# Patient Record
Sex: FEMALE | Race: BLACK OR AFRICAN AMERICAN | Employment: OTHER | URBAN - METROPOLITAN AREA
[De-identification: names, ages, dates, MRNs, and addresses within clinical notes are randomized per-mention and may not be internally consistent; named-entity substitution may affect disease eponyms.]

---

## 2017-01-04 ENCOUNTER — TELEPHONE (OUTPATIENT)
Dept: NEUROLOGY | Age: 72
End: 2017-01-04

## 2017-01-13 ENCOUNTER — DOCUMENTATION ONLY (OUTPATIENT)
Dept: NEUROLOGY | Age: 72
End: 2017-01-13

## 2017-01-13 NOTE — PROGRESS NOTES
Tried to fax notes several time to Dr Brielle Kong, but line is consistently busy.  Documenting this has been attempted

## 2018-08-20 ENCOUNTER — HOSPITAL ENCOUNTER (OUTPATIENT)
Dept: NUCLEAR MEDICINE | Age: 73
Discharge: HOME | End: 2018-08-20
Attending: ORTHOPAEDIC SURGERY
Payer: MEDICARE

## 2018-08-20 DIAGNOSIS — M25.562 LEFT KNEE PAIN: ICD-10-CM

## 2018-08-20 DIAGNOSIS — M25.561 RIGHT KNEE PAIN: ICD-10-CM

## 2018-08-20 PROCEDURE — 78315 BONE IMAGING 3 PHASE: CPT

## 2018-08-22 ENCOUNTER — OFFICE VISIT (OUTPATIENT)
Dept: RHEUMATOLOGY | Age: 73
End: 2018-08-22

## 2018-08-22 VITALS
TEMPERATURE: 97.8 F | DIASTOLIC BLOOD PRESSURE: 78 MMHG | HEART RATE: 68 BPM | HEIGHT: 65 IN | BODY MASS INDEX: 32.82 KG/M2 | SYSTOLIC BLOOD PRESSURE: 159 MMHG | RESPIRATION RATE: 20 BRPM | WEIGHT: 197 LBS | OXYGEN SATURATION: 98 %

## 2018-08-22 DIAGNOSIS — D47.2 MGUS (MONOCLONAL GAMMOPATHY OF UNKNOWN SIGNIFICANCE): ICD-10-CM

## 2018-08-22 DIAGNOSIS — E55.9 VITAMIN D DEFICIENCY: ICD-10-CM

## 2018-08-22 DIAGNOSIS — M85.89 OSTEOPENIA OF MULTIPLE SITES: ICD-10-CM

## 2018-08-22 DIAGNOSIS — M06.9 RHEUMATOID ARTHRITIS WITH UNKNOWN RHEUMATOID FACTOR STATUS (HCC): Primary | ICD-10-CM

## 2018-08-22 DIAGNOSIS — M89.9 DISORDER OF BONE: ICD-10-CM

## 2018-08-22 DIAGNOSIS — Z79.60 LONG-TERM USE OF IMMUNOSUPPRESSANT MEDICATION: ICD-10-CM

## 2018-08-22 RX ORDER — METFORMIN HYDROCHLORIDE 500 MG/1
500 TABLET ORAL 2 TIMES DAILY WITH MEALS
COMMUNITY
End: 2018-09-28 | Stop reason: SDUPTHER

## 2018-08-22 RX ORDER — GLUCOSAMINE SULFATE 1500 MG
POWDER IN PACKET (EA) ORAL DAILY
COMMUNITY
End: 2018-08-28 | Stop reason: DRUGHIGH

## 2018-08-22 RX ORDER — PREDNISONE 5 MG/1
TABLET ORAL
Qty: 91 TAB | Refills: 0 | Status: SHIPPED | OUTPATIENT
Start: 2018-08-22 | End: 2018-09-21

## 2018-08-22 NOTE — PROGRESS NOTES
REASON FOR VISIT    This is the initial evaluation for Mr. Merced Downs a 81 y.o.  male for question of an inflammatory arthritis. The patient is referred to the [facility] at the request of . No ref. provider found. HISTORY OF PRESENT ILLNESS      I have reviewed and summarized old records from Shawna Baker, [facility], Godwin Galeazzi, NP office notes    She has had bilateral knee replacements, which appears to have been due to Rheumatoid Arthritis. In 8/18/2010, Bilateral Knee radiographs showed irregularity along the patella surface where there is a scalloped margin suggesting liner wear. In addition, the cement seen along the bone prosthetic interface of the anterior distal femoral cortex is less apparent, which also may reflect underlying liner wear and possibly early particulate disease. Heterotopic ossification has not changed in interval. Vascular calcifications reflecting atherosclerotic disease remains severe. Diffuse osteopenia is noted. In 9/27/2010, she saw [address] [place] [address] rheumatology. Bilateral Hand radiographs showed  Overall alignment is anatomic. The bones are demineralized overall. The joint spaces are relatively preserved throughout. Relatively mild degenerative changes are present in the DIP joints, and in the first CMC joints. There appears to be some mild soft tissue swelling overlying the right MCP joints. There are also several equivocal erosions noted involving the metacarpal heads of the right hand which raise the possibility of early erosions related to inflammatory arthritis such as rheumatoid. These changes appear slightly more conspicuous and the 2008 examination. Incidentally, there are fairly prominent vascular calcifications present indicating that the patient is likely diabetic or perhaps as renal disease    In 10/28/2010, she was started on methotrexate     In 8/03/2011, Right Hip radiograph showed there is diffuse osteopenia.  Degenerative changes noted in the lower lumbar spine. The SI joints and pubic symphysis are not widened. The right and left hip are normally aligned. The right hip demonstrates mild osteophyte formation most notable at the inferior aspect of the joint. There is minimal diffuse joint space narrowing. Similar changes are noted in the left hip with minimal osteophyte formation. There is preservation of the left hip joint space. No acute fractures. Dense vascular calcifications are noted. Soft tissue calcification lateral to the right iliac wing is visualized and was noted on prior abdominal and pelvic CT scan. Hypertrophic changes anterior/superior left iliac wing and left greater trochanter as before. In 9/08/2011, Bilateral Shoulder radiograph showed RIGHT: no evidence of fracture or dislocation. Alignment of the glenohumeral joint is anatomic. The patient is status post distal clavicle resection and acromioplasty. A small amount of heterotopic ossification is noted about the distal aspect of the clavicle. Mild marginal osteophytes noted at the the glenoid. Irregularity along the superolateral aspect of the humeral head may reflect rotator cuff pathology. An ovoid well-corticated calcific density projecting over the humeral head may represent an intraventricular loose body. The visualized right lung is clear. Soft tissues are unremarkable. LEFT: no evidence of fracture or dislocation. Alignment of the glenohumeral and acromioclavicular joints is anatomic. Moderate superiorly projecting osteophytes emanate from the distal clavicle at the acromioclavicular joint. Bony hypertrophy irregularity about the greater tuberosity may indicate chronic rotator cuff pathology. There is a small subacromial spur. The visualized left lung is clear. Multiple mediastinal clips are noted. Bone mineralization is mildly diminished.     In 9/26/2011, CT Right Shoulder with arthrogram showed there is a large full-thickness tear seen involving the supraspinatus tendon extending to the level of the acromion. Additionally there is a partial tear of the subscapularis tendon. The biceps appears chronically torn. There are mild degenerative changes of the acromioclavicular joint. Some mild osteoarthritic changes are present of the glenohumeral joint. No evidence of fracture was seen. Im 5/04/2016, DXA showed (excluded L4 for spondylitic change, right hip) lumbar spine L1-L3 T score -1.5 (BMD 0.954 g/cm2), left femoral neck T score: -1.0 (0.805 g/cm2), left total hip T score: -0.1 (1.016 g/cm2), and distal one third left radius T score -1.9 (BMD 0.571 g/cm2). FRAX score 6.1 % probability in 10 years for major osteoporotic fracture and 0.3 % 10 year probability of hip fracture. In 6/21/2018, labs showed WBC 6.7, lymphocytes 2.7, Hct 38.4%, platelets 549,920, creatinine 0.85 mg/dL, eGFR 80, albumin 4.3 g/dL, ALK 68 U/L, ALT 11 U/L, AST 34 U/L, HbA1c 6.5%, negative SUPA IFA    In 8/20/2018, Triple Phase Scan showed Patient status post bilateral knee replacements. Phase 1 (blood flow): There is slight increased blood flow to the left knee. Phase 2 (blood pool/soft tissue): There is slight increased peritracheal activity left knee. Phase 3 (delayed bone): Patient status post right knee replacement which is within normal limits. Patient is status post left knee replacement. There are slight increased activity left tibial plateau. Today, she complains of aching pain in her hands, shoulders and knees (right more than left). Her hand and shoulder pain is worse at night and in the morning. She has stiffness lasting hours. She has swelling in her hands and knees. She is unable to make a fist and has pain walking. Hot water helps. Resting, rain, and cold makes it worse. She is Aleve without relief. Prednisone helped. She had seen another rheumatologist a while back and recommended an infusion but her insurance did not cover it.  She no longer follows with a rheumatologist.    Therapy History includes:    Current DMARD therapy includes: methotrexate 20 mg every Saturday  Prior DMARD therapy includes: none  The following DMARDs have been ineffective: none  The following DMARDs were stopped because of side effects: none    REVIEW OF SYSTEMS    A 15 point review of systems was performed and summarized below. The questionnaire was reviewed with the patient and scanned into the patient's medical record.     General: denies recent weight gain, recent weight loss, fatigue, weakness, fever, night sweats  Musculoskeletal: endorses joint pain, joint swelling, morning stiffness (lasting 120 minutes), muscle pain  Ears: denies ringing in ears, loss of hearing, deafness  Eyes: denies pain, redness, loss of vision, double vision, blurred vision, dryness, foreign body sensation  Mouth: denies sore tongue, oral ulcers, bleeding gums, loss of taste, dryness, increased dental caries  Nose: denies nosebleeds, loss of smell, nasal ulcers  Throat: denies frequent sore throats, hoarseness, difficulty in swallowing, pain in jaw while chewing  Neck: denies swollen glands, tender glands  Cardiopulmonary: denies pain in chest, irregular heart beat, sudden changes in heart beat, shortness of breath, difficulty breathing at night, dry cough, productive cough, coughing of blood, wheezing  Gastrointestinal: endorses heartburn, denies nausea, stomach pain relieved by food, vomiting of blood/\"coffee grounds\", jaundice, increasing constipation, persistent diarrhea, blood in stools, black stools  Genitourinary: denies nocturia, difficult urination, pain or burning on urination, blood in urine, cloudy urine, pus in urine, genital discharge, frequent urination, vaginal dryness, rash/ulcers, sexual difficulties  Hematologic: denies anemia, bleeding tendency, blood clots  Skin: endorses easy bruising, denies sun sensitive, rash, redness, hives, skin tightness, nodules/bumps, hair loss, color changes of hands or feet in the cold (Raynaud's)  Neurologic: endorses muscle weakness, numbness or tingling in hands/feet, denies headaches, dizziness,  memory loss  Psychiatric: denies depression, excessive worries, PTSD, Bipolar  Sleep: endorses poor sleep, difficulty falling asleep, difficulty staying asleep , denies snoring, apnea, daytime somnolence    PAST MEDICAL HISTORY    He has a past medical history of Arthritis; Diabetes ([address] High cholesterol; and Hypertension. He also has no past medical history of Difficult intubation; Malignant hyperthermia due to anesthesia; Nausea & vomiting; Pseudocholinesterase deficiency; or Unspecified adverse effect of anesthesia. FAMILY HISTORY    His family history includes Heart Disease in his mother. There is no history of Dementia, Cancer, or Seizures. SOCIAL HISTORY    He reports that he has never smoked. He has never used smokeless tobacco. He reports that he does not drink alcohol or use illicit drugs. GYNECOLOGIC HISTORY     2, Para 2, Living 2, Miscarriage 0  She denies severe pre-eclampsia, eclampsia or placental insufficiency    HEALTH MAINTENANCE    Immunizations    There is no immunization history on file for this patient. MEDICATIONS    Current Outpatient Prescriptions   Medication Sig Dispense Refill    cholecalciferol (VITAMIN D3) 1,000 unit cap Take  by mouth daily.  metFORMIN (GLUCOPHAGE) 500 mg tablet Take 500 mg by mouth two (2) times daily (with meals).  methotrexate (RHEUMATREX) 2.5 mg tablet dose pack Take 20 mg by mouth Every Saturday.  predniSONE (DELTASONE) 5 mg tablet 4 tabs daily for 7 days, 3 tabs for 7 days, 2 tabs for 14 days, 1 tab for 14 days 91 Tab 0    alendronate (FOSAMAX) 70 mg tablet Take 70 mg by mouth every seven (7) days.  aspirin delayed-release 81 mg tablet Take  by mouth daily.  benzonatate (TESSALON) 100 mg capsule Take 100 mg by mouth three (3) times daily as needed for Cough.       cyanocobalamin (VITAMIN B-12) 1,000 mcg sublingual tablet Take 1,000 mcg by mouth daily.  fluticasone (FLOVENT DISKUS) 50 mcg/actuation inhaler Take  by inhalation.  folic acid (FOLVITE) 1 mg tablet Take  by mouth daily.  furosemide (LASIX) 20 mg tablet Take  by mouth daily.  metoprolol tartrate (LOPRESSOR) 25 mg tablet Take  by mouth two (2) times a day.  nortriptyline (PAMELOR) 50 mg capsule Take 50 mg by mouth nightly.  simvastatin (ZOCOR) 20 mg tablet Take  by mouth nightly.  lisinopril (PRINIVIL, ZESTRIL) 10 mg tablet Take 20 mg by mouth daily.  FERROUS SULFATE, DRIED (IRON, DRIED, PO) Take  by mouth.  OMEPRAZOLE PO Take  by mouth daily.  diclofenac EC (VOLTAREN) 50 mg EC tablet Take 1 Tab by mouth two (2) times daily as needed for Pain. 20 Tab 0       ALLERGIES    Allergies   Allergen Reactions    Lisinopril Rash       PHYSICAL EXAMINATION    Visit Vitals    /78 (BP 1 Location: Left arm, BP Patient Position: Sitting)    Pulse 68    Temp 97.8 °F (36.6 °C) (Oral)    Resp 20    Ht 5' 5\" (1.651 m)    Wt 197 lb (89.4 kg)    SpO2 98%    BMI 32.78 kg/m2     Body mass index is 32.78 kg/(m^2). General: Patient is alert, oriented x 3, not in acute distress    HEENT:   Conjunctiva are not injected and appear moist, oral mucous membranes are moist, there are no ulcers present, there is no alopecia, neck is supple, there is no lymphadenopathy. Salivary glands are normal    Cardiovascular:  Heart is regular rate and rhythm, no murmurs. Chest:  Lungs are clear to auscultation bilaterally. Extremities:  Free of clubbing, cyanosis, edema, extremities well perfused. Neurological exam:  No focal sensory deficits, muscle strength is full in upper and lower extremities. Skin exam:  There are no rashes, no tophi, no psoriasis, no active Raynaud's, no livedo reticularis, no periungual erythema.     Musculoskeletal exam:  A comprehensive musculoskeletal exam was performed for all joints of each upper and lower extremity and assessed for swelling, tenderness and range of motion. Pertinent results are documented as below:    Decreased ROM of bilateral shoulders (right worse than left) due to pain  Bilateral knee replacements without effusion.   Bilateral ankle swelling with tenderness on the right    Z-Deformities:   no  [place] Deformities:  no  [place]ierre's Deformities:  no  Ulnar Deviation:   Yes (LEFT:3rd digit)  MCP Subluxation:  no    Joint Count 8/22/2018   Patient pain (0-100) 90   MHAQ 0.5   Left shoulder - Tender 1   Left shoulder - Swollen 1   Left elbow - Tender 1   Left elbow - Swollen 1   Left wrist- Swollen 1   Left 1st MCP - Tender 1   Left 1st MCP - Swollen 1   Left 2nd MCP - Tender 1   Left 2nd MCP - Swollen 1   Left 3rd MCP - Tender 1   Left 3rd MCP - Swollen 1   Left 4th MCP - Tender 1   Left 4th MCP - Swollen 1   Left 5th MCP - Tender 1   Left 5th MCP - Swollen 1   Left thumb IP - Tender 1   Left 2nd PIP - Tender 1   Left 2nd PIP - Swollen 1   Left 3rd PIP - Tender 1   Left 3rd PIP - Swollen 1   Left 4th PIP - Tender 1   Left 4th PIP - Swollen 1   Left 5th PIP - Tender 1   Left 5th PIP - Swollen 1   Right shoulder - Tender 1   Right elbow - Tender 1   Right elbow - Swollen 1   Right wrist- Tender 1   Right wrist- Swollen 1   Right 1st MCP - Tender 1   Right 2nd MCP - Tender 1   Right 2nd MCP - Swollen 1   Right 3rd MCP - Tender 1   Right 3rd MCP - Swollen 1   Right 4th MCP - Tender 1   Right 4th MCP - Swollen 1   Right 5th MCP - Tender 1   Right 5th MCP - Swollen 1   Right thumb IP - Tender 1   Right 2nd PIP - Tender 1   Right 2nd PIP - Swollen 1   Right 3rd PIP - Tender 1   Right 3rd PIP - Swollen 1   Right 4th PIP - Tender 1   Right 4th PIP - Swollen 1   Right 5th PIP - Tender 1   Right 5th PIP - Swollen 1   Tender Joint Count (Total) 25   Swollen Joint Count (Total) 22   Physician Assessment (0-10) 7   Patient Assessment (0-10) 9   CDAI Total (calculated) 63       DATA REVIEW    Prior medical records were reviewed and are summarized as below:    Laboratory data: summarized in the HPI    Imaging: summarized in the HPI. ASSESSMENT AND PLAN    1) Rheumatoid Arthritis. She has long standing Rheumatoid Arthritis that has involved her shoulders, wrists, hands, knees and ankles. She has had bilateral knee replacement in the past with persistent pain likely due to active/recurrent synovitis in the replaced joint. Bone scan showed vascular flow suggestive of this. She has been on methotrexate since 10/2010 and intermittent doses of prednisone. She was recommended an infusion but it was not covered by her insurance per her report. Her CDAI was 63 with 25 tender and 22 swollen joints, with right ankle involvement, consistent with high disease activity. I discussed with him about advancing therapy to a biologic (small particle versus anti-TNF versus anti-IL-7 versus anti-CD20). We discussed the potential adverse effects, which include infections, such as upper respiratory infections, latent TB reactivation, viral hepatitis activation, and routes of administration (oral versus infusion versus subcutaneous). The patient was informed about the low risk for lymphoma based on at least 20 years of post-market data and the likely inherent relation between chronic autoimmune diseases, such as Rheumatoid Arthritis, and lymphoma. The patient was informed these medications co-pay are subject to the patient's insurance coverage and we will not know until it has been submitted to the insurance company. She preferred an infusion. An order will be submitted today to [facility] for [address]. I explained that she may need to apply for patient assistance for the drug company if her copay is too high. I asked her to continue methotrexate 20 mg every Saturday. I will start him on a short course of prednisone, called a prednisone taper, with 5 mg tablets.  This regimen is to be taken as follows: 4 tabs (20 mg) for 7 days, 3 tabs (15 mg) for 7 days, 2 tabs (10 mg) for 14 days and then 1 tab (5 mg) for 14 days, and then stop. Labs and radiographs today. 2) Long Term Use of Immunosuppressants. The patient remains on immunomodulatory medications (methotrexate) and requires frequent toxicity monitoring by blood work. Respective labs were ordered (CBC and CMP). 3) Vitamin D Deficiency. She is on replacement. I will check her vitamin D level today, and if it is low, I will prescribe her a weekly supplement called ergocalciferol 50,000.    4) Osteopenia involving Multiple Joints. Her most recent DXA on 5/04/201 umbar spine L1-L3 T score -1.5 (BMD 0.954 g/cm2) and distal one third left radius T score -1.9 (BMD 0.571 g/cm2). This is likely due to her chronic Rheumatoid Arthritis. She is on Fosamax per her PCP. Based on her DXA, she may not require Fosamax. Cristiana Blank al reported that the risk of atypical femoral fractures is low, with an incidence of up to 50 per 100,000 person-years during the first 5 years of bisphosphonate use, resulting in a clear positive benefit/risk ratio within this time frame (J Bone [place] Res. 2016 Jan;31(1):16-35). The patient voiced understanding of the aforementioned assessment and plan. Summary of plan was provided in the After Visit Summary patient instructions. I also provided education about MyChart setup and utility.     TODAY'S ORDERS    Orders Placed This Encounter    QUANTIFERON TB GOLD    XR FOOT LT MIN 3 V    XR FOOT RT MIN 3 V    XR HAND LT MIN 3 V    XR HAND RT MIN 3 V    CYCLIC CITRUL PEPTIDE AB, IGG    CBC WITH AUTOMATED DIFF    CHRONIC HEPATITIS PANEL    METABOLIC PANEL, COMPREHENSIVE    C REACTIVE PROTEIN, QT    SED RATE (ESR)    RHEUMATOID FACTOR, QL    PROTEIN ELECTROPHORESIS W/ REFLX TIANNA    URIC ACID    VITAMIN D, 25 HYDROXY    methotrexate (RHEUMATREX) 2.5 mg tablet dose pack    predniSONE (DELTASONE) 5 mg tablet     Future Appointments  Date Time Provider Dorothy Mirandai   11/19/2018 9:40 AM MD Clint Ambrosio MD, [address]    Adult Rheumatology   Rheumatology Ultrasound Certified  [facility]  A Part of [address], [address] [place] [address]   Phone [phone]  Fax [phone]

## 2018-08-22 NOTE — MR AVS SNAPSHOT
[address] [facility] [address] 
[phone] Patient: Melisa Alvarez MRN: [id number] :1945 Visit Information Date & Time Provider Department Dept. Phone Encounter #  
 2018 11:00 AM Ana Cristina Tuttle [facility] [phone] [id number] Follow-up Instructions Return in about 3 months (around 2018). Upcoming Health Maintenance Date Due Hepatitis C Screening 1945 DTaP/Tdap/Td series (1 - Tdap) 10/31/1968 FOBT Q 1 YEAR AGE 50-75 10/31/1997 ZOSTER VACCINE AGE 60> 2007 GLAUCOMA SCREENING Q2Y 10/31/2012 Pneumococcal 65+ Low/Medium Risk (1 of 2 - PCV13) 10/31/2012 Influenza Age 5 to Adult 2018 MEDICARE YEARLY EXAM 2018 Allergies as of 2018  Review Complete On: 2018 By: Karyna Sloan LPN Severity Noted Reaction Type Reactions Lisinopril  2018    Rash Current Immunizations  Reviewed on 11/3/2013 No immunizations on file. Not reviewed this visit You Were Diagnosed With   
  
 Codes Comments Rheumatoid arthritis with unknown rheumatoid factor status ([facility]    -  Primary ICD-10-CM: M06.9 ICD-9-CM: 714.0 Long-term use of immunosuppressant medication     ICD-10-CM: Z79.899 ICD-9-CM: V58.69 Osteopenia of multiple sites     ICD-10-CM: M85.89 ICD-9-CM: 733.90 Disorder of bone     ICD-10-CM: M89.9 ICD-9-CM: 733.90 Vitals BP Pulse Temp Resp Height(growth percentile) Weight(growth percentile) 159/78 (BP 1 Location: Left arm, BP Patient Position: Sitting) 68 97.8 °F (36.6 °C) (Oral) 20 5' 5\" (1.651 m) 197 lb (89.4 kg) SpO2 BMI Smoking Status 98% 32.78 kg/m2 Never Smoker Vitals History BMI and BSA Data Body Mass Index Body Surface Area 32.78 kg/m 2 2.02 m 2 Preferred Pharmacy Pharmacy Name Phone [facility]Y [id number], [place] - [address] [phone] Your Updated Medication List  
  
   
This list is accurate as of 8/22/18 11:35 AM.  Always use your most recent med list.  
  
  
  
  
 alendronate 70 mg tablet Commonly known as:  FOSAMAX Take 70 mg by mouth every seven (7) days. aspirin delayed-release 81 mg tablet Take  by mouth daily. benzonatate 100 mg capsule Commonly known as:  TESSALON Take 100 mg by mouth three (3) times daily as needed for Cough. cyanocobalamin 1,000 mcg sublingual tablet Commonly known as:  VITAMIN B-12 Take 1,000 mcg by mouth daily. diclofenac EC 50 mg EC tablet Commonly known as:  VOLTAREN Take 1 Tab by mouth two (2) times daily as needed for Pain. fluticasone 50 mcg/actuation inhaler Commonly known as:  FLOVENT DISKUS Take  by inhalation. folic acid 1 mg tablet Commonly known as:  Google Take  by mouth daily. furosemide 20 mg tablet Commonly known as:  LASIX Take  by mouth daily. IRON (DRIED) PO Take  by mouth.  
  
 lisinopril 10 mg tablet Commonly known as:  Donnald Code Take 20 mg by mouth daily. metFORMIN 500 mg tablet Commonly known as:  GLUCOPHAGE Take 500 mg by mouth two (2) times daily (with meals). methotrexate 2.5 mg tablet dose pack Commonly known as:  Madhavi Frames Take 20 mg by mouth Every Saturday. metoprolol tartrate 25 mg tablet Commonly known as:  LOPRESSOR Take  by mouth two (2) times a day. nortriptyline 50 mg capsule Commonly known as:  PAMELOR Take 50 mg by mouth nightly. OMEPRAZOLE PO Take  by mouth daily. predniSONE 5 mg tablet Commonly known as:  DELTASONE  
4 tabs daily for 7 days, 3 tabs for 7 days, 2 tabs for 14 days, 1 tab for 14 days  
  
 simvastatin 20 mg tablet Commonly known as:  ZOCOR Take  by mouth nightly. VITAMIN D3 1,000 unit Cap Generic drug:  cholecalciferol Take  by mouth daily. Prescriptions Sent to Pharmacy Refills  
 predniSONE (DELTASONE) 5 mg tablet 0 Si tabs daily for 7 days, 3 tabs for 7 days, 2 tabs for 14 days, 1 tab for 14 days Class: Normal  
 Pharmacy: [facility] [id number] [facility] [address]  #: [phone] We Performed the Following C REACTIVE PROTEIN, QT [33848 CPT(R)] CBC WITH AUTOMATED DIFF [53624 CPT(R)] CHRONIC HEPATITIS PANEL [GRQ8839 Custom] [address], IGG [id number] CPT(R)] METABOLIC PANEL, COMPREHENSIVE [49611 CPT(R)] PROTEIN ELECTROPHORESIS W/ REFLX TIANNA [[id number] Custom] QUANTIFERON TB GOLD [[id number] Custom] RHEUMATOID FACTOR, QL Q5664512 CPT(R)] SED RATE (ESR) V6779843 CPT(R)] URIC ACID S6583717 CPT(R)] VITAMIN D, 25 HYDROXY I979461 CPT(R)] Follow-up Instructions Return in about 3 months (around 2018). To-Do List   
 2018 Imaging:  XR FOOT LT MIN 3 V   
  
 2018 Imaging:  XR FOOT RT MIN 3 V   
  
 2018 Imaging:  XR HAND LT MIN 3 V   
  
 2018 Imaging:  XR HAND RT MIN 3 V Patient Instructions Continue methotrexate 8 tablets every Saturday. Take folic acid 1 mg daily I will start you on a short course of prednisone, called a prednisone taper, with 5 mg tablets. This regimen is to be taken as follows:  
 
 - 4 tablets (20 mg), all at once, daily for 7 days - 3 tablets (15 mg), all at once, daily for 7 days - 2 tablets (10 mg), all at once, daily for 14 days - 1 tablet (5 mg), daily for 14 days - then STOP Golimumab (By injection) Golimumab ([id number]-EHU-yh-mab) Treats rheumatoid arthritis, psoriatic arthritis, ankylosing spondylitis, and ulcerative colitis. Brand Name(s): Frosty Homans There may be other brand names for this medicine. When This Medicine Should Not Be Used: This medicine is not right for everyone. You should not receive it if you had an allergic reaction to golimumab. How to Use This Medicine:  
Injectable · Your doctor will prescribe your dose and schedule. This medicine is given through a needle placed in a vein or as a shot under your skin. Nelly Mixer must be injected slowly, so the IV will need to stay in place for 30 minutes. · A nurse or other health provider will give you this medicine. · You may be taught how to give your medicine at home. Make sure you understand all instructions before giving yourself an injection. Do not use more medicine or use it more often than your doctor tells you to. · Use a new needle and syringe each time you inject your medicine. · You will be shown the body areas where this shot can be given. Use a different body area each time you give yourself a shot. Keep track of where you give each shot to make sure you rotate body areas. Do not inject into skin areas that are red, bruised, tender, or hard, or have scars or stretch marks. · Check the liquid in the syringe or autoinjector. It should be clear and colorless or slightly yellow. Do not use Simponi® if it is cloudy, discolored, or has particles in it. Do not shake the medicine. · This medicine should come with a Medication Guide. Ask your pharmacist for a copy if you do not have one. · Missed dose: Take a dose as soon as you remember. If it is almost time for your next dose, wait until then and take a regular dose. Do not take extra medicine to make up for a missed dose. · If you store this medicine at home, keep it in the refrigerator. Do not freeze. Protect the medicine from direct light. Keep the medicine in the original package until you are ready to use it. Drugs and Foods to Avoid: Ask your doctor or pharmacist before using any other medicine, including over-the-counter medicines, vitamins, and herbal products. · Some medicines can affect how golimumab works. Tell your doctor if you are using any of the following: ¨ Abatacept, adalimumab, anakinra, certolizumab, cyclosporine, etanercept, infliximab, rituximab, theophylline, tocilizumab ¨ Blood thinner (including warfarin) ¨ Medicine that weakens the immune system (including a steroid or cancer medicine) · This medicine may interfere with vaccines. Ask your doctor before you get a flu shot or any other vaccines. Warnings While Using This Medicine: · Tell your doctor if you are pregnant or breastfeeding, or if you have liver disease, cancer, heart failure, diabetes, COPD, psoriasis, problems with your immune system, Wegener granulomatosis, optic neuritis, multiple sclerosis, or a history of Guillain-Barré syndrome. Tell your doctor if you have any type of infection (including hepatitis B or tuberculosis) or an infection that keeps coming back. Tell your doctor if you are allergic to latex. · This medicine may cause the following problems: 
¨ Increased risk for infection ¨ Increased risk of certain cancers (including lymphoma, leukemia, colon cancer, skin cancer) ¨ Heart problems, including heart failure ¨ Liver problems ¨ Lupus-like syndrome · You will need to have a skin test for tuberculosis (TB) before you start this medicine. Tell your doctor if you or anyone in your home has ever had a positive TB skin test or been exposed to TB. · This medicine may make you bleed, bruise, or get infections more easily. Take precautions to prevent illness and injury. Wash your hands often. · This medicine may make your skin more sensitive to sunlight. Wear sunscreen. Do not use sunlamps or tanning beds. · Your doctor will do lab tests at regular visits to check on the effects of this medicine. Keep all appointments. · Throw away used needles in a hard, closed container that the needles cannot poke through. Keep this container away from children and pets. · Keep all medicine out of the reach of children. Never share your medicine with anyone. Possible Side Effects While Using This Medicine:  
Call your doctor right away if you notice any of these side effects: · Allergic reaction: Itching or hives, swelling in your face or hands, swelling or tingling in your mouth or throat, chest tightness, trouble breathing · Change in how much or how often you urinate, painful urination · Changes in vision · Dark urine or pale stools, nausea, vomiting, loss of appetite, stomach pain, yellow skin or eyes · Fever, chills, cough, runny or stuffy nose, sore throat, and body aches · Numbness, tingling, or burning pain in your hands, arms, legs, or feet · Rapid weight gain, swelling in your hands, ankles, or feet · Swollen glands in the neck, underarms, or groin · Trouble breathing, chest pain, uneven heartbeat · Unusual bleeding, bruising, or weakness · Warm, red, swollen, or painful skin, blisters, skin sores If you notice these less serious side effects, talk with your doctor: · Redness, itching, pain, or swelling where the needle was placed or the shot was given If you notice other side effects that you think are caused by this medicine, tell your doctor. Call your doctor for medical advice about side effects. You may report side effects to FDA at 6-357-FDA-4898 © 2017 [facility] Information is for End User's use only and may not be sold, redistributed or otherwise used for commercial purposes. The above information is an  only. It is not intended as medical advice for individual conditions or treatments. Talk to your doctor, nurse or pharmacist before following any medical regimen to see if it is safe and effective for you. Introducing [facility]! [facility] introduces [employer] patient portal. Now you can access parts of your medical record, email your doctor's office, and request medication refills online. 1. In your internet browser, go to [email or web address] 2. Click on the First Time User? Click Here link in the Sign In box. You will see the New Member Sign Up page. 3. Enter your [employer] Access Code exactly as it appears below. You will not need to use this code after youve completed the sign-up process. If you do not sign up before the expiration date, you must request a new code. · [employer] Access Code: [email or web address] Expires: 10/22/2018  3:21 PM 
 
4. Enter the last four digits of your Social Security Number (xxxx) and Date of Birth (mm/dd/yyyy) as indicated and click Submit. You will be taken to the next sign-up page. 5. Create a [employer] ID. This will be your [employer] login ID and cannot be changed, so think of one that is secure and easy to remember. 6. Create a [employer] password. You can change your password at any time. 7. Enter your Password Reset Question and Answer. This can be used at a later time if you forget your password. 8. Enter your e-mail address. You will receive e-mail notification when new information is available in [address] 9. Click Sign Up. You can now view and download portions of your medical record. 10. Click the Download Summary menu link to download a portable copy of your medical information. If you have questions, please visit the Frequently Asked Questions section of the [employer] website. Remember, [employer] is NOT to be used for urgent needs. For medical emergencies, dial 911. Now available from your iPhone and Android! Please provide this summary of care documentation to your next provider. Your primary care clinician is listed as Lisbet Pedraza. If you have any questions after today's visit, please call [phone].

## 2018-08-22 NOTE — PATIENT INSTRUCTIONS
Continue methotrexate 8 tablets every Saturday. Take folic acid 1 mg daily    I will start you on a short course of prednisone, called a prednisone taper, with 5 mg tablets. This regimen is to be taken as follows:      - 4 tablets (20 mg), all at once, daily for 7 days   - 3 tablets (15 mg), all at once, daily for 7 days   - 2 tablets (10 mg), all at once, daily for 14 days   - 1 tablet (5 mg), daily for 14 days   - then STOP    Golimumab (By injection)   Golimumab ([id number]-mab)  Treats rheumatoid arthritis, psoriatic arthritis, ankylosing spondylitis, and ulcerative colitis. Brand Name(s): Simbreezyi, Simponi Aria   There may be other brand names for this medicine. When This Medicine Should Not Be Used: This medicine is not right for everyone. You should not receive it if you had an allergic reaction to golimumab. How to Use This Medicine:   Injectable  · Your doctor will prescribe your dose and schedule. This medicine is given through a needle placed in a vein or as a shot under your skin. Gus Macadam must be injected slowly, so the IV will need to stay in place for 30 minutes. · A nurse or other health provider will give you this medicine. · You may be taught how to give your medicine at home. Make sure you understand all instructions before giving yourself an injection. Do not use more medicine or use it more often than your doctor tells you to. · Use a new needle and syringe each time you inject your medicine. · You will be shown the body areas where this shot can be given. Use a different body area each time you give yourself a shot. Keep track of where you give each shot to make sure you rotate body areas. Do not inject into skin areas that are red, bruised, tender, or hard, or have scars or stretch marks. · Check the liquid in the syringe or autoinjector. It should be clear and colorless or slightly yellow. Do not use Simponi® if it is cloudy, discolored, or has particles in it.  Do not shake the medicine. · This medicine should come with a Medication Guide. Ask your pharmacist for a copy if you do not have one. · Missed dose: Take a dose as soon as you remember. If it is almost time for your next dose, wait until then and take a regular dose. Do not take extra medicine to make up for a missed dose. · If you store this medicine at home, keep it in the refrigerator. Do not freeze. Protect the medicine from direct light. Keep the medicine in the original package until you are ready to use it. Drugs and Foods to Avoid:   Ask your doctor or pharmacist before using any other medicine, including over-the-counter medicines, vitamins, and herbal products. · Some medicines can affect how golimumab works. Tell your doctor if you are using any of the following:  ¨ Abatacept, adalimumab, anakinra, certolizumab, cyclosporine, etanercept, infliximab, rituximab, theophylline, tocilizumab  ¨ Blood thinner (including warfarin)  ¨ Medicine that weakens the immune system (including a steroid or cancer medicine)  · This medicine may interfere with vaccines. Ask your doctor before you get a flu shot or any other vaccines. Warnings While Using This Medicine:   · Tell your doctor if you are pregnant or breastfeeding, or if you have liver disease, cancer, heart failure, diabetes, COPD, psoriasis, problems with your immune system, Wegener granulomatosis, optic neuritis, multiple sclerosis, or a history of Guillain-Barré syndrome. Tell your doctor if you have any type of infection (including hepatitis B or tuberculosis) or an infection that keeps coming back. Tell your doctor if you are allergic to latex.   · This medicine may cause the following problems:  ¨ Increased risk for infection  ¨ Increased risk of certain cancers (including lymphoma, leukemia, colon cancer, skin cancer)  ¨ Heart problems, including heart failure  ¨ Liver problems  ¨ Lupus-like syndrome  · You will need to have a skin test for tuberculosis (TB) before you start this medicine. Tell your doctor if you or anyone in your home has ever had a positive TB skin test or been exposed to TB. · This medicine may make you bleed, bruise, or get infections more easily. Take precautions to prevent illness and injury. Wash your hands often. · This medicine may make your skin more sensitive to sunlight. Wear sunscreen. Do not use sunlamps or tanning beds. · Your doctor will do lab tests at regular visits to check on the effects of this medicine. Keep all appointments. · Throw away used needles in a hard, closed container that the needles cannot poke through. Keep this container away from children and pets. · Keep all medicine out of the reach of children. Never share your medicine with anyone. Possible Side Effects While Using This Medicine:   Call your doctor right away if you notice any of these side effects:  · Allergic reaction: Itching or hives, swelling in your face or hands, swelling or tingling in your mouth or throat, chest tightness, trouble breathing  · Change in how much or how often you urinate, painful urination  · Changes in vision  · Dark urine or pale stools, nausea, vomiting, loss of appetite, stomach pain, yellow skin or eyes  · Fever, chills, cough, runny or stuffy nose, sore throat, and body aches  · Numbness, tingling, or burning pain in your hands, arms, legs, or feet  · Rapid weight gain, swelling in your hands, ankles, or feet  · Swollen glands in the neck, underarms, or groin  · Trouble breathing, chest pain, uneven heartbeat  · Unusual bleeding, bruising, or weakness  · Warm, red, swollen, or painful skin, blisters, skin sores  If you notice these less serious side effects, talk with your doctor:   · Redness, itching, pain, or swelling where the needle was placed or the shot was given  If you notice other side effects that you think are caused by this medicine, tell your doctor. Call your doctor for medical advice about side effects. You may report side effects to FDA at 4-546-FDA-9977  © 2017 [address] Information is for End User's use only and may not be sold, redistributed or otherwise used for commercial purposes. The above information is an  only. It is not intended as medical advice for individual conditions or treatments. Talk to your doctor, nurse or pharmacist before following any medical regimen to see if it is safe and effective for you.

## 2018-08-24 LAB
25(OH)D3+25(OH)D2 SERPL-MCNC: 19.3 NG/ML (ref 30–100)
ALBUMIN SERPL ELPH-MCNC: 3.6 G/DL (ref 2.9–4.4)
ALBUMIN SERPL-MCNC: 4.3 G/DL (ref 3.5–4.8)
ALBUMIN/GLOB SERPL: 1.2 {RATIO} (ref 0.7–1.7)
ALBUMIN/GLOB SERPL: 1.8 {RATIO} (ref 1.2–2.2)
ALP SERPL-CCNC: 66 IU/L (ref 39–117)
ALPHA1 GLOB SERPL ELPH-MCNC: 0.2 G/DL (ref 0–0.4)
ALPHA2 GLOB SERPL ELPH-MCNC: 0.8 G/DL (ref 0.4–1)
ALT SERPL-CCNC: 16 IU/L (ref 0–44)
AST SERPL-CCNC: 38 IU/L (ref 0–40)
B-GLOBULIN SERPL ELPH-MCNC: 0.9 G/DL (ref 0.7–1.3)
BASOPHILS # BLD AUTO: 0.1 X10E3/UL (ref 0–0.2)
BASOPHILS NFR BLD AUTO: 1 %
BILIRUB SERPL-MCNC: 1.2 MG/DL (ref 0–1.2)
BUN SERPL-MCNC: 14 MG/DL (ref 8–27)
BUN/CREAT SERPL: 18 (ref 10–24)
CALCIUM SERPL-MCNC: 8.2 MG/DL (ref 8.6–10.2)
CCP IGA+IGG SERPL IA-ACNC: 3 UNITS (ref 0–19)
CHLORIDE SERPL-SCNC: 107 MMOL/L (ref 96–106)
CO2 SERPL-SCNC: 22 MMOL/L (ref 20–29)
COMMENT, 144067: NORMAL
CREAT SERPL-MCNC: 0.78 MG/DL (ref 0.76–1.27)
CRP SERPL-MCNC: 1.3 MG/L (ref 0–4.9)
EOSINOPHIL # BLD AUTO: 0.1 X10E3/UL (ref 0–0.4)
EOSINOPHIL NFR BLD AUTO: 2 %
ERYTHROCYTE [DISTWIDTH] IN BLOOD BY AUTOMATED COUNT: 14.4 % (ref 12.3–15.4)
ERYTHROCYTE [SEDIMENTATION RATE] IN BLOOD BY WESTERGREN METHOD: 21 MM/HR (ref 0–30)
GAMMA GLOB SERPL ELPH-MCNC: 1.2 G/DL (ref 0.4–1.8)
GLOBULIN SER CALC-MCNC: 2.4 G/DL (ref 1.5–4.5)
GLOBULIN SER CALC-MCNC: 3.1 G/DL (ref 2.2–3.9)
GLUCOSE SERPL-MCNC: 97 MG/DL (ref 65–99)
HBV CORE AB SERPL QL IA: NEGATIVE
HBV CORE IGM SERPL QL IA: NEGATIVE
HBV E AB SERPL QL IA: NEGATIVE
HBV E AG SERPL QL IA: NEGATIVE
HBV SURFACE AB SER QL: NON REACTIVE
HBV SURFACE AG SERPL QL IA: NEGATIVE
HCT VFR BLD AUTO: 35.3 % (ref 37.5–51)
HCV AB S/CO SERPL IA: <0.1 S/CO RATIO (ref 0–0.9)
HGB BLD-MCNC: 11.5 G/DL (ref 13–17.7)
IGA SERPL-MCNC: 180 MG/DL (ref 61–437)
IGG SERPL-MCNC: 1072 MG/DL (ref 700–1600)
IGM SERPL-MCNC: 42 MG/DL (ref 20–172)
IMM GRANULOCYTES # BLD: 0 X10E3/UL (ref 0–0.1)
IMM GRANULOCYTES NFR BLD: 0 %
INTERPRETATION SERPL IEP-IMP: NORMAL
LYMPHOCYTES # BLD AUTO: 2.7 X10E3/UL (ref 0.7–3.1)
LYMPHOCYTES NFR BLD AUTO: 45 %
M PROTEIN SERPL ELPH-MCNC: 0.3 G/DL
MCH RBC QN AUTO: 30.3 PG (ref 26.6–33)
MCHC RBC AUTO-ENTMCNC: 32.6 G/DL (ref 31.5–35.7)
MCV RBC AUTO: 93 FL (ref 79–97)
MONOCYTES # BLD AUTO: 0.3 X10E3/UL (ref 0.1–0.9)
MONOCYTES NFR BLD AUTO: 4 %
NEUTROPHILS # BLD AUTO: 2.8 X10E3/UL (ref 1.4–7)
NEUTROPHILS NFR BLD AUTO: 48 %
PLATELET # BLD AUTO: 272 X10E3/UL (ref 150–379)
PLEASE NOTE, 011150: ABNORMAL
POTASSIUM SERPL-SCNC: 4.6 MMOL/L (ref 3.5–5.2)
PROT PATTERN SERPL ELPH-IMP: ABNORMAL
PROT SERPL-MCNC: 6.7 G/DL (ref 6–8.5)
RBC # BLD AUTO: 3.79 X10E6/UL (ref 4.14–5.8)
RHEUMATOID FACT SERPL-ACNC: <10 IU/ML (ref 0–13.9)
SODIUM SERPL-SCNC: 145 MMOL/L (ref 134–144)
URATE SERPL-MCNC: 3.6 MG/DL (ref 3.7–8.6)
WBC # BLD AUTO: 6 X10E3/UL (ref 3.4–10.8)

## 2018-08-27 LAB
ANNOTATION COMMENT IMP: NORMAL
GAMMA INTERFERON BACKGROUND BLD IA-ACNC: 0.16 IU/ML
M TB IFN-G BLD-IMP: NEGATIVE
M TB IFN-G CD4+ BCKGRND COR BLD-ACNC: 0.3 IU/ML
M TB IFN-G CD4+ T-CELLS BLD-ACNC: 0.46 IU/ML
MITOGEN IGNF BLD-ACNC: 8.14 IU/ML
QUANTIFERON INCUBATION: NORMAL
SERVICE CMNT-IMP: NORMAL

## 2018-08-28 DIAGNOSIS — D47.2 MGUS (MONOCLONAL GAMMOPATHY OF UNKNOWN SIGNIFICANCE): Primary | ICD-10-CM

## 2018-08-28 RX ORDER — ERGOCALCIFEROL 1.25 MG/1
50000 CAPSULE ORAL
Qty: 12 CAP | Refills: 3 | Status: SHIPPED | OUTPATIENT
Start: 2018-08-28 | End: 2018-11-20 | Stop reason: SDUPTHER

## 2018-09-18 ENCOUNTER — HOSPITAL ENCOUNTER (EMERGENCY)
Age: 73
Discharge: HOME | End: 2018-09-18
Attending: EMERGENCY MEDICINE
Payer: MEDICARE

## 2018-09-18 ENCOUNTER — APPOINTMENT (OUTPATIENT)
Dept: GENERAL RADIOLOGY | Age: 73
End: 2018-09-18
Payer: MEDICARE

## 2018-09-18 VITALS
HEIGHT: 65 IN | OXYGEN SATURATION: 99 % | WEIGHT: 197 LBS | SYSTOLIC BLOOD PRESSURE: 155 MMHG | BODY MASS INDEX: 32.82 KG/M2 | HEART RATE: 103 BPM | DIASTOLIC BLOOD PRESSURE: 75 MMHG | TEMPERATURE: 97.5 F | RESPIRATION RATE: 18 BRPM

## 2018-09-18 DIAGNOSIS — M53.3 COCCYX PAIN: ICD-10-CM

## 2018-09-18 DIAGNOSIS — M25.551 PAIN OF BOTH HIP JOINTS: ICD-10-CM

## 2018-09-18 DIAGNOSIS — M25.552 PAIN OF BOTH HIP JOINTS: ICD-10-CM
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
## 2018-09-18 PROCEDURE — 72220 X-RAY EXAM SACRUM TAILBONE: CPT

## 2018-09-18 PROCEDURE — 74011250637 HC RX REV CODE- 250/637: Performed by: PHYSICIAN ASSISTANT

## 2018-09-18 PROCEDURE — 99283 EMERGENCY DEPT VISIT LOW MDM: CPT

## 2018-09-18 PROCEDURE — 73521 X-RAY EXAM HIPS BI 2 VIEWS: CPT

## 2018-09-18 RX ORDER — TRAMADOL HYDROCHLORIDE 50 MG/1
50 TABLET ORAL
Qty: 15 TAB | Refills: 0 | Status: SHIPPED | OUTPATIENT
Start: 2018-09-18 | End: 2018-09-28 | Stop reason: ALTCHOICE

## 2018-09-18 RX ORDER — TRAMADOL HYDROCHLORIDE 50 MG/1
50 TABLET ORAL
Status: COMPLETED | OUTPATIENT
Start: 2018-09-18 | End: 2018-09-18

## 2018-09-18 RX ADMIN — TRAMADOL HYDROCHLORIDE 50 MG: 50 TABLET, FILM COATED ORAL at 15:28

## 2018-09-18 NOTE — DISCHARGE INSTRUCTIONS
Preventing Falls: Care Instructions  Your Care Instructions    Getting around your home safely can be a challenge if you have injuries or health problems that make it easy for you to fall. Loose rugs and furniture in walkways are among the dangers for many older people who have problems walking or who have poor eyesight. People who have conditions such as arthritis, osteoporosis, or dementia also have to be careful not to fall. You can make your home safer with a few simple measures. Follow-up care is a key part of your treatment and safety. Be sure to make and go to all appointments, and call your doctor if you are having problems. It's also a good idea to know your test results and keep a list of the medicines you take. How can you care for yourself at home? Taking care of yourself  · You may get dizzy if you do not drink enough water. To prevent dehydration, drink plenty of fluids, enough so that your urine is light yellow or clear like water. Choose water and other caffeine-free clear liquids. If you have kidney, heart, or liver disease and have to limit fluids, talk with your doctor before you increase the amount of fluids you drink. · Exercise regularly to improve your strength, muscle tone, and balance. Walk if you can. Swimming may be a good choice if you cannot walk easily. · Have your vision and hearing checked each year or any time you notice a change. If you have trouble seeing and hearing, you might not be able to avoid objects and could lose your balance. · Know the side effects of the medicines you take. Ask your doctor or pharmacist whether the medicines you take can affect your balance. Sleeping pills or sedatives can affect your balance. · Limit the amount of alcohol you drink. Alcohol can impair your balance and other senses. · Ask your doctor whether calluses or corns on your feet need to be removed.  If you wear loose-fitting shoes because of calluses or corns, you can lose your balance and fall. · Talk to your doctor if you have numbness in your feet. Preventing falls at home  · Remove raised doorway thresholds, throw rugs, and clutter. Repair loose carpet or raised areas in the floor. · Move furniture and electrical cords to keep them out of walking paths. · Use nonskid floor wax, and wipe up spills right away, especially on ceramic tile floors. · If you use a walker or cane, put rubber tips on it. If you use crutches, clean the bottoms of them regularly with an abrasive pad, such as steel wool. · Keep your house well lit, especially [place], and outside walkways. Use night-lights in areas such as hallways and bathrooms. Add extra light switches or use remote switches (such as switches that go on or off when you clap your hands) to make it easier to turn lights on if you have to get up during the night. · Install sturdy handrails on stairways. · Move items in your cabinets so that the things you use a lot are on the lower shelves (about waist level). · Keep a cordless phone and a flashlight with new batteries by your bed. If possible, put a phone in each of the main rooms of your house, or carry a cell phone in case you fall and cannot reach a phone. Or, you can wear a device around your neck or wrist. You push a button that sends a signal for help. · Wear low-heeled shoes that fit well and give your feet good support. Use footwear with nonskid soles. Check the heels and soles of your shoes for wear. Repair or replace worn heels or soles. · Do not wear socks without shoes on wood floors. · Walk on the grass when the sidewalks are slippery. If you live in an area that gets snow and ice in the winter, sprinkle salt on slippery steps and sidewalks. Preventing falls in the bath  · Install grab bars and nonskid mats inside and outside your shower or tub and near the toilet and sinks. · Use shower chairs and bath benches.   · Use a hand-held shower head that will allow you to sit while showering. · Get into a tub or shower by putting the weaker leg in first. Get out of a tub or shower with your strong side first.  · Repair loose toilet seats and consider installing a raised toilet seat to make getting on and off the toilet easier. · Keep your bathroom door unlocked while you are in the shower. Where can you learn more? Go to http://annette-evelin.info/. Enter 0476 [id number] in the search box to learn more about \"Preventing Falls: Care Instructions. \"  Current as of: May 12, 2017  Content Version: 11.7  © 6884-7166 [employer]. Care instructions adapted under license by [employer] (which disclaims liability or warranty for this information). If you have questions about a medical condition or this instruction, always ask your healthcare professional. [place] [address] any warranty or liability for your use of this information. Hip Pain: Care Instructions  Your Care Instructions    Hip pain may be caused by many things, including overuse, a fall, or a twisting movement. Another cause of hip pain is arthritis. Your pain may increase when you stand up, walk, or squat. The pain may come and go or may be constant. Home treatment can help relieve hip pain, swelling, and stiffness. If your pain is ongoing, you may need more tests and treatment. Follow-up care is a key part of your treatment and safety. Be sure to make and go to all appointments, and call your doctor if you are having problems. It's also a good idea to know your test results and keep a list of the medicines you take. How can you care for yourself at home? · Take pain medicines exactly as directed. ¨ If the doctor gave you a prescription medicine for pain, take it as prescribed. ¨ If you are not taking a prescription pain medicine, ask your doctor if you can take an over-the-counter medicine. · Rest and protect your hip.  Take a break from any activity, including standing or walking, that may cause pain. · Put ice or a cold pack against your hip for 10 to 20 minutes at a time. Try to do this every 1 to 2 hours for the next 3 days (when you are awake) or until the swelling goes down. Put a thin cloth between the ice and your skin. · Sleep on your healthy side with a pillow between your knees, or sleep on your back with pillows under your knees. · If there is no swelling, you can put moist heat, a heating pad, or a warm cloth on your hip. Do gentle stretching exercises to help keep your hip flexible. · Learn how to prevent falls. Have your vision and hearing checked regularly. Wear slippers or shoes with a nonskid sole. · Stay at a healthy weight. · Wear comfortable shoes. When should you call for help? Call 911 anytime you think you may need emergency care. For example, call if:    · You have sudden chest pain and shortness of breath, or you cough up blood.     · You are not able to stand or walk or bear weight.     · Your buttocks, legs, or feet feel numb or tingly.     · Your leg or foot is cool or pale or changes color.     · You have severe pain.    Call your doctor now or seek immediate medical care if:    · You have signs of infection, such as:  ¨ Increased pain, swelling, warmth, or redness in the hip area. ¨ Red streaks leading from the hip area. ¨ Pus draining from the hip area. ¨ A fever.     · You have signs of a blood clot, such as:  ¨ Pain in your calf, back of the knee, thigh, or groin. ¨ Redness and swelling in your leg or groin.     · You are not able to bend, straighten, or move your leg normally.     · You have trouble urinating or having bowel movements.    Watch closely for changes in your health, and be sure to contact your doctor if:    · You do not get better as expected. Where can you learn more? Go to http://annette-evelin.info/.   Enter T428 in the search box to learn more about \"Hip Pain: Care Instructions. \"  Current as of: November 20, 2017  Content Version: 11.7  © 8025-6646 [employer], [facility]. Care instructions adapted under license by [employer] (which disclaims liability or warranty for this information). If you have questions about a medical condition or this instruction, always ask your healthcare professional. [place] [address] any warranty or liability for your use of this information.

## 2018-09-18 NOTE — ED PROVIDER NOTES
EMERGENCY DEPARTMENT HISTORY AND PHYSICAL EXAM    Date: 9/18/2018  Patient Name: Tommie Wallace    History of Presenting Illness     Chief Complaint   Patient presents with    Fall     x 4days ago, pt states she broke tailbone in past, PCP advised to go to ED         History Provided By: Patient    Chief Complaint: fall  Duration: 4 Days  Timing:  Acute  Location: both hips, buttocks  Quality: Aching and Dull  Severity: 9 out of 10  Modifying Factors: walking makes pain worse  Associated Symptoms: denies any other associated signs or symptoms      HPI: Tommie Wallace is a 81 y.o. female with a PMH of DM, HTN, HLD, Arthritis who presents to the ER c/o bilateral hip pain and sacrum pain. Patient states she slipped out of bed and fell on the hard, tile floor about 4 days prior. She did not hit her head, and had no LOC. Patient reports persistent bilateral hip pain and pain in her buttocks similar to previous hx of broken tailbone in the past.  She has been taking OTC meds with minimal relief. Pain is worse with walking/weight bearing. She denied any numbness, tingling, abnormal weakness, bowel or bladder incontinence, saddle anesthesia and has no other symptoms or complaints. PCP: Sid Lauren MD    Current Facility-Administered Medications   Medication Dose Route Frequency Provider Last Rate Last Dose    traMADol (ULTRAM) tablet 50 mg  50 mg Oral NOW Zoila Alfaro PA-C         Current Outpatient Prescriptions   Medication Sig Dispense Refill    traMADol (ULTRAM) 50 mg tablet Take 1 Tab by mouth every eight (8) hours as needed for Pain. Max Daily Amount: 150 mg. 15 Tab 0    ergocalciferol (ERGOCALCIFEROL) 50,000 unit capsule Take 1 Cap by mouth every seven (7) days. Indications: VITAMIN D DEFICIENCY (HIGH DOSE THERAPY) 12 Cap 3    metFORMIN (GLUCOPHAGE) 500 mg tablet Take 500 mg by mouth two (2) times daily (with meals).       methotrexate (RHEUMATREX) 2.5 mg tablet dose pack Take 20 mg by mouth Every Saturday.  predniSONE (DELTASONE) 5 mg tablet 4 tabs daily for 7 days, 3 tabs for 7 days, 2 tabs for 14 days, 1 tab for 14 days 91 Tab 0    alendronate (FOSAMAX) 70 mg tablet Take 70 mg by mouth every seven (7) days.  aspirin delayed-release 81 mg tablet Take  by mouth daily.  benzonatate (TESSALON) 100 mg capsule Take 100 mg by mouth three (3) times daily as needed for Cough.  cyanocobalamin (VITAMIN B-12) 1,000 mcg sublingual tablet Take 1,000 mcg by mouth daily.  fluticasone (FLOVENT DISKUS) 50 mcg/actuation inhaler Take  by inhalation.  folic acid (FOLVITE) 1 mg tablet Take  by mouth daily.  furosemide (LASIX) 20 mg tablet Take  by mouth daily.  metoprolol tartrate (LOPRESSOR) 25 mg tablet Take  by mouth two (2) times a day.  nortriptyline (PAMELOR) 50 mg capsule Take 50 mg by mouth nightly.  simvastatin (ZOCOR) 20 mg tablet Take  by mouth nightly.  lisinopril (PRINIVIL, ZESTRIL) 10 mg tablet Take 20 mg by mouth daily.  FERROUS SULFATE, DRIED (IRON, DRIED, PO) Take  by mouth.  OMEPRAZOLE PO Take  by mouth daily.  diclofenac EC (VOLTAREN) 50 mg EC tablet Take 1 Tab by mouth two (2) times daily as needed for Pain. 20 Tab 0       Past History     Past Medical History:  Past Medical History:   Diagnosis Date    Arthritis     Diabetes ([facility] [address]     High cholesterol     Hypertension        Past Surgical History:  Past Surgical History:   Procedure Laterality Date    CARDIAC SURG PROCEDURE UNLIST      HX GASTRIC BYPASS      HX ORTHOPAEDIC Bilateral     knee replacement       Family History:  Family History   Problem Relation Age of Onset    Heart Disease Mother     Dementia Neg Hx     Cancer Neg Hx     Seizures Neg Hx        Social History:  Social History   Substance Use Topics    Smoking status: Never Smoker    Smokeless tobacco: Never Used    Alcohol use No       Allergies:   Allergies   Allergen Reactions    Lisinopril Rash         Review of Systems   Review of Systems   Constitutional: Negative for chills, fatigue and fever. HENT: Negative. Negative for sore throat. Eyes: Negative. Respiratory: Negative for cough and shortness of breath. Cardiovascular: Negative for chest pain and palpitations. Gastrointestinal: Negative for abdominal pain, nausea and vomiting. Genitourinary: Negative for dysuria. Musculoskeletal: Positive for arthralgias. Bilateral hip pain, buttocks pain   Skin: Negative. Neurological: Negative for dizziness, weakness, light-headedness and headaches. Psychiatric/Behavioral: Negative. All other systems reviewed and are negative. Physical Exam     Vitals:    09/18/18 1316   BP: 155/75   Pulse: (!) 103   Resp: 18   Temp: 97.5 °F (36.4 °C)   SpO2: 99%   Weight: 89.4 kg (197 lb)   Height: 5' 5\" (1.651 m)     Physical Exam   Constitutional: She is oriented to person, place, and time. She appears well-developed and well-nourished. She appears distressed. HENT:   Head: Normocephalic and atraumatic. Mouth/Throat: Oropharynx is clear and moist.   Eyes: Conjunctivae are normal. No scleral icterus. Neck: Normal range of motion. Neck supple. No JVD present. No spinous process tenderness present. No tracheal deviation present. Cardiovascular: Regular rhythm and normal heart sounds. Tachycardia present. No murmur heard. Pulmonary/Chest: Effort normal and breath sounds normal. No respiratory distress. She has no wheezes. She has no rales. Abdominal: Soft. There is no tenderness. Musculoskeletal: Normal range of motion. Lower lumbar/sacral TTP on exam; LROM to both hips due to pts age/cooperation; strong DP/PT pulses BL. No obvious crepitus/deformity noted. Able to weight bear   Neurological: She is alert and oriented to person, place, and time. She has normal strength. Gait normal. GCS eye subscore is 4. GCS verbal subscore is 5. GCS motor subscore is 6. Skin: Skin is warm and dry. She is not diaphoretic. Psychiatric: She has a normal mood and affect. Nursing note and vitals reviewed. Diagnostic Study Results     Labs -   No results found for this or any previous visit (from the past 12 hour(s)). Radiologic Studies -   XR SACRUM AND COCCYX   Final Result      XR HIPS BI W AP PELV   Final Result        CT Results  (Last 48 hours)    None        CXR Results  (Last 48 hours)    None            Medical Decision Making   I am the first provider for this patient. I reviewed the vital signs, available nursing notes, past medical history, past surgical history, family history and social history. Vital Signs-Reviewed the patient's vital signs. Records Reviewed: Nursing Notes and Old Medical Records    ED Course:   2:27 PM  80 y/o female c/o right hip, tailbone pain s/p mechanical fall that occurred on Friday. States she slipped when trying to get out of bed, fell on hard tile floor. Pain has persisted in right/left hips and tailbone. Denied hitting head and had no LOC. Able to weight bear with use of cane, but reports feeling unsteady. Will plan on imaging and will reeval.  Cassius Castro PA-C    3:13 PM  Xrays negative for acute abnormalities. Discussed results with pt. Will plan on tramadol for pain relief. Advised to f/u with PCP this week and discussed return precautions. All questions answered and patient in agreement with plan of care. Will plan for discharge. Cassius Castro PA-C    Disposition:  Discharged    DISCHARGE NOTE:       Care plan outlined and precautions discussed. Patient has no new complaints, changes, or physical findings. Results of xrays were reviewed with the patient. All medications were reviewed with the patient; will d/c home with tramadol. All of pt's questions and concerns were addressed. Patient was instructed and agrees to follow up with pcp, as well as to return to the ED upon further deterioration. Patient is ready to go home. Follow-up Information     Follow up With Details Comments Contact Info    [facility] EMERGENCY DEPT  If symptoms worsen [address] [facility], [address] [facility]    Carlo Carrera MD Call in 1 day As needed for ER follow up [address]  [phone]            Current Discharge Medication List      START taking these medications    Details   traMADol (ULTRAM) 50 mg tablet Take 1 Tab by mouth every eight (8) hours as needed for Pain. Max Daily Amount: 150 mg.  Qty: 15 Tab, Refills: 0    Associated Diagnoses: Pain of both hip joints         CONTINUE these medications which have NOT CHANGED    Details   ergocalciferol (ERGOCALCIFEROL) 50,000 unit capsule Take 1 Cap by mouth every seven (7) days. Indications: VITAMIN D DEFICIENCY (HIGH DOSE THERAPY)  Qty: 12 Cap, Refills: 3    Associated Diagnoses: Vitamin D deficiency      metFORMIN (GLUCOPHAGE) 500 mg tablet Take 500 mg by mouth two (2) times daily (with meals). methotrexate (RHEUMATREX) 2.5 mg tablet dose pack Take 20 mg by mouth Every Saturday. predniSONE (DELTASONE) 5 mg tablet 4 tabs daily for 7 days, 3 tabs for 7 days, 2 tabs for 14 days, 1 tab for 14 days  Qty: 91 Tab, Refills: 0    Associated Diagnoses: Rheumatoid arthritis with unknown rheumatoid factor status (HCC)      alendronate (FOSAMAX) 70 mg tablet Take 70 mg by mouth every seven (7) days. aspirin delayed-release 81 mg tablet Take  by mouth daily. benzonatate (TESSALON) 100 mg capsule Take 100 mg by mouth three (3) times daily as needed for Cough. cyanocobalamin (VITAMIN B-12) 1,000 mcg sublingual tablet Take 1,000 mcg by mouth daily. fluticasone (FLOVENT DISKUS) 50 mcg/actuation inhaler Take  by inhalation. folic acid (FOLVITE) 1 mg tablet Take  by mouth daily. furosemide (LASIX) 20 mg tablet Take  by mouth daily.       metoprolol tartrate (LOPRESSOR) 25 mg tablet Take  by mouth two (2) times a day. nortriptyline (PAMELOR) 50 mg capsule Take 50 mg by mouth nightly. simvastatin (ZOCOR) 20 mg tablet Take  by mouth nightly. lisinopril (PRINIVIL, ZESTRIL) 10 mg tablet Take 20 mg by mouth daily. FERROUS SULFATE, DRIED (IRON, DRIED, PO) Take  by mouth. OMEPRAZOLE PO Take  by mouth daily. diclofenac EC (VOLTAREN) 50 mg EC tablet Take 1 Tab by mouth two (2) times daily as needed for Pain. Qty: 20 Tab, Refills: 0             Provider Notes (Medical Decision Making):     Procedures:  Procedures        Diagnosis     Clinical Impression:   1. Fall, initial encounter    2. Pain of both hip joints    3.  Coccyx pain

## 2018-09-28 ENCOUNTER — OFFICE VISIT (OUTPATIENT)
Dept: FAMILY MEDICINE CLINIC | Age: 73
End: 2018-09-28

## 2018-09-28 VITALS
BODY MASS INDEX: 32.06 KG/M2 | HEIGHT: 65 IN | OXYGEN SATURATION: 92 % | TEMPERATURE: 96.5 F | SYSTOLIC BLOOD PRESSURE: 127 MMHG | HEART RATE: 70 BPM | DIASTOLIC BLOOD PRESSURE: 64 MMHG | RESPIRATION RATE: 18 BRPM | WEIGHT: 192.4 LBS

## 2018-09-28 DIAGNOSIS — G25.81 RLS (RESTLESS LEGS SYNDROME): ICD-10-CM

## 2018-09-28 DIAGNOSIS — R77.8 ABNORMAL SPEP: ICD-10-CM

## 2018-09-28 DIAGNOSIS — I25.83 CORONARY ARTERY DISEASE DUE TO LIPID RICH PLAQUE: ICD-10-CM

## 2018-09-28 DIAGNOSIS — E11.8 TYPE 2 DIABETES MELLITUS WITH COMPLICATION, WITHOUT LONG-TERM CURRENT USE OF INSULIN (HCC): Primary | ICD-10-CM

## 2018-09-28 DIAGNOSIS — M06.9 RHEUMATOID ARTHRITIS INVOLVING MULTIPLE SITES, UNSPECIFIED RHEUMATOID FACTOR PRESENCE: ICD-10-CM

## 2018-09-28 DIAGNOSIS — Z95.820 STATUS POST ANGIOPLASTY WITH STENT: ICD-10-CM

## 2018-09-28 DIAGNOSIS — J45.20 MILD INTERMITTENT ASTHMA, UNSPECIFIED WHETHER COMPLICATED: ICD-10-CM

## 2018-09-28 DIAGNOSIS — I10 ESSENTIAL HYPERTENSION: ICD-10-CM

## 2018-09-28 DIAGNOSIS — Z23 NEED FOR SHINGLES VACCINE: ICD-10-CM

## 2018-09-28 DIAGNOSIS — I25.10 CORONARY ARTERY DISEASE DUE TO LIPID RICH PLAQUE: ICD-10-CM

## 2018-09-28 DIAGNOSIS — Z12.31 ENCOUNTER FOR SCREENING MAMMOGRAM FOR BREAST CANCER: ICD-10-CM

## 2018-09-28 DIAGNOSIS — E78.2 MIXED HYPERLIPIDEMIA: ICD-10-CM

## 2018-09-28 DIAGNOSIS — E55.9 VITAMIN D DEFICIENCY: ICD-10-CM

## 2018-09-28 DIAGNOSIS — R35.0 URINARY FREQUENCY: ICD-10-CM

## 2018-09-28 LAB
BILIRUB UR QL STRIP: NEGATIVE
GLUCOSE UR-MCNC: NEGATIVE MG/DL
HBA1C MFR BLD HPLC: 6.7 %
KETONES P FAST UR STRIP-MCNC: NEGATIVE MG/DL
PH UR STRIP: 5.5 [PH] (ref 4.6–8)
PROT UR QL STRIP: NORMAL
SP GR UR STRIP: 1.02 (ref 1–1.03)
UA UROBILINOGEN AMB POC: NORMAL (ref 0.2–1)
URINALYSIS CLARITY POC: CLEAR
URINALYSIS COLOR POC: YELLOW
URINE BLOOD POC: NORMAL
URINE LEUKOCYTES POC: NORMAL
URINE NITRITES POC: NEGATIVE

## 2018-09-28 RX ORDER — GABAPENTIN 100 MG/1
CAPSULE ORAL 3 TIMES DAILY
COMMUNITY
End: 2019-05-07 | Stop reason: SDUPTHER

## 2018-09-28 RX ORDER — NAPROXEN 375 MG/1
325 TABLET ORAL
COMMUNITY
End: 2019-08-23 | Stop reason: SDUPTHER

## 2018-09-28 RX ORDER — SULFAMETHOXAZOLE AND TRIMETHOPRIM 800; 160 MG/1; MG/1
1 TABLET ORAL 2 TIMES DAILY
Qty: 14 TAB | Refills: 0 | Status: SHIPPED | OUTPATIENT
Start: 2018-09-28 | End: 2018-10-05

## 2018-09-28 RX ORDER — PREDNISONE 5 MG/1
10 TABLET ORAL
COMMUNITY
Start: 2018-09-16 | End: 2018-10-01

## 2018-09-28 RX ORDER — MAGNESIUM 200 MG
100 TABLET ORAL DAILY
COMMUNITY
End: 2019-11-22

## 2018-09-28 RX ORDER — METFORMIN HYDROCHLORIDE 500 MG/1
500 TABLET ORAL 2 TIMES DAILY WITH MEALS
Qty: 60 TAB | Refills: 5 | Status: SHIPPED | OUTPATIENT
Start: 2018-09-28 | End: 2020-04-29 | Stop reason: SDUPTHER

## 2018-09-28 RX ORDER — LATANOPROST 50 UG/ML
1 SOLUTION/ DROPS OPHTHALMIC
COMMUNITY
End: 2018-10-30 | Stop reason: SDUPTHER

## 2018-09-28 RX ORDER — NORTRIPTYLINE HYDROCHLORIDE 10 MG/1
10 CAPSULE ORAL
Qty: 30 CAP | Refills: 2 | Status: SHIPPED | OUTPATIENT
Start: 2018-09-28 | End: 2018-11-20 | Stop reason: SDUPTHER

## 2018-09-28 RX ORDER — ALBUTEROL SULFATE 2.5 MG/.5ML
2.5 SOLUTION RESPIRATORY (INHALATION)
COMMUNITY
End: 2019-05-07 | Stop reason: SDUPTHER

## 2018-09-28 RX ORDER — FLUTICASONE FUROATE AND VILANTEROL 100; 25 UG/1; UG/1
1 POWDER RESPIRATORY (INHALATION) DAILY
Qty: 1 INHALER | Refills: 5 | Status: SHIPPED | OUTPATIENT
Start: 2018-09-28 | End: 2020-04-29 | Stop reason: SDUPTHER

## 2018-09-28 RX ORDER — TRAMADOL HYDROCHLORIDE 50 MG/1
50 TABLET ORAL
COMMUNITY
End: 2018-09-28 | Stop reason: SINTOL

## 2018-09-28 RX ORDER — BENZONATATE 100 MG/1
100 CAPSULE ORAL
COMMUNITY
End: 2019-05-07 | Stop reason: ALTCHOICE

## 2018-09-28 NOTE — MR AVS SNAPSHOT
[address] [facility] 
[phone] Patient: Elizabeth Hernandez MRN: [id number] :1945 Visit Information Date & Time Provider Department Dept. Phone Encounter #  
 2018  8:30 AM Abdiaziz Chase MD [facility] at [address] [id number] Follow-up Instructions Return in about 6 weeks (around 2018) for Regular Follow up. Your Appointments 2018  9:40 AM  
ESTABLISHED PATIENT with Caroleen Holter, MD  
[facility] [address]) Appt Note: 3 month fu  
 [facility] [place] [address]  
[address] [place] [id number] Upcoming Health Maintenance Date Due FOBT Q 1 YEAR AGE 50-75 10/31/1997 GLAUCOMA SCREENING Q2Y 10/31/2012 MEDICARE YEARLY EXAM 2018 Pneumococcal 65+ High/Highest Risk (1 of 2 - PCV13) 2019* DTaP/Tdap/Td series (1 - Tdap) 2019* Shingrix Vaccine Age 50> (1 of 2) 3/29/2019* *Topic was postponed. The date shown is not the original due date. Allergies as of 2018  Review Complete On: 2018 By: Angela Guerrier LPN Severity Noted Reaction Type Reactions Lisinopril  2018    Rash Current Immunizations  Reviewed on 11/3/2013 Name Date Influenza High Dose Vaccine PF 2018 Not reviewed this visit You Were Diagnosed With   
  
 Codes Comments Type 2 diabetes mellitus with complication, without long-term current use of insulin (HCC)    -  Primary ICD-10-CM: E11.8 ICD-9-CM: 250.90 Urinary frequency     ICD-10-CM: R35.0 ICD-9-CM: 788.41 Rheumatoid arthritis involving multiple sites, unspecified rheumatoid factor presence ([facility]     ICD-10-CM: M06.9 ICD-9-CM: 714.0 Vitamin D deficiency     ICD-10-CM: E55.9 ICD-9-CM: 268.9 Abnormal SPEP     ICD-10-CM: R77.8 ICD-9-CM: 790.99 Essential hypertension     ICD-10-CM: I10 
ICD-9-CM: 401.9 Mixed hyperlipidemia     ICD-10-CM: E78.2 ICD-9-CM: 272.2 Need for shingles vaccine     ICD-10-CM: S65 ICD-9-CM: V04.89 Encounter for screening mammogram for breast cancer     ICD-10-CM: Z12.31 
ICD-9-CM: V76.12   
 RLS (restless legs syndrome)     ICD-10-CM: G25.81 ICD-9-CM: 333.94 Mild intermittent asthma, unspecified whether complicated     XMX-37-UH: J45.20 ICD-9-CM: 493.90 Coronary artery disease due to lipid rich plaque     ICD-10-CM: I25.10, I25.83 ICD-9-CM: 414.00, 414.3 Status post angioplasty with stent     ICD-10-CM: Z95.9 ICD-9-CM: V45.89 Vitals BP Pulse Temp Resp Height(growth percentile) Weight(growth percentile) 127/64 (BP 1 Location: Right arm, BP Patient Position: Sitting) 70 96.5 °F (35.8 °C) (Oral) 18 5' 5\" (1.651 m) 192 lb 6.4 oz (87.3 kg) SpO2 BMI Smoking Status 92% 32.02 kg/m2 Never Smoker Vitals History BMI and BSA Data Body Mass Index Body Surface Area 32.02 kg/m 2 2 m 2 Preferred Pharmacy Pharmacy Name Phone [id number], [place] - [address] [phone] Your Updated Medication List  
  
   
This list is accurate as of 9/28/18 12:15 PM.  Always use your most recent med list.  
  
  
  
  
 albuterol sulfate 2.5 mg/0.5 mL Nebu nebulizer solution Commonly known as:  PROVENTIL;VENTOLIN  
2.5 mg by Nebulization route every four (4) hours as needed for Wheezing. Indications: Acute Asthma Attack  
  
 aspirin delayed-release 81 mg tablet Take  by mouth daily. benzonatate 100 mg capsule Commonly known as:  TESSALON Take 100 mg by mouth three (3) times daily as needed for Cough. cyanocobalamin 1,000 mcg sublingual tablet Commonly known as:  VITAMIN B-12 Take 1,000 mcg by mouth daily. ergocalciferol 50,000 unit capsule Commonly known as:  ERGOCALCIFEROL Take 1 Cap by mouth every seven (7) days. Indications: VITAMIN D DEFICIENCY (HIGH DOSE THERAPY)  
  
 fluticasone-vilanterol 100-25 mcg/dose inhaler Commonly known as:  BREO ELLIPTA Take 1 Puff by inhalation daily. folic acid 1 mg tablet Commonly known as:  Google Take  by mouth daily. furosemide 20 mg tablet Commonly known as:  LASIX Take  by mouth daily. gabapentin 100 mg capsule Commonly known as:  NEURONTIN Take  by mouth three (3) times daily. IRON (DRIED) PO Take  by mouth. Twice a week  
  
 latanoprost 0.005 % ophthalmic solution Commonly known as:  Scooter Baez Administer 1 Drop to both eyes nightly.  
  
 magnesium 200 mg Tab Take 100 mg by mouth daily. metFORMIN 500 mg tablet Commonly known as:  GLUCOPHAGE Take 1 Tab by mouth two (2) times daily (with meals). methotrexate 2.5 mg tablet dose pack Commonly known as:  Olaf Jody Take 8 Tabs by mouth Every Saturday for 90 days. Start taking on:  9/29/2018  
  
 metoprolol tartrate 25 mg tablet Commonly known as:  LOPRESSOR Take  by mouth two (2) times a day. naproxen 375 mg tablet Commonly known as:  NAPROSYN Take 325 mg by mouth two (2) times daily as needed. nortriptyline 10 mg capsule Commonly known as:  PAMELOR Take 1 Cap by mouth nightly. DOSE DECREASE OMEPRAZOLE PO Take  by mouth daily. predniSONE 5 mg tablet Commonly known as:  Abdias Felling Take 10 mg by mouth. Take two daily for 14 days and then one daily for fourteen days  
  
 simvastatin 20 mg tablet Commonly known as:  ZOCOR Take  by mouth nightly. trimethoprim-sulfamethoxazole 160-800 mg per tablet Commonly known as:  BACTRIM DS, SEPTRA DS Take 1 Tab by mouth two (2) times a day for 7 days. Prescriptions Sent to Pharmacy Refills  
 methotrexate (RHEUMATREX) 2.5 mg tablet dose pack 2 Starting on: 9/29/2018 Sig: Take 8 Tabs by mouth Every Saturday for 90 days. Class: Normal  
 Pharmacy: [facility]/pharmacy [address] [place] [address] [address] Ph #: [phone] Route: Oral  
 metFORMIN (GLUCOPHAGE) 500 mg tablet 5 Sig: Take 1 Tab by mouth two (2) times daily (with meals). Class: Normal  
 Pharmacy: [facility]/pharmacy [address] [place] [address] [address] Ph #: [phone] Route: Oral  
 nortriptyline (PAMELOR) 10 mg capsule 2 Sig: Take 1 Cap by mouth nightly. DOSE DECREASE Class: Normal  
 Pharmacy: [facility]/pharmacy [address] [place] [address] [address] Ph #: [phone] Route: Oral  
 fluticasone-vilanterol (BREO ELLIPTA) 100-25 mcg/dose inhaler 5 Sig: Take 1 Puff by inhalation daily. Class: Normal  
 Pharmacy: [facility]/pharmacy [address] [place] [address] [address] Ph #: [phone] Route: Inhalation  
 trimethoprim-sulfamethoxazole (BACTRIM DS, SEPTRA DS) 160-800 mg per tablet 0 Sig: Take 1 Tab by mouth two (2) times a day for 7 days. Class: Normal  
 Pharmacy: [facility]/pharmacy [address] [place] [address] [address] Ph #: [phone] Route: Oral  
  
We Performed the Following AMB POC HEMOGLOBIN A1C [75457 CPT(R)] AMB POC URINALYSIS DIP STICK AUTO W/O MICRO [47961 CPT(R)] CULTURE, URINE Q4736343 CPT(R)] REFERRAL TO HEMATOLOGY [PDP49 Custom] Comments:  
 M spike on SPEP, suspect MGUS. Would like to be seen at [address] [place] [address] location REFERRAL TO PODIATRY [REF90 Custom] Comments:  
 Please evaluate patient for: [facility] location Follow-up Instructions Return in about 6 weeks (around 11/9/2018) for Regular Follow up. To-Do List   
 09/28/2018 Imaging:  [facility]AMMO BI SCREENING INCL CAD Referral Information Referral ID Referred By Referred To [id number] Kamlesh Saunders DPM   
   [address] [facility] [address] [place], [address] Phone: [phone] Fax: [phone] Visits Status Start Date End Date 1 New Request 9/28/18 9/28/19 If your referral has a status of pending review or denied, additional information will be sent to support the outcome of this decision. Referral ID Referred By Referred To  
 [id number] Leighann CHEN MD  
   [facility] [address] [place], [address] Phone: [phone] Fax: [phone] Visits Status Start Date End Date 1 New Request 9/28/18 9/28/19 If your referral has a status of pending review or denied, additional information will be sent to support the outcome of this decision. Patient Instructions For your restless leg syndrome, please try tonic water (quinine water) over the counter x 4 oz every night. We are also decreasing your dose of Nortriptylene - I have sent 10 mg capsules and would like you to take 2 caps every night for 1 week and then drop down to 1 cap nightly (10 mg). I would like to get records from [facility] and then consider checking additional labs including iron levels, vitamin levels, and cholesterol. We may also get an EMG (test to look at how your nerves are conducting). For your asthma, I would like to try to switch you to [facility] which is only once daily instead of Flovent. If too $$$, we can switch you back to Flovent - just call about this. Lastly, on the labs from Dr. Lor Martin, you had a test called SPEP that showed a slightly abnormal spike of protein. This is often not a major issue but something we monitor, but it sometimes can be more serious so we will have you check in with a specialist for this. Introducing [facility]!    
 Makenna Dupree introduces [email or web address] patient portal. Now you can access parts of your medical record, email your doctor's office, and request medication refills online. 1. In your internet browser, go to [email or web address] 2. Click on the First Time User? Click Here link in the Sign In box. You will see the New Member Sign Up page. 3. Enter your [employer] Access Code exactly as it appears below. You will not need to use this code after youve completed the sign-up process. If you do not sign up before the expiration date, you must request a new code. · [employer] Access Code: [email or web address] Expires: 10/22/2018  3:21 PM 
 
4. Enter the last four digits of your Social Security Number (xxxx) and Date of Birth (mm/dd/yyyy) as indicated and click Submit. You will be taken to the next sign-up page. 5. Create a [employer] ID. This will be your [employer] login ID and cannot be changed, so think of one that is secure and easy to remember. 6. Create a [employer] password. You can change your password at any time. 7. Enter your Password Reset Question and Answer. This can be used at a later time if you forget your password. 8. Enter your e-mail address. You will receive e-mail notification when new information is available in [address] 9. Click Sign Up. You can now view and download portions of your medical record. 10. Click the Download Summary menu link to download a portable copy of your medical information. If you have questions, please visit the Frequently Asked Questions section of the [employer] website. Remember, [employer] is NOT to be used for urgent needs. For medical emergencies, dial 911. Now available from your iPhone and Android! Please provide this summary of care documentation to your next provider. Your primary care clinician is listed as Deacon Lobo. If you have any questions after today's visit, please call [phone].

## 2018-09-28 NOTE — PROGRESS NOTES
Subjective:     Chief Complaint   Patient presents with   [facility] patient        He  is a 81 y.o. male who presents for evaluation of:  New pt to est care. Prev seeing [facility] and then switched to another practice. PMHx DM, HTN, HLD, CAD s/p PCI with stenting in 1998 at [facility], RA, Asthma, Sarcoidosis, Vit D def, Osteopenia, and s/p gastric bypass. Due for mammogram - last was about 3-4 yrs ago. Thinks she had a nuclear stress test recently. Diabetes Mellitus:  Dx about 39 yrs ago  Taking meds - prev on insulin but no longer needed after gastric bypass  Reports no polyuria or polydipsia, no chest pain, dyspnea or TIA's, no numbness, tingling or pain in extremities, last eye exam approximately < 1 yr ago. Exercises regularly with walking but limited d/t knees giving way. Working on diet  Pt is a non smoker. Lab Results   Component Value Date/Time    Hemoglobin A1c (POC) 6.7 09/28/2018 10:30 AM    Creatinine 0.78 08/22/2018 11:47 AM      Lab Results   Component Value Date/Time    GFR est  08/22/2018 11:47 AM    GFR est non-AA 91 08/22/2018 11:47 AM      No results found for: TSH, TSHEXT, TSHEXT      Asthma  Current control: Good   Current level: mild intermittent  Current symptoms: none  Current controller: flovent  Last flareup: 3-4 months ago. Number of flareups in past year: > 5, but not requiring ER visits. Occ will need Prednisone. Current symptom relief med: albuterol inh and nebs    Recently had fall and was seen in ER. X-rays ok. Has been on chronic pain meds for RA with prev PCPs.  reviewed and appropropriate. Following with Dr. Brionna Gill for this now. On MTX 20 mg daily. Dx with Sarcoidosis many yrs ago. No regular tx for this. Thought to be affecting her lungs. ROS  Gen - no fever/chills  Resp - no dyspnea or cough  CV - no chest pain or WORKMAN  Neuro - She tells me she is on Nortriptyline for RLS. No recent Fe labs. She occ takes Fe supplement.   Feels like legs are going to sleep. Feels like she needs to move her legs. Worse at night. Feels a little better when moving legs. Also reports sx in her hands too. Describes sx mostly as cramping. Rest per HPI    Past Medical History:   Diagnosis Date    Arthritis     Asthma     Diabetes ([address]     High cholesterol     Hypertension     Sarcoidosis 1999    MCV     Past Surgical History:   Procedure Laterality Date    CARDIAC SURG PROCEDURE UNLIST      HX GASTRIC BYPASS      HX ORTHOPAEDIC Bilateral     knee replacement     Current Outpatient Prescriptions on File Prior to Visit   Medication Sig Dispense Refill    ergocalciferol (ERGOCALCIFEROL) 50,000 unit capsule Take 1 Cap by mouth every seven (7) days. Indications: VITAMIN D DEFICIENCY (HIGH DOSE THERAPY) 12 Cap 3    aspirin delayed-release 81 mg tablet Take  by mouth daily.  cyanocobalamin (VITAMIN B-12) 1,000 mcg sublingual tablet Take 1,000 mcg by mouth daily.  furosemide (LASIX) 20 mg tablet Take  by mouth daily.  metoprolol tartrate (LOPRESSOR) 25 mg tablet Take  by mouth two (2) times a day.  simvastatin (ZOCOR) 20 mg tablet Take  by mouth nightly.  FERROUS SULFATE, DRIED (IRON, DRIED, PO) Take  by mouth. Twice a week      folic acid (FOLVITE) 1 mg tablet Take  by mouth daily.  OMEPRAZOLE PO Take  by mouth daily. No current facility-administered medications on file prior to visit.          Objective:     Vitals:    09/28/18 0918   BP: 127/64   Pulse: 70   Resp: 18   Temp: 96.5 °F (35.8 °C)   TempSrc: Oral   SpO2: 92%   Weight: 192 lb 6.4 oz (87.3 kg)   Height: 5' 5\" (1.651 m)       Physical Examination:  General appearance - alert, well appearing, and in no distress  Eyes -sclera anicteric  Neck - supple, no significant adenopathy, no thyromegaly, no bruits  Chest - clear to auscultation, no wheezes, rales or rhonchi, symmetric air entry  Heart - normal rate, regular rhythm, normal S1, S2, no murmurs, rubs, clicks or gallops  Neurological - alert, oriented, no focal findings or movement disorder noted  Msk -   Extremities-no edema  Psych-normal mood and affect    Assessment/ Plan:   Diagnoses and all orders for this visit:    1. Type 2 diabetes mellitus with complication, without long-term current use of insulin (HCC)-well controlled with A1c today. Continue metformin. Patient would like to see a podiatrist for routine foot care as a diabetic  -     AMB POC HEMOGLOBIN A1C  -     metFORMIN (GLUCOPHAGE) 500 mg tablet; Take 1 Tab by mouth two (2) times daily (with meals). -     REFERRAL TO PODIATRY    2. Urinary frequency-recent UTI and continues to have symptoms with abnormal UA. Sending urine for culture and will use Bactrim  -     AMB POC URINALYSIS DIP STICK AUTO W/O MICRO  -     CULTURE, URINE  -     trimethoprim-sulfamethoxazole (BACTRIM DS, SEPTRA DS) 160-800 mg per tablet; Take 1 Tab by mouth two (2) times a day for 7 days. 3. Rheumatoid arthritis involving multiple sites, unspecified rheumatoid factor presence (HCC)-long-standing issue and on methotrexate 20 mg/week with folate daily. Working with rheumatology and currently on prednisone taper. Refilling methotrexate until she follows up with rheumatology and possibly change his medications  -     methotrexate (RHEUMATREX) 2.5 mg tablet dose pack; Take 8 Tabs by mouth Every Saturday for 90 days. 4. Vitamin D deficiency-encouraged continued ergo    5. Abnormal SPEP-patient had canceled her appointment today with H/O so we will set her up again for further workup with CHRISTELLE gomez. Labs and history most consistent with MGUS  -     REFERRAL TO HEMATOLOGY    6. Essential hypertension-well controlled    7. Mixed hyperlipidemia-awaiting records from previous PCP. Encouraged her to continue on Zocor 20 mg nightly    8. Need for shingles vaccine-encouraged her to get shingrix from her pharmacy    9.  Encounter for screening mammogram for breast cancer  -     [facility] MAMMO BI SCREENING INCL CAD; Future    10. RLS (restless legs syndrome)-adjusting medications to decrease side effects and fall risk given recent fall. Will have patient get labs including iron panel as iron deficiency is the most common reason for RLS. We will also have her try quinine water. Question if she actually has some neuropathy so may end up needing an EMG  -     nortriptyline (PAMELOR) 10 mg capsule; Take 1 Cap by mouth nightly. DOSE DECREASE    11. Mild intermittent asthma, unspecified whether complicated- few flares on Flovent. Will switch to [facility] as a once daily ICS/LABA option to help keep her optimized  -     fluticasone-vilanterol (BREO ELLIPTA) 100-25 mcg/dose inhaler; Take 1 Puff by inhalation daily. 12. Coronary artery disease due to lipid rich plaque  13. Status post angioplasty with stent  -Encouraged continued compliance with Zocor and aspirin daily. Continue to work on weight loss with diet and exercise. I spent > 50% of the 25 min visit counseling and educating about overall history, medication compliance, medication side effects, and need to make further adjustments based on labs and old records. I have discussed the diagnosis with the patient and the intended plan as seen in the above orders. The patient has received an after-visit summary and questions were answered concerning future plans. I have discussed medication side effects and warnings with the patient as well. The patient verbalizes understanding and agreement with the plan. Follow-up Disposition:  Return in about 6 weeks (around 11/9/2018) for Regular Follow up.

## 2018-09-28 NOTE — PROGRESS NOTES
Chief Complaint   Patient presents with   [facility] Establish Care     New patient   1. Have you been to the ER, urgent care clinic since your last visit? Hospitalized since your last visit? Yes [facility]  2. Have you seen or consulted any other health care providers outside of the [address] [place] [address] since your last visit? Include any pap smears or colon screening.  Yes Where: [facility] 6 months ago   [id number]  Follow-up UTI,Shingles and feeling tired  Room 4

## 2018-09-28 NOTE — PATIENT INSTRUCTIONS
For your restless leg syndrome, please try tonic water (quinine water) over the counter x 4 oz every night. We are also decreasing your dose of Nortriptylene - I have sent 10 mg capsules and would like you to take 2 caps every night for 1 week and then drop down to 1 cap nightly (10 mg). I would like to get records from [facility] and then consider checking additional labs including iron levels, vitamin levels, and cholesterol. We may also get an EMG (test to look at how your nerves are conducting). For your asthma, I would like to try to switch you to [facility] which is only once daily instead of Flovent. If too $$$, we can switch you back to Flovent - just call about this. Lastly, on the labs from Dr. Danny Brumfield, you had a test called SPEP that showed a slightly abnormal spike of protein. This is often not a major issue but something we monitor, but it sometimes can be more serious so we will have you check in with a specialist for this.

## 2018-09-29 LAB — BACTERIA UR CULT: NO GROWTH

## 2018-10-08 ENCOUNTER — HOSPITAL ENCOUNTER (OUTPATIENT)
Dept: MAMMOGRAPHY | Age: 73
Discharge: HOME | End: 2018-10-08
Attending: FAMILY MEDICINE
Payer: MEDICARE

## 2018-10-08 DIAGNOSIS — Z12.31 ENCOUNTER FOR SCREENING MAMMOGRAM FOR BREAST CANCER: ICD-10-CM

## 2018-10-08 PROCEDURE — 77067 SCR MAMMO BI INCL CAD: CPT

## 2018-10-18 ENCOUNTER — OFFICE VISIT (OUTPATIENT)
Dept: ONCOLOGY | Age: 73
End: 2018-10-18

## 2018-10-18 VITALS
HEART RATE: 80 BPM | WEIGHT: 198.4 LBS | HEIGHT: 65 IN | RESPIRATION RATE: 18 BRPM | TEMPERATURE: 95.4 F | BODY MASS INDEX: 33.05 KG/M2 | OXYGEN SATURATION: 100 %

## 2018-10-18 DIAGNOSIS — D47.2 MGUS (MONOCLONAL GAMMOPATHY OF UNKNOWN SIGNIFICANCE): Primary | ICD-10-CM

## 2018-10-18 NOTE — PROGRESS NOTES
Hematology Consultation        Patient: Nick Morales MRN: [id number]  SSN: [id number]    YOB: 1945  Age: 81 y.o. Sex: female        Diagnosis:     1. MGUS    Subjective:      Nick Morales is a 81 y.o. female who I am seeing in consultation for MGUS in request of Dr. Montse Diaz. She has a diagnosis of poorly controlled RA. Routine laboratory studies show presence of IGM monoclonal protein. She has chronic joint stiffness and bone pains. The MGUS has been present since it was noted in labs. Review of Systems:    Constitutional: negative  Eyes: negative  Ears, Nose, Mouth, Throat, and Face: negative  Respiratory: negative  Cardiovascular: negative  Gastrointestinal: negative  Genitourinary:negative  Integument/Breast: negative  Hematologic/Lymphatic: negative  Musculoskeletal:negative  Neurological: negative      Past Medical History:   Diagnosis Date    Arthritis     Asthma     Diabetes ([facility] [address]     High cholesterol     Hypertension     Sarcoidosis 1999    [facility]     Past Surgical History:   Procedure Laterality Date    CARDIAC SURG PROCEDURE UNLIST      HX GASTRIC BYPASS      HX ORTHOPAEDIC Bilateral     knee replacement      Family History   Problem Relation Age of Onset    Heart Disease Mother     Liver Disease Father     Alcohol abuse Brother     Dementia Neg Hx     Cancer Neg Hx     Seizures Neg Hx      Social History     Tobacco Use    Smoking status: Never Smoker    Smokeless tobacco: Never Used   Substance Use Topics    Alcohol use: Yes     Alcohol/week: 1.2 oz     Types: 1 Glasses of wine, 1 Shots of liquor per week     Comment: 2-3 yearly      Prior to Admission medications    Medication Sig Start Date End Date Taking? Authorizing Provider   gabapentin (NEURONTIN) 100 mg capsule Take  by mouth three (3) times daily. Yes Provider, Historical   magnesium 200 mg tab Take 100 mg by mouth daily.    Yes Provider, Historical   benzonatate (TESSALON) 100 mg capsule Take 100 mg by mouth three (3) times daily as needed for Cough. Yes Provider, Historical   albuterol sulfate (PROVENTIL;VENTOLIN) 2.5 mg/0.5 mL nebu nebulizer solution 2.5 mg by Nebulization route every four (4) hours as needed for Wheezing. Indications: Acute Asthma Attack   Yes Provider, Historical   latanoprost (XALATAN) 0.005 % ophthalmic solution Administer 1 Drop to both eyes nightly. Yes Provider, Historical   naproxen (NAPROSYN) 375 mg tablet Take 325 mg by mouth two (2) times daily as needed. Yes Provider, Historical   methotrexate (RHEUMATREX) 2.5 mg tablet dose pack Take 8 Tabs by mouth Every Saturday for 90 days. 9/29/18 12/28/18 Yes Carter Eng MD   metFORMIN (GLUCOPHAGE) 500 mg tablet Take 1 Tab by mouth two (2) times daily (with meals). 9/28/18  Yes Carter Eng MD   nortriptyline (PAMELOR) 10 mg capsule Take 1 Cap by mouth nightly. DOSE DECREASE 9/28/18  Yes Carter Eng MD   fluticasone-vilanterol (BREO ELLIPTA) 100-25 mcg/dose inhaler Take 1 Puff by inhalation daily. 9/28/18  Yes Carter Eng MD   ergocalciferol (ERGOCALCIFEROL) 50,000 unit capsule Take 1 Cap by mouth every seven (7) days. Indications: VITAMIN D DEFICIENCY (HIGH DOSE THERAPY) 8/28/18  Yes Damien Kraft MD   aspirin delayed-release 81 mg tablet Take  by mouth daily. Yes Provider, Historical   cyanocobalamin (VITAMIN B-12) 1,000 mcg sublingual tablet Take 1,000 mcg by mouth daily. Yes Provider, Historical   folic acid (FOLVITE) 1 mg tablet Take  by mouth daily. Yes Provider, Historical   furosemide (LASIX) 20 mg tablet Take  by mouth daily. Yes Provider, Historical   metoprolol tartrate (LOPRESSOR) 25 mg tablet Take  by mouth two (2) times a day. Yes Provider, Historical   simvastatin (ZOCOR) 20 mg tablet Take  by mouth nightly. Yes Provider, Historical   FERROUS SULFATE, DRIED (IRON, DRIED, PO) Take  by mouth.  Twice a week   Yes OtherDevon MD   OMEPRAZOLE PO Take  by mouth daily. Yes Other, MD Devon              Allergies   Allergen Reactions    Lisinopril Rash           Objective:     Vitals:    10/18/18 1035   Pulse: 80   Resp: 18   Temp: 95.4 °F (35.2 °C)   TempSrc: Oral   SpO2: 100%   Weight: 198 lb 6.4 oz (90 kg)   Height: 5' 5\" (1.651 m)            Physical Exam:    GENERAL: alert, cooperative, no distress, appears stated age  EYE: negative  LYMPHATIC: Cervical, supraclavicular, and axillary nodes normal.   THROAT & NECK: normal and no erythema or exudates noted. LUNG: clear to auscultation bilaterally  HEART: regular rate and rhythm  ABDOMEN: soft, non-tender  EXTREMITIES:  no edema  SKIN: Normal.  NEUROLOGIC: negative      Lab Results   Component Value Date/Time    WBC 6.0 08/22/2018 11:47 AM    HGB 11.5 (L) 08/22/2018 11:47 AM    HCT 35.3 (L) 08/22/2018 11:47 AM    PLATELET 273 13/17/9687 11:47 AM    MCV 93 08/22/2018 11:47 AM       Lab Results   Component Value Date/Time    Sodium 145 (H) 08/22/2018 11:47 AM    Potassium 4.6 08/22/2018 11:47 AM    Chloride 107 (H) 08/22/2018 11:47 AM    CO2 22 08/22/2018 11:47 AM    Glucose 97 08/22/2018 11:47 AM    BUN 14 08/22/2018 11:47 AM    Creatinine 0.78 08/22/2018 11:47 AM    BUN/Creatinine ratio 18 08/22/2018 11:47 AM    GFR est  08/22/2018 11:47 AM    GFR est non-AA 91 08/22/2018 11:47 AM    Calcium 8.2 (L) 08/22/2018 11:47 AM    Bilirubin, total 1.2 08/22/2018 11:47 AM    AST (SGOT) 38 08/22/2018 11:47 AM    Alk. phosphatase 66 08/22/2018 11:47 AM    Protein, total 6.7 08/22/2018 11:47 AM    Albumin 4.3 08/22/2018 11:47 AM    A-G Ratio 1.8 08/22/2018 11:47 AM    ALT (SGPT) 16 08/22/2018 11:47 AM            Assessment:     1. MGUS   IgG lambda   M-spike: 0.3    No end organ damage  Likely related to underlying rheumatological illness    > Observation  > Risk of progression to Myeloma is low       Plan:       1. Return as needed      Signed by: Miguel Gregg MD                     October 18, 2018        CCWisam Ta Dyllan Jimenez MD  CC.  Colletta Milks, MD

## 2018-10-18 NOTE — PATIENT INSTRUCTIONS
How to Get Up Safely After a Fall: Care Instructions  Your Care Instructions    If you have injuries, health problems, or other reasons that may make it easy for you to fall at home, it is a good idea to learn how to get up safely after a fall. Learning how to get up correctly can help you avoid making an injury worse. Also, knowing what to do if you cannot get up can help you stay safe until help arrives. Follow-up care is a key part of your treatment and safety. Be sure to make and go to all appointments, and call your doctor if you are having problems. It's also a good idea to know your test results and keep a list of the medicines you take. How can you care for yourself after a fall? If you think you can get up  First lie still for a few minutes and think about how you feel. If your body feels okay and you think you can get up safely, follow the rest of the steps below:  1. Look for a chair or other piece of furniture that is close to you. 2. Roll onto your side and rest. Roll by turning your head in the direction you want to roll, move your shoulder and arm, then hip and leg in the same direction. 3. Lie still for a moment to let your blood pressure adjust.  4. Slowly push your upper body up, lift your head, and take a moment to rest.  5. Slowly get up on your hands and knees, and crawl to the chair or other stable piece of furniture. 6. Put your hands on the chair. 7. Move one foot forward, and place it flat on the floor. Your other leg should be bent with the knee on the floor. 8. Rise slowly, turn your body, and sit in the chair. Stay seated for a bit and think about how you feel. Call for help. Even if you feel okay, let someone know what happened to you. You might not know that you have a serious injury. If you cannot get up  1. If you think you are injured after a fall or you cannot get up, try not to panic. 2. Call out for help.   3. If you have a phone within reach or you have an emergency call device, use it to call for help. 4. If you do not have a phone within reach, try to slide yourself toward it. If you cannot get to the phone, try to slide toward a door or window or a place where you think you can be heard. 5. [place] or use an object to make noise so someone might hear you. 6. If you can reach something that you can use for a pillow, place it under your head. Try to stay warm by covering yourself with a blanket or clothing while you wait for help. When should you call for help? Call 911 anytime you think you may need emergency care. For example, call if:    · You passed out (lost consciousness).     · You cannot get up after a fall.     · You have severe pain.    Call your doctor now or seek immediate medical care if:    · You have new or worse pain.     · You are dizzy or lightheaded.     · You hit your head.    Watch closely for changes in your health, and be sure to contact your doctor if:    · You do not get better as expected. Where can you learn more? Go to http://annette-evelin.info/. Enter O420 in the search box to learn more about \"How to Get Up Safely After a Fall: Care Instructions. \"  Current as of: March 16, 2018  Content Version: 11.8  © 5630-7613 [employer]. Care instructions adapted under license by [employer] (which disclaims liability or warranty for this information). If you have questions about a medical condition or this instruction, always ask your healthcare professional. [address] any warranty or liability for your use of this information.

## 2018-10-18 NOTE — PROGRESS NOTES
Sami French is a 81 y.o. female new patient referred by Dr. Arline De Anda to provider for abnormal labs per pt. Patient ambulated with a cane. VS stable. Patient denies pain.      Visit Vitals  Pulse 80   Temp 95.4 °F (35.2 °C) (Oral)   Resp 18   Ht 5' 5\" (1.651 m)   Wt 198 lb 6.4 oz (90 kg)   SpO2 100%   BMI 33.02 kg/m²

## 2018-10-30 NOTE — TELEPHONE ENCOUNTER
Pt would like a refill for:     atanoprost (XALATAN) 0.005 % ophthalmic solution     She is out of it now     [phone]

## 2018-10-31 RX ORDER — LATANOPROST 50 UG/ML
1 SOLUTION/ DROPS OPHTHALMIC
Qty: 1 BOTTLE | Refills: 2 | Status: SHIPPED | OUTPATIENT
Start: 2018-10-31 | End: 2019-02-22 | Stop reason: SDUPTHER

## 2018-11-19 ENCOUNTER — OFFICE VISIT (OUTPATIENT)
Dept: RHEUMATOLOGY | Age: 73
End: 2018-11-19

## 2018-11-19 VITALS
BODY MASS INDEX: 33.66 KG/M2 | TEMPERATURE: 97.8 F | RESPIRATION RATE: 18 BRPM | DIASTOLIC BLOOD PRESSURE: 84 MMHG | SYSTOLIC BLOOD PRESSURE: 164 MMHG | HEIGHT: 65 IN | HEART RATE: 81 BPM | WEIGHT: 202 LBS

## 2018-11-19 DIAGNOSIS — D47.2 MGUS (MONOCLONAL GAMMOPATHY OF UNKNOWN SIGNIFICANCE): ICD-10-CM

## 2018-11-19 DIAGNOSIS — E55.9 VITAMIN D DEFICIENCY: ICD-10-CM

## 2018-11-19 DIAGNOSIS — Z79.60 LONG-TERM USE OF IMMUNOSUPPRESSANT MEDICATION: ICD-10-CM

## 2018-11-19 DIAGNOSIS — M06.09 SERONEGATIVE RHEUMATOID ARTHRITIS OF MULTIPLE SITES (HCC): Primary | ICD-10-CM

## 2018-11-19 DIAGNOSIS — M85.89 OSTEOPENIA OF MULTIPLE SITES: ICD-10-CM

## 2018-11-19 RX ORDER — FOLIC ACID 1 MG/1
1 TABLET ORAL DAILY
Qty: 90 TAB | Refills: 0 | Status: SHIPPED | OUTPATIENT
Start: 2018-11-19 | End: 2019-05-23 | Stop reason: SDUPTHER

## 2018-11-19 RX ORDER — TRIAMCINOLONE ACETONIDE 40 MG/ML
80 INJECTION, SUSPENSION INTRA-ARTICULAR; INTRAMUSCULAR ONCE
Qty: 1 ML | Refills: 0
Start: 2018-11-19 | End: 2018-11-19

## 2018-11-19 NOTE — PROGRESS NOTES
REASON FOR VISIT    This is a follow-up visit for Ms. Aris Simpson for Seronegative Rheumatoid Arthritis. Inflammatory arthritis phenotype includes:  Anti-CCP positive: no  Rheumatoid factor positive: no  Erosive disease: no  Extra-articular manifestations include: MGUS    Immunosuppression Screening (8/22/2018): Quantiferon TB: negative  PPD:  Not performed  Hepatitis B: negative  Hepatitis C: negative    Therapy History includes:  Current DMARD therapy includes: methotrexate 20 mg every Saturday  Prior DMARD therapy includes: none  The following DMARDs have been ineffective: none  The following DMARDs were stopped because of side effects: none    Immunization History   Administered Date(s) Administered    Influenza High Dose Vaccine PF 09/07/2018       Patient Active Problem List   Diagnosis Code    Long-term use of immunosuppressant medication Z79.899    Osteopenia of multiple sites M85.89    Vitamin D deficiency E55.9    Rheumatoid arthritis involving multiple sites ([facility] M06.9    Essential hypertension I10    Mixed hyperlipidemia E78.2    RLS (restless legs syndrome) G25.81    Mild intermittent asthma J45.20    Status post angioplasty with stent Z95.9    Coronary artery disease due to lipid rich plaque I25.10, I25.83       HISTORY OF PRESENT ILLNESS    Ms. Aris Simpson returns for a follow-up. On her lats visit, I continueed methotrexate 20 mg every Saturday, prescribed Simponi Aria and prednisone taper. Labs showed MGUS 0.3 M-spike so I referred her to Dr. Carlos Ceballos who noted a low risk of myeloma progression. She did not receive a call to schedule her infusion. Today, she feels bad. She continues to have aching and throbbing pain and swelling in her hands. She feels worse. She notes pain in her hamstring and butt. Her pain is worse at night and morning due to left knee pain. Tylenol does not help. Warm water helps. She endorses ankle swelling constantly.     She denies fever, weight loss, blurred vision, vision loss, oral ulcers, dry cough dyspnea, nausea, vomiting, dysphagia, abdominal pain, black or bloody stool, fall since last visit, rash, easy bruising and increased thirst.    Ms. Kameron Young has continued her medications for arthritis and reports good tolerance without significant side effects. Last toxicity monitoring by blood work was done on 8/22/2018 and did not reveal any significant adverse effects. Most recent inflammatory markers from 8/22/2018 revealed a ESR 21 mm/hr (previously N/A mm/hr) and CRP 1.3 mg/L (previously N/A mg/L). The patient has not had any interval hospital admissions, infections, or surgeries. REVIEW OF SYSTEMS    A comprehensive review of systems was performed and pertinent results are documented in the HPI, review of systems is otherwise non-contributory. PAST MEDICAL HISTORY    She has a past medical history of Arthritis, Asthma, Diabetes ([address] High cholesterol, Hypertension, and Sarcoidosis. FAMILY HISTORY    Her family history includes Alcohol abuse in her brother; Heart Disease in her mother; Liver Disease in her father. SOCIAL HISTORY    She reports that  has never smoked. she has never used smokeless tobacco. She reports that she drinks about 1.2 oz of alcohol per week. She reports that she does not use drugs. MEDICATIONS    Current Outpatient Medications   Medication Sig Dispense Refill    folic acid (FOLVITE) 1 mg tablet Take 1 Tab by mouth daily. 90 Tab 0    [START ON 11/24/2018] methotrexate (RHEUMATREX) 2.5 mg tablet dose pack Take 8 Tabs by mouth Every Saturday. 96 Tab 0    triamcinolone acetonide (KENALOG) 40 mg/mL injection 2 mL by IntraMUSCular route once for 1 dose. 1 mL 0    latanoprost (XALATAN) 0.005 % ophthalmic solution Administer 1 Drop to both eyes nightly. 1 Bottle 2    gabapentin (NEURONTIN) 100 mg capsule Take  by mouth three (3) times daily.       benzonatate (TESSALON) 100 mg capsule Take 100 mg by mouth three (3) times daily as needed for Cough.  albuterol sulfate (PROVENTIL;VENTOLIN) 2.5 mg/0.5 mL nebu nebulizer solution 2.5 mg by Nebulization route every four (4) hours as needed for Wheezing. Indications: Acute Asthma Attack      naproxen (NAPROSYN) 375 mg tablet Take 325 mg by mouth two (2) times daily as needed.  metFORMIN (GLUCOPHAGE) 500 mg tablet Take 1 Tab by mouth two (2) times daily (with meals). 60 Tab 5    nortriptyline (PAMELOR) 10 mg capsule Take 1 Cap by mouth nightly. DOSE DECREASE 30 Cap 2    fluticasone-vilanterol (BREO ELLIPTA) 100-25 mcg/dose inhaler Take 1 Puff by inhalation daily. 1 Inhaler 5    ergocalciferol (ERGOCALCIFEROL) 50,000 unit capsule Take 1 Cap by mouth every seven (7) days. Indications: VITAMIN D DEFICIENCY (HIGH DOSE THERAPY) 12 Cap 3    aspirin delayed-release 81 mg tablet Take  by mouth daily.  cyanocobalamin (VITAMIN B-12) 1,000 mcg sublingual tablet Take 1,000 mcg by mouth daily.  furosemide (LASIX) 20 mg tablet Take  by mouth daily.  metoprolol tartrate (LOPRESSOR) 25 mg tablet Take  by mouth two (2) times a day.  simvastatin (ZOCOR) 20 mg tablet Take  by mouth nightly.  FERROUS SULFATE, DRIED (IRON, DRIED, PO) Take  by mouth. Twice a week      OMEPRAZOLE PO Take  by mouth daily.  magnesium 200 mg tab Take 100 mg by mouth daily. ALLERGIES    Allergies   Allergen Reactions    Lisinopril Rash       PHYSICAL EXAMINATION    Visit Vitals  /84   Pulse 81   Temp 97.8 °F (36.6 °C)   Resp 18   Ht 5' 5\" (1.651 m)   Wt 202 lb (91.6 kg)   BMI 33.61 kg/m²     Body mass index is 33.61 kg/m². General: Patient is alert, oriented x 3, not in acute distress    HEENT:   Sclerae are not injected and appear moist.  There is no alopecia. Neck is supple     Cardiovascular:  Heart is regular rate and rhythm, no murmurs. Chest:  Lungs are clear to auscultation bilaterally. No rhonchi, wheezes, or crackles.     Extremities:  Free of clubbing, cyanosis, edema    Neurological exam:  Muscle strength is full in upper and lower extremities     Skin exam:  There are no rashes, no alopecia, no discoid lesions, no active Raynaud's, no livedo reticularis, no periungual erythema. Musculoskeletal exam:  A comprehensive musculoskeletal exam was performed for all joints of each upper and lower extremity and assessed for swelling, tenderness and range of motion. Positive results are documented as below:    Decreased ROM of bilateral shoulders (right worse than left) due to pain  Bilateral knee replacements without effusion.   Bilateral ankle swelling with tenderness on the right       Z-Deformities:   no  [place] Neck Deformities:  no  Boutonierre's Deformities:  no  Ulnar Deviation:   Yes (LEFT:3rd digit)  MCP Subluxation:  no     Joint Count 11/19/2018 8/22/2018   Patient pain (0-100) 90 90   MHAQ 1 0.5   Left shoulder - Tender - 1   Left shoulder - Swollen - 1   Left elbow - Tender 1 1   Left elbow - Swollen 0 1   Left wrist- Tender 1 -   Left wrist- Swollen 1 1   Left 1st MCP - Tender 1 1   Left 1st MCP - Swollen 1 1   Left 2nd MCP - Tender 1 1   Left 2nd MCP - Swollen 1 1   Left 3rd MCP - Tender 1 1   Left 3rd MCP - Swollen 1 1   Left 4th MCP - Tender 1 1   Left 4th MCP - Swollen 1 1   Left 5th MCP - Tender - 1   Left 5th MCP - Swollen - 1   Left thumb IP - Tender 1 1   Left thumb IP - Swollen 1 -   Left 2nd PIP - Tender 1 1   Left 2nd PIP - Swollen 1 1   Left 3rd PIP - Tender 1 1   Left 3rd PIP - Swollen 1 1   Left 4th PIP - Tender 1 1   Left 4th PIP - Swollen 1 1   Left 5th PIP - Tender 1 1   Left 5th PIP - Swollen 1 1   Left knee - Tender 1 -   Left knee - Swollen 1 -   Right shoulder - Tender - 1   Right elbow - Tender 1 1   Right elbow - Swollen 1 1   Right wrist- Tender 1 1   Right wrist- Swollen 1 1   Right 1st MCP - Tender 1 1   Right 1st MCP - Swollen 1 -   Right 2nd MCP - Tender 1 1   Right 2nd MCP - Swollen 1 1   Right 3rd MCP - Tender 1 1   Right 3rd MCP - Swollen 1 1   Right 4th MCP - Tender 1 1   Right 4th MCP - Swollen 1 1   Right 5th MCP - Tender 1 1   Right 5th MCP - Swollen 1 1   Right thumb IP - Tender - 1   Right 2nd PIP - Tender 1 1   Right 2nd PIP - Swollen 1 1   Right 3rd PIP - Tender 1 1   Right 3rd PIP - Swollen 1 1   Right 4th PIP - Tender 1 1   Right 4th PIP - Swollen 1 1   Right 5th PIP - Tender 1 1   Right 5th PIP - Swollen 1 1   Tender Joint Count (Total) 23 25   Swollen Joint Count (Total) 22 22   Physician Assessment (0-10) 7 7   Patient Assessment (0-10) 9 9   CDAI Total (calculated) 61 61       DATA REVIEW    Laboratory     Recent laboratory results were reviewed, summarized, and discussed with the patient. Imaging    Musculoskeletal Ultrasound    None    Radiographs    Sacrum and Coccyx 9/18/2018: no definite fracture or dislocation, however osteopenia and overlapping bowel gas somewhat limited evaluation of the sacrum. Severe multilevel degenerative disc disease is noted in the lower lumbar spine. Dense arterial vascular calcifications are present. Bilateral Hips 9/18/2018: no acute fracture or dislocation. Evaluation of the sacrum is limited by overlying bowel gas and osteopenia. Within these limitations, no fractures identified. Degenerative changes are present in the lower lumbar spine. Dense arterial vascular calcifications are present. Bilateral Hand 8/22/2018: moderate to severe diffuse osteopenia. Extensive atherosclerotic calcifications  are shown extending into the fingers bilaterally. Fusiform soft tissue swelling is shown bilaterally throughout the metacarpophalangeal and proximal interphalangeal joints. There is mild joint space narrowing on the left throughout the metacarpophalangeal joints and on the right in the third through fifth metacarpophalangeal joints.  Subtle marginal erosions are demonstrated at the lateral base of the right ring finger proximal phalanx and more extensively in the third through fifth left MCP joints. There are minimal-mild features of osteoarthritis at several DIP joints bilaterally. No substantial carpal joint space narrowing is shown. Calcium pyrophosphate dihydrate position is in the medial carpus bilaterally. Bilateral Foot 8/22/2018: moderate to severe diffuse osteopenia. Extensive atherosclerotic calcifications are shown extending into the digits. Bilateral talonavicular, naviculocuneiform and left greater than right diffuse tarsometatarsal joint space narrowing is noted with small marginal osteophytes. No substantial digital joint space narrowing is evident though fusiform soft tissue swelling is suggested at the metatarsophalangeal joints bilaterally. Moderate bilateral plantar and dorsal calcaneal spurs are shown. Bilateral Shoulder 9/08/2011: RIGHT: no evidence of fracture or dislocation. Alignment of the glenohumeral joint is anatomic. The patient is status post distal clavicle resection and acromioplasty. A small amount of heterotopic ossification is noted about the distal aspect of the clavicle. Mild marginal osteophytes noted at the the glenoid. Irregularity along the superolateral aspect of the humeral head may reflect rotator cuff pathology. An ovoid well-corticated calcific density projecting over the humeral head may represent an intraventricular loose body. The visualized right lung is clear. Soft tissues are unremarkable. LEFT: no evidence of fracture or dislocation. Alignment of the glenohumeral and acromioclavicular joints is anatomic. Moderate superiorly projecting osteophytes emanate from the distal clavicle at the acromioclavicular joint. Bony hypertrophy irregularity about the greater tuberosity may indicate chronic rotator cuff pathology. There is a small subacromial spur. The visualized left lung is clear. Multiple mediastinal clips are noted. Bone mineralization is mildly diminished. Right Hip 8/03/2011: there is diffuse osteopenia.  Degenerative changes noted in the lower lumbar spine. The SI joints and pubic symphysis are not widened. The right and left hip are normally aligned. The right hip demonstrates mild osteophyte formation most notable at the inferior aspect of the joint. There is minimal diffuse joint space narrowing. Similar changes are noted in the left hip with minimal osteophyte formation. There is preservation of the left hip joint space. No acute fractures. Dense vascular calcifications are noted. Soft tissue calcification lateral to the right iliac wing is visualized and was noted on prior abdominal and pelvic CT scan. Hypertrophic changes anterior/superior left iliac wing and left greater trochanter as before. Bilateral Hand 9/27/2010: Overall alignment is anatomic. The bones are demineralized overall. The joint spaces are relatively preserved throughout. Relatively mild degenerative changes are present in the DIP joints, and in the first CMC joints. There appears to be some mild soft tissue swelling overlying the right MCP joints. There are also several equivocal erosions noted involving the metacarpal heads of the right hand which raise the possibility of early erosions related to inflammatory arthritis such as rheumatoid. These changes appear slightly more conspicuous and the 2008 examination. Incidentally, there are fairly prominent vascular calcifications present indicating that the patient is likely diabetic or perhaps as renal disease    Bilateral Knee 8/18/2010:  irregularity along the patella surface where there is a scalloped margin suggesting liner wear. In addition, the cement seen along the bone prosthetic interface of the anterior distal femoral cortex is less apparent, which also may reflect underlying liner wear and possibly early particulate disease. Heterotopic ossification has not changed in interval. Vascular calcifications reflecting atherosclerotic disease remains severe. Diffuse osteopenia is noted.     CT Imaging    CT Right Shoulder with arthrogram 9/26/2011: there is a large full-thickness tear seen involving the supraspinatus tendon extending to the level of the acromion. Additionally there is a partial tear of the subscapularis tendon. The biceps appears chronically torn. There are mild degenerative changes of the acromioclavicular joint. Some mild osteoarthritic changes are present of the glenohumeral joint. No evidence of fracture was seen. MR Imaging    None    DXA     DXA 5/04/2016:  (excluded L4 for spondylitic change, right hip) lumbar spine L1-L3 T score -1.5 (BMD 0.954 g/cm2), left femoral neck T score: -1.0 (0.805 g/cm2), left total hip T score: -0.1 (1.016 g/cm2), and distal one third left radius T score -1.9 (BMD 0.571 g/cm2). FRAX score 6.1 % probability in 10 years for major osteoporotic fracture and 0.3 % 10 year probability of hip fracture. Nuclear Studies    Triple Phase Scan 8/20/0218:  Patient status post bilateral knee replacements. Phase 1 (blood flow):  There is slight increased blood flow to the left knee. Phase 2 (blood pool/soft tissue):  There is slight increased peritracheal activity left knee. Phase 3 (delayed bone): Patient status post right knee replacement which is within normal limits. Patient is status post left knee replacement. There are slight increased activity left tibial plateau    [facility]  OFFICE PROCEDURE PROGRESS NOTE      Symptom relief from Rheumatoid Arthritis.  (11/19/18)     Chart reviewed for the following:   Saloni Rocha MD, have reviewed the History, Physical and updated the Allergic reactions for [address] performed immediately prior to start of procedure:   Saloni Rocha MD, have performed the following reviews on Dai Garza prior to the start of the procedure            * Patient was identified by name and date of birth   * Agreement on procedure being performed was verified  * Risks and Benefits explained to the patient  * Procedure site verified and marked as necessary  * Patient was positioned for comfort  * Consent was signed and verified     Time: 12:43 PM    Date of procedure: 11/19/2018    Procedure performed by: Shanon Barr MD    Provider assisted by: self    Patient assisted by: self    How tolerated by patient: tolerated the procedure well with no complications    Post Procedural Pain Scale: 0 - No Hurt    Comments: none    PROCEDURE     Indications:   Symptom relief from Rheumatoid Arthritis flare. (11/19/18)     Procedure:   After consent was obtained, and timeout performed, using sterile technique the right gluteus was prepped using alcohol. Local anesthetic used: none. The gluteus was entered and 0 ml's of fluid was withdrawn. Kenalog 80 mg was injected into the gluteus and the needle withdrawn. The procedure was well tolerated. The patient was asked to watch for fever, or increased swelling or persistent pain in the joint. Call or return to clinic as needed if such symptoms occur or there is failure to improve as anticipated. ASSESSMENT AND PLAN    This is a follow-up visit for Ms. Opal Pollock. 1) Seronegative Rheumatoid Arthritis. She has long standing Rheumatoid Arthritis that has involved her shoulders, wrists, hands, knees and ankles. She has had bilateral knee replacement in the past with persistent pain likely due to active/recurrent synovitis in the replaced joint. Bone scan showed vascular flow suggestive of this. She has been on methotrexate since 10/2010 and intermittent doses of prednisone. I had started Renell Reyes on her last visit but she was not called and scheduled. Her CDAI was 61 (previously 63) with 23 tender and 22 swollen joints, with right ankle involvement, consistent with high disease activity. We called and scheduled her infusion today. I injected her with Kenalog 80 mg IM with good tolerance. 2) Long Term Use of Immunosuppressants.  The patient remains on immunomodulatory medications (methotrexate) and requires frequent toxicity monitoring by blood work. Respective labs were ordered (CBC and CMP). 3) Vitamin D Deficiency. She is on replacement. I will check her vitamin D level today, and if it is low, I will prescribe her a weekly supplement called ergocalciferol 50,000.    4) Osteopenia involving Multiple Sites. Her most recent DXA on 5/04/2016 umbar spine L1-L3 T score -1.5 (BMD 0.954 g/cm2) and distal one third left radius T score -1.9 (BMD 0.571 g/cm2). This is likely due to her chronic Rheumatoid Arthritis. She is on Fosamax per her PCP. Based on her DXA, she may not require Fosamax. Thang Shah al reported that the risk of atypical femoral fractures is low, with an incidence of up to 50 per 100,000 person-years during the first 5 years of bisphosphonate use, resulting in a clear positive benefit/risk ratio within this time frame (J Bone [place] Res. 2016 Jan;31(1):16-35). 5) MGUS. Her M-spike 0.3 with immunofixation shows IgG monoclonal protein with lambda light chain. I referred her to hematology with low risk for myeloma    6) Vitamin D Deficiency. Her vitamin D level was 19.3. I prescribed weekly ergocalciferol 50,000. The patient voiced understanding of the aforementioned assessment and plan. Summary of plan was provided in the After Visit Summary patient instructions.      TODAY'S ORDERS    Orders Placed This Encounter    THER/PROPH/DIAG INJECTION, SUBCUT/IM ([id number])    CBC WITH AUTOMATED DIFF    METABOLIC PANEL, COMPREHENSIVE    SED RATE (ESR)    TRIAMCINOLONE ACETONIDE INJ    folic acid (FOLVITE) 1 mg tablet    methotrexate (RHEUMATREX) 2.5 mg tablet dose pack    triamcinolone acetonide (KENALOG) 40 mg/mL injection     Future Appointments   Date Time Provider Dorothy Timmons   11/20/2018  9:10 AM Shekhar Garcia MD [address]   11/29/2018  3:00 PM [facility] NURSE 1 [address] REG   12/27/2018  3:00 PM [facility] NURSE 1 [address] REG   2/19/2019  9:40 AM Antionette Nelson MD [place], [place], [address] [facility]    Adult Rheumatology   Rheumatology Ultrasound Certified  [address]  [place], [address]   Phone [phone]  Fax [phone]

## 2018-11-20 ENCOUNTER — OFFICE VISIT (OUTPATIENT)
Dept: FAMILY MEDICINE CLINIC | Age: 73
End: 2018-11-20

## 2018-11-20 VITALS
DIASTOLIC BLOOD PRESSURE: 82 MMHG | WEIGHT: 197.8 LBS | BODY MASS INDEX: 32.96 KG/M2 | SYSTOLIC BLOOD PRESSURE: 136 MMHG | HEART RATE: 95 BPM | HEIGHT: 65 IN | TEMPERATURE: 96.7 F | OXYGEN SATURATION: 97 % | RESPIRATION RATE: 18 BRPM

## 2018-11-20 DIAGNOSIS — E55.9 VITAMIN D DEFICIENCY: ICD-10-CM

## 2018-11-20 DIAGNOSIS — F33.0 MILD EPISODE OF RECURRENT MAJOR DEPRESSIVE DISORDER (HCC): ICD-10-CM

## 2018-11-20 DIAGNOSIS — M06.9 RHEUMATOID ARTHRITIS INVOLVING MULTIPLE SITES, UNSPECIFIED RHEUMATOID FACTOR PRESENCE: ICD-10-CM

## 2018-11-20 DIAGNOSIS — G25.81 RLS (RESTLESS LEGS SYNDROME): ICD-10-CM

## 2018-11-20 DIAGNOSIS — J45.20 MILD INTERMITTENT ASTHMA, UNSPECIFIED WHETHER COMPLICATED: Primary | ICD-10-CM

## 2018-11-20 DIAGNOSIS — F51.04 PSYCHOPHYSIOLOGICAL INSOMNIA: ICD-10-CM

## 2018-11-20 LAB
ALBUMIN SERPL-MCNC: 3.9 G/DL (ref 3.5–4.8)
ALBUMIN/GLOB SERPL: 1.6 {RATIO} (ref 1.2–2.2)
ALP SERPL-CCNC: 62 IU/L (ref 39–117)
ALT SERPL-CCNC: 13 IU/L (ref 0–32)
AST SERPL-CCNC: 32 IU/L (ref 0–40)
BASOPHILS # BLD AUTO: 0 X10E3/UL (ref 0–0.2)
BASOPHILS NFR BLD AUTO: 1 %
BILIRUB SERPL-MCNC: 0.8 MG/DL (ref 0–1.2)
BUN SERPL-MCNC: 21 MG/DL (ref 8–27)
BUN/CREAT SERPL: 24 (ref 12–28)
CALCIUM SERPL-MCNC: 8.8 MG/DL (ref 8.7–10.3)
CHLORIDE SERPL-SCNC: 112 MMOL/L (ref 96–106)
CO2 SERPL-SCNC: 19 MMOL/L (ref 20–29)
CREAT SERPL-MCNC: 0.87 MG/DL (ref 0.57–1)
EOSINOPHIL # BLD AUTO: 0.1 X10E3/UL (ref 0–0.4)
EOSINOPHIL NFR BLD AUTO: 2 %
ERYTHROCYTE [DISTWIDTH] IN BLOOD BY AUTOMATED COUNT: 14.5 % (ref 12.3–15.4)
ERYTHROCYTE [SEDIMENTATION RATE] IN BLOOD BY WESTERGREN METHOD: 10 MM/HR (ref 0–40)
GLOBULIN SER CALC-MCNC: 2.4 G/DL (ref 1.5–4.5)
GLUCOSE SERPL-MCNC: 92 MG/DL (ref 65–99)
HCT VFR BLD AUTO: 36.4 % (ref 34–46.6)
HGB BLD-MCNC: 12 G/DL (ref 11.1–15.9)
IMM GRANULOCYTES # BLD: 0 X10E3/UL (ref 0–0.1)
IMM GRANULOCYTES NFR BLD: 0 %
LYMPHOCYTES # BLD AUTO: 2.1 X10E3/UL (ref 0.7–3.1)
LYMPHOCYTES NFR BLD AUTO: 39 %
MCH RBC QN AUTO: 31.5 PG (ref 26.6–33)
MCHC RBC AUTO-ENTMCNC: 33 G/DL (ref 31.5–35.7)
MCV RBC AUTO: 96 FL (ref 79–97)
MONOCYTES # BLD AUTO: 0.5 X10E3/UL (ref 0.1–0.9)
MONOCYTES NFR BLD AUTO: 9 %
NEUTROPHILS # BLD AUTO: 2.6 X10E3/UL (ref 1.4–7)
NEUTROPHILS NFR BLD AUTO: 49 %
PLATELET # BLD AUTO: 229 X10E3/UL (ref 150–379)
POTASSIUM SERPL-SCNC: 4.4 MMOL/L (ref 3.5–5.2)
PROT SERPL-MCNC: 6.3 G/DL (ref 6–8.5)
RBC # BLD AUTO: 3.81 X10E6/UL (ref 3.77–5.28)
SODIUM SERPL-SCNC: 143 MMOL/L (ref 134–144)
WBC # BLD AUTO: 5.4 X10E3/UL (ref 3.4–10.8)

## 2018-11-20 RX ORDER — SERTRALINE HYDROCHLORIDE 50 MG/1
TABLET, FILM COATED ORAL
Qty: 30 TAB | Refills: 1 | Status: SHIPPED | OUTPATIENT
Start: 2018-11-20 | End: 2018-12-12 | Stop reason: SDUPTHER

## 2018-11-20 RX ORDER — ERGOCALCIFEROL 1.25 MG/1
50000 CAPSULE ORAL
Qty: 12 CAP | Refills: 3 | Status: SHIPPED | OUTPATIENT
Start: 2018-11-20 | End: 2020-05-29 | Stop reason: SDUPTHER

## 2018-11-20 RX ORDER — NORTRIPTYLINE HYDROCHLORIDE 25 MG/1
25 CAPSULE ORAL
Qty: 30 CAP | Refills: 1 | Status: SHIPPED | OUTPATIENT
Start: 2018-11-20 | End: 2018-12-12 | Stop reason: SDUPTHER

## 2018-11-20 NOTE — PATIENT INSTRUCTIONS
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
## 2018-11-20 NOTE — PROGRESS NOTES
Chief Complaint   Patient presents with    Follow-up     asthma       1. Have you been to the ER, urgent care clinic since your last visit? Hospitalized since your last visit? No    2. Have you seen or consulted any other health care providers outside of the [address] [place] [address] since your last visit? Include any pap smears or colon screening.  No

## 2018-11-26 RX ORDER — SODIUM CHLORIDE 9 MG/ML
25 INJECTION, SOLUTION INTRAVENOUS CONTINUOUS
Status: DISPENSED | OUTPATIENT
Start: 2018-11-29 | End: 2018-11-29

## 2018-11-26 RX ORDER — ACETAMINOPHEN 325 MG/1
650 TABLET ORAL
Status: ACTIVE | OUTPATIENT
Start: 2018-11-29 | End: 2018-11-29

## 2018-11-26 RX ORDER — DIPHENHYDRAMINE HYDROCHLORIDE 50 MG/ML
50 INJECTION, SOLUTION INTRAMUSCULAR; INTRAVENOUS
Status: ACTIVE | OUTPATIENT
Start: 2018-11-29 | End: 2018-11-29

## 2018-11-29 ENCOUNTER — HOSPITAL ENCOUNTER (OUTPATIENT)
Dept: INFUSION THERAPY | Age: 73
Discharge: HOME | End: 2018-11-29
Payer: MEDICARE

## 2018-11-29 VITALS
SYSTOLIC BLOOD PRESSURE: 139 MMHG | WEIGHT: 200.5 LBS | DIASTOLIC BLOOD PRESSURE: 72 MMHG | HEART RATE: 78 BPM | RESPIRATION RATE: 18 BRPM | BODY MASS INDEX: 33.36 KG/M2 | OXYGEN SATURATION: 98 % | TEMPERATURE: 97.2 F

## 2018-11-29 PROCEDURE — 96365 THER/PROPH/DIAG IV INF INIT: CPT

## 2018-11-29 PROCEDURE — 74011250636 HC RX REV CODE- 250/636: Performed by: INTERNAL MEDICINE

## 2018-11-29 PROCEDURE — 74011000258 HC RX REV CODE- 258: Performed by: INTERNAL MEDICINE

## 2018-11-29 RX ORDER — SODIUM CHLORIDE 0.9 % (FLUSH) 0.9 %
5-10 SYRINGE (ML) INJECTION AS NEEDED
Status: ACTIVE | OUTPATIENT
Start: 2018-11-29 | End: 2018-11-30

## 2018-11-29 RX ADMIN — GOLIMUMAB 180 MG: 50 SOLUTION INTRAVENOUS at 14:50

## 2018-11-29 RX ADMIN — Medication 10 ML: at 14:37

## 2018-11-29 RX ADMIN — SODIUM CHLORIDE 25 ML/HR: 900 INJECTION, SOLUTION INTRAVENOUS at 14:48

## 2018-11-29 NOTE — PROGRESS NOTES
1425 Pt arrived at [facility] ambulatory and in no distress for Kurti. Assessment completed, no new complaints voiced. Full labs done 11/19/18. Simponi discussed. IV established. Patient Vitals for the past 12 hrs:   Temp Pulse Resp BP SpO2   11/29/18 1539 -- 78 -- 139/72 --   11/29/18 1425 97.2 °F (36.2 °C) 92 18 132/71 98 %       Medications received:  Simponi    1545 Tolerated treatment well, no adverse reaction noted. IV d/c'd. Discharge instructions given. D/Cd from [facility] ambulatory and in no distress accompanied by friend.   Next appt 12/27

## 2018-12-12 DIAGNOSIS — G25.81 RLS (RESTLESS LEGS SYNDROME): ICD-10-CM

## 2018-12-12 DIAGNOSIS — F33.0 MILD EPISODE OF RECURRENT MAJOR DEPRESSIVE DISORDER (HCC): ICD-10-CM

## 2018-12-12 RX ORDER — SERTRALINE HYDROCHLORIDE 50 MG/1
TABLET, FILM COATED ORAL
Qty: 90 TAB | Refills: 0 | Status: SHIPPED | OUTPATIENT
Start: 2018-12-12 | End: 2019-02-26 | Stop reason: SDUPTHER

## 2018-12-13 RX ORDER — NORTRIPTYLINE HYDROCHLORIDE 25 MG/1
25 CAPSULE ORAL
Qty: 90 CAP | Refills: 0 | Status: SHIPPED | OUTPATIENT
Start: 2018-12-13 | End: 2019-09-24 | Stop reason: ALTCHOICE

## 2018-12-26 RX ORDER — ACETAMINOPHEN 325 MG/1
650 TABLET ORAL
Status: ACTIVE | OUTPATIENT
Start: 2018-12-27 | End: 2018-12-27

## 2018-12-26 RX ORDER — SODIUM CHLORIDE 9 MG/ML
25 INJECTION, SOLUTION INTRAVENOUS CONTINUOUS
Status: DISPENSED | OUTPATIENT
Start: 2018-12-27 | End: 2018-12-27

## 2018-12-26 RX ORDER — DIPHENHYDRAMINE HYDROCHLORIDE 50 MG/ML
50 INJECTION, SOLUTION INTRAMUSCULAR; INTRAVENOUS
Status: ACTIVE | OUTPATIENT
Start: 2018-12-27 | End: 2018-12-27

## 2018-12-27 ENCOUNTER — HOSPITAL ENCOUNTER (OUTPATIENT)
Dept: INFUSION THERAPY | Age: 73
Discharge: HOME | End: 2018-12-27

## 2019-01-11 ENCOUNTER — HOSPITAL ENCOUNTER (OUTPATIENT)
Dept: INFUSION THERAPY | Age: 74
Discharge: HOME | End: 2019-01-11
Payer: MEDICARE

## 2019-01-11 VITALS
RESPIRATION RATE: 16 BRPM | TEMPERATURE: 97.7 F | DIASTOLIC BLOOD PRESSURE: 80 MMHG | HEART RATE: 75 BPM | BODY MASS INDEX: 33.68 KG/M2 | OXYGEN SATURATION: 99 % | SYSTOLIC BLOOD PRESSURE: 161 MMHG | WEIGHT: 202.4 LBS

## 2019-01-11 PROCEDURE — 74011250636 HC RX REV CODE- 250/636: Performed by: INTERNAL MEDICINE

## 2019-01-11 PROCEDURE — 74011000258 HC RX REV CODE- 258: Performed by: INTERNAL MEDICINE

## 2019-01-11 PROCEDURE — 74011250636 HC RX REV CODE- 250/636

## 2019-01-11 PROCEDURE — 96365 THER/PROPH/DIAG IV INF INIT: CPT

## 2019-01-11 RX ORDER — SODIUM CHLORIDE 9 MG/ML
25 INJECTION, SOLUTION INTRAVENOUS CONTINUOUS
Status: DISPENSED | OUTPATIENT
Start: 2019-01-11 | End: 2019-01-11

## 2019-01-11 RX ORDER — SODIUM CHLORIDE 0.9 % (FLUSH) 0.9 %
10-40 SYRINGE (ML) INJECTION AS NEEDED
Status: ACTIVE | OUTPATIENT
Start: 2019-01-11 | End: 2019-01-12

## 2019-01-11 RX ORDER — DIPHENHYDRAMINE HYDROCHLORIDE 50 MG/ML
50 INJECTION, SOLUTION INTRAMUSCULAR; INTRAVENOUS
Status: ACTIVE | OUTPATIENT
Start: 2019-01-11 | End: 2019-01-12

## 2019-01-11 RX ORDER — ACETAMINOPHEN 325 MG/1
650 TABLET ORAL
Status: ACTIVE | OUTPATIENT
Start: 2019-01-11 | End: 2019-01-12

## 2019-01-11 RX ADMIN — Medication 10 ML: at 08:30

## 2019-01-11 RX ADMIN — Medication 10 ML: at 10:15

## 2019-01-11 RX ADMIN — GOLIMUMAB 180 MG: 50 SOLUTION INTRAVENOUS at 09:40

## 2019-01-11 RX ADMIN — SODIUM CHLORIDE 25 ML/HR: 900 INJECTION, SOLUTION INTRAVENOUS at 08:32

## 2019-01-11 NOTE — PROGRESS NOTES
2296 Verified with Dr. Eduarda Galindo via tiger text, pt missed scheduled 4 week dose in December, so he would like her to receive simponi 4 weeks from today, then every 8 weeks

## 2019-01-11 NOTE — PROGRESS NOTES
Outpatient Infusion Center Progress Note    0810- Pt admit to [facility] for [address] ambulatory in stable condition. Assessment completed. No new concerns voiced. 24G PIV established in left wrist with positive blood return noted. Patient Vitals for the past 24 hrs:   Temp Pulse Resp BP SpO2   01/11/19 1009 97.7 °F (36.5 °C) 75 16 161/80 --   01/11/19 0810 98.1 °F (36.7 °C) 77 16 142/77 99 %     Medications:  NS KVO  Simponi Aria IV    1015- Pt tolerated treatment well. IV flushed per policy and removed. D/c home ambulatory in no distress.  Pt aware of next appointment scheduled for 2/8 at 8 AM.

## 2019-01-17 ENCOUNTER — OFFICE VISIT (OUTPATIENT)
Dept: FAMILY MEDICINE CLINIC | Age: 74
End: 2019-01-17

## 2019-01-17 VITALS
SYSTOLIC BLOOD PRESSURE: 140 MMHG | BODY MASS INDEX: 33.99 KG/M2 | DIASTOLIC BLOOD PRESSURE: 62 MMHG | HEIGHT: 65 IN | OXYGEN SATURATION: 98 % | WEIGHT: 204 LBS | RESPIRATION RATE: 18 BRPM | HEART RATE: 64 BPM | TEMPERATURE: 96 F

## 2019-01-17 DIAGNOSIS — I25.10 CORONARY ARTERY DISEASE DUE TO LIPID RICH PLAQUE: ICD-10-CM

## 2019-01-17 DIAGNOSIS — M06.9 RHEUMATOID ARTHRITIS INVOLVING MULTIPLE SITES, UNSPECIFIED RHEUMATOID FACTOR PRESENCE: ICD-10-CM

## 2019-01-17 DIAGNOSIS — E78.2 MIXED HYPERLIPIDEMIA: ICD-10-CM

## 2019-01-17 DIAGNOSIS — J45.20 MILD INTERMITTENT ASTHMA, UNSPECIFIED WHETHER COMPLICATED: ICD-10-CM

## 2019-01-17 DIAGNOSIS — M85.89 OSTEOPENIA OF MULTIPLE SITES: ICD-10-CM

## 2019-01-17 DIAGNOSIS — I10 ESSENTIAL HYPERTENSION: ICD-10-CM

## 2019-01-17 DIAGNOSIS — Z13.39 SCREENING FOR ALCOHOLISM: ICD-10-CM

## 2019-01-17 DIAGNOSIS — I25.83 CORONARY ARTERY DISEASE DUE TO LIPID RICH PLAQUE: ICD-10-CM

## 2019-01-17 DIAGNOSIS — E11.8 TYPE 2 DIABETES MELLITUS WITH COMPLICATION, WITHOUT LONG-TERM CURRENT USE OF INSULIN (HCC): ICD-10-CM

## 2019-01-17 DIAGNOSIS — Z71.89 ADVANCED DIRECTIVES, COUNSELING/DISCUSSION: ICD-10-CM

## 2019-01-17 DIAGNOSIS — F33.0 MILD EPISODE OF RECURRENT MAJOR DEPRESSIVE DISORDER (HCC): ICD-10-CM

## 2019-01-17 DIAGNOSIS — Z00.00 MEDICARE ANNUAL WELLNESS VISIT, SUBSEQUENT: Primary | ICD-10-CM

## 2019-01-17 DIAGNOSIS — R77.8 ABNORMAL SPEP: ICD-10-CM

## 2019-01-17 DIAGNOSIS — Z79.60 LONG-TERM USE OF IMMUNOSUPPRESSANT MEDICATION: ICD-10-CM

## 2019-01-17 DIAGNOSIS — Z79.899 ENCOUNTER FOR LONG-TERM (CURRENT) USE OF HIGH-RISK MEDICATION: ICD-10-CM

## 2019-01-17 DIAGNOSIS — Z95.820 STATUS POST ANGIOPLASTY WITH STENT: ICD-10-CM

## 2019-01-17 DIAGNOSIS — Z79.899 ENCOUNTER FOR LONG-TERM (CURRENT) USE OF MEDICATIONS: ICD-10-CM

## 2019-01-17 NOTE — PATIENT INSTRUCTIONS
Medicare Wellness Visit, Female     The best way to live healthy is to have a lifestyle where you eat a well-balanced diet, exercise regularly, limit alcohol use, and quit all forms of tobacco/nicotine, if applicable. Regular preventive services are another way to keep healthy. Preventive services (vaccines, screening tests, monitoring & exams) can help personalize your care plan, which helps you manage your own care. Screening tests can find health problems at the earliest stages, when they are easiest to treat. [facility] follows the current, evidence-based guidelines published by the [facility] ([facility]) when recommending preventive services for our patients. Because we follow these guidelines, sometimes recommendations change over time as research supports it. (For example, mammograms used to be recommended annually. Even though Medicare will still pay for an annual mammogram, the newer guidelines recommend a mammogram every two years for women of average risk.)  Of course, you and your doctor may decide to screen more often for some diseases, based on your risk and your health status. Preventive services for you include:  - Medicare offers their members a free annual wellness visit, which is time for you and your primary care provider to discuss and plan for your preventive service needs. Take advantage of this benefit every year!  -All adults over the age of 72 should receive the recommended pneumonia vaccines. Current USPSTF guidelines recommend a series of two vaccines for the best pneumonia protection.   -All adults should have a flu vaccine yearly and a tetanus vaccine every 10 years. All adults age 61 and older should receive a shingles vaccine once in their lifetime.    -A bone mass density test is recommended when a woman turns 65 to screen for osteoporosis. This test is only recommended one time, as a screening.  Some providers will use this same test as a disease monitoring tool if you already have osteoporosis. -All adults age 38-68 who are overweight should have a diabetes screening test once every three years.   -Other screening tests and preventive services for persons with diabetes include: an eye exam to screen for diabetic retinopathy, a kidney function test, a foot exam, and stricter control over your cholesterol.   -Cardiovascular screening for adults with routine risk involves an electrocardiogram (ECG) at intervals determined by your doctor.   -Colorectal cancer screenings should be done for adults age 54-65 with no increased risk factors for colorectal cancer. There are a number of acceptable methods of screening for this type of cancer. Each test has its own benefits and drawbacks. Discuss with your doctor what is most appropriate for you during your annual wellness visit. The different tests include: colonoscopy (considered the best screening method), a fecal occult blood test, a fecal DNA test, and sigmoidoscopy. -Breast cancer screenings are recommended every other year for women of normal risk, age 54-69.  -Cervical cancer screenings for women over age 72 are only recommended with certain risk factors.   -All adults born between [facility] should be screened once for Hepatitis C.      Here is a list of your current Health Maintenance items (your personalized list of preventive services) with a due date:  Health Maintenance Due   Topic Date Due    Diabetic Foot Care  10/31/1957    Eye Exam  10/31/1957    Colonoscopy  10/31/1965    Glaucoma Screening   10/31/2012    Pneumococcal Vaccine (1 of 2 - PCV13) 10/31/2012    Albumin Urine Test  01/05/2018    Annual Well Visit  10/13/2018

## 2019-01-17 NOTE — ACP (ADVANCE CARE PLANNING)
Advance Care Planning    Advance Care Planning (ACP) Provider Note - Comprehensive     Date of ACP Conversation: 01/17/19  Persons included in Conversation:  patient  Length of ACP Conversation in minutes:  <16 minutes (Non-Billable)    Authorized Decision Maker (if patient is incapable of making informed decisions): This person is:  Healthcare Agent/Medical Power of  under Advance Directive          General ACP for ALL Patients with Decision Making Capacity:   Importance of advance care planning, including choosing a healthcare agent to communicate patient's healthcare decisions if patient lost the ability to make decisions, such as after a sudden illness or accident    Review of Existing Advance Directive:  What information were you given about medical decisions to consider before completing your advance directive?  Discussion    For Serious or Chronic Illness:  Understanding of medical condition      Interventions Provided:  Recommended completion of Advance Directive form after review of ACP materials and conversation with prospective healthcare agent
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
## 2019-01-17 NOTE — PROGRESS NOTES
Chief Complaint   Patient presents with   [place] Annual Wellness Visit     Medicare   1. Have you been to the ER, urgent care clinic since your last visit? Hospitalized since your last visit? No    2. Have you seen or consulted any other health care providers outside of the [address] [place] [address] since your last visit? Include any pap smears or colon screening.  No   Discuss fall on Christmas Malorie no injury   [facility]

## 2019-01-31 ENCOUNTER — APPOINTMENT (OUTPATIENT)
Dept: INFUSION THERAPY | Age: 74
End: 2019-01-31
Payer: MEDICARE

## 2019-02-06 RX ORDER — DIPHENHYDRAMINE HYDROCHLORIDE 50 MG/ML
50 INJECTION, SOLUTION INTRAMUSCULAR; INTRAVENOUS
Status: ACTIVE | OUTPATIENT
Start: 2019-02-07 | End: 2019-02-08

## 2019-02-06 RX ORDER — SODIUM CHLORIDE 9 MG/ML
25 INJECTION, SOLUTION INTRAVENOUS CONTINUOUS
Status: DISPENSED | OUTPATIENT
Start: 2019-02-07 | End: 2019-02-07

## 2019-02-06 RX ORDER — ACETAMINOPHEN 325 MG/1
650 TABLET ORAL
Status: ACTIVE | OUTPATIENT
Start: 2019-02-07 | End: 2019-02-08

## 2019-02-07 ENCOUNTER — HOSPITAL ENCOUNTER (OUTPATIENT)
Dept: INFUSION THERAPY | Age: 74
Discharge: HOME | End: 2019-02-07
Payer: MEDICARE

## 2019-02-07 VITALS
RESPIRATION RATE: 20 BRPM | HEIGHT: 64 IN | SYSTOLIC BLOOD PRESSURE: 116 MMHG | DIASTOLIC BLOOD PRESSURE: 67 MMHG | BODY MASS INDEX: 34.16 KG/M2 | WEIGHT: 200.06 LBS | OXYGEN SATURATION: 100 % | HEART RATE: 78 BPM | TEMPERATURE: 97.5 F

## 2019-02-07 LAB
ALBUMIN SERPL-MCNC: 3.6 G/DL (ref 3.5–5)
ALBUMIN/GLOB SERPL: 1.1 {RATIO} (ref 1.1–2.2)
ALP SERPL-CCNC: 62 U/L (ref 45–117)
ALT SERPL-CCNC: 25 U/L (ref 12–78)
ANION GAP SERPL CALC-SCNC: 9 MMOL/L (ref 5–15)
AST SERPL-CCNC: 44 U/L (ref 15–37)
BASOPHILS # BLD: 0.1 K/UL (ref 0–0.1)
BASOPHILS NFR BLD: 1 % (ref 0–1)
BILIRUB SERPL-MCNC: 0.9 MG/DL (ref 0.2–1)
BUN SERPL-MCNC: 23 MG/DL (ref 6–20)
BUN/CREAT SERPL: 23 (ref 12–20)
CALCIUM SERPL-MCNC: 8.5 MG/DL (ref 8.5–10.1)
CHLORIDE SERPL-SCNC: 111 MMOL/L (ref 97–108)
CO2 SERPL-SCNC: 20 MMOL/L (ref 21–32)
CREAT SERPL-MCNC: 1.01 MG/DL (ref 0.55–1.02)
CRP SERPL-MCNC: <0.29 MG/DL (ref 0–0.6)
DIFFERENTIAL METHOD BLD: ABNORMAL
EOSINOPHIL # BLD: 0.1 K/UL (ref 0–0.4)
EOSINOPHIL NFR BLD: 2 % (ref 0–7)
ERYTHROCYTE [DISTWIDTH] IN BLOOD BY AUTOMATED COUNT: 12.9 % (ref 11.5–14.5)
ERYTHROCYTE [SEDIMENTATION RATE] IN BLOOD: 1 MM/HR (ref 0–30)
GLOBULIN SER CALC-MCNC: 3.2 G/DL (ref 2–4)
GLUCOSE SERPL-MCNC: 128 MG/DL (ref 65–100)
HCT VFR BLD AUTO: 34.9 % (ref 35–47)
HGB BLD-MCNC: 11.5 G/DL (ref 11.5–16)
IMM GRANULOCYTES # BLD AUTO: 0 K/UL (ref 0–0.04)
IMM GRANULOCYTES NFR BLD AUTO: 1 % (ref 0–0.5)
LYMPHOCYTES # BLD: 2.1 K/UL (ref 0.8–3.5)
LYMPHOCYTES NFR BLD: 33 % (ref 12–49)
MCH RBC QN AUTO: 32.8 PG (ref 26–34)
MCHC RBC AUTO-ENTMCNC: 33 G/DL (ref 30–36.5)
MCV RBC AUTO: 99.4 FL (ref 80–99)
MONOCYTES # BLD: 0.6 K/UL (ref 0–1)
MONOCYTES NFR BLD: 9 % (ref 5–13)
NEUTS SEG # BLD: 3.4 K/UL (ref 1.8–8)
NEUTS SEG NFR BLD: 54 % (ref 32–75)
NRBC # BLD: 0 K/UL (ref 0–0.01)
NRBC BLD-RTO: 0 PER 100 WBC
PLATELET # BLD AUTO: 215 K/UL (ref 150–400)
PMV BLD AUTO: 9.7 FL (ref 8.9–12.9)
POTASSIUM SERPL-SCNC: 4.7 MMOL/L (ref 3.5–5.1)
PROT SERPL-MCNC: 6.8 G/DL (ref 6.4–8.2)
RBC # BLD AUTO: 3.51 M/UL (ref 3.8–5.2)
SODIUM SERPL-SCNC: 140 MMOL/L (ref 136–145)
WBC # BLD AUTO: 6.2 K/UL (ref 3.6–11)

## 2019-02-07 PROCEDURE — 80053 COMPREHEN METABOLIC PANEL: CPT

## 2019-02-07 PROCEDURE — 85025 COMPLETE CBC W/AUTO DIFF WBC: CPT

## 2019-02-07 PROCEDURE — 96365 THER/PROPH/DIAG IV INF INIT: CPT

## 2019-02-07 PROCEDURE — 74011250636 HC RX REV CODE- 250/636

## 2019-02-07 PROCEDURE — 74011250636 HC RX REV CODE- 250/636: Performed by: INTERNAL MEDICINE

## 2019-02-07 PROCEDURE — 74011000258 HC RX REV CODE- 258: Performed by: INTERNAL MEDICINE

## 2019-02-07 PROCEDURE — 85652 RBC SED RATE AUTOMATED: CPT

## 2019-02-07 PROCEDURE — 86140 C-REACTIVE PROTEIN: CPT

## 2019-02-07 PROCEDURE — 36415 COLL VENOUS BLD VENIPUNCTURE: CPT

## 2019-02-07 RX ORDER — SODIUM CHLORIDE 0.9 % (FLUSH) 0.9 %
5-10 SYRINGE (ML) INJECTION AS NEEDED
Status: ACTIVE | OUTPATIENT
Start: 2019-02-07 | End: 2019-02-08

## 2019-02-07 RX ADMIN — Medication 10 ML: at 10:18

## 2019-02-07 RX ADMIN — GOLIMUMAB 180 MG: 50 SOLUTION INTRAVENOUS at 09:45

## 2019-02-07 RX ADMIN — Medication 10 ML: at 09:38

## 2019-02-07 RX ADMIN — SODIUM CHLORIDE 25 ML/HR: 900 INJECTION, SOLUTION INTRAVENOUS at 09:38

## 2019-02-07 NOTE — PROGRESS NOTES
Pt arrived to [facility] ambulatory in no acute distress at 0845 for Alma Sky.  Assessment unremarkable no new complaints or concerns. After 4 attempts  IV established in R wrist site WNL and positive blood return noted.      Patient Vitals for the past 12 hrs:   Temp Pulse Resp BP SpO2   02/07/19 1015 -- 78 -- 116/67 100 %   02/07/19 0848 97.5 °F (36.4 °C) 87 20 125/69 99 %       Labs obtained: Some labs results pending  Recent Results (from the past 12 hour(s))   CBC WITH AUTOMATED DIFF    Collection Time: 02/07/19  8:54 AM   Result Value Ref Range    WBC 6.2 3.6 - 11.0 K/uL    RBC 3.51 (L) 3.80 - 5.20 M/uL    HGB 11.5 11.5 - 16.0 g/dL    HCT 34.9 (L) 35.0 - 47.0 %    MCV 99.4 (H) 80.0 - 99.0 FL    MCH 32.8 26.0 - 34.0 PG    MCHC 33.0 30.0 - 36.5 g/dL    RDW 12.9 11.5 - 14.5 %    PLATELET 551 072 - 429 K/uL    MPV 9.7 8.9 - 12.9 FL    NRBC 0.0 0  WBC    ABSOLUTE NRBC 0.00 0.00 - 0.01 K/uL    NEUTROPHILS 54 32 - 75 %    LYMPHOCYTES 33 12 - 49 %    MONOCYTES 9 5 - 13 %    EOSINOPHILS 2 0 - 7 %    BASOPHILS 1 0 - 1 %    IMMATURE GRANULOCYTES 1 (H) 0.0 - 0.5 %    ABS. NEUTROPHILS 3.4 1.8 - 8.0 K/UL    ABS. LYMPHOCYTES 2.1 0.8 - 3.5 K/UL    ABS. MONOCYTES 0.6 0.0 - 1.0 K/UL    ABS. EOSINOPHILS 0.1 0.0 - 0.4 K/UL    ABS. BASOPHILS 0.1 0.0 - 0.1 K/UL    ABS. IMM.  GRANS. 0.0 0.00 - 0.04 K/UL    DF AUTOMATED     METABOLIC PANEL, COMPREHENSIVE    Collection Time: 02/07/19  8:54 AM   Result Value Ref Range    Sodium 140 136 - 145 mmol/L    Potassium 4.7 3.5 - 5.1 mmol/L    Chloride 111 (H) 97 - 108 mmol/L    CO2 20 (L) 21 - 32 mmol/L    Anion gap 9 5 - 15 mmol/L    Glucose 128 (H) 65 - 100 mg/dL    BUN 23 (H) 6 - 20 MG/DL    Creatinine 1.01 0.55 - 1.02 MG/DL    BUN/Creatinine ratio 23 (H) 12 - 20      GFR est AA >60 >60 ml/min/1.73m2    GFR est non-AA 54 (L) >60 ml/min/1.73m2    Calcium 8.5 8.5 - 10.1 MG/DL    Bilirubin, total 0.9 0.2 - 1.0 MG/DL    ALT (SGPT) 25 12 - 78 U/L    AST (SGOT) 44 (H) 15 - 37 U/L Alk. phosphatase 62 45 - 117 U/L    Protein, total 6.8 6.4 - 8.2 g/dL    Albumin 3.6 3.5 - 5.0 g/dL    Globulin 3.2 2.0 - 4.0 g/dL    A-G Ratio 1.1 1.1 - 2.2     SED RATE (ESR)    Collection Time: 02/07/19  8:54 AM   Result Value Ref Range    Sed rate, automated 1 0 - 30 mm/hr       The following medications administered:  NS KVO  Simponi Aria 180 mg IV over 30 min    Pt tolerated treatment well. No adverse reactions noted. IV flushed per policy and removed, 2x2 and coban placed.  Pt discharged ambulatory in no acute distress at 1025, accompanied by family.   Next appointment 4/5/19 @ 8 am.

## 2019-02-08 ENCOUNTER — APPOINTMENT (OUTPATIENT)
Dept: INFUSION THERAPY | Age: 74
End: 2019-02-08

## 2019-02-11 ENCOUNTER — TELEPHONE (OUTPATIENT)
Dept: RHEUMATOLOGY | Age: 74
End: 2019-02-11

## 2019-02-11 NOTE — TELEPHONE ENCOUNTER
Left message for pt regarding her lab results and let her know that she needs to increase her fluid intake and avoid NSAID's if she is taking them. Informed to call back with questions.

## 2019-02-18 ENCOUNTER — OFFICE VISIT (OUTPATIENT)
Dept: RHEUMATOLOGY | Age: 74
End: 2019-02-18

## 2019-02-18 VITALS
SYSTOLIC BLOOD PRESSURE: 121 MMHG | DIASTOLIC BLOOD PRESSURE: 72 MMHG | HEART RATE: 93 BPM | RESPIRATION RATE: 18 BRPM | WEIGHT: 199 LBS | TEMPERATURE: 98 F | BODY MASS INDEX: 33.97 KG/M2 | HEIGHT: 64 IN

## 2019-02-18 DIAGNOSIS — Z79.60 LONG-TERM USE OF IMMUNOSUPPRESSANT MEDICATION: ICD-10-CM

## 2019-02-18 DIAGNOSIS — M06.09 SERONEGATIVE RHEUMATOID ARTHRITIS OF MULTIPLE SITES (HCC): Primary | ICD-10-CM

## 2019-02-18 DIAGNOSIS — R79.89 LFT ELEVATION: ICD-10-CM

## 2019-02-18 DIAGNOSIS — M85.89 OSTEOPENIA OF MULTIPLE SITES: ICD-10-CM

## 2019-02-18 DIAGNOSIS — E55.9 VITAMIN D DEFICIENCY: ICD-10-CM

## 2019-02-18 LAB
ALBUMIN SERPL-MCNC: 4.1 G/DL (ref 3.5–4.8)
ALBUMIN/CREAT UR: 22.5 MG/G CREAT (ref 0–30)
ALBUMIN/GLOB SERPL: 1.6 {RATIO} (ref 1.2–2.2)
ALP SERPL-CCNC: 59 IU/L (ref 39–117)
ALT SERPL-CCNC: 20 IU/L (ref 0–32)
AST SERPL-CCNC: 36 IU/L (ref 0–40)
BASOPHILS # BLD AUTO: 0.1 X10E3/UL (ref 0–0.2)
BASOPHILS NFR BLD AUTO: 1 %
BILIRUB SERPL-MCNC: 1.3 MG/DL (ref 0–1.2)
BUN SERPL-MCNC: 25 MG/DL (ref 8–27)
BUN/CREAT SERPL: 25 (ref 12–28)
CALCIUM SERPL-MCNC: 8.3 MG/DL (ref 8.7–10.3)
CHLORIDE SERPL-SCNC: 100 MMOL/L (ref 96–106)
CHOLEST SERPL-MCNC: 157 MG/DL (ref 100–199)
CO2 SERPL-SCNC: 22 MMOL/L (ref 20–29)
CREAT SERPL-MCNC: 0.99 MG/DL (ref 0.57–1)
CREAT UR-MCNC: 43.1 MG/DL
CRP SERPL-MCNC: 0.9 MG/L (ref 0–4.9)
EOSINOPHIL # BLD AUTO: 0.1 X10E3/UL (ref 0–0.4)
EOSINOPHIL NFR BLD AUTO: 2 %
ERYTHROCYTE [DISTWIDTH] IN BLOOD BY AUTOMATED COUNT: 13.6 % (ref 12.3–15.4)
ERYTHROCYTE [SEDIMENTATION RATE] IN BLOOD BY WESTERGREN METHOD: 20 MM/HR (ref 0–40)
EST. AVERAGE GLUCOSE BLD GHB EST-MCNC: 128 MG/DL
GLOBULIN SER CALC-MCNC: 2.5 G/DL (ref 1.5–4.5)
GLUCOSE SERPL-MCNC: 102 MG/DL (ref 65–99)
HBA1C MFR BLD: 6.1 % (ref 4.8–5.6)
HCT VFR BLD AUTO: 35.4 % (ref 34–46.6)
HDLC SERPL-MCNC: 62 MG/DL
HGB BLD-MCNC: 11.6 G/DL (ref 11.1–15.9)
IMM GRANULOCYTES # BLD AUTO: 0 X10E3/UL (ref 0–0.1)
IMM GRANULOCYTES NFR BLD AUTO: 0 %
INTERPRETATION: NORMAL
LDLC SERPL CALC-MCNC: 82 MG/DL (ref 0–99)
LYMPHOCYTES # BLD AUTO: 3.2 X10E3/UL (ref 0.7–3.1)
LYMPHOCYTES NFR BLD AUTO: 45 %
Lab: NORMAL
MCH RBC QN AUTO: 32.1 PG (ref 26.6–33)
MCHC RBC AUTO-ENTMCNC: 32.8 G/DL (ref 31.5–35.7)
MCV RBC AUTO: 98 FL (ref 79–97)
MICROALBUMIN UR-MCNC: 9.7 UG/ML
MONOCYTES # BLD AUTO: 0.6 X10E3/UL (ref 0.1–0.9)
MONOCYTES NFR BLD AUTO: 10 %
NEUTROPHILS # BLD AUTO: 2.8 X10E3/UL (ref 1.4–7)
NEUTROPHILS NFR BLD AUTO: 42 %
PLATELET # BLD AUTO: 258 X10E3/UL (ref 150–379)
POTASSIUM SERPL-SCNC: 4.6 MMOL/L (ref 3.5–5.2)
PROT SERPL-MCNC: 6.6 G/DL (ref 6–8.5)
RBC # BLD AUTO: 3.61 X10E6/UL (ref 3.77–5.28)
SODIUM SERPL-SCNC: 139 MMOL/L (ref 134–144)
TRIGL SERPL-MCNC: 67 MG/DL (ref 0–149)
TSH SERPL DL<=0.005 MIU/L-ACNC: 2.97 UIU/ML (ref 0.45–4.5)
VLDLC SERPL CALC-MCNC: 13 MG/DL (ref 5–40)
WBC # BLD AUTO: 6.8 X10E3/UL (ref 3.4–10.8)

## 2019-02-18 RX ORDER — FOLIC ACID 1 MG/1
1 TABLET ORAL DAILY
Qty: 90 TAB | Refills: 0 | Status: CANCELLED | OUTPATIENT
Start: 2019-02-18

## 2019-02-18 RX ORDER — TRIAMCINOLONE ACETONIDE 40 MG/ML
80 INJECTION, SUSPENSION INTRA-ARTICULAR; INTRAMUSCULAR ONCE
Qty: 1 ML | Refills: 0
Start: 2019-02-18 | End: 2019-02-18

## 2019-02-18 NOTE — PROGRESS NOTES
REASON FOR VISIT    This is a follow-up visit for Ms. Alyssa Torres for Seronegative Rheumatoid Arthritis. Inflammatory arthritis phenotype includes:  Anti-CCP positive: no  Rheumatoid factor positive: no  Erosive disease: no  Extra-articular manifestations include: MGUS    Immunosuppression Screening (8/22/2018): Quantiferon TB: negative  PPD:  Not performed  Hepatitis B: negative  Hepatitis C: negative    Therapy History includes:  Current DMARD therapy includes: methotrexate 20 mg every Saturday, Simponi Aria (11/29/2018 to present)  Prior DMARD therapy includes: none  The following DMARDs have been ineffective: none  The following DMARDs were stopped because of side effects: none    Immunization History   Administered Date(s) Administered    Influenza High Dose Vaccine PF 09/07/2018       Patient Active Problem List   Diagnosis Code    Long-term use of immunosuppressant medication Z79.899    Osteopenia of multiple sites M85.89    Vitamin D deficiency E55.9    Rheumatoid arthritis involving multiple sites ([facility] M06.9    Essential hypertension I10    Mixed hyperlipidemia E78.2    RLS (restless legs syndrome) G25.81    Mild intermittent asthma J45.20    Status post angioplasty with stent Z95.9    Coronary artery disease due to lipid rich plaque I25.10, I25.83    Seronegative rheumatoid arthritis of multiple sites ([facility] M06.09     HISTORY OF PRESENT ILLNESS    Ms. Alyssa Torres returns for a follow-up. On her lats visit, I continueed methotrexate 20 mg every Saturday and scheduled [facility], which she received on 11/29/2019. Her most recent infusion was 2/07/2019. She has only received 3 doses to date with good tolerance. I also injected her with Kenalog 80 mg IM. Today, she has not felt much different since missing a dose in December. She continues to have aching and throbbing pain and swelling in her hands. She cannot make a fist that takes hours to loosen up.  Her pain is worse at night and morning due to left knee pain. Tylenol Arthritis does not help. Warm water helps. She denies fever, weight loss, blurred vision, vision loss, oral ulcers, ankle swelling, dry cough, dyspnea, nausea, vomiting, dysphagia, abdominal pain, black or bloody stool, fall since last visit, rash, easy bruising and increased thirst.    Ms. Manuel Yi has continued her medications for arthritis and reports good tolerance without significant side effects. Last toxicity monitoring by blood work was done on 2/07/2019 and did not reveal any significant adverse effects, except AST 44    Most recent inflammatory markers from 2/07/2019 revealed a ESR 1 mm/hr (previously 21 mm/hr) and CRP 0.3 mg/L (previously 1.3 mg/L). The patient has not had any interval hospital admissions, infections, or surgeries. REVIEW OF SYSTEMS    A comprehensive review of systems was performed and pertinent results are documented in the HPI, review of systems is otherwise non-contributory. PAST MEDICAL HISTORY    She has a past medical history of Arthritis, Asthma, Diabetes ([address] High cholesterol, Hypertension, and Sarcoidosis (1999). She also has no past medical history of Difficult intubation, Malignant hyperthermia due to anesthesia, Nausea & vomiting, Pseudocholinesterase deficiency, or Unspecified adverse effect of anesthesia. FAMILY HISTORY    Her family history includes Alcohol abuse in her brother; Heart Disease in her mother; Liver Disease in her father. SOCIAL HISTORY    She reports that  has never smoked. she has never used smokeless tobacco. She reports that she drinks about 1.2 oz of alcohol per week. She reports that she does not use drugs. MEDICATIONS    Current Outpatient Medications   Medication Sig Dispense Refill    golimumab (SIMPONI ARIA IV) 2 mg/kg by IntraVENous route every two (2) months.  [START ON 2/23/2019] methotrexate (RHEUMATREX) 2.5 mg tablet dose pack Take 8 Tabs by mouth Every Saturday.  96 Tab 0    triamcinolone acetonide (KENALOG) 40 mg/mL injection 2 mL by IntraMUSCular route once for 1 dose. 1 mL 0    nortriptyline (PAMELOR) 25 mg capsule Take 1 Cap by mouth nightly. 90 Cap 0    sertraline (ZOLOFT) 50 mg tablet Take 1/2 tab by mouth daily x 1 week and then increase to 1 tab daily 90 Tab 0    ergocalciferol (ERGOCALCIFEROL) 50,000 unit capsule Take 1 Cap by mouth every seven (7) days. 12 Cap 3    folic acid (FOLVITE) 1 mg tablet Take 1 Tab by mouth daily. 90 Tab 0    latanoprost (XALATAN) 0.005 % ophthalmic solution Administer 1 Drop to both eyes nightly. 1 Bottle 2    gabapentin (NEURONTIN) 100 mg capsule Take  by mouth three (3) times daily.  magnesium 200 mg tab Take 100 mg by mouth daily.  benzonatate (TESSALON) 100 mg capsule Take 100 mg by mouth three (3) times daily as needed for Cough.  albuterol sulfate (PROVENTIL;VENTOLIN) 2.5 mg/0.5 mL nebu nebulizer solution 2.5 mg by Nebulization route every four (4) hours as needed for Wheezing. Indications: Acute Asthma Attack      naproxen (NAPROSYN) 375 mg tablet Take 325 mg by mouth two (2) times daily as needed.  metFORMIN (GLUCOPHAGE) 500 mg tablet Take 1 Tab by mouth two (2) times daily (with meals). (Patient taking differently: Take 500 mg by mouth daily (with breakfast). ) 60 Tab 5    fluticasone-vilanterol (BREO ELLIPTA) 100-25 mcg/dose inhaler Take 1 Puff by inhalation daily. 1 Inhaler 5    aspirin delayed-release 81 mg tablet Take  by mouth daily.  cyanocobalamin (VITAMIN B-12) 1,000 mcg sublingual tablet Take 1,000 mcg by mouth daily.  furosemide (LASIX) 20 mg tablet Take  by mouth daily.  metoprolol tartrate (LOPRESSOR) 25 mg tablet Take  by mouth two (2) times a day.  simvastatin (ZOCOR) 20 mg tablet Take  by mouth nightly.  FERROUS SULFATE, DRIED (IRON, DRIED, PO) Take  by mouth every seven (7) days. Twice a week      OMEPRAZOLE PO Take 20 mg by mouth daily.           ALLERGIES    Allergies Allergen Reactions    Lisinopril Rash       PHYSICAL EXAMINATION    Visit Vitals  /72   Pulse 93   Temp 98 °F (36.7 °C)   Resp 18   Ht 5' 4\" (1.626 m)   Wt 199 lb (90.3 kg)   BMI 34.16 kg/m²     Body mass index is 34.16 kg/m². General: Patient is alert, oriented x 3, not in acute distress    HEENT:   Sclerae are not injected and appear moist.  There is no alopecia. Neck is supple     Cardiovascular:  Heart is regular rate and rhythm, no murmurs. Chest:  Lungs are clear to auscultation bilaterally. No rhonchi, wheezes, or crackles. Extremities:  Free of clubbing, cyanosis, edema    Neurological exam:  Muscle strength is full in upper and lower extremities     Skin exam:  There are no rashes, no alopecia, no discoid lesions, no active Raynaud's, no livedo reticularis, no periungual erythema. Musculoskeletal exam:  A comprehensive musculoskeletal exam was performed for all joints of each upper and lower extremity and assessed for swelling, tenderness and range of motion. Positive results are documented as below:    Decreased ROM of bilateral shoulders (right worse than left) due to pain  Bilateral knee replacements without effusion.   Bilateral ankle swelling with tenderness on the right (persistently)    Z-Deformities:   no  [place] Neck Deformities:  no  Boutonierre's Deformities:  no  Ulnar Deviation:   Yes (LEFT:3rd digit)  MCP Subluxation:  no     Joint Count 2/18/2019 11/19/2018 8/22/2018   Patient pain (0-100) 60 90 90   MHAQ 0.75 1 0.5   Left shoulder - Tender - - 1   Left shoulder - Swollen - - 1   Left elbow - Tender - 1 1   Left elbow - Swollen - 0 1   Left wrist- Tender 1 1 -   Left wrist- Swollen 1 1 1   Left 1st MCP - Tender 1 1 1   Left 1st MCP - Swollen 1 1 1   Left 2nd MCP - Tender 1 1 1   Left 2nd MCP - Swollen 1 1 1   Left 3rd MCP - Tender 0 1 1   Left 3rd MCP - Swollen 1 1 1   Left 4th MCP - Tender 1 1 1   Left 4th MCP - Swollen 1 1 1   Left 5th MCP - Tender 1 - 1   Left 5th MCP - Swollen 1 - 1   Left thumb IP - Tender 1 1 1   Left thumb IP - Swollen 1 1 -   Left 2nd PIP - Tender 1 1 1   Left 2nd PIP - Swollen 1 1 1   Left 3rd PIP - Tender 1 1 1   Left 3rd PIP - Swollen 1 1 1   Left 4th PIP - Tender 1 1 1   Left 4th PIP - Swollen 1 1 1   Left 5th PIP - Tender 1 1 1   Left 5th PIP - Swollen - 1 1   Left knee - Tender - 1 -   Left knee - Swollen - 1 -   Right shoulder - Tender - - 1   Right elbow - Tender - 1 1   Right elbow - Swollen - 1 1   Right wrist- Tender 1 1 1   Right wrist- Swollen 1 1 1   Right 1st MCP - Tender 1 1 1   Right 1st MCP - Swollen 0 1 -   Right 2nd MCP - Tender 1 1 1   Right 2nd MCP - Swollen 1 1 1   Right 3rd MCP - Tender 1 1 1   Right 3rd MCP - Swollen 1 1 1   Right 4th MCP - Tender 1 1 1   Right 4th MCP - Swollen 1 1 1   Right 5th MCP - Tender 1 1 1   Right 5th MCP - Swollen 1 1 1   Right thumb IP - Tender - - 1   Right 2nd PIP - Tender 1 1 1   Right 2nd PIP - Swollen 1 1 1   Right 3rd PIP - Tender 1 1 1   Right 3rd PIP - Swollen 1 1 1   Right 4th PIP - Tender 1 1 1   Right 4th PIP - Swollen 1 1 1   Right 5th PIP - Tender 1 1 1   Right 5th PIP - Swollen 1 1 1   Right knee - Tender 1 - -   Tender Joint Count (Total) 21 23 25   Swollen Joint Count (Total) 19 22 22   Physician Assessment (0-10) 6 7 7   Patient Assessment (0-10) 7 9 9   CDAI Total (calculated) 48 61 61       DATA REVIEW    Laboratory     Recent laboratory results were reviewed, summarized, and discussed with the patient. Imaging    Musculoskeletal Ultrasound    None    Radiographs    Sacrum and Coccyx 9/18/2018: no definite fracture or dislocation, however osteopenia and overlapping bowel gas somewhat limited evaluation of the sacrum. Severe multilevel degenerative disc disease is noted in the lower lumbar spine. Dense arterial vascular calcifications are present. Bilateral Hips 9/18/2018: no acute fracture or dislocation. Evaluation of the sacrum is limited by overlying bowel gas and osteopenia. Within these limitations, no fractures identified. Degenerative changes are present in the lower lumbar spine. Dense arterial vascular calcifications are present. Bilateral Hand 8/22/2018: moderate to severe diffuse osteopenia. Extensive atherosclerotic calcifications  are shown extending into the fingers bilaterally. Fusiform soft tissue swelling is shown bilaterally throughout the metacarpophalangeal and proximal interphalangeal joints. There is mild joint space narrowing on the left throughout the metacarpophalangeal joints and on the right in the third through fifth metacarpophalangeal joints. Subtle marginal erosions are demonstrated at the lateral base of the right ring finger proximal phalanx and more extensively in the third through fifth left MCP joints. There are minimal-mild features of osteoarthritis at several DIP joints bilaterally. No substantial carpal joint space narrowing is shown. Calcium pyrophosphate dihydrate position is in the medial carpus bilaterally. Bilateral Foot 8/22/2018: moderate to severe diffuse osteopenia. Extensive atherosclerotic calcifications are shown extending into the digits. Bilateral talonavicular, naviculocuneiform and left greater than right diffuse tarsometatarsal joint space narrowing is noted with small marginal osteophytes. No substantial digital joint space narrowing is evident though fusiform soft tissue swelling is suggested at the metatarsophalangeal joints bilaterally. Moderate bilateral plantar and dorsal calcaneal spurs are shown. Bilateral Shoulder 9/08/2011: RIGHT: no evidence of fracture or dislocation. Alignment of the glenohumeral joint is anatomic. The patient is status post distal clavicle resection and acromioplasty. A small amount of heterotopic ossification is noted about the distal aspect of the clavicle. Mild marginal osteophytes noted at the the glenoid.  Irregularity along the superolateral aspect of the humeral head may reflect rotator cuff pathology. An ovoid well-corticated calcific density projecting over the humeral head may represent an intraventricular loose body. The visualized right lung is clear. Soft tissues are unremarkable. LEFT: no evidence of fracture or dislocation. Alignment of the glenohumeral and acromioclavicular joints is anatomic. Moderate superiorly projecting osteophytes emanate from the distal clavicle at the acromioclavicular joint. Bony hypertrophy irregularity about the greater tuberosity may indicate chronic rotator cuff pathology. There is a small subacromial spur. The visualized left lung is clear. Multiple mediastinal clips are noted. Bone mineralization is mildly diminished. Right Hip 8/03/2011: there is diffuse osteopenia. Degenerative changes noted in the lower lumbar spine. The SI joints and pubic symphysis are not widened. The right and left hip are normally aligned. The right hip demonstrates mild osteophyte formation most notable at the inferior aspect of the joint. There is minimal diffuse joint space narrowing. Similar changes are noted in the left hip with minimal osteophyte formation. There is preservation of the left hip joint space. No acute fractures. Dense vascular calcifications are noted. Soft tissue calcification lateral to the right iliac wing is visualized and was noted on prior abdominal and pelvic CT scan. Hypertrophic changes anterior/superior left iliac wing and left greater trochanter as before. Bilateral Hand 9/27/2010: Overall alignment is anatomic. The bones are demineralized overall. The joint spaces are relatively preserved throughout. Relatively mild degenerative changes are present in the DIP joints, and in the first CMC joints. There appears to be some mild soft tissue swelling overlying the right MCP joints.  There are also several equivocal erosions noted involving the metacarpal heads of the right hand which raise the possibility of early erosions related to inflammatory arthritis such as rheumatoid. These changes appear slightly more conspicuous and the 2008 examination. Incidentally, there are fairly prominent vascular calcifications present indicating that the patient is likely diabetic or perhaps as renal disease    Bilateral Knee 8/18/2010:  irregularity along the patella surface where there is a scalloped margin suggesting liner wear. In addition, the cement seen along the bone prosthetic interface of the anterior distal femoral cortex is less apparent, which also may reflect underlying liner wear and possibly early particulate disease. Heterotopic ossification has not changed in interval. Vascular calcifications reflecting atherosclerotic disease remains severe. Diffuse osteopenia is noted. CT Imaging    CT Right Shoulder with arthrogram 9/26/2011: there is a large full-thickness tear seen involving the supraspinatus tendon extending to the level of the acromion. Additionally there is a partial tear of the subscapularis tendon. The biceps appears chronically torn. There are mild degenerative changes of the acromioclavicular joint. Some mild osteoarthritic changes are present of the glenohumeral joint. No evidence of fracture was seen. MR Imaging    None    DXA     DXA 5/04/2016:  (excluded L4 for spondylitic change, right hip) lumbar spine L1-L3 T score -1.5 (BMD 0.954 g/cm2), left femoral neck T score: -1.0 (0.805 g/cm2), left total hip T score: -0.1 (1.016 g/cm2), and distal one third left radius T score -1.9 (BMD 0.571 g/cm2). FRAX score 6.1 % probability in 10 years for major osteoporotic fracture and 0.3 % 10 year probability of hip fracture. Nuclear Studies    Triple Phase Scan 8/20/0218:  Patient status post bilateral knee replacements. Phase 1 (blood flow):  There is slight increased blood flow to the left knee. Phase 2 (blood pool/soft tissue):  There is slight increased peritracheal activity left knee.  Phase 3 (delayed bone): Patient status post right knee replacement which is within normal limits. Patient is status post left knee replacement. There are slight increased activity left tibial plateau    PROCEDURE    Kenalog 80 mg IA. (11/19/18)     [address]  OFFICE PROCEDURE PROGRESS NOTE      Symptom relief from Rheumatoid Arthritis. (02/18/19)     Chart reviewed for the following:   Roger LIM MD, have reviewed the History, Physical and updated the Allergic reactions for [address] performed immediately prior to start of procedure:   Roger LIM MD, have performed the following reviews on [place] prior to the start of the procedure            * Patient was identified by name and date of birth   * Agreement on procedure being performed was verified  * Risks and Benefits explained to the patient  * Procedure site verified and marked as necessary  * Patient was positioned for comfort  * Consent was signed and verified     Time: 12:43 PM    Date of procedure: 2/18/2019    Procedure performed by: Roger Amado MD    Provider assisted by: self    Patient assisted by: self    How tolerated by patient: tolerated the procedure well with no complications    Post Procedural Pain Scale: 0 - No Hurt    Comments: none    PROCEDURE     Indications:   Symptom relief from Rheumatoid Arthritis flare. (11/19/18)     Procedure:   After consent was obtained, and timeout performed, using sterile technique the right gluteus was prepped using alcohol. Local anesthetic used: none. The gluteus was entered and 0 ml's of fluid was withdrawn. Kenalog 80 mg was injected into the gluteus and the needle withdrawn. The procedure was well tolerated. The patient was asked to watch for fever, or increased swelling or persistent pain in the joint. Call or return to clinic as needed if such symptoms occur or there is failure to improve as anticipated. ASSESSMENT AND PLAN    This is a follow-up visit for Ms. Nidia Monsivais.     1) Seronegative Rheumatoid Arthritis. She has long standing Rheumatoid Arthritis that has involved her shoulders, wrists, hands, knees and ankles. She has had bilateral knee replacement in the past with persistent pain likely due to active/recurrent synovitis in the replaced joint. Bone scan showed vascular flow suggestive of this. She is maintained on methotrexate 20 mg weekly and newly on Simponi Aria. She has not felt much improved after 3 doses of Simponi. Her CDAI was 53 (previously 61, 63) with 21 tender and 19 swollen joints, with right ankle involvement, consistent with high disease activity. I will give treatment more time. I injected her with Kenalog 80 mg IM with good tolerance. 2) Long Term Use of Immunosuppressants. The patient remains on immunomodulatory medications (methotrexate) and requires frequent toxicity monitoring by blood work. Respective labs were ordered (CBC and CMP). 3) Vitamin D Deficiency. Her vitamin D was 19.3. She is on weekly ergocalciferol 50,000. I will check her level today. 4) Osteopenia involving Multiple Sites. Her most recent DXA on 5/04/2016 umbar spine L1-L3 T score -1.5 (BMD 0.954 g/cm2) and distal one third left radius T score -1.9 (BMD 0.571 g/cm2). This is likely due to her chronic Rheumatoid Arthritis. She is no longer on Fosamax. 5) MGUS. Her M-spike 0.3 with immunofixation shows IgG monoclonal protein with lambda light chain. I referred her to hematology, Dr. Kali Theodore, who noted a low risk for myeloma    6) Elevated AST. Her AST 44. I will repeat today. The patient voiced understanding of the aforementioned assessment and plan. Summary of plan was provided in the After Visit Summary patient instructions.      TODAY'S ORDERS    Orders Placed This Encounter    THER/PROPH/DIAG INJECTION, SUBCUT/IM ([id number])    METABOLIC PANEL, COMPREHENSIVE    VITAMIN D, 25 HYDROXY    TRIAMCINOLONE ACETONIDE INJ    golimumab (SIMPONI ARIA IV)    methotrexate (RHEUMATREX) 2.5 mg tablet dose pack    triamcinolone acetonide (KENALOG) 40 mg/mL injection     Future Appointments   Date Time Provider Dorothy Timmons   4/5/2019  8:00 AM [facility] NURSE 4 [address] REG   4/18/2019  7:50 AM Tiffanie Wisdom MD [address] [facility]   5/20/2019  9:00 AM Alanna Sexton MD [facility]   5/31/2019  8:00 AM [facility] NURSE 4 [address] [address], MD, [address] [place] [address]    Adult Rheumatology   Rheumatology Ultrasound Certified  [facility]  [address] [place] [address], [address] [place] [address] Road   Phone [phone]  Fax [phone]

## 2019-02-19 DIAGNOSIS — Z79.899 ENCOUNTER FOR LONG-TERM (CURRENT) USE OF MEDICATIONS: ICD-10-CM

## 2019-02-19 DIAGNOSIS — I10 ESSENTIAL HYPERTENSION: ICD-10-CM

## 2019-02-19 DIAGNOSIS — F33.0 MILD EPISODE OF RECURRENT MAJOR DEPRESSIVE DISORDER (HCC): ICD-10-CM

## 2019-02-19 DIAGNOSIS — E78.2 MIXED HYPERLIPIDEMIA: ICD-10-CM

## 2019-02-19 DIAGNOSIS — Z79.60 LONG-TERM USE OF IMMUNOSUPPRESSANT MEDICATION: ICD-10-CM

## 2019-02-19 DIAGNOSIS — E11.8 TYPE 2 DIABETES MELLITUS WITH COMPLICATION, WITHOUT LONG-TERM CURRENT USE OF INSULIN (HCC): ICD-10-CM

## 2019-02-19 DIAGNOSIS — Z79.899 ENCOUNTER FOR LONG-TERM (CURRENT) USE OF HIGH-RISK MEDICATION: ICD-10-CM

## 2019-02-19 DIAGNOSIS — M06.9 RHEUMATOID ARTHRITIS INVOLVING MULTIPLE SITES, UNSPECIFIED RHEUMATOID FACTOR PRESENCE: ICD-10-CM

## 2019-02-19 DIAGNOSIS — R77.8 ABNORMAL SPEP: ICD-10-CM

## 2019-02-19 LAB
25(OH)D3+25(OH)D2 SERPL-MCNC: 28 NG/ML (ref 30–100)
ALBUMIN SERPL-MCNC: 4 G/DL (ref 3.5–4.8)
ALBUMIN/GLOB SERPL: 1.9 {RATIO} (ref 1.2–2.2)
ALP SERPL-CCNC: 52 IU/L (ref 39–117)
ALT SERPL-CCNC: 16 IU/L (ref 0–32)
AST SERPL-CCNC: 33 IU/L (ref 0–40)
BILIRUB SERPL-MCNC: 0.6 MG/DL (ref 0–1.2)
BUN SERPL-MCNC: 34 MG/DL (ref 8–27)
BUN/CREAT SERPL: 32 (ref 12–28)
CALCIUM SERPL-MCNC: 8 MG/DL (ref 8.7–10.3)
CHLORIDE SERPL-SCNC: 109 MMOL/L (ref 96–106)
CO2 SERPL-SCNC: 17 MMOL/L (ref 20–29)
CREAT SERPL-MCNC: 1.05 MG/DL (ref 0.57–1)
GLOBULIN SER CALC-MCNC: 2.1 G/DL (ref 1.5–4.5)
GLUCOSE SERPL-MCNC: 84 MG/DL (ref 65–99)
POTASSIUM SERPL-SCNC: 3.8 MMOL/L (ref 3.5–5.2)
PROT SERPL-MCNC: 6.1 G/DL (ref 6–8.5)
SODIUM SERPL-SCNC: 143 MMOL/L (ref 134–144)

## 2019-02-19 NOTE — PROGRESS NOTES
The results were reviewed and a letter was sent. Stable chronic kidney disease stage 2 (eGFR 62). Please drink more fluids. Calcium slightly low.  Vitamin D is low --> continue weekly ergocalciferol

## 2019-02-21 ENCOUNTER — APPOINTMENT (OUTPATIENT)
Dept: INFUSION THERAPY | Age: 74
End: 2019-02-21

## 2019-02-22 RX ORDER — LATANOPROST 50 UG/ML
SOLUTION/ DROPS OPHTHALMIC
Qty: 2.5 ML | Refills: 0 | Status: SHIPPED | OUTPATIENT
Start: 2019-02-22 | End: 2019-03-15 | Stop reason: SDUPTHER

## 2019-02-22 NOTE — LETTER
4/22/2019 9:29 AM 
 
Ms. Antoinette [facility] [address] [id number] Dear Ms. Brigida Rizzo missed you! Please call our office at [phone] and schedule a follow up appointment for your continued care.  
 
 
 
Sincerely, 
 
 
Analilia Barroso MD

## 2019-02-26 DIAGNOSIS — F33.0 MILD EPISODE OF RECURRENT MAJOR DEPRESSIVE DISORDER (HCC): ICD-10-CM

## 2019-02-27 RX ORDER — SERTRALINE HYDROCHLORIDE 50 MG/1
TABLET, FILM COATED ORAL
Qty: 90 TAB | Refills: 0 | Status: SHIPPED | OUTPATIENT
Start: 2019-02-27 | End: 2019-05-07 | Stop reason: SDUPTHER

## 2019-02-28 ENCOUNTER — APPOINTMENT (OUTPATIENT)
Dept: INFUSION THERAPY | Age: 74
End: 2019-02-28

## 2019-03-08 ENCOUNTER — APPOINTMENT (OUTPATIENT)
Dept: INFUSION THERAPY | Age: 74
End: 2019-03-08

## 2019-03-17 RX ORDER — LATANOPROST 50 UG/ML
SOLUTION/ DROPS OPHTHALMIC
Qty: 2.5 ML | Refills: 1 | Status: SHIPPED | OUTPATIENT
Start: 2019-03-17 | End: 2019-05-19 | Stop reason: SDUPTHER

## 2019-03-18 DIAGNOSIS — M06.09 SERONEGATIVE RHEUMATOID ARTHRITIS OF MULTIPLE SITES (HCC): ICD-10-CM

## 2019-03-18 DIAGNOSIS — Z79.60 LONG-TERM USE OF IMMUNOSUPPRESSANT MEDICATION: ICD-10-CM

## 2019-04-02 RX ORDER — SODIUM CHLORIDE 9 MG/ML
25 INJECTION, SOLUTION INTRAVENOUS CONTINUOUS
Status: DISPENSED | OUTPATIENT
Start: 2019-04-05 | End: 2019-04-05

## 2019-04-02 RX ORDER — ACETAMINOPHEN 325 MG/1
650 TABLET ORAL
Status: DISCONTINUED | OUTPATIENT
Start: 2019-04-05 | End: 2019-04-06 | Stop reason: HOSPADM

## 2019-04-02 RX ORDER — DIPHENHYDRAMINE HYDROCHLORIDE 50 MG/ML
50 INJECTION, SOLUTION INTRAMUSCULAR; INTRAVENOUS
Status: DISCONTINUED | OUTPATIENT
Start: 2019-04-05 | End: 2019-04-06 | Stop reason: HOSPADM

## 2019-04-05 ENCOUNTER — HOSPITAL ENCOUNTER (OUTPATIENT)
Dept: INFUSION THERAPY | Age: 74
Discharge: HOME | End: 2019-04-05
Payer: MEDICARE

## 2019-04-05 VITALS
HEART RATE: 83 BPM | HEIGHT: 64 IN | SYSTOLIC BLOOD PRESSURE: 140 MMHG | OXYGEN SATURATION: 100 % | RESPIRATION RATE: 18 BRPM | BODY MASS INDEX: 35.29 KG/M2 | TEMPERATURE: 98.1 F | WEIGHT: 206.7 LBS | DIASTOLIC BLOOD PRESSURE: 75 MMHG

## 2019-04-05 PROCEDURE — 74011250636 HC RX REV CODE- 250/636: Performed by: INTERNAL MEDICINE

## 2019-04-05 PROCEDURE — 96365 THER/PROPH/DIAG IV INF INIT: CPT

## 2019-04-05 PROCEDURE — 74011250636 HC RX REV CODE- 250/636

## 2019-04-05 PROCEDURE — 74011000258 HC RX REV CODE- 258: Performed by: INTERNAL MEDICINE

## 2019-04-05 RX ORDER — SODIUM CHLORIDE 0.9 % (FLUSH) 0.9 %
5-10 SYRINGE (ML) INJECTION AS NEEDED
Status: DISCONTINUED | OUTPATIENT
Start: 2019-04-05 | End: 2019-04-06 | Stop reason: HOSPADM

## 2019-04-05 RX ADMIN — GOLIMUMAB 180 MG: 50 SOLUTION INTRAVENOUS at 09:44

## 2019-04-05 RX ADMIN — SODIUM CHLORIDE 25 ML/HR: 900 INJECTION, SOLUTION INTRAVENOUS at 09:44

## 2019-04-05 RX ADMIN — Medication 30 ML: at 09:49

## 2019-04-05 NOTE — PROGRESS NOTES
Outpatient Infusion Center Progress Note    0805: Pt admit to [facility] for Donnell Vogel, ambulatory w/ cane for assist, in stable condition. Assessment completed. No new concerns voiced. 0845: IV started w/ 4 attempts R posterior forearm #24, good bld return. Visit Vitals  /75 (BP 1 Location: Left arm, BP Patient Position: Sitting)   Pulse 78   Temp 98.1 °F (36.7 °C)   Resp 18   Ht 5' 4\" (1.626 m)   Wt 93.8 kg (206 lb 11.2 oz)   SpO2 100%   Breastfeeding? No   BMI 35.48 kg/m²       Medications:  NS 25cc/hr  Simponi Aria over 30 min. Pt tolerated treatment well. IV flushed and DCd per protocol. D/c home ambulatory in no distress at 1020. Pt aware of next appointment scheduled for 5/31/19 at 0800.

## 2019-05-03 ENCOUNTER — APPOINTMENT (OUTPATIENT)
Dept: INFUSION THERAPY | Age: 74
End: 2019-05-03
Payer: MEDICARE

## 2019-05-07 ENCOUNTER — OFFICE VISIT (OUTPATIENT)
Dept: FAMILY MEDICINE CLINIC | Age: 74
End: 2019-05-07

## 2019-05-07 VITALS
RESPIRATION RATE: 18 BRPM | SYSTOLIC BLOOD PRESSURE: 123 MMHG | BODY MASS INDEX: 34.59 KG/M2 | HEART RATE: 80 BPM | WEIGHT: 202.6 LBS | TEMPERATURE: 96.3 F | DIASTOLIC BLOOD PRESSURE: 60 MMHG | HEIGHT: 64 IN

## 2019-05-07 DIAGNOSIS — M06.9 RHEUMATOID ARTHRITIS INVOLVING MULTIPLE SITES, UNSPECIFIED RHEUMATOID FACTOR PRESENCE: ICD-10-CM

## 2019-05-07 DIAGNOSIS — I25.10 CORONARY ARTERY DISEASE DUE TO LIPID RICH PLAQUE: ICD-10-CM

## 2019-05-07 DIAGNOSIS — G25.81 RLS (RESTLESS LEGS SYNDROME): ICD-10-CM

## 2019-05-07 DIAGNOSIS — Z95.820 STATUS POST ANGIOPLASTY WITH STENT: ICD-10-CM

## 2019-05-07 DIAGNOSIS — E66.01 SEVERE OBESITY (HCC): ICD-10-CM

## 2019-05-07 DIAGNOSIS — R25.2 BILATERAL LEG CRAMPS: ICD-10-CM

## 2019-05-07 DIAGNOSIS — E78.2 MIXED HYPERLIPIDEMIA: ICD-10-CM

## 2019-05-07 DIAGNOSIS — F33.0 MILD EPISODE OF RECURRENT MAJOR DEPRESSIVE DISORDER (HCC): ICD-10-CM

## 2019-05-07 DIAGNOSIS — E11.8 TYPE 2 DIABETES MELLITUS WITH COMPLICATION, WITHOUT LONG-TERM CURRENT USE OF INSULIN (HCC): Primary | ICD-10-CM

## 2019-05-07 DIAGNOSIS — I25.83 CORONARY ARTERY DISEASE DUE TO LIPID RICH PLAQUE: ICD-10-CM

## 2019-05-07 DIAGNOSIS — I10 ESSENTIAL HYPERTENSION: ICD-10-CM

## 2019-05-07 DIAGNOSIS — J45.20 MILD INTERMITTENT ASTHMA, UNSPECIFIED WHETHER COMPLICATED: ICD-10-CM

## 2019-05-07 RX ORDER — AMOXICILLIN 500 MG/1
CAPSULE ORAL
Refills: 0 | COMMUNITY
Start: 2019-04-23 | End: 2019-05-07 | Stop reason: ALTCHOICE

## 2019-05-07 RX ORDER — METHOTREXATE 2.5 MG/1
TABLET ORAL
Refills: 0 | COMMUNITY
Start: 2019-03-19 | End: 2019-05-23 | Stop reason: SDUPTHER

## 2019-05-07 RX ORDER — SERTRALINE HYDROCHLORIDE 50 MG/1
50 TABLET, FILM COATED ORAL DAILY
Qty: 90 TAB | Refills: 1 | Status: SHIPPED | OUTPATIENT
Start: 2019-05-07 | End: 2020-01-07

## 2019-05-07 RX ORDER — ALBUTEROL SULFATE 2.5 MG/.5ML
2.5 SOLUTION RESPIRATORY (INHALATION)
Qty: 30 ML | Refills: 0 | Status: SHIPPED | OUTPATIENT
Start: 2019-05-07 | End: 2020-04-29 | Stop reason: SDUPTHER

## 2019-05-07 RX ORDER — GABAPENTIN 100 MG/1
CAPSULE ORAL
Qty: 120 CAP | Refills: 2 | Status: SHIPPED | OUTPATIENT
Start: 2019-05-07 | End: 2020-04-29 | Stop reason: SDUPTHER

## 2019-05-07 RX ORDER — FUROSEMIDE 20 MG/1
20 TABLET ORAL DAILY
Qty: 90 TAB | Refills: 0 | Status: SHIPPED | OUTPATIENT
Start: 2019-05-07 | End: 2019-08-02 | Stop reason: SDUPTHER

## 2019-05-07 NOTE — PROGRESS NOTES
Chief Complaint   Patient presents with    Diabetes     3 month follow-up   1. Have you been to the ER, urgent care clinic since your last visit? Hospitalized since your last visit? No    2. Have you seen or consulted any other health care providers outside of the [address] [place] [address] since your last visit? Include any pap smears or colon screening.  No   Discuss knee and leg pain ,also having cramps in legs

## 2019-05-07 NOTE — PROGRESS NOTES
NURSE PRACTITIONER STUDENT    HISTORY OF PRESENT ILLNESS  Pt is a 72year old  female presenting for routine follow up. PMHx: DM, HTN, mild intermittent asthma, osteopenia, RA, mixed hyperlipidemia, RLS, S/P angioplasty with stent, obesity, and Sarcoidosis (dx 1999)  Diabetes Mellitus:   Taking metformin as prescribed. No med SE  Reports no polyuria, no polydypsia, no chest pain, dyspnea or TIA. No tingling or numbness in BLE. Walks occasionally and is continuing to work on diet   Patient is a non smoker    Hypertension:  Taking Metoprolol 2.5 mg and lasix 20 mg. Reports no SOB, dizziness, chest pain or palpitations . Reports swelling in BLE     Rheumathoid Arthritis  Taking Naproxen, and methotrexate. Is being followed by Rheumatologist.      Restless leg syndrome:  Is taking Neurontin daily. Reports cramping in BLE that has increased over time. Cramping in morning upon waking up and in the afternoon. Review of Systems   Constitutional: Negative for chills, diaphoresis, fever and malaise/fatigue. HENT: Negative for congestion, ear discharge, ear pain, hearing loss, sore throat and tinnitus. Eyes: Negative for blurred vision, double vision, pain, discharge and redness. Respiratory: Negative for cough and shortness of breath. Cardiovascular: Positive for leg swelling. Negative for chest pain and palpitations. Gastrointestinal: Negative for abdominal pain, constipation, diarrhea, nausea and vomiting. Genitourinary: Negative for dysuria and urgency. Musculoskeletal: Negative for back pain, falls, joint pain and neck pain. Skin: Negative for itching and rash. Neurological: Negative for dizziness, weakness and headaches. Endo/Heme/Allergies: Negative for environmental allergies and polydipsia. Blood pressure 123/60, pulse 80, temperature 96.3 °F (35.7 °C), temperature source Oral, resp. rate 18, height 5' 4\" (1.626 m), weight 202 lb 9.6 oz (91.9 kg).   General: alert and oriented times person, place and time. No apparent distress  Physical Exam   HENT:   Head: Normocephalic. Right Ear: External ear normal.   Left Ear: External ear normal.   Nose: Nose normal.   Mouth/Throat: Oropharynx is clear and moist.   Eyes: Right eye exhibits no discharge. Left eye exhibits no discharge. Neck: Normal range of motion. No tracheal deviation present. No thyromegaly present. Cardiovascular: Normal rate, regular rhythm, normal heart sounds and intact distal pulses. No murmur heard. Pulmonary/Chest: Breath sounds normal. No respiratory distress. She has no wheezes. She exhibits no tenderness. Abdominal: Soft. Bowel sounds are normal. She exhibits no distension. There is no tenderness. Musculoskeletal: She exhibits edema. Non-pitting    Skin: Skin is warm and dry. No rash noted. Psychiatric: She has a normal mood and affect. ASSESSMENT and PLAN   Diabetes Mellitus II: compliant with medication, controlled with  Medication.    - Basic metabolic panel     - Hemoglobin A1C  1. Hypertension: Compliant with medication, controlled with  medication  2. Hyperlipidemia: Compliant with medication, controlled with  medication  3. Rheumathoid Arthritis: is being followed by Rheumatologist and  controlled    4. Restless leg syndrome: Modification done to medication    Gabapentin 100 mg. Take 1 capsule each morning and 3 Capsules  before bed    Ferrous Sulfate 47.5 mg. Take 1 tablet daily  5. Edema in extremities: discussed elevating lower extremities when  sitting and in bed. I have reviewed diagnosis and treatment with patient. Together we have agreed on the treatment plan. Medication and side effects have been discussed. End of visit summary reviewed with patient and given.      Follow Up  3 Months

## 2019-05-07 NOTE — PROGRESS NOTES
Subjective:     Chief Complaint   Patient presents with    Diabetes     3 month follow-up      She  is a 72 y.o. female who presents for evaluation of:  Here for routine follow-up. PMHx DM, HTN, HLD, CAD s/p PCI with stenting in 1998 [facility], RA, Asthma, Sarcoidosis, Vit D def, Osteopenia, and s/p gastric bypass. Diabetes Mellitus:  Dx about 40 yrs ago  Taking meds - prev on insulin but no longer needed after gastric bypass  Reports no polyuria or polydipsia, no chest pain, dyspnea or TIA's, no numbness, tingling or pain in extremities, last eye exam approximately < 1 yr ago. Exercises regularly with walking but limited d/t knees giving way. Working on diet  Pt is a non smoker. Lab Results   Component Value Date/Time    Hemoglobin A1c (POC) 6.7 09/28/2018 10:30 AM    Hemoglobin A1c 6.1 (H) 02/16/2019 08:23 AM    Microalb/Creat ratio (ug/mg creat.) 22.5 02/16/2019 08:23 AM    LDL, calculated 82 02/16/2019 08:23 AM    Creatinine 1.05 (H) 02/18/2019 10:25 AM      Lab Results   Component Value Date/Time    GFR est AA 62 02/18/2019 10:25 AM    GFR est non-AA 54 (L) 02/18/2019 10:25 AM      Lab Results   Component Value Date/Time    TSH 2.970 02/16/2019 08:23 AM       Prev was on chronic pain meds for RA with prev PCPs. Following with Dr. Ajay Munoz for this now. On MTX and Simponi and seems to be improving. ROS  Gen - no fever/chills  Resp - Dx with Sarcoidosis many yrs ago. No regular tx for this. Thought to be affecting her lungs. Also has Asthma and doing well. CV - no chest pain or WORKMAN  H/O - noted to have MGUS with low risk. Seen by Dr. Zulema Shoemaker  Neuro - Also taking Nortriptyline and Gabapentin for RLS. No recent Fe labs. She occ takes Fe supplement. Feels like legs are going to sleep. Feels like she needs to move her legs. Worse at night. Feels a little better when moving legs. Also reports sx in her hands too. Describes sx mostly as cramping.   She also has a hx of Fe def anemia and was on Fe from prev PCP (Fe def is a common cause of RLS). Rest per HPI    Past Medical History:   Diagnosis Date    Arthritis     Asthma     Diabetes ([address]     High cholesterol     Hypertension     Sarcoidosis 1999    [facility]     Past Surgical History:   Procedure Laterality Date    CARDIAC SURG PROCEDURE UNLIST      HX GASTRIC BYPASS      HX ORTHOPAEDIC Bilateral     knee replacement     Current Outpatient Medications on File Prior to Visit   Medication Sig Dispense Refill    methotrexate (RHEUMATREX) 2.5 mg tablet TAKE 8 TABS BY MOUTH EVERY SATURDAY. 0    methotrexate (RHEUMATREX) 2.5 mg tablet dose pack Take 8 Tabs by mouth Every Saturday. 96 Tab 0    latanoprost (XALATAN) 0.005 % ophthalmic solution ADMINISTER 1 DROP INTO BOTH EYES NIGHTLY 2.5 mL 1    golimumab (SIMPONI ARIA IV) 2 mg/kg by IntraVENous route every two (2) months.  nortriptyline (PAMELOR) 25 mg capsule Take 1 Cap by mouth nightly. 90 Cap 0    ergocalciferol (ERGOCALCIFEROL) 50,000 unit capsule Take 1 Cap by mouth every seven (7) days. 12 Cap 3    folic acid (FOLVITE) 1 mg tablet Take 1 Tab by mouth daily. 90 Tab 0    magnesium 200 mg tab Take 100 mg by mouth daily.  naproxen (NAPROSYN) 375 mg tablet Take 325 mg by mouth two (2) times daily as needed.  metFORMIN (GLUCOPHAGE) 500 mg tablet Take 1 Tab by mouth two (2) times daily (with meals). (Patient taking differently: Take 500 mg by mouth daily (with breakfast). ) 60 Tab 5    fluticasone-vilanterol (BREO ELLIPTA) 100-25 mcg/dose inhaler Take 1 Puff by inhalation daily. 1 Inhaler 5    aspirin delayed-release 81 mg tablet Take  by mouth daily.  cyanocobalamin (VITAMIN B-12) 1,000 mcg sublingual tablet Take 1,000 mcg by mouth daily.  metoprolol tartrate (LOPRESSOR) 25 mg tablet Take  by mouth two (2) times a day.  simvastatin (ZOCOR) 20 mg tablet Take  by mouth nightly.  OMEPRAZOLE PO Take 20 mg by mouth daily.        No current facility-administered medications on file prior to visit. Objective:     Vitals:    05/07/19 0916   BP: 123/60   Pulse: 80   Resp: 18   Temp: 96.3 °F (35.7 °C)   TempSrc: Oral   Weight: 202 lb 9.6 oz (91.9 kg)   Height: 5' 4\" (1.626 m)     Physical Examination:  General appearance - alert, well appearing, and in no distress  Eyes -sclera anicteric  Neck - supple, no significant adenopathy, no thyromegaly  Chest - clear to auscultation, no wheezes, rales or rhonchi, symmetric air entry  Heart - normal rate, regular rhythm, normal S1, S2, no murmurs, rubs, clicks or gallops  Neurological - alert, oriented, no focal findings or movement disorder noted  Extremities- bilateral trace edema  Psych-normal mood and affect    Assessment/ Plan:   Diagnoses and all orders for this visit:    1. Type 2 diabetes mellitus with complication, without long-term current use of insulin (HCC) - controlled  -     METABOLIC PANEL, BASIC  -     HEMOGLOBIN A1C WITH EAG    2. Essential hypertension - controlled  -     METABOLIC PANEL, BASIC    3. Mixed hyperlipidemia - LDL at goal  -     METABOLIC PANEL, BASIC  -     HEMOGLOBIN A1C WITH EAG    4. Coronary artery disease due to lipid rich plaque  5. Status post angioplasty with stent  - doing well with no sx    6. Severe obesity ([address] - encouraged weight loss  Discussed the patient's BMI. The BMI follow up plan is as follows:   dietary management education, guidance, and counseling  encourage exercise  monitor weight  prescribed dietary intake    7. Mild episode of recurrent major depressive disorder (HCC) - improving with meds  -     sertraline (ZOLOFT) 50 mg tablet; Take 1 Tab by mouth daily. 8. Rheumatoid arthritis involving multiple sites, unspecified rheumatoid factor presence (HCC)-long-standing issue and doing well with Dr. Mao Bone.   Has Amox for dental procedure and interacts with MTX so advised her to discuss with Dr. Mao Bone and her dentist.  Would consider dropping weekly dose of MTX for that week to use Amox for ppx with ortho hardware though I have not seen any evidence behind this ppx practice recently. 9. RLS (restless legs syndrome) - incr Gabapentin and ct Fe given common cause of RLS being Fe deficiency and pt has hx of Fe def anemia  -     ferrous sulfate (SLOW FE) 47.5 mg iron TbER tablet; Take 1 Tab by mouth daily.  -     gabapentin (NEURONTIN) 100 mg capsule; Take 1 capsule in the morning and take 3 capsules before bedtime    10. Bilateral leg cramps - as above    11. Mild intermittent asthma, unspecified whether complicated  -     albuterol sulfate (PROVENTIL;VENTOLIN) 2.5 mg/0.5 mL nebu nebulizer solution; 0.5 mL by Nebulization route every four (4) hours as needed for Wheezing. Indications: Asthma Attack    Other orders- mild lower extremity edema so we will continue Lasix. Had discussion about elevating legs and decreasing salt intake  -     furosemide (LASIX) 20 mg tablet; Take 1 Tab by mouth daily. I have discussed the diagnosis with the patient and the intended plan as seen in the above orders. The patient has received an after-visit summary and questions were answered concerning future plans. I have discussed medication side effects and warnings with the patient as well. The patient verbalizes understanding and agreement with the plan. Follow-up and Dispositions    · Return in about 3 months (around 8/7/2019).

## 2019-05-20 RX ORDER — LATANOPROST 50 UG/ML
SOLUTION/ DROPS OPHTHALMIC
Qty: 2.5 ML | Refills: 1 | Status: SHIPPED | OUTPATIENT
Start: 2019-05-20 | End: 2019-07-17 | Stop reason: SDUPTHER

## 2019-05-23 ENCOUNTER — OFFICE VISIT (OUTPATIENT)
Dept: RHEUMATOLOGY | Age: 74
End: 2019-05-23

## 2019-05-23 VITALS
RESPIRATION RATE: 18 BRPM | WEIGHT: 201 LBS | HEIGHT: 64 IN | TEMPERATURE: 97.4 F | BODY MASS INDEX: 34.31 KG/M2 | DIASTOLIC BLOOD PRESSURE: 76 MMHG | HEART RATE: 84 BPM | SYSTOLIC BLOOD PRESSURE: 148 MMHG

## 2019-05-23 DIAGNOSIS — M06.09 SERONEGATIVE RHEUMATOID ARTHRITIS OF MULTIPLE SITES (HCC): Primary | ICD-10-CM

## 2019-05-23 DIAGNOSIS — M85.89 OSTEOPENIA OF MULTIPLE SITES: ICD-10-CM

## 2019-05-23 DIAGNOSIS — E55.9 VITAMIN D DEFICIENCY: ICD-10-CM

## 2019-05-23 DIAGNOSIS — Z79.60 LONG-TERM USE OF IMMUNOSUPPRESSANT MEDICATION: ICD-10-CM

## 2019-05-23 RX ORDER — SYRINGE-NEEDLE,INSULIN,0.5 ML 30 GX5/16"
1 SYRINGE, EMPTY DISPOSABLE MISCELLANEOUS
Qty: 12 SYRINGE | Refills: 3 | Status: SHIPPED | OUTPATIENT
Start: 2019-05-25 | End: 2019-08-11

## 2019-05-23 RX ORDER — TRIAMCINOLONE ACETONIDE 40 MG/ML
80 INJECTION, SUSPENSION INTRA-ARTICULAR; INTRAMUSCULAR ONCE
Qty: 2 ML | Refills: 0
Start: 2019-05-23 | End: 2019-05-23

## 2019-05-23 RX ORDER — METHOTREXATE 25 MG/ML
20 INJECTION INTRA-ARTERIAL; INTRAMUSCULAR; INTRATHECAL; INTRAVENOUS
Qty: 10 ML | Refills: 0 | Status: SHIPPED | OUTPATIENT
Start: 2019-05-23 | End: 2019-07-29 | Stop reason: SINTOL

## 2019-05-23 RX ORDER — FOLIC ACID 1 MG/1
1 TABLET ORAL DAILY
Qty: 90 TAB | Refills: 0 | Status: SHIPPED | OUTPATIENT
Start: 2019-05-23 | End: 2019-07-29 | Stop reason: SDUPTHER

## 2019-05-23 NOTE — LETTER
5/23/19 Patient: Vivek Carpio YOB: 1945 Date of Visit: 5/23/2019 Leida Cohen MD 
 [employer] [place] [id number] VIA In Basket Dear Leida Cohen MD, Thank you for referring Ms. Vivek Carpio to [facility] for evaluation. My notes for this consultation are attached. If you have questions, please do not hesitate to call me. I look forward to following your patient along with you.  
 
 
Sincerely, 
 
Roseline Yeager MD

## 2019-05-23 NOTE — PATIENT INSTRUCTIONS
I will stop Simponi Aria infusions    I will replace oral methotrexate for subcutaneous injections of methotrexate     Please draw up 0.8 mL of methotrexate in the syringe. Then, place an ice cube or ice pack on your injection site (thigh or abdomen) for about one to two minutes to numb your skin - if you are concerned for the pinch sensation from the small needle    Then pinch your skin to life it up (tent it) and insert the needle under the skin and inject the methotrexate. Continue daily folic acid    Stop methotrexate tablets    Adalimumab (By injection)   Adalimumab ([id number])  Treats arthritis, plaque psoriasis, ankylosing spondylitis, Crohn disease, ulcerative colitis, hidradenitis suppurativa, and uveitis. Brand Name(s): Humira   There may be other brand names for this medicine. When This Medicine Should Not Be Used: This medicine is not right for everyone. Do not use it if you had an allergic reaction to adalimumab. How to Use This Medicine:   Injectable  · Your doctor will prescribe your exact dose and tell you how often it should be given. This medicine is given as a shot under your skin. · A nurse or other health provider will give you this medicine. · You may be taught how to give your medicine at home. Make sure you understand all instructions before giving yourself an injection. Do not use more medicine or use it more often than your doctor tells you to. · You will be shown the body areas where this shot can be given. Use a different body area each time you give yourself a shot. Keep track of where you give each shot to make sure you rotate body areas. Do not inject into skin areas that are red, bruised, tender, or hard. If you have psoriasis, do not inject into a raised, thick, red, or scaly skin patch or into skin lesions. · This medicine should come with a Medication Guide. Ask your pharmacist for a copy if you do not have one. · Missed dose:  Take a dose as soon as you remember. If it is almost time for your next dose, wait until then and take a regular dose. Do not take extra medicine to make up for a missed dose. · If you store this medicine at home, keep it in the refrigerator. Do not freeze. Protect the medicine from light. Keep your medicine and supplies in the original packages until you are ready to use them. Drugs and Foods to Avoid:   Ask your doctor or pharmacist before using any other medicine, including over-the-counter medicines, vitamins, and herbal products. · Some foods and medicines can affect how adalimumab works. Tell your doctor if you are using any of the following:   ¨ Abatacept, anakinra, azathioprine, cyclosporine, mercaptopurine, rituximab, theophylline  ¨ A blood thinner (including warfarin)  ¨ Medicine that weakens the immune system (including a steroid or cancer medicine)  · This medicine may interfere with vaccines. Ask your doctor before you get a flu shot or any other vaccines. Warnings While Using This Medicine:   · Tell your doctor if you are pregnant or breastfeeding, or if you have liver disease, a history of cancer, COPD, heart failure, diabetes, psoriasis, multiple sclerosis, optic neuritis, problems with your immune system, or a history of Guillain-Barré syndrome. Tell your doctor if you have any type of infection (such as hepatitis B or tuberculosis) or an infection that keeps coming back. · This medicine may cause the following problems:   ¨ Increased risk for infection  ¨ Increased risk of certain cancers, such as lymphoma or leukemia  ¨ New or worsening heart failure  · Tell your doctor if you have a latex allergy. The needle cover of the syringe contains latex and may cause allergic reactions. · You will need to have a skin test for tuberculosis (TB) before you start this medicine. Tell your doctor if you or anyone in your home has ever had a positive TB skin test or been exposed to TB.   · This medicine may make you bleed, bruise, or get infections more easily. Take precautions to prevent illness and injury. Wash your hands often. · Your doctor will do lab tests at regular visits to check on the effects of this medicine. Keep all appointments. · Throw away used needles in a hard, closed container that the needles cannot poke through. Keep this container away from children and pets. · Keep all medicine out of the reach of children. Never share your medicine with anyone. Possible Side Effects While Using This Medicine:   Call your doctor right away if you notice any of these side effects:  · Allergic reaction: Itching or hives, swelling in your face or hands, swelling or tingling in your mouth or throat, chest tightness, trouble breathing  · Blistering, peeling, red skin rash, or red, scaly patches on the skin  · Change in how much or how often you urinate, painful urination  · Changes in vision  · Chest pain, uneven heartbeat, trouble breathing  · Cough, fever, chills, runny or stuffy nose, sore throat, and body aches  · Dark urine or pale stools, nausea, vomiting, loss of appetite, stomach pain, yellow skin or eyes  · Numbness, tingling, or burning pain in your hands, arms, legs, or feet, or joint pain  · Rapid weight gain, swelling in your hands, ankles, lower legs, or feet  · Sores or white patches on your lips, mouth, or throat  · Swollen glands in your neck, underarms, or groin  · Unusual bleeding, bruising, tiredness, weakness, or weight loss  If you notice these less serious side effects, talk with your doctor:   · Back pain  · Headache  · Redness, itching, bruising, bleeding, pain, or swelling where the shot was given  If you notice other side effects that you think are caused by this medicine, tell your doctor. Call your doctor for medical advice about side effects.  You may report side effects to FDA at 2-497-PJA-0805  © 2017 [facility] Information is for End User's use only and may not be sold, redistributed or otherwise used for commercial purposes. The above information is an  only. It is not intended as medical advice for individual conditions or treatments. Talk to your doctor, nurse or pharmacist before following any medical regimen to see if it is safe and effective for you.

## 2019-05-23 NOTE — PROGRESS NOTES
Chief Complaint   Patient presents with    Arthritis     1. Have you been to the ER, urgent care clinic since your last visit? Hospitalized since your last visit? No    2. Have you seen or consulted any other health care providers outside of the [address] [place] [address] since your last visit? Include any pap smears or colon screening.  No

## 2019-05-23 NOTE — PROGRESS NOTES
REASON FOR VISIT    This is a follow-up visit for Ms. Candy Oliveros for Seronegative Rheumatoid Arthritis. Inflammatory arthritis phenotype includes:  Anti-CCP positive: no  Rheumatoid factor positive: no  Erosive disease: no  Extra-articular manifestations include: MGUS    Immunosuppression Screening (8/22/2018): Quantiferon TB: negative  PPD:  Not performed  Hepatitis B: negative  Hepatitis C: negative    Therapy History includes:  Current DMARD therapy includes: methotrexate 20 mg every Saturday, Simponi Aria (11/29/2018 to 5/23/2019)  Prior DMARD therapy includes: Sharon Everts (11/29/2018 to 5/23/2019)  The following DMARDs have been ineffective: Simponi Aria   The following DMARDs were stopped because of side effects: none  Contra-Indicated DMARDs because of history of diverticulitis: Actemra, Kevzara    Immunization History   Administered Date(s) Administered    Influenza High Dose Vaccine PF 09/07/2018       Patient Active Problem List   Diagnosis Code    Long-term use of immunosuppressant medication Z79.899    Osteopenia of multiple sites M85.89    Vitamin D deficiency E55.9    Rheumatoid arthritis involving multiple sites ([facility] M06.9    Essential hypertension I10    Mixed hyperlipidemia E78.2    RLS (restless legs syndrome) G25.81    Mild intermittent asthma J45.20    Status post angioplasty with stent Z95.820    Coronary artery disease due to lipid rich plaque I25.10, I25.83    Seronegative rheumatoid arthritis of multiple sites ([facility] M06.09    Severe obesity ([facility] E66.01     HISTORY OF PRESENT ILLNESS    Ms. Candy Oliveros returns for a follow-up. On her lats visit, I continued methotrexate 20 mg every Saturday and Sharon Everts. I also injected her with Kenalog 80 mg IM due to active disease, which helped. Her most recent infusion was 4/05/2019. Today, she has about the same as last time. She continues to have aching pain in her hands associated with swelling or stiffness lasting hours.  Warm water/bath helps. Her symptoms are worse in the morning and night. Her pain is worse at night and morning due to left knee pain. She wakes up with cramps in her legs associated with pain and swelling in her feet    She has a history of diverticulitis    She endorses ankle swelling in the morning and afternoon. She denies fever, weight loss, blurred vision, vision loss, oral ulcers, dry cough, dyspnea, nausea, vomiting, dysphagia, abdominal pain, black or bloody stool, fall since last visit, rash, easy bruising and increased thirst.    Ms. Shaun Mendez has continued her medications for arthritis and reports good tolerance without significant side effects. Last toxicity monitoring by blood work was done on 2/18/2019 and did not reveal any significant adverse effects, except creatinine 1.05 mg/dL, eGFR 62. Most recent inflammatory markers from 2/07/2019 revealed a ESR 1 mm/hr (previously 21 mm/hr) and CRP 0.3 mg/L (previously 1.3 mg/L). The patient has not had any interval hospital admissions, infections, or surgeries. REVIEW OF SYSTEMS    A comprehensive review of systems was performed and pertinent results are documented in the HPI, review of systems is otherwise non-contributory. PAST MEDICAL HISTORY    She has a past medical history of Arthritis, Asthma, Diabetes ([address] High cholesterol, Hypertension, and Sarcoidosis (1999). She also has no past medical history of Difficult intubation, Malignant hyperthermia due to anesthesia, Nausea & vomiting, Pseudocholinesterase deficiency, or Unspecified adverse effect of anesthesia. FAMILY HISTORY    Her family history includes Alcohol abuse in her brother; Heart Disease in her mother; Liver Disease in her father. SOCIAL HISTORY    She reports that she has never smoked. She has never used smokeless tobacco. She reports that she drinks about 1.2 oz of alcohol per week. She reports that she does not use drugs.     MEDICATIONS    Current Outpatient Medications Medication Sig Dispense Refill    folic acid (FOLVITE) 1 mg tablet Take 1 Tab by mouth daily. 90 Tab 0    adalimumab (HUMIRA,CF, PEN) 40 mg/0.4 mL pnkt 40 mg by SubCUTAneous route every fourteen (14) days. Indications: rheumatoid arthritis 2 Kit 11    methotrexate, PF, 25 mg/mL injection 0.8 mL by SubCUTAneous route every seven (7) days. 10 mL 0    [START ON 5/25/2019] Insulin Syringe-Needle U-100 1 mL 30 gauge x 5/16 syrg 1 Each by Does Not Apply route Every Saturday for 12 doses. To be used for methotrexate solution 12 Syringe 3    triamcinolone acetonide (KENALOG) 40 mg/mL injection 2 mL by IntraMUSCular route once for 1 dose. 2 mL 0    latanoprost (XALATAN) 0.005 % ophthalmic solution ADMINISTER 1 DROP INTO BOTH EYES NIGHTLY 2.5 mL 1    furosemide (LASIX) 20 mg tablet Take 1 Tab by mouth daily. 90 Tab 0    sertraline (ZOLOFT) 50 mg tablet Take 1 Tab by mouth daily. 90 Tab 1    albuterol sulfate (PROVENTIL;VENTOLIN) 2.5 mg/0.5 mL nebu nebulizer solution 0.5 mL by Nebulization route every four (4) hours as needed for Wheezing. Indications: Asthma Attack 30 mL 0    ferrous sulfate (SLOW FE) 47.5 mg iron TbER tablet Take 1 Tab by mouth daily. (Patient taking differently: Take 1 Tab by mouth every seven (7) days.) 90 Tab 1    gabapentin (NEURONTIN) 100 mg capsule Take 1 capsule in the morning and take 3 capsules before bedtime 120 Cap 2    nortriptyline (PAMELOR) 25 mg capsule Take 1 Cap by mouth nightly. 90 Cap 0    ergocalciferol (ERGOCALCIFEROL) 50,000 unit capsule Take 1 Cap by mouth every seven (7) days. 12 Cap 3    magnesium 200 mg tab Take 100 mg by mouth daily.  naproxen (NAPROSYN) 375 mg tablet Take 325 mg by mouth two (2) times daily as needed.  metFORMIN (GLUCOPHAGE) 500 mg tablet Take 1 Tab by mouth two (2) times daily (with meals). (Patient taking differently: Take 500 mg by mouth daily (with breakfast). ) 60 Tab 5    fluticasone-vilanterol (BREO ELLIPTA) 100-25 mcg/dose inhaler Take 1 Puff by inhalation daily. 1 Inhaler 5    aspirin delayed-release 81 mg tablet Take  by mouth daily.  cyanocobalamin (VITAMIN B-12) 1,000 mcg sublingual tablet Take 1,000 mcg by mouth daily.  metoprolol tartrate (LOPRESSOR) 25 mg tablet Take  by mouth two (2) times a day.  simvastatin (ZOCOR) 20 mg tablet Take  by mouth nightly.  OMEPRAZOLE PO Take 20 mg by mouth daily. ALLERGIES    Allergies   Allergen Reactions    Lisinopril Rash       PHYSICAL EXAMINATION    Visit Vitals  /76   Pulse 84   Temp 97.4 °F (36.3 °C)   Resp 18   Ht 5' 4\" (1.626 m)   Wt 201 lb (91.2 kg)   BMI 34.50 kg/m²     Body mass index is 34.5 kg/m². General: Patient is alert, oriented x 3, not in acute distress    HEENT:   Sclerae are not injected and appear moist.  There is no alopecia. Neck is supple     Cardiovascular:  Heart is regular rate and rhythm, no murmurs. Chest:  Lungs are clear to auscultation bilaterally. No rhonchi, wheezes, or crackles. Extremities:  Free of clubbing, cyanosis, edema    Neurological exam:  Muscle strength is full in upper and lower extremities     Skin exam:  There are no rashes, no alopecia, no discoid lesions, no active Raynaud's, no livedo reticularis, no periungual erythema. Musculoskeletal exam:  A comprehensive musculoskeletal exam was performed for all joints of each upper and lower extremity and assessed for swelling, tenderness and range of motion. Positive results are documented as below:    Decreased ROM of bilateral shoulders (right worse than left) due to pain  Bilateral knee replacements without effusion.   Bilateral ankle swelling with tenderness  Bilateral ankle foot swelling and tenderness    Z-Deformities:   no  [place]ck Deformities:  no  Boutonierre's Deformities:  no  Ulnar Deviation:   Yes (LEFT:3rd digit)  MCP Subluxation:  no     Joint Count 5/23/2019 2/18/2019 11/19/2018 8/22/2018   Patient pain (0-100) 50 60 90 90   MHAQ 0.75 0.75 1 0.5   Left shoulder - Tender - - - 1   Left shoulder - Swollen - - - 1   Left elbow - Tender 1 - 1 1   Left elbow - Swollen 0 - 0 1   Left wrist- Tender 1 1 1 -   Left wrist- Swollen 1 1 1 1   Left 1st MCP - Tender 1 1 1 1   Left 1st MCP - Swollen 1 1 1 1   Left 2nd MCP - Tender 1 1 1 1   Left 2nd MCP - Swollen 1 1 1 1   Left 3rd MCP - Tender 1 0 1 1   Left 3rd MCP - Swollen 1 1 1 1   Left 4th MCP - Tender 1 1 1 1   Left 4th MCP - Swollen 0 1 1 1   Left 5th MCP - Tender 1 1 - 1   Left 5th MCP - Swollen 1 1 - 1   Left thumb IP - Tender 1 1 1 1   Left thumb IP - Swollen 0 1 1 -   Left 2nd PIP - Tender 1 1 1 1   Left 2nd PIP - Swollen 1 1 1 1   Left 3rd PIP - Tender 1 1 1 1   Left 3rd PIP - Swollen 1 1 1 1   Left 4th PIP - Tender 1 1 1 1   Left 4th PIP - Swollen 1 1 1 1   Left 5th PIP - Tender 1 1 1 1   Left 5th PIP - Swollen 0 - 1 1   Left knee - Tender - - 1 -   Left knee - Swollen - - 1 -   Right shoulder - Tender - - - 1   Right elbow - Tender 1 - 1 1   Right elbow - Swollen 0 - 1 1   Right wrist- Tender 1 1 1 1   Right wrist- Swollen 1 1 1 1   Right 1st MCP - Tender 1 1 1 1   Right 1st MCP - Swollen 1 0 1 -   Right 2nd MCP - Tender 1 1 1 1   Right 2nd MCP - Swollen 1 1 1 1   Right 3rd MCP - Tender 1 1 1 1   Right 3rd MCP - Swollen 1 1 1 1   Right 4th MCP - Tender 1 1 1 1   Right 4th MCP - Swollen 1 1 1 1   Right 5th MCP - Tender 1 1 1 1   Right 5th MCP - Swollen 1 1 1 1   Right thumb IP - Tender - - - 1   Right 2nd PIP - Tender 1 1 1 1   Right 2nd PIP - Swollen 1 1 1 1   Right 3rd PIP - Tender 1 1 1 1   Right 3rd PIP - Swollen 1 1 1 1   Right 4th PIP - Tender 1 1 1 1   Right 4th PIP - Swollen 1 1 1 1   Right 5th PIP - Tender 1 1 1 1   Right 5th PIP - Swollen 1 1 1 1   Right knee - Tender - 1 - -   Tender Joint Count (Total) 23 21 23 25   Swollen Joint Count (Total) 18 19 22 22   Physician Assessment (0-10) 5 6 7 7   Patient Assessment (0-10) 5 7 9 9   CDAI Total (calculated) 51 53 61 63       DATA REVIEW    Laboratory     Recent laboratory results were reviewed, summarized, and discussed with the patient. Imaging    Musculoskeletal Ultrasound    None    Radiographs    Sacrum and Coccyx 9/18/2018: no definite fracture or dislocation, however osteopenia and overlapping bowel gas somewhat limited evaluation of the sacrum. Severe multilevel degenerative disc disease is noted in the lower lumbar spine. Dense arterial vascular calcifications are present. Bilateral Hips 9/18/2018: no acute fracture or dislocation. Evaluation of the sacrum is limited by overlying bowel gas and osteopenia. Within these limitations, no fractures identified. Degenerative changes are present in the lower lumbar spine. Dense arterial vascular calcifications are present. Bilateral Hand 8/22/2018: moderate to severe diffuse osteopenia. Extensive atherosclerotic calcifications are shown extending into the fingers bilaterally. Fusiform soft tissue swelling is shown bilaterally throughout the metacarpophalangeal and proximal interphalangeal joints. There is mild joint space narrowing on the left throughout the metacarpophalangeal joints and on the right in the third through fifth metacarpophalangeal joints. Subtle marginal erosions are demonstrated at the lateral base of the right ring finger proximal phalanx and more extensively in the third through fifth left MCP joints. There are minimal-mild features of osteoarthritis at several DIP joints bilaterally. No substantial carpal joint space narrowing is shown. Calcium pyrophosphate dihydrate position is in the medial carpus bilaterally. Bilateral Foot 8/22/2018: moderate to severe diffuse osteopenia. Extensive atherosclerotic calcifications are shown extending into the digits. Bilateral talonavicular, naviculocuneiform and left greater than right diffuse tarsometatarsal joint space narrowing is noted with small marginal osteophytes.  No substantial digital joint space narrowing is evident though fusiform soft tissue swelling is suggested at the metatarsophalangeal joints bilaterally. Moderate bilateral plantar and dorsal calcaneal spurs are shown. Bilateral Shoulder 9/08/2011: RIGHT: no evidence of fracture or dislocation. Alignment of the glenohumeral joint is anatomic. The patient is status post distal clavicle resection and acromioplasty. A small amount of heterotopic ossification is noted about the distal aspect of the clavicle. Mild marginal osteophytes noted at the the glenoid. Irregularity along the superolateral aspect of the humeral head may reflect rotator cuff pathology. An ovoid well-corticated calcific density projecting over the humeral head may represent an intraventricular loose body. The visualized right lung is clear. Soft tissues are unremarkable. LEFT: no evidence of fracture or dislocation. Alignment of the glenohumeral and acromioclavicular joints is anatomic. Moderate superiorly projecting osteophytes emanate from the distal clavicle at the acromioclavicular joint. Bony hypertrophy irregularity about the greater tuberosity may indicate chronic rotator cuff pathology. There is a small subacromial spur. The visualized left lung is clear. Multiple mediastinal clips are noted. Bone mineralization is mildly diminished. Right Hip 8/03/2011: there is diffuse osteopenia. Degenerative changes noted in the lower lumbar spine. The SI joints and pubic symphysis are not widened. The right and left hip are normally aligned. The right hip demonstrates mild osteophyte formation most notable at the inferior aspect of the joint. There is minimal diffuse joint space narrowing. Similar changes are noted in the left hip with minimal osteophyte formation. There is preservation of the left hip joint space. No acute fractures. Dense vascular calcifications are noted.  Soft tissue calcification lateral to the right iliac wing is visualized and was noted on prior abdominal and pelvic CT scan. Hypertrophic changes anterior/superior left iliac wing and left greater trochanter as before. Bilateral Hand 9/27/2010: Overall alignment is anatomic. The bones are demineralized overall. The joint spaces are relatively preserved throughout. Relatively mild degenerative changes are present in the DIP joints, and in the first CMC joints. There appears to be some mild soft tissue swelling overlying the right MCP joints. There are also several equivocal erosions noted involving the metacarpal heads of the right hand which raise the possibility of early erosions related to inflammatory arthritis such as rheumatoid. These changes appear slightly more conspicuous and the 2008 examination. Incidentally, there are fairly prominent vascular calcifications present indicating that the patient is likely diabetic or perhaps as renal disease    Bilateral Knee 8/18/2010:  irregularity along the patella surface where there is a scalloped margin suggesting liner wear. In addition, the cement seen along the bone prosthetic interface of the anterior distal femoral cortex is less apparent, which also may reflect underlying liner wear and possibly early particulate disease. Heterotopic ossification has not changed in interval. Vascular calcifications reflecting atherosclerotic disease remains severe. Diffuse osteopenia is noted. CT Imaging    CT Right Shoulder with arthrogram 9/26/2011: there is a large full-thickness tear seen involving the supraspinatus tendon extending to the level of the acromion. Additionally there is a partial tear of the subscapularis tendon. The biceps appears chronically torn. There are mild degenerative changes of the acromioclavicular joint. Some mild osteoarthritic changes are present of the glenohumeral joint. No evidence of fracture was seen.     MR Imaging    None    DXA     DXA 5/04/2016:  (excluded L4 for spondylitic change, right hip) lumbar spine L1-L3 T score -1.5 (BMD 0.954 g/cm2), left femoral neck T score: -1.0 (0.805 g/cm2), left total hip T score: -0.1 (1.016 g/cm2), and distal one third left radius T score -1.9 (BMD 0.571 g/cm2). FRAX score 6.1 % probability in 10 years for major osteoporotic fracture and 0.3 % 10 year probability of hip fracture. Nuclear Studies    Triple Phase Scan 8/20/0218:  Patient status post bilateral knee replacements. Phase 1 (blood flow):  There is slight increased blood flow to the left knee. Phase 2 (blood pool/soft tissue):  There is slight increased peritracheal activity left knee. Phase 3 (delayed bone): Patient status post right knee replacement which is within normal limits. Patient is status post left knee replacement. There are slight increased activity left tibial plateau    PROCEDURE  Kenalog 80 mg IA. (02/18/19)   Kenalog 80 mg IA. (11/19/18)     [facility]  OFFICE PROCEDURE PROGRESS NOTE      Symptom relief from Rheumatoid Arthritis. (5/23/19)     Chart reviewed for the following:   Henrique LIM MD, have reviewed the History, Physical and updated the Allergic reactions for [address] performed immediately prior to start of procedure:   Henrique LIM MD, have performed the following reviews on Berniece Fly prior to the start of the procedure            * Patient was identified by name and date of birth   * Agreement on procedure being performed was verified  * Risks and Benefits explained to the patient  * Procedure site verified and marked as necessary  * Patient was positioned for comfort  * Consent was signed and verified     Time: 12:43 PM    Date of procedure: 5/23/2019    Procedure performed by: Henrique Joseph MD    Provider assisted by: self    Patient assisted by: self    How tolerated by patient: tolerated the procedure well with no complications    Post Procedural Pain Scale: 0 - No Hurt    Comments: none    PROCEDURE     Indications:   Symptom relief from Rheumatoid Arthritis flare. (5/23/19)     Procedure:   After consent was obtained, and timeout performed, using sterile technique the right gluteus was prepped using alcohol. Local anesthetic used: none. The gluteus was entered and 0 ml's of fluid was withdrawn. Kenalog 80 mg was injected into the gluteus and the needle withdrawn. The procedure was well tolerated. The patient was asked to watch for fever, or increased swelling or persistent pain in the joint. Call or return to clinic as needed if such symptoms occur or there is failure to improve as anticipated. ASSESSMENT AND PLAN    This is a follow-up visit for Ms. Ramin Anthony. 1) Seronegative Rheumatoid Arthritis. She has long standing Rheumatoid Arthritis that has involved her shoulders, wrists, hands, knees and ankles. She has had bilateral knee replacement in the past with persistent pain likely due to active/recurrent synovitis in the replaced joint. Bone scan showed vascular flow suggestive of this. She is maintained on methotrexate 20 mg weekly and Serge Zhane but has not felt much improvement to date. She feels improvement after her Kenalog 80 mg IM injections, so I injected her again today with good tolerance. Her CDAI was 51 (previously 53, 61, 63) with 23 tender and 18 swollen joints, with bilateral ankle and foot  involvement, consistent with high disease activity. I will change Simponi Aria to Humira. I will change oral methotrexate to subcutaneous 0.8 mL (20 mg)    2) Long Term Use of Immunosuppressants. The patient remains on immunomodulatory medications (methotrexate, Simponi Aria) and requires frequent toxicity monitoring by blood work. Respective labs were ordered (CBC and CMP). 3) Vitamin D Deficiency. Her vitamin D was 28.0 (previously 19.3). She is on weekly ergocalciferol 50,000. I will check her level today. 4) Osteopenia involving Multiple Sites.  Her most recent DXA on 5/04/2016 umbar spine L1-L3 T score -1.5 (BMD 0.954 g/cm2) and distal one third left radius T score -1.9 (BMD 0.571 g/cm2). This is likely due to her chronic Rheumatoid Arthritis. She is no longer on Fosamax. 5) MGUS. Her M-spike 0.3 with immunofixation shows IgG monoclonal protein with lambda light chain. I referred her to hematology, Dr. Era Mccloud, who noted a low risk for myeloma    6) Elevated AST. This resolved. The patient voiced understanding of the aforementioned assessment and plan. Summary of plan was provided in the After Visit Summary patient instructions.      TODAY'S ORDERS    Orders Placed This Encounter    THER/PROPH/DIAG INJECTION, SUBCUT/IM ([id number])    CBC WITH AUTOMATED DIFF    METABOLIC PANEL, COMPREHENSIVE    C REACTIVE PROTEIN, QT    SED RATE (ESR)    VITAMIN D, 25 HYDROXY    TRIAMCINOLONE ACETONIDE INJ    DISCONTD: methotrexate (RHEUMATREX) 2.5 mg tablet dose pack    folic acid (FOLVITE) 1 mg tablet    adalimumab (HUMIRA,CF, PEN) 40 mg/0.4 mL pnkt    methotrexate, PF, 25 mg/mL injection    Insulin Syringe-Needle U-100 1 mL 30 gauge x 5/16 syrg    triamcinolone acetonide (KENALOG) 40 mg/mL injection     Future Appointments   Date Time Provider Dorothy Timmons   8/8/2019  8:30 AM Misty Valerio MD [address]   8/23/2019  8:20 AM Vada Bumpers, MD [place], [place], [address] [facility]    Adult Rheumatology   Rheumatology Ultrasound Certified  [facility]  A Part of [facility], [address] [facility], [address]   Phone [phone]  Fax [phone]

## 2019-05-24 LAB
25(OH)D3+25(OH)D2 SERPL-MCNC: 24.1 NG/ML (ref 30–100)
ALBUMIN SERPL-MCNC: 4.1 G/DL (ref 3.5–4.8)
ALBUMIN/GLOB SERPL: 1.7 {RATIO} (ref 1.2–2.2)
ALP SERPL-CCNC: 79 IU/L (ref 39–117)
ALT SERPL-CCNC: 14 IU/L (ref 0–32)
AST SERPL-CCNC: 39 IU/L (ref 0–40)
BASOPHILS # BLD AUTO: 0.1 X10E3/UL (ref 0–0.2)
BASOPHILS NFR BLD AUTO: 1 %
BILIRUB SERPL-MCNC: 0.6 MG/DL (ref 0–1.2)
BUN SERPL-MCNC: 23 MG/DL (ref 8–27)
BUN/CREAT SERPL: 27 (ref 12–28)
CALCIUM SERPL-MCNC: 8.5 MG/DL (ref 8.7–10.3)
CHLORIDE SERPL-SCNC: 105 MMOL/L (ref 96–106)
CO2 SERPL-SCNC: 22 MMOL/L (ref 20–29)
CREAT SERPL-MCNC: 0.86 MG/DL (ref 0.57–1)
CRP SERPL-MCNC: 6.5 MG/L (ref 0–4.9)
EOSINOPHIL # BLD AUTO: 0.2 X10E3/UL (ref 0–0.4)
EOSINOPHIL NFR BLD AUTO: 3 %
ERYTHROCYTE [DISTWIDTH] IN BLOOD BY AUTOMATED COUNT: 13.2 % (ref 12.3–15.4)
ERYTHROCYTE [SEDIMENTATION RATE] IN BLOOD BY WESTERGREN METHOD: 28 MM/HR (ref 0–40)
GLOBULIN SER CALC-MCNC: 2.4 G/DL (ref 1.5–4.5)
GLUCOSE SERPL-MCNC: 118 MG/DL (ref 65–99)
HCT VFR BLD AUTO: 34.6 % (ref 34–46.6)
HGB BLD-MCNC: 11.2 G/DL (ref 11.1–15.9)
IMM GRANULOCYTES # BLD AUTO: 0.1 X10E3/UL (ref 0–0.1)
IMM GRANULOCYTES NFR BLD AUTO: 1 %
LYMPHOCYTES # BLD AUTO: 2.5 X10E3/UL (ref 0.7–3.1)
LYMPHOCYTES NFR BLD AUTO: 35 %
MCH RBC QN AUTO: 30.6 PG (ref 26.6–33)
MCHC RBC AUTO-ENTMCNC: 32.4 G/DL (ref 31.5–35.7)
MCV RBC AUTO: 95 FL (ref 79–97)
MONOCYTES # BLD AUTO: 0.8 X10E3/UL (ref 0.1–0.9)
MONOCYTES NFR BLD AUTO: 11 %
NEUTROPHILS # BLD AUTO: 3.6 X10E3/UL (ref 1.4–7)
NEUTROPHILS NFR BLD AUTO: 49 %
PLATELET # BLD AUTO: 213 X10E3/UL (ref 150–450)
POTASSIUM SERPL-SCNC: 4.5 MMOL/L (ref 3.5–5.2)
PROT SERPL-MCNC: 6.5 G/DL (ref 6–8.5)
RBC # BLD AUTO: 3.66 X10E6/UL (ref 3.77–5.28)
SODIUM SERPL-SCNC: 141 MMOL/L (ref 134–144)
WBC # BLD AUTO: 7.1 X10E3/UL (ref 3.4–10.8)

## 2019-05-24 NOTE — PROGRESS NOTES
The results were reviewed and a letter was sent. Elevated inflammatory marker (CRP) and low vitamin D 24.1 --> continue weekly ergocalciferol 50,000. remaining labs are good.

## 2019-05-31 ENCOUNTER — APPOINTMENT (OUTPATIENT)
Dept: INFUSION THERAPY | Age: 74
End: 2019-05-31

## 2019-06-11 ENCOUNTER — TELEPHONE (OUTPATIENT)
Dept: RHEUMATOLOGY | Age: 74
End: 2019-06-11

## 2019-06-11 NOTE — TELEPHONE ENCOUNTER
Returned patient's call, and used 2 identifiers. Patient informed a prior auth. Has to been done first, and then if the co pay is to high she can apply for patient assistance. Patient states she understands.

## 2019-06-11 NOTE — TELEPHONE ENCOUNTER
Patient is calling about her prescription for Humira. She says a company refused request for free medication. She says she spoke with her insurance and they said to have her provider send the request through her insurance company. She was having difficulty explaining what she needed, and said she would better explain to the nurse.

## 2019-06-28 ENCOUNTER — APPOINTMENT (OUTPATIENT)
Dept: INFUSION THERAPY | Age: 74
End: 2019-06-28

## 2019-07-03 ENCOUNTER — DOCUMENTATION ONLY (OUTPATIENT)
Dept: RHEUMATOLOGY | Age: 74
End: 2019-07-03

## 2019-07-17 RX ORDER — LATANOPROST 50 UG/ML
SOLUTION/ DROPS OPHTHALMIC
Qty: 2.5 ML | Refills: 1 | Status: SHIPPED | OUTPATIENT
Start: 2019-07-17 | End: 2019-09-26 | Stop reason: SDUPTHER

## 2019-07-28 ENCOUNTER — HOSPITAL ENCOUNTER (EMERGENCY)
Age: 74
Discharge: HOME | End: 2019-07-28
Attending: EMERGENCY MEDICINE
Payer: MEDICARE

## 2019-07-28 VITALS
BODY MASS INDEX: 33.15 KG/M2 | TEMPERATURE: 98 F | OXYGEN SATURATION: 98 % | SYSTOLIC BLOOD PRESSURE: 162 MMHG | WEIGHT: 199 LBS | HEART RATE: 96 BPM | RESPIRATION RATE: 18 BRPM | HEIGHT: 65 IN | DIASTOLIC BLOOD PRESSURE: 75 MMHG

## 2019-07-28 DIAGNOSIS — T50.905A ADVERSE EFFECT OF DRUG, INITIAL ENCOUNTER: Primary | ICD-10-CM

## 2019-07-28 DIAGNOSIS — K12.1 STOMATITIS: ICD-10-CM

## 2019-07-28 LAB
ALBUMIN SERPL-MCNC: 3.3 G/DL (ref 3.5–5)
ALBUMIN/GLOB SERPL: 0.9 {RATIO} (ref 1.1–2.2)
ALP SERPL-CCNC: 74 U/L (ref 45–117)
ALT SERPL-CCNC: 18 U/L (ref 12–78)
ANION GAP SERPL CALC-SCNC: 8 MMOL/L (ref 5–15)
AST SERPL-CCNC: 35 U/L (ref 15–37)
BASOPHILS # BLD: 0.1 K/UL (ref 0–0.1)
BASOPHILS NFR BLD: 1 % (ref 0–1)
BILIRUB SERPL-MCNC: 0.7 MG/DL (ref 0.2–1)
BUN SERPL-MCNC: 17 MG/DL (ref 6–20)
BUN/CREAT SERPL: 19 (ref 12–20)
CALCIUM SERPL-MCNC: 8.3 MG/DL (ref 8.5–10.1)
CHLORIDE SERPL-SCNC: 105 MMOL/L (ref 97–108)
CO2 SERPL-SCNC: 26 MMOL/L (ref 21–32)
CREAT SERPL-MCNC: 0.9 MG/DL (ref 0.55–1.02)
DIFFERENTIAL METHOD BLD: ABNORMAL
EOSINOPHIL # BLD: 0.1 K/UL (ref 0–0.4)
EOSINOPHIL NFR BLD: 1 % (ref 0–7)
ERYTHROCYTE [DISTWIDTH] IN BLOOD BY AUTOMATED COUNT: 15 % (ref 11.5–14.5)
GLOBULIN SER CALC-MCNC: 3.6 G/DL (ref 2–4)
GLUCOSE SERPL-MCNC: 111 MG/DL (ref 65–100)
HCT VFR BLD AUTO: 32.8 % (ref 35–47)
HGB BLD-MCNC: 10.7 G/DL (ref 11.5–16)
IMM GRANULOCYTES # BLD AUTO: 0.1 K/UL (ref 0–0.04)
IMM GRANULOCYTES NFR BLD AUTO: 1 % (ref 0–0.5)
LYMPHOCYTES # BLD: 1.7 K/UL (ref 0.8–3.5)
LYMPHOCYTES NFR BLD: 25 % (ref 12–49)
MCH RBC QN AUTO: 32.8 PG (ref 26–34)
MCHC RBC AUTO-ENTMCNC: 32.6 G/DL (ref 30–36.5)
MCV RBC AUTO: 100.6 FL (ref 80–99)
MONOCYTES # BLD: 0.6 K/UL (ref 0–1)
MONOCYTES NFR BLD: 9 % (ref 5–13)
NEUTS SEG # BLD: 4.4 K/UL (ref 1.8–8)
NEUTS SEG NFR BLD: 63 % (ref 32–75)
NRBC # BLD: 0 K/UL (ref 0–0.01)
NRBC BLD-RTO: 0 PER 100 WBC
PLATELET # BLD AUTO: 226 K/UL (ref 150–400)
PMV BLD AUTO: 9 FL (ref 8.9–12.9)
POTASSIUM SERPL-SCNC: 3.8 MMOL/L (ref 3.5–5.1)
PROT SERPL-MCNC: 6.9 G/DL (ref 6.4–8.2)
RBC # BLD AUTO: 3.26 M/UL (ref 3.8–5.2)
SODIUM SERPL-SCNC: 139 MMOL/L (ref 136–145)
WBC # BLD AUTO: 6.9 K/UL (ref 3.6–11)

## 2019-07-28 PROCEDURE — 85025 COMPLETE CBC W/AUTO DIFF WBC: CPT

## 2019-07-28 PROCEDURE — 80053 COMPREHEN METABOLIC PANEL: CPT

## 2019-07-28 PROCEDURE — 74011250637 HC RX REV CODE- 250/637: Performed by: EMERGENCY MEDICINE

## 2019-07-28 PROCEDURE — 99283 EMERGENCY DEPT VISIT LOW MDM: CPT

## 2019-07-28 PROCEDURE — 74011000250 HC RX REV CODE- 250: Performed by: EMERGENCY MEDICINE

## 2019-07-28 PROCEDURE — 36415 COLL VENOUS BLD VENIPUNCTURE: CPT

## 2019-07-28 RX ORDER — LIDOCAINE HYDROCHLORIDE 20 MG/ML
10 SOLUTION OROPHARYNGEAL
Status: COMPLETED | OUTPATIENT
Start: 2019-07-28 | End: 2019-07-28

## 2019-07-28 RX ORDER — LIDOCAINE HYDROCHLORIDE 20 MG/ML
10 SOLUTION OROPHARYNGEAL AS NEEDED
Qty: 1 BOTTLE | Refills: 1 | Status: SHIPPED | OUTPATIENT
Start: 2019-07-28 | End: 2019-09-04

## 2019-07-28 RX ORDER — FOLIC ACID 1 MG/1
10 TABLET ORAL
Status: COMPLETED | OUTPATIENT
Start: 2019-07-28 | End: 2019-07-28

## 2019-07-28 RX ADMIN — FOLIC ACID 10 MG: 1 TABLET ORAL at 14:03

## 2019-07-28 RX ADMIN — LIDOCAINE HYDROCHLORIDE 10 ML: 20 SOLUTION ORAL; TOPICAL at 12:46

## 2019-07-28 NOTE — ED NOTES
Discharge instructions were given to the patient by MOUNA Ellison RN. .     The patient left the Emergency Department ambulatory, alert and oriented and in no acute distress with 1 prescription(s). The patient was encouraged to call or return to the ED for worsening symptoms or problems and was encouraged to schedule a follow up appointment for continuing care. Patient leaving ED accompanied by boyfriend. The patient verbalized understanding of discharge instructions and prescriptions, all questions were answered. The patient has no further concerns at this time. Patient declined wheelchair transfer upon ED discharge.

## 2019-07-28 NOTE — ED NOTES
Emergency Department Nursing Plan of Care       The Nursing Plan of Care is developed from the Nursing assessment and Emergency Department Attending provider initial evaluation. The plan of care may be reviewed in the ED Provider note.     The Plan of Care was developed with the following considerations:   Patient / Family readiness to learn indicated by:verbalized understanding  Persons(s) to be included in education: patient  Barriers to Learning/Limitations:No    Signed     Sindhu Simons    7/28/2019   12:21 PM

## 2019-07-28 NOTE — DISCHARGE INSTRUCTIONS
Patient Education     Side Effects of Medicine: Care Instructions  Your Care Instructions  Medicines are a big part of treatment for many health problems. But all medicines have side effects. Often these are mild problems. They might include a dry mouth or upset stomach. But sometimes medicines can cause dangerous side effects. One example is a bad allergic reaction. The best treatment will depend on what side effects you have. If you have a serious side effect, you may need to stop taking the medicine. You may also need to take another medicine to treat the side effect. If you have a mild side effect, it may go away after you take the medicine for a while. The doctor has checked you carefully, but problems can develop later. If you notice any problems or new symptoms, get medical treatment right away. Follow-up care is a key part of your treatment and safety. Be sure to make and go to all appointments, and call your doctor if you are having problems. It's also a good idea to know your test results and keep a list of the medicines you take. How can you care for yourself at home? · Be safe with medicines. Take your medicines exactly as prescribed. Call your doctor if you think you are having a problem with your medicine. · Call your doctor if side effects bother you and you wonder if you should keep taking a medicine. Your doctor may be able to lower your dose or change your medicine. Do not suddenly quit taking your medicine unless a doctor tells you to. · Make sure your doctor has a list of all the medicines, vitamins, supplements, and herbal remedies you take. Ask about side effects. When should you call for help? Call 911 anytime you think you may need emergency care. For example, call if:  · You have symptoms of a severe allergic reaction. These may include:  ¨ Sudden raised, red areas (hives) all over your body. ¨ Swelling of the throat, mouth, lips, or tongue. ¨ Trouble breathing.   ¨ Passing out (losing consciousness). Or you may feel very lightheaded or suddenly feel weak, confused, or restless. Call your doctor now or seek immediate medical care if:  · You have symptoms of an allergic reaction, such as:  ¨ A rash or hives (raised, red areas on the skin). ¨ Itching. ¨ Swelling. ¨ Belly pain, nausea, or vomiting. Watch closely for changes in your health, and be sure to contact your doctor if:  · You think you are having a new problem with your medicine. · You do not get better as expected. Where can you learn more? Go to Andela.be  Enter D152 in the search box to learn more about \"Side Effects of Medicine: Care Instructions. \"   © 9408-3418 Healthwise, Incorporated. Care instructions adapted under license by [facility] [employer] (which disclaims liability or warranty for this information). This care instruction is for use with your licensed healthcare professional. If you have questions about a medical condition or this instruction, always ask your healthcare professional. [place] [address] any warranty or liability for your use of this information. Content Version: 87.1.639792; Current as of: November 20, 2015           Patient Education        Stomatitis: Care Instructions  Your Care Instructions  Stomatitis is swelling and redness of the lining of your mouth. It can cause painful sores that can make it hard for you to eat, drink, or swallow. Stomatitis may be caused by a viral or bacterial infection, a disease, or not taking care of your teeth and gums properly. Other causes include reactions to smoking, burns from hot food or drinks, or an allergic reaction. Allergy causes may be a result of flavorings such as spearmint or cinnamon that are used in chewing gum, toothpaste, soft drinks, candies, and other products. Your doctor may prescribe pain medicines or special mouth rinses.  He or she may tell you to quit using some products that may be causing the sores. It can take up to 2 weeks for the sores to heal.  Some people with stomatitis also get a yeast infection of the mouth, called thrush. Medicines can treat this problem. Follow-up care is a key part of your treatment and safety. Be sure to make and go to all appointments, and call your doctor if you are having problems. It's also a good idea to know your test results and keep a list of the medicines you take. How can you care for yourself at home? · Be safe with medicines. Read and follow all instructions on the label. ? If the doctor gave you a prescription medicine for pain, take it as prescribed. ? If you are not taking a prescription pain medicine, ask your doctor if you can take an over-the-counter medicine. · If your doctor prescribed antibiotics, take them as directed. Do not stop taking them just because you feel better. You need to take the full course of antibiotics. · Use prescription mouth rinses as prescribed. Ask your doctor if you can freeze the mouth rinse in an ice cube tray. Sucking on a frozen cube of the mouth rinse can help ease the pain. · Make a rinse to keep your mouth from getting dry. Add 1 teaspoon baking soda and ½ teaspoon salt to a quart of water. Use it to rinse your mouth 4 to 6 times each day. Spit out the rinse. Do not swallow it. · Do not use a mouthwash or other over-the-counter rinse with alcohol. These can dry out your mouth or cause more pain. · If your doctor gave you mouthwash or lozenges to treat your yeast infection, use them as directed. · Eat soft foods such as mashed potatoes and other cooked vegetables, noodles, applesauce, clear broth soups, yogurt, and cottage cheese. You can get extra protein by adding protein powder to milk shakes or breakfast drinks. Avoid eating spicy or crunchy foods. · Try eating cold foods such as ice cream or yogurt. · Avoid drinking high-acid juices such as orange, grapefruit, and cranberry juices.   · Drink plenty of fluids to prevent dehydration. Drinking through a straw may help with pain. · Practice good oral hygiene. Use a very soft toothbrush to brush your teeth at least two times a day. Or use your finger to brush your teeth if your mouth is sore. When should you call for help? Call your doctor now or seek immediate medical care if:    · You have new or worse symptoms of infection, such as:  ? Increased pain, swelling, warmth, or redness. ? Red streaks leading from the area. ? Pus draining from the area. ? A fever.    Watch closely for changes in your health, and be sure to contact your doctor if:    · You do not get better as expected. Where can you learn more? Go to http://annette-evelin.info/. Enter W907 in the search box to learn more about \"Stomatitis: Care Instructions. \"  Current as of: October 3, 2018  Content Version: 12.1  © 4824-3737 [employer]. Care instructions adapted under license by [employer] (which disclaims liability or warranty for this information). If you have questions about a medical condition or this instruction, always ask your healthcare professional. [address] any warranty or liability for your use of this information. [employer] Activation    Thank you for requesting access to [employer]. Please follow the instructions below to securely access and download your online medical record. [employer] allows you to send messages to your doctor, view your test results, renew your prescriptions, schedule appointments, and more. How Do I Sign Up? 1. In your internet browser, go to [email or web address]  2. Click on the First Time User? Click Here link in the Sign In box. You will be redirect to the New Member Sign Up page. 3. Enter your [employer] Access Code exactly as it appears below. You will not need to use this code after youve completed the sign-up process.  If you do not sign up before the expiration date, you must request a new code. [employer] Access Code: [email or web address]  Expires: 2019 12:03 PM (This is the date your [employer] access code will )    4. Enter the last four digits of your Social Security Number (xxxx) and Date of Birth (mm/dd/yyyy) as indicated and click Submit. You will be taken to the next sign-up page. 5. Create a Solutionaryt ID. This will be your [employer] login ID and cannot be changed, so think of one that is secure and easy to remember. 6. Create a [employer] password. You can change your password at any time. 7. Enter your Password Reset Question and Answer. This can be used at a later time if you forget your password. 8. Enter your e-mail address. You will receive e-mail notification when new information is available in [address]. 9. Click Sign Up. You can now view and download portions of your medical record. 10. Click the Download Summary menu link to download a portable copy of your medical information. Additional Information    If you have questions, please visit the Frequently Asked Questions section of the [employer] website at [email or web address] Remember, [employer] is NOT to be used for urgent needs. For medical emergencies, dial 911.

## 2019-07-28 NOTE — ED PROVIDER NOTES
EMERGENCY DEPARTMENT HISTORY AND PHYSICAL EXAM      Date: 7/28/2019  Patient Name: Ranulfo Weems    History of Presenting Illness     Chief Complaint   Patient presents with    Mouth Pain     complains of mouth being sowllen and sore times one week        History Provided By: Patient     HPI: Ranulfo Weems, 72 y.o. female with PMHx significant for the of hypertension, coronary disease, rheumatoid arthritis to the ER with several days of mouth pain. She states she has been on 2 rounds of antibiotics within the last 2 months for an infected toe. Her last antibiotic finished 2 weeks ago. She thinks it was amoxicillin. Concurrent with this she was started on methotrexate via injection 4 weeks ago. She denies any associated fever, chills, joint pain or rash on her body. She denies any rectal pain or rectal bleeding. She denies any pain with swallowing or sores in the posterior pharynx. There are no other complaints, changes, or physical findings at this time. PCP: Kvng Cantrell MD    No current facility-administered medications on file prior to encounter. Current Outpatient Medications on File Prior to Encounter   Medication Sig Dispense Refill    latanoprost (XALATAN) 0.005 % ophthalmic solution ADMINISTER 1 DROP INTO BOTH EYES NIGHTLY 2.5 mL 1    folic acid (FOLVITE) 1 mg tablet Take 1 Tab by mouth daily. 90 Tab 0    adalimumab (HUMIRA,CF, PEN) 40 mg/0.4 mL pnkt 40 mg by SubCUTAneous route every fourteen (14) days. Indications: rheumatoid arthritis 2 Kit 11    methotrexate, PF, 25 mg/mL injection 0.8 mL by SubCUTAneous route every seven (7) days. 10 mL 0    Insulin Syringe-Needle U-100 1 mL 30 gauge x 5/16 syrg 1 Each by Does Not Apply route Every Saturday for 12 doses. To be used for methotrexate solution 12 Syringe 3    furosemide (LASIX) 20 mg tablet Take 1 Tab by mouth daily. 90 Tab 0    sertraline (ZOLOFT) 50 mg tablet Take 1 Tab by mouth daily.  90 Tab 1    albuterol sulfate (PROVENTIL;VENTOLIN) 2.5 mg/0.5 mL nebu nebulizer solution 0.5 mL by Nebulization route every four (4) hours as needed for Wheezing. Indications: Asthma Attack 30 mL 0    ferrous sulfate (SLOW FE) 47.5 mg iron TbER tablet Take 1 Tab by mouth daily. (Patient taking differently: Take 1 Tab by mouth every seven (7) days.) 90 Tab 1    gabapentin (NEURONTIN) 100 mg capsule Take 1 capsule in the morning and take 3 capsules before bedtime 120 Cap 2    nortriptyline (PAMELOR) 25 mg capsule Take 1 Cap by mouth nightly. 90 Cap 0    ergocalciferol (ERGOCALCIFEROL) 50,000 unit capsule Take 1 Cap by mouth every seven (7) days. 12 Cap 3    magnesium 200 mg tab Take 100 mg by mouth daily.  naproxen (NAPROSYN) 375 mg tablet Take 325 mg by mouth two (2) times daily as needed.  metFORMIN (GLUCOPHAGE) 500 mg tablet Take 1 Tab by mouth two (2) times daily (with meals). (Patient taking differently: Take 500 mg by mouth daily (with breakfast). ) 60 Tab 5    fluticasone-vilanterol (BREO ELLIPTA) 100-25 mcg/dose inhaler Take 1 Puff by inhalation daily. 1 Inhaler 5    aspirin delayed-release 81 mg tablet Take  by mouth daily.  cyanocobalamin (VITAMIN B-12) 1,000 mcg sublingual tablet Take 1,000 mcg by mouth daily.  metoprolol tartrate (LOPRESSOR) 25 mg tablet Take  by mouth two (2) times a day.  simvastatin (ZOCOR) 20 mg tablet Take  by mouth nightly.  OMEPRAZOLE PO Take 20 mg by mouth daily.          Past History     Past Medical History:  Past Medical History:   Diagnosis Date    Arthritis     Asthma     Diabetes ([address]     High cholesterol     Hypertension     Sarcoidosis 1999    [facility]       Past Surgical History:  Past Surgical History:   Procedure Laterality Date    CARDIAC SURG PROCEDURE UNLIST      HX GASTRIC BYPASS      HX ORTHOPAEDIC Bilateral     knee replacement       Family History:  Family History   Problem Relation Age of Onset    Heart Disease Mother     Liver Disease Father    [facility] Alcohol abuse Brother     Dementia Neg Hx     Cancer Neg Hx     Seizures Neg Hx        Social History:  Social History     Tobacco Use    Smoking status: Never Smoker    Smokeless tobacco: Never Used   Substance Use Topics    Alcohol use: Yes     Alcohol/week: 2.0 standard drinks     Types: 1 Glasses of wine, 1 Shots of liquor per week     Comment: 2-3 yearly    Drug use: No       Allergies: Allergies   Allergen Reactions    Lisinopril Rash         Review of Systems   Review of Systems   Constitutional: Negative. Negative for chills and fever. HENT: Positive for mouth sores. Negative for congestion, dental problem, drooling, rhinorrhea, sore throat, trouble swallowing and voice change. Respiratory: Negative. Negative for cough, chest tightness and wheezing. Cardiovascular: Negative. Negative for chest pain and palpitations. Gastrointestinal: Negative. Negative for abdominal pain, constipation, nausea and vomiting. Endocrine: Negative. Genitourinary: Negative. Negative for decreased urine volume, flank pain, hematuria and pelvic pain. Musculoskeletal: Negative. Negative for back pain and neck pain. Skin: Negative. Negative for color change, pallor and rash. Neurological: Negative. Negative for dizziness, seizures, weakness, numbness and headaches. Hematological: Negative. Negative for adenopathy. Psychiatric/Behavioral: Negative. All other systems reviewed and are negative. Physical Exam   Physical Exam   Constitutional: She is oriented to person, place, and time. She appears well-developed and well-nourished. No distress. HENT:   Head: Normocephalic and atraumatic. Mouth/Throat: Oropharynx is clear and moist and mucous membranes are normal. No oral lesions. No trismus in the jaw. No uvula swelling. No oropharyngeal exudate or tonsillar abscesses. Patient has clear filled blisters on the buccal mucosa of the upper and lower lip.   There is some inflammation noted to the distal tongue. There is no posterior pharyngeal or soft palate's ulcerations noted. The patient denies any rectal sores. Eyes: Pupils are equal, round, and reactive to light. Conjunctivae are normal. Right eye exhibits no discharge. Left eye exhibits no discharge. No scleral icterus. Neck: Normal range of motion. Neck supple. No JVD present. Cardiovascular: Normal rate, regular rhythm, normal heart sounds and intact distal pulses. Exam reveals no gallop and no friction rub. No murmur heard. Pulmonary/Chest: Effort normal and breath sounds normal. No stridor. No respiratory distress. She has no wheezes. She has no rales. She exhibits no tenderness. Abdominal: Soft. Bowel sounds are normal. She exhibits no distension and no mass. There is no tenderness. There is no rebound and no guarding. Patient has a small skin breakdown where she has injected her methotrexate on her right lower abdomen. There is no palpitation of fluctuance or induration noted. Genitourinary:   Genitourinary Comments: Declined rectal exam but denies any rectal bleeding or rectal pain   Neurological: She is alert and oriented to person, place, and time. She displays normal reflexes. No cranial nerve deficit. She exhibits normal muscle tone. Coordination normal.   Skin: Skin is warm. No rash noted. She is not diaphoretic. No pallor. Vitals reviewed. 12:45PM- Dr Steele August [phone] the patient's rheumatologist was contacted. He agrees with checking blood work making sure the patient is not pancytopenic and checking the renal function. Plan will be to call him back with those results and final disposition will be made pending then. 1:37 PM patient states she had some relief with the Xylocaine. Her lab results were back she is not pancytopenic and renal function is normal.  She thinks she is using 0.8 mL's but there is some concern about her understanding of measurement.   We will contact the rheumatologist to assist with final disposition    1:49 PM Dr Nelson-recommends the patient no longer take the methotrexate. He recommends we give the patient 10 mg of folic acid in the emergency room. He did recommend she takes 10 mg at bedtime tonight and 10 mg tomorrow. They will call her from the office to check on her. Diagnostic Study Results     Labs -     Recent Results (from the past 12 hour(s))   CBC WITH AUTOMATED DIFF    Collection Time: 07/28/19 12:48 PM   Result Value Ref Range    WBC 6.9 3.6 - 11.0 K/uL    RBC 3.26 (L) 3.80 - 5.20 M/uL    HGB 10.7 (L) 11.5 - 16.0 g/dL    HCT 32.8 (L) 35.0 - 47.0 %    .6 (H) 80.0 - 99.0 FL    MCH 32.8 26.0 - 34.0 PG    MCHC 32.6 30.0 - 36.5 g/dL    RDW 15.0 (H) 11.5 - 14.5 %    PLATELET 611 396 - 129 K/uL    MPV 9.0 8.9 - 12.9 FL    NRBC 0.0 0  WBC    ABSOLUTE NRBC 0.00 0.00 - 0.01 K/uL    NEUTROPHILS 63 32 - 75 %    LYMPHOCYTES 25 12 - 49 %    MONOCYTES 9 5 - 13 %    EOSINOPHILS 1 0 - 7 %    BASOPHILS 1 0 - 1 %    IMMATURE GRANULOCYTES 1 (H) 0.0 - 0.5 %    ABS. NEUTROPHILS 4.4 1.8 - 8.0 K/UL    ABS. LYMPHOCYTES 1.7 0.8 - 3.5 K/UL    ABS. MONOCYTES 0.6 0.0 - 1.0 K/UL    ABS. EOSINOPHILS 0.1 0.0 - 0.4 K/UL    ABS. BASOPHILS 0.1 0.0 - 0.1 K/UL    ABS. IMM. GRANS. 0.1 (H) 0.00 - 0.04 K/UL    DF AUTOMATED     METABOLIC PANEL, COMPREHENSIVE    Collection Time: 07/28/19 12:48 PM   Result Value Ref Range    Sodium 139 136 - 145 mmol/L    Potassium 3.8 3.5 - 5.1 mmol/L    Chloride 105 97 - 108 mmol/L    CO2 26 21 - 32 mmol/L    Anion gap 8 5 - 15 mmol/L    Glucose 111 (H) 65 - 100 mg/dL    BUN 17 6 - 20 MG/DL    Creatinine 0.90 0.55 - 1.02 MG/DL    BUN/Creatinine ratio 19 12 - 20      GFR est AA >60 >60 ml/min/1.73m2    GFR est non-AA >60 >60 ml/min/1.73m2    Calcium 8.3 (L) 8.5 - 10.1 MG/DL    Bilirubin, total 0.7 0.2 - 1.0 MG/DL    ALT (SGPT) 18 12 - 78 U/L    AST (SGOT) 35 15 - 37 U/L    Alk.  phosphatase 74 45 - 117 U/L    Protein, total 6.9 6.4 - 8.2 g/dL    Albumin 3.3 (L) 3.5 - 5.0 g/dL    Globulin 3.6 2.0 - 4.0 g/dL    A-G Ratio 0.9 (L) 1.1 - 2.2         Radiologic Studies -   No orders to display     CT Results  (Last 48 hours)    None        CXR Results  (Last 48 hours)    None            Medical Decision Making   I am the first provider for this patient. I reviewed the vital signs, available nursing notes, past medical history, past surgical history, family history and social history. Vital Signs-Reviewed the patient's vital signs. Patient Vitals for the past 12 hrs:   Temp Pulse Resp BP SpO2   07/28/19 1203 98 °F (36.7 °C) 96 18 162/75 98 %         Records Reviewed: Nursing Notes and Old Medical Records    Provider Notes (Medical Decision Making):   Differential diagnosis-Jaren Addi syndrome, methotrexate induced stomatitis, pancytopenia, angioedema, gingivitis, dental abscess, malignancy    Impression/plan-72year-old female with history of rheumatoid arthritis recently started on injectable methotrexate. She is also been on recent antibiotics. She is here with mouth pain. Her exam does not suggest Kamara-Addi as there is no other mucosal involvement and no sloughing of skin. Suspect possible stomatitis related to the methotrexate. There is some question about whether she is taking the appropriate dose. Will discuss case with rheumatology to assist with final disposition which may be giving the patient extra folic acid or leucovorin. ED Course:   Initial assessment performed. The patients presenting problems have been discussed, and they are in agreement with the care plan formulated and outlined with them. I have encouraged them to ask questions as they arise throughout their visit. Critical Care Time:   0    Disposition:  Home    PLAN:  1. Current Discharge Medication List      START taking these medications    Details   lidocaine (LIDOCAINE VISCOUS) 2 % solution Take 10 mL by mouth as needed for Pain.   Qty: 1 Bottle, Refills: 1 2.   Follow-up Information     Follow up With Specialties Details Why Contact Info    Heydi Nickerson MD Rheumatology Schedule an appointment as soon as possible for a visit in 1 day  [facility] [address] - [place] EMERGENCY DEPT Emergency Medicine  If symptoms worsen [facility]  [phone]        Return to ED if worse     Diagnosis     Clinical Impression:   1. Adverse effect of drug, initial encounter    2.  Stomatitis        Attestations:

## 2019-07-29 ENCOUNTER — TELEPHONE (OUTPATIENT)
Dept: RHEUMATOLOGY | Age: 74
End: 2019-07-29

## 2019-07-29 ENCOUNTER — OFFICE VISIT (OUTPATIENT)
Dept: RHEUMATOLOGY | Age: 74
End: 2019-07-29

## 2019-07-29 VITALS
BODY MASS INDEX: 32.32 KG/M2 | RESPIRATION RATE: 18 BRPM | DIASTOLIC BLOOD PRESSURE: 74 MMHG | HEART RATE: 89 BPM | WEIGHT: 194 LBS | SYSTOLIC BLOOD PRESSURE: 161 MMHG | TEMPERATURE: 98 F | HEIGHT: 65 IN

## 2019-07-29 DIAGNOSIS — T45.1X1A METHOTREXATE TOXICITY, ACCIDENTAL OR UNINTENTIONAL, INITIAL ENCOUNTER: ICD-10-CM

## 2019-07-29 DIAGNOSIS — M06.09 SERONEGATIVE RHEUMATOID ARTHRITIS OF MULTIPLE SITES (HCC): Primary | ICD-10-CM

## 2019-07-29 DIAGNOSIS — Z79.60 LONG-TERM USE OF IMMUNOSUPPRESSANT MEDICATION: ICD-10-CM

## 2019-07-29 RX ORDER — FOLIC ACID 1 MG/1
1 TABLET ORAL DAILY
Qty: 90 TAB | Refills: 0 | Status: SHIPPED | OUTPATIENT
Start: 2019-07-29 | End: 2019-08-23 | Stop reason: ALTCHOICE

## 2019-07-29 RX ORDER — TRIAMCINOLONE ACETONIDE 1 MG/G
PASTE DENTAL
Qty: 1 TUBE | Refills: 0 | Status: SHIPPED | OUTPATIENT
Start: 2019-07-29 | End: 2019-08-02 | Stop reason: SDUPTHER

## 2019-07-29 RX ORDER — METHOTREXATE 25 MG/ML
20 INJECTION INTRA-ARTERIAL; INTRAMUSCULAR; INTRATHECAL; INTRAVENOUS
Qty: 12 VIAL | Refills: 0 | Status: CANCELLED | OUTPATIENT
Start: 2019-08-03

## 2019-07-29 NOTE — TELEPHONE ENCOUNTER
Patient calling due to she was in the ER at [facility] yesterday and she stated a physician spoke with Dr. Emy Cruz for her to take a medication today which she does not know the name of it. Her mouth is still swollen and painful. She also stated she will need a F/up from the hospital. Her phone is [phone].

## 2019-07-29 NOTE — TELEPHONE ENCOUNTER
Spoke with pt and made her an appt for today at 3:20. She stated an understanding and will bring her syringe with her to her appt.

## 2019-07-29 NOTE — LETTER
7/29/19 Patient: Mckay Weinstein YOB: 1945 Date of Visit: 7/29/2019 Clay Sales MD 
 [address] [place] [id number] VIA In Basket Dear Clay Sales MD, Thank you for referring Ms. Mckay Weinstein to [facility] for evaluation. My notes for this consultation are attached. If you have questions, please do not hesitate to call me. I look forward to following your patient along with you.  
 
 
Sincerely, 
 
Pop Marin MD

## 2019-07-29 NOTE — PROGRESS NOTES
Chief Complaint   Patient presents with    Arthritis     1. Have you been to the ER, urgent care clinic since your last visit? Hospitalized since your last visit? Yes [address] [place] [address] for mouth sores/rash    2. Have you seen or consulted any other health care providers outside of the [address] [place] [address] since your last visit? Include any pap smears or colon screening.  No

## 2019-07-29 NOTE — PROGRESS NOTES
REASON FOR VISIT    This is an acute follow-up visit for Ms. Stanford Valencia for Seronegative Rheumatoid Arthritis. Inflammatory arthritis phenotype includes:  Anti-CCP positive: no  Rheumatoid factor positive: no  Erosive disease: no  Extra-articular manifestations include: MGUS    Immunosuppression Screening (8/22/2018): Quantiferon TB: negative  PPD:  Not performed  Hepatitis B: negative  Hepatitis C: negative    Therapy History includes:  Current DMARD therapy includes: none  Prior DMARD therapy includes: methotrexate 20 mg every Saturday, methotrexate 20 mg SubQ every Saturday (5/23/2019 to 7/29/2019) Martell Hof (11/29/2018 to 5/23/2019)  The following DMARDs have been ineffective: Simponi Aria   The following DMARDs were stopped because of side effects: methotrexate 20 mg SubQ (aphthous ulcer, injection site breakdown)  Contra-Indicated DMARDs because of history of diverticulitis: Actemra, Kevzara    Immunization History   Administered Date(s) Administered    Influenza High Dose Vaccine PF 09/07/2018       Patient Active Problem List   Diagnosis Code    Long-term use of immunosuppressant medication Z79.899    Osteopenia of multiple sites M85.89    Vitamin D deficiency E55.9    Rheumatoid arthritis involving multiple sites ([facility] M06.9    Essential hypertension I10    Mixed hyperlipidemia E78.2    RLS (restless legs syndrome) G25.81    Mild intermittent asthma J45.20    Status post angioplasty with stent Z95.820    Coronary artery disease due to lipid rich plaque I25.10, I25.83    Seronegative rheumatoid arthritis of multiple sites ([facility] M06.09    Severe obesity ([facility] E66.01     HISTORY OF PRESENT ILLNESS    Ms. Stanford Valencia returns for an acute follow-up. On her lats visit, due to active disease, I changed Simponi Aria to Humira. I will change oral methotrexate to subcutaneous 0.8 mL (20 mg). I also injected her with Kenalog 80 mg IM with good tolerance and improvement.  She presented to the ED yesterday due to oral ulcers that started last week. She reports having injection site breakdown after starting the injections in May did contact me. I spoke with Dr. Rhett Muller. I asked him to give her folic acid 10 mg in the ED and for her to take 10 mg at bedtime and this morning and okay to discharge since her labs were normal. She did not take 10 mg in the evening or morning because she was expecting a script from the pharmacy. He informed me that she had skin breakdown at the injection site, so there was question for methotrexate toxicity. She was not filling her syringes from the pharmacy but we asked her to bring her syringes with her which are standard 1 mL U-100 insulin syringes. Today, she feels that her joints started to feel better with the injections    She has a history of diverticulitis    She endorses oral ulcers, dysphagia, injection site rash, easy bruising. She denies fever, weight loss, blurred vision, vision loss, oral ulcers, ankle swelling, dry cough, dyspnea, nausea, vomiting, dysphagia, abdominal pain, black or bloody stool, fall since last visit, rash, easy bruising and increased thirst.    Ms. Emery Young has continued her medications for arthritis and reports good tolerance without significant side effects. Last toxicity monitoring by blood work was done on 7/29/2019 and did not reveal any significant adverse effects, except Hct 32.8%, eGFR >60, albumin 3.3 g/dL    Most recent inflammatory markers from 5/23/2019 revealed a ESR 28 mm/hr (previously 1, 21 mm/hr) and CRP 6.5 mg/L (previously 0.3, 1.3 mg/L). The patient has not had any interval hospital admissions, infections, or surgeries. REVIEW OF SYSTEMS    A comprehensive review of systems was performed and pertinent results are documented in the HPI, review of systems is otherwise non-contributory.     PAST MEDICAL HISTORY    She has a past medical history of Arthritis, Asthma, Diabetes ([address] High cholesterol, Hypertension, and Sarcoidosis (1999). She also has no past medical history of Difficult intubation, Malignant hyperthermia due to anesthesia, Nausea & vomiting, Pseudocholinesterase deficiency, or Unspecified adverse effect of anesthesia. FAMILY HISTORY    Her family history includes Alcohol abuse in her brother; Heart Disease in her mother; Liver Disease in her father. SOCIAL HISTORY    She reports that she has never smoked. She has never used smokeless tobacco. She reports that she drinks about 2.0 standard drinks of alcohol per week. She reports that she does not use drugs. MEDICATIONS    Current Outpatient Medications   Medication Sig Dispense Refill    folic acid (FOLVITE) 1 mg tablet Take 1 Tab by mouth daily. 90 Tab 0    triamcinolone acetonide (KENALOG) 0.1 % dental paste Apply with Q-tip twice daily to affected oral ulcers until they heal 1 Tube 0    lidocaine (LIDOCAINE VISCOUS) 2 % solution Take 10 mL by mouth as needed for Pain. 1 Bottle 1    latanoprost (XALATAN) 0.005 % ophthalmic solution ADMINISTER 1 DROP INTO BOTH EYES NIGHTLY 2.5 mL 1    Insulin Syringe-Needle U-100 1 mL 30 gauge x 5/16 syrg 1 Each by Does Not Apply route Every Saturday for 12 doses. To be used for methotrexate solution 12 Syringe 3    furosemide (LASIX) 20 mg tablet Take 1 Tab by mouth daily. 90 Tab 0    sertraline (ZOLOFT) 50 mg tablet Take 1 Tab by mouth daily. 90 Tab 1    albuterol sulfate (PROVENTIL;VENTOLIN) 2.5 mg/0.5 mL nebu nebulizer solution 0.5 mL by Nebulization route every four (4) hours as needed for Wheezing. Indications: Asthma Attack 30 mL 0    ferrous sulfate (SLOW FE) 47.5 mg iron TbER tablet Take 1 Tab by mouth daily. (Patient taking differently: Take 1 Tab by mouth every seven (7) days.) 90 Tab 1    gabapentin (NEURONTIN) 100 mg capsule Take 1 capsule in the morning and take 3 capsules before bedtime 120 Cap 2    nortriptyline (PAMELOR) 25 mg capsule Take 1 Cap by mouth nightly.  90 Cap 0    ergocalciferol (ERGOCALCIFEROL) 50,000 unit capsule Take 1 Cap by mouth every seven (7) days. 12 Cap 3    magnesium 200 mg tab Take 100 mg by mouth daily.  naproxen (NAPROSYN) 375 mg tablet Take 325 mg by mouth two (2) times daily as needed.  metFORMIN (GLUCOPHAGE) 500 mg tablet Take 1 Tab by mouth two (2) times daily (with meals). (Patient taking differently: Take 500 mg by mouth daily (with breakfast). ) 60 Tab 5    fluticasone-vilanterol (BREO ELLIPTA) 100-25 mcg/dose inhaler Take 1 Puff by inhalation daily. 1 Inhaler 5    aspirin delayed-release 81 mg tablet Take  by mouth daily.  cyanocobalamin (VITAMIN B-12) 1,000 mcg sublingual tablet Take 1,000 mcg by mouth daily.  metoprolol tartrate (LOPRESSOR) 25 mg tablet Take  by mouth two (2) times a day.  simvastatin (ZOCOR) 20 mg tablet Take  by mouth nightly.  OMEPRAZOLE PO Take 20 mg by mouth daily.  adalimumab (HUMIRA,CF, PEN) 40 mg/0.4 mL pnkt 40 mg by SubCUTAneous route every fourteen (14) days. Indications: rheumatoid arthritis 2 Kit 11        ALLERGIES    Allergies   Allergen Reactions    Lisinopril Rash       PHYSICAL EXAMINATION    Visit Vitals  /74   Pulse 89   Temp 98 °F (36.7 °C)   Resp 18   Ht 5' 5\" (1.651 m)   Wt 194 lb (88 kg)   BMI 32.28 kg/m²     Body mass index is 32.28 kg/m². General: Patient is alert, oriented x 3, not in acute distress    HEENT:   Sclerae are not injected and appear moist.  There is no alopecia. Neck is supple   Oral aphthous on buccal and gingival mucosa    Cardiovascular:  Heart is regular rate and rhythm, no murmurs. Chest:  Lungs are clear to auscultation bilaterally. No rhonchi, wheezes, or crackles. Extremities:  Free of clubbing, cyanosis, edema    Neurological exam:  Muscle strength is full in upper and lower extremities     Skin exam:  There are no alopecia, no discoid lesions, no active Raynaud's, no livedo reticularis, no periungual erythema.     Multiple injection site skin breakdown on abdomen    Musculoskeletal exam:  A comprehensive musculoskeletal exam was NOT performed for all joints of each upper and lower extremity and assessed for swelling, tenderness and range of motion. Positive results are documented as below:    Previous exam    Decreased ROM of bilateral shoulders (right worse than left) due to pain  Bilateral knee replacements without effusion.   Bilateral ankle swelling with tenderness  Bilateral ankle foot swelling and tenderness    Z-Deformities:   no  [place] Neck Deformities:  no  Boutonierre's Deformities:  no  Ulnar Deviation:   Yes (LEFT:3rd digit)  MCP Subluxation:  no     Joint Count 5/23/2019 2/18/2019 11/19/2018 8/22/2018   Patient pain (0-100) 50 60 90 90   MHAQ 0.75 0.75 1 0.5   Left shoulder - Tender - - - 1   Left shoulder - Swollen - - - 1   Left elbow - Tender 1 - 1 1   Left elbow - Swollen 0 - 0 1   Left wrist- Tender 1 1 1 -   Left wrist- Swollen 1 1 1 1   Left 1st MCP - Tender 1 1 1 1   Left 1st MCP - Swollen 1 1 1 1   Left 2nd MCP - Tender 1 1 1 1   Left 2nd MCP - Swollen 1 1 1 1   Left 3rd MCP - Tender 1 0 1 1   Left 3rd MCP - Swollen 1 1 1 1   Left 4th MCP - Tender 1 1 1 1   Left 4th MCP - Swollen 0 1 1 1   Left 5th MCP - Tender 1 1 - 1   Left 5th MCP - Swollen 1 1 - 1   Left thumb IP - Tender 1 1 1 1   Left thumb IP - Swollen 0 1 1 -   Left 2nd PIP - Tender 1 1 1 1   Left 2nd PIP - Swollen 1 1 1 1   Left 3rd PIP - Tender 1 1 1 1   Left 3rd PIP - Swollen 1 1 1 1   Left 4th PIP - Tender 1 1 1 1   Left 4th PIP - Swollen 1 1 1 1   Left 5th PIP - Tender 1 1 1 1   Left 5th PIP - Swollen 0 - 1 1   Left knee - Tender - - 1 -   Left knee - Swollen - - 1 -   Right shoulder - Tender - - - 1   Right elbow - Tender 1 - 1 1   Right elbow - Swollen 0 - 1 1   Right wrist- Tender 1 1 1 1   Right wrist- Swollen 1 1 1 1   Right 1st MCP - Tender 1 1 1 1   Right 1st MCP - Swollen 1 0 1 -   Right 2nd MCP - Tender 1 1 1 1   Right 2nd MCP - Swollen 1 1 1 1   Right 3rd MCP - Tender 1 1 1 1   Right 3rd MCP - Swollen 1 1 1 1   Right 4th MCP - Tender 1 1 1 1   Right 4th MCP - Swollen 1 1 1 1   Right 5th MCP - Tender 1 1 1 1   Right 5th MCP - Swollen 1 1 1 1   Right thumb IP - Tender - - - 1   Right 2nd PIP - Tender 1 1 1 1   Right 2nd PIP - Swollen 1 1 1 1   Right 3rd PIP - Tender 1 1 1 1   Right 3rd PIP - Swollen 1 1 1 1   Right 4th PIP - Tender 1 1 1 1   Right 4th PIP - Swollen 1 1 1 1   Right 5th PIP - Tender 1 1 1 1   Right 5th PIP - Swollen 1 1 1 1   Right knee - Tender - 1 - -   Tender Joint Count (Total) 23 21 23 25   Swollen Joint Count (Total) 18 19 22 22   Physician Assessment (0-10) 5 6 7 7   Patient Assessment (0-10) 5 7 9 9   CDAI Total (calculated) 51 48 61 61       DATA REVIEW    Laboratory     Recent laboratory results were reviewed, summarized, and discussed with the patient. Imaging    Musculoskeletal Ultrasound    None    Radiographs    Sacrum and Coccyx 9/18/2018: no definite fracture or dislocation, however osteopenia and overlapping bowel gas somewhat limited evaluation of the sacrum. Severe multilevel degenerative disc disease is noted in the lower lumbar spine. Dense arterial vascular calcifications are present. Bilateral Hips 9/18/2018: no acute fracture or dislocation. Evaluation of the sacrum is limited by overlying bowel gas and osteopenia. Within these limitations, no fractures identified. Degenerative changes are present in the lower lumbar spine. Dense arterial vascular calcifications are present. Bilateral Hand 8/22/2018: moderate to severe diffuse osteopenia. Extensive atherosclerotic calcifications are shown extending into the fingers bilaterally. Fusiform soft tissue swelling is shown bilaterally throughout the metacarpophalangeal and proximal interphalangeal joints. There is mild joint space narrowing on the left throughout the metacarpophalangeal joints and on the right in the third through fifth metacarpophalangeal joints.  Subtle marginal erosions are demonstrated at the lateral base of the right ring finger proximal phalanx and more extensively in the third through fifth left MCP joints. There are minimal-mild features of osteoarthritis at several DIP joints bilaterally. No substantial carpal joint space narrowing is shown. Calcium pyrophosphate dihydrate position is in the medial carpus bilaterally. Bilateral Foot 8/22/2018: moderate to severe diffuse osteopenia. Extensive atherosclerotic calcifications are shown extending into the digits. Bilateral talonavicular, naviculocuneiform and left greater than right diffuse tarsometatarsal joint space narrowing is noted with small marginal osteophytes. No substantial digital joint space narrowing is evident though fusiform soft tissue swelling is suggested at the metatarsophalangeal joints bilaterally. Moderate bilateral plantar and dorsal calcaneal spurs are shown. Bilateral Shoulder 9/08/2011: RIGHT: no evidence of fracture or dislocation. Alignment of the glenohumeral joint is anatomic. The patient is status post distal clavicle resection and acromioplasty. A small amount of heterotopic ossification is noted about the distal aspect of the clavicle. Mild marginal osteophytes noted at the the glenoid. Irregularity along the superolateral aspect of the humeral head may reflect rotator cuff pathology. An ovoid well-corticated calcific density projecting over the humeral head may represent an intraventricular loose body. The visualized right lung is clear. Soft tissues are unremarkable. LEFT: no evidence of fracture or dislocation. Alignment of the glenohumeral and acromioclavicular joints is anatomic. Moderate superiorly projecting osteophytes emanate from the distal clavicle at the acromioclavicular joint. Bony hypertrophy irregularity about the greater tuberosity may indicate chronic rotator cuff pathology. There is a small subacromial spur. The visualized left lung is clear.  Multiple mediastinal clips are noted. Bone mineralization is mildly diminished. Right Hip 8/03/2011: there is diffuse osteopenia. Degenerative changes noted in the lower lumbar spine. The SI joints and pubic symphysis are not widened. The right and left hip are normally aligned. The right hip demonstrates mild osteophyte formation most notable at the inferior aspect of the joint. There is minimal diffuse joint space narrowing. Similar changes are noted in the left hip with minimal osteophyte formation. There is preservation of the left hip joint space. No acute fractures. Dense vascular calcifications are noted. Soft tissue calcification lateral to the right iliac wing is visualized and was noted on prior abdominal and pelvic CT scan. Hypertrophic changes anterior/superior left iliac wing and left greater trochanter as before. Bilateral Hand 9/27/2010: Overall alignment is anatomic. The bones are demineralized overall. The joint spaces are relatively preserved throughout. Relatively mild degenerative changes are present in the DIP joints, and in the first CMC joints. There appears to be some mild soft tissue swelling overlying the right MCP joints. There are also several equivocal erosions noted involving the metacarpal heads of the right hand which raise the possibility of early erosions related to inflammatory arthritis such as rheumatoid. These changes appear slightly more conspicuous and the 2008 examination. Incidentally, there are fairly prominent vascular calcifications present indicating that the patient is likely diabetic or perhaps as renal disease    Bilateral Knee 8/18/2010:  irregularity along the patella surface where there is a scalloped margin suggesting liner wear. In addition, the cement seen along the bone prosthetic interface of the anterior distal femoral cortex is less apparent, which also may reflect underlying liner wear and possibly early particulate disease.  Heterotopic ossification has not changed in interval. Vascular calcifications reflecting atherosclerotic disease remains severe. Diffuse osteopenia is noted. CT Imaging    CT Right Shoulder with arthrogram 9/26/2011: there is a large full-thickness tear seen involving the supraspinatus tendon extending to the level of the acromion. Additionally there is a partial tear of the subscapularis tendon. The biceps appears chronically torn. There are mild degenerative changes of the acromioclavicular joint. Some mild osteoarthritic changes are present of the glenohumeral joint. No evidence of fracture was seen. MR Imaging    None    DXA     DXA 5/04/2016:  (excluded L4 for spondylitic change, right hip) lumbar spine L1-L3 T score -1.5 (BMD 0.954 g/cm2), left femoral neck T score: -1.0 (0.805 g/cm2), left total hip T score: -0.1 (1.016 g/cm2), and distal one third left radius T score -1.9 (BMD 0.571 g/cm2). FRAX score 6.1 % probability in 10 years for major osteoporotic fracture and 0.3 % 10 year probability of hip fracture. Nuclear Studies    Triple Phase Scan 8/20/0218:  Patient status post bilateral knee replacements. Phase 1 (blood flow):  There is slight increased blood flow to the left knee. Phase 2 (blood pool/soft tissue):  There is slight increased peritracheal activity left knee. Phase 3 (delayed bone): Patient status post right knee replacement which is within normal limits. Patient is status post left knee replacement. There are slight increased activity left tibial plateau    PROCEDURE    Kenalog 80 mg IA. (5/23/19)   Kenalog 80 mg IA. (02/18/19)   Kenalog 80 mg IA. (11/19/18)     ASSESSMENT AND PLAN    This is an acute follow-up visit for Ms. Niki Glover. 1) Seronegative Rheumatoid Arthritis. She has long standing Rheumatoid Arthritis that has involved her shoulders, wrists, hands, knees and ankles. She has had bilateral knee replacement in the past with persistent pain likely due to active/recurrent synovitis in the replaced joint. Bone scan showed vascular flow suggestive of this. She was maintained on methotrexate 20 mg weekly and Luna Li but has not felt much improvement to date. I therefore changed her methotrexate to subcutaneously but she developed injection site skin breakdown followed up mucositis. She was administering it correctly at the correct dose, which is surprising. I instructed her to reverse with folic acid 10 mg. I also prescribed Kenalog dental paste to apply twice daily. I asked her to not resume SubQ methotrexate but to resume oral methotrexate when her oral ulcers resolve. She was approved for Humira so I asked her to contact [facility] for delivery. Her CDAI on her last visit was 51 (previously 53, 61, 63) with 23 tender and 18 swollen joints, with bilateral ankle and foot  involvement, consistent with high disease activity. 2) Long Term Use of Immunosuppressants. The patient remains on immunomodulatory medications (methotrexate, Simponi Aria) and requires frequent toxicity monitoring by blood work. Respective labs were ordered (CBC and CMP). 3) Vitamin D Deficiency. Her vitamin D was 24.1 (previously 28.0, 19.3). She is on weekly ergocalciferol 50,000. I will check her level today. 4) Osteopenia involving Multiple Sites. Her most recent DXA on 5/04/2016 umbar spine L1-L3 T score -1.5 (BMD 0.954 g/cm2) and distal one third left radius T score -1.9 (BMD 0.571 g/cm2). This is likely due to her chronic Rheumatoid Arthritis. She is no longer on Fosamax. 5) MGUS. Her M-spike 0.3 with immunofixation shows IgG monoclonal protein with lambda light chain. I referred her to hematology, Dr. Shanthi Naylor, who noted a low risk for myeloma    6) Elevated AST. This resolved. The patient voiced understanding of the aforementioned assessment and plan. Summary of plan was provided in the After Visit Summary patient instructions.      TODAY'S ORDERS    Orders Placed This Encounter    folic acid (FOLVITE) 1 mg tablet  triamcinolone acetonide (KENALOG) 0.1 % dental paste     Future Appointments   Date Time Provider Dorothy Timmons   8/8/2019  8:30 AM Shayne Lara MD [address]   8/23/2019  8:20 AM Pravin Nelson MD [place], [place], [address] [facility]    Adult Rheumatology   Rheumatology Ultrasound Certified  [address]  [facility], [address] [facility][1]   Phone [phone]  Fax [phone]

## 2019-07-29 NOTE — PATIENT INSTRUCTIONS
Take 10 tablets of the folic acid when you get home and then take 10 more tablets before bedtime. Take 10 tablets tomorrow morning and then tomorrow afternoon. Stop injection methotrexate.     When your oral ulcer heal, you may resume oral methotrexate tables    Call [facility]  pharmacy to get your Humira prescriptions mailed to you @ [phone]

## 2019-08-02 DIAGNOSIS — T45.1X1A METHOTREXATE TOXICITY, ACCIDENTAL OR UNINTENTIONAL, INITIAL ENCOUNTER: ICD-10-CM

## 2019-08-02 RX ORDER — FUROSEMIDE 20 MG/1
TABLET ORAL
Qty: 90 TAB | Refills: 0 | Status: SHIPPED | OUTPATIENT
Start: 2019-08-02 | End: 2020-04-29 | Stop reason: SDUPTHER

## 2019-08-02 RX ORDER — TRIAMCINOLONE ACETONIDE 1 MG/G
PASTE DENTAL
Qty: 5 G | Refills: 0 | Status: SHIPPED | OUTPATIENT
Start: 2019-08-02 | End: 2019-11-22

## 2019-08-15 ENCOUNTER — TELEPHONE (OUTPATIENT)
Dept: RHEUMATOLOGY | Age: 74
End: 2019-08-15

## 2019-08-15 NOTE — TELEPHONE ENCOUNTER
Patient calling due to she was not able to complete the Humira Injection due to it ran down her leg. [address] will be reaching out to our office to have another order placed for this. [address] Phone is [phone].  Alternate patient's friend's phone is [phone]

## 2019-08-15 NOTE — TELEPHONE ENCOUNTER
Spoke with pt and let her know that I spoke with [address] and they said that she needs to call the number on her Humira box and they will fax our office the form to fill out to send her a replacement pen. She stated an understanding.

## 2019-08-19 ENCOUNTER — TELEPHONE (OUTPATIENT)
Dept: RHEUMATOLOGY | Age: 74
End: 2019-08-19

## 2019-08-19 RX ORDER — LEFLUNOMIDE 20 MG/1
20 TABLET ORAL DAILY
Qty: 90 TAB | Refills: 0 | Status: SHIPPED | OUTPATIENT
Start: 2019-08-19 | End: 2019-11-22 | Stop reason: SDUPTHER

## 2019-08-19 NOTE — TELEPHONE ENCOUNTER
Spoke with pt who stated that she was taking the methotrexate tabs 8 per week and she started noticing her mouth getting sore 2 days after taking it. I told her that per Dr. Janessa Mcneill he would like her to stop taking methotrexate and that we will send in a new script for her to take called leflunomide. I explained to her how to take the medication. She stated an understanding and will be in for a visit on Friday.

## 2019-08-23 ENCOUNTER — OFFICE VISIT (OUTPATIENT)
Dept: RHEUMATOLOGY | Age: 74
End: 2019-08-23

## 2019-08-23 VITALS
HEIGHT: 65 IN | HEART RATE: 93 BPM | WEIGHT: 199 LBS | BODY MASS INDEX: 33.15 KG/M2 | DIASTOLIC BLOOD PRESSURE: 67 MMHG | SYSTOLIC BLOOD PRESSURE: 118 MMHG | RESPIRATION RATE: 18 BRPM | TEMPERATURE: 97.8 F

## 2019-08-23 DIAGNOSIS — D47.2 MGUS (MONOCLONAL GAMMOPATHY OF UNKNOWN SIGNIFICANCE): ICD-10-CM

## 2019-08-23 DIAGNOSIS — M06.09 SERONEGATIVE RHEUMATOID ARTHRITIS OF MULTIPLE SITES (HCC): Primary | ICD-10-CM

## 2019-08-23 DIAGNOSIS — Z79.60 LONG-TERM USE OF IMMUNOSUPPRESSANT MEDICATION: ICD-10-CM

## 2019-08-23 DIAGNOSIS — M85.89 OSTEOPENIA OF MULTIPLE SITES: ICD-10-CM

## 2019-08-23 DIAGNOSIS — E55.9 VITAMIN D DEFICIENCY: ICD-10-CM

## 2019-08-23 RX ORDER — NAPROXEN 500 MG/1
500 TABLET ORAL
Qty: 90 TAB | Refills: 2 | Status: SHIPPED | OUTPATIENT
Start: 2019-08-23 | End: 2019-09-13

## 2019-08-23 NOTE — PROGRESS NOTES
Chief Complaint   Patient presents with    Arthritis     1. Have you been to the ER, urgent care clinic since your last visit? Hospitalized since your last visit? No    2. Have you seen or consulted any other health care providers outside of the [address] [place] [address] since your last visit? Include any pap smears or colon screening.  No

## 2019-08-23 NOTE — PROGRESS NOTES
REASON FOR VISIT    This is a follow-up visit for Ms. Mary Park for     ICD-10-CM   1. Seronegative rheumatoid arthritis of multiple sites ([facility] M06.09     Inflammatory arthritis phenotype includes:  Anti-CCP positive: no  Rheumatoid factor positive: no  Erosive disease: no  Extra-articular manifestations include: MGUS    Immunosuppression Screening (8/22/2018): Quantiferon TB: negative  PPD:  Not performed  Hepatitis B: negative  Hepatitis C: negative    Therapy History includes:  Current DMARD therapy includes: leflunomide 20 mg daily (8/19/2019 to present), Humira 40 mg every 14 days (8/03/2019 to present)  Prior DMARD therapy includes: methotrexate 20 mg every Saturday, methotrexate 20 mg SubQ every Saturday (5/23/2019 to 7/29/2019) Coby Almaguer (11/29/2018 to 5/23/2019)  The following DMARDs have been ineffective: Simponi Aria   The following DMARDs were stopped because of side effects: methotrexate 20 mg SubQ (aphthous ulcer, injection site breakdown)  Contra-Indicated DMARDs because of history of diverticulitis: Actemra, Kevzara    Immunization History   Administered Date(s) Administered    Influenza High Dose Vaccine PF 09/07/2018     Patient Active Problem List   Diagnosis Code    Long-term use of immunosuppressant medication Z79.899    Osteopenia of multiple sites M85.89    Vitamin D deficiency E55.9    Rheumatoid arthritis involving multiple sites ([facility] M06.9    Essential hypertension I10    Mixed hyperlipidemia E78.2    RLS (restless legs syndrome) G25.81    Mild intermittent asthma J45.20    Status post angioplasty with stent Z95.820    Coronary artery disease due to lipid rich plaque I25.10, I25.83    Seronegative rheumatoid arthritis of multiple sites ([facility] M06.09    Severe obesity ([facility] E66.01     HISTORY OF PRESENT ILLNESS    Ms. Mary Park returns for a follow-up.     On her last visit, I instructed her to reverse with folic acid 10 mg. I also prescribed Kenalog dental paste to apply twice daily. I asked her to not resume SubQ methotrexate but to resume oral methotrexate when her oral ulcers resolve. She was approved for Humira so I asked her to contact [facility] for delivery. When her ulcers resolved, she resumed oral methotrexate but had recurrence of her mucositis, so I discontinued it and started leflunomide 20 mg daily. She mis-fired her second Humira. Today, she feels better. She no longer has mucositis. She has sore pain in her hands associated with swelling and stiffness lasting hours. She feels better with Humira and leflunomide. She endorses sores in mouth (RESOLVED), ankle swelling in the morning and afternoon. She denies fever, weight loss, blurred vision, vision loss, dry cough, dyspnea, nausea, vomiting, dysphagia, abdominal pain, black or bloody stool, fall since last visit, rash, easy bruising and increased thirst.    Ms. Niki Glover has continued her medications for arthritis and reports good tolerance without significant side effects. Last toxicity monitoring by blood work was done on 7/28/2019 and did not reveal any significant adverse effects, except Hct 32.8%, eGFR >60, albumin 3.3 g/dL    Most recent inflammatory markers from 5/23/2019 revealed a ESR 28 mm/hr (previously 1, 21 mm/hr) and CRP 6.5 mg/L (previously 0.3, 1.3 mg/L). The patient has not had any interval hospital admissions, infections, or surgeries. REVIEW OF SYSTEMS    A comprehensive review of systems was performed and pertinent results are documented in the HPI, review of systems is otherwise non-contributory. PAST MEDICAL HISTORY    She has a past medical history of Arthritis, Asthma, Diabetes ([address] High cholesterol, Hypertension, and Sarcoidosis (1999). She also has no past medical history of Difficult intubation, Malignant hyperthermia due to anesthesia, Nausea & vomiting, Pseudocholinesterase deficiency, or Unspecified adverse effect of anesthesia.     FAMILY HISTORY    Her family history includes Alcohol abuse in her brother; Heart Disease in her mother; Liver Disease in her father. SOCIAL HISTORY    She reports that she has never smoked. She has never used smokeless tobacco. She reports that she drinks about 2.0 standard drinks of alcohol per week. She reports that she does not use drugs. MEDICATIONS    Current Outpatient Medications   Medication Sig Dispense Refill    naproxen (NAPROSYN) 500 mg tablet Take 1 Tab by mouth two (2) times daily as needed for Pain. 90 Tab 2    leflunomide (ARAVA) 20 mg tablet Take 1 Tab by mouth daily. Take half tab daily for 7 days and then one tab daily if tolerated 90 Tab 0    furosemide (LASIX) 20 mg tablet TAKE 1 TABLET BY MOUTH EVERY DAY 90 Tab 0    triamcinolone acetonide (KENALOG) 0.1 % dental paste APPLY WITH Q-TIP TWICE DAILY TO AFFECTED ORAL ULCERS UNTIL THEY HEAL 5 g 0    lidocaine (LIDOCAINE VISCOUS) 2 % solution Take 10 mL by mouth as needed for Pain. 1 Bottle 1    latanoprost (XALATAN) 0.005 % ophthalmic solution ADMINISTER 1 DROP INTO BOTH EYES NIGHTLY 2.5 mL 1    adalimumab (HUMIRA,CF, PEN) 40 mg/0.4 mL pnkt 40 mg by SubCUTAneous route every fourteen (14) days. Indications: rheumatoid arthritis 2 Kit 11    sertraline (ZOLOFT) 50 mg tablet Take 1 Tab by mouth daily. 90 Tab 1    albuterol sulfate (PROVENTIL;VENTOLIN) 2.5 mg/0.5 mL nebu nebulizer solution 0.5 mL by Nebulization route every four (4) hours as needed for Wheezing. Indications: Asthma Attack 30 mL 0    ferrous sulfate (SLOW FE) 47.5 mg iron TbER tablet Take 1 Tab by mouth daily. (Patient taking differently: Take 1 Tab by mouth every seven (7) days.) 90 Tab 1    gabapentin (NEURONTIN) 100 mg capsule Take 1 capsule in the morning and take 3 capsules before bedtime 120 Cap 2    nortriptyline (PAMELOR) 25 mg capsule Take 1 Cap by mouth nightly. 90 Cap 0    ergocalciferol (ERGOCALCIFEROL) 50,000 unit capsule Take 1 Cap by mouth every seven (7) days.  12 Cap 3    magnesium 200 mg tab Take 100 mg by mouth daily.  metFORMIN (GLUCOPHAGE) 500 mg tablet Take 1 Tab by mouth two (2) times daily (with meals). (Patient taking differently: Take 500 mg by mouth daily (with breakfast). ) 60 Tab 5    fluticasone-vilanterol (BREO ELLIPTA) 100-25 mcg/dose inhaler Take 1 Puff by inhalation daily. 1 Inhaler 5    aspirin delayed-release 81 mg tablet Take  by mouth daily.  cyanocobalamin (VITAMIN B-12) 1,000 mcg sublingual tablet Take 1,000 mcg by mouth daily.  metoprolol tartrate (LOPRESSOR) 25 mg tablet Take  by mouth two (2) times a day.  simvastatin (ZOCOR) 20 mg tablet Take  by mouth nightly.  OMEPRAZOLE PO Take 20 mg by mouth daily. ALLERGIES    Allergies   Allergen Reactions    Lisinopril Rash     PHYSICAL EXAMINATION    Visit Vitals  /67   Pulse 93   Temp 97.8 °F (36.6 °C)   Resp 18   Ht 5' 5\" (1.651 m)   Wt 199 lb (90.3 kg)   BMI 33.12 kg/m²     Body mass index is 33.12 kg/m². General: Patient is alert, oriented x 3, not in acute distress    HEENT:   Sclerae are not injected and appear moist.  There is no alopecia. Neck is supple   Oral aphthous on buccal and gingival mucosa (RESOLVED)    Cardiovascular:  Heart is regular rate and rhythm, no murmurs. Chest:  Lungs are clear to auscultation bilaterally. No rhonchi, wheezes, or crackles. Extremities:  Free of clubbing, cyanosis, edema    Neurological exam:  Muscle strength is full in upper and lower extremities     Skin exam:  There are no alopecia, no discoid lesions, no active Raynaud's, no livedo reticularis, no periungual erythema. Multiple injection site skin breakdown on abdomen    Musculoskeletal exam:  A comprehensive musculoskeletal exam was performed for all joints of each upper and lower extremity and assessed for swelling, tenderness and range of motion.  Positive results are documented as below:    Decreased ROM of bilateral shoulders (right worse than left) due to pain  Bilateral knee replacements without effusion.   Bilateral ankle swelling with tenderness    Z-Deformities:   no  [place] Deformities:  no  Boutonierre's Deformities:  no  Ulnar Deviation:   Yes (LEFT:3rd digit)  MCP Subluxation:  no     Joint Count 8/23/2019 5/23/2019 2/18/2019 11/19/2018 8/22/2018   Patient pain (0-100) 40 50 60 90 90   MHAQ 0.75 0.75 0.75 1 0.5   Left shoulder - Tender - - - - 1   Left shoulder - Swollen - - - - 1   Left elbow - Tender - 1 - 1 1   Left elbow - Swollen - 0 - 0 1   Left wrist- Tender 1 1 1 1 -   Left wrist- Swollen 1 1 1 1 1   Left 1st MCP - Tender 1 1 1 1 1   Left 1st MCP - Swollen 1 1 1 1 1   Left 2nd MCP - Tender 1 1 1 1 1   Left 2nd MCP - Swollen 1 1 1 1 1   Left 3rd MCP - Tender 1 1 0 1 1   Left 3rd MCP - Swollen 1 1 1 1 1   Left 4th MCP - Tender 1 1 1 1 1   Left 4th MCP - Swollen 1 0 1 1 1   Left 5th MCP - Tender 1 1 1 - 1   Left 5th MCP - Swollen 0 1 1 - 1   Left thumb IP - Tender 1 1 1 1 1   Left thumb IP - Swollen 1 0 1 1 -   Left 2nd PIP - Tender 1 1 1 1 1   Left 2nd PIP - Swollen 1 1 1 1 1   Left 3rd PIP - Tender 1 1 1 1 1   Left 3rd PIP - Swollen 1 1 1 1 1   Left 4th PIP - Tender 1 1 1 1 1   Left 4th PIP - Swollen 1 1 1 1 1   Left 5th PIP - Tender 1 1 1 1 1   Left 5th PIP - Swollen 1 0 - 1 1   Left knee - Tender - - - 1 -   Left knee - Swollen - - - 1 -   Right shoulder - Tender - - - - 1   Right elbow - Tender - 1 - 1 1   Right elbow - Swollen - 0 - 1 1   Right wrist- Tender 1 1 1 1 1   Right wrist- Swollen 1 1 1 1 1   Right 1st MCP - Tender 1 1 1 1 1   Right 1st MCP - Swollen 1 1 0 1 -   Right 2nd MCP - Tender 1 1 1 1 1   Right 2nd MCP - Swollen 1 1 1 1 1   Right 3rd MCP - Tender 1 1 1 1 1   Right 3rd MCP - Swollen 1 1 1 1 1   Right 4th MCP - Tender 1 1 1 1 1   Right 4th MCP - Swollen 1 1 1 1 1   Right 5th MCP - Tender 1 1 1 1 1   Right 5th MCP - Swollen 1 1 1 1 1   Right thumb IP - Tender 1 - - - 1   Right thumb IP - Swollen 0 - - - -   Right 2nd PIP - Tender 1 1 1 1 1   Right 2nd PIP - Swollen 1 1 1 1 1   Right 3rd PIP - Tender 1 1 1 1 1   Right 3rd PIP - Swollen 1 1 1 1 1   Right 4th PIP - Tender 1 1 1 1 1   Right 4th PIP - Swollen 1 1 1 1 1   Right 5th PIP - Tender 1 1 1 1 1   Right 5th PIP - Swollen 1 1 1 1 1   Right knee - Tender - - 1 - -   Tender Joint Count (Total) 22 23 21 23 25   Swollen Joint Count (Total) 20 18 19 22 22   Physician Assessment (0-10) 4 5 6 7 7   Patient Assessment (0-10) 6.5 5 7 9 9   CDAI Total (calculated) 52.5 51 48 61 61     DATA REVIEW    Laboratory     Recent laboratory results were reviewed, summarized, and discussed with the patient. Imaging    Musculoskeletal Ultrasound    None    Radiographs    Sacrum and Coccyx 9/18/2018: no definite fracture or dislocation, however osteopenia and overlapping bowel gas somewhat limited evaluation of the sacrum. Severe multilevel degenerative disc disease is noted in the lower lumbar spine. Dense arterial vascular calcifications are present. Bilateral Hips 9/18/2018: no acute fracture or dislocation. Evaluation of the sacrum is limited by overlying bowel gas and osteopenia. Within these limitations, no fractures identified. Degenerative changes are present in the lower lumbar spine. Dense arterial vascular calcifications are present. Bilateral Hand 8/22/2018: moderate to severe diffuse osteopenia. Extensive atherosclerotic calcifications are shown extending into the fingers bilaterally. Fusiform soft tissue swelling is shown bilaterally throughout the metacarpophalangeal and proximal interphalangeal joints. There is mild joint space narrowing on the left throughout the metacarpophalangeal joints and on the right in the third through fifth metacarpophalangeal joints. Subtle marginal erosions are demonstrated at the lateral base of the right ring finger proximal phalanx and more extensively in the third through fifth left MCP joints.  There are minimal-mild features of osteoarthritis at several DIP joints bilaterally. No substantial carpal joint space narrowing is shown. Calcium pyrophosphate dihydrate position is in the medial carpus bilaterally. Bilateral Foot 8/22/2018: moderate to severe diffuse osteopenia. Extensive atherosclerotic calcifications are shown extending into the digits. Bilateral talonavicular, naviculocuneiform and left greater than right diffuse tarsometatarsal joint space narrowing is noted with small marginal osteophytes. No substantial digital joint space narrowing is evident though fusiform soft tissue swelling is suggested at the metatarsophalangeal joints bilaterally. Moderate bilateral plantar and dorsal calcaneal spurs are shown. Bilateral Shoulder 9/08/2011: RIGHT: no evidence of fracture or dislocation. Alignment of the glenohumeral joint is anatomic. The patient is status post distal clavicle resection and acromioplasty. A small amount of heterotopic ossification is noted about the distal aspect of the clavicle. Mild marginal osteophytes noted at the the glenoid. Irregularity along the superolateral aspect of the humeral head may reflect rotator cuff pathology. An ovoid well-corticated calcific density projecting over the humeral head may represent an intraventricular loose body. The visualized right lung is clear. Soft tissues are unremarkable. LEFT: no evidence of fracture or dislocation. Alignment of the glenohumeral and acromioclavicular joints is anatomic. Moderate superiorly projecting osteophytes emanate from the distal clavicle at the acromioclavicular joint. Bony hypertrophy irregularity about the greater tuberosity may indicate chronic rotator cuff pathology. There is a small subacromial spur. The visualized left lung is clear. Multiple mediastinal clips are noted. Bone mineralization is mildly diminished. Right Hip 8/03/2011: there is diffuse osteopenia. Degenerative changes noted in the lower lumbar spine. The SI joints and pubic symphysis are not widened.  The right and left hip are normally aligned. The right hip demonstrates mild osteophyte formation most notable at the inferior aspect of the joint. There is minimal diffuse joint space narrowing. Similar changes are noted in the left hip with minimal osteophyte formation. There is preservation of the left hip joint space. No acute fractures. Dense vascular calcifications are noted. Soft tissue calcification lateral to the right iliac wing is visualized and was noted on prior abdominal and pelvic CT scan. Hypertrophic changes anterior/superior left iliac wing and left greater trochanter as before. Bilateral Hand 9/27/2010: Overall alignment is anatomic. The bones are demineralized overall. The joint spaces are relatively preserved throughout. Relatively mild degenerative changes are present in the DIP joints, and in the first CMC joints. There appears to be some mild soft tissue swelling overlying the right MCP joints. There are also several equivocal erosions noted involving the metacarpal heads of the right hand which raise the possibility of early erosions related to inflammatory arthritis such as rheumatoid. These changes appear slightly more conspicuous and the 2008 examination. Incidentally, there are fairly prominent vascular calcifications present indicating that the patient is likely diabetic or perhaps as renal disease    Bilateral Knee 8/18/2010:  irregularity along the patella surface where there is a scalloped margin suggesting liner wear. In addition, the cement seen along the bone prosthetic interface of the anterior distal femoral cortex is less apparent, which also may reflect underlying liner wear and possibly early particulate disease. Heterotopic ossification has not changed in interval. Vascular calcifications reflecting atherosclerotic disease remains severe. Diffuse osteopenia is noted.     CT Imaging    CT Right Shoulder with arthrogram 9/26/2011: there is a large full-thickness tear seen involving the supraspinatus tendon extending to the level of the acromion. Additionally there is a partial tear of the subscapularis tendon. The biceps appears chronically torn. There are mild degenerative changes of the acromioclavicular joint. Some mild osteoarthritic changes are present of the glenohumeral joint. No evidence of fracture was seen. MR Imaging    None    DXA     DXA 5/04/2016:  (excluded L4 for spondylitic change, right hip) lumbar spine L1-L3 T score -1.5 (BMD 0.954 g/cm2), left femoral neck T score: -1.0 (0.805 g/cm2), left total hip T score: -0.1 (1.016 g/cm2), and distal one third left radius T score -1.9 (BMD 0.571 g/cm2). FRAX score 6.1 % probability in 10 years for major osteoporotic fracture and 0.3 % 10 year probability of hip fracture. Nuclear Studies    Triple Phase Scan 8/20/0218:  Patient status post bilateral knee replacements. Phase 1 (blood flow):  There is slight increased blood flow to the left knee. Phase 2 (blood pool/soft tissue):  There is slight increased peritracheal activity left knee. Phase 3 (delayed bone): Patient status post right knee replacement which is within normal limits. Patient is status post left knee replacement. There are slight increased activity left tibial plateau    PROCEDURE    Kenalog 80 mg IA. (5/23/19)   Kenalog 80 mg IA. (02/18/19)   Kenalog 80 mg IA. (11/19/18)     ASSESSMENT AND PLAN    This is a follow-up visit for Ms. Aubrey Parnell. 1) Seronegative Rheumatoid Arthritis. She did not tolerate methotrexate due to oral ulcers. She recently started leflunomide 20 mg daily and Humira every 14 days which she has taken with good tolerance for less than a month and has started to feel better, but continues to have active disease. Her CDAI on her last visit was 52.5 (previously 51, 53, 61, 63) with 22 tender and 20 swollen joints, with bilateral ankle and foot  involvement, consistent with high disease activity. I will continue treatment.  Labs today. 2) Long Term Use of Immunosuppressants. The patient remains on immunomodulatory medications (leflunomide, Humira) and requires frequent toxicity monitoring by blood work. Respective labs were ordered (CBC and CMP). 3) Vitamin D Deficiency. Her vitamin D was 24.1 (previously 28.0, 19.3). She is on weekly ergocalciferol 50,000. I will check her level today. 4) Osteopenia involving Multiple Sites. Her most recent DXA on 5/04/2016 umbar spine L1-L3 T score -1.5 (BMD 0.954 g/cm2) and distal one third left radius T score -1.9 (BMD 0.571 g/cm2). This is likely due to her chronic Rheumatoid Arthritis. She is no longer on Fosamax. 5) MGUS. Her M-spike 0.3 with immunofixation shows IgG monoclonal protein with lambda light chain. I referred her to hematology, Dr. Mariann Marie, who noted a low risk for myeloma. i will repeat her SPEP. 6) Elevated AST. This resolved. The patient voiced understanding of the aforementioned assessment and plan. Summary of plan was provided in the After Visit Summary patient instructions.      TODAY'S ORDERS    Orders Placed This Encounter    QUANTIFERON-TB GOLD PLUS    CBC WITH AUTOMATED DIFF    METABOLIC PANEL, COMPREHENSIVE    C REACTIVE PROTEIN, QT    SED RATE (ESR)    CHRONIC HEPATITIS PANEL    METHOTREXATE POLYGLUTAMATES    PROTEIN ELECTROPHORESIS W/ REFLX TIANNA    naproxen (NAPROSYN) 500 mg tablet     Future Appointments   Date Time Provider Dorothy Timmons   11/22/2019  8:20 AM Tommy Nelson MD [place], MD, [address] [facility]    Adult Rheumatology   Rheumatology Ultrasound Certified  [facility]  A Part of [facility], [address]   Phone [phone]  Fax [phone]

## 2019-08-23 NOTE — LETTER
8/23/19 Patient: Caleb Zuñiga YOB: 1945 Date of Visit: 8/23/2019 Aleks Bowman MD 
 [employer] [place] [id number] VIA In Basket Dear Aleks Bowman MD, Thank you for referring Ms. Caleb Zuñiga to [address] [place] [address] for evaluation. My notes for this consultation are attached. If you have questions, please do not hesitate to call me. I look forward to following your patient along with you.  
 
 
Sincerely, 
 
Cameron Pedro MD

## 2019-09-04 ENCOUNTER — APPOINTMENT (OUTPATIENT)
Dept: CT IMAGING | Age: 74
End: 2019-09-04
Attending: EMERGENCY MEDICINE
Payer: MEDICARE

## 2019-09-04 ENCOUNTER — HOSPITAL ENCOUNTER (EMERGENCY)
Age: 74
Discharge: HOME | End: 2019-09-04
Attending: EMERGENCY MEDICINE
Payer: MEDICARE

## 2019-09-04 ENCOUNTER — APPOINTMENT (OUTPATIENT)
Dept: GENERAL RADIOLOGY | Age: 74
End: 2019-09-04
Attending: EMERGENCY MEDICINE
Payer: MEDICARE

## 2019-09-04 VITALS
SYSTOLIC BLOOD PRESSURE: 170 MMHG | RESPIRATION RATE: 18 BRPM | WEIGHT: 190 LBS | DIASTOLIC BLOOD PRESSURE: 63 MMHG | HEIGHT: 64 IN | HEART RATE: 88 BPM | TEMPERATURE: 97.6 F | BODY MASS INDEX: 32.44 KG/M2 | OXYGEN SATURATION: 100 %
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
## 2019-09-04 DIAGNOSIS — S30.1XXA CONTUSION OF ABDOMINAL WALL, INITIAL ENCOUNTER: Primary | ICD-10-CM

## 2019-09-04 LAB
ALBUMIN SERPL-MCNC: 3.7 G/DL (ref 3.5–5)
ALBUMIN/GLOB SERPL: 0.9 {RATIO} (ref 1.1–2.2)
ALP SERPL-CCNC: 75 U/L (ref 45–117)
ALT SERPL-CCNC: 20 U/L (ref 12–78)
ANION GAP SERPL CALC-SCNC: 9 MMOL/L (ref 5–15)
APPEARANCE UR: ABNORMAL
AST SERPL-CCNC: 43 U/L (ref 15–37)
BACTERIA URNS QL MICRO: NEGATIVE /HPF
BASOPHILS # BLD: 0.1 K/UL (ref 0–0.1)
BASOPHILS NFR BLD: 1 % (ref 0–1)
BILIRUB SERPL-MCNC: 1 MG/DL (ref 0.2–1)
BILIRUB UR QL: NEGATIVE
BUN SERPL-MCNC: 18 MG/DL (ref 6–20)
BUN/CREAT SERPL: 21 (ref 12–20)
CALCIUM SERPL-MCNC: 8.2 MG/DL (ref 8.5–10.1)
CHLORIDE SERPL-SCNC: 104 MMOL/L (ref 97–108)
CO2 SERPL-SCNC: 26 MMOL/L (ref 21–32)
COLOR UR: ABNORMAL
CREAT SERPL-MCNC: 0.86 MG/DL (ref 0.55–1.02)
DIFFERENTIAL METHOD BLD: ABNORMAL
EOSINOPHIL # BLD: 0.1 K/UL (ref 0–0.4)
EOSINOPHIL NFR BLD: 2 % (ref 0–7)
EPITH CASTS URNS QL MICRO: ABNORMAL /LPF
ERYTHROCYTE [DISTWIDTH] IN BLOOD BY AUTOMATED COUNT: 13.2 % (ref 11.5–14.5)
GLOBULIN SER CALC-MCNC: 4.1 G/DL (ref 2–4)
GLUCOSE SERPL-MCNC: 101 MG/DL (ref 65–100)
GLUCOSE UR STRIP.AUTO-MCNC: NEGATIVE MG/DL
HCT VFR BLD AUTO: 37.4 % (ref 35–47)
HGB BLD-MCNC: 11.8 G/DL (ref 11.5–16)
HGB UR QL STRIP: NEGATIVE
IMM GRANULOCYTES # BLD AUTO: 0 K/UL (ref 0–0.04)
IMM GRANULOCYTES NFR BLD AUTO: 1 % (ref 0–0.5)
KETONES UR QL STRIP.AUTO: NEGATIVE MG/DL
LEUKOCYTE ESTERASE UR QL STRIP.AUTO: ABNORMAL
LIPASE SERPL-CCNC: 256 U/L (ref 73–393)
LYMPHOCYTES # BLD: 2 K/UL (ref 0.8–3.5)
LYMPHOCYTES NFR BLD: 32 % (ref 12–49)
MCH RBC QN AUTO: 32 PG (ref 26–34)
MCHC RBC AUTO-ENTMCNC: 31.6 G/DL (ref 30–36.5)
MCV RBC AUTO: 101.4 FL (ref 80–99)
MONOCYTES # BLD: 0.7 K/UL (ref 0–1)
MONOCYTES NFR BLD: 10 % (ref 5–13)
NEUTS SEG # BLD: 3.5 K/UL (ref 1.8–8)
NEUTS SEG NFR BLD: 54 % (ref 32–75)
NITRITE UR QL STRIP.AUTO: NEGATIVE
NRBC # BLD: 0 K/UL (ref 0–0.01)
NRBC BLD-RTO: 0 PER 100 WBC
PH UR STRIP: 5.5 [PH] (ref 5–8)
PLATELET # BLD AUTO: 220 K/UL (ref 150–400)
PMV BLD AUTO: 9.9 FL (ref 8.9–12.9)
POTASSIUM SERPL-SCNC: 4.8 MMOL/L (ref 3.5–5.1)
PROT SERPL-MCNC: 7.8 G/DL (ref 6.4–8.2)
PROT UR STRIP-MCNC: NEGATIVE MG/DL
RBC # BLD AUTO: 3.69 M/UL (ref 3.8–5.2)
RBC #/AREA URNS HPF: ABNORMAL /HPF (ref 0–5)
SODIUM SERPL-SCNC: 139 MMOL/L (ref 136–145)
SP GR UR REFRACTOMETRY: 1.01 (ref 1–1.03)
TROPONIN I SERPL-MCNC: <0.05 NG/ML
UA: UC IF INDICATED,UAUC: ABNORMAL
UROBILINOGEN UR QL STRIP.AUTO: 0.2 EU/DL (ref 0.2–1)
WBC # BLD AUTO: 6.4 K/UL (ref 3.6–11)
WBC URNS QL MICRO: ABNORMAL /HPF (ref 0–4)

## 2019-09-04 PROCEDURE — 83690 ASSAY OF LIPASE: CPT

## 2019-09-04 PROCEDURE — 74011636320 HC RX REV CODE- 636/320: Performed by: EMERGENCY MEDICINE

## 2019-09-04 PROCEDURE — 84484 ASSAY OF TROPONIN QUANT: CPT

## 2019-09-04 PROCEDURE — 85025 COMPLETE CBC W/AUTO DIFF WBC: CPT

## 2019-09-04 PROCEDURE — 81001 URINALYSIS AUTO W/SCOPE: CPT

## 2019-09-04 PROCEDURE — 74011250636 HC RX REV CODE- 250/636: Performed by: EMERGENCY MEDICINE

## 2019-09-04 PROCEDURE — 74177 CT ABD & PELVIS W/CONTRAST: CPT

## 2019-09-04 PROCEDURE — 80053 COMPREHEN METABOLIC PANEL: CPT

## 2019-09-04 PROCEDURE — 36415 COLL VENOUS BLD VENIPUNCTURE: CPT

## 2019-09-04 PROCEDURE — 93005 ELECTROCARDIOGRAM TRACING: CPT

## 2019-09-04 PROCEDURE — 71111 X-RAY EXAM RIBS/CHEST4/> VWS: CPT

## 2019-09-04 PROCEDURE — 99284 EMERGENCY DEPT VISIT MOD MDM: CPT

## 2019-09-04 PROCEDURE — 74011250637 HC RX REV CODE- 250/637: Performed by: EMERGENCY MEDICINE

## 2019-09-04 RX ORDER — SODIUM CHLORIDE 0.9 % (FLUSH) 0.9 %
10 SYRINGE (ML) INJECTION
Status: COMPLETED | OUTPATIENT
Start: 2019-09-04 | End: 2019-09-04

## 2019-09-04 RX ORDER — SODIUM CHLORIDE 9 MG/ML
100 INJECTION, SOLUTION INTRAVENOUS CONTINUOUS
Status: DISCONTINUED | OUTPATIENT
Start: 2019-09-04 | End: 2019-09-04 | Stop reason: HOSPADM

## 2019-09-04 RX ORDER — TRAMADOL HYDROCHLORIDE 50 MG/1
50 TABLET ORAL
Status: COMPLETED | OUTPATIENT
Start: 2019-09-04 | End: 2019-09-04

## 2019-09-04 RX ORDER — SODIUM CHLORIDE 0.9 % (FLUSH) 0.9 %
5-40 SYRINGE (ML) INJECTION EVERY 8 HOURS
Status: DISCONTINUED | OUTPATIENT
Start: 2019-09-04 | End: 2019-09-04 | Stop reason: HOSPADM

## 2019-09-04 RX ORDER — TRAMADOL HYDROCHLORIDE 50 MG/1
50 TABLET ORAL
Qty: 20 TAB | Refills: 0 | Status: SHIPPED | OUTPATIENT
Start: 2019-09-04 | End: 2019-09-14

## 2019-09-04 RX ADMIN — SODIUM CHLORIDE 100 ML/HR: 900 INJECTION, SOLUTION INTRAVENOUS at 12:12

## 2019-09-04 RX ADMIN — Medication 10 ML: at 12:38

## 2019-09-04 RX ADMIN — TRAMADOL HYDROCHLORIDE 50 MG: 50 TABLET, FILM COATED ORAL at 13:29

## 2019-09-04 RX ADMIN — IOPAMIDOL 100 ML: 755 INJECTION, SOLUTION INTRAVENOUS at 12:37

## 2019-09-04 NOTE — DISCHARGE INSTRUCTIONS
Patient Education        Preventing Falls: Care Instructions  Your Care Instructions    Getting around your home safely can be a challenge if you have injuries or health problems that make it easy for you to fall. Loose rugs and furniture in walkways are among the dangers for many older people who have problems walking or who have poor eyesight. People who have conditions such as arthritis, osteoporosis, or dementia also have to be careful not to fall. You can make your home safer with a few simple measures. Follow-up care is a key part of your treatment and safety. Be sure to make and go to all appointments, and call your doctor if you are having problems. It's also a good idea to know your test results and keep a list of the medicines you take. How can you care for yourself at home? Taking care of yourself  · You may get dizzy if you do not drink enough water. To prevent dehydration, drink plenty of fluids, enough so that your urine is light yellow or clear like water. Choose water and other caffeine-free clear liquids. If you have kidney, heart, or liver disease and have to limit fluids, talk with your doctor before you increase the amount of fluids you drink. · Exercise regularly to improve your strength, muscle tone, and balance. Walk if you can. Swimming may be a good choice if you cannot walk easily. · Have your vision and hearing checked each year or any time you notice a change. If you have trouble seeing and hearing, you might not be able to avoid objects and could lose your balance. · Know the side effects of the medicines you take. Ask your doctor or pharmacist whether the medicines you take can affect your balance. Sleeping pills or sedatives can affect your balance. · Limit the amount of alcohol you drink. Alcohol can impair your balance and other senses. · Ask your doctor whether calluses or corns on your feet need to be removed.  If you wear loose-fitting shoes because of calluses or corns, you can lose your balance and fall. · Talk to your doctor if you have numbness in your feet. Preventing falls at home  · Remove raised doorway thresholds, throw rugs, and clutter. Repair loose carpet or raised areas in the floor. · Move furniture and electrical cords to keep them out of walking paths. · Use nonskid floor wax, and wipe up spills right away, especially on ceramic tile floors. · If you use a walker or cane, put rubber tips on it. If you use crutches, clean the bottoms of them regularly with an abrasive pad, such as steel wool. · Keep your house well lit, especially [place], and outside walkways. Use night-lights in areas such as hallways and bathrooms. Add extra light switches or use remote switches (such as switches that go on or off when you clap your hands) to make it easier to turn lights on if you have to get up during the night. · Install sturdy handrails on stairways. · Move items in your cabinets so that the things you use a lot are on the lower shelves (about waist level). · Keep a cordless phone and a flashlight with new batteries by your bed. If possible, put a phone in each of the main rooms of your house, or carry a cell phone in case you fall and cannot reach a phone. Or, you can wear a device around your neck or wrist. You push a button that sends a signal for help. · Wear low-heeled shoes that fit well and give your feet good support. Use footwear with nonskid soles. Check the heels and soles of your shoes for wear. Repair or replace worn heels or soles. · Do not wear socks without shoes on wood floors. · Walk on the grass when the sidewalks are slippery. If you live in an area that gets snow and ice in the winter, sprinkle salt on slippery steps and sidewalks. Preventing falls in the bath  · Install grab bars and nonskid mats inside and outside your shower or tub and near the toilet and sinks. · Use shower chairs and bath benches.   · Use a hand-held shower head that will allow you to sit while showering. · Get into a tub or shower by putting the weaker leg in first. Get out of a tub or shower with your strong side first.  · Repair loose toilet seats and consider installing a raised toilet seat to make getting on and off the toilet easier. · Keep your bathroom door unlocked while you are in the shower. Where can you learn more? Go to http://annette-evelin.info/. Enter [id number] [id number] [phone] in the search box to learn more about \"Preventing Falls: Care Instructions. \"  Current as of: March 16, 2018  Content Version: 11.8  © 7723-3514 [employer], [employer]. Care instructions adapted under license by [employer] (which disclaims liability or warranty for this information). If you have questions about a medical condition or this instruction, always ask your healthcare professional. [address] any warranty or liability for your use of this information.

## 2019-09-04 NOTE — ED PROVIDER NOTES
EMERGENCY DEPARTMENT HISTORY AND PHYSICAL EXAM      Date: 9/4/2019  Patient Name: Danielle Hardy    History of Presenting Illness     Chief Complaint   Patient presents with    Fall       History Provided By: Patient    HPI: Danielle Hardy, 72 y.o. female with PMHx significant for DM, HTN, RA, asthma, presents ambulatory to the ED for evaluation of worsening abd pain s/p fall at 6 PM yesterday. She states she was walking to her kitchen, with bags in her hands, and tripped, falling forward onto tile floor. She denies any dizziness or CP prior to fall. She states her lip was bleeding after incident. Denies LOC. She reports exacerbation in abd pain with palpation. She reports hx of CAD with stent placement. Pt specifically denies any NVD, headaches, CP, SOB. There are no other complaints, changes, or physical findings at this time. PCP: Paulette Cisse MD    No current facility-administered medications on file prior to encounter. Current Outpatient Medications on File Prior to Encounter   Medication Sig Dispense Refill    naproxen (NAPROSYN) 500 mg tablet Take 1 Tab by mouth two (2) times daily as needed for Pain. 90 Tab 2    leflunomide (ARAVA) 20 mg tablet Take 1 Tab by mouth daily. Take half tab daily for 7 days and then one tab daily if tolerated 90 Tab 0    furosemide (LASIX) 20 mg tablet TAKE 1 TABLET BY MOUTH EVERY DAY 90 Tab 0    latanoprost (XALATAN) 0.005 % ophthalmic solution ADMINISTER 1 DROP INTO BOTH EYES NIGHTLY 2.5 mL 1    adalimumab (HUMIRA,CF, PEN) 40 mg/0.4 mL pnkt 40 mg by SubCUTAneous route every fourteen (14) days. Indications: rheumatoid arthritis 2 Kit 11    sertraline (ZOLOFT) 50 mg tablet Take 1 Tab by mouth daily. 90 Tab 1    albuterol sulfate (PROVENTIL;VENTOLIN) 2.5 mg/0.5 mL nebu nebulizer solution 0.5 mL by Nebulization route every four (4) hours as needed for Wheezing.  Indications: Asthma Attack 30 mL 0    ferrous sulfate (SLOW FE) 47.5 mg iron TbER tablet Take 1 Tab by mouth daily. (Patient taking differently: Take 1 Tab by mouth every seven (7) days.) 90 Tab 1    gabapentin (NEURONTIN) 100 mg capsule Take 1 capsule in the morning and take 3 capsules before bedtime 120 Cap 2    nortriptyline (PAMELOR) 25 mg capsule Take 1 Cap by mouth nightly. 90 Cap 0    ergocalciferol (ERGOCALCIFEROL) 50,000 unit capsule Take 1 Cap by mouth every seven (7) days. 12 Cap 3    magnesium 200 mg tab Take 100 mg by mouth daily.  metFORMIN (GLUCOPHAGE) 500 mg tablet Take 1 Tab by mouth two (2) times daily (with meals). (Patient taking differently: Take 500 mg by mouth daily (with breakfast). ) 60 Tab 5    fluticasone-vilanterol (BREO ELLIPTA) 100-25 mcg/dose inhaler Take 1 Puff by inhalation daily. 1 Inhaler 5    aspirin delayed-release 81 mg tablet Take  by mouth daily.  cyanocobalamin (VITAMIN B-12) 1,000 mcg sublingual tablet Take 1,000 mcg by mouth daily.  metoprolol tartrate (LOPRESSOR) 25 mg tablet Take  by mouth two (2) times a day.  simvastatin (ZOCOR) 20 mg tablet Take  by mouth nightly.       triamcinolone acetonide (KENALOG) 0.1 % dental paste APPLY WITH Q-TIP TWICE DAILY TO AFFECTED ORAL ULCERS UNTIL THEY HEAL 5 g 0       Past History     Past Medical History:  Past Medical History:   Diagnosis Date    Arthritis     Asthma     Diabetes ([facility] [address]     High cholesterol     Hypertension     Sarcoidosis 1999    [facility]       Past Surgical History:  Past Surgical History:   Procedure Laterality Date    CARDIAC SURG PROCEDURE UNLIST      HX GASTRIC BYPASS      HX ORTHOPAEDIC Bilateral     knee replacement       Family History:  Family History   Problem Relation Age of Onset    Heart Disease Mother     Liver Disease Father     Alcohol abuse Brother     Dementia Neg Hx     Cancer Neg Hx     Seizures Neg Hx        Social History:  Social History     Tobacco Use    Smoking status: Never Smoker    Smokeless tobacco: Never Used   Substance Use Topics    Alcohol use: Yes     Alcohol/week: 2.0 standard drinks     Types: 1 Glasses of wine, 1 Shots of liquor per week     Comment: 2-3 yearly    Drug use: No       Allergies: Allergies   Allergen Reactions    Lisinopril Rash    Methotrexate Hives         Review of Systems   Review of Systems   Constitutional: Negative for fever. HENT: Negative for sore throat. Eyes: Negative for photophobia and redness. Respiratory: Negative for shortness of breath and wheezing. Cardiovascular: Negative for chest pain and leg swelling. Gastrointestinal: Positive for abdominal pain. Negative for blood in stool, nausea and vomiting. Genitourinary: Negative for difficulty urinating, dysuria, hematuria, menstrual problem and vaginal bleeding. Musculoskeletal: Negative for back pain and joint swelling. Neurological: Negative for dizziness, seizures, syncope, speech difficulty, weakness, numbness and headaches. Hematological: Negative for adenopathy. Psychiatric/Behavioral: Negative for agitation, confusion and suicidal ideas. The patient is not nervous/anxious. Physical Exam   Physical Exam   Constitutional: She is oriented to person, place, and time. She appears well-developed and well-nourished. No distress. HENT:   Head: Normocephalic and atraumatic. Mouth/Throat: Oropharynx is clear and moist. No oropharyngeal exudate. Eyes: Pupils are equal, round, and reactive to light. Conjunctivae and EOM are normal. Left eye exhibits no discharge. Neck: Normal range of motion. Neck supple. No JVD present. Cardiovascular: Normal rate, regular rhythm, normal heart sounds and intact distal pulses. Pulmonary/Chest: Effort normal and breath sounds normal. No respiratory distress. She has no wheezes. Abdominal: Soft. Bowel sounds are normal. She exhibits no distension. There is tenderness in the epigastric area. There is no rebound and no guarding. Musculoskeletal: Normal range of motion.  She exhibits no edema or tenderness. Lymphadenopathy:     She has no cervical adenopathy. Neurological: She is alert and oriented to person, place, and time. She has normal reflexes. No cranial nerve deficit. Skin: Skin is warm and dry. No rash noted. Psychiatric: She has a normal mood and affect. Her behavior is normal.   Nursing note and vitals reviewed. Diagnostic Study Results     Labs -     Recent Results (from the past 12 hour(s))   EKG, 12 LEAD, INITIAL    Collection Time: 09/04/19 10:57 AM   Result Value Ref Range    Ventricular Rate 77 BPM    Atrial Rate 77 BPM    P-R Interval 164 ms    QRS Duration 90 ms    Q-T Interval 430 ms    QTC Calculation (Bezet) 486 ms    Calculated P Axis 73 degrees    Calculated R Axis 53 degrees    Calculated T Axis 91 degrees    Diagnosis       Normal sinus rhythm  Normal ECG  No previous ECGs available     CBC WITH AUTOMATED DIFF    Collection Time: 09/04/19 11:14 AM   Result Value Ref Range    WBC 6.4 3.6 - 11.0 K/uL    RBC 3.69 (L) 3.80 - 5.20 M/uL    HGB 11.8 11.5 - 16.0 g/dL    HCT 37.4 35.0 - 47.0 %    .4 (H) 80.0 - 99.0 FL    MCH 32.0 26.0 - 34.0 PG    MCHC 31.6 30.0 - 36.5 g/dL    RDW 13.2 11.5 - 14.5 %    PLATELET 095 743 - 975 K/uL    MPV 9.9 8.9 - 12.9 FL    NRBC 0.0 0  WBC    ABSOLUTE NRBC 0.00 0.00 - 0.01 K/uL    NEUTROPHILS 54 32 - 75 %    LYMPHOCYTES 32 12 - 49 %    MONOCYTES 10 5 - 13 %    EOSINOPHILS 2 0 - 7 %    BASOPHILS 1 0 - 1 %    IMMATURE GRANULOCYTES 1 (H) 0.0 - 0.5 %    ABS. NEUTROPHILS 3.5 1.8 - 8.0 K/UL    ABS. LYMPHOCYTES 2.0 0.8 - 3.5 K/UL    ABS. MONOCYTES 0.7 0.0 - 1.0 K/UL    ABS. EOSINOPHILS 0.1 0.0 - 0.4 K/UL    ABS. BASOPHILS 0.1 0.0 - 0.1 K/UL    ABS. IMM.  GRANS. 0.0 0.00 - 0.04 K/UL    DF AUTOMATED     METABOLIC PANEL, COMPREHENSIVE    Collection Time: 09/04/19 11:14 AM   Result Value Ref Range    Sodium 139 136 - 145 mmol/L    Potassium 4.8 3.5 - 5.1 mmol/L    Chloride 104 97 - 108 mmol/L    CO2 26 21 - 32 mmol/L    Anion gap 9 5 - 15 mmol/L    Glucose 101 (H) 65 - 100 mg/dL    BUN 18 6 - 20 MG/DL    Creatinine 0.86 0.55 - 1.02 MG/DL    BUN/Creatinine ratio 21 (H) 12 - 20      GFR est AA >60 >60 ml/min/1.73m2    GFR est non-AA >60 >60 ml/min/1.73m2    Calcium 8.2 (L) 8.5 - 10.1 MG/DL    Bilirubin, total 1.0 0.2 - 1.0 MG/DL    ALT (SGPT) 20 12 - 78 U/L    AST (SGOT) 43 (H) 15 - 37 U/L    Alk. phosphatase 75 45 - 117 U/L    Protein, total 7.8 6.4 - 8.2 g/dL    Albumin 3.7 3.5 - 5.0 g/dL    Globulin 4.1 (H) 2.0 - 4.0 g/dL    A-G Ratio 0.9 (L) 1.1 - 2.2     LIPASE    Collection Time: 09/04/19 11:14 AM   Result Value Ref Range    Lipase 256 73 - 393 U/L   TROPONIN I    Collection Time: 09/04/19 11:14 AM   Result Value Ref Range    Troponin-I, Qt. <0.05 <0.05 ng/mL   URINALYSIS W/ REFLEX CULTURE    Collection Time: 09/04/19 12:16 PM   Result Value Ref Range    Color YELLOW/STRAW      Appearance CLOUDY (A) CLEAR      Specific gravity 1.010 1.003 - 1.030      pH (UA) 5.5 5.0 - 8.0      Protein NEGATIVE  NEG mg/dL    Glucose NEGATIVE  NEG mg/dL    Ketone NEGATIVE  NEG mg/dL    Bilirubin NEGATIVE  NEG      Blood NEGATIVE  NEG      Urobilinogen 0.2 0.2 - 1.0 EU/dL    Nitrites NEGATIVE  NEG      Leukocyte Esterase SMALL (A) NEG      WBC 0-4 0 - 4 /hpf    RBC 0-5 0 - 5 /hpf    Epithelial cells FEW FEW /lpf    Bacteria NEGATIVE  NEG /hpf    UA:UC IF INDICATED CULTURE NOT INDICATED BY UA RESULT CNI         Radiologic Studies -   CT ABD PELV W CONT   Final Result   IMPRESSION:   Hiatal hernia    Gastritis   Micronodular changes in the right lower lobe. 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 XR RIBS BI W PA CHEST 4 VS   Final Result   IMPRESSION:  No definite rib fracture identified. If symptoms persist follow-up   study is recommended.                  CT Results  (Last 48 hours)               09/04/19 1238  CT ABD PELV W CONT Final result    Impression:  IMPRESSION:   Hiatal hernia    Gastritis   Micronodular changes in the right lower lobe. 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 Narrative:  EXAM: CT ABD PELV W CONT       INDICATION: Mid abdominal pain after fall       COMPARISON: None        CONTRAST: 100 mL of Isovue-370. TECHNIQUE:    Following the uneventful intravenous administration of contrast, thin axial   images were obtained through the abdomen and pelvis. Coronal and sagittal   reconstructions were generated. Oral contrast was not administered. CT dose   reduction was achieved through use of a standardized protocol tailored for this   examination and automatic exposure control for dose modulation. FINDINGS:    LUNG BASES: Micronodular changes in the right lower lobe   INCIDENTALLY IMAGED HEART AND MEDIASTINUM: Hiatal hernia. Clips associated with   the GE junction. LIVER: No mass or biliary dilatation. GALLBLADDER: Surgically absent   SPLEEN: No mass. PANCREAS: No mass or ductal dilatation. ADRENALS: Unremarkable. KIDNEYS: No mass, calculus, or hydronephrosis. Left mid renal 10 mm hypodensity   with density measurement of 19 HU. STOMACH: Mild diffuse thickening of the pylorus (series 3, image 24 and coronal   image 36. Cisneros Challenger SMALL BOWEL: No dilatation or wall thickening. COLON: No dilatation or wall thickening. Anastomosis in the left lower quadrant. APPENDIX: Unremarkable. PERITONEUM: No ascites or pneumoperitoneum. Heavy calcification of the SMA. RETROPERITONEUM: No lymphadenopathy or aortic aneurysm. REPRODUCTIVE ORGANS: Few calcifications in the uterus   URINARY BLADDER: No mass or calculus. BONES: No destructive bone lesion.  Disc desiccation at the L4-5 level. Asymmetric bridging of the right L5-S1 level. Anterolisthesis of L4 relative to   L5 by 8 mm. Erosive changes at L4-5. ADDITIONAL COMMENTS: N/A               CXR Results  (Last 48 hours)               09/04/19 1133  XR RIBS BI W PA CHEST 4 VS Final result    Impression:  IMPRESSION:  No definite rib fracture identified. If symptoms persist follow-up   study is recommended. Narrative:  EXAM:  XR RIBS BI W PA CHEST 4 VS       INDICATION:   fall       COMPARISON: None. FINDINGS: A frontal radiograph of the chest and 3 oblique views of the both ribs   demonstrate no fracture. There is no pneumothorax or pleural effusion. Medical Decision Making   I am the first provider for this patient. I reviewed the vital signs, available nursing notes, past medical history, past surgical history, family history and social history. Vital Signs-Reviewed the patient's vital signs. Patient Vitals for the past 12 hrs:   Temp Pulse Resp BP SpO2   09/04/19 1238 -- 88 -- 170/63 100 %   09/04/19 1035 97.6 °F (36.4 °C) (!) 108 18 157/77 100 %       Pulse Oximetry Analysis - 100% on RA    Cardiac Monitor:   Rate: 77 bpm    EKG interpretation: (Preliminary) 1057  Rhythm: normal sinus rhythm; and regular . Rate (approx.): 77; Axis: normal; DC interval: normal; QRS interval: normal ; ST/T wave: normal    Records Reviewed: Nursing Notes, Old Medical Records, Previous electrocardiograms, Previous Radiology Studies and Previous Laboratory Studies    Provider Notes (Medical Decision Making):   DDx: fall, abd contusion, blunt trauma to abdomen, near syncope    ED Course:   Initial assessment performed. The patients presenting problems have been discussed, and they are in agreement with the care plan formulated and outlined with them. I have encouraged them to ask questions as they arise throughout their visit. 1:19 PM  Pt reevaluated. Updated on CT results. Anticipate discharge. Critical Care Time:   none    Disposition:  DISCHARGE  The patient has been re-evaluated and is ready for discharge. Reviewed available results with patient. Counseled pt on diagnosis and care plan. Pt has expressed understanding, and all questions have been answered. Pt agrees with plan and agrees to follow up as recommended, or return to the ED if their symptoms worsen. Discharge instructions have been provided and explained to the pt, along with reasons to return to the ED. PLAN:  1. Current Discharge Medication List        2. Follow-up Information     Follow up With Specialties Details Why Contact Info    Jacoby Vegas MD [facility] In 1 week  [place]  [address] [place] [address]  [phone]          Return to ED if worse     Diagnosis     Clinical Impression:   1. Contusion of abdominal wall, initial encounter    2. Fall, initial encounter        Attestations: This note is prepared by Kimberly Patel, acting as Scribe for Alanna Paulson MD.    Alanna Paulson MD: The scribe's documentation has been prepared under my direction and personally reviewed by me in its entirety. I confirm that the note above accurately reflects all work, treatment, procedures, and medical decision making performed by me.

## 2019-09-04 NOTE — ED TRIAGE NOTES
Pt reports falling last night, unsure of why she fell but states that she had bags in her hands so may have been off balance; reports hitting head and lip on counter, denies LOC, reports mid abdominal pain and left knee pain

## 2019-09-04 NOTE — ED NOTES
Pt given printed discharge instructions and 1 script(s). Pt verbalized understanding of instructions and script(s). Pt verbalized importance of following up with PCP. Pt alert and oriented, in no acute distress, escorted to car in wheelchair to ride home with friend.

## 2019-09-05 LAB
ATRIAL RATE: 77 BPM
CALCULATED P AXIS, ECG09: 73 DEGREES
CALCULATED R AXIS, ECG10: 53 DEGREES
CALCULATED T AXIS, ECG11: 91 DEGREES
DIAGNOSIS, 93000: NORMAL
P-R INTERVAL, ECG05: 164 MS
Q-T INTERVAL, ECG07: 430 MS
QRS DURATION, ECG06: 90 MS
QTC CALCULATION (BEZET), ECG08: 486 MS
VENTRICULAR RATE, ECG03: 77 BPM

## 2019-09-07 LAB
ALBUMIN SERPL ELPH-MCNC: 3.7 G/DL (ref 2.9–4.4)
ALBUMIN SERPL-MCNC: 3.9 G/DL (ref 3.5–4.8)
ALBUMIN/GLOB SERPL: 1.3 {RATIO} (ref 0.7–1.7)
ALBUMIN/GLOB SERPL: 1.5 {RATIO} (ref 1.2–2.2)
ALP SERPL-CCNC: 64 IU/L (ref 39–117)
ALPHA1 GLOB SERPL ELPH-MCNC: 0.2 G/DL (ref 0–0.4)
ALPHA2 GLOB SERPL ELPH-MCNC: 0.7 G/DL (ref 0.4–1)
ALT SERPL-CCNC: 12 IU/L (ref 0–32)
AST SERPL-CCNC: 33 IU/L (ref 0–40)
B-GLOBULIN SERPL ELPH-MCNC: 0.9 G/DL (ref 0.7–1.3)
BASOPHILS # BLD AUTO: 0.1 X10E3/UL (ref 0–0.2)
BASOPHILS NFR BLD AUTO: 1 %
BILIRUB SERPL-MCNC: 0.4 MG/DL (ref 0–1.2)
BUN SERPL-MCNC: 24 MG/DL (ref 8–27)
BUN/CREAT SERPL: 21 (ref 12–28)
CALCIUM SERPL-MCNC: 7.8 MG/DL (ref 8.7–10.3)
CHLORIDE SERPL-SCNC: 105 MMOL/L (ref 96–106)
CO2 SERPL-SCNC: 20 MMOL/L (ref 20–29)
COMMENT, 144067: NORMAL
CREAT SERPL-MCNC: 1.14 MG/DL (ref 0.57–1)
CRP SERPL-MCNC: 1 MG/L (ref 0–10)
EOSINOPHIL # BLD AUTO: 0.2 X10E3/UL (ref 0–0.4)
EOSINOPHIL NFR BLD AUTO: 2 %
ERYTHROCYTE [DISTWIDTH] IN BLOOD BY AUTOMATED COUNT: 15.3 % (ref 12.3–15.4)
ERYTHROCYTE [SEDIMENTATION RATE] IN BLOOD BY WESTERGREN METHOD: 33 MM/HR (ref 0–40)
GAMMA GLOB SERPL ELPH-MCNC: 1 G/DL (ref 0.4–1.8)
GAMMA INTERFERON BACKGROUND BLD IA-ACNC: 0.12 IU/ML
GLOBULIN SER CALC-MCNC: 2.6 G/DL (ref 1.5–4.5)
GLOBULIN SER CALC-MCNC: 2.8 G/DL (ref 2.2–3.9)
GLUCOSE SERPL-MCNC: 116 MG/DL (ref 65–99)
HBV CORE AB SERPL QL IA: NEGATIVE
HBV CORE IGM SERPL QL IA: NEGATIVE
HBV E AB SERPL QL IA: NEGATIVE
HBV E AG SERPL QL IA: NEGATIVE
HBV SURFACE AB SER QL: NON REACTIVE
HBV SURFACE AG SERPL QL IA: NEGATIVE
HCT VFR BLD AUTO: 34.7 % (ref 34–46.6)
HCV AB S/CO SERPL IA: <0.1 S/CO RATIO (ref 0–0.9)
HGB BLD-MCNC: 11.6 G/DL (ref 11.1–15.9)
IGA SERPL-MCNC: 191 MG/DL (ref 64–422)
IGG SERPL-MCNC: 1076 MG/DL (ref 700–1600)
IGM SERPL-MCNC: 39 MG/DL (ref 26–217)
IMM GRANULOCYTES # BLD AUTO: 0 X10E3/UL (ref 0–0.1)
IMM GRANULOCYTES NFR BLD AUTO: 0 %
INTERPRETATION SERPL IEP-IMP: NORMAL
LYMPHOCYTES # BLD AUTO: 3.1 X10E3/UL (ref 0.7–3.1)
LYMPHOCYTES NFR BLD AUTO: 34 %
M PROTEIN SERPL ELPH-MCNC: 0.2 G/DL
M TB IFN-G BLD-IMP: NEGATIVE
M TB IFN-G CD4+ BCKGRND COR BLD-ACNC: 0.23 IU/ML
MCH RBC QN AUTO: 33.5 PG (ref 26.6–33)
MCHC RBC AUTO-ENTMCNC: 33.4 G/DL (ref 31.5–35.7)
MCV RBC AUTO: 100 FL (ref 79–97)
METHOTREXATE PG1: 40 NMOL/L RBC
METHOTREXATE PG2: 50.77 NMOL/L RBC
METHOTREXATE PG3: 73.85 NMOL/L RBC
METHOTREXATE PG4: 32.62 NMOL/L RBC
METHOTREXATE PG5: 10.31 NMOL/L RBC
METHOTREXATE-PG TOTAL: 207.54 NMOL/L RBC
MITOGEN IGNF BLD-ACNC: >10 IU/ML
MONOCYTES # BLD AUTO: 0.8 X10E3/UL (ref 0.1–0.9)
MONOCYTES NFR BLD AUTO: 9 %
MTXPGSRA INTERPRETATION: NORMAL
NEUTROPHILS # BLD AUTO: 4.8 X10E3/UL (ref 1.4–7)
NEUTROPHILS NFR BLD AUTO: 54 %
PLATELET # BLD AUTO: 251 X10E3/UL (ref 150–450)
PLEASE NOTE, 011150: ABNORMAL
POTASSIUM SERPL-SCNC: 3.5 MMOL/L (ref 3.5–5.2)
PROT PATTERN SERPL ELPH-IMP: ABNORMAL
PROT SERPL-MCNC: 6.5 G/DL (ref 6–8.5)
QUANTIFERON INCUBATION, QF1T: NORMAL
QUANTIFERON TB2 AG: 0.22 IU/ML
RBC # BLD AUTO: 3.46 X10E6/UL (ref 3.77–5.28)
SERVICE CMNT-IMP: NORMAL
SODIUM SERPL-SCNC: 143 MMOL/L (ref 134–144)
WBC # BLD AUTO: 9.1 X10E3/UL (ref 3.4–10.8)

## 2019-09-10 NOTE — PROGRESS NOTES
The results were reviewed. Slight worsening of chronic kidney disease. methotrexate still therapeutic. 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 All remaining labs are normal/negative.

## 2019-09-13 ENCOUNTER — HOSPITAL ENCOUNTER (EMERGENCY)
Age: 74
Discharge: HOME | End: 2019-09-13
Attending: EMERGENCY MEDICINE
Payer: MEDICARE

## 2019-09-13 ENCOUNTER — APPOINTMENT (OUTPATIENT)
Dept: GENERAL RADIOLOGY | Age: 74
End: 2019-09-13
Attending: NURSE PRACTITIONER
Payer: MEDICARE

## 2019-09-13 VITALS
SYSTOLIC BLOOD PRESSURE: 120 MMHG | BODY MASS INDEX: 31.65 KG/M2 | HEART RATE: 72 BPM | TEMPERATURE: 97.7 F | RESPIRATION RATE: 18 BRPM | HEIGHT: 65 IN | WEIGHT: 190 LBS | DIASTOLIC BLOOD PRESSURE: 66 MMHG | OXYGEN SATURATION: 99 %

## 2019-09-13 DIAGNOSIS — M25.462 KNEE EFFUSION, LEFT: Primary | ICD-10-CM

## 2019-09-13 PROCEDURE — 99283 EMERGENCY DEPT VISIT LOW MDM: CPT

## 2019-09-13 PROCEDURE — 73562 X-RAY EXAM OF KNEE 3: CPT

## 2019-09-13 RX ORDER — DICLOFENAC SODIUM 75 MG/1
75 TABLET, DELAYED RELEASE ORAL 2 TIMES DAILY
Qty: 20 TAB | Refills: 0 | Status: SHIPPED | OUTPATIENT
Start: 2019-09-13 | End: 2019-11-22

## 2019-09-13 RX ORDER — METHOTREXATE 2.5 MG/1
TABLET ORAL
Refills: 0 | COMMUNITY
Start: 2019-07-29 | End: 2019-09-24 | Stop reason: ALTCHOICE

## 2019-09-13 NOTE — ED NOTES
Emergency Department Nursing Plan of Care       The Nursing Plan of Care is developed from the Nursing assessment and Emergency Department Attending provider initial evaluation. The plan of care may be reviewed in the ED Provider note.     The Plan of Care was developed with the following considerations:   Patient / Family readiness to learn indicated by:verbalized understanding  Persons(s) to be included in education: patient  Barriers to Learning/Limitations:No    [address] Ramakrishna, RN    9/13/2019   2:32 PM

## 2019-09-13 NOTE — ED NOTES
Patient (s) 1 given copy of dc instructions and 0 paper script(s) and 1 electronic scripts. Patient (s) verbalized understanding of instructions and script (s). Patient given a current medication reconciliation form and verbalized understanding of their medications. Patient (s) verbalized understanding of the importance of discussing medications with  his or her physician or clinic they will be following up with. Patient alert and oriented and in no acute distress.

## 2019-09-13 NOTE — ED PROVIDER NOTES
EMERGENCY DEPARTMENT HISTORY AND PHYSICAL EXAM    Date: 9/13/2019  Patient Name: Tamia Bhatti    History of Presenting Illness     Chief Complaint   Patient presents with    Leg Pain     left left pain after fall 2 weeks ago, states worsening little more every day         History Provided By: Patient    Chief Complaint: knee pain  Duration: 2 Weeks  Timing:  Acute  Location: left knee  Quality: Aching  Severity: 8 out of 10  Modifying Factors: walking worsens pain  Associated Symptoms: denies any other associated signs or symptoms      HPI: Tamia Bhatti is a 72 y.o. female with a PMH of diabetes, hypertension and osteoarthritis asthma who presents with left knee pain acute onset 2 weeks ago. Patient states she fell when she was seen in this emergency department and says it is getting increasingly painful to walk she states she had a left knee replacement and is concerned there is damage to the hardware. She has no other complaints at this time    PCP: Terence Jacobo MD    Current Outpatient Medications   Medication Sig Dispense Refill    diclofenac EC (VOLTAREN) 75 mg EC tablet Take 1 Tab by mouth two (2) times a day. 20 Tab 0    leflunomide (ARAVA) 20 mg tablet Take 1 Tab by mouth daily. Take half tab daily for 7 days and then one tab daily if tolerated 90 Tab 0    furosemide (LASIX) 20 mg tablet TAKE 1 TABLET BY MOUTH EVERY DAY 90 Tab 0    latanoprost (XALATAN) 0.005 % ophthalmic solution ADMINISTER 1 DROP INTO BOTH EYES NIGHTLY 2.5 mL 1    adalimumab (HUMIRA,CF, PEN) 40 mg/0.4 mL pnkt 40 mg by SubCUTAneous route every fourteen (14) days. Indications: rheumatoid arthritis 2 Kit 11    ferrous sulfate (SLOW FE) 47.5 mg iron TbER tablet Take 1 Tab by mouth daily.  (Patient taking differently: Take 1 Tab by mouth every seven (7) days.) 90 Tab 1    gabapentin (NEURONTIN) 100 mg capsule Take 1 capsule in the morning and take 3 capsules before bedtime 120 Cap 2    nortriptyline (PAMELOR) 25 mg capsule Take 1 Cap by mouth nightly. 90 Cap 0    ergocalciferol (ERGOCALCIFEROL) 50,000 unit capsule Take 1 Cap by mouth every seven (7) days. 12 Cap 3    metFORMIN (GLUCOPHAGE) 500 mg tablet Take 1 Tab by mouth two (2) times daily (with meals). (Patient taking differently: Take 500 mg by mouth daily (with breakfast). ) 60 Tab 5    fluticasone-vilanterol (BREO ELLIPTA) 100-25 mcg/dose inhaler Take 1 Puff by inhalation daily. 1 Inhaler 5    methotrexate (RHEUMATREX) 2.5 mg tablet TAKE 8 TABS BY MOUTH EVERY SATURDAY  0    traMADol (ULTRAM) 50 mg tablet Take 1 Tab by mouth every six (6) hours as needed for Pain for up to 10 days. Max Daily Amount: 200 mg. 20 Tab 0    triamcinolone acetonide (KENALOG) 0.1 % dental paste APPLY WITH Q-TIP TWICE DAILY TO AFFECTED ORAL ULCERS UNTIL THEY HEAL 5 g 0    sertraline (ZOLOFT) 50 mg tablet Take 1 Tab by mouth daily. 90 Tab 1    albuterol sulfate (PROVENTIL;VENTOLIN) 2.5 mg/0.5 mL nebu nebulizer solution 0.5 mL by Nebulization route every four (4) hours as needed for Wheezing. Indications: Asthma Attack 30 mL 0    magnesium 200 mg tab Take 100 mg by mouth daily.  aspirin delayed-release 81 mg tablet Take  by mouth daily.  cyanocobalamin (VITAMIN B-12) 1,000 mcg sublingual tablet Take 1,000 mcg by mouth daily.  metoprolol tartrate (LOPRESSOR) 25 mg tablet Take  by mouth two (2) times a day.  simvastatin (ZOCOR) 20 mg tablet Take  by mouth nightly.          Past History     Past Medical History:  Past Medical History:   Diagnosis Date    Arthritis     Asthma     Diabetes ([address]     High cholesterol     Hypertension     Sarcoidosis 1999    [facility]       Past Surgical History:  Past Surgical History:   Procedure Laterality Date    CARDIAC SURG PROCEDURE UNLIST      HX GASTRIC BYPASS      HX ORTHOPAEDIC Bilateral     knee replacement       Family History:  Family History   Problem Relation Age of Onset    Heart Disease Mother     Liver Disease Father  Alcohol abuse Brother     Dementia Neg Hx     Cancer Neg Hx     Seizures Neg Hx        Social History:  Social History     Tobacco Use    Smoking status: Never Smoker    Smokeless tobacco: Never Used   Substance Use Topics    Alcohol use: Yes     Alcohol/week: 2.0 standard drinks     Types: 1 Glasses of wine, 1 Shots of liquor per week     Comment: 2-3 yearly    Drug use: No       Allergies: Allergies   Allergen Reactions    Lisinopril Rash    Methotrexate Hives         Review of Systems   Review of Systems   Constitutional: Negative for fatigue and fever. Respiratory: Negative for shortness of breath and wheezing. Cardiovascular: Negative for chest pain and palpitations. Gastrointestinal: Negative for abdominal pain. Musculoskeletal: Positive for arthralgias (knee pain). Negative for myalgias, neck pain and neck stiffness. Skin: Negative for pallor and rash. Neurological: Negative for dizziness, tremors, weakness and headaches. All other systems reviewed and are negative. Physical Exam     Vitals:    09/13/19 1335 09/13/19 1616   BP: 141/71 120/66   Pulse: 75 72   Resp: 15 18   Temp: 97.7 °F (36.5 °C)    SpO2: 99% 99%   Weight: 86.2 kg (190 lb)    Height: 5' 4.5\" (1.638 m)      Physical Exam   Constitutional: She is oriented to person, place, and time. She appears well-developed and well-nourished. No distress. HENT:   Head: Normocephalic and atraumatic. Right Ear: External ear normal.   Left Ear: External ear normal.   Nose: Nose normal.   Mouth/Throat: Oropharynx is clear and moist.   Eyes: Conjunctivae are normal.   Neck: Normal range of motion. Neck supple. Cardiovascular: Normal rate and regular rhythm. Pulmonary/Chest: Effort normal and breath sounds normal. No respiratory distress. She has no wheezes. Abdominal: Soft. Bowel sounds are normal. There is no tenderness. Musculoskeletal: Normal range of motion.    Right knee mild swelling no deformity Lymphadenopathy:     She has no cervical adenopathy. Neurological: She is alert and oriented to person, place, and time. No cranial nerve deficit. Coordination normal.   Skin: Skin is warm and dry. No rash noted. Psychiatric: She has a normal mood and affect. Her behavior is normal. Judgment and thought content normal.   Nursing note and vitals reviewed. Diagnostic Study Results     Labs -   No results found for this or any previous visit (from the past 12 hour(s)). Radiologic Studies -   XR KNEE LT 3 V   Final Result   IMPRESSION: Left total knee arthroplasty. No acute fracture or dislocation. Possible small effusion. .        CT Results  (Last 48 hours)    None        CXR Results  (Last 48 hours)    None            Medical Decision Making   I am the first provider for this patient. I reviewed the vital signs, available nursing notes, past medical history, past surgical history, family history and social history. Vital Signs-Reviewed the patient's vital signs. Records Reviewed: Nursing Notes and Old Medical Records            Disposition:  home    DISCHARGE NOTE:         Care plan outlined and precautions discussed. Patient has no new complaints, changes, or physical findings. Results of xray were reviewed with the patient. All medications were reviewed with the patient; will d/c home with voltaren. All of pt's questions and concerns were addressed. Patient was instructed and agrees to follow up with PCP, as well as to return to the ED upon further deterioration. Patient is ready to go home.     Follow-up Information     Follow up With Specialties Details Why Contact Info    Arnulfo Soni MD [facility] In 3 days  [address] [facility]  [phone]            Discharge Medication List as of 9/13/2019  4:12 PM      START taking these medications    Details   diclofenac EC (VOLTAREN) 75 mg EC tablet Take 1 Tab by mouth two (2) times a day., Normal, Disp-20 Tab, R-0         CONTINUE these medications which have NOT CHANGED    Details   leflunomide (ARAVA) 20 mg tablet Take 1 Tab by mouth daily. Take half tab daily for 7 days and then one tab daily if tolerated, Normal, Disp-90 Tab, R-0      furosemide (LASIX) 20 mg tablet TAKE 1 TABLET BY MOUTH EVERY DAY, Normal, Disp-90 Tab, R-0      latanoprost (XALATAN) 0.005 % ophthalmic solution ADMINISTER 1 DROP INTO BOTH EYES NIGHTLY, Normal, Disp-2.5 mL, R-1      adalimumab (HUMIRA,CF, PEN) 40 mg/0.4 mL pnkt 40 mg by SubCUTAneous route every fourteen (14) days. Indications: rheumatoid arthritis, Normal, Disp-2 Kit, R-11      ferrous sulfate (SLOW FE) 47.5 mg iron TbER tablet Take 1 Tab by mouth daily. , Normal, Disp-90 Tab, R-1      gabapentin (NEURONTIN) 100 mg capsule Take 1 capsule in the morning and take 3 capsules before bedtime, Normal, Disp-120 Cap, R-2      nortriptyline (PAMELOR) 25 mg capsule Take 1 Cap by mouth nightly., NormalReplaces prev rxDisp-90 Cap, R-0      ergocalciferol (ERGOCALCIFEROL) 50,000 unit capsule Take 1 Cap by mouth every seven (7) days. , Print, Disp-12 Cap, R-3      metFORMIN (GLUCOPHAGE) 500 mg tablet Take 1 Tab by mouth two (2) times daily (with meals). , Normal, Disp-60 Tab, R-5      fluticasone-vilanterol (BREO ELLIPTA) 100-25 mcg/dose inhaler Take 1 Puff by inhalation daily. , Normal, Disp-1 Inhaler, R-5      methotrexate (RHEUMATREX) 2.5 mg tablet TAKE 8 TABS BY MOUTH EVERY SATURDAY, Historical Med, R-0      traMADol (ULTRAM) 50 mg tablet Take 1 Tab by mouth every six (6) hours as needed for Pain for up to 10 days. Max Daily Amount: 200 mg., Print, Disp-20 Tab, R-0      triamcinolone acetonide (KENALOG) 0.1 % dental paste APPLY WITH Q-TIP TWICE DAILY TO AFFECTED ORAL ULCERS UNTIL THEY HEAL, Normal, Disp-5 g, R-0      sertraline (ZOLOFT) 50 mg tablet Take 1 Tab by mouth daily. , Normal, Disp-90 Tab, R-1      albuterol sulfate (PROVENTIL;VENTOLIN) 2.5 mg/0.5 mL nebu nebulizer solution 0.5 mL by Nebulization route every four (4) hours as needed for Wheezing. Indications: Asthma Attack, Normal, Disp-30 mL, R-0      magnesium 200 mg tab Take 100 mg by mouth daily. , Historical Med      aspirin delayed-release 81 mg tablet Take  by mouth daily. , Historical Med      cyanocobalamin (VITAMIN B-12) 1,000 mcg sublingual tablet Take 1,000 mcg by mouth daily. , Historical Med      metoprolol tartrate (LOPRESSOR) 25 mg tablet Take  by mouth two (2) times a day., Historical Med      simvastatin (ZOCOR) 20 mg tablet Take  by mouth nightly., Historical Med         STOP taking these medications       naproxen (NAPROSYN) 500 mg tablet Comments:   Reason for Stopping:               Provider Notes (Medical Decision Making):   DDX knee effusion degenerative joint disease sprain contusion  Procedures:  Procedures    Please note that this dictation was completed with Dragon, computer voice recognition software. Quite often unanticipated grammatical, syntax, homophones, and other interpretive errors are inadvertently transcribed by the computer software. Please disregard these errors. Additionally, please excuse any errors that have escaped final proofreading. Diagnosis     Clinical Impression:   1.  Knee effusion, left

## 2019-09-24 ENCOUNTER — OFFICE VISIT (OUTPATIENT)
Dept: FAMILY MEDICINE CLINIC | Age: 74
End: 2019-09-24

## 2019-09-24 VITALS
TEMPERATURE: 97.5 F | DIASTOLIC BLOOD PRESSURE: 56 MMHG | HEART RATE: 78 BPM | BODY MASS INDEX: 32.99 KG/M2 | RESPIRATION RATE: 18 BRPM | OXYGEN SATURATION: 98 % | SYSTOLIC BLOOD PRESSURE: 108 MMHG | WEIGHT: 198 LBS | HEIGHT: 65 IN

## 2019-09-24 DIAGNOSIS — K29.70 GASTRITIS, PRESENCE OF BLEEDING UNSPECIFIED, UNSPECIFIED CHRONICITY, UNSPECIFIED GASTRITIS TYPE: ICD-10-CM

## 2019-09-24 DIAGNOSIS — E11.8 TYPE 2 DIABETES MELLITUS WITH COMPLICATION, WITHOUT LONG-TERM CURRENT USE OF INSULIN (HCC): Primary | ICD-10-CM

## 2019-09-24 DIAGNOSIS — I10 ESSENTIAL HYPERTENSION: ICD-10-CM

## 2019-09-24 DIAGNOSIS — M51.36 DDD (DEGENERATIVE DISC DISEASE), LUMBAR: ICD-10-CM

## 2019-09-24 DIAGNOSIS — M06.9 RHEUMATOID ARTHRITIS INVOLVING MULTIPLE SITES, UNSPECIFIED RHEUMATOID FACTOR PRESENCE: ICD-10-CM

## 2019-09-24 DIAGNOSIS — K44.9 HIATAL HERNIA: ICD-10-CM

## 2019-09-24 DIAGNOSIS — Z79.899 ENCOUNTER FOR LONG-TERM (CURRENT) USE OF MEDICATIONS: ICD-10-CM

## 2019-09-24 DIAGNOSIS — Z23 ENCOUNTER FOR IMMUNIZATION: ICD-10-CM

## 2019-09-24 DIAGNOSIS — I25.83 CORONARY ARTERY DISEASE DUE TO LIPID RICH PLAQUE: ICD-10-CM

## 2019-09-24 DIAGNOSIS — Z12.11 COLON CANCER SCREENING: ICD-10-CM

## 2019-09-24 DIAGNOSIS — Z95.820 STATUS POST ANGIOPLASTY WITH STENT: ICD-10-CM

## 2019-09-24 DIAGNOSIS — I73.9 PAD (PERIPHERAL ARTERY DISEASE) (HCC): ICD-10-CM

## 2019-09-24 DIAGNOSIS — G47.33 OSA (OBSTRUCTIVE SLEEP APNEA): ICD-10-CM

## 2019-09-24 DIAGNOSIS — I25.10 CORONARY ARTERY DISEASE DUE TO LIPID RICH PLAQUE: ICD-10-CM

## 2019-09-24 DIAGNOSIS — R13.10 DYSPHAGIA, UNSPECIFIED TYPE: ICD-10-CM

## 2019-09-24 DIAGNOSIS — E78.2 MIXED HYPERLIPIDEMIA: ICD-10-CM

## 2019-09-24 LAB — HBA1C MFR BLD HPLC: 5.4 %

## 2019-09-24 RX ORDER — DOXYCYCLINE HYCLATE 100 MG
TABLET ORAL
Refills: 0 | COMMUNITY
Start: 2019-09-03 | End: 2019-11-11

## 2019-09-24 RX ORDER — FOLIC ACID 1 MG/1
TABLET ORAL
Refills: 0 | COMMUNITY
Start: 2019-07-29 | End: 2019-11-11

## 2019-09-24 RX ORDER — NORTRIPTYLINE HYDROCHLORIDE 50 MG/1
50 CAPSULE ORAL
COMMUNITY
Start: 2018-05-07

## 2019-09-24 RX ORDER — PHENOL/SODIUM PHENOLATE
20 AEROSOL, SPRAY (ML) MUCOUS MEMBRANE DAILY
Qty: 30 TAB | Refills: 2 | Status: SHIPPED | OUTPATIENT
Start: 2019-09-24 | End: 2020-02-24

## 2019-09-24 NOTE — PROGRESS NOTES
Chief Complaint   Patient presents with    Diabetes     3 month follow-up   1. Have you been to the ER, urgent care clinic since your last visit? Hospitalized since your last visit? Yes Where: ER [facility]    2. Have you seen or consulted any other health care providers outside of the [address] [place] [address] since your last visit? Include any pap smears or colon screening. Yes Where: Podiatry      She denies any symptoms , reactions or allergies that would exclude them from being immunized today. Risks and adverse reactions were discussed and the VIS was given to them. All questions were addressed. She was observed for 15 min post injection. There were no reactions observed.     Gianna Mccormick LPN

## 2019-09-24 NOTE — PROGRESS NOTES
Subjective:     Chief Complaint   Patient presents with    Diabetes     3 month follow-up        She  is a 72 y.o. female who presents for evaluation of:  Here for routine follow-up    PMHx DM, HTN, HLD, CAD s/p PCI with stenting in 1998 at [facility], RA, Asthma, Sarcoidosis, Vit D def, Osteopenia, and s/p gastric bypass. Today, she mentions pain after having toenail removal with podiatry nearly 5 months ago. Podiatry also ordered lower extremity duplex and lower extremity PVR with exercise that has not been checked yet. Since last appointment, also had ER visit for left knee pain. Had knee x-rays showing arthroplasty intact with no concerns. Labs recently checked on 9/4/2019 in the ER after having a fall. Also had bilateral rib x-rays which showed no fractures. She also had a CT of the abdomen and pelvis showing: \"Hiatal hernia   Gastritis  Micronodular changes in the right lower lobe. Please correlate with the infectious and inflammatory processes  Left mid renal hypodensity, suggestive of cyst  Heavy calcification of the SMA  Degenerative disc disease and osteoarthritis of the lower lumbar spine. \"    Diabetes Mellitus:  Dx about 40 yrs ago  Taking meds - prev on insulin but no longer needed after gastric bypass. Now only taking metformin 500 mg daily. Reports no polyuria or polydipsia, no chest pain, dyspnea or TIA's, no numbness, tingling or pain in extremities, last eye exam approximately < 1 yr ago. Exercises regularly with walking but limited d/t knees giving way. Working on diet  Pt is a non smoker.     Lab Results   Component Value Date/Time    Hemoglobin A1c (POC) 5.4 09/24/2019 08:50 AM    Hemoglobin A1c (POC) 6.7 09/28/2018 10:30 AM    Hemoglobin A1c 6.1 (H) 02/16/2019 08:23 AM    Microalb/Creat ratio (ug/mg creat.) 22.5 02/16/2019 08:23 AM    LDL, calculated 82 02/16/2019 08:23 AM    Creatinine 0.86 09/04/2019 11:14 AM      Lab Results   Component Value Date/Time    GFR est AA >60 09/04/2019 11:14 AM    GFR est non-AA >60 09/04/2019 11:14 AM      Lab Results   Component Value Date/Time    TSH 2.970 02/16/2019 08:23 AM       Jagdeep was on chronic pain meds for RA with [facility] PCPs. Following with Dr. Elvin Key for this now. Currently on Humira and Arava. ROS  Gen - no fever/chills  Resp - Dx with Sarcoidosis many yrs ago. No regular tx for this. Thought to be affecting her lungs. Also has Asthma and doing well. CV - no chest pain or WORKMAN  H/O - noted to have MGUS with low risk. Seen by Dr. Gilberto Corbin  Neuro - Also taking Nortriptyline and Gabapentin for RLS. No recent Fe labs. She occ takes Fe supplement. Feels like legs are going to sleep. Feels like she needs to move her legs. Worse at night. Feels a little better when moving legs. Also reports sx in her hands too. Describes sx mostly as cramping. She also has a hx of Fe def anemia and was on Fe from prev PCP (Fe def is a common cause of RLS). Rest per HPI    Past Medical History:   Diagnosis Date    Arthritis     Asthma     Diabetes ([address]     High cholesterol     Hypertension     Sarcoidosis 1999    [facility]     Past Surgical History:   Procedure Laterality Date    CARDIAC SURG PROCEDURE UNLIST      HX GASTRIC BYPASS      HX ORTHOPAEDIC Bilateral     knee replacement     Current Outpatient Medications on File Prior to Visit   Medication Sig Dispense Refill    nortriptyline (PAMELOR) 50 mg capsule TAKE ONE CAPSULE EVERY DAY AT BEDTIME      folic acid (FOLVITE) 1 mg tablet TAKE 1 TABLET BY MOUTH EVERY DAY  0    diclofenac EC (VOLTAREN) 75 mg EC tablet Take 1 Tab by mouth two (2) times a day. 20 Tab 0    leflunomide (ARAVA) 20 mg tablet Take 1 Tab by mouth daily.  Take half tab daily for 7 days and then one tab daily if tolerated 90 Tab 0    furosemide (LASIX) 20 mg tablet TAKE 1 TABLET BY MOUTH EVERY DAY 90 Tab 0    triamcinolone acetonide (KENALOG) 0.1 % dental paste APPLY WITH Q-TIP TWICE DAILY TO AFFECTED ORAL ULCERS UNTIL THEY HEAL 5 g 0    latanoprost (XALATAN) 0.005 % ophthalmic solution ADMINISTER 1 DROP INTO BOTH EYES NIGHTLY 2.5 mL 1    adalimumab (HUMIRA,CF, PEN) 40 mg/0.4 mL pnkt 40 mg by SubCUTAneous route every fourteen (14) days. Indications: rheumatoid arthritis 2 Kit 11    sertraline (ZOLOFT) 50 mg tablet Take 1 Tab by mouth daily. 90 Tab 1    albuterol sulfate (PROVENTIL;VENTOLIN) 2.5 mg/0.5 mL nebu nebulizer solution 0.5 mL by Nebulization route every four (4) hours as needed for Wheezing. Indications: Asthma Attack 30 mL 0    ferrous sulfate (SLOW FE) 47.5 mg iron TbER tablet Take 1 Tab by mouth daily. (Patient taking differently: Take 1 Tab by mouth every seven (7) days.) 90 Tab 1    gabapentin (NEURONTIN) 100 mg capsule Take 1 capsule in the morning and take 3 capsules before bedtime 120 Cap 2    ergocalciferol (ERGOCALCIFEROL) 50,000 unit capsule Take 1 Cap by mouth every seven (7) days. 12 Cap 3    magnesium 200 mg tab Take 100 mg by mouth daily.  metFORMIN (GLUCOPHAGE) 500 mg tablet Take 1 Tab by mouth two (2) times daily (with meals). (Patient taking differently: Take 500 mg by mouth daily (with breakfast). ) 60 Tab 5    fluticasone-vilanterol (BREO ELLIPTA) 100-25 mcg/dose inhaler Take 1 Puff by inhalation daily. 1 Inhaler 5    aspirin delayed-release 81 mg tablet Take  by mouth daily.  cyanocobalamin (VITAMIN B-12) 1,000 mcg sublingual tablet Take 1,000 mcg by mouth daily.  metoprolol tartrate (LOPRESSOR) 25 mg tablet Take  by mouth two (2) times a day.  simvastatin (ZOCOR) 20 mg tablet Take  by mouth nightly.  doxycycline (VIBRA-TABS) 100 mg tablet TAKE 1 TABLET BY MOUTH EVERY DAY FOR 14 DAYS  0    methotrexate (RHEUMATREX) 2.5 mg tablet TAKE 8 TABS BY MOUTH EVERY SATURDAY  0     No current facility-administered medications on file prior to visit.          Objective:     Vitals:    09/24/19 0835   BP: 108/56   Pulse: 78   Resp: 18   Temp: 97.5 °F (36.4 °C)   TempSrc: Oral   SpO2: 98% Weight: 198 lb (89.8 kg)   Height: 5' 4.5\" (1.638 m)     Physical Examination:  General appearance - alert, well appearing, and in no distress  Eyes -sclera anicteric  Neck - supple, no significant adenopathy, no thyromegaly, no bruits  Chest - clear to auscultation, no wheezes, rales or rhonchi, symmetric air entry  Heart - normal rate, regular rhythm, normal S1, S2, no murmurs, rubs, clicks or gallops  Neurological - alert, oriented, no focal findings or movement disorder noted  Extremities-no edema  Psych-normal mood and affect    EKG - nsr, no st changes    Assessment/ Plan:   Diagnoses and all orders for this visit:    1. Type 2 diabetes mellitus with complication, without long-term current use of insulin (HCC)-continues with excellent control. A1c today 5.4% and only on metformin 500 mg daily  -     AMB POC HEMOGLOBIN A1C    2. Essential hypertension-well-controlled    3. Mixed hyperlipidemia-stable on simvastatin. Last LDL 82  -     AMB POC HEMOGLOBIN A1C    4. Coronary artery disease due to lipid rich plaque  5. Status post angioplasty with stent  -No recent symptoms, continues to follow with cardiology    6. Rheumatoid arthritis involving multiple sites, unspecified rheumatoid factor presence ([facility] [address]- pain control improving and followed by Dr. Diaz. Doing well on Humira and Arava. 7. PAD (peripheral artery disease) (HCC)-newer concern and studies being done by podiatry    8. Encounter for long-term (current) use of medications  -     AMB POC HEMOGLOBIN A1C    9. Encounter for immunization  -     INFLUENZA VACCINE INACTIVATED (IIV), SUBUNIT, ADJUVANTED, IM  -     ADMIN INFLUENZA VIRUS VAC    10. BMI 33.0-33.9,adult-encouraged continued work on exercise and diet  Discussed the patient's BMI. The BMI follow up plan is as follows:   dietary management education, guidance, and counseling  encourage exercise  monitor weight  prescribed dietary intake    11. Hiatal hernia  12.  Gastritis, presence of bleeding unspecified, unspecified chronicity, unspecified gastritis type  -     REFERRAL TO GASTROENTEROLOGY  -     Omeprazole delayed release (PRILOSEC D/R) 20 mg tablet; Take 1 Tab by mouth daily. 13. DDD (degenerative disc disease), lumbar    14. Colon cancer screening  -     REFERRAL TO GASTROENTEROLOGY    15. Dysphagia, unspecified type  -     REFERRAL TO GASTROENTEROLOGY    16. MICKI (obstructive sleep apnea) - to repeat PSG, may no longer be an issue since bariatric sgy but would like additional eval echo given hx of CAD with stenting years ago. -     SLEEP MEDICINE REFERRAL    I have discussed the diagnosis with the patient and the intended plan as seen in the above orders. The patient has received an after-visit summary and questions were answered concerning future plans. I have discussed medication side effects and warnings with the patient as well. The patient verbalizes understanding and agreement with the plan. Follow-up and Dispositions    · Return in about 3 months (around 12/24/2019), or if symptoms worsen or fail to improve.

## 2019-09-26 RX ORDER — LATANOPROST 50 UG/ML
SOLUTION/ DROPS OPHTHALMIC
Qty: 2.5 ML | Refills: 1 | Status: SHIPPED | OUTPATIENT
Start: 2019-09-26 | End: 2020-07-27 | Stop reason: SDUPTHER

## 2019-11-11 ENCOUNTER — TELEPHONE (OUTPATIENT)
Dept: FAMILY MEDICINE CLINIC | Age: 74
End: 2019-11-11

## 2019-11-11 NOTE — TELEPHONE ENCOUNTER
----- Message from Gayle Adkins sent at 11/11/2019 10:09 AM EST -----  Regarding: Dr. Teja Baez   Appointment not available    Caller's first and last name and relationship to patient (if not the patient):      Best contact number: [phone]      Preferred date and time:Before the 15th of November       Scheduled appointment date and time:N/A      Reason for appointment:Pt is having her toe on R foot removed [facility]) and needs to complete a Pre-op      Details to clarify the request:      Jo Garcia

## 2019-11-12 ENCOUNTER — TELEPHONE (OUTPATIENT)
Dept: FAMILY MEDICINE CLINIC | Age: 74
End: 2019-11-12

## 2019-11-12 ENCOUNTER — OFFICE VISIT (OUTPATIENT)
Dept: FAMILY MEDICINE CLINIC | Age: 74
End: 2019-11-12

## 2019-11-12 VITALS
OXYGEN SATURATION: 100 % | SYSTOLIC BLOOD PRESSURE: 139 MMHG | RESPIRATION RATE: 16 BRPM | HEART RATE: 85 BPM | HEIGHT: 65 IN | TEMPERATURE: 97.6 F | WEIGHT: 200 LBS | DIASTOLIC BLOOD PRESSURE: 65 MMHG | BODY MASS INDEX: 33.32 KG/M2

## 2019-11-12 DIAGNOSIS — I25.83 CORONARY ARTERY DISEASE DUE TO LIPID RICH PLAQUE: ICD-10-CM

## 2019-11-12 DIAGNOSIS — E78.2 MIXED HYPERLIPIDEMIA: ICD-10-CM

## 2019-11-12 DIAGNOSIS — Z95.820 STATUS POST ANGIOPLASTY WITH STENT: ICD-10-CM

## 2019-11-12 DIAGNOSIS — I10 ESSENTIAL HYPERTENSION: ICD-10-CM

## 2019-11-12 DIAGNOSIS — I25.10 CORONARY ARTERY DISEASE DUE TO LIPID RICH PLAQUE: ICD-10-CM

## 2019-11-12 DIAGNOSIS — M06.09 SERONEGATIVE RHEUMATOID ARTHRITIS OF MULTIPLE SITES (HCC): ICD-10-CM

## 2019-11-12 DIAGNOSIS — E11.8 TYPE 2 DIABETES MELLITUS WITH COMPLICATION, WITHOUT LONG-TERM CURRENT USE OF INSULIN (HCC): ICD-10-CM

## 2019-11-12 DIAGNOSIS — D86.0 SARCOIDOSIS OF LUNG (HCC): ICD-10-CM

## 2019-11-12 DIAGNOSIS — G47.33 OSA (OBSTRUCTIVE SLEEP APNEA): ICD-10-CM

## 2019-11-12 DIAGNOSIS — R06.09 DOE (DYSPNEA ON EXERTION): ICD-10-CM

## 2019-11-12 DIAGNOSIS — Z01.818 PREOP EXAMINATION: Primary | ICD-10-CM

## 2019-11-12 DIAGNOSIS — I73.9 PAD (PERIPHERAL ARTERY DISEASE) (HCC): ICD-10-CM

## 2019-11-12 DIAGNOSIS — Z79.899 ENCOUNTER FOR LONG-TERM (CURRENT) USE OF MEDICATIONS: ICD-10-CM

## 2019-11-12 NOTE — PROGRESS NOTES
Chief Complaint   Patient presents with   [facility] Pre-op Exam     Surgery scheduled for 11/15/19   Dr Sherwin Molina surgery @[facility] Right Big Toe Amputation

## 2019-11-12 NOTE — TELEPHONE ENCOUNTER
----- Message from [place] Myles sent at 11/12/2019 10:38 AM EST -----  Regarding: Dr. Chew Moder: [phone]  Reason for call: Lab Work               Callback required yes/no and why: Yes, pt needs blood work scheduled and completed before her appt. on Friday 11/15/19 for the removal of her toe. Best contact number(s): [phone] or [phone]  Details to clarify the request: pt has sent multiple request to schedule her blood work labs and has not heard anything back from the office to assist her.

## 2019-11-12 NOTE — PROGRESS NOTES
Subjective:     Chief Complaint   Patient presents with    Pre-op Exam     Surgery scheduled for 11/15/19        She  is a 69 y.o. female who presents for evaluation of:  Here for preop. PMHx DM, HTN, HLD, [place] [facility] [place], Asthma, Sarcoidosis, Vit D def, Osteopenia, and s/p gastric bypass. Will have R great toe amputation with Dr. Naa Loredo. Plans for MAC with IV sedation. No issues with anesthesia. No latex allergies. Limited by pain so unable to perform 4 METS. Recently had vascular procedure and she says she had anesthesia for this with no problems. Had a fall back on 9/4/19 and ended up going to ER. Had labs drawn at that time which looked mostly nml. Did have a mild anemia with Hgb 11.6. Does have sig breathing issues with her Sarcoidosis    Prev was on chronic pain meds for RA with prev PCPs. Following with Dr. Lydia Hernandez for this now. Currently on Humira and Arava. ROS  Gen - no fever/chills  Resp - Dx with Sarcoidosis many yrs ago. No regular tx for this. Thought to be affecting her lungs. Also has Asthma and doing well. Not seeing Pulmonology. CV - no chest pain, does endorse very minimal WORKMAN echo with going up stairs but does ok walking on flat surfaces, does use 2 pillows to prop herself up but states she does not get dyspnea from this. H/O - noted to have MGUS with low risk. Seen by Dr. Madalyn Hawkins  Neuro - Also taking Nortriptyline and Gabapentin for RLS. No recent Fe labs. She occ takes Fe supplement. Feels like legs are going to sleep. Feels like she needs to move her legs. Worse at night. Feels a little better when moving legs. Also reports sx in her hands too. Describes sx mostly as cramping. She also has a hx of Fe def anemia and was on Fe from prev PCP (Fe def is a common cause of RLS).   Rest per HPI    Past Medical History:   Diagnosis Date    Asthma     At risk for sleep apnea 11/11/2019    Stop Bang 4 - Sleep study in January per patient  DDD (degenerative disc disease), lumbar     Diabetes ([facility] [address]     Gastritis     GERD (gastroesophageal reflux disease)     Glaucoma     Hiatal hernia     High cholesterol     Hypertension     Peripheral vascular disease (HCC)     RA (rheumatoid arthritis) ([facility] [address]     Sarcoidosis 1999    [facility]     Past Surgical History:   Procedure Laterality Date    HX GASTRIC BYPASS      HX HEART CATHETERIZATION      s/p PCI with stenting in 1998     HX KNEE REPLACEMENT Bilateral     HX SHOULDER REPLACEMENT Right     x 2      Current Outpatient Medications on File Prior to Visit   Medication Sig Dispense Refill    latanoprost (XALATAN) 0.005 % ophthalmic solution ADMINISTER 1 DROP INTO BOTH EYES NIGHTLY 2.5 mL 1    nortriptyline (PAMELOR) 50 mg capsule TAKE ONE CAPSULE EVERY DAY AT BEDTIME      Omeprazole delayed release (PRILOSEC D/R) 20 mg tablet Take 1 Tab by mouth daily. 30 Tab 2    diclofenac EC (VOLTAREN) 75 mg EC tablet Take 1 Tab by mouth two (2) times a day. 20 Tab 0    leflunomide (ARAVA) 20 mg tablet Take 1 Tab by mouth daily. Take half tab daily for 7 days and then one tab daily if tolerated 90 Tab 0    furosemide (LASIX) 20 mg tablet TAKE 1 TABLET BY MOUTH EVERY DAY 90 Tab 0    triamcinolone acetonide (KENALOG) 0.1 % dental paste APPLY WITH Q-TIP TWICE DAILY TO AFFECTED ORAL ULCERS UNTIL THEY HEAL 5 g 0    adalimumab (HUMIRA,CF, PEN) 40 mg/0.4 mL pnkt 40 mg by SubCUTAneous route every fourteen (14) days. Indications: rheumatoid arthritis 2 Kit 11    sertraline (ZOLOFT) 50 mg tablet Take 1 Tab by mouth daily. 90 Tab 1    albuterol sulfate (PROVENTIL;VENTOLIN) 2.5 mg/0.5 mL nebu nebulizer solution 0.5 mL by Nebulization route every four (4) hours as needed for Wheezing. Indications: Asthma Attack 30 mL 0    ferrous sulfate (SLOW FE) 47.5 mg iron TbER tablet Take 1 Tab by mouth daily.  (Patient taking differently: Take 1 Tab by mouth every seven (7) days.) 90 Tab 1    gabapentin (NEURONTIN) 100 mg capsule Take 1 capsule in the morning and take 3 capsules before bedtime 120 Cap 2    ergocalciferol (ERGOCALCIFEROL) 50,000 unit capsule Take 1 Cap by mouth every seven (7) days. 12 Cap 3    magnesium 200 mg tab Take 100 mg by mouth daily.  metFORMIN (GLUCOPHAGE) 500 mg tablet Take 1 Tab by mouth two (2) times daily (with meals). (Patient taking differently: Take 500 mg by mouth daily (with breakfast). ) 60 Tab 5    fluticasone-vilanterol (BREO ELLIPTA) 100-25 mcg/dose inhaler Take 1 Puff by inhalation daily. 1 Inhaler 5    aspirin delayed-release 81 mg tablet Take  by mouth daily.  cyanocobalamin (VITAMIN B-12) 1,000 mcg sublingual tablet Take 1,000 mcg by mouth daily.  metoprolol tartrate (LOPRESSOR) 25 mg tablet Take  by mouth two (2) times a day.  simvastatin (ZOCOR) 20 mg tablet Take  by mouth nightly. No current facility-administered medications on file prior to visit. Objective:     Vitals:    11/12/19 1517   BP: 139/65   Pulse: 85   Resp: 16   Temp: 97.6 °F (36.4 °C)   TempSrc: Oral   SpO2: 100%   Weight: 200 lb (90.7 kg)   Height: 5' 4.5\" (1.638 m)     Physical Examination:  General appearance - alert, well appearing, and in no distress  Eyes -sclera anicteric  Neck - supple, no significant adenopathy, no thyromegaly, no bruits  Chest - clear to auscultation, no wheezes, rales or rhonchi, symmetric air entry  Heart - normal rate, regular rhythm, normal S1, S2, no murmurs, rubs, clicks or gallops  Neurological - alert, oriented, no focal findings or movement disorder noted  Extremities-no edema  Psych-normal mood and affect    EKG - nsr, no st changes    Assessment/ Plan:   Diagnoses and all orders for this visit:    1. Preop examination- patient cleared for surgery pending labs. Moderate risk patient for moderate risk surgery. No complications with previous surgeries or anesthesia. -     CBC WITH AUTOMATED DIFF  -     METABOLIC PANEL, BASIC    2.  PAD (peripheral artery disease) ([facility] [address]- for surgery as above    3. Coronary artery disease due to lipid rich plaque  4. Status post angioplasty with stent  -No recent echo or stress test so getting further evaluation with nuclear stress test especially since patient is not following with cardiology  -     NUCLEAR CARDIAC STRESS TEST; Future    5. Sarcoidosis of lung (HCC)-stable issue, patient not following with pulmonology  -     CBC WITH AUTOMATED DIFF  -     METABOLIC PANEL, BASIC    6. Seronegative rheumatoid arthritis of multiple sites [facility])- doing well and following with rheumatology  -     CBC WITH AUTOMATED DIFF  -     METABOLIC PANEL, BASIC    7. MICKI (obstructive sleep apnea)- sent to sleep medicine at last appointment. Not using CPAP regularly since after her bariatric surgery years ago    8. Type 2 diabetes mellitus with complication, without long-term current use of insulin ([facility])-well-controlled    9. Essential hypertension-controlled  -     METABOLIC PANEL, BASIC    10. Mixed hyperlipidemia-stable    11. WORKMAN (dyspnea on exertion)-very minimal symptoms upon further questioning but would still like to get a nuclear stress test given her history of previous stenting  -     NUCLEAR CARDIAC STRESS TEST; Future    12. Encounter for long-term (current) use of medications  -     CBC WITH AUTOMATED DIFF  -     METABOLIC PANEL, BASIC    I have discussed the diagnosis with the patient and the intended plan as seen in the above orders. The patient has received an after-visit summary and questions were answered concerning future plans. I have discussed medication side effects and warnings with the patient as well. The patient verbalizes understanding and agreement with the plan. Follow-up and Dispositions    · Return in about 6 weeks (around 12/24/2019), or if symptoms worsen or fail to improve.

## 2019-11-12 NOTE — TELEPHONE ENCOUNTER
----- Message from Kimmy Joe sent at 11/11/2019  5:11 PM EST -----  Regarding: Dr. Grover Tyler  Patient return call    Caller's first and last name and relationship (if not the patient):      Best contact number(s):  [phone] or [phone]    Whose call is being returned:  Pt did not know.       Details to clarify the request:      Kimmy Joe

## 2019-11-13 LAB
BASOPHILS # BLD AUTO: 0.1 X10E3/UL (ref 0–0.2)
BASOPHILS NFR BLD AUTO: 2 %
BUN SERPL-MCNC: 16 MG/DL (ref 8–27)
BUN/CREAT SERPL: 18 (ref 12–28)
CALCIUM SERPL-MCNC: 8 MG/DL (ref 8.7–10.3)
CHLORIDE SERPL-SCNC: 110 MMOL/L (ref 96–106)
CO2 SERPL-SCNC: 23 MMOL/L (ref 20–29)
CREAT SERPL-MCNC: 0.87 MG/DL (ref 0.57–1)
EOSINOPHIL # BLD AUTO: 0.2 X10E3/UL (ref 0–0.4)
EOSINOPHIL NFR BLD AUTO: 4 %
ERYTHROCYTE [DISTWIDTH] IN BLOOD BY AUTOMATED COUNT: 11.6 % (ref 12.3–15.4)
GLUCOSE SERPL-MCNC: 137 MG/DL (ref 65–99)
HCT VFR BLD AUTO: 33 % (ref 34–46.6)
HGB BLD-MCNC: 11.2 G/DL (ref 11.1–15.9)
IMM GRANULOCYTES # BLD AUTO: 0 X10E3/UL (ref 0–0.1)
IMM GRANULOCYTES NFR BLD AUTO: 0 %
LYMPHOCYTES # BLD AUTO: 1.9 X10E3/UL (ref 0.7–3.1)
LYMPHOCYTES NFR BLD AUTO: 40 %
MCH RBC QN AUTO: 30.9 PG (ref 26.6–33)
MCHC RBC AUTO-ENTMCNC: 33.9 G/DL (ref 31.5–35.7)
MCV RBC AUTO: 91 FL (ref 79–97)
MONOCYTES # BLD AUTO: 0.7 X10E3/UL (ref 0.1–0.9)
MONOCYTES NFR BLD AUTO: 14 %
NEUTROPHILS # BLD AUTO: 1.8 X10E3/UL (ref 1.4–7)
NEUTROPHILS NFR BLD AUTO: 40 %
PLATELET # BLD AUTO: 236 X10E3/UL (ref 150–450)
POTASSIUM SERPL-SCNC: 3.8 MMOL/L (ref 3.5–5.2)
RBC # BLD AUTO: 3.62 X10E6/UL (ref 3.77–5.28)
SODIUM SERPL-SCNC: 146 MMOL/L (ref 134–144)
WBC # BLD AUTO: 4.6 X10E3/UL (ref 3.4–10.8)

## 2019-11-14 ENCOUNTER — HOSPITAL ENCOUNTER (OUTPATIENT)
Dept: NON INVASIVE DIAGNOSTICS | Age: 74
Discharge: HOME | End: 2019-11-14
Attending: FAMILY MEDICINE
Payer: MEDICARE

## 2019-11-14 ENCOUNTER — DOCUMENTATION ONLY (OUTPATIENT)
Dept: FAMILY MEDICINE CLINIC | Age: 74
End: 2019-11-14

## 2019-11-14 ENCOUNTER — HOSPITAL ENCOUNTER (OUTPATIENT)
Dept: NUCLEAR MEDICINE | Age: 74
Discharge: HOME | End: 2019-11-14
Attending: FAMILY MEDICINE
Payer: MEDICARE

## 2019-11-14 VITALS
SYSTOLIC BLOOD PRESSURE: 183 MMHG | BODY MASS INDEX: 34.15 KG/M2 | DIASTOLIC BLOOD PRESSURE: 84 MMHG | HEIGHT: 64 IN | WEIGHT: 200 LBS

## 2019-11-14 DIAGNOSIS — I25.10 CORONARY ARTERY DISEASE DUE TO LIPID RICH PLAQUE: ICD-10-CM

## 2019-11-14 DIAGNOSIS — I25.83 CORONARY ARTERY DISEASE DUE TO LIPID RICH PLAQUE: ICD-10-CM

## 2019-11-14 DIAGNOSIS — R06.09 DOE (DYSPNEA ON EXERTION): ICD-10-CM

## 2019-11-14 DIAGNOSIS — Z95.820 STATUS POST ANGIOPLASTY WITH STENT: ICD-10-CM

## 2019-11-14 PROCEDURE — 93017 CV STRESS TEST TRACING ONLY: CPT

## 2019-11-14 PROCEDURE — 74011250636 HC RX REV CODE- 250/636: Performed by: SPECIALIST

## 2019-11-14 PROCEDURE — 78452 HT MUSCLE IMAGE SPECT MULT: CPT

## 2019-11-14 RX ORDER — SODIUM CHLORIDE 0.9 % (FLUSH) 0.9 %
20 SYRINGE (ML) INJECTION
Status: COMPLETED | OUTPATIENT
Start: 2019-11-14 | End: 2019-11-14

## 2019-11-14 RX ADMIN — REGADENOSON 0.4 MG: 0.08 INJECTION, SOLUTION INTRAVENOUS at 10:36

## 2019-11-14 RX ADMIN — Medication 20 ML: at 10:36

## 2019-11-14 NOTE — PERIOP NOTES
Pt's PCP wanted her to have nuclear stress completed before surgery. She had it done today at [facility], results can't be guaranteed back before 7:30 start tomorrow. Dr. Sherly Islas reviewed, case needs moved. Spoke with Hailey Tesfaye, case can be done at 1pm if okay with Dr. Robert Bertrand, reached out to Dr. Robert Bertrand, cannot do any time but 7:30, requested case moved to Monday, to follow others. Pt's case moved from Friday 11/15/2019 to Monday 11/18/2019, pt called and notified.

## 2019-11-15 ENCOUNTER — ANESTHESIA EVENT (OUTPATIENT)
Dept: SURGERY | Age: 74
End: 2019-11-15
Payer: MEDICARE

## 2019-11-18 ENCOUNTER — ANESTHESIA (OUTPATIENT)
Dept: SURGERY | Age: 74
End: 2019-11-18
Payer: MEDICARE

## 2019-11-18 ENCOUNTER — HOSPITAL ENCOUNTER (OUTPATIENT)
Age: 74
Setting detail: OUTPATIENT SURGERY
Discharge: HOME | End: 2019-11-18
Attending: PODIATRIST | Admitting: PODIATRIST
Payer: MEDICARE

## 2019-11-18 ENCOUNTER — TELEPHONE (OUTPATIENT)
Dept: CARDIOLOGY CLINIC | Age: 74
End: 2019-11-18

## 2019-11-18 VITALS
HEART RATE: 81 BPM | SYSTOLIC BLOOD PRESSURE: 152 MMHG | BODY MASS INDEX: 32.65 KG/M2 | RESPIRATION RATE: 16 BRPM | WEIGHT: 196 LBS | HEIGHT: 65 IN | OXYGEN SATURATION: 99 % | TEMPERATURE: 97.7 F | DIASTOLIC BLOOD PRESSURE: 93 MMHG

## 2019-11-18 DIAGNOSIS — G89.18 POST-OPERATIVE PAIN: Primary | ICD-10-CM

## 2019-11-18 LAB
GLUCOSE BLD STRIP.AUTO-MCNC: 83 MG/DL (ref 65–100)
GLUCOSE BLD STRIP.AUTO-MCNC: 90 MG/DL (ref 65–100)
SERVICE CMNT-IMP: NORMAL
SERVICE CMNT-IMP: NORMAL
STRESS BASELINE HR: 71 BPM
STRESS ESTIMATED WORKLOAD: 1 METS
STRESS EXERCISE DUR MIN: NORMAL
STRESS PEAK DIAS BP: 84 MMHG
STRESS PEAK SYS BP: 183 MMHG
STRESS PERCENT HR ACHIEVED: 57 %
STRESS POST PEAK HR: 85 BPM
STRESS RATE PRESSURE PRODUCT: NORMAL BPM*MMHG
STRESS ST DEPRESSION: 0 MM
STRESS ST ELEVATION: 0 MM
STRESS TARGET HR: 148 BPM

## 2019-11-18 PROCEDURE — 87176 TISSUE HOMOGENIZATION CULTR: CPT

## 2019-11-18 PROCEDURE — 88305 TISSUE EXAM BY PATHOLOGIST: CPT

## 2019-11-18 PROCEDURE — 87205 SMEAR GRAM STAIN: CPT

## 2019-11-18 PROCEDURE — 77030000032 HC CUF TRNQT ZIMM -B: Performed by: PODIATRIST

## 2019-11-18 PROCEDURE — 77030011640 HC PAD GRND REM COVD -A: Performed by: PODIATRIST

## 2019-11-18 PROCEDURE — 82962 GLUCOSE BLOOD TEST: CPT

## 2019-11-18 PROCEDURE — 77030021352 HC CBL LD SYS DISP COVD -B: Performed by: PODIATRIST

## 2019-11-18 PROCEDURE — 76060000061 HC AMB SURG ANES 0.5 TO 1 HR: Performed by: PODIATRIST

## 2019-11-18 PROCEDURE — 74011250636 HC RX REV CODE- 250/636: Performed by: PODIATRIST

## 2019-11-18 PROCEDURE — 74011000250 HC RX REV CODE- 250: Performed by: PODIATRIST

## 2019-11-18 PROCEDURE — 76210000050 HC AMBSU PH II REC 0.5 TO 1 HR: Performed by: PODIATRIST

## 2019-11-18 PROCEDURE — 77030006821 HC BLD SAW SAG BRSM -A: Performed by: PODIATRIST

## 2019-11-18 PROCEDURE — 77030002916 HC SUT ETHLN J&J -A: Performed by: PODIATRIST

## 2019-11-18 PROCEDURE — 74011250636 HC RX REV CODE- 250/636: Performed by: NURSE ANESTHETIST, CERTIFIED REGISTERED

## 2019-11-18 PROCEDURE — 76210000040 HC AMBSU PH I REC FIRST 0.5 HR: Performed by: PODIATRIST

## 2019-11-18 PROCEDURE — 74011250636 HC RX REV CODE- 250/636: Performed by: ANESTHESIOLOGY

## 2019-11-18 PROCEDURE — 87075 CULTR BACTERIA EXCEPT BLOOD: CPT

## 2019-11-18 PROCEDURE — 76030000000 HC AMB SURG OR TIME 0.5 TO 1: Performed by: PODIATRIST

## 2019-11-18 PROCEDURE — 77030020255 HC SOL INJ LR 1000ML BG: Performed by: PODIATRIST

## 2019-11-18 PROCEDURE — 77030018836 HC SOL IRR NACL ICUM -A: Performed by: PODIATRIST

## 2019-11-18 PROCEDURE — 88311 DECALCIFY TISSUE: CPT

## 2019-11-18 PROCEDURE — 88307 TISSUE EXAM BY PATHOLOGIST: CPT

## 2019-11-18 RX ORDER — SODIUM CHLORIDE, SODIUM LACTATE, POTASSIUM CHLORIDE, CALCIUM CHLORIDE 600; 310; 30; 20 MG/100ML; MG/100ML; MG/100ML; MG/100ML
INJECTION, SOLUTION INTRAVENOUS
Status: DISCONTINUED | OUTPATIENT
Start: 2019-11-18 | End: 2019-11-18 | Stop reason: HOSPADM

## 2019-11-18 RX ORDER — PROPOFOL 10 MG/ML
INJECTION, EMULSION INTRAVENOUS
Status: DISCONTINUED | OUTPATIENT
Start: 2019-11-18 | End: 2019-11-18 | Stop reason: HOSPADM

## 2019-11-18 RX ORDER — SODIUM CHLORIDE 0.9 % (FLUSH) 0.9 %
5-40 SYRINGE (ML) INJECTION AS NEEDED
Status: DISCONTINUED | OUTPATIENT
Start: 2019-11-18 | End: 2019-11-18 | Stop reason: HOSPADM

## 2019-11-18 RX ORDER — ONDANSETRON 2 MG/ML
INJECTION INTRAMUSCULAR; INTRAVENOUS AS NEEDED
Status: DISCONTINUED | OUTPATIENT
Start: 2019-11-18 | End: 2019-11-18 | Stop reason: HOSPADM

## 2019-11-18 RX ORDER — SODIUM CHLORIDE, SODIUM LACTATE, POTASSIUM CHLORIDE, CALCIUM CHLORIDE 600; 310; 30; 20 MG/100ML; MG/100ML; MG/100ML; MG/100ML
25 INJECTION, SOLUTION INTRAVENOUS CONTINUOUS
Status: DISCONTINUED | OUTPATIENT
Start: 2019-11-18 | End: 2019-11-18 | Stop reason: HOSPADM

## 2019-11-18 RX ORDER — WATER FOR INJECTION,STERILE
VIAL (ML) INJECTION
Status: DISCONTINUED
Start: 2019-11-18 | End: 2019-11-18 | Stop reason: HOSPADM

## 2019-11-18 RX ORDER — MORPHINE SULFATE 10 MG/ML
2 INJECTION, SOLUTION INTRAMUSCULAR; INTRAVENOUS
Status: DISCONTINUED | OUTPATIENT
Start: 2019-11-18 | End: 2019-11-18 | Stop reason: HOSPADM

## 2019-11-18 RX ORDER — SODIUM CHLORIDE 0.9 % (FLUSH) 0.9 %
5-40 SYRINGE (ML) INJECTION EVERY 8 HOURS
Status: DISCONTINUED | OUTPATIENT
Start: 2019-11-18 | End: 2019-11-18 | Stop reason: HOSPADM

## 2019-11-18 RX ORDER — MIDAZOLAM HYDROCHLORIDE 1 MG/ML
INJECTION, SOLUTION INTRAMUSCULAR; INTRAVENOUS AS NEEDED
Status: DISCONTINUED | OUTPATIENT
Start: 2019-11-18 | End: 2019-11-18 | Stop reason: HOSPADM

## 2019-11-18 RX ORDER — PROPOFOL 10 MG/ML
INJECTION, EMULSION INTRAVENOUS AS NEEDED
Status: DISCONTINUED | OUTPATIENT
Start: 2019-11-18 | End: 2019-11-18 | Stop reason: HOSPADM

## 2019-11-18 RX ORDER — ONDANSETRON 2 MG/ML
4 INJECTION INTRAMUSCULAR; INTRAVENOUS AS NEEDED
Status: DISCONTINUED | OUTPATIENT
Start: 2019-11-18 | End: 2019-11-18 | Stop reason: HOSPADM

## 2019-11-18 RX ORDER — OXYCODONE AND ACETAMINOPHEN 5; 325 MG/1; MG/1
1 TABLET ORAL
Status: DISCONTINUED | OUTPATIENT
Start: 2019-11-18 | End: 2019-11-18 | Stop reason: HOSPADM

## 2019-11-18 RX ORDER — CEPHALEXIN 500 MG/1
500 CAPSULE ORAL 3 TIMES DAILY
Qty: 30 CAP | Refills: 0 | Status: SHIPPED | OUTPATIENT
Start: 2019-11-18 | End: 2020-02-24

## 2019-11-18 RX ORDER — FENTANYL CITRATE 50 UG/ML
INJECTION, SOLUTION INTRAMUSCULAR; INTRAVENOUS AS NEEDED
Status: DISCONTINUED | OUTPATIENT
Start: 2019-11-18 | End: 2019-11-18 | Stop reason: HOSPADM

## 2019-11-18 RX ORDER — LIDOCAINE HYDROCHLORIDE 10 MG/ML
0.1 INJECTION, SOLUTION EPIDURAL; INFILTRATION; INTRACAUDAL; PERINEURAL AS NEEDED
Status: DISCONTINUED | OUTPATIENT
Start: 2019-11-18 | End: 2019-11-18 | Stop reason: HOSPADM

## 2019-11-18 RX ORDER — HYDROMORPHONE HYDROCHLORIDE 1 MG/ML
.2-.5 INJECTION, SOLUTION INTRAMUSCULAR; INTRAVENOUS; SUBCUTANEOUS ONCE
Status: DISCONTINUED | OUTPATIENT
Start: 2019-11-18 | End: 2019-11-18 | Stop reason: HOSPADM

## 2019-11-18 RX ORDER — DIPHENHYDRAMINE HYDROCHLORIDE 50 MG/ML
12.5 INJECTION, SOLUTION INTRAMUSCULAR; INTRAVENOUS AS NEEDED
Status: DISCONTINUED | OUTPATIENT
Start: 2019-11-18 | End: 2019-11-18 | Stop reason: HOSPADM

## 2019-11-18 RX ORDER — FENTANYL CITRATE 50 UG/ML
25 INJECTION, SOLUTION INTRAMUSCULAR; INTRAVENOUS
Status: DISCONTINUED | OUTPATIENT
Start: 2019-11-18 | End: 2019-11-18 | Stop reason: HOSPADM

## 2019-11-18 RX ORDER — BUPIVACAINE HYDROCHLORIDE 5 MG/ML
INJECTION, SOLUTION EPIDURAL; INTRACAUDAL AS NEEDED
Status: DISCONTINUED | OUTPATIENT
Start: 2019-11-18 | End: 2019-11-18 | Stop reason: HOSPADM

## 2019-11-18 RX ORDER — HYDROCODONE BITARTRATE AND ACETAMINOPHEN 7.5; 325 MG/1; MG/1
1 TABLET ORAL
Qty: 28 TAB | Refills: 0 | Status: SHIPPED | OUTPATIENT
Start: 2019-11-18 | End: 2019-11-25

## 2019-11-18 RX ORDER — CEFAZOLIN SODIUM 1 G/3ML
INJECTION, POWDER, FOR SOLUTION INTRAMUSCULAR; INTRAVENOUS
Status: DISCONTINUED
Start: 2019-11-18 | End: 2019-11-18 | Stop reason: HOSPADM

## 2019-11-18 RX ADMIN — WATER 2 G: 1 INJECTION INTRAMUSCULAR; INTRAVENOUS; SUBCUTANEOUS at 13:20

## 2019-11-18 RX ADMIN — MIDAZOLAM HYDROCHLORIDE 1 MG: 1 INJECTION, SOLUTION INTRAMUSCULAR; INTRAVENOUS at 13:14

## 2019-11-18 RX ADMIN — FENTANYL CITRATE 25 MCG: 50 INJECTION, SOLUTION INTRAMUSCULAR; INTRAVENOUS at 13:15

## 2019-11-18 RX ADMIN — MIDAZOLAM HYDROCHLORIDE 1 MG: 1 INJECTION, SOLUTION INTRAMUSCULAR; INTRAVENOUS at 13:06

## 2019-11-18 RX ADMIN — SODIUM CHLORIDE, SODIUM LACTATE, POTASSIUM CHLORIDE, AND CALCIUM CHLORIDE 25 ML/HR: 600; 310; 30; 20 INJECTION, SOLUTION INTRAVENOUS at 10:47

## 2019-11-18 RX ADMIN — PROPOFOL 40 MG: 10 INJECTION, EMULSION INTRAVENOUS at 13:29

## 2019-11-18 RX ADMIN — ONDANSETRON HYDROCHLORIDE 4 MG: 2 INJECTION, SOLUTION INTRAMUSCULAR; INTRAVENOUS at 13:38

## 2019-11-18 RX ADMIN — SODIUM CHLORIDE, POTASSIUM CHLORIDE, SODIUM LACTATE AND CALCIUM CHLORIDE: 600; 310; 30; 20 INJECTION, SOLUTION INTRAVENOUS at 12:52

## 2019-11-18 RX ADMIN — PROPOFOL 60 MG: 10 INJECTION, EMULSION INTRAVENOUS at 13:14

## 2019-11-18 RX ADMIN — PROPOFOL 40 MCG/KG/MIN: 10 INJECTION, EMULSION INTRAVENOUS at 13:14

## 2019-11-18 RX ADMIN — MIDAZOLAM HYDROCHLORIDE 1 MG: 1 INJECTION, SOLUTION INTRAMUSCULAR; INTRAVENOUS at 13:28

## 2019-11-18 NOTE — PERIOP NOTES
Magdalena Burnette  1945  [id number]    Situation:  Verbal report given from: Twila Foote and KATIANA Neil RN  Procedure: Procedure(s):  AMPUTATION OF RIGHT GREAT TOE (MAC WITH IV SEDATION-LOCAL BLOCK BY SURGEON)    Background:    Preoperative diagnosis: OSTEOMYELITIS ACUTE RIGHT ANKLE OR FOOT    Postoperative diagnosis: OSTEOMYELITIS ACUTE RIGHT ANKLE OR FOOT    :  Dr. Jessie Rutledge    Assistant(s): Circ-1: Elle Cuevas RN  Scrub Tech-1: Jessie Carrington  Scrub Tech-Relief: Tisha Peterson    Specimens:   ID Type Source Tests Collected by Time Destination   1 : Bone Margin - Right Hallux Preservative Toe  [facility] 11/18/2019 1322 Pathology   2 : Osteomylitis Right Hallux Preservative Toe  [facility] 11/18/2019 1335 Pathology   1 : Osteomylitis Right Hallux Tissue Toe CULTURE, ANAEROBIC, CULTURE, TISSUE [facility] 11/18[facility]2019 1320 Microbiology       Assessment:  Intra-procedure medications         Anesthesia gave intra-procedure sedation and medications, see anesthesia flow sheet     Intravenous fluids: LR@ KVO     Vital signs stable       Recommendation:    Permission to share finding with friend Junior Runner and sonTarlton

## 2019-11-18 NOTE — PERIOP NOTES
Permission received to review discharge instructions and discuss private health information with Bria Brito, friend and Gutierrez Eastons, son.

## 2019-11-18 NOTE — TELEPHONE ENCOUNTER
Annie from Ambulatory surgery is calling because pt has a scheduled surgery for today at 10:30 AM and needs Dr. Salvador Chahal to finalize the echo test that he has read.  Thanks CF    Phone: [phone]

## 2019-11-18 NOTE — PERIOP NOTES
Checked CC, stress is still in preliminary results. Reached out to [facility], cath lab and nuclear side. I was told Dr. Carlitos Gar needs to read his portion and sign off to finalize. The cath lab was to page him and ask if he could do this. I also called his office and asked for the nurse to have him do this. I reached out to Dr. Juan Carlos Knight to see how he wanted to proceed. He wanted me to ask the pt how she wants to proceed. He stated she can come in knowing that this is not done and if it is not finalized she will have to be cancelled. I reached out to the pt, she is coming in. She states she is miserable and in pain, unable to sleep. She will call her PCP to see if they can get this test read. The patient verbalized understanding that if this test is not finalized, she will be cancelled and not done. donna and Emma notified.

## 2019-11-18 NOTE — ANESTHESIA PREPROCEDURE EVALUATION
Anesthetic History   No history of anesthetic complications            Review of Systems / Medical History  Patient summary reviewed, nursing notes reviewed and pertinent labs reviewed    Pulmonary            Asthma (no recent problems)     Comments: Sarcoidosis   Neuro/Psych   Within defined limits           Cardiovascular    Hypertension          CAD, PAD and cardiac stents (1998)      Comments: Negative Stress test 11/19   GI/Hepatic/Renal     GERD (improved/ takes med prn)      Hiatal hernia     Endo/Other    Diabetes: type 2    Morbid obesity and arthritis (Rheumatoid)     Other Findings   Comments: DDD  Glaucoma  Osteomyelitis right great toe           Physical Exam    Airway  Mallampati: I  TM Distance: 4 - 6 cm  Neck ROM: normal range of motion   Mouth opening: Normal     Cardiovascular    Rhythm: regular  Rate: normal         Dental         Pulmonary  Breath sounds clear to auscultation               Abdominal  GI exam deferred       Other Findings            Anesthetic Plan    ASA: 3  Anesthesia type: MAC and general - backup          Induction: Intravenous  Anesthetic plan and risks discussed with: Patient      preop glucose 90.  **Last took BB 11/14/19

## 2019-11-18 NOTE — TELEPHONE ENCOUNTER
Verified patient with two types of identifiers. Gave Annie the back line to get in touch with me faster in the future.

## 2019-11-18 NOTE — DISCHARGE INSTRUCTIONS
Thinks To Do Until Your 1st Post Operative Appointment     Go directly home, elevate your feet on the way at least TWO PILLOW height and rest for at least 72 hours, getting up only as necessary.  Elevate your feet about above  hip level by supporting your feet and legs with at least two pillow once at home.  Alternat use of ice packs BEHIND YOUR KNEE (30 min. ON, 30 min. Off) while you are awake. Make sure the ice pack does not leak by wrapping it in a towel.  Keep your foot, ankle and lower leg dry. I would suggest sponge bathing for the first three days if you feel you will soil your dressing. Use a shower cover if you decide to bathe. You can purchase a shower cover from our office for $35.  Keep your bandages clean and dry. Avoid areas of wetness I.e., pools, jacuzzis, beaches, sunbathing.  Start taking your ANTIBIOTIC the morning after your surgery. Be sure to eat a full meal prior to taking your antibiotic to avoid stomach upset.  Your pain medication may cause constipation. If you experience constipation, I suggest Miralax or Dulcolax OTC medications.  Get plenty of rest, drink plenty of fluids, and eat a regular well balanced diet.  Exercise you legs by bending your knees your knees and ankles to stimulate circulation and healing at least every 2 hours while awake.  If you have discomfort, take your pain medication before it becomes intolerable.  Your surgeon states that you  o CAN DRIVE IF YOU ARE NOT TAKING PAIN MEDICATION     Your surgeon state that you  o CAN WALK AS NECESSARY IN YOUR POST OP SHOE OR BOOT     Your surgeon state that your are NOT allowed to  o  [address] [facility] [facility] [address] HEAT TO THE DRESSING  o SMOKE OR USE ALCOHOL WITH YOUR MEDICATION    DO NOT TAKE TYLENOL/ACETAMINOPHEN WITH [address].     TAKE NARCOTIC PAIN MEDICATIONS WITH FOOD     Narcotics tend to be constipating, we suggest taking a stool softener such as Colace or Miralax (follow package instructions). DO NOT DRIVE WHILE TAKING NARCOTIC PAIN MEDICATIONS. DO NOT TAKE SLEEPING MEDICATIONS OR ANTIANXIETY MEDICATIONS WHILE TAKING NARCOTIC PAIN MEDICATIONS,  ESPECIALLY THE NIGHT OF ANESTHESIA! CPAP PATIENTS BE SURE TO WEAR MACHINE WHENEVER NAPPING OR SLEEPING! TO PREVENT AN INFECTION      1. [address] YOUR HANDS     To prevent infection, good handwashing is the most important thing you or your caregiver can do.  Wash your hands with soap and water or use the hand  we gave you before you touch any wounds. 2. SHOWER     Use the antibacterial soap we gave you when you take a shower.  Shower with this soap until your wounds are healed.  To reach all areas of your body, you may need someone to help you.  Dont forget to clean your belly button with every shower. 3.  USE CLEAN SHEETS     Use freshly cleaned sheets on your bed after surgery.  To keep the surgery site clean, do not allow pets to sleep with you while your wound is still healing. 4. STOP SMOKING     Stop smoking, or at least cut back on smoking     Smoking slows your healing. 5.  CONTROL YOUR BLOOD SUGAR     High blood sugars slow wound healing.  If you are diabetic, control your blood sugar levels before and after your surgery. DISCHARGE SUMMARY from Nurse    The following personal items collected during your admission are returned to you:   Dental Appliance: Dental Appliances: None  Vision: Visual Aid: Glasses(son)  Hearing Aid:    Jewelry: Jewelry: None  Clothing: Clothing: With patient  Other Valuables: Other Valuables: Eyeglasses, Cane(son)  Valuables sent to safe:        PATIENT INSTRUCTIONS:    After General Anesthesia or Intravenous Sedation, for 24 hours or while taking prescription Narcotics:        Someone should be with you for the next 24 hours. For your own safety, a responsible adult must drive you home.   · Limit your activities  · Recommended activity: Rest today, up with assistance today. Do not climb stairs or shower unattended for the next 24 hours. · Please start with a soft bland diet and advance as tolerated (no nausea) to regular diet. · If you have a sore throat you should try the following: fluids, warm salt water gargles, or throat lozenges. If it does not improve after several days please follow up with your primary physician. · Do not drive and operate hazardous machinery  · Do not make important personal or business decisions  · Do  not drink alcoholic beverages  · If you have not urinated within 8 hours after discharge, please contact your surgeon on call. Report the following to your surgeon:  · Excessive pain, swelling, redness or odor of or around the surgical area  · Temperature over 100.5  · Nausea and vomiting lasting longer than 4 hours or if unable to take medications  · Any signs of decreased circulation or nerve impairment to extremity: change in color, persistent  numbness, tingling, coldness or increase pain      · You will receive a Post Operative Call from one of the Recovery Room Nurses on the day after your surgery to check on you. It is very important for us to know how you are recovering after your surgery. If you have an issue or need to speak with someone, please call your surgeon, do not wait for the post operative call. · You may receive an e-mail or letter in the mail from [employer] regarding your experience with us in the Ambulatory Surgery Unit. Your feedback is valuable to us and we appreciate your participation in the survey. · If the above instructions are not adequate or you are having problems after your surgery, call the physician at their office number. · We wish you a speedy recovery ? What to do at Home:      *  Please give a list of your current medications to your Primary Care Provider.     *  Please update this list whenever your medications are discontinued, doses are      changed, or new medications (including over-the-counter products) are added. *  Please carry medication information at all times in case of emergency situations. If you have not received your influenza and/or pneumococcal vaccine, please follow up with your primary care physician. The discharge information has been reviewed with the patient and caregiver. The patient and caregiver verbalized understanding.

## 2019-11-18 NOTE — BRIEF OP NOTE
BRIEF OPERATIVE NOTE    Date of Procedure: 11/18/2019   Preoperative Diagnosis: OSTEOMYELITIS RIGHT HALLUX  Postoperative Diagnosis: OSTEOMYELITIS RIGHT HALLUX  Procedure(s):  AMPUTATION OF RIGHT GREAT TOE AT IPJ (MAC WITH IV SEDATION-LOCAL BLOCK BY SURGEON)  Surgeon(s) and Role:     * Paulie Stewart DPM - Primary         Surgical Assistant: n/a    Surgical Staff:  Circ-1: Colleen Mackenzie RN  Scrub Tech-1: Valdez Guerrier  Scrub Tech-Relief: Nguyendavid Santos  Event Time In Time Out   Incision Start 1314    Incision Close 1338      Anesthesia: MAC   Estimated Blood Loss: 10 ml  Specimens:   ID Type Source Tests Collected by Time Destination   1 : Bone Margin - Right Hallux Preservative Toe  Britany Soni DPM 11/18/2019 1322 Pathology   2 : Osteomylitis Right Hallux Preservative Toe  Britany Soni DPM 11/18/2019 1335 Pathology   1 : Osteomylitis Right Hallux Tissue Toe CULTURE, ANAEROBIC, CULTURE, TISSUE W GRAM STAIN Britany Soni DPM 11/18/2019 1320 Microbiology      Findings: Exposed bone right hallux   Complications: none  Implants: * No implants in log *

## 2019-11-18 NOTE — ANESTHESIA POSTPROCEDURE EVALUATION
Procedure(s):  AMPUTATION OF RIGHT GREAT TOE (MAC WITH IV SEDATION-LOCAL BLOCK BY SURGEON).     MAC, general - backup    Anesthesia Post Evaluation      Multimodal analgesia: multimodal analgesia used between 6 hours prior to anesthesia start to PACU discharge  Patient location during evaluation: PACU  Patient participation: complete - patient participated  Level of consciousness: awake  Pain score: 0  Airway patency: patent  Anesthetic complications: no  Cardiovascular status: acceptable  Respiratory status: acceptable  Hydration status: acceptable  Post anesthesia nausea and vomiting:  none      Vitals Value Taken Time   /69 11/18/2019  2:00 PM   Temp 36.5 °C (97.7 °F) 11/18/2019  1:51 PM   Pulse 82 11/18/2019  2:00 PM   Resp 17 11/18/2019  2:00 PM   SpO2 99 % 11/18/2019  2:00 PM

## 2019-11-19 NOTE — OP NOTES
[address]  OPERATIVE REPORT    Name:  Brit Jett  MR#:  [id number]  :  1945  ACCOUNT #:  [de-identified]  DATE OF SERVICE:  2019    PREOPERATIVE DIAGNOSIS:  Osteomyelitis, right hallux. POSTOPERATIVE DIAGNOSIS:  Osteomyelitis, right hallux. PROCEDURE PERFORMED:  Partial amputation, right hallux at interphalangeal joint. SURGEON:  Dieudonne Che DPM    ASSISTANT:  Not applicable. ANESTHESIA:  IV sedation with local digital block. SPECIMENS REMOVED:  1. Aerobic and anaerobic bone cultures. 2.  Osteomyelitis, right hallux. 3.  Bone margin, right hallux. COMPLICATIONS:  None. IMPLANTS:  None. ESTIMATED BLOOD LOSS:  10 mL. PROCEDURE DETAIL:  The patient was brought into the operating room and placed supine upon the OR table. After administration of IV sedation, I injected the right hallux with 6 mL of 0.5% plain Marcaine. The foot was prepped and draped in the usual sterile technique. The foot was elevated for 3 minutes before the inflation of the tourniquet around the right ankle to a pressure of 250 mmHg. Attention was directed to the right hallux. A roughly fishmouth shaped incision was made beginning dorsally at the interphalangeal joint and extending along the medial and lateral aspects of the toe distally until they converged at the tip of the right hallux. The interphalangeal joint right hallux was disarticulated with a #15 blade and the dorsal soft tissue and distal phalanx were sharply dissected away from the remainder of the toe. A small rongeur was used to obtain samples of bone from the end of the distal phalanx and these were placed into aerobic and anaerobic culture tubes. The remainder of the toe will be sent to pathology as a specimen labeled osteomyelitis, right hallux. A power microsagittal saw was used to remove the head of the proximal phalanx, right hallux, and this will be sent to pathology labeled as bone margin. The edges of the bone were smoothed with a hand rasp. The surgical site was flushed well with sterile irrigation and then closure was carried out utilizing 4-0 nylon for closure of the skin with no deep sutures utilized. The tourniquet was released and capillary refill was less than 3 seconds to all toes. The foot was cleaned and dried and then a dry sterile dressing with Adaptic over the surgical incision was applied. The patient was transported to the recovery room with vital signs stable and vascular status intact. She will remain until deemed stable for discharge home. She has written and verbal instructions as well as prescription for pain medication and postoperative antibiotics. She will follow up in my office in approximately 1 week.         Rocio Moreno DPM [id number]  D:  11/18/2019 14:02  T:  11/18/2019 23:59  JOB #:  [id number]

## 2019-11-22 ENCOUNTER — OFFICE VISIT (OUTPATIENT)
Dept: RHEUMATOLOGY | Age: 74
End: 2019-11-22

## 2019-11-22 VITALS
WEIGHT: 199 LBS | RESPIRATION RATE: 18 BRPM | DIASTOLIC BLOOD PRESSURE: 79 MMHG | SYSTOLIC BLOOD PRESSURE: 180 MMHG | BODY MASS INDEX: 33.97 KG/M2 | HEART RATE: 103 BPM | HEIGHT: 64 IN | TEMPERATURE: 98 F

## 2019-11-22 DIAGNOSIS — E55.9 VITAMIN D DEFICIENCY: ICD-10-CM

## 2019-11-22 DIAGNOSIS — D47.2 MGUS (MONOCLONAL GAMMOPATHY OF UNKNOWN SIGNIFICANCE): ICD-10-CM

## 2019-11-22 DIAGNOSIS — Z79.60 LONG-TERM USE OF IMMUNOSUPPRESSANT MEDICATION: ICD-10-CM

## 2019-11-22 DIAGNOSIS — M85.89 OSTEOPENIA OF MULTIPLE SITES: ICD-10-CM

## 2019-11-22 DIAGNOSIS — M06.09 SERONEGATIVE RHEUMATOID ARTHRITIS OF MULTIPLE SITES (HCC): Primary | ICD-10-CM

## 2019-11-22 LAB
BACTERIA SPEC CULT: NORMAL
BACTERIA SPEC CULT: NORMAL
GRAM STN SPEC: NORMAL
GRAM STN SPEC: NORMAL
SERVICE CMNT-IMP: NORMAL
SERVICE CMNT-IMP: NORMAL

## 2019-11-22 RX ORDER — NAPROXEN 500 MG/1
500 TABLET ORAL 2 TIMES DAILY WITH MEALS
COMMUNITY
End: 2021-03-24 | Stop reason: SDUPTHER

## 2019-11-22 RX ORDER — LEFLUNOMIDE 20 MG/1
20 TABLET ORAL DAILY
Qty: 90 TAB | Refills: 0 | Status: SHIPPED | OUTPATIENT
Start: 2019-11-22 | End: 2020-02-24 | Stop reason: SDUPTHER

## 2019-11-22 NOTE — LETTER
11/22/19 Patient: Epi Norris YOB: 1945 Date of Visit: 11/22/2019 Paola Wing MD 
[address] [place] [id number] VIA In Basket Dear Paola Wing MD, Thank you for referring Ms. Epi Norris to [facility] for evaluation. My notes for this consultation are attached. If you have questions, please do not hesitate to call me. I look forward to following your patient along with you.  
 
 
Sincerely, 
 
Teena Garcia MD

## 2019-11-22 NOTE — PATIENT INSTRUCTIONS
Please call [facility] and inform them that I will be changing your Humira to every 7 days (Every Thursday)

## 2019-11-22 NOTE — PROGRESS NOTES
REASON FOR VISIT    This is a follow-up visit for Ms. Marion Melendez for     ICD-10-CM   1. Seronegative rheumatoid arthritis of multiple sites ([facility] M06.09     Inflammatory arthritis phenotype includes:  Anti-CCP positive: no  Rheumatoid factor positive: no  Erosive disease: no  Extra-articular manifestations include: MGUS    Immunosuppression Screening (8/23/2019): Quantiferon TB: negative  PPD:  Not performed  Hepatitis B: negative  Hepatitis C: negative    Therapy History includes:  Current DMARD therapy includes: leflunomide 20 mg daily (8/19/2019 to present), Humira 40 mg every 14 days (8/03/2019 to present)  Prior DMARD therapy includes: methotrexate 20 mg every Saturday, methotrexate 20 mg SubQ every Saturday (5/23/2019 to 7/29/2019) Leti Feliciano (11/29/2018 to 5/23/2019)  The following DMARDs have been ineffective: Simponi Aria   The following DMARDs were stopped because of side effects: methotrexate 20 mg SubQ (aphthous ulcer, injection site breakdown)  Contra-Indicated DMARDs because of history of diverticulitis: ActemraJeremizara    Immunization History   Administered Date(s) Administered    Influenza High Dose Vaccine PF 09/07/2018    Influenza Vaccine (Tri) Adjuvanted 09/24/2019     Patient Active Problem List   Diagnosis Code    Long-term use of immunosuppressant medication Z79.899    Osteopenia of multiple sites M85.89    Vitamin D deficiency E55.9    Rheumatoid arthritis involving multiple sites ([facility] M06.9    Essential hypertension I10    Mixed hyperlipidemia E78.2    RLS (restless legs syndrome) G25.81    Mild intermittent asthma J45.20    Status post angioplasty with stent Z95.820    Coronary artery disease due to lipid rich plaque I25.10, I25.83    Seronegative rheumatoid arthritis of multiple sites ([facility] M06.09    Severe obesity (HCC) E66.01    MGUS (monoclonal gammopathy of unknown significance) D47.2     HISTORY OF PRESENT ILLNESS    Ms. Marion Melendez returns for a follow-up.     On her last visit, I continued leflunomide 20 mg daily and Humira 40 mg every 14 days with good tolerance. She had interval right big toe amputation due to osteomyelitis that apparently was being managed chronically with debridement by podiatry for almost a year but recently worsened and had seen another podiatrist. Pathology showed clear margins. She is currently on Keflex and has not help her DMARDs. Today, she feels ok. She has sore pain in her hands associated with stiffness lasting 2 hours associated with swelling. Her symptoms tends to improve with use. She notes breakthrough around 7 days. She endorses easy bruising. She denies fever, weight loss, blurred vision, vision loss, oral ulcers, ankle swelling, dry cough, dyspnea, nausea, vomiting, dysphagia, abdominal pain, black or bloody stool, fall since last visit, rash and increased thirst.    Ms. Nicola Carpenter has continued her medications for arthritis and reports good tolerance without significant side effects. Last toxicity monitoring by blood work was done on 9/04/2019 and did not reveal any significant adverse effects, except AST 43    Most recent inflammatory markers from 8/23/2019 revealed a ESR 33 mm/hr and CRP 1 mg/L. The patient has not had any interval hospital admissions, infections, surgeries for right toe osteomyelitis. REVIEW OF SYSTEMS    A comprehensive review of systems was performed and pertinent results are documented in the HPI, review of systems is otherwise non-contributory. PAST MEDICAL HISTORY    She has a past medical history of Asthma, At risk for sleep apnea (11/11/2019), DDD (degenerative disc disease), lumbar, Diabetes ([address] Gastritis, GERD (gastroesophageal reflux disease), Glaucoma, Hiatal hernia, High cholesterol, Hypertension, Peripheral vascular disease ([address] RA (rheumatoid arthritis) ([address] and Sarcoidosis (1999).     FAMILY HISTORY    Her family history includes Alcohol abuse in her brother; Heart Disease in her mother; Liver Disease in her father. SOCIAL HISTORY    She reports that she has never smoked. She has never used smokeless tobacco. She reports current alcohol use of about 2.0 standard drinks of alcohol per week. She reports that she does not use drugs. MEDICATIONS    Current Outpatient Medications   Medication Sig Dispense Refill    naproxen (NAPROSYN) 500 mg tablet Take 500 mg by mouth two (2) times daily (with meals).  leflunomide (ARAVA) 20 mg tablet Take 1 Tab by mouth daily. 90 Tab 0    [START ON 11/28/2019] adalimumab (HUMIRA,CF, PEN) 40 mg/0.4 mL pnkt 40 mg by SubCUTAneous route Every Thursday. Indications: rheumatoid arthritis 4 Kit 11    HYDROcodone-acetaminophen (NORCO) 7.5-325 mg per tablet Take 1 Tab by mouth every six (6) hours as needed for Pain for up to 7 days. Max Daily Amount: 4 Tabs. 28 Tab 0    cephALEXin (KEFLEX) 500 mg capsule Take 1 Cap by mouth three (3) times daily. 30 Cap 0    latanoprost (XALATAN) 0.005 % ophthalmic solution ADMINISTER 1 DROP INTO BOTH EYES NIGHTLY 2.5 mL 1    nortriptyline (PAMELOR) 50 mg capsule TAKE ONE CAPSULE EVERY DAY AT BEDTIME      Omeprazole delayed release (PRILOSEC D/R) 20 mg tablet Take 1 Tab by mouth daily. 30 Tab 2    furosemide (LASIX) 20 mg tablet TAKE 1 TABLET BY MOUTH EVERY DAY 90 Tab 0    sertraline (ZOLOFT) 50 mg tablet Take 1 Tab by mouth daily. 90 Tab 1    albuterol sulfate (PROVENTIL;VENTOLIN) 2.5 mg/0.5 mL nebu nebulizer solution 0.5 mL by Nebulization route every four (4) hours as needed for Wheezing. Indications: Asthma Attack 30 mL 0    gabapentin (NEURONTIN) 100 mg capsule Take 1 capsule in the morning and take 3 capsules before bedtime 120 Cap 2    ergocalciferol (ERGOCALCIFEROL) 50,000 unit capsule Take 1 Cap by mouth every seven (7) days. 12 Cap 3    metFORMIN (GLUCOPHAGE) 500 mg tablet Take 1 Tab by mouth two (2) times daily (with meals). (Patient taking differently: Take 500 mg by mouth daily (with breakfast). ) 60 Tab 5    fluticasone-vilanterol (BREO ELLIPTA) 100-25 mcg/dose inhaler Take 1 Puff by inhalation daily. 1 Inhaler 5    aspirin delayed-release 81 mg tablet Take  by mouth daily.  cyanocobalamin (VITAMIN B-12) 1,000 mcg sublingual tablet Take 1,000 mcg by mouth daily.  metoprolol tartrate (LOPRESSOR) 25 mg tablet Take  by mouth two (2) times a day.  simvastatin (ZOCOR) 20 mg tablet Take  by mouth nightly. ALLERGIES    Allergies   Allergen Reactions    Lisinopril Rash    Methotrexate Hives     PHYSICAL EXAMINATION    Visit Vitals  /79   Pulse (!) 103   Temp 98 °F (36.7 °C)   Resp 18   Ht 5' 4\" (1.626 m)   Wt 199 lb (90.3 kg)   BMI 34.16 kg/m²     Body mass index is 34.16 kg/m². General: Patient is alert, oriented x 3, not in acute distress    HEENT:   Sclerae are not injected and appear moist.  There is no alopecia. Neck is supple     Cardiovascular:  Heart is regular rate and rhythm, no murmurs. Chest:  Lungs are clear to auscultation bilaterally. No rhonchi, wheezes, or crackles. Extremities:  Free of clubbing, cyanosis, edema    Neurological exam:  Muscle strength is full in upper and lower extremities     Skin exam:  There are no alopecia, no discoid lesions, no active Raynaud's, no livedo reticularis, no periungual erythema. Musculoskeletal exam:  A comprehensive musculoskeletal exam was performed for all joints of each upper and lower extremity and assessed for swelling, tenderness and range of motion. Positive results are documented as below:    Decreased ROM of bilateral shoulders (right worse than left) due to pain  Bilateral knee replacements without effusion.   Bilateral ankle swelling with tenderness (RESOLVED)  Right foot exam deferred due to post-surgical boot placed    Z-Deformities:   no  Inglis Neck Deformities:  no  Boutonierre's Deformities:  no  Ulnar Deviation:   Yes (LEFT: 3rd digit)  MCP Subluxation:  no     Joint Count 11/22/2019 8/23/2019 5/23/2019 2/18/2019 11/19/2018 8/22/2018   Patient pain (0-100) 45 40 50 60 90 90   MHAQ 0.625 0.75 0.75 0.75 1 0.5   Left shoulder - Tender - - - - - 1   Left shoulder - Swollen - - - - - 1   Left elbow - Tender 1 - 1 - 1 1   Left elbow - Swollen 0 - 0 - 0 1   Left wrist- Tender 1 1 1 1 1 -   Left wrist- Swollen 1 1 1 1 1 1   Left 1st MCP - Tender 1 1 1 1 1 1   Left 1st MCP - Swollen 1 1 1 1 1 1   Left 2nd MCP - Tender 1 1 1 1 1 1   Left 2nd MCP - Swollen 1 1 1 1 1 1   Left 3rd MCP - Tender 1 1 1 0 1 1   Left 3rd MCP - Swollen 1 1 1 1 1 1   Left 4th MCP - Tender - 1 1 1 1 1   Left 4th MCP - Swollen - 1 0 1 1 1   Left 5th MCP - Tender 1 1 1 1 - 1   Left 5th MCP - Swollen 1 0 1 1 - 1   Left thumb IP - Tender - 1 1 1 1 1   Left thumb IP - Swollen - 1 0 1 1 -   Left 2nd PIP - Tender 1 1 1 1 1 1   Left 2nd PIP - Swollen 1 1 1 1 1 1   Left 3rd PIP - Tender 1 1 1 1 1 1   Left 3rd PIP - Swollen 1 1 1 1 1 1   Left 4th PIP - Tender 1 1 1 1 1 1   Left 4th PIP - Swollen 1 1 1 1 1 1   Left 5th PIP - Tender 1 1 1 1 1 1   Left 5th PIP - Swollen 1 1 0 - 1 1   Left knee - Tender - - - - 1 -   Left knee - Swollen - - - - 1 -   Right shoulder - Tender - - - - - 1   Right elbow - Tender 1 - 1 - 1 1   Right elbow - Swollen 0 - 0 - 1 1   Right wrist- Tender 1 1 1 1 1 1   Right wrist- Swollen 1 1 1 1 1 1   Right 1st MCP - Tender 1 1 1 1 1 1   Right 1st MCP - Swollen 0 1 1 0 1 -   Right 2nd MCP - Tender 1 1 1 1 1 1   Right 2nd MCP - Swollen 1 1 1 1 1 1   Right 3rd MCP - Tender 1 1 1 1 1 1   Right 3rd MCP - Swollen 1 1 1 1 1 1   Right 4th MCP - Tender 1 1 1 1 1 1   Right 4th MCP - Swollen 1 1 1 1 1 1   Right 5th MCP - Tender 1 1 1 1 1 1   Right 5th MCP - Swollen 1 1 1 1 1 1   Right thumb IP - Tender - 1 - - - 1   Right thumb IP - Swollen - 0 - - - -   Right 2nd PIP - Tender 1 1 1 1 1 1   Right 2nd PIP - Swollen 1 1 1 1 1 1   Right 3rd PIP - Tender 1 1 1 1 1 1   Right 3rd PIP - Swollen 1 1 1 1 1 1   Right 4th PIP - Tender 1 1 1 1 1 1   Right 4th PIP - Swollen 1 1 1 1 1 1   Right 5th PIP - Tender 1 1 1 1 1 1   Right 5th PIP - Swollen 1 1 1 1 1 1   Right knee - Tender - - - 1 - -   Tender Joint Count (Total) 21 22 23 21 23 25   Swollen Joint Count (Total) 18 20 18 19 22 22   Physician Assessment (0-10) 5 4 5 6 7 7   Patient Assessment (0-10) 5.5 6.5 5 7 9 9   CDAI Total (calculated) 49.5 52.5 51 48 61 61     DATA REVIEW    Laboratory     Recent laboratory results were reviewed, summarized, and discussed with the patient. Imaging    Musculoskeletal Ultrasound    None    Radiographs    Sacrum and Coccyx 9/18/2018: no definite fracture or dislocation, however osteopenia and overlapping bowel gas somewhat limited evaluation of the sacrum. Severe multilevel degenerative disc disease is noted in the lower lumbar spine. Dense arterial vascular calcifications are present. Bilateral Hips 9/18/2018: no acute fracture or dislocation. Evaluation of the sacrum is limited by overlying bowel gas and osteopenia. Within these limitations, no fractures identified. Degenerative changes are present in the lower lumbar spine. Dense arterial vascular calcifications are present. Bilateral Hand 8/22/2018: moderate to severe diffuse osteopenia. Extensive atherosclerotic calcifications are shown extending into the fingers bilaterally. Fusiform soft tissue swelling is shown bilaterally throughout the metacarpophalangeal and proximal interphalangeal joints. There is mild joint space narrowing on the left throughout the metacarpophalangeal joints and on the right in the third through fifth metacarpophalangeal joints. Subtle marginal erosions are demonstrated at the lateral base of the right ring finger proximal phalanx and more extensively in the third through fifth left MCP joints. There are minimal-mild features of osteoarthritis at several DIP joints bilaterally. No substantial carpal joint space narrowing is shown.  Calcium pyrophosphate dihydrate position is in the medial carpus bilaterally. Bilateral Foot 8/22/2018: moderate to severe diffuse osteopenia. Extensive atherosclerotic calcifications are shown extending into the digits. Bilateral talonavicular, naviculocuneiform and left greater than right diffuse tarsometatarsal joint space narrowing is noted with small marginal osteophytes. No substantial digital joint space narrowing is evident though fusiform soft tissue swelling is suggested at the metatarsophalangeal joints bilaterally. Moderate bilateral plantar and dorsal calcaneal spurs are shown. Bilateral Shoulder 9/08/2011: RIGHT: no evidence of fracture or dislocation. Alignment of the glenohumeral joint is anatomic. The patient is status post distal clavicle resection and acromioplasty. A small amount of heterotopic ossification is noted about the distal aspect of the clavicle. Mild marginal osteophytes noted at the the glenoid. Irregularity along the superolateral aspect of the humeral head may reflect rotator cuff pathology. An ovoid well-corticated calcific density projecting over the humeral head may represent an intraventricular loose body. The visualized right lung is clear. Soft tissues are unremarkable. LEFT: no evidence of fracture or dislocation. Alignment of the glenohumeral and acromioclavicular joints is anatomic. Moderate superiorly projecting osteophytes emanate from the distal clavicle at the acromioclavicular joint. Bony hypertrophy irregularity about the greater tuberosity may indicate chronic rotator cuff pathology. There is a small subacromial spur. The visualized left lung is clear. Multiple mediastinal clips are noted. Bone mineralization is mildly diminished. Right Hip 8/03/2011: there is diffuse osteopenia. Degenerative changes noted in the lower lumbar spine. The SI joints and pubic symphysis are not widened. The right and left hip are normally aligned.  The right hip demonstrates mild osteophyte formation most notable at the inferior aspect of the joint. There is minimal diffuse joint space narrowing. Similar changes are noted in the left hip with minimal osteophyte formation. There is preservation of the left hip joint space. No acute fractures. Dense vascular calcifications are noted. Soft tissue calcification lateral to the right iliac wing is visualized and was noted on prior abdominal and pelvic CT scan. Hypertrophic changes anterior/superior left iliac wing and left greater trochanter as before. Bilateral Hand 9/27/2010: Overall alignment is anatomic. The bones are demineralized overall. The joint spaces are relatively preserved throughout. Relatively mild degenerative changes are present in the DIP joints, and in the first CMC joints. There appears to be some mild soft tissue swelling overlying the right MCP joints. There are also several equivocal erosions noted involving the metacarpal heads of the right hand which raise the possibility of early erosions related to inflammatory arthritis such as rheumatoid. These changes appear slightly more conspicuous and the 2008 examination. Incidentally, there are fairly prominent vascular calcifications present indicating that the patient is likely diabetic or perhaps as renal disease    Bilateral Knee 8/18/2010:  irregularity along the patella surface where there is a scalloped margin suggesting liner wear. In addition, the cement seen along the bone prosthetic interface of the anterior distal femoral cortex is less apparent, which also may reflect underlying liner wear and possibly early particulate disease. Heterotopic ossification has not changed in interval. Vascular calcifications reflecting atherosclerotic disease remains severe. Diffuse osteopenia is noted. CT Imaging    CT Right Shoulder with arthrogram 9/26/2011: there is a large full-thickness tear seen involving the supraspinatus tendon extending to the level of the acromion.  Additionally there is a partial tear of the subscapularis tendon. The biceps appears chronically torn. There are mild degenerative changes of the acromioclavicular joint. Some mild osteoarthritic changes are present of the glenohumeral joint. No evidence of fracture was seen. MR Imaging    None    DXA     DXA 5/04/2016:  (excluded L4 for spondylitic change, right hip) lumbar spine L1-L3 T score -1.5 (BMD 0.954 g/cm2), left femoral neck T score: -1.0 (0.805 g/cm2), left total hip T score: -0.1 (1.016 g/cm2), and distal one third left radius T score -1.9 (BMD 0.571 g/cm2). FRAX score 6.1 % probability in 10 years for major osteoporotic fracture and 0.3 % 10 year probability of hip fracture. Nuclear Studies    Triple Phase Scan 8/20/0218:  Patient status post bilateral knee replacements. Phase 1 (blood flow):  There is slight increased blood flow to the left knee. Phase 2 (blood pool/soft tissue):  There is slight increased peritracheal activity left knee. Phase 3 (delayed bone): Patient status post right knee replacement which is within normal limits. Patient is status post left knee replacement. There are slight increased activity left tibial plateau    PATHOLOGY    Bone, right hallux, biopsy 11/18/2019: No evidence for osteomyelitis. Toe, right hallux, amputation 11/18/2019: Acute osteomyelitis. PROCEDURE    Kenalog 80 mg IA. (5/23/19)   Kenalog 80 mg IA. (02/18/19)   Kenalog 80 mg IA. (11/19/18)     ASSESSMENT AND PLAN    This is a follow-up visit for Ms. Virgen Day. 1) Seronegative Rheumatoid Arthritis. She is maintained on leflunomide 20 mg daily and Humira every 14 days which she has taken with good tolerance, but has breakthrough 7 days before her next dose of Humira. She had recent surgical amputation of right 1st digit due to osteomyelitis with clear margins. Her CDAI was 49.5 (previously 52.5, 51, 53, 61, 63) with 21 tender and 18 swollen joints, consistent with high disease activity. I will change Humira to every 7 days. Labs today.     2) Long Term Use of Immunosuppressants. The patient remains on immunomodulatory medications (leflunomide, Humira) and requires frequent toxicity monitoring by blood work. Respective labs were ordered (CBC and CMP). 3) Vitamin D Deficiency. Her vitamin D was 24.1 (previously 28.0, 19.3). She is on weekly ergocalciferol 50,000.      4) Osteopenia involving Multiple Sites. Her most recent DXA on 5/04/2016 umbar spine L1-L3 T score -1.5 (BMD 0.954 g/cm2) and distal one third left radius T score -1.9 (BMD 0.571 g/cm2). This is likely due to her chronic Rheumatoid Arthritis. She is no longer on Fosamax. 5) MGUS. Her M-spike 0.2 (previously 0.3) with immunofixation shows IgG monoclonal protein with lambda light chain. I referred her to hematology, Dr. Ernesto Anguiano, who noted a low risk for myeloma. 6) Elevated AST. Her AST was 43. I will monitor. 7) Acute Osteomyelitis of Right Toe. This is s/p surgical amputation with clear margins, so I do not feel she needs to hold DMARDs. The patient voiced understanding of the aforementioned assessment and plan. Summary of plan was provided in the After Visit Summary patient instructions.      TODAY'S ORDERS    Orders Placed This Encounter    leflunomide (ARAVA) 20 mg tablet    adalimumab (HUMIRA,CF, PEN) 40 mg/0.4 mL pnkt     Future Appointments   Date Time Provider Dorothy Timmons   1/7/2020  8:30 AM Stan Das MD [address]   1/14/2020  3:40 PM Deirdre Mendez  [facility] [address]   2/24/2020  8:40 AM Nadja Nelson MD [place], [place], [address]    Adult Rheumatology   Rheumatology Ultrasound Certified  [facility]  [facility], [address] [place] [address]   Phone [phone]  Fax [phone]

## 2019-11-22 NOTE — PROGRESS NOTES
Chief Complaint   Patient presents with    Arthritis     1. Have you been to the ER, urgent care clinic since your last visit? Hospitalized since your last visit? No    2. Have you seen or consulted any other health care providers outside of the [address] [place] [address] since your last visit? Include any pap smears or colon screening.  No

## 2019-12-23 ENCOUNTER — DOCUMENTATION ONLY (OUTPATIENT)
Dept: RHEUMATOLOGY | Age: 74
End: 2019-12-23

## 2019-12-23 NOTE — PROGRESS NOTES
Office received fax from [email or web address] on 7/30/2019 stating Humira Pen INJ 40/0.4 ML has been approved through 12/31/2019. PA #, [id number].

## 2020-01-07 DIAGNOSIS — F33.0 MILD EPISODE OF RECURRENT MAJOR DEPRESSIVE DISORDER (HCC): ICD-10-CM

## 2020-01-07 RX ORDER — SERTRALINE HYDROCHLORIDE 50 MG/1
TABLET, FILM COATED ORAL
Qty: 90 TAB | Refills: 1 | Status: SHIPPED | OUTPATIENT
Start: 2020-01-07 | End: 2020-07-20

## 2020-02-24 ENCOUNTER — OFFICE VISIT (OUTPATIENT)
Dept: RHEUMATOLOGY | Age: 75
End: 2020-02-24

## 2020-02-24 VITALS
HEIGHT: 64 IN | DIASTOLIC BLOOD PRESSURE: 76 MMHG | TEMPERATURE: 97.9 F | BODY MASS INDEX: 34.31 KG/M2 | HEART RATE: 101 BPM | WEIGHT: 201 LBS | SYSTOLIC BLOOD PRESSURE: 156 MMHG | RESPIRATION RATE: 18 BRPM

## 2020-02-24 DIAGNOSIS — D47.2 MGUS (MONOCLONAL GAMMOPATHY OF UNKNOWN SIGNIFICANCE): ICD-10-CM

## 2020-02-24 DIAGNOSIS — M06.09 SERONEGATIVE RHEUMATOID ARTHRITIS OF MULTIPLE SITES (HCC): Primary | ICD-10-CM

## 2020-02-24 DIAGNOSIS — Z79.60 LONG-TERM USE OF IMMUNOSUPPRESSANT MEDICATION: ICD-10-CM

## 2020-02-24 DIAGNOSIS — E55.9 VITAMIN D DEFICIENCY: ICD-10-CM

## 2020-02-24 DIAGNOSIS — M85.89 OSTEOPENIA OF MULTIPLE SITES: ICD-10-CM

## 2020-02-24 RX ORDER — LEFLUNOMIDE 20 MG/1
20 TABLET ORAL DAILY
Qty: 90 TAB | Refills: 0 | Status: SHIPPED | OUTPATIENT
Start: 2020-02-24 | End: 2020-07-20

## 2020-02-24 RX ORDER — PREDNISONE 5 MG/1
TABLET ORAL
Qty: 91 TAB | Refills: 0 | Status: SHIPPED | OUTPATIENT
Start: 2020-02-24 | End: 2020-04-09

## 2020-02-24 NOTE — LETTER
2/24/20 Patient: Nnamdi Brown YOB: 1945 Date of Visit: 2/24/2020 Mary Whitt MD 
 [email or web address] [place] [id number] VIA In Basket Dear Mary Whitt MD, Thank you for referring Ms. Nnamdi Brown to [address] [place] [address] for evaluation. My notes for this consultation are attached. If you have questions, please do not hesitate to call me. I look forward to following your patient along with you.  
 
 
Sincerely, 
 
April Durbin MD

## 2020-02-24 NOTE — PROGRESS NOTES
Chief Complaint   Patient presents with    Arthritis     1. Have you been to the ER, urgent care clinic since your last visit? Hospitalized since your last visit? No    2. Have you seen or consulted any other health care providers outside of the [address] [place] [address] since your last visit? Include any pap smears or colon screening.  No

## 2020-02-24 NOTE — PROGRESS NOTES
REASON FOR VISIT    This is a follow-up visit for Ms. Kee Ellison for     ICD-10-CM   1. Seronegative rheumatoid arthritis of multiple sites ([place] [address].) M06.09     Inflammatory arthritis phenotype includes:  Anti-CCP positive: no  Rheumatoid factor positive: no  Erosive disease: no  Extra-articular manifestations include: MGUS    Immunosuppression Screening (8/23/2019):   Quantiferon TB: negative  PPD:  Not performed  Hepatitis B: negative  Hepatitis C: negative    Therapy History includes:  Current DMARD therapy includes: leflunomide 20 mg daily (8/19/2019 to present), Humira 40 mg every Thursday (11/22/2019 to present)  Prior DMARD therapy includes: methotrexate 20 mg every Saturday, methotrexate 20 mg SubQ every Saturday (5/23/2019 to 7/29/2019) Simponi Aria (11/29/2018 to 5/23/2019), Humira 40 mg every 14 days (8/03/2019 to 11/22/2019)  The following DMARDs have been ineffective: Simponi Aria, Humira every 14 days  The following DMARDs were stopped because of side effects: methotrexate 20 mg SubQ (aphthous ulcer, injection site breakdown)  Contra-Indicated DMARDs because of history of diverticulitis: Actemra, Kevzara    Immunization History   Administered Date(s) Administered    Influenza High Dose Vaccine PF 09/07/2018    Influenza Vaccine (Tri) Adjuvanted 09/24/2019     Patient Active Problem List   Diagnosis Code    Long-term use of immunosuppressant medication Z79.899    Osteopenia of multiple sites M85.89    Vitamin D deficiency E55.9    Rheumatoid arthritis involving multiple sites ([facility] M06.9    Essential hypertension I10    Mixed hyperlipidemia E78.2    RLS (restless legs syndrome) G25.81    Mild intermittent asthma J45.20    Status post angioplasty with stent Z95.820    Coronary artery disease due to lipid rich plaque I25.10, I25.83    Seronegative rheumatoid arthritis of multiple sites ([facility] M06.09    Severe obesity (HCC) E66.01    MGUS (monoclonal gammopathy of unknown significance) D47.2 HISTORY OF PRESENT ILLNESS    Ms. Ramin Anthony returns for a follow-up. On her last visit, I continued leflunomide 20 mg daily and changed Humira 40 mg form every 14 days to Thursday due to breakthrough with active disease, which she has taken with good tolerance. She was doing well but had recurrent toe infection for 3 weeks, so had to hold Humira for 3 weeks. She had an increase in pain in her hands with swelling while off treamtent. She resumed it 2 weeks ago and has started to feel better. Today, she feels better. She has sore pain, swelling and stiffness in her hands lasting hours. She reports that prior to holding treatment, her swelling had improved and her stiffness was much less without pain. She was doing a whole lot better prior to holding treatment. She endorses easy bruising. She denies fever, weight loss, blurred vision, vision loss, oral ulcers, ankle swelling, dry cough, dyspnea, nausea, vomiting, dysphagia, abdominal pain, black or bloody stool, fall since last visit, rash and increased thirst.    Ms. Ramin Anthony has continued her medications for arthritis and reports good tolerance without significant side effects. Last toxicity monitoring by blood work was done on 9/04/2019 and did not reveal any significant adverse effects, except AST 43    Most recent inflammatory markers from 8/23/2019 revealed a ESR 33 mm/hr and CRP 1 mg/L. The patient has not had any interval hospital admissions or surgeries, but had recurrent right toe infections. REVIEW OF SYSTEMS    A comprehensive review of systems was performed and pertinent results are documented in the HPI, review of systems is otherwise non-contributory.     PAST MEDICAL HISTORY    She has a past medical history of Asthma, At risk for sleep apnea (11/11/2019), DDD (degenerative disc disease), lumbar, Diabetes ([facility] Gastritis, GERD (gastroesophageal reflux disease), Glaucoma, Hiatal hernia, High cholesterol, Hypertension, Peripheral vascular disease ([facility] RA (rheumatoid arthritis) ([facility] and Sarcoidosis (1999). FAMILY HISTORY    Her family history includes Alcohol abuse in her brother; Heart Disease in her mother; Liver Disease in her father. SOCIAL HISTORY    She reports that she has never smoked. She has never used smokeless tobacco. She reports current alcohol use of about 2.0 standard drinks of alcohol per week. She reports that she does not use drugs. MEDICATIONS    Current Outpatient Medications   Medication Sig Dispense Refill    leflunomide (ARAVA) 20 mg tablet Take 1 Tab by mouth daily. 90 Tab 0    predniSONE (DELTASONE) 5 mg tablet 4 tabs daily for 7 days, 3 tabs for 7 days, 2 tabs for 14 days, 1 tab for 14 days 91 Tab 0    sertraline (ZOLOFT) 50 mg tablet TAKE 1 TABLET BY MOUTH EVERY DAY 90 Tab 1    naproxen (NAPROSYN) 500 mg tablet Take 500 mg by mouth two (2) times daily (with meals).  adalimumab (HUMIRA,CF, PEN) 40 mg/0.4 mL pnkt 40 mg by SubCUTAneous route Every Thursday. Indications: rheumatoid arthritis 4 Kit 11    latanoprost (XALATAN) 0.005 % ophthalmic solution ADMINISTER 1 DROP INTO BOTH EYES NIGHTLY 2.5 mL 1    nortriptyline (PAMELOR) 50 mg capsule TAKE ONE CAPSULE EVERY DAY AT BEDTIME      furosemide (LASIX) 20 mg tablet TAKE 1 TABLET BY MOUTH EVERY DAY 90 Tab 0    albuterol sulfate (PROVENTIL;VENTOLIN) 2.5 mg/0.5 mL nebu nebulizer solution 0.5 mL by Nebulization route every four (4) hours as needed for Wheezing. Indications: Asthma Attack 30 mL 0    gabapentin (NEURONTIN) 100 mg capsule Take 1 capsule in the morning and take 3 capsules before bedtime 120 Cap 2    ergocalciferol (ERGOCALCIFEROL) 50,000 unit capsule Take 1 Cap by mouth every seven (7) days. 12 Cap 3    metFORMIN (GLUCOPHAGE) 500 mg tablet Take 1 Tab by mouth two (2) times daily (with meals). (Patient taking differently: Take 500 mg by mouth daily (with breakfast). ) 60 Tab 5    fluticasone-vilanterol (BREO ELLIPTA) 100-25 mcg/dose inhaler Take 1 Puff by inhalation daily. 1 Inhaler 5    aspirin delayed-release 81 mg tablet Take  by mouth daily.  metoprolol tartrate (LOPRESSOR) 25 mg tablet Take  by mouth two (2) times a day.  simvastatin (ZOCOR) 20 mg tablet Take  by mouth nightly. ALLERGIES    Allergies   Allergen Reactions    Lisinopril Rash    Methotrexate Hives     PHYSICAL EXAMINATION    Visit Vitals  /76   Pulse (!) 101   Temp 97.9 °F (36.6 °C)   Resp 18   Ht 5' 4\" (1.626 m)   Wt 201 lb (91.2 kg)   BMI 34.50 kg/m²     Body mass index is 34.5 kg/m². General: Patient is alert, oriented x 3, not in acute distress    HEENT:   Sclerae are not injected and appear moist.  There is no alopecia. Neck is supple     Cardiovascular:  Heart is regular rate and rhythm, no murmurs. Chest:  Lungs are clear to auscultation bilaterally. No rhonchi, wheezes, or crackles. Extremities:  Free of clubbing, cyanosis, edema    Neurological exam:  Muscle strength is full in upper and lower extremities     Skin exam:  There are no alopecia, no discoid lesions, no active Raynaud's, no livedo reticularis, no periungual erythema. Musculoskeletal exam:  A comprehensive musculoskeletal exam was performed for all joints of each upper and lower extremity and assessed for swelling, tenderness and range of motion. Positive results are documented as below:    Decreased ROM of bilateral shoulders (right worse than left) due to pain  Bilateral knee replacements without effusion.   Bilateral ankle swelling with tenderness (RESOLVED)  Right foot exam deferred due to post-surgical boot placed    Z-Deformities:   no  [place]ck Deformities:  no  Boutonierre's Deformities:  no  Ulnar Deviation:   Yes (LEFT: 3rd digit)  MCP Subluxation:  no     Joint Count 2/24/2020 11/22/2019 8/23/2019 5/23/2019 2/18/2019 11/19/2018 8/22/2018   Patient pain (0-100) 75 45 40 50 60 90 90   MHAQ 1.25 0.625 0.75 0.75 0.75 1 0.5   Left shoulder - Tender - - - - - - 1   Left shoulder - Swollen - - - - - - 1   Left elbow - Tender 1 1 - 1 - 1 1   Left elbow - Swollen 0 0 - 0 - 0 1   Left wrist- Tender 1 1 1 1 1 1 -   Left wrist- Swollen 1 1 1 1 1 1 1   Left 1st MCP - Tender 0 1 1 1 1 1 1   Left 1st MCP - Swollen 1 1 1 1 1 1 1   Left 2nd MCP - Tender 1 1 1 1 1 1 1   Left 2nd MCP - Swollen 1 1 1 1 1 1 1   Left 3rd MCP - Tender 1 1 1 1 0 1 1   Left 3rd MCP - Swollen 1 1 1 1 1 1 1   Left 4th MCP - Tender 1 - 1 1 1 1 1   Left 4th MCP - Swollen 1 - 1 0 1 1 1   Left 5th MCP - Tender 1 1 1 1 1 - 1   Left 5th MCP - Swollen 0 1 0 1 1 - 1   Left thumb IP - Tender - - 1 1 1 1 1   Left thumb IP - Swollen - - 1 0 1 1 -   Left 2nd PIP - Tender 1 1 1 1 1 1 1   Left 2nd PIP - Swollen 1 1 1 1 1 1 1   Left 3rd PIP - Tender 1 1 1 1 1 1 1   Left 3rd PIP - Swollen 1 1 1 1 1 1 1   Left 4th PIP - Tender 1 1 1 1 1 1 1   Left 4th PIP - Swollen 1 1 1 1 1 1 1   Left 5th PIP - Tender 1 1 1 1 1 1 1   Left 5th PIP - Swollen 1 1 1 0 - 1 1   Left knee - Tender - - - - - 1 -   Left knee - Swollen - - - - - 1 -   Right shoulder - Tender - - - - - - 1   Right elbow - Tender - 1 - 1 - 1 1   Right elbow - Swollen - 0 - 0 - 1 1   Right wrist- Tender 1 1 1 1 1 1 1   Right wrist- Swollen 1 1 1 1 1 1 1   Right 1st MCP - Tender - 1 1 1 1 1 1   Right 1st MCP - Swollen - 0 1 1 0 1 -   Right 2nd MCP - Tender 1 1 1 1 1 1 1   Right 2nd MCP - Swollen 1 1 1 1 1 1 1   Right 3rd MCP - Tender 1 1 1 1 1 1 1   Right 3rd MCP - Swollen 1 1 1 1 1 1 1   Right 4th MCP - Tender 1 1 1 1 1 1 1   Right 4th MCP - Swollen 1 1 1 1 1 1 1   Right 5th MCP - Tender 1 1 1 1 1 1 1   Right 5th MCP - Swollen 1 1 1 1 1 1 1   Right thumb IP - Tender - - 1 - - - 1   Right thumb IP - Swollen - - 0 - - - -   Right 2nd PIP - Tender 1 1 1 1 1 1 1   Right 2nd PIP - Swollen 1 1 1 1 1 1 1   Right 3rd PIP - Tender 1 1 1 1 1 1 1   Right 3rd PIP - Swollen 1 1 1 1 1 1 1   Right 4th PIP - Tender 1 1 1 1 1 1 1   Right 4th PIP - Swollen 1 1 1 1 1 1 1   Right 5th PIP - Tender 1 1 1 1 1 1 1   Right 5th PIP - Swollen 1 1 1 1 1 1 1   Right knee - Tender - - - - 1 - -   Tender Joint Count (Total) 19 21 22 23 21 23 25   Swollen Joint Count (Total) 18 18 20 18 19 22 22   Physician Assessment (0-10) 6 5 4 5 6 7 7   Patient Assessment (0-10) 7 5.5 6.5 5 7 9 9   CDAI Total (calculated) 50 49.5 52.5 51 48 61 61     DATA REVIEW    Laboratory     Recent laboratory results were reviewed, summarized, and discussed with the patient. Imaging    Musculoskeletal Ultrasound    None    Radiographs    Sacrum and Coccyx 9/18/2018: no definite fracture or dislocation, however osteopenia and overlapping bowel gas somewhat limited evaluation of the sacrum. Severe multilevel degenerative disc disease is noted in the lower lumbar spine. Dense arterial vascular calcifications are present. Bilateral Hips 9/18/2018: no acute fracture or dislocation. Evaluation of the sacrum is limited by overlying bowel gas and osteopenia. Within these limitations, no fractures identified. Degenerative changes are present in the lower lumbar spine. Dense arterial vascular calcifications are present. Bilateral Hand 8/22/2018: moderate to severe diffuse osteopenia. Extensive atherosclerotic calcifications are shown extending into the fingers bilaterally. Fusiform soft tissue swelling is shown bilaterally throughout the metacarpophalangeal and proximal interphalangeal joints. There is mild joint space narrowing on the left throughout the metacarpophalangeal joints and on the right in the third through fifth metacarpophalangeal joints. Subtle marginal erosions are demonstrated at the lateral base of the right ring finger proximal phalanx and more extensively in the third through fifth left MCP joints. There are minimal-mild features of osteoarthritis at several DIP joints bilaterally. No substantial carpal joint space narrowing is shown.  Calcium pyrophosphate dihydrate position is in the medial carpus bilaterally. Bilateral Foot 8/22/2018: moderate to severe diffuse osteopenia. Extensive atherosclerotic calcifications are shown extending into the digits. Bilateral talonavicular, naviculocuneiform and left greater than right diffuse tarsometatarsal joint space narrowing is noted with small marginal osteophytes. No substantial digital joint space narrowing is evident though fusiform soft tissue swelling is suggested at the metatarsophalangeal joints bilaterally. Moderate bilateral plantar and dorsal calcaneal spurs are shown. Bilateral Shoulder 9/08/2011: RIGHT: no evidence of fracture or dislocation. Alignment of the glenohumeral joint is anatomic. The patient is status post distal clavicle resection and acromioplasty. A small amount of heterotopic ossification is noted about the distal aspect of the clavicle. Mild marginal osteophytes noted at the the glenoid. Irregularity along the superolateral aspect of the humeral head may reflect rotator cuff pathology. An ovoid well-corticated calcific density projecting over the humeral head may represent an intraventricular loose body. The visualized right lung is clear. Soft tissues are unremarkable. LEFT: no evidence of fracture or dislocation. Alignment of the glenohumeral and acromioclavicular joints is anatomic. Moderate superiorly projecting osteophytes emanate from the distal clavicle at the acromioclavicular joint. Bony hypertrophy irregularity about the greater tuberosity may indicate chronic rotator cuff pathology. There is a small subacromial spur. The visualized left lung is clear. Multiple mediastinal clips are noted. Bone mineralization is mildly diminished. Right Hip 8/03/2011: there is diffuse osteopenia. Degenerative changes noted in the lower lumbar spine. The SI joints and pubic symphysis are not widened. The right and left hip are normally aligned.  The right hip demonstrates mild osteophyte formation most notable at the inferior aspect of the joint. There is minimal diffuse joint space narrowing. Similar changes are noted in the left hip with minimal osteophyte formation. There is preservation of the left hip joint space. No acute fractures. Dense vascular calcifications are noted. Soft tissue calcification lateral to the right iliac wing is visualized and was noted on prior abdominal and pelvic CT scan. Hypertrophic changes anterior/superior left iliac wing and left greater trochanter as before. Bilateral Hand 9/27/2010: Overall alignment is anatomic. The bones are demineralized overall. The joint spaces are relatively preserved throughout. Relatively mild degenerative changes are present in the DIP joints, and in the first CMC joints. There appears to be some mild soft tissue swelling overlying the right MCP joints. There are also several equivocal erosions noted involving the metacarpal heads of the right hand which raise the possibility of early erosions related to inflammatory arthritis such as rheumatoid. These changes appear slightly more conspicuous and the 2008 examination. Incidentally, there are fairly prominent vascular calcifications present indicating that the patient is likely diabetic or perhaps as renal disease    Bilateral Knee 8/18/2010:  irregularity along the patella surface where there is a scalloped margin suggesting liner wear. In addition, the cement seen along the bone prosthetic interface of the anterior distal femoral cortex is less apparent, which also may reflect underlying liner wear and possibly early particulate disease. Heterotopic ossification has not changed in interval. Vascular calcifications reflecting atherosclerotic disease remains severe. Diffuse osteopenia is noted. CT Imaging    CT Right Shoulder with arthrogram 9/26/2011: there is a large full-thickness tear seen involving the supraspinatus tendon extending to the level of the acromion. Additionally there is a partial tear of the subscapularis tendon. The biceps appears chronically torn. There are mild degenerative changes of the acromioclavicular joint. Some mild osteoarthritic changes are present of the glenohumeral joint. No evidence of fracture was seen. MR Imaging    None    DXA     DXA 5/04/2016:  (excluded L4 for spondylitic change, right hip) lumbar spine L1-L3 T score -1.5 (BMD 0.954 g/cm2), left femoral neck T score: -1.0 (0.805 g/cm2), left total hip T score: -0.1 (1.016 g/cm2), and distal one third left radius T score -1.9 (BMD 0.571 g/cm2). FRAX score 6.1 % probability in 10 years for major osteoporotic fracture and 0.3 % 10 year probability of hip fracture. Nuclear Studies    Triple Phase Scan 8/20/0218:  Patient status post bilateral knee replacements. Phase 1 (blood flow):  There is slight increased blood flow to the left knee. Phase 2 (blood pool/soft tissue):  There is slight increased peritracheal activity left knee. Phase 3 (delayed bone): Patient status post right knee replacement which is within normal limits. Patient is status post left knee replacement. There are slight increased activity left tibial plateau    PATHOLOGY    Bone, right hallux, biopsy 11/18/2019: No evidence for osteomyelitis. Toe, right hallux, amputation 11/18/2019: Acute osteomyelitis. PROCEDURE    Kenalog 80 mg IA. (5/23/19)   Kenalog 80 mg IA. (02/18/19)   Kenalog 80 mg IA. (11/19/18)     ASSESSMENT AND PLAN    This is a follow-up visit for Ms. Fiona An. 1) Seronegative Rheumatoid Arthritis. She is maintained on leflunomide 20 mg daily and Humira every Thursday with good tolerance and was feel better with less pain, swelling and stiffness. She was doing well but had to hold treatment for 3 weeks for recurrent toe infection and has since flared. She resumed ioana 2 weeks ago. Her CDAI was 50 (previously 49.5, 52.5, 51, 53, 61, 63) with 19 tender and 18 swollen joints, consistent with high disease activity. I will continue treatment.     I will start her on a short course of prednisone, called a prednisone taper, with 5 mg tablets. This regimen is to be taken as follows: 4 tabs (20 mg) for 7 days, 3 tabs (15 mg) for 7 days, 2 tabs (10 mg) for 14 days and then 1 tab (5 mg) for 14 days, and then stop. Labs today. 2) Long Term Use of Immunosuppressants. The patient remains on immunomodulatory medications (leflunomide, Humira) and requires frequent toxicity monitoring by blood work. Respective labs were ordered (CBC and CMP). 3) Vitamin D Deficiency. Her vitamin D was 24.1 (previously 28.0, 19.3). She is on weekly ergocalciferol 50,000. I will check her level today. 4) Osteopenia involving Multiple Sites. Her most recent DXA on 5/04/2016 umbar spine L1-L3 T score -1.5 (BMD 0.954 g/cm2) and distal one third left radius T score -1.9 (BMD 0.571 g/cm2). This is likely due to her chronic Rheumatoid Arthritis. She is no longer on Fosamax. 5) MGUS. Her M-spike 0.2 (previously 0.3) with immunofixation shows IgG monoclonal protein with lambda light chain. I referred her to hematology, Dr. Gerson Fuentes, who noted a low risk for myeloma. I will repeat. 6) Elevated AST. Her AST was 43. I will monitor. I will repeat. The patient voiced understanding of the aforementioned assessment and plan. Summary of plan was provided in the After Visit Summary patient instructions.      TODAY'S ORDERS    Orders Placed This Encounter    CBC WITH AUTOMATED DIFF    METABOLIC PANEL, COMPREHENSIVE    C REACTIVE PROTEIN, QT    SED RATE (ESR)    VITAMIN D, 25 HYDROXY    SPEP AND TIANNA, SERUM    leflunomide (ARAVA) 20 mg tablet    predniSONE (DELTASONE) 5 mg tablet     Future Appointments   Date Time Provider Department Center   3/31/2020  2:20 PM Brittny Ruelas  [facility] [address]   5/29/2020  8:20 AM MD Clint Rondon MD, [address] [facility]    Adult Rheumatology   Rheumatology Ultrasound Certified  [facility]  A Part of Aiden Montaño, [address] [facility]   Phone [phone]  Fax [phone]

## 2020-02-25 ENCOUNTER — PATIENT OUTREACH (OUTPATIENT)
Dept: FAMILY MEDICINE CLINIC | Age: 75
End: 2020-02-25

## 2020-02-25 NOTE — PROGRESS NOTES
2/25/20 Ambulatory Care Management Note    Date/Time:  2/25/2020 3:10 PM    This patient was received as a referral from Celina Reynolds outreached to patient today to offer care management services. Introduction to self and role of care manager provided. Patient accepted care management services at this time. Follow up call scheduled at this time. Patient has Ambulatory Care Manager's contact number for for any questions or concerns.

## 2020-02-27 LAB
25(OH)D3+25(OH)D2 SERPL-MCNC: 21.1 NG/ML (ref 30–100)
ALBUMIN SERPL ELPH-MCNC: 3.6 G/DL (ref 2.9–4.4)
ALBUMIN SERPL-MCNC: 3.9 G/DL (ref 3.7–4.7)
ALBUMIN/GLOB SERPL: 1.5 {RATIO} (ref 0.7–1.7)
ALBUMIN/GLOB SERPL: 1.8 {RATIO} (ref 1.2–2.2)
ALP SERPL-CCNC: 74 IU/L (ref 39–117)
ALPHA1 GLOB SERPL ELPH-MCNC: 0.1 G/DL (ref 0–0.4)
ALPHA2 GLOB SERPL ELPH-MCNC: 0.6 G/DL (ref 0.4–1)
ALT SERPL-CCNC: 10 IU/L (ref 0–32)
AST SERPL-CCNC: 24 IU/L (ref 0–40)
B-GLOBULIN SERPL ELPH-MCNC: 1 G/DL (ref 0.7–1.3)
BASOPHILS # BLD AUTO: 0.1 X10E3/UL (ref 0–0.2)
BASOPHILS NFR BLD AUTO: 2 %
BILIRUB SERPL-MCNC: 0.7 MG/DL (ref 0–1.2)
BUN SERPL-MCNC: 15 MG/DL (ref 8–27)
BUN/CREAT SERPL: 18 (ref 12–28)
CALCIUM SERPL-MCNC: 7.9 MG/DL (ref 8.7–10.3)
CHLORIDE SERPL-SCNC: 109 MMOL/L (ref 96–106)
CO2 SERPL-SCNC: 19 MMOL/L (ref 20–29)
CREAT SERPL-MCNC: 0.85 MG/DL (ref 0.57–1)
CRP SERPL-MCNC: 2 MG/L (ref 0–10)
EOSINOPHIL # BLD AUTO: 0.1 X10E3/UL (ref 0–0.4)
EOSINOPHIL NFR BLD AUTO: 2 %
ERYTHROCYTE [DISTWIDTH] IN BLOOD BY AUTOMATED COUNT: 11.7 % (ref 11.7–15.4)
ERYTHROCYTE [SEDIMENTATION RATE] IN BLOOD BY WESTERGREN METHOD: 22 MM/HR (ref 0–40)
GAMMA GLOB SERPL ELPH-MCNC: 0.8 G/DL (ref 0.4–1.8)
GLOBULIN SER CALC-MCNC: 2.2 G/DL (ref 1.5–4.5)
GLOBULIN SER-MCNC: 2.5 G/DL (ref 2.2–3.9)
GLUCOSE SERPL-MCNC: 119 MG/DL (ref 65–99)
HCT VFR BLD AUTO: 35.7 % (ref 34–46.6)
HGB BLD-MCNC: 11.3 G/DL (ref 11.1–15.9)
IGA SERPL-MCNC: 198 MG/DL (ref 64–422)
IGG SERPL-MCNC: 998 MG/DL (ref 700–1600)
IGM SERPL-MCNC: 41 MG/DL (ref 26–217)
IMM GRANULOCYTES # BLD AUTO: 0 X10E3/UL (ref 0–0.1)
IMM GRANULOCYTES NFR BLD AUTO: 0 %
INTERPRETATION SERPL IEP-IMP: ABNORMAL
LYMPHOCYTES # BLD AUTO: 2 X10E3/UL (ref 0.7–3.1)
LYMPHOCYTES NFR BLD AUTO: 35 %
M PROTEIN SERPL ELPH-MCNC: 0.2 G/DL
MCH RBC QN AUTO: 30 PG (ref 26.6–33)
MCHC RBC AUTO-ENTMCNC: 31.7 G/DL (ref 31.5–35.7)
MCV RBC AUTO: 95 FL (ref 79–97)
MONOCYTES # BLD AUTO: 0.6 X10E3/UL (ref 0.1–0.9)
MONOCYTES NFR BLD AUTO: 10 %
NEUTROPHILS # BLD AUTO: 2.9 X10E3/UL (ref 1.4–7)
NEUTROPHILS NFR BLD AUTO: 51 %
PLATELET # BLD AUTO: 220 X10E3/UL (ref 150–450)
PLEASE NOTE:, 149534: ABNORMAL
POTASSIUM SERPL-SCNC: 3.9 MMOL/L (ref 3.5–5.2)
PROT SERPL-MCNC: 6.1 G/DL (ref 6–8.5)
RBC # BLD AUTO: 3.77 X10E6/UL (ref 3.77–5.28)
SODIUM SERPL-SCNC: 142 MMOL/L (ref 134–144)
WBC # BLD AUTO: 5.6 X10E3/UL (ref 3.4–10.8)

## 2020-03-24 ENCOUNTER — APPOINTMENT (OUTPATIENT)
Dept: CT IMAGING | Age: 75
End: 2020-03-24
Attending: NURSE PRACTITIONER
Payer: MEDICARE

## 2020-03-24 ENCOUNTER — HOSPITAL ENCOUNTER (EMERGENCY)
Age: 75
Discharge: HOME | End: 2020-03-24
Attending: EMERGENCY MEDICINE
Payer: MEDICARE

## 2020-03-24 VITALS
DIASTOLIC BLOOD PRESSURE: 58 MMHG | WEIGHT: 200 LBS | TEMPERATURE: 98 F | RESPIRATION RATE: 16 BRPM | BODY MASS INDEX: 34.15 KG/M2 | SYSTOLIC BLOOD PRESSURE: 146 MMHG | OXYGEN SATURATION: 99 % | HEIGHT: 64 IN | HEART RATE: 75 BPM
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
## 2020-03-24 DIAGNOSIS — S09.90XA INJURY OF HEAD, INITIAL ENCOUNTER: Primary | ICD-10-CM

## 2020-03-24 DIAGNOSIS — M50.30 DDD (DEGENERATIVE DISC DISEASE), CERVICAL: ICD-10-CM

## 2020-03-24 PROCEDURE — 74011250637 HC RX REV CODE- 250/637: Performed by: NURSE PRACTITIONER

## 2020-03-24 PROCEDURE — 70450 CT HEAD/BRAIN W/O DYE: CPT

## 2020-03-24 PROCEDURE — 99283 EMERGENCY DEPT VISIT LOW MDM: CPT

## 2020-03-24 PROCEDURE — 72125 CT NECK SPINE W/O DYE: CPT

## 2020-03-24 RX ORDER — ACETAMINOPHEN 500 MG
1000 TABLET ORAL
Qty: 20 TAB | Refills: 0 | Status: SHIPPED | OUTPATIENT
Start: 2020-03-24 | End: 2021-08-31 | Stop reason: ALTCHOICE

## 2020-03-24 RX ORDER — IBUPROFEN 600 MG/1
600 TABLET ORAL
Status: COMPLETED | OUTPATIENT
Start: 2020-03-24 | End: 2020-03-24

## 2020-03-24 RX ADMIN — IBUPROFEN 600 MG: 600 TABLET, FILM COATED ORAL at 19:01

## 2020-03-24 NOTE — ED NOTES
Emergency Department Nursing Plan of Care       The Nursing Plan of Care is developed from the Nursing assessment and Emergency Department Attending provider initial evaluation. The plan of care may be reviewed in the ED Provider note.     The Plan of Care was developed with the following considerations:   Patient / Family readiness to learn indicated by:verbalized understanding  Persons(s) to be included in education: patient  Barriers to Learning/Limitations:No    Signed     Irving Erwin RN    3/24/2020   5:56 PM

## 2020-03-24 NOTE — ED NOTES
Patient  given copy of dc instructions and 1 electronic script(s). Patient  verbalized understanding of instructions and script (s). Patient given a current medication reconciliation form and verbalized understanding of their medications. Patient  verbalized understanding of the importance of discussing medications with  his or her physician or clinic they will be following up with. Patient alert and oriented and in no acute distress. Patient discharged home ambulatory with walker.

## 2020-03-24 NOTE — ED TRIAGE NOTES
Patient reports two ground level falls about 2 weeks ago, the worst of which resulted in a black eye which remains to her right side, she reports she has bumps all over her head and has intermittent dizziness since then. Her doctor advised her to come to ED for evaluation.

## 2020-03-24 NOTE — ED PROVIDER NOTES
EMERGENCY DEPARTMENT HISTORY AND PHYSICAL EXAM      Date: 3/24/2020  Patient Name: Frances Case    History of Presenting Illness     Chief Complaint   Patient presents with    Fall       History Provided By: Patient    Additional History (Context): Frances Case is a 69 y.o. female with RA, HTN, GERD, DM, Gastritis, Asthma, DDD who presents for fall. Onset 2 week ago. Pt states she tripped and suffered head injury from ground level fall. Reports landing on her R side. Reports bruised eye and swelling in addition to neck pain. Reports bruising is improving but she is concerned due to intermittent dizziness and headache. Denies LOC . Denies dizziness or sx prior to fall. Reports taking tylenol with no relief. Denies CP, SOB, cough. No vision changes. Pt is taking aspirin. PCP: Simeon Worrell MD    Current Outpatient Medications   Medication Sig Dispense Refill    acetaminophen (Acetaminophen Extra Strength) 500 mg tablet Take 2 Tabs by mouth every six (6) hours as needed for Pain. 20 Tab 0    leflunomide (ARAVA) 20 mg tablet Take 1 Tab by mouth daily. 90 Tab 0    predniSONE (DELTASONE) 5 mg tablet 4 tabs daily for 7 days, 3 tabs for 7 days, 2 tabs for 14 days, 1 tab for 14 days 91 Tab 0    sertraline (ZOLOFT) 50 mg tablet TAKE 1 TABLET BY MOUTH EVERY DAY 90 Tab 1    naproxen (NAPROSYN) 500 mg tablet Take 500 mg by mouth two (2) times daily (with meals).  adalimumab (HUMIRA,CF, PEN) 40 mg/0.4 mL pnkt 40 mg by SubCUTAneous route Every Thursday.  Indications: rheumatoid arthritis 4 Kit 11    latanoprost (XALATAN) 0.005 % ophthalmic solution ADMINISTER 1 DROP INTO BOTH EYES NIGHTLY 2.5 mL 1    nortriptyline (PAMELOR) 50 mg capsule TAKE ONE CAPSULE EVERY DAY AT BEDTIME      furosemide (LASIX) 20 mg tablet TAKE 1 TABLET BY MOUTH EVERY DAY 90 Tab 0    albuterol sulfate (PROVENTIL;VENTOLIN) 2.5 mg/0.5 mL nebu nebulizer solution 0.5 mL by Nebulization route every four (4) hours as needed for Wheezing. Indications: Asthma Attack 30 mL 0    gabapentin (NEURONTIN) 100 mg capsule Take 1 capsule in the morning and take 3 capsules before bedtime 120 Cap 2    ergocalciferol (ERGOCALCIFEROL) 50,000 unit capsule Take 1 Cap by mouth every seven (7) days. 12 Cap 3    metFORMIN (GLUCOPHAGE) 500 mg tablet Take 1 Tab by mouth two (2) times daily (with meals). (Patient taking differently: Take 500 mg by mouth daily (with breakfast). ) 60 Tab 5    fluticasone-vilanterol (BREO ELLIPTA) 100-25 mcg/dose inhaler Take 1 Puff by inhalation daily. 1 Inhaler 5    aspirin delayed-release 81 mg tablet Take  by mouth daily.  metoprolol tartrate (LOPRESSOR) 25 mg tablet Take  by mouth two (2) times a day.  simvastatin (ZOCOR) 20 mg tablet Take  by mouth nightly. Past History     Past Medical History:  Past Medical History:   Diagnosis Date    Asthma     At risk for sleep apnea 11/11/2019    Jerrica Braxtonish 4 - Sleep study in January per patient     DDD (degenerative disc disease), lumbar     Diabetes ([facility]     Gastritis     GERD (gastroesophageal reflux disease)     Glaucoma     Hiatal hernia     High cholesterol     Hypertension     Peripheral vascular disease (HCC)     RA (rheumatoid arthritis) ([facility]     Sarcoidosis 1999    [facility]       Past Surgical History:  Past Surgical History:   Procedure Laterality Date    HX GASTRIC BYPASS      HX HEART CATHETERIZATION      s/p PCI with stenting in 1998     HX KNEE REPLACEMENT Bilateral     HX SHOULDER REPLACEMENT Right     x 2        Family History:  Family History   Problem Relation Age of Onset    Heart Disease Mother     Liver Disease Father     Alcohol abuse Brother     Dementia Neg Hx     Cancer Neg Hx     Seizures Neg Hx        Social History:  Social History     Tobacco Use    Smoking status: Never Smoker    Smokeless tobacco: Never Used   Substance Use Topics    Alcohol use:  Yes     Alcohol/week: 2.0 standard drinks     Types: 1 Glasses of wine, 1 Shots of liquor per week     Comment: 2-3 yearly    Drug use: No       Allergies: Allergies   Allergen Reactions    Lisinopril Rash    Methotrexate Hives         Review of Systems   Review of Systems   Constitutional: Negative for appetite change, chills, fatigue and fever. HENT: Negative for congestion, ear pain and rhinorrhea. Respiratory: Negative for cough, chest tightness, shortness of breath and wheezing. Cardiovascular: Negative for chest pain, palpitations and leg swelling. Gastrointestinal: Negative for abdominal pain, nausea and vomiting. Musculoskeletal: Positive for neck pain. Negative for arthralgias. Skin: Positive for color change. Negative for rash. Neurological: Positive for dizziness and headaches. Negative for seizures, syncope, facial asymmetry, weakness and numbness. All other systems reviewed and are negative. Physical Exam     Vitals:    03/24/20 1727 03/24/20 1800   BP: 170/79 146/58   Pulse: 79 75   Resp: 16    Temp: 98 °F (36.7 °C)    SpO2: 100% 99%   Weight: 90.7 kg (200 lb)    Height: 5' 4\" (1.626 m)      Physical Exam  Vitals signs and nursing note reviewed. Constitutional:       General: She is not in acute distress. Appearance: She is well-developed. She is not ill-appearing. HENT:      Head: Normocephalic. Contusion (R eyebrow and forehead ) present. No raccoon eyes or Logan's sign. Right Ear: Tympanic membrane and ear canal normal.      Left Ear: Tympanic membrane and ear canal normal.      Mouth/Throat:      Mouth: Mucous membranes are moist.   Eyes:      Extraocular Movements: Extraocular movements intact. Conjunctiva/sclera: Conjunctivae normal.      Pupils: Pupils are equal, round, and reactive to light. Neck:      Musculoskeletal: Normal range of motion and neck supple. Cardiovascular:      Rate and Rhythm: Normal rate and regular rhythm. Pulses: Normal pulses. Heart sounds: Normal heart sounds.    Pulmonary: Effort: Pulmonary effort is normal.      Breath sounds: Normal breath sounds. Musculoskeletal:      Right shoulder: Normal.      Right hip: Normal.      Left hip: Normal.      Right knee: Normal.      Cervical back: She exhibits decreased range of motion, tenderness, pain and spasm. Thoracic back: Normal.      Lumbar back: Normal.      Comments: No palpable step offs or crepitus    Skin:     General: Skin is warm and dry. Comments: R orbital bruising   Surgical scars noted to bilateral shoulders    Neurological:      Mental Status: She is alert and oriented to person, place, and time. GCS: GCS eye subscore is 4. GCS verbal subscore is 5. GCS motor subscore is 6. Cranial Nerves: Cranial nerves are intact. Sensory: Sensation is intact. Motor: Motor function is intact. Coordination: Coordination is intact. Gait: Gait is intact. Diagnostic Study Results     Labs -   No results found for this or any previous visit (from the past 12 hour(s)). Radiologic Studies -   CT SPINE CERV WO CONT   Final Result   IMPRESSION:   1. No evidence of acute fracture or subluxation. 2.  Extensive multilevel degenerative disc disease with canal stenosis and   foraminal narrowing. 3.  Calcified thyroid nodules      CT HEAD WO CONT   Final Result   IMPRESSION: No acute intracranial abnormality. Chronic small vessel ischemic   disease. CT Results  (Last 48 hours)               03/24/20 1830  CT SPINE CERV WO CONT Final result    Impression:  IMPRESSION:   1. No evidence of acute fracture or subluxation. 2.  Extensive multilevel degenerative disc disease with canal stenosis and   foraminal narrowing. 3.  Calcified thyroid nodules       Narrative:  EXAM:  CT CERVICAL SPINE WITHOUT CONTRAST       INDICATION: neck pain s/p fall 1 week ago. COMPARISON: None. CONTRAST:  None.        TECHNIQUE: Multislice helical CT of the cervical spine was performed without   intravenous contrast administration. Sagittal and coronal reconstructions were   generated. CT dose reduction was achieved through use of a standardized   protocol tailored for this examination and automatic exposure control for dose   modulation. FINDINGS:       The alignment is within normal limits. There is no fracture or subluxation. The   odontoid process is intact. The craniocervical junction is within normal limits. The prevertebral soft tissues are within normal limits. C2-C3: No significant canal stenosis or foraminal narrowing. C3-C4: Posterior disc osteophyte complex with moderate canal stenosis. Moderate   bilateral foraminal narrowing. C4-C5: Posterior disc osteophyte complex and facet arthrosis with probable   severe canal stenosis. Moderate to severe right and moderate left foraminal   narrowing. Tripp Sunshine C5-C6: Posterior disc osteophyte complex with moderate canal stenosis and   moderate to severe left foraminal narrowing. Moderate right foraminal narrowing. C6-C7: Posterior disc osteophyte complex with moderate bilateral foraminal   narrowing and moderate canal stenosis. C7-T1: Posterior disc osteophyte complex without canal stenosis or foraminal   narrowing       Incidental note is made of bilateral cervical ribs. There are calcified nodules   in the bilateral thyroid gland. 03/24/20 1829  CT HEAD WO CONT Final result    Impression:  IMPRESSION: No acute intracranial abnormality. Chronic small vessel ischemic   disease. Narrative:  EXAM: CT HEAD WO CONT       INDICATION: fall 1 week ago ; r/o subdural hematoma       COMPARISON: None. CONTRAST: None. TECHNIQUE: Unenhanced CT of the head was performed using 5 mm images. Brain and   bone windows were generated.   CT dose reduction was achieved through use of a   standardized protocol tailored for this examination and automatic exposure   control for dose modulation. FINDINGS:   The ventricles and sulci are normal in size, shape and configuration and   midline. Subcortical and deep white matter hypodensities. . There is a possible   calcified pleural-based mass in the left frontal parietal region which is   unchanged and may represent a small meningioma. There is no intracranial   hemorrhage, extra-axial collection, mass, mass effect or midline shift. The   basilar cisterns are open. No acute infarct is identified. The bone windows   demonstrate no abnormalities. The visualized portions of the paranasal sinuses   and mastoid air cells are clear. CXR Results  (Last 48 hours)    None            Medical Decision Making   I am the first provider for this patient. I reviewed the vital signs, available nursing notes, past medical history, past surgical history, family history and social history. Vital Signs-Reviewed the patient's vital signs. Records Reviewed: Nursing Notes, Old Medical Records, Previous Radiology Studies and Previous Laboratory Studies      70 yo F with c/o headache and dizziness s/p fall exhibiting benign neurological exam but has visible contusions and scalp tenderness. Plan to obtain CT of head and neck to r/o bleed and fracture     ED Course:   ED Course as of Mar 24 1905   Tue Mar 24, 2020   1853 Discussed Head CT negative for bleed. C spine remarkable for  DDD but otherwise neg for fracture and subluxation. Plan to d/c home with PCP follow . Discussed concussion precautions with pt and she verbalized udnerstanding    [NA]      ED Course User Index  [NA] Veronique Raymond NP         Disposition:  Discharge   DISCHARGE NOTE:   7:05 PM    Pt has been reexamined. Patient has no new complaints, changes, or physical findings. Care plan outlined and precautions discussed. All of pt's questions and concerns were addressed.  Patient was instructed and agrees to follow up with PCP, as well as to return to the ED upon further deterioration. Patient is ready to go home. Follow-up Information     Follow up With Specialties Details Why Contact Info    Simeon Worrell MD Family Practice In 1 week If symptoms worsen [place]  [address] [place] [address]  [phone]            Current Discharge Medication List      START taking these medications    Details   acetaminophen (Acetaminophen Extra Strength) 500 mg tablet Take 2 Tabs by mouth every six (6) hours as needed for Pain. Qty: 20 Tab, Refills: 0         CONTINUE these medications which have NOT CHANGED    Details   leflunomide (ARAVA) 20 mg tablet Take 1 Tab by mouth daily. Qty: 90 Tab, Refills: 0    Associated Diagnoses: Seronegative rheumatoid arthritis of multiple sites (HCC)      predniSONE (DELTASONE) 5 mg tablet 4 tabs daily for 7 days, 3 tabs for 7 days, 2 tabs for 14 days, 1 tab for 14 days  Qty: 91 Tab, Refills: 0    Associated Diagnoses: Seronegative rheumatoid arthritis of multiple sites (HCC)      sertraline (ZOLOFT) 50 mg tablet TAKE 1 TABLET BY MOUTH EVERY DAY  Qty: 90 Tab, Refills: 1    Associated Diagnoses: Mild episode of recurrent major depressive disorder (HCC)      naproxen (NAPROSYN) 500 mg tablet Take 500 mg by mouth two (2) times daily (with meals). adalimumab (HUMIRA,CF, PEN) 40 mg/0.4 mL pnkt 40 mg by SubCUTAneous route Every Thursday. Indications: rheumatoid arthritis  Qty: 4 Kit, Refills: 11    Associated Diagnoses: Seronegative rheumatoid arthritis of multiple sites ([address]  Long-term use of immunosuppressant medication      latanoprost (XALATAN) 0.005 % ophthalmic solution ADMINISTER 1 DROP INTO BOTH EYES NIGHTLY  Qty: 2.5 mL, Refills: 1      nortriptyline (PAMELOR) 50 mg capsule TAKE ONE CAPSULE EVERY DAY AT BEDTIME      furosemide (LASIX) 20 mg tablet TAKE 1 TABLET BY MOUTH EVERY DAY  Qty: 90 Tab, Refills: 0      albuterol sulfate (PROVENTIL;VENTOLIN) 2.5 mg/0.5 mL nebu nebulizer solution 0.5 mL by Nebulization route every four (4) hours as needed for Wheezing. Indications: Asthma Attack  Qty: 30 mL, Refills: 0    Associated Diagnoses: Mild intermittent asthma, unspecified whether complicated      gabapentin (NEURONTIN) 100 mg capsule Take 1 capsule in the morning and take 3 capsules before bedtime  Qty: 120 Cap, Refills: 2    Associated Diagnoses: RLS (restless legs syndrome)      ergocalciferol (ERGOCALCIFEROL) 50,000 unit capsule Take 1 Cap by mouth every seven (7) days. Qty: 12 Cap, Refills: 3    Associated Diagnoses: Vitamin D deficiency; Rheumatoid arthritis involving multiple sites, unspecified rheumatoid factor presence (HCC)      metFORMIN (GLUCOPHAGE) 500 mg tablet Take 1 Tab by mouth two (2) times daily (with meals). Qty: 60 Tab, Refills: 5    Associated Diagnoses: Type 2 diabetes mellitus with complication, without long-term current use of insulin ([facility])      fluticasone-vilanterol (BREO ELLIPTA) 100-25 mcg/dose inhaler Take 1 Puff by inhalation daily. Qty: 1 Inhaler, Refills: 5    Associated Diagnoses: Mild intermittent asthma, unspecified whether complicated      aspirin delayed-release 81 mg tablet Take  by mouth daily. metoprolol tartrate (LOPRESSOR) 25 mg tablet Take  by mouth two (2) times a day. simvastatin (ZOCOR) 20 mg tablet Take  by mouth nightly. Provider Notes (Medical Decision Making):   DDX: Subdural hematoma, basilar skull fracture, head concussion, head injury, contusion, cervical fracture vs subluxation vs sprain            Diagnosis     Clinical Impression:   1. Injury of head, initial encounter    2. Fall, initial encounter    3.  DDD (degenerative disc disease), cervical

## 2020-03-24 NOTE — DISCHARGE INSTRUCTIONS
Patient Education        Learning About a Closed Head Injury  What is a closed head injury? A closed head injury happens when your head gets hit hard. The strong force of the blow causes your brain to shake in your skull. This movement can cause the brain to bruise, swell, or tear. Sometimes nerves or blood vessels also get damaged. This can cause bleeding in or around the brain. A concussion is a type of closed head injury. What are the symptoms? If you have a mild concussion, you may have a mild headache or feel \"not quite right. \" These symptoms are common. They usually go away over a few days to 4 weeks. But sometimes after a concussion, you feel like you can't function as well as before the injury. And you have new symptoms. This is called postconcussive syndrome. You may:  · Find it harder to solve problems, think, concentrate, or remember. · Have headaches. · Have changes in your sleep patterns, such as not being able to sleep or sleeping all the time. · Have changes in your personality. · Not be interested in your usual activities. · Feel angry or anxious without a clear reason. · Lose your sense of taste or smell. · Be dizzy, lightheaded, or unsteady. It may be hard to stand or walk. How is a closed head injury treated? Any person who may have a concussion needs to see a doctor. Some people have to stay in the hospital to be watched. Others can go home safely. If you go home, follow your doctor's instructions. He or she will tell you if you need someone to watch you closely for the next 24 hours or longer. Rest is the best treatment. Get plenty of sleep at night. And try to rest during the day. · Avoid activities that are physically or mentally demanding. These include housework, exercise, and schoolwork. And don't play video games, send text messages, or use the computer. You may need to change your school or work schedule to be able to avoid these activities.   · Ask your doctor when it's okay to drive, ride a bike, or operate machinery. · Take an over-the-counter pain medicine, such as acetaminophen (Tylenol), ibuprofen (Advil, Motrin), or naproxen (Aleve). Be safe with medicines. Read and follow all instructions on the label. · Check with your doctor before you use any other medicines for pain. · Do not drink alcohol or use illegal drugs. They can slow recovery. They can also increase your risk of getting a second head injury. Follow-up care is a key part of your treatment and safety. Be sure to make and go to all appointments, and call your doctor if you are having problems. It's also a good idea to know your test results and keep a list of the medicines you take. Where can you learn more? Go to http://annette-evelin.info/  Enter E235 in the search box to learn more about \"Learning About a Closed Head Injury. \"  Current as of: November 19, 2019Content Version: 12.4  © 3807-7139 [employer]. Care instructions adapted under license by [employer] (which disclaims liability or warranty for this information). If you have questions about a medical condition or this instruction, always ask your healthcare professional. [address] any warranty or liability for your use of this information.

## 2020-03-25 ENCOUNTER — PATIENT OUTREACH (OUTPATIENT)
Dept: CASE MANAGEMENT | Age: 75
End: 2020-03-25

## 2020-03-25 NOTE — PROGRESS NOTES
Patient contacted regarding recent discharge and COVID-19 risk   Care Transition Nurse/ Ambulatory Care Manager contacted the patient by telephone to perform post discharge assessment. Verified name and  with patient as identifiers. Patient has following risk factors of: asthma, immunocompromised. CTN/ACM reviewed discharge instructions, medical action plan and red flags related to discharge diagnosis. Reviewed and educated them on any new and changed medications related to discharge diagnosis. Advised obtaining a 90-day supply of all daily and as-needed medications. Education provided regarding infection prevention, and signs and symptoms of COVID-19 and when to seek medical attention with patient who verbalized understanding. Discussed exposure protocols and quarantine from [address] you at higher risk for severe illness  and given an opportunity for questions and concerns. The patient agrees to contact the COVID-19 hotline [phone] or PCP office for questions related to their healthcare. CTN/ACM provided contact information for future reference. From CDC: Are you at higher risk for severe illness?  Wash your hands often.  Avoid close contact (6 feet, which is about two arm lengths) with people who are sick.  Put distance between yourself and other people if COVID-19 is spreading in your community.  Clean and disinfect frequently touched surfaces.  Avoid all cruise travel and non-essential air travel.  Call your healthcare professional if you have concerns about COVID-19 and your underlying condition or if you are sick.     For more information on steps you can take to protect yourself, see CDC's How to Protect Yourself

## 2020-03-31 ENCOUNTER — VIRTUAL VISIT (OUTPATIENT)
Dept: SLEEP MEDICINE | Age: 75
End: 2020-03-31

## 2020-03-31 VITALS — OXYGEN SATURATION: 100 % | HEIGHT: 64 IN | BODY MASS INDEX: 34.15 KG/M2 | WEIGHT: 200 LBS

## 2020-04-08 ENCOUNTER — PATIENT OUTREACH (OUTPATIENT)
Dept: CASE MANAGEMENT | Age: 75
End: 2020-04-08

## 2020-04-08 NOTE — PROGRESS NOTES
Patient resolved from Transition of Care episode on 4/8/20. Patient/family has been provided the following resources and education related to COVID-19:                         Signs, symptoms and red flags related to COVID-19            CDC exposure and quarantine guidelines            [facility] exposure contact - [phone]            Contact for their local Department of Health                 Patient currently reports that the following symptoms have improved:  no new/worsening symptoms     No further outreach scheduled with this CTN/ACM. Episode of Care resolved. Patient has this CTN/ACM contact information if future needs arise.

## 2020-04-28 ENCOUNTER — TELEPHONE (OUTPATIENT)
Dept: FAMILY MEDICINE CLINIC | Age: 75
End: 2020-04-28

## 2020-04-28 NOTE — TELEPHONE ENCOUNTER
Spoke with patient and she fell on Friday but did not go to ER. Right hand is swollen and she has been having bleeding from ear prior to fall. Scheduled appointment with Dr Armando Campos for tomorrow.

## 2020-04-28 NOTE — TELEPHONE ENCOUNTER
----- Message from Gavin Mejia sent at 4/28/2020  9:29 AM EDT -----  Regarding: Dr. Manuel Person telephone  Contact: [phone]  Caller's first and last name and relationship to patient (if not the patient): n/a  Best contact number: [phone]  Preferred date and time: soonest available appt  Scheduled appointment date and time: n/a  Reason for appointment:  Details to clarify the request: Would like to be seen in the office as soon as possible due to ear pains, swollen legs and her hand as she fell 4/24/20 on it. Advised she is also out of all prescriptions and would like them filled as soon as possible at the [facility] on [facility] and [address] [place] [address].

## 2020-04-29 ENCOUNTER — VIRTUAL VISIT (OUTPATIENT)
Dept: FAMILY MEDICINE CLINIC | Age: 75
End: 2020-04-29

## 2020-04-29 VITALS
HEART RATE: 85 BPM | BODY MASS INDEX: 33.12 KG/M2 | SYSTOLIC BLOOD PRESSURE: 142 MMHG | WEIGHT: 194 LBS | DIASTOLIC BLOOD PRESSURE: 61 MMHG | HEIGHT: 64 IN

## 2020-04-29 DIAGNOSIS — H81.11 BPPV (BENIGN PAROXYSMAL POSITIONAL VERTIGO), RIGHT: Primary | ICD-10-CM

## 2020-04-29 DIAGNOSIS — G25.81 RLS (RESTLESS LEGS SYNDROME): ICD-10-CM
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
## 2020-04-29 DIAGNOSIS — J45.20 MILD INTERMITTENT ASTHMA, UNSPECIFIED WHETHER COMPLICATED: ICD-10-CM

## 2020-04-29 DIAGNOSIS — M79.641 PAIN OF RIGHT HAND: ICD-10-CM

## 2020-04-29 DIAGNOSIS — E11.8 TYPE 2 DIABETES MELLITUS WITH COMPLICATION, WITHOUT LONG-TERM CURRENT USE OF INSULIN (HCC): ICD-10-CM

## 2020-04-29 RX ORDER — FUROSEMIDE 20 MG/1
TABLET ORAL
Qty: 90 TAB | Refills: 0 | Status: SHIPPED | OUTPATIENT
Start: 2020-04-29 | End: 2020-07-23

## 2020-04-29 RX ORDER — FLUTICASONE FUROATE AND VILANTEROL TRIFENATATE 100; 25 UG/1; UG/1
1 POWDER RESPIRATORY (INHALATION) DAILY
Qty: 1 INHALER | Refills: 5 | Status: SHIPPED | OUTPATIENT
Start: 2020-04-29 | End: 2021-01-05

## 2020-04-29 RX ORDER — TRAMADOL HYDROCHLORIDE 50 MG/1
50 TABLET ORAL
Qty: 20 TAB | Refills: 0 | Status: SHIPPED | OUTPATIENT
Start: 2020-04-29 | End: 2020-05-04

## 2020-04-29 RX ORDER — ALBUTEROL SULFATE 2.5 MG/.5ML
2.5 SOLUTION RESPIRATORY (INHALATION)
Qty: 30 ML | Refills: 0 | Status: SHIPPED | OUTPATIENT
Start: 2020-04-29 | End: 2021-03-29 | Stop reason: SDUPTHER

## 2020-04-29 RX ORDER — MECLIZINE HYDROCHLORIDE 25 MG/1
25 TABLET ORAL
Qty: 30 TAB | Refills: 0 | Status: SHIPPED | OUTPATIENT
Start: 2020-04-29 | End: 2020-05-09

## 2020-04-29 RX ORDER — GABAPENTIN 100 MG/1
CAPSULE ORAL
Qty: 120 CAP | Refills: 2 | Status: SHIPPED | OUTPATIENT
Start: 2020-04-29 | End: 2021-01-05

## 2020-04-29 RX ORDER — METFORMIN HYDROCHLORIDE 500 MG/1
500 TABLET ORAL 2 TIMES DAILY WITH MEALS
Qty: 60 TAB | Refills: 5 | Status: SHIPPED | OUTPATIENT
Start: 2020-04-29 | End: 2020-08-06

## 2020-04-29 NOTE — PROGRESS NOTES
Virgen Garnica is a 69 y.o. female who was seen by synchronous (real-time) audio-video technology on 4/29/2020. Consent: Virgen Garnica, who was seen by synchronous (real-time) audio-video technology, and/or her healthcare decision maker, is aware that this patient-initiated, Telehealth encounter on 4/29/2020 is a billable service, with coverage as determined by her insurance carrier. She is aware that she may receive a bill and has provided verbal consent to proceed: Yes. Assessment & Plan:   Diagnoses and all orders for this visit:    1. BPPV (benign paroxysmal positional vertigo), rightsuspect vertigo based on history and exam & may be contributing to disequilibrium  -     meclizine (ANTIVERT) 25 mg tablet; Take 1 Tab by mouth three (3) times daily as needed for Dizziness for up to 10 days. 2. Fall, subsequent encounter  3. Pain of right hand  -Do not suspect fracture based on history and exam.  Will use tramadol for pain control in the short-term. Wonder if her symptoms are related to BPPV as above or weakness  -     traMADoL (ULTRAM) 50 mg tablet; Take 1 Tab by mouth every six (6) hours as needed for Pain for up to 5 days. Max Daily Amount: 200 mg.    4. RLS (restless legs syndrome)continue low-dose gabapentin especially at bedtime  -     gabapentin (NEURONTIN) 100 mg capsule; Take 1 capsule in the morning and take 3 capsules before bedtime    5. Type 2 diabetes mellitus with complication, without long-term current use of insulin (HCC)controlled  -     metFORMIN (GLUCOPHAGE) 500 mg tablet; Take 1 Tab by mouth two (2) times daily (with meals). 6. Mild intermittent asthma, unspecified whether complicated  -     fluticasone furoate-vilanteroL (Breo Ellipta) 100-25 mcg/dose inhaler; Take 1 Puff by inhalation daily. -     albuterol sulfate (PROVENTIL;VENTOLIN) 2.5 mg/0.5 mL nebu nebulizer solution; 0.5 mL by Nebulization route every four (4) hours as needed for Wheezing.  Indications: asthma attack    Other orders  -     furosemide (LASIX) 20 mg tablet; TAKE 1 TABLET BY MOUTH EVERY DAY    Follow-up and Dispositions    · Return in about 4 weeks (around 5/27/2020), or if symptoms worsen or fail to improve. Subjective:   Zeny Jones is a 69 y.o. female who was seen for Fall (@ [facility] on Friday,swollen right hand,swelling in legs,blood in left ear)    [place] since last appt. Most recently fell while carrying laundry basket. Has a black eye. Feels off balance. Had bleeding from her ear. No trouble with her hearing with her hearing aids. Feeling     Has been using Gabapentin sparingly to help with restless leg syndrome. Sig pain on right hand and right side where she fell. Went to ER on 3/24/2020 for previous fall and had head CT and CT of neck:    CT neck  FINDINGS:  The alignment is within normal limits. There is no fracture or subluxation. The odontoid process is intact. The craniocervical junction is within normal limits. The prevertebral soft tissues are within normal limits. C2-C3: No significant canal stenosis or foraminal narrowing. C3-C4: Posterior disc osteophyte complex with moderate canal stenosis. Moderate bilateral foraminal narrowing. C4-C5: Posterior disc osteophyte complex and facet arthrosis with probable severe canal stenosis. Moderate to severe right and moderate left foraminal narrowing. Marnell Van C5-C6: Posterior disc osteophyte complex with moderate canal stenosis and moderate to severe left foraminal narrowing. Moderate right foraminal narrowing. C6-C7: Posterior disc osteophyte complex with moderate bilateral foraminal narrowing and moderate canal stenosis. C7-T1: Posterior disc osteophyte complex without canal stenosis or foraminal narrowing     Incidental note is made of bilateral cervical ribs. There are calcified nodules in the bilateral thyroid gland. \"          CT HEAD WO CONT  \"FINDINGS:  The ventricles and sulci are normal in size, shape and configuration and midline. Subcortical and deep white matter hypodensities. . There is a possible calcified pleural-based mass in the left frontal parietal region which is unchanged and may represent a small meningioma. There is no intracranial hemorrhage, extra-axial collection, mass, mass effect or midline shift. The basilar cisterns are open. No acute infarct is identified. The bone windows demonstrate no abnormalities. The visualized portions of the paranasal sinuses and mastoid air cells are clear\"                Prior to Admission medications    Medication Sig Start Date End Date Taking? Authorizing Provider   fluticasone furoate-vilanteroL (Breo Ellipta) 100-25 mcg/dose inhaler Take 1 Puff by inhalation daily. 4/29/20  Yes Jenny Verma MD   albuterol sulfate (PROVENTIL;VENTOLIN) 2.5 mg/0.5 mL nebu nebulizer solution 0.5 mL by Nebulization route every four (4) hours as needed for Wheezing. Indications: asthma attack 4/29/20  Yes Jenny Verma MD   furosemide (LASIX) 20 mg tablet TAKE 1 TABLET BY MOUTH EVERY DAY 4/29/20  Yes Jenny Verma MD   gabapentin (NEURONTIN) 100 mg capsule Take 1 capsule in the morning and take 3 capsules before bedtime 4/29/20  Yes Jenny Verma MD   metFORMIN (GLUCOPHAGE) 500 mg tablet Take 1 Tab by mouth two (2) times daily (with meals). 4/29/20  Yes Jenny Verma MD   meclizine (ANTIVERT) 25 mg tablet Take 1 Tab by mouth three (3) times daily as needed for Dizziness for up to 10 days. 4/29/20 5/9/20 Yes Jenny Verma MD   traMADoL [place] [facility]) 50 mg tablet Take 1 Tab by mouth every six (6) hours as needed for Pain for up to 5 days. Max Daily Amount: 200 mg. 4/29/20 5/4/20 Yes Jenny Verma MD   acetaminophen (Acetaminophen Extra Strength) 500 mg tablet Take 2 Tabs by mouth every six (6) hours as needed for Pain. 3/24/20  Yes Veronique Raymond NP   leflunomide (ARAVA) 20 mg tablet Take 1 Tab by mouth daily.  2/24/20  Yes Omar Zachery Nicole MD   sertraline (ZOLOFT) 50 mg tablet TAKE 1 TABLET BY MOUTH EVERY DAY 1/7/20  Yes Awais Alvarez MD   naproxen (NAPROSYN) 500 mg tablet Take 500 mg by mouth two (2) times daily (with meals). Yes Provider, Historical   adalimumab (HUMIRA,CF, PEN) 40 mg/0.4 mL pnkt 40 mg by SubCUTAneous route Every Thursday. Indications: rheumatoid arthritis 11/28/19  Yes Uriel Hunt MD   latanoprost (XALATAN) 0.005 % ophthalmic solution ADMINISTER 1 DROP INTO BOTH EYES NIGHTLY 9/26/19  Yes Awais Alvarez MD   nortriptyline (PAMELOR) 50 mg capsule TAKE ONE CAPSULE EVERY DAY AT BEDTIME 5/7/18  Yes Provider, Historical   ergocalciferol (ERGOCALCIFEROL) 50,000 unit capsule Take 1 Cap by mouth every seven (7) days. 11/20/18  Yes Awais Alvarez MD   aspirin delayed-release 81 mg tablet Take  by mouth daily. Yes Provider, Historical   metoprolol tartrate (LOPRESSOR) 25 mg tablet Take  by mouth two (2) times a day. Yes Provider, Historical   simvastatin (ZOCOR) 20 mg tablet Take  by mouth nightly. Yes Provider, Historical   furosemide (LASIX) 20 mg tablet TAKE 1 TABLET BY MOUTH EVERY DAY 8/2/19 4/29/20  Awais Alvarez MD   albuterol sulfate (PROVENTIL;VENTOLIN) 2.5 mg/0.5 mL nebu nebulizer solution 0.5 mL by Nebulization route every four (4) hours as needed for Wheezing. Indications: Asthma Attack 5/7/19 4/29/20  Awais Alvarez MD   gabapentin (NEURONTIN) 100 mg capsule Take 1 capsule in the morning and take 3 capsules before bedtime 5/7/19 4/29/20  Awais Alvarez MD   metFORMIN (GLUCOPHAGE) 500 mg tablet Take 1 Tab by mouth two (2) times daily (with meals). Patient taking differently: Take 500 mg by mouth daily (with breakfast). 9/28/18 4/29/20  Awais Alvarez MD   fluticasone-vilanterol (BREO ELLIPTA) 100-25 mcg/dose inhaler Take 1 Puff by inhalation daily.  9/28/18 4/29/20  Awais Alvarez MD     Allergies   Allergen Reactions    Lisinopril Rash    Methotrexate Hives       Patient Active Problem List    Diagnosis Date Noted    MGUS (monoclonal gammopathy of unknown significance) 11/22/2019    Severe obesity ([facility] [address] 05/07/2019    Seronegative rheumatoid arthritis of multiple sites ([facility] 02/18/2019    Rheumatoid arthritis involving multiple sites ([facility] 09/28/2018    Essential hypertension 09/28/2018    Mixed hyperlipidemia 09/28/2018    RLS (restless legs syndrome) 09/28/2018    Mild intermittent asthma 09/28/2018    Status post angioplasty with stent 09/28/2018    Coronary artery disease due to lipid rich plaque 09/28/2018    Long-term use of immunosuppressant medication 08/22/2018    Osteopenia of multiple sites 08/22/2018    Vitamin D deficiency 08/22/2018     Past Medical History:   Diagnosis Date    Asthma     At risk for sleep apnea 11/11/2019    [facility] - Sleep study in January per patient     DDD (degenerative disc disease), lumbar     Diabetes ([facility]     Gastritis     GERD (gastroesophageal reflux disease)     Glaucoma     Hiatal hernia     High cholesterol     Hypertension     Peripheral vascular disease (HCC)     RA (rheumatoid arthritis) (HCC)     Sarcoidosis 1999    [facility]       ROS  Gen - no fever/chills  Resp - no dyspnea or cough  CV - no chest pain or WORKMAN  Rest per HPI    Objective:   Vital Signs: (As obtained by patient/caregiver at home)  Visit Vitals  /61   Pulse 85   Ht 5' 4\" (1.626 m)   Wt 194 lb (88 kg)   BMI 33.30 kg/m²        Physical exam:  General appearance - alert, well appearing, and in no distress  Eyes -sclera anicteric, no discharge  HEENT normocephalic, atraumatic, moist mucous membranes, no visualized neck mass  Chest -normal respiratory effort, no visualized signs of respiratory distress  Neurological - alert, awake, normal speech, no focal findings or movement disorder noted  Psych - normal mood and affect  Skin R eye with sig bruising and swelling   Msk - R hand and wrist with FROM      We discussed the expected course, resolution and complications of the diagnosis(es) in detail. Medication risks, benefits, costs, interactions, and alternatives were discussed as indicated. I advised her to contact the office if her condition worsens, changes or fails to improve as anticipated. She expressed understanding with the diagnosis(es) and plan. Olga Lidia Vasquez is a 69 y.o. female who was evaluated by a video visit encounter for concerns as above. Patient identification was verified prior to start of the visit. A caregiver was present when appropriate. Due to this being a TeleHealth encounter (During [facility] public health emergency), evaluation of the following organ systems was limited: Vitals/Constitutional/EENT/Resp/CV/GI//MS/Neuro/Skin/Heme-Lymph-Imm. Pursuant to the emergency declaration under the [facility] waiver authority and the [employer] and "[employer] General Act, this Virtual  Visit was conducted, with patient's (and/or legal guardian's) consent, to reduce the patient's risk of exposure to COVID-19 and provide necessary medical care. Services were provided through a video synchronous discussion virtually to substitute for in-person clinic visit. Patient and provider were located at their individual homes.       Eden Uribe MD

## 2020-05-08 PROBLEM — E83.51 HYPOCALCEMIA: Status: ACTIVE | Noted: 2020-05-08

## 2020-05-08 PROBLEM — E11.9 TYPE 2 DIABETES MELLITUS WITHOUT COMPLICATION (HCC): Status: ACTIVE | Noted: 2020-05-08

## 2020-05-08 PROBLEM — G89.29 CHRONIC PAIN: Status: ACTIVE | Noted: 2020-05-08

## 2020-05-08 PROBLEM — H40.9 GLAUCOMA: Status: ACTIVE | Noted: 2020-05-08

## 2020-05-08 PROBLEM — H91.90 HEARING LOSS: Status: ACTIVE | Noted: 2020-05-08

## 2020-05-28 NOTE — PROGRESS NOTES
REASON FOR VISIT    This is a follow-up visit for Ms. Uday Hill for     ICD-10-CM   1. Seronegative rheumatoid arthritis of multiple sites [facility]) M06.09     The patient has consented for synchronous (real-time) Telemedicine (audio-video technology) on 5/29/2020 for their care to be delivered over telemedicine in place of their regularly scheduled office visit pursuant to the emergency declaration under the [address] and the [address] authority and the [employer] Act, this Virtual  Visit was conducted, with patient's consent, to reduce the patient's risk of exposure to COVID-19 and provide continuity of care for an established patient. Services were provided through a video synchronous discussion virtually to substitute for in-person clinic visit. Inflammatory arthritis phenotype includes:  Anti-CCP positive: no  Rheumatoid factor positive: no  Erosive disease: no  Extra-articular manifestations include: MGUS    Immunosuppression Screening (8/23/2019):   Quantiferon TB: negative  PPD:  Not performed  Hepatitis B: negative  Hepatitis C: negative    Therapy History includes:  Current DMARD therapy includes: leflunomide 20 mg daily (8/19/2019 to present), Humira 40 mg every Thursday (11/22/2019 to present)  Prior DMARD therapy includes: methotrexate 20 mg every Saturday, methotrexate 20 mg SubQ every Saturday (5/23/2019 to 7/29/2019) Simponi Aria (11/29/2018 to 5/23/2019), Humira 40 mg every 14 days (8/03/2019 to 11/22/2019)  The following DMARDs have been ineffective: Simponi Aria, Humira every 14 days  The following DMARDs were stopped because of side effects: methotrexate 20 mg SubQ (aphthous ulcer, injection site breakdown)  Contra-Indicated DMARDs because of history of diverticulitis: Actemra, Kevzara    Immunization History   Administered Date(s) Administered    Influenza High Dose Vaccine PF 09/07/2018    Influenza Vaccine (Tri) Adjuvanted 09/24/2019     Patient Active Problem List   Diagnosis Code    Long-term use of immunosuppressant medication Z79.899    Osteopenia of multiple sites M85.89    Vitamin D deficiency E55.9    Rheumatoid arthritis involving multiple sites ([facility] M06.9    Essential hypertension I10    Mixed hyperlipidemia E78.2    RLS (restless legs syndrome) G25.81    Mild intermittent asthma J45.20    Status post angioplasty with stent Z95.820    Coronary artery disease due to lipid rich plaque I25.10, I25.83    Seronegative rheumatoid arthritis of multiple sites ([facility] M06.09    Severe obesity (HCC) E66.01    MGUS (monoclonal gammopathy of unknown significance) D47.2    Chronic pain G89.29    Glaucoma H40.9    Hearing loss H91.90    Hypocalcemia E83.51    Type 2 diabetes mellitus without complication ([facility]) R74.4     HISTORY OF PRESENT ILLNESS    Ms. Mala Ahumada returns for a follow-up. On her last visit, I continued leflunomide 20 mg daily and Humira 40 mg every Thursday, which she has taken with good tolerance. I had prescribed her a prednisone taper due to flaring, which resolved her flare. She denies breakthrough. Today, she feels good. She denies pain or swelling in her right hand but has stiffness lasting an hour or a little more. She may have pain in her hands when she makes a fist. She feels that her left hand is swelling without pain or stiffness. She usually feels worse when the weather changes to cold and rainy. She endorses blurred vision in her right eye after a fall around 3 weeks ago, where she now see black dots. She has not seen an ophthalmologist. She has ankle swelling in her right that usually happens in the afternoon but can be present in the morning. She also had an interval fall without fracture and injured her right eye. She was prescribed meclizine on 4/29/2020 by Dr. Christine Ortiz which was helped.     She denies fever, weight loss, vision loss, oral ulcers, dry cough, dyspnea, nausea, vomiting, dysphagia, abdominal pain, black or bloody stool, rash, easy bruising and increased thirst.    Last toxicity monitoring by blood work was done on 2/24/2020 and did not reveal any significant adverse effects, except AST 43    Most recent inflammatory markers from 2/24/2020 revealed a ESR 22 mm/hr and CRP 2 mg/L. The patient has not had any interval hospital admissions or surgeries, but had recurrent right toe infections. REVIEW OF SYSTEMS    A comprehensive review of systems was performed and pertinent results are documented in the HPI, review of systems is otherwise non-contributory. PAST MEDICAL HISTORY    She has a past medical history of Asthma, At risk for sleep apnea (11/11/2019), DDD (degenerative disc disease), lumbar, Diabetes ([facility] Gastritis, GERD (gastroesophageal reflux disease), Glaucoma, Hiatal hernia, High cholesterol, Hypertension, Peripheral vascular disease ([facility] RA (rheumatoid arthritis) ([facility] and Sarcoidosis (1999). FAMILY HISTORY    Her family history includes Alcohol abuse in her brother; Heart Disease in her mother; Liver Disease in her father. SOCIAL HISTORY    She reports that she has never smoked. She has never used smokeless tobacco. She reports current alcohol use of about 2.0 standard drinks of alcohol per week. She reports that she does not use drugs. MEDICATIONS    Current Outpatient Medications   Medication Sig Dispense Refill    ergocalciferol (ERGOCALCIFEROL) 1,250 mcg (50,000 unit) capsule Take 1 Cap by mouth every seven (7) days. Indications: vitamin D deficiency (high dose therapy) 12 Cap 3    fluticasone furoate-vilanteroL (Breo Ellipta) 100-25 mcg/dose inhaler Take 1 Puff by inhalation daily. 1 Inhaler 5    albuterol sulfate (PROVENTIL;VENTOLIN) 2.5 mg/0.5 mL nebu nebulizer solution 0.5 mL by Nebulization route every four (4) hours as needed for Wheezing.  Indications: asthma attack 30 mL 0    furosemide (LASIX) 20 mg tablet TAKE 1 TABLET BY MOUTH EVERY DAY 90 Tab 0    gabapentin (NEURONTIN) 100 mg capsule Take 1 capsule in the morning and take 3 capsules before bedtime 120 Cap 2    metFORMIN (GLUCOPHAGE) 500 mg tablet Take 1 Tab by mouth two (2) times daily (with meals). 60 Tab 5    acetaminophen (Acetaminophen Extra Strength) 500 mg tablet Take 2 Tabs by mouth every six (6) hours as needed for Pain. 20 Tab 0    leflunomide (ARAVA) 20 mg tablet Take 1 Tab by mouth daily. 90 Tab 0    sertraline (ZOLOFT) 50 mg tablet TAKE 1 TABLET BY MOUTH EVERY DAY 90 Tab 1    naproxen (NAPROSYN) 500 mg tablet Take 500 mg by mouth two (2) times daily (with meals).  adalimumab (HUMIRA,CF, PEN) 40 mg/0.4 mL pnkt 40 mg by SubCUTAneous route Every Thursday. Indications: rheumatoid arthritis 4 Kit 11    latanoprost (XALATAN) 0.005 % ophthalmic solution ADMINISTER 1 DROP INTO BOTH EYES NIGHTLY 2.5 mL 1    nortriptyline (PAMELOR) 50 mg capsule TAKE ONE CAPSULE EVERY DAY AT BEDTIME      aspirin delayed-release 81 mg tablet Take  by mouth daily.  metoprolol tartrate (LOPRESSOR) 25 mg tablet Take  by mouth two (2) times a day.  simvastatin (ZOCOR) 20 mg tablet Take  by mouth nightly. ALLERGIES    Allergies   Allergen Reactions    Lisinopril Rash    Methotrexate Hives     PHYSICAL EXAMINATION    There were no vitals taken for this visit. There is no height or weight on file to calculate BMI. General: Patient is alert, oriented x 3, not in acute distress    HEENT:   Sclerae are not injected and appear moist.  There is no alopecia. Neck is supple     Chest:  Breathing comfortably at room air    Musculoskeletal exam:  A comprehensive musculoskeletal exam was NOT performed for all joints of each upper and lower extremity and assessed for swelling, tenderness and range of motion.  Positive results are documented as below:    Previous Exam    Decreased ROM of bilateral shoulders (right worse than left) due to pain  Bilateral knee replacements without effusion.   Bilateral ankle swelling with tenderness (RESOLVED)  Right foot exam deferred due to post-surgical boot placed    Z-Deformities:   no  Hilger Neck Deformities:  no  Boutonierre's Deformities:  no  Ulnar Deviation:   Yes (LEFT: 3rd digit)  MCP Subluxation:  no     Joint Count 2/24/2020 11/22/2019 8/23/2019 5/23/2019 2/18/2019 11/19/2018 8/22/2018   Patient pain (0-100) 75 45 40 50 60 90 90   MHAQ 1.25 0.625 0.75 0.75 0.75 1 0.5   Left shoulder - Tender - - - - - - 1   Left shoulder - Swollen - - - - - - 1   Left elbow - Tender 1 1 - 1 - 1 1   Left elbow - Swollen 0 0 - 0 - 0 1   Left wrist- Tender 1 1 1 1 1 1 -   Left wrist- Swollen 1 1 1 1 1 1 1   Left 1st MCP - Tender 0 1 1 1 1 1 1   Left 1st MCP - Swollen 1 1 1 1 1 1 1   Left 2nd MCP - Tender 1 1 1 1 1 1 1   Left 2nd MCP - Swollen 1 1 1 1 1 1 1   Left 3rd MCP - Tender 1 1 1 1 0 1 1   Left 3rd MCP - Swollen 1 1 1 1 1 1 1   Left 4th MCP - Tender 1 - 1 1 1 1 1   Left 4th MCP - Swollen 1 - 1 0 1 1 1   Left 5th MCP - Tender 1 1 1 1 1 - 1   Left 5th MCP - Swollen 0 1 0 1 1 - 1   Left thumb IP - Tender - - 1 1 1 1 1   Left thumb IP - Swollen - - 1 0 1 1 -   Left 2nd PIP - Tender 1 1 1 1 1 1 1   Left 2nd PIP - Swollen 1 1 1 1 1 1 1   Left 3rd PIP - Tender 1 1 1 1 1 1 1   Left 3rd PIP - Swollen 1 1 1 1 1 1 1   Left 4th PIP - Tender 1 1 1 1 1 1 1   Left 4th PIP - Swollen 1 1 1 1 1 1 1   Left 5th PIP - Tender 1 1 1 1 1 1 1   Left 5th PIP - Swollen 1 1 1 0 - 1 1   Left knee - Tender - - - - - 1 -   Left knee - Swollen - - - - - 1 -   Right shoulder - Tender - - - - - - 1   Right elbow - Tender - 1 - 1 - 1 1   Right elbow - Swollen - 0 - 0 - 1 1   Right wrist- Tender 1 1 1 1 1 1 1   Right wrist- Swollen 1 1 1 1 1 1 1   Right 1st MCP - Tender - 1 1 1 1 1 1   Right 1st MCP - Swollen - 0 1 1 0 1 -   Right 2nd MCP - Tender 1 1 1 1 1 1 1   Right 2nd MCP - Swollen 1 1 1 1 1 1 1   Right 3rd MCP - Tender 1 1 1 1 1 1 1   Right 3rd MCP - Swollen 1 1 1 1 1 1 1   Right 4th MCP - Tender 1 1 1 1 1 1 1   Right 4th MCP - Swollen 1 1 1 1 1 1 1   Right 5th MCP - Tender 1 1 1 1 1 1 1   Right 5th MCP - Swollen 1 1 1 1 1 1 1   Right thumb IP - Tender - - 1 - - - 1   Right thumb IP - Swollen - - 0 - - - -   Right 2nd PIP - Tender 1 1 1 1 1 1 1   Right 2nd PIP - Swollen 1 1 1 1 1 1 1   Right 3rd PIP - Tender 1 1 1 1 1 1 1   Right 3rd PIP - Swollen 1 1 1 1 1 1 1   Right 4th PIP - Tender 1 1 1 1 1 1 1   Right 4th PIP - Swollen 1 1 1 1 1 1 1   Right 5th PIP - Tender 1 1 1 1 1 1 1   Right 5th PIP - Swollen 1 1 1 1 1 1 1   Right knee - Tender - - - - 1 - -   Tender Joint Count (Total) 19 21 22 23 21 23 25   Swollen Joint Count (Total) 18 18 20 18 19 22 22   Physician Assessment (0-10) 6 5 4 5 6 7 7   Patient Assessment (0-10) 7 5.5 6.5 5 7 9 9   CDAI Total (calculated) 50 49.5 52.5 51 48 61 61     DATA REVIEW    Laboratory     Recent laboratory results were reviewed, summarized, and discussed with the patient. Imaging    Musculoskeletal Ultrasound    None    Radiographs    Sacrum and Coccyx 9/18/2018: no definite fracture or dislocation, however osteopenia and overlapping bowel gas somewhat limited evaluation of the sacrum. Severe multilevel degenerative disc disease is noted in the lower lumbar spine. Dense arterial vascular calcifications are present. Bilateral Hips 9/18/2018: no acute fracture or dislocation. Evaluation of the sacrum is limited by overlying bowel gas and osteopenia. Within these limitations, no fractures identified. Degenerative changes are present in the lower lumbar spine. Dense arterial vascular calcifications are present. Bilateral Hand 8/22/2018: moderate to severe diffuse osteopenia. Extensive atherosclerotic calcifications are shown extending into the fingers bilaterally. Fusiform soft tissue swelling is shown bilaterally throughout the metacarpophalangeal and proximal interphalangeal joints.  There is mild joint space narrowing on the left throughout the metacarpophalangeal joints and on the right in the third through fifth metacarpophalangeal joints. Subtle marginal erosions are demonstrated at the lateral base of the right ring finger proximal phalanx and more extensively in the third through fifth left MCP joints. There are minimal-mild features of osteoarthritis at several DIP joints bilaterally. No substantial carpal joint space narrowing is shown. Calcium pyrophosphate dihydrate position is in the medial carpus bilaterally. Bilateral Foot 8/22/2018: moderate to severe diffuse osteopenia. Extensive atherosclerotic calcifications are shown extending into the digits. Bilateral talonavicular, naviculocuneiform and left greater than right diffuse tarsometatarsal joint space narrowing is noted with small marginal osteophytes. No substantial digital joint space narrowing is evident though fusiform soft tissue swelling is suggested at the metatarsophalangeal joints bilaterally. Moderate bilateral plantar and dorsal calcaneal spurs are shown. Bilateral Shoulder 9/08/2011: RIGHT: no evidence of fracture or dislocation. Alignment of the glenohumeral joint is anatomic. The patient is status post distal clavicle resection and acromioplasty. A small amount of heterotopic ossification is noted about the distal aspect of the clavicle. Mild marginal osteophytes noted at the the glenoid. Irregularity along the superolateral aspect of the humeral head may reflect rotator cuff pathology. An ovoid well-corticated calcific density projecting over the humeral head may represent an intraventricular loose body. The visualized right lung is clear. Soft tissues are unremarkable. LEFT: no evidence of fracture or dislocation. Alignment of the glenohumeral and acromioclavicular joints is anatomic. Moderate superiorly projecting osteophytes emanate from the distal clavicle at the acromioclavicular joint.  Bony hypertrophy irregularity about the greater tuberosity may indicate chronic rotator cuff pathology. There is a small subacromial spur. The visualized left lung is clear. Multiple mediastinal clips are noted. Bone mineralization is mildly diminished. Right Hip 8/03/2011: there is diffuse osteopenia. Degenerative changes noted in the lower lumbar spine. The SI joints and pubic symphysis are not widened. The right and left hip are normally aligned. The right hip demonstrates mild osteophyte formation most notable at the inferior aspect of the joint. There is minimal diffuse joint space narrowing. Similar changes are noted in the left hip with minimal osteophyte formation. There is preservation of the left hip joint space. No acute fractures. Dense vascular calcifications are noted. Soft tissue calcification lateral to the right iliac wing is visualized and was noted on prior abdominal and pelvic CT scan. Hypertrophic changes anterior/superior left iliac wing and left greater trochanter as before. Bilateral Hand 9/27/2010: Overall alignment is anatomic. The bones are demineralized overall. The joint spaces are relatively preserved throughout. Relatively mild degenerative changes are present in the DIP joints, and in the first CMC joints. There appears to be some mild soft tissue swelling overlying the right MCP joints. There are also several equivocal erosions noted involving the metacarpal heads of the right hand which raise the possibility of early erosions related to inflammatory arthritis such as rheumatoid. These changes appear slightly more conspicuous and the 2008 examination. Incidentally, there are fairly prominent vascular calcifications present indicating that the patient is likely diabetic or perhaps as renal disease    Bilateral Knee 8/18/2010:  irregularity along the patella surface where there is a scalloped margin suggesting liner wear.  In addition, the cement seen along the bone prosthetic interface of the anterior distal femoral cortex is less apparent, which also may reflect underlying liner wear and possibly early particulate disease. Heterotopic ossification has not changed in interval. Vascular calcifications reflecting atherosclerotic disease remains severe. Diffuse osteopenia is noted. CT Imaging    CT Head without contrast 3/24/2020: The ventricles and sulci are normal in size, shape and configuration and midline. Subcortical and deep white matter hypodensities. . There is a possible calcified pleural-based mass in the left frontal parietal region which is unchanged and may represent a small meningioma. There is no intracranial hemorrhage, extra-axial collection, mass, mass effect or midline shift. The basilar cisterns are open. No acute infarct is identified. The bone windows demonstrate no abnormalities. The visualized portions of the paranasal sinuses and mastoid air cells are clear. CT Cervical Spine without contrast 3/24/2020: The alignment is within normal limits. There is no fracture or subluxation. The odontoid process is intact. The craniocervical junction is within normal limits. The prevertebral soft tissues are within normal limits. C2-C3: No significant canal stenosis or foraminal narrowing. C3-C4: Posterior disc osteophyte complex with moderate canal stenosis. Moderate bilateral foraminal narrowing. C4-C5: Posterior disc osteophyte complex and facet arthrosis with probable severe canal stenosis. Moderate to severe right and moderate left foraminal narrowing. C5-C6: Posterior disc osteophyte complex with moderate canal stenosis and moderate to severe left foraminal narrowing. Moderate right foraminal narrowing. C6-C7: Posterior disc osteophyte complex with moderate bilateral foraminal narrowing and moderate canal stenosis. C7-T1: Posterior disc osteophyte complex without canal stenosis or foraminal narrowing. Incidental note is made of bilateral cervical ribs. There are calcified nodules in the bilateral thyroid gland.     CT Right Shoulder with arthrogram 9/26/2011: there is a large full-thickness tear seen involving the supraspinatus tendon extending to the level of the acromion. Additionally there is a partial tear of the subscapularis tendon. The biceps appears chronically torn. There are mild degenerative changes of the acromioclavicular joint. Some mild osteoarthritic changes are present of the glenohumeral joint. No evidence of fracture was seen. MR Imaging    None    DXA     DXA 5/04/2016:  (excluded L4 for spondylitic change, right hip) lumbar spine L1-L3 T score -1.5 (BMD 0.954 g/cm2), left femoral neck T score: -1.0 (0.805 g/cm2), left total hip T score: -0.1 (1.016 g/cm2), and distal one third left radius T score -1.9 (BMD 0.571 g/cm2). FRAX score 6.1 % probability in 10 years for major osteoporotic fracture and 0.3 % 10 year probability of hip fracture. Nuclear Studies    Triple Phase Scan 8/20/0218:  Patient status post bilateral knee replacements. Phase 1 (blood flow):  There is slight increased blood flow to the left knee. Phase 2 (blood pool/soft tissue):  There is slight increased peritracheal activity left knee. Phase 3 (delayed bone): Patient status post right knee replacement which is within normal limits. Patient is status post left knee replacement. There are slight increased activity left tibial plateau    PATHOLOGY    Bone, right hallux, biopsy 11/18/2019: No evidence for osteomyelitis. Toe, right hallux, amputation 11/18/2019: Acute osteomyelitis. PROCEDURE    Kenalog 80 mg IA. (5/23/19)   Kenalog 80 mg IA. (02/18/19)   Kenalog 80 mg IA. (11/19/18)     ASSESSMENT AND PLAN    This is a follow-up visit for Ms. Maikol Brown. 1) Seronegative Rheumatoid Arthritis. She is maintained on leflunomide 20 mg daily and Humira 40 mg every Thursday with good tolerance and was feel better. She notes at times swelling in her hands and pain depending on the weather.     Her CDAI was previously 50 (previously 49.5, 52.5, 51, 53, 61, 63) with 19 tender and 18 swollen joints, consistent with high disease activity. I will continue treatment. Labs today. 2) Long Term Use of Immunosuppressants. The patient remains on immunomodulatory medications (leflunomide, Humira) and requires frequent toxicity monitoring by blood work. Respective labs were ordered (CBC and CMP). 3) Vitamin D Deficiency. Her vitamin D was 21.1 (previously 24.1, 28.0, 19.3). She is on weekly ergocalciferol 50,000. I will check her level today. I refilled it. 4) Osteopenia involving Multiple Sites. Her most recent DXA on 5/04/2016 lumbar spine L1-L3 T score -1.5 (BMD 0.954 g/cm2) and distal one third left radius T score -1.9 (BMD 0.571 g/cm2). This is likely due to her chronic Rheumatoid Arthritis. She is no longer on Fosamax. I ordered a repeat DXA. 5) MGUS. Her M-spike 0.2 (previously 0.2, 0.3) with immunofixation shows IgG monoclonal protein with lambda light chain. I referred her to hematology, Dr. Newton Stoddard, who noted a low risk for myeloma. I will repeat. 6) Elevated AST. Her AST was 43. I will monitor. I will repeat. 7) Blurred Vision s/p Fall. I asked her to see an ophthalmology. The patient voiced understanding of the aforementioned assessment and plan. The patient has consented for synchronous (real-time) Telemedicine (audio-video technology) on 5/29/2020 for their care to be delivered over telemedicine in place of their regularly scheduled office visit pursuant to the emergency declaration under the [facility], [facility] waiver authority and the [employer] Act, this Virtual  Visit was conducted, with patient's consent, to reduce the patient's risk of exposure to COVID-19 and provide continuity of care for an established patient. Services were provided through a video synchronous discussion virtually to substitute for in-person clinic visit.     TODAY'S ORDERS    Orders Placed This Encounter    DEXA BONE DENSITY STUDY AXIAL    CBC WITH AUTOMATED DIFF    METABOLIC PANEL, COMPREHENSIVE    C REACTIVE PROTEIN, QT    SED RATE (ESR)    VITAMIN D, 25 HYDROXY    PTH INTACT    TSH REFLEX TO T4    PROTEIN ELECTROPHORESIS W/ REFLX TIANNA    ergocalciferol (ERGOCALCIFEROL) 1,250 mcg (50,000 unit) capsule     Future Appointments   Date Time Provider Doorthy Shanelle   6/15/2020 10:20 AM Ml Jenkins MD [address], [place], [address] [place] [address]    Adult Rheumatology   Rheumatology Ultrasound Certified  [address] [facility]   [place], [address] [facility]   Phone [phone]  Fax [phone]

## 2020-05-29 ENCOUNTER — VIRTUAL VISIT (OUTPATIENT)
Dept: RHEUMATOLOGY | Age: 75
End: 2020-05-29

## 2020-05-29 DIAGNOSIS — E55.9 VITAMIN D DEFICIENCY: ICD-10-CM

## 2020-05-29 DIAGNOSIS — Z78.0 POST-MENOPAUSAL: ICD-10-CM

## 2020-05-29 DIAGNOSIS — M06.09 SERONEGATIVE RHEUMATOID ARTHRITIS OF MULTIPLE SITES (HCC): Primary | ICD-10-CM

## 2020-05-29 DIAGNOSIS — H53.8 BLURRED VISION, RIGHT EYE: ICD-10-CM

## 2020-05-29 DIAGNOSIS — R93.89 ABNORMAL FINDINGS ON DIAGNOSTIC IMAGING OF OTHER SPECIFIED BODY STRUCTURES: ICD-10-CM

## 2020-05-29 DIAGNOSIS — M06.9 RHEUMATOID ARTHRITIS INVOLVING MULTIPLE SITES, UNSPECIFIED RHEUMATOID FACTOR PRESENCE: ICD-10-CM

## 2020-05-29 RX ORDER — ERGOCALCIFEROL 1.25 MG/1
50000 CAPSULE ORAL
Qty: 12 CAP | Refills: 3 | Status: SHIPPED | OUTPATIENT
Start: 2020-05-29 | End: 2021-03-29 | Stop reason: SDUPTHER

## 2020-06-05 ENCOUNTER — TELEPHONE (OUTPATIENT)
Dept: FAMILY MEDICINE CLINIC | Age: 75
End: 2020-06-05

## 2020-06-05 NOTE — TELEPHONE ENCOUNTER
----- Message from Lucidna Rivero sent at 6/5/2020  9:06 AM EDT -----  Regarding: Dr. Sahra Lopez Rx  Contact: Mey Vaughan RN calling on behalf of [address] [facility] is stating that the pharmacy claims that  the patient is diabetic but she's not on statin therapy.  Reference number: [id number]

## 2020-07-17 ENCOUNTER — TELEPHONE (OUTPATIENT)
Dept: FAMILY MEDICINE CLINIC | Age: 75
End: 2020-07-17

## 2020-07-17 NOTE — TELEPHONE ENCOUNTER
----- Message from Georgie Luna sent at 7/17/2020 12:39 PM EDT -----  Regarding: Dr. Ronald Arguello Message/Vendor Calls    Caller's first and last name: Neha(RN/[facility])      Reason for call:nurse or doctor call back      Callback required yes/no and why:yes      Best contact number(s): [phone] [facility]#[phone]      Details to clarify the request: [facility] requested a call regarding pt being dx with dm and is not on a statin which is recommended by CMS guidelines, and would like a call back as to the reason why.       Georgie Luna

## 2020-07-20 DIAGNOSIS — M06.09 SERONEGATIVE RHEUMATOID ARTHRITIS OF MULTIPLE SITES (HCC): ICD-10-CM

## 2020-07-20 DIAGNOSIS — F33.0 MILD EPISODE OF RECURRENT MAJOR DEPRESSIVE DISORDER (HCC): ICD-10-CM

## 2020-07-20 RX ORDER — LEFLUNOMIDE 20 MG/1
TABLET ORAL
Qty: 90 TAB | Refills: 0 | Status: SHIPPED | OUTPATIENT
Start: 2020-07-20 | End: 2020-11-05 | Stop reason: SDUPTHER

## 2020-07-20 RX ORDER — SERTRALINE HYDROCHLORIDE 50 MG/1
TABLET, FILM COATED ORAL
Qty: 90 TAB | Refills: 1 | Status: SHIPPED | OUTPATIENT
Start: 2020-07-20 | End: 2021-03-24 | Stop reason: SDUPTHER

## 2020-07-23 RX ORDER — FUROSEMIDE 20 MG/1
TABLET ORAL
Qty: 90 TAB | Refills: 0 | Status: SHIPPED | OUTPATIENT
Start: 2020-07-23 | End: 2021-10-26 | Stop reason: SDUPTHER

## 2020-07-27 RX ORDER — SIMVASTATIN 20 MG/1
TABLET, FILM COATED ORAL
Qty: 90 TAB | Refills: 0 | Status: SHIPPED | OUTPATIENT
Start: 2020-07-27 | End: 2020-10-22

## 2020-07-27 RX ORDER — LATANOPROST 50 UG/ML
SOLUTION/ DROPS OPHTHALMIC
Qty: 2.5 ML | Refills: 1 | Status: SHIPPED | OUTPATIENT
Start: 2020-07-27 | End: 2020-09-26

## 2020-07-27 NOTE — TELEPHONE ENCOUNTER
----- Message from Klaus Renteria sent at 7/27/2020  2:48 PM EDT -----  Regarding: dr Phani Olvera telephone  Patient return call    Caller's first and last name and relationship (if not the patient): pt      Best contact number(s): [phone]      Whose call is being returned: doctor       Details to clarify the request:      Carolee Encarnacion

## 2020-07-27 NOTE — TELEPHONE ENCOUNTER
Spoke with patient and she advised that she is taking Zocor but just needs refill. Request forwarded to Dr Alia Lang.

## 2020-08-04 ENCOUNTER — TELEPHONE (OUTPATIENT)
Dept: PHARMACY | Age: 75
End: 2020-08-04

## 2020-08-04 NOTE — TELEPHONE ENCOUNTER
CLINICAL PHARMACY: STATIN THERAPY REVIEW    SUBJECTIVE:   Identified care gap per [employer]: statin use in persons with cardiovascular disease. AND Identified as DM care gap for [employer]: statin therapy. Last Office Visit: 4/29/2020    ASSESSMENT:    Patient has been identified as having an inpatient hospitalization for MI, CABG, PCI, or diagnosis of ischemic vascular disease in the past and not currently filling a moderate or high intensity statin. Per chart review, patient is prescribed simvastatin 20 mg (moderate-intensity statin)    Per [facility]   Simvastatin 20 mg tablet last filled on 7/27/2020 for a 90 day supply; last picked up on 8/1/2020. 0 refills remaining. Billed through [facility]    Lab Results   Component Value Date    CHOL 157 02/16/2019    HDL 62 02/16/2019     ALT (SGPT)   Date Value Ref Range Status   02/24/2020 10 0 - 32 IU/L Final     AST (SGOT)   Date Value Ref Range Status   02/24/2020 24 0 - 40 IU/L Final     The ASCVD Risk score (Misty Giraldo, et al., 2013) failed to calculate for the following reasons:    ASCVD risk score not calculated    Hyperlipidemia Goal: high-intensity statin. Recently represcribed moderate-intensity statin. As patient and lab work tolerates, could consider increasing to high-intensity statin as appropriate in future. PLAN:  No further outreach planned at this time.     Jaden Campos, PharmD, [facility], [address] [facility] Clinical Pharmacist  O: [phone]  Department, toll free: [phone], option 7     CLINICAL PHARMACY CONSULT: MED RECONCILIATION/REVIEW ADDENDUM  For Pharmacy Admin Tracking Only  PHSO: PHSO Patient?: Yes  Total # of Interventions Recommended: Count: 0  - Maintenance Safety Lab Monitoring #: 1  Recommended intervention potential cost savings: 1  Total Interventions Accepted: 0  Time Spent (min): 15

## 2020-08-06 ENCOUNTER — OFFICE VISIT (OUTPATIENT)
Dept: FAMILY MEDICINE CLINIC | Age: 75
End: 2020-08-06
Payer: MEDICARE

## 2020-08-06 VITALS
SYSTOLIC BLOOD PRESSURE: 149 MMHG | HEIGHT: 64 IN | DIASTOLIC BLOOD PRESSURE: 73 MMHG | TEMPERATURE: 96.9 F | OXYGEN SATURATION: 94 % | RESPIRATION RATE: 18 BRPM | HEART RATE: 89 BPM | BODY MASS INDEX: 33.3 KG/M2

## 2020-08-06 DIAGNOSIS — I10 ESSENTIAL HYPERTENSION: ICD-10-CM

## 2020-08-06 DIAGNOSIS — E11.8 TYPE 2 DIABETES MELLITUS WITH COMPLICATION, WITHOUT LONG-TERM CURRENT USE OF INSULIN (HCC): ICD-10-CM
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
## 2020-08-06 DIAGNOSIS — Z23 ENCOUNTER FOR IMMUNIZATION: ICD-10-CM

## 2020-08-06 DIAGNOSIS — Z00.00 MEDICARE ANNUAL WELLNESS VISIT, SUBSEQUENT: Primary | ICD-10-CM

## 2020-08-06 DIAGNOSIS — M06.09 SERONEGATIVE RHEUMATOID ARTHRITIS OF MULTIPLE SITES (HCC): ICD-10-CM

## 2020-08-06 DIAGNOSIS — D86.0 SARCOIDOSIS OF LUNG (HCC): ICD-10-CM

## 2020-08-06 DIAGNOSIS — I73.9 PAD (PERIPHERAL ARTERY DISEASE) (HCC): ICD-10-CM

## 2020-08-06 DIAGNOSIS — I25.10 CORONARY ARTERY DISEASE DUE TO LIPID RICH PLAQUE: ICD-10-CM

## 2020-08-06 DIAGNOSIS — I25.83 CORONARY ARTERY DISEASE DUE TO LIPID RICH PLAQUE: ICD-10-CM

## 2020-08-06 DIAGNOSIS — Z95.820 STATUS POST ANGIOPLASTY WITH STENT: ICD-10-CM

## 2020-08-06 DIAGNOSIS — Z12.11 COLON CANCER SCREENING: ICD-10-CM

## 2020-08-06 DIAGNOSIS — Z79.899 ENCOUNTER FOR LONG-TERM (CURRENT) USE OF MEDICATIONS: ICD-10-CM

## 2020-08-06 DIAGNOSIS — Z96.653 STATUS POST BILATERAL KNEE REPLACEMENTS: ICD-10-CM

## 2020-08-06 DIAGNOSIS — E78.2 MIXED HYPERLIPIDEMIA: ICD-10-CM

## 2020-08-06 DIAGNOSIS — Z71.89 ADVANCED DIRECTIVES, COUNSELING/DISCUSSION: ICD-10-CM

## 2020-08-06 DIAGNOSIS — G47.33 OSA (OBSTRUCTIVE SLEEP APNEA): ICD-10-CM

## 2020-08-06 PROCEDURE — G8510 SCR DEP NEG, NO PLAN REQD: HCPCS | Performed by: FAMILY MEDICINE

## 2020-08-06 PROCEDURE — 99214 OFFICE O/P EST MOD 30 MIN: CPT | Performed by: FAMILY MEDICINE

## 2020-08-06 PROCEDURE — 1090F PRES/ABSN URINE INCON ASSESS: CPT | Performed by: FAMILY MEDICINE

## 2020-08-06 PROCEDURE — 1100F PTFALLS ASSESS-DOCD GE2>/YR: CPT | Performed by: FAMILY MEDICINE

## 2020-08-06 PROCEDURE — 2022F DILAT RTA XM EVC RTNOPTHY: CPT | Performed by: FAMILY MEDICINE

## 2020-08-06 PROCEDURE — G8427 DOCREV CUR MEDS BY ELIG CLIN: HCPCS | Performed by: FAMILY MEDICINE

## 2020-08-06 PROCEDURE — G8399 PT W/DXA RESULTS DOCUMENT: HCPCS | Performed by: FAMILY MEDICINE

## 2020-08-06 PROCEDURE — G8754 DIAS BP LESS 90: HCPCS | Performed by: FAMILY MEDICINE

## 2020-08-06 PROCEDURE — G8753 SYS BP > OR = 140: HCPCS | Performed by: FAMILY MEDICINE

## 2020-08-06 PROCEDURE — 99497 ADVNCD CARE PLAN 30 MIN: CPT | Performed by: FAMILY MEDICINE

## 2020-08-06 PROCEDURE — G0439 PPPS, SUBSEQ VISIT: HCPCS | Performed by: FAMILY MEDICINE

## 2020-08-06 PROCEDURE — G8536 NO DOC ELDER MAL SCRN: HCPCS | Performed by: FAMILY MEDICINE

## 2020-08-06 PROCEDURE — G9899 SCRN MAM PERF RSLTS DOC: HCPCS | Performed by: FAMILY MEDICINE

## 2020-08-06 PROCEDURE — 3017F COLORECTAL CA SCREEN DOC REV: CPT | Performed by: FAMILY MEDICINE

## 2020-08-06 PROCEDURE — G0444 DEPRESSION SCREEN ANNUAL: HCPCS | Performed by: FAMILY MEDICINE

## 2020-08-06 PROCEDURE — 90732 PPSV23 VACC 2 YRS+ SUBQ/IM: CPT

## 2020-08-06 PROCEDURE — G0009 ADMIN PNEUMOCOCCAL VACCINE: HCPCS

## 2020-08-06 PROCEDURE — 3288F FALL RISK ASSESSMENT DOCD: CPT | Performed by: FAMILY MEDICINE

## 2020-08-06 PROCEDURE — G8417 CALC BMI ABV UP PARAM F/U: HCPCS | Performed by: FAMILY MEDICINE

## 2020-08-06 PROCEDURE — 3044F HG A1C LEVEL LT 7.0%: CPT | Performed by: FAMILY MEDICINE

## 2020-08-06 RX ORDER — METFORMIN HYDROCHLORIDE 500 MG/1
500 TABLET ORAL
Qty: 90 TAB | Refills: 0 | Status: SHIPPED | OUTPATIENT
Start: 2020-08-06 | End: 2021-01-25 | Stop reason: SDUPTHER

## 2020-08-06 RX ORDER — FLUTICASONE FUROATE AND VILANTEROL TRIFENATATE 100; 25 UG/1; UG/1
POWDER RESPIRATORY (INHALATION)
COMMUNITY
Start: 2020-05-27 | End: 2020-08-06 | Stop reason: SDUPTHER

## 2020-08-06 NOTE — PROGRESS NOTES
Subjective:     Chief Complaint   Patient presents with    Annual Wellness Visit     medicare        She  is a 69 y.o. female who presents for evaluation of:    Falls recently - had knees give way on her twice since last appointment and had a fall prior to her last appointment as well. She reports having bilateral knee replacements back at [facility] about 27 years ago. Denies any other symptoms with her falls. Denies any dizziness or chest pain or shortness of breath. She has been using a cane to get around and a walker while at home but has had such difficulty with a walker because of her RA and OA in her hands, shoulders, and neck. Diabetes Mellitus:  Taking meds, home glucose monitoring: is performed regularly  Reports no polyuria or polydipsia, no chest pain, dyspnea or TIA's, no numbness, tingling or pain in extremities, last eye exam approximately < 1 yr ago. Exercises regularly with walking. Compliant with diabetic diet. Avoids sodas and sweet teas. Avoids fast food. Limits carbs. Pt is a non smoker. Lab Results   Component Value Date/Time    Hemoglobin A1c (POC) 5.4 09/24/2019 08:50 AM    Hemoglobin A1c (POC) 6.7 09/28/2018 10:30 AM    Hemoglobin A1c 5.3 08/06/2020 03:47 PM    Microalb/Creat ratio (ug/mg creat.) 45 (H) 08/06/2020 03:47 PM    LDL, calculated 36 08/06/2020 03:47 PM    Creatinine 0.99 08/06/2020 03:47 PM      Lab Results   Component Value Date/Time    GFR est AA 66 08/06/2020 03:47 PM    GFR est non-AA 57 (L) 08/06/2020 03:47 PM      Lab Results   Component Value Date/Time    TSH 2.230 08/06/2020 03:47 PM           ROS  Gen - no fever/chills  Resp - no dyspnea or cough  CV - no chest pain or WORKMAN. Nu stress test in 11/2019 with no concerns.   Rest per HPI    Past Medical History:   Diagnosis Date    Asthma     At risk for sleep apnea 11/11/2019    Stop Bang 4 - Sleep study in January per patient     DDD (degenerative disc disease), lumbar     Diabetes ([facility] [address]     Gastritis     GERD (gastroesophageal reflux disease)     Glaucoma     Hiatal hernia     High cholesterol     Hypertension     Peripheral vascular disease (HCC)     RA (rheumatoid arthritis) ([facility] [address]     Sarcoidosis 1999    [facility]     Past Surgical History:   Procedure Laterality Date    HX GASTRIC BYPASS      HX HEART CATHETERIZATION      s/p PCI with stenting in 1998     HX KNEE REPLACEMENT Bilateral     HX SHOULDER REPLACEMENT Right     x 2      Current Outpatient Medications on File Prior to Visit   Medication Sig Dispense Refill    simvastatin (ZOCOR) 20 mg tablet Take  by mouth nightly. 90 Tab 0    latanoprost (XALATAN) 0.005 % ophthalmic solution ADMINISTER 1 DROP INTO BOTH EYES NIGHTLY 2.5 mL 1    furosemide (LASIX) 20 mg tablet TAKE 1 TABLET BY MOUTH EVERY DAY 90 Tab 0    sertraline (ZOLOFT) 50 mg tablet TAKE 1 TABLET BY MOUTH EVERY DAY 90 Tab 1    leflunomide (ARAVA) 20 mg tablet TAKE 1 TABLET BY MOUTH EVERY DAY 90 Tab 0    ergocalciferol (ERGOCALCIFEROL) 1,250 mcg (50,000 unit) capsule Take 1 Cap by mouth every seven (7) days. Indications: vitamin D deficiency (high dose therapy) 12 Cap 3    fluticasone furoate-vilanteroL (Breo Ellipta) 100-25 mcg/dose inhaler Take 1 Puff by inhalation daily. 1 Inhaler 5    albuterol sulfate (PROVENTIL;VENTOLIN) 2.5 mg/0.5 mL nebu nebulizer solution 0.5 mL by Nebulization route every four (4) hours as needed for Wheezing. Indications: asthma attack 30 mL 0    gabapentin (NEURONTIN) 100 mg capsule Take 1 capsule in the morning and take 3 capsules before bedtime 120 Cap 2    acetaminophen (Acetaminophen Extra Strength) 500 mg tablet Take 2 Tabs by mouth every six (6) hours as needed for Pain. 20 Tab 0    naproxen (NAPROSYN) 500 mg tablet Take 500 mg by mouth two (2) times daily (with meals).  adalimumab (HUMIRA,CF, PEN) 40 mg/0.4 mL pnkt 40 mg by SubCUTAneous route Every Thursday.  Indications: rheumatoid arthritis 4 Kit 11    nortriptyline (PAMELOR) 50 mg capsule TAKE ONE CAPSULE EVERY DAY AT BEDTIME      aspirin delayed-release 81 mg tablet Take  by mouth daily.  metoprolol tartrate (LOPRESSOR) 25 mg tablet Take  by mouth two (2) times a day. No current facility-administered medications on file prior to visit. Objective:     Vitals:    08/06/20 1332 08/06/20 1339   BP: 155/82 149/73   Pulse: 92 89   Resp: 18    Temp: 96.9 °F (36.1 °C)    TempSrc: Temporal    SpO2: 94%    Height: 5' 4\" (1.626 m)        Physical Examination:  General appearance - alert, well appearing, and in no distress  Eyes -sclera anicteric  Neck - supple, no significant adenopathy, no thyromegaly, no bruits  Chest - clear to auscultation, no wheezes, rales or rhonchi, symmetric air entry  Heart - normal rate, regular rhythm, normal S1, S2, no murmurs, rubs, clicks or gallops  Neurological - alert, oriented, no focal findings or movement disorder noted  Extremities-no edema  Psych-normal mood and affect    Assessment/ Plan:   Diagnoses and all orders for this visit:    1. Medicare annual wellness visit, subsequent  2. Advanced directives, counseling/discussion  -     ADVANCE CARE PLANNING FIRST 30 MINS    3. Fall, subsequent encounter - checking labs, seems to be OA related with hx of TKR bilat 27 yrs ago  -     HEMOGLOBIN A1C WITH EAG  -     LIPID PANEL  -     METABOLIC PANEL, COMPREHENSIVE  -     TSH 3RD GENERATION  -     CBC W/O DIFF  -     URINALYSIS W/ RFLX MICROSCOPIC  -     REFERRAL TO ORTHOPEDICS    4. Type 2 diabetes mellitus with complication, without long-term current use of insulin ([facility]) - controlled, rechecking labs  -     HEMOGLOBIN A1C WITH EAG  -     LIPID PANEL  -     METABOLIC PANEL, COMPREHENSIVE  -     TSH 3RD GENERATION  -     CBC W/O DIFF  -     URINALYSIS W/ RFLX MICROSCOPIC  -     MICROALBUMIN, UR, RAND W/ MICROALB/CREAT RATIO  -     metFORMIN (GLUCOPHAGE) 500 mg tablet; Take 1 Tab by mouth daily (with dinner). Decreased dose    5.  PAD (peripheral artery disease) ([facility]  6. Coronary artery disease due to lipid rich plaque  7. Status post angioplasty with stent  - stable, checking labs, ct following with Cardiology prn, nu stress test with no sig concerns, denies sig sx  -     HEMOGLOBIN A1C WITH EAG  -     LIPID PANEL  -     METABOLIC PANEL, COMPREHENSIVE  -     TSH 3RD GENERATION  -     CBC W/O DIFF    8. Sarcoidosis of lung ([facility]  -     HEMOGLOBIN A1C WITH EAG  -     LIPID PANEL  -     METABOLIC PANEL, COMPREHENSIVE  -     TSH 3RD GENERATION  -     CBC W/O DIFF    9. Seronegative rheumatoid arthritis of multiple sites ([facility] - stable, ct working with Dr. Sondra Wilson on this  -     HEMOGLOBIN A1C WITH EAG  -     LIPID PANEL  -     METABOLIC PANEL, COMPREHENSIVE  -     TSH 3RD GENERATION  -     CBC W/O DIFF  -     URINALYSIS W/ RFLX MICROSCOPIC    10. MICKI (obstructive sleep apnea) - stable on CPAP    11. Essential hypertension - controlled  -     METABOLIC PANEL, COMPREHENSIVE  -     URINALYSIS W/ RFLX MICROSCOPIC    12. Mixed hyperlipidemia - stable  -     HEMOGLOBIN A1C WITH EAG  -     LIPID PANEL  -     METABOLIC PANEL, COMPREHENSIVE  -     TSH 3RD GENERATION  -     CBC W/O DIFF    13. Encounter for long-term (current) use of medications  -     HEMOGLOBIN A1C WITH EAG  -     LIPID PANEL  -     METABOLIC PANEL, COMPREHENSIVE  -     TSH 3RD GENERATION  -     CBC W/O DIFF  -     URINALYSIS W/ RFLX MICROSCOPIC  -     MICROALBUMIN, UR, RAND W/ MICROALB/CREAT RATIO  -     OCCULT BLOOD IMMUNOASSAY,DIAGNOSTIC    14. Encounter for immunization  -     PNEUMOCOCCAL POLYSACCHARIDE VACCINE, 23-VALENT, ADULT OR IMMUNOSUPPRESSED PT DOSE,    15. Colon cancer screening  -     OCCULT BLOOD IMMUNOASSAY,DIAGNOSTIC    16. Status post bilateral knee replacements - likely prev knee replacement are giving way so will get ortho eval   -     REFERRAL TO ORTHOPEDICS     I have discussed the diagnosis with the patient and the intended plan as seen in the above orders.   The patient has received an after-visit summary and questions were answered concerning future plans. I have discussed medication side effects and warnings with the patient as well. The patient verbalizes understanding and agreement with the plan. Follow-up and Dispositions    · Return in about 4 weeks (around 9/3/2020). This is the Subsequent Medicare Annual Wellness Exam, performed 12 months or more after the Initial AWV or the last Subsequent AWV    I have reviewed the patient's medical history in detail and updated the computerized patient record.      History     Patient Active Problem List   Diagnosis Code    Long-term use of immunosuppressant medication Z79.899    Osteopenia of multiple sites M85.89    Vitamin D deficiency E55.9    Rheumatoid arthritis involving multiple sites ([facility] [address] M06.9    Essential hypertension I10    Mixed hyperlipidemia E78.2    RLS (restless legs syndrome) G25.81    Mild intermittent asthma J45.20    Status post angioplasty with stent Z95.820    Coronary artery disease due to lipid rich plaque I25.10, I25.83    Seronegative rheumatoid arthritis of multiple sites ([facility] M06.09    Severe obesity (HCC) E66.01    MGUS (monoclonal gammopathy of unknown significance) D47.2    Chronic pain G89.29    Glaucoma H40.9    Hearing loss H91.90    Hypocalcemia E83.51    Type 2 diabetes mellitus without complication (HCC) O26.8     Past Medical History:   Diagnosis Date    Asthma     At risk for sleep apnea 11/11/2019    Stop Bang 4 - Sleep study in January per patient     DDD (degenerative disc disease), lumbar     Diabetes ([facility] [address]     Gastritis     GERD (gastroesophageal reflux disease)     Glaucoma     Hiatal hernia     High cholesterol     Hypertension     Peripheral vascular disease (HCC)     RA (rheumatoid arthritis) ([facility] [address]     Sarcoidosis 1999    [facility]      Past Surgical History:   Procedure Laterality Date    HX GASTRIC BYPASS      HX HEART CATHETERIZATION      s/p PCI with stenting in 1998     HX KNEE REPLACEMENT Bilateral     HX SHOULDER REPLACEMENT Right     x 2      Current Outpatient Medications   Medication Sig Dispense Refill    metFORMIN (GLUCOPHAGE) 500 mg tablet Take 1 Tab by mouth daily (with dinner). Decreased dose 90 Tab 0    simvastatin (ZOCOR) 20 mg tablet Take  by mouth nightly. 90 Tab 0    latanoprost (XALATAN) 0.005 % ophthalmic solution ADMINISTER 1 DROP INTO BOTH EYES NIGHTLY 2.5 mL 1    furosemide (LASIX) 20 mg tablet TAKE 1 TABLET BY MOUTH EVERY DAY 90 Tab 0    sertraline (ZOLOFT) 50 mg tablet TAKE 1 TABLET BY MOUTH EVERY DAY 90 Tab 1    leflunomide (ARAVA) 20 mg tablet TAKE 1 TABLET BY MOUTH EVERY DAY 90 Tab 0    ergocalciferol (ERGOCALCIFEROL) 1,250 mcg (50,000 unit) capsule Take 1 Cap by mouth every seven (7) days. Indications: vitamin D deficiency (high dose therapy) 12 Cap 3    fluticasone furoate-vilanteroL (Breo Ellipta) 100-25 mcg/dose inhaler Take 1 Puff by inhalation daily. 1 Inhaler 5    albuterol sulfate (PROVENTIL;VENTOLIN) 2.5 mg/0.5 mL nebu nebulizer solution 0.5 mL by Nebulization route every four (4) hours as needed for Wheezing. Indications: asthma attack 30 mL 0    gabapentin (NEURONTIN) 100 mg capsule Take 1 capsule in the morning and take 3 capsules before bedtime 120 Cap 2    acetaminophen (Acetaminophen Extra Strength) 500 mg tablet Take 2 Tabs by mouth every six (6) hours as needed for Pain. 20 Tab 0    naproxen (NAPROSYN) 500 mg tablet Take 500 mg by mouth two (2) times daily (with meals).  adalimumab (HUMIRA,CF, PEN) 40 mg/0.4 mL pnkt 40 mg by SubCUTAneous route Every Thursday. Indications: rheumatoid arthritis 4 Kit 11    nortriptyline (PAMELOR) 50 mg capsule TAKE ONE CAPSULE EVERY DAY AT BEDTIME      aspirin delayed-release 81 mg tablet Take  by mouth daily.  metoprolol tartrate (LOPRESSOR) 25 mg tablet Take  by mouth two (2) times a day.        Allergies   Allergen Reactions    Lisinopril Rash    Methotrexate Hives       Family History   Problem Relation Age of Onset    Heart Disease Mother     Liver Disease Father     Alcohol abuse Brother     Dementia Neg Hx     Cancer Neg Hx     Seizures Neg Hx      Social History     Tobacco Use    Smoking status: Never Smoker    Smokeless tobacco: Never Used   Substance Use Topics    Alcohol use: Yes     Alcohol/week: 2.0 standard drinks     Types: 1 Glasses of wine, 1 Shots of liquor per week     Comment: 2-3 yearly       Depression Risk Factor Screening:     3 most recent PHQ Screens 8/6/2020   Little interest or pleasure in doing things Several days   Feeling down, depressed, irritable, or hopeless Several days   Total Score PHQ 2 2   Trouble falling or staying asleep, or sleeping too much -   Feeling tired or having little energy -   Poor appetite, weight loss, or overeating -   Feeling bad about yourself - or that you are a failure or have let yourself or your family down -   Trouble concentrating on things such as school, work, reading, or watching TV -   Moving or speaking so slowly that other people could have noticed; or the opposite being so fidgety that others notice -   Thoughts of being better off dead, or hurting yourself in some way -   PHQ 9 Score -   How difficult have these problems made it for you to do your work, take care of your home and get along with others -       Alcohol Risk Factor Screening:   Do you average 1 drink per night or more than 7 drinks a week:  No    On any one occasion in the past three months have you have had more than 3 drinks containing alcohol:  No      Functional Ability and Level of Safety:   Hearing: Hearing is good. Activities of Daily Living: The home contains: no safety equipment. Patient does total self care     Ambulation: with no difficulty     Fall Risk:  Fall Risk Assessment, last 12 mths 8/6/2020   Able to walk? Yes   Fall in past 12 months? Yes   Fall with injury?  Yes   Number of falls in past 12 months 3 Fall Risk Score 4     Abuse Screen:  Patient is not abused       Cognitive Screening   Has your family/caregiver stated any concerns about your memory: no     Cognitive Screening: Normal - Verbal Fluency Test    Patient Care Team   Patient Care Team:  Cira Cosby MD as PCP - General (Family Medicine)  Marc Villa MD (Rheumatology)  Kell Helms DPM (Podiatry)  Brijesh Awan, RN as Ambulatory Care Manager (Internal Medicine)    Assessment/Plan   Education and counseling provided:  Are appropriate based on today's review and evaluation    Diagnoses and all orders for this visit:    1. Medicare annual wellness visit, subsequent    2. Advanced directives, counseling/discussion  -     ADVANCE CARE PLANNING FIRST 30 MINS    3. Fall, subsequent encounter  -     HEMOGLOBIN A1C WITH EAG  -     LIPID PANEL  -     METABOLIC PANEL, COMPREHENSIVE  -     TSH 3RD GENERATION  -     CBC W/O DIFF  -     URINALYSIS W/ RFLX MICROSCOPIC  -     REFERRAL TO ORTHOPEDICS    4. Type 2 diabetes mellitus with complication, without long-term current use of insulin (HCC)  -     HEMOGLOBIN A1C WITH EAG  -     LIPID PANEL  -     METABOLIC PANEL, COMPREHENSIVE  -     TSH 3RD GENERATION  -     CBC W/O DIFF  -     URINALYSIS W/ RFLX MICROSCOPIC  -     MICROALBUMIN, UR, RAND W/ MICROALB/CREAT RATIO  -     metFORMIN (GLUCOPHAGE) 500 mg tablet; Take 1 Tab by mouth daily (with dinner). Decreased dose    5. PAD (peripheral artery disease) (HCC)  -     HEMOGLOBIN A1C WITH EAG  -     LIPID PANEL  -     METABOLIC PANEL, COMPREHENSIVE  -     TSH 3RD GENERATION  -     CBC W/O DIFF    6.  Coronary artery disease due to lipid rich plaque  -     HEMOGLOBIN A1C WITH EAG  -     LIPID PANEL  -     METABOLIC PANEL, COMPREHENSIVE  -     TSH 3RD GENERATION  -     CBC W/O DIFF    7. Status post angioplasty with stent  -     HEMOGLOBIN A1C WITH EAG  -     LIPID PANEL  -     METABOLIC PANEL, COMPREHENSIVE  -     TSH 3RD GENERATION  -     CBC W/O DIFF    8. Sarcoidosis of lung ([facility]  -     HEMOGLOBIN A1C WITH EAG  -     LIPID PANEL  -     METABOLIC PANEL, COMPREHENSIVE  -     TSH 3RD GENERATION  -     CBC W/O DIFF    9. Seronegative rheumatoid arthritis of multiple sites ([facility]  -     HEMOGLOBIN A1C WITH EAG  -     LIPID PANEL  -     METABOLIC PANEL, COMPREHENSIVE  -     TSH 3RD GENERATION  -     CBC W/O DIFF  -     URINALYSIS W/ RFLX MICROSCOPIC    10. MICKI (obstructive sleep apnea)    11. Essential hypertension  -     METABOLIC PANEL, COMPREHENSIVE  -     URINALYSIS W/ RFLX MICROSCOPIC    12. Mixed hyperlipidemia  -     HEMOGLOBIN A1C WITH EAG  -     LIPID PANEL  -     METABOLIC PANEL, COMPREHENSIVE  -     TSH 3RD GENERATION  -     CBC W/O DIFF    13. Encounter for long-term (current) use of medications  -     HEMOGLOBIN A1C WITH EAG  -     LIPID PANEL  -     METABOLIC PANEL, COMPREHENSIVE  -     TSH 3RD GENERATION  -     CBC W/O DIFF  -     URINALYSIS W/ RFLX MICROSCOPIC  -     MICROALBUMIN, UR, RAND W/ MICROALB/CREAT RATIO  -     OCCULT BLOOD IMMUNOASSAY,DIAGNOSTIC    14. Encounter for immunization  -     PNEUMOCOCCAL POLYSACCHARIDE VACCINE, 23-VALENT, ADULT OR IMMUNOSUPPRESSED PT DOSE,    15.  Colon cancer screening  -     OCCULT BLOOD IMMUNOASSAY,DIAGNOSTIC    16. Status post bilateral knee replacements  -     REFERRAL TO ORTHOPEDICS      Health Maintenance Due   Topic Date Due    Eye Exam Retinal or Dilated  10/31/1957    FOBT Q1Y Age,18+  10/31/1965    DTaP/Tdap/Td series (1 - Tdap) 10/31/1968    Shingrix Vaccine Age 50> (1 of 2) 10/31/1997    GLAUCOMA SCREENING Q2Y  10/31/2012    Foot Exam Q1  01/17/2020    A1C test (Diabetic or Prediabetic)  02/16/2020    MICROALBUMIN Q1  02/16/2020    Lipid Screen  02/16/2020    Influenza Age 9 to Adult  08/01/2020

## 2020-08-06 NOTE — PROGRESS NOTES
Chief Complaint   Patient presents with    Annual Wellness Visit     medicare   Frequent Falls    She denies any symptoms , reactions or allergies that would exclude them from being immunized today. Risks and adverse reactions were discussed and the VIS was given to them. All questions were addressed. She was observed for 15 min post injection. There were no reactions observed.     Ventura Sandhu LPN

## 2020-08-06 NOTE — PATIENT INSTRUCTIONS
Medicare Wellness Visit, Female     The best way to live healthy is to have a lifestyle where you eat a well-balanced diet, exercise regularly, limit alcohol use, and quit all forms of tobacco/nicotine, if applicable. Regular preventive services are another way to keep healthy. Preventive services (vaccines, screening tests, monitoring & exams) can help personalize your care plan, which helps you manage your own care. Screening tests can find health problems at the earliest stages, when they are easiest to treat. [facility] follows the current, evidence-based guidelines published by the [facility] Story ([facility]) when recommending preventive services for our patients. Because we follow these guidelines, sometimes recommendations change over time as research supports it. (For example, mammograms used to be recommended annually. Even though Medicare will still pay for an annual mammogram, the newer guidelines recommend a mammogram every two years for women of average risk). Of course, you and your doctor may decide to screen more often for some diseases, based on your risk and your co-morbidities (chronic disease you are already diagnosed with). Preventive services for you include:  - Medicare offers their members a free annual wellness visit, which is time for you and your primary care provider to discuss and plan for your preventive service needs. Take advantage of this benefit every year!  -All adults over the age of 72 should receive the recommended pneumonia vaccines. Current USPSTF guidelines recommend a series of two vaccines for the best pneumonia protection.   -All adults should have a flu vaccine yearly and a tetanus vaccine every 10 years.   -All adults age 48 and older should receive the shingles vaccines (series of two vaccines).       -All adults age 38-68 who are overweight should have a diabetes screening test once every three years.   -All adults born between [birth year] and 1965 should be screened once for Hepatitis C.  -Other screening tests and preventive services for persons with diabetes include: an eye exam to screen for diabetic retinopathy, a kidney function test, a foot exam, and stricter control over your cholesterol.   -Cardiovascular screening for adults with routine risk involves an electrocardiogram (ECG) at intervals determined by your doctor.   -Colorectal cancer screenings should be done for adults age 54-65 with no increased risk factors for colorectal cancer. There are a number of acceptable methods of screening for this type of cancer. Each test has its own benefits and drawbacks. Discuss with your doctor what is most appropriate for you during your annual wellness visit. The different tests include: colonoscopy (considered the best screening method), a fecal occult blood test, a fecal DNA test, and sigmoidoscopy.    -A bone mass density test is recommended when a woman turns 65 to screen for osteoporosis. This test is only recommended one time, as a screening. Some providers will use this same test as a disease monitoring tool if you already have osteoporosis. -Breast cancer screenings are recommended every other year for women of normal risk, age 54-69.  -Cervical cancer screenings for women over age 72 are only recommended with certain risk factors.      Here is a list of your current Health Maintenance items (your personalized list of preventive services) with a due date:  Health Maintenance Due   Topic Date Due    Eye Exam  10/31/1957    Colonoscopy  10/31/1965    DTaP/Tdap/Td  (1 - Tdap) 10/31/1968    Shingles Vaccine (1 of 2) 10/31/1997    Glaucoma Screening   10/31/2012    Pneumococcal Vaccine (1 of 1 - PPSV23) 10/31/2012    Diabetic Foot Care  01/17/2020    Annual Well Visit  01/18/2020    Hemoglobin A1C    02/16/2020    Albumin Urine Test  02/16/2020    Cholesterol Test   02/16/2020    Flu Vaccine  08/01/2020

## 2020-08-06 NOTE — ACP (ADVANCE CARE PLANNING)
Advance Care Planning       Advance Care Planning (ACP) Physician/NP/PA (Provider) Jackie Paz        Date of ACP Conversation: 8/6/2020    Conversation Conducted with:   Patient with Decision Making [address]  [facility] Decision Maker:  Bertha Silva  [phone]          Care Preferences:    Hospitalization: \"If your health worsens and it becomes clear that your chance of recovery is unlikely, what would your preference be regarding hospitalization? \"  If the patient would want hospitalization, answer \"yes\". If the patient would prefer comfort-focused treatment without hospitalization, answer \"no\". yes      Ventilation: \"If you were in your present state of health and suddenly became very ill and were unable to breathe on your own, what would your preference be about the use of a ventilator (breathing machine) if it was available to you? \"    If patient would desire the use of a ventilator (breathing machine), answer \"yes\", if not answer \"no\":yes    \"If your health worsens and it becomes clear that your chance of recovery is unlikely, what would your preference be about the use of a ventilator (breathing machine) if it was available to you? \"   no      Resuscitation:  \"CPR works best to restart the heart when there is a sudden event, like a heart attack, in someone who is otherwise healthy. Unfortunately, CPR does not typically restart the heart for people who have serious health conditions or who are very sick. \"    \"In the event your heart stopped as a result of an underlying serious health condition, would you want attempts to be made to restart your heart (answer \"yes\" for attempt to resuscitate) or would you prefer a natural death (answer \"no\" for do not attempt to resuscitate)? \"   no    NOTE: If the patient has a valid advance directive AND provides care preference(s) that are inconsistent with that prior directive, advise the patient to consider either: creating a new advance directive that complies with state-specific requirements; or, if that is not possible, orally revoking that prior directive in accordance with state-specific requirements, which must be documented in the EHR.     Conversation Outcomes / Follow-Up Plan:   Recommended completion of Advance Directive      Length of Voluntary ACP Conversation in minutes:  16 minutes      Vicente Church MD

## 2020-08-07 DIAGNOSIS — M25.561 RIGHT KNEE PAIN, UNSPECIFIED CHRONICITY: ICD-10-CM

## 2020-08-07 DIAGNOSIS — M25.562 LEFT KNEE PAIN, UNSPECIFIED CHRONICITY: Primary | ICD-10-CM

## 2020-08-07 LAB
ALBUMIN SERPL-MCNC: 4 G/DL (ref 3.7–4.7)
ALBUMIN/CREAT UR: 45 MG/G CREAT (ref 0–29)
ALBUMIN/GLOB SERPL: 1.7 {RATIO} (ref 1.2–2.2)
ALP SERPL-CCNC: 82 IU/L (ref 39–117)
ALT SERPL-CCNC: 13 IU/L (ref 0–32)
APPEARANCE UR: ABNORMAL
AST SERPL-CCNC: 40 IU/L (ref 0–40)
BACTERIA #/AREA URNS HPF: ABNORMAL /[HPF]
BILIRUB SERPL-MCNC: 0.7 MG/DL (ref 0–1.2)
BILIRUB UR QL STRIP: NEGATIVE
BUN SERPL-MCNC: 20 MG/DL (ref 8–27)
BUN/CREAT SERPL: 20 (ref 12–28)
CALCIUM SERPL-MCNC: 8.2 MG/DL (ref 8.7–10.3)
CASTS URNS QL MICRO: ABNORMAL /LPF
CHLORIDE SERPL-SCNC: 103 MMOL/L (ref 96–106)
CHOLEST SERPL-MCNC: 106 MG/DL (ref 100–199)
CO2 SERPL-SCNC: 18 MMOL/L (ref 20–29)
COLOR UR: YELLOW
CREAT SERPL-MCNC: 0.99 MG/DL (ref 0.57–1)
CREAT UR-MCNC: 132.5 MG/DL
EPI CELLS #/AREA URNS HPF: ABNORMAL /HPF (ref 0–10)
ERYTHROCYTE [DISTWIDTH] IN BLOOD BY AUTOMATED COUNT: 12.8 % (ref 11.7–15.4)
EST. AVERAGE GLUCOSE BLD GHB EST-MCNC: 105 MG/DL
GLOBULIN SER CALC-MCNC: 2.3 G/DL (ref 1.5–4.5)
GLUCOSE SERPL-MCNC: 68 MG/DL (ref 65–99)
GLUCOSE UR QL: NEGATIVE
HBA1C MFR BLD: 5.3 % (ref 4.8–5.6)
HCT VFR BLD AUTO: 35.4 % (ref 34–46.6)
HDLC SERPL-MCNC: 52 MG/DL
HGB BLD-MCNC: 11.5 G/DL (ref 11.1–15.9)
HGB UR QL STRIP: NEGATIVE
INTERPRETATION: NORMAL
KETONES UR QL STRIP: NEGATIVE
LDLC SERPL CALC-MCNC: 36 MG/DL (ref 0–99)
LEUKOCYTE ESTERASE UR QL STRIP: ABNORMAL
Lab: NORMAL
MCH RBC QN AUTO: 31.5 PG (ref 26.6–33)
MCHC RBC AUTO-ENTMCNC: 32.5 G/DL (ref 31.5–35.7)
MCV RBC AUTO: 97 FL (ref 79–97)
MICRO URNS: ABNORMAL
MICROALBUMIN UR-MCNC: 59.9 UG/ML
MUCOUS THREADS URNS QL MICRO: PRESENT
NITRITE UR QL STRIP: NEGATIVE
PH UR STRIP: 5 [PH] (ref 5–7.5)
PLATELET # BLD AUTO: 163 X10E3/UL (ref 150–450)
POTASSIUM SERPL-SCNC: 4.5 MMOL/L (ref 3.5–5.2)
PROT SERPL-MCNC: 6.3 G/DL (ref 6–8.5)
PROT UR QL STRIP: ABNORMAL
RBC # BLD AUTO: 3.65 X10E6/UL (ref 3.77–5.28)
RBC #/AREA URNS HPF: ABNORMAL /HPF (ref 0–2)
SODIUM SERPL-SCNC: 139 MMOL/L (ref 134–144)
SP GR UR: 1.03 (ref 1–1.03)
TRIGL SERPL-MCNC: 89 MG/DL (ref 0–149)
TSH SERPL DL<=0.005 MIU/L-ACNC: 2.23 UIU/ML (ref 0.45–4.5)
UROBILINOGEN UR STRIP-MCNC: 0.2 MG/DL (ref 0.2–1)
VLDLC SERPL CALC-MCNC: 18 MG/DL (ref 5–40)
WBC # BLD AUTO: 3.1 X10E3/UL (ref 3.4–10.8)
WBC #/AREA URNS HPF: ABNORMAL /HPF (ref 0–5)

## 2020-08-18 ENCOUNTER — HOSPITAL ENCOUNTER (OUTPATIENT)
Dept: GENERAL RADIOLOGY | Age: 75
Discharge: HOME | End: 2020-08-18
Payer: MEDICARE

## 2020-08-18 DIAGNOSIS — M25.561 RIGHT KNEE PAIN, UNSPECIFIED CHRONICITY: ICD-10-CM

## 2020-08-18 DIAGNOSIS — M25.562 LEFT KNEE PAIN, UNSPECIFIED CHRONICITY: ICD-10-CM

## 2020-08-18 PROCEDURE — 73564 X-RAY EXAM KNEE 4 OR MORE: CPT

## 2020-08-19 ENCOUNTER — OFFICE VISIT (OUTPATIENT)
Dept: ORTHOPEDIC SURGERY | Age: 75
End: 2020-08-19
Payer: MEDICARE

## 2020-08-19 VITALS
HEART RATE: 68 BPM | HEIGHT: 64 IN | SYSTOLIC BLOOD PRESSURE: 182 MMHG | WEIGHT: 179 LBS | BODY MASS INDEX: 30.56 KG/M2 | TEMPERATURE: 97.1 F | DIASTOLIC BLOOD PRESSURE: 82 MMHG | OXYGEN SATURATION: 95 %

## 2020-08-19 DIAGNOSIS — T84.84XA PAIN OF BOTH LOWER EXTREMITIES DUE TO BILATERAL TOTAL KNEE REPLACEMENT, INITIAL ENCOUNTER: Primary | ICD-10-CM

## 2020-08-19 DIAGNOSIS — Z96.653 PAIN OF BOTH LOWER EXTREMITIES DUE TO BILATERAL TOTAL KNEE REPLACEMENT, INITIAL ENCOUNTER: Primary | ICD-10-CM

## 2020-08-19 PROCEDURE — 99204 OFFICE O/P NEW MOD 45 MIN: CPT | Performed by: ORTHOPAEDIC SURGERY

## 2020-08-19 NOTE — PROGRESS NOTES
8/19/2020    Chief Complaint: Bilateral knee pain    HPI: This is a(n) 69 y.o. female  who complains of left knee pain. Onset was gradual.  The patient has had pain for years. The pain is in the knees globally, it is severe in intensity. The patient has tried activity modification, she has tried physical therapy, injections have not been attempted, she has a history of bilateral knee replacement. The pain causes severe limitation with walking. The patient complains of feelings of instability in the knee. .    Past Medical History:   Diagnosis Date    Asthma     At risk for sleep apnea 11/11/2019    [facility] 4 - Sleep study in January per patient     DDD (degenerative disc disease), lumbar     Diabetes ([facility] [address]     Gastritis     GERD (gastroesophageal reflux disease)     Glaucoma     Hiatal hernia     High cholesterol     Hypertension     Peripheral vascular disease (HCC)     RA (rheumatoid arthritis) ([facility] [address]     Sarcoidosis 1999    [facility]       Past Surgical History:   Procedure Laterality Date    HX GASTRIC BYPASS      HX HEART CATHETERIZATION      s/p PCI with stenting in 1998     HX KNEE REPLACEMENT Bilateral     HX SHOULDER REPLACEMENT Right     x 2        Current Outpatient Medications on File Prior to Visit   Medication Sig Dispense Refill    metFORMIN (GLUCOPHAGE) 500 mg tablet Take 1 Tab by mouth daily (with dinner). Decreased dose 90 Tab 0    simvastatin (ZOCOR) 20 mg tablet Take  by mouth nightly. 90 Tab 0    latanoprost (XALATAN) 0.005 % ophthalmic solution ADMINISTER 1 DROP INTO BOTH EYES NIGHTLY 2.5 mL 1    furosemide (LASIX) 20 mg tablet TAKE 1 TABLET BY MOUTH EVERY DAY 90 Tab 0    sertraline (ZOLOFT) 50 mg tablet TAKE 1 TABLET BY MOUTH EVERY DAY 90 Tab 1    leflunomide (ARAVA) 20 mg tablet TAKE 1 TABLET BY MOUTH EVERY DAY 90 Tab 0    ergocalciferol (ERGOCALCIFEROL) 1,250 mcg (50,000 unit) capsule Take 1 Cap by mouth every seven (7) days.  Indications: vitamin D deficiency (high dose therapy) 12 Cap 3    fluticasone furoate-vilanteroL (Breo Ellipta) 100-25 mcg/dose inhaler Take 1 Puff by inhalation daily. 1 Inhaler 5    albuterol sulfate (PROVENTIL;VENTOLIN) 2.5 mg/0.5 mL nebu nebulizer solution 0.5 mL by Nebulization route every four (4) hours as needed for Wheezing. Indications: asthma attack 30 mL 0    gabapentin (NEURONTIN) 100 mg capsule Take 1 capsule in the morning and take 3 capsules before bedtime 120 Cap 2    acetaminophen (Acetaminophen Extra Strength) 500 mg tablet Take 2 Tabs by mouth every six (6) hours as needed for Pain. 20 Tab 0    naproxen (NAPROSYN) 500 mg tablet Take 500 mg by mouth two (2) times daily (with meals).  adalimumab (HUMIRA,CF, PEN) 40 mg/0.4 mL pnkt 40 mg by SubCUTAneous route Every Thursday. Indications: rheumatoid arthritis 4 Kit 11    nortriptyline (PAMELOR) 50 mg capsule TAKE ONE CAPSULE EVERY DAY AT BEDTIME      aspirin delayed-release 81 mg tablet Take  by mouth daily.  metoprolol tartrate (LOPRESSOR) 25 mg tablet Take  by mouth two (2) times a day. No current facility-administered medications on file prior to visit. Allergies   Allergen Reactions    Lisinopril Rash    Methotrexate Hives       Family History   Problem Relation Age of Onset    Heart Disease Mother     Liver Disease Father     Alcohol abuse Brother     Dementia Neg Hx     Cancer Neg Hx     Seizures Neg Hx        Social History     Socioeconomic History    Marital status: LEGALLY      Spouse name: Not on file    Number of children: Not on file    Years of education: Not on file    Highest education level: Not on file   Tobacco Use    Smoking status: Never Smoker    Smokeless tobacco: Never Used   Substance and Sexual Activity    Alcohol use:  Yes     Alcohol/week: 2.0 standard drinks     Types: 1 Glasses of wine, 1 Shots of liquor per week     Comment: 2-3 yearly    Drug use: No    Sexual activity: Yes     Partners: Male Birth control/protection: None         Review of Systems:       General: Denies headache, lethargy, fever, weight loss  Ears/Nose/Throat: Denies ear discharge, drainage, nosebleeds, hoarse voice, dental problems  Cardiovascular: Denies chest pain, shortness of breath  Lungs: Denies chest pain, breathing problems, wheezing, pneumonia  Stomach: Denies stomach pain, heartburn, constipation, irritable bowel  Skin: Denies rash, sores, open wounds  Musculoskeletal: Admits to knee pain, no deformity. Genitourinary: Denies dysuria, hematuria, polyuria  Gastrointestinal: Denies constipation, obstipation, diarrhea  Neurological: Denies changes in sight, smell, hearing, taste, seizures. Denies loss of consciousness. Psychiatric: Denies depression, sleep pattern changes, anxiety, change in personality  Endocrine: Denies mood swings, heat or cold intolerance  Hematologic/Lymphatic: Denies anemia, purpura, petechia  Allergic/Immunologic: Denies swelling of throat, pain or swelling at lymph nodes      Physical Examination:    Visit Vitals  /82 (BP 1 Location: Right arm, BP Patient Position: Sitting)   Pulse 68   Temp 97.1 °F (36.2 °C) (Tympanic)   Ht 5' 4\" (1.626 m)   Wt 179 lb (81.2 kg)   SpO2 95%   BMI 30.73 kg/m²        General: AOX3, no apparent distress  Psychiatric: mood and affect appropriate  Lungs: breathing is symmetric and unlabored bilaterally  Heart: regular rate and rhythm  Abdomen: no guarding  Head: normocephalic, atraumatic  Skin: No significant abnormalities, good turgor  Sensation intact to light touch: L1-S1 dermatomes  Muscular exam: 5/5 strength in all major muscle groups unless noted in specialty exam.    Extremities:      Left upper extremity: Full active and passive range of motion without pain, deformity, no open wound, strength 5/5 in all major muscle groups.     Right upper extremity: Full active and passive range of motion without pain, deformity, no open wound, strength 5/5 in all major muscle groups. Right lower extremity: No deformity is noted. Well healed surgical scar. Range of motion of the knee is 0-100. Ligamentous testing of the knee indicates stability of the the joint. Joint line tenderness to palpation medially and laterally. Popliteal area is unremarkable. mild effusion. No patellar crepitus. Patella tracks centrally with a negative apprehension and grind test.  Pivot shift is negative. Strength testing is indicative of 5/5 strength at hip flexion, extension, knee flexion and extension, tibialis anterior, EHL, and FHL. Sensation is intact to light touch in the L1-S1 dermatomes. Capillary refill is less than 2 seconds in the toes. Left lower extremity:  No deformity is noted. Well healed surgical scar. Range of motion of the knee is 0-100. Ligamentous testing of the knee indicates stability of the the joint. Joint line tenderness to palpation medially and laterally. Popliteal area is unremarkable. mild effusion. No patellar crepitus. Patella tracks centrally with a negative apprehension and grind test.  Pivot shift is negative. Strength testing is indicative of 5/5 strength at hip flexion, extension, knee flexion and extension, tibialis anterior, EHL, and FHL. Sensation is intact to light touch in the L1-S1 dermatomes. Capillary refill is less than 2 seconds in the toes. Diagnostics:    Pertinent Diagnostics:   xrays of the bilateral knee indicate possible loosening of left femoral component, good alignment, mild effusion    Assessment: Pain in left knee, possible mechanical complication left knee with loosening    Plan:  Plan for bone scan, pain medications, PT, and potentially an electric scooter as she is deconditioned, unstable on knees, and unable to ambulate for normal activities of daily living. Ms. Alan Stringer has a reminder for a \"due or due soon\" health maintenance.  I have asked that she contact her primary care provider for follow-up on this health maintenance.

## 2020-08-19 NOTE — PROGRESS NOTES
Identified pt with two pt identifiers (name and ). Reviewed chart in preparation for visit and have obtained necessary documentation. Jhony Muniz is a 69 y.o. female  Chief Complaint   Patient presents with    Knee Pain     Bilateral knee     Visit Vitals  /82 (BP 1 Location: Right arm, BP Patient Position: Sitting)   Pulse 68   Temp 97.1 °F (36.2 °C) (Tympanic)   Ht 5' 4\" (1.626 m)   Wt 179 lb (81.2 kg)   SpO2 95%   BMI 30.73 kg/m²     1. Have you been to the ER, urgent care clinic since your last visit? Hospitalized since your last visit? No    2. Have you seen or consulted any other health care providers outside of the [facility] since your last visit? Include any pap smears or colon screening.  No

## 2020-08-31 ENCOUNTER — VIRTUAL VISIT (OUTPATIENT)
Dept: RHEUMATOLOGY | Age: 75
End: 2020-08-31
Payer: MEDICARE

## 2020-08-31 DIAGNOSIS — E55.9 VITAMIN D DEFICIENCY: ICD-10-CM

## 2020-08-31 DIAGNOSIS — D47.2 MGUS (MONOCLONAL GAMMOPATHY OF UNKNOWN SIGNIFICANCE): ICD-10-CM

## 2020-08-31 DIAGNOSIS — Z79.60 LONG-TERM USE OF IMMUNOSUPPRESSANT MEDICATION: ICD-10-CM

## 2020-08-31 DIAGNOSIS — M06.09 SERONEGATIVE RHEUMATOID ARTHRITIS OF MULTIPLE SITES (HCC): Primary | ICD-10-CM

## 2020-08-31 DIAGNOSIS — M85.89 OSTEOPENIA OF MULTIPLE SITES: ICD-10-CM

## 2020-08-31 PROCEDURE — 99215 OFFICE O/P EST HI 40 MIN: CPT | Performed by: INTERNAL MEDICINE

## 2020-08-31 PROCEDURE — G8399 PT W/DXA RESULTS DOCUMENT: HCPCS | Performed by: INTERNAL MEDICINE

## 2020-08-31 PROCEDURE — 1090F PRES/ABSN URINE INCON ASSESS: CPT | Performed by: INTERNAL MEDICINE

## 2020-08-31 PROCEDURE — G9899 SCRN MAM PERF RSLTS DOC: HCPCS | Performed by: INTERNAL MEDICINE

## 2020-08-31 PROCEDURE — 1101F PT FALLS ASSESS-DOCD LE1/YR: CPT | Performed by: INTERNAL MEDICINE

## 2020-08-31 PROCEDURE — G8432 DEP SCR NOT DOC, RNG: HCPCS | Performed by: INTERNAL MEDICINE

## 2020-08-31 PROCEDURE — 3017F COLORECTAL CA SCREEN DOC REV: CPT | Performed by: INTERNAL MEDICINE

## 2020-08-31 PROCEDURE — G8756 NO BP MEASURE DOC: HCPCS | Performed by: INTERNAL MEDICINE

## 2020-08-31 PROCEDURE — G8427 DOCREV CUR MEDS BY ELIG CLIN: HCPCS | Performed by: INTERNAL MEDICINE

## 2020-08-31 NOTE — PROGRESS NOTES
REASON FOR VISIT    This is a follow-up visit for Ms. Adriana Coates for     ICD-10-CM   1. Seronegative rheumatoid arthritis of multiple sites [facility]) M06.09     The patient has consented for synchronous (real-time) Telemedicine (audio-video technology) on 8/31/2020 for their care to be delivered over telemedicine in place of their regularly scheduled office visit pursuant to the emergency declaration under the [address] and the [address] authority and the [employer] and [employer] Act, this Virtual  Visit was conducted, with patient's consent, to reduce the patient's risk of exposure to COVID-19 and provide continuity of care for an established patient. Services were provided through a video synchronous discussion virtually to substitute for in-person clinic visit. Inflammatory arthritis phenotype includes:  Anti-CCP positive: no  Rheumatoid factor positive: no  Erosive disease: no  Extra-articular manifestations include: MGUS    Immunosuppression Screening (8/23/2019):   Quantiferon TB: negative  PPD:  Not performed  Hepatitis B: negative  Hepatitis C: negative    Therapy History includes:  Current DMARD therapy includes: leflunomide 20 mg daily (8/19/2019 to present), Humira 40 mg every Thursday (11/22/2019 to present)  Prior DMARD therapy includes: methotrexate 20 mg every Saturday, methotrexate 20 mg SubQ every Saturday (5/23/2019 to 7/29/2019) Simponi Aria (11/29/2018 to 5/23/2019), Humira 40 mg every 14 days (8/03/2019 to 11/22/2019)  The following DMARDs have been ineffective: Simponi Aria, Humira every 14 days  The following DMARDs were stopped because of side effects: methotrexate 20 mg SubQ (aphthous ulcer, injection site breakdown)  Contra-Indicated DMARDs because of history of diverticulitis: Actemra, Kevzara    Immunization History   Administered Date(s) Administered    Influenza High Dose Vaccine PF 09/07/2018    Influenza Vaccine (Tri) Adjuvanted 09/24/2019    Pneumococcal Polysaccharide (PPSV-23) 08/06/2020     Patient Active Problem List   Diagnosis Code    Long-term use of immunosuppressant medication Z79.899    Osteopenia of multiple sites M85.89    Vitamin D deficiency E55.9    Rheumatoid arthritis involving multiple sites ([facility] M06.9    Essential hypertension I10    Mixed hyperlipidemia E78.2    RLS (restless legs syndrome) G25.81    Mild intermittent asthma J45.20    Status post angioplasty with stent Z95.820    Coronary artery disease due to lipid rich plaque I25.10, I25.83    Seronegative rheumatoid arthritis of multiple sites (HCC) M06.09    Severe obesity ([facility]) E66.01    MGUS (monoclonal gammopathy of unknown significance) D47.2    Chronic pain G89.29    Glaucoma H40.9    Hearing loss H91.90    Hypocalcemia E83.51    Type 2 diabetes mellitus without complication ([facility]) I37.0     HISTORY OF PRESENT ILLNESS    Ms. Chacorta Mathis returns for a follow-up. On her last visit, I continued leflunomide 20 mg daily and Humira 40 mg every Thursday, which she has taken with good tolerance. I ordered labs and a DXA which she did not do. Today, she feels good. She feels swelling in her hands with sore pain on the right side without stiffness. She feels better as the day goes on. She is not sure if Humira. She endorses interval fall without fracture because of her knees keep giving out. She denies fever, weight loss, blurred vision, vision loss, oral ulcers, ankle swelling, dry cough, dyspnea, nausea, vomiting, dysphagia, abdominal pain, black or bloody stool, rash, easy bruising and increased thirst.    Last toxicity monitoring by blood work was done on 8/06/2020 and did not reveal any significant adverse effects. Most recent inflammatory markers from 2/24/2020 revealed a ESR 22 mm/hr and CRP 2 mg/L. The patient has not had any interval hospital admissions, infections, or surgeries.     REVIEW OF SYSTEMS    A comprehensive review of systems was performed and pertinent results are documented in the HPI, review of systems is otherwise non-contributory. PAST MEDICAL HISTORY    She has a past medical history of Asthma, At risk for sleep apnea (11/11/2019), DDD (degenerative disc disease), lumbar, Diabetes ([address] Gastritis, GERD (gastroesophageal reflux disease), Glaucoma, Hiatal hernia, High cholesterol, Hypertension, Peripheral vascular disease ([facility] [address] [address] RA (rheumatoid arthritis) ([facility] [address] and Sarcoidosis (1999). FAMILY HISTORY    Her family history includes Alcohol abuse in her brother; Heart Disease in her mother; Liver Disease in her father. SOCIAL HISTORY    She reports that she has never smoked. She has never used smokeless tobacco. She reports current alcohol use of about 2.0 standard drinks of alcohol per week. She reports that she does not use drugs. MEDICATIONS    Current Outpatient Medications   Medication Sig Dispense Refill    metFORMIN (GLUCOPHAGE) 500 mg tablet Take 1 Tab by mouth daily (with dinner). Decreased dose (Patient taking differently: Take 500 mg by mouth two (2) times daily (with meals). Decreased dose) 90 Tab 0    simvastatin (ZOCOR) 20 mg tablet Take  by mouth nightly. 90 Tab 0    latanoprost (XALATAN) 0.005 % ophthalmic solution ADMINISTER 1 DROP INTO BOTH EYES NIGHTLY 2.5 mL 1    furosemide (LASIX) 20 mg tablet TAKE 1 TABLET BY MOUTH EVERY DAY 90 Tab 0    sertraline (ZOLOFT) 50 mg tablet TAKE 1 TABLET BY MOUTH EVERY DAY 90 Tab 1    leflunomide (ARAVA) 20 mg tablet TAKE 1 TABLET BY MOUTH EVERY DAY 90 Tab 0    ergocalciferol (ERGOCALCIFEROL) 1,250 mcg (50,000 unit) capsule Take 1 Cap by mouth every seven (7) days. Indications: vitamin D deficiency (high dose therapy) 12 Cap 3    fluticasone furoate-vilanteroL (Breo Ellipta) 100-25 mcg/dose inhaler Take 1 Puff by inhalation daily.  1 Inhaler 5    albuterol sulfate (PROVENTIL;VENTOLIN) 2.5 mg/0.5 mL nebu nebulizer solution 0.5 mL by Nebulization route every four (4) hours as needed for Wheezing. Indications: asthma attack 30 mL 0    gabapentin (NEURONTIN) 100 mg capsule Take 1 capsule in the morning and take 3 capsules before bedtime 120 Cap 2    acetaminophen (Acetaminophen Extra Strength) 500 mg tablet Take 2 Tabs by mouth every six (6) hours as needed for Pain. 20 Tab 0    naproxen (NAPROSYN) 500 mg tablet Take 500 mg by mouth two (2) times daily (with meals).  adalimumab (HUMIRA,CF, PEN) 40 mg/0.4 mL pnkt 40 mg by SubCUTAneous route Every Thursday. Indications: rheumatoid arthritis 4 Kit 11    nortriptyline (PAMELOR) 50 mg capsule TAKE ONE CAPSULE EVERY DAY AT BEDTIME      aspirin delayed-release 81 mg tablet Take  by mouth daily.  metoprolol tartrate (LOPRESSOR) 25 mg tablet Take  by mouth two (2) times a day. ALLERGIES    Allergies   Allergen Reactions    Lisinopril Rash    Methotrexate Hives     PHYSICAL EXAMINATION    There were no vitals taken for this visit. There is no height or weight on file to calculate BMI. General: Patient is alert, oriented x 3, not in acute distress    HEENT:   Sclerae are not injected and appear moist.  There is no alopecia. Neck is supple     Chest:  Breathing comfortably at room air    Musculoskeletal exam:  A comprehensive musculoskeletal exam was NOT performed for all joints of each upper and lower extremity and assessed for swelling, tenderness and range of motion. Positive results are documented as below:    Previous Exam    Decreased ROM of bilateral shoulders (right worse than left) due to pain  Bilateral knee replacements without effusion.   Bilateral ankle swelling with tenderness (RESOLVED)  Right foot exam deferred due to post-surgical boot placed    Z-Deformities:   no  Nelson Neck Deformities:  no  Boutonierre's Deformities:  no  Ulnar Deviation:   Yes (LEFT: 3rd digit)  MCP Subluxation:  no     Joint Count 2/24/2020 11/22/2019 8/23/2019 5/23/2019 2/18/2019 11/19/2018 8/22/2018   Patient pain (0-100) 75 45 40 50 60 90 90   MHAQ 1.25 0.625 0.75 0.75 0.75 1 0.5   Left shoulder - Tender - - - - - - 1   Left shoulder - Swollen - - - - - - 1   Left elbow - Tender 1 1 - 1 - 1 1   Left elbow - Swollen 0 0 - 0 - 0 1   Left wrist- Tender 1 1 1 1 1 1 -   Left wrist- Swollen 1 1 1 1 1 1 1   Left 1st MCP - Tender 0 1 1 1 1 1 1   Left 1st MCP - Swollen 1 1 1 1 1 1 1   Left 2nd MCP - Tender 1 1 1 1 1 1 1   Left 2nd MCP - Swollen 1 1 1 1 1 1 1   Left 3rd MCP - Tender 1 1 1 1 0 1 1   Left 3rd MCP - Swollen 1 1 1 1 1 1 1   Left 4th MCP - Tender 1 - 1 1 1 1 1   Left 4th MCP - Swollen 1 - 1 0 1 1 1   Left 5th MCP - Tender 1 1 1 1 1 - 1   Left 5th MCP - Swollen 0 1 0 1 1 - 1   Left thumb IP - Tender - - 1 1 1 1 1   Left thumb IP - Swollen - - 1 0 1 1 -   Left 2nd PIP - Tender 1 1 1 1 1 1 1   Left 2nd PIP - Swollen 1 1 1 1 1 1 1   Left 3rd PIP - Tender 1 1 1 1 1 1 1   Left 3rd PIP - Swollen 1 1 1 1 1 1 1   Left 4th PIP - Tender 1 1 1 1 1 1 1   Left 4th PIP - Swollen 1 1 1 1 1 1 1   Left 5th PIP - Tender 1 1 1 1 1 1 1   Left 5th PIP - Swollen 1 1 1 0 - 1 1   Left knee - Tender - - - - - 1 -   Left knee - Swollen - - - - - 1 -   Right shoulder - Tender - - - - - - 1   Right elbow - Tender - 1 - 1 - 1 1   Right elbow - Swollen - 0 - 0 - 1 1   Right wrist- Tender 1 1 1 1 1 1 1   Right wrist- Swollen 1 1 1 1 1 1 1   Right 1st MCP - Tender - 1 1 1 1 1 1   Right 1st MCP - Swollen - 0 1 1 0 1 -   Right 2nd MCP - Tender 1 1 1 1 1 1 1   Right 2nd MCP - Swollen 1 1 1 1 1 1 1   Right 3rd MCP - Tender 1 1 1 1 1 1 1   Right 3rd MCP - Swollen 1 1 1 1 1 1 1   Right 4th MCP - Tender 1 1 1 1 1 1 1   Right 4th MCP - Swollen 1 1 1 1 1 1 1   Right 5th MCP - Tender 1 1 1 1 1 1 1   Right 5th MCP - Swollen 1 1 1 1 1 1 1   Right thumb IP - Tender - - 1 - - - 1   Right thumb IP - Swollen - - 0 - - - -   Right 2nd PIP - Tender 1 1 1 1 1 1 1   Right 2nd PIP - Swollen 1 1 1 1 1 1 1   Right 3rd PIP - Tender 1 1 1 1 1 1 1   Right 3rd PIP - Swollen 1 1 1 1 1 1 1   Right 4th PIP - Tender 1 1 1 1 1 1 1   Right 4th PIP - Swollen 1 1 1 1 1 1 1   Right 5th PIP - Tender 1 1 1 1 1 1 1   Right 5th PIP - Swollen 1 1 1 1 1 1 1   Right knee - Tender - - - - 1 - -   Tender Joint Count (Total) 19 21 22 23 21 23 25   Swollen Joint Count (Total) 18 18 20 18 19 22 22   Physician Assessment (0-10) 6 5 4 5 6 7 7   Patient Assessment (0-10) 7 5.5 6.5 5 7 9 9   CDAI Total (calculated) 50 49.5 52.5 51 48 61 61     DATA REVIEW    Laboratory     Recent laboratory results were reviewed, summarized, and discussed with the patient. Imaging    Musculoskeletal Ultrasound    None    Radiographs    Bilateral Knee 8/18/2020: LEFT: nonconstrained cemented 3 component total arthroplasty of the left knee. There is no acute fracture or dislocation demonstrated. There is mild diffuse thinning of the medial and lateral compartment joint spaces suggesting wearing of the polyethylene component. There is metal-bone interface lucency demonstrated anteriorly at the femoral component measuring up to 5 mm. There is cement-bone interface lucency at the lateral phalangeal of the tibial component measuring 2 to 3 mm. There is interface lucency of the distal peg of the tibial component measuring up to 4 mm. Extensive atherosclerotic calcifications are shown. RIGHT: a nonconstrained cemented 3 component total knee arthroplasty without demonstration of osseous or component fracture, displacement or dislocation. There is no metal-cement or cement-bone interface lucency is shown. There is uniform thickness of the polyethylene spacer. No substantial knee effusion is demonstrated. Extensive atherosclerotic calcifications are again noted. Sacrum and Coccyx 9/18/2018: no definite fracture or dislocation, however osteopenia and overlapping bowel gas somewhat limited evaluation of the sacrum. Severe multilevel degenerative disc disease is noted in the lower lumbar spine.  Dense arterial vascular calcifications are present. Bilateral Hips 9/18/2018: no acute fracture or dislocation. Evaluation of the sacrum is limited by overlying bowel gas and osteopenia. Within these limitations, no fractures identified. Degenerative changes are present in the lower lumbar spine. Dense arterial vascular calcifications are present. Bilateral Hand 8/22/2018: moderate to severe diffuse osteopenia. Extensive atherosclerotic calcifications are shown extending into the fingers bilaterally. Fusiform soft tissue swelling is shown bilaterally throughout the metacarpophalangeal and proximal interphalangeal joints. There is mild joint space narrowing on the left throughout the metacarpophalangeal joints and on the right in the third through fifth metacarpophalangeal joints. Subtle marginal erosions are demonstrated at the lateral base of the right ring finger proximal phalanx and more extensively in the third through fifth left MCP joints. There are minimal-mild features of osteoarthritis at several DIP joints bilaterally. No substantial carpal joint space narrowing is shown. Calcium pyrophosphate dihydrate position is in the medial carpus bilaterally. Bilateral Foot 8/22/2018: moderate to severe diffuse osteopenia. Extensive atherosclerotic calcifications are shown extending into the digits. Bilateral talonavicular, naviculocuneiform and left greater than right diffuse tarsometatarsal joint space narrowing is noted with small marginal osteophytes. No substantial digital joint space narrowing is evident though fusiform soft tissue swelling is suggested at the metatarsophalangeal joints bilaterally. Moderate bilateral plantar and dorsal calcaneal spurs are shown. Bilateral Shoulder 9/08/2011: RIGHT: no evidence of fracture or dislocation. Alignment of the glenohumeral joint is anatomic. The patient is status post distal clavicle resection and acromioplasty.  A small amount of heterotopic ossification is noted about the distal aspect of the clavicle. Mild marginal osteophytes noted at the the glenoid. Irregularity along the superolateral aspect of the humeral head may reflect rotator cuff pathology. An ovoid well-corticated calcific density projecting over the humeral head may represent an intraventricular loose body. The visualized right lung is clear. Soft tissues are unremarkable. LEFT: no evidence of fracture or dislocation. Alignment of the glenohumeral and acromioclavicular joints is anatomic. Moderate superiorly projecting osteophytes emanate from the distal clavicle at the acromioclavicular joint. Bony hypertrophy irregularity about the greater tuberosity may indicate chronic rotator cuff pathology. There is a small subacromial spur. The visualized left lung is clear. Multiple mediastinal clips are noted. Bone mineralization is mildly diminished. Right Hip 8/03/2011: there is diffuse osteopenia. Degenerative changes noted in the lower lumbar spine. The SI joints and pubic symphysis are not widened. The right and left hip are normally aligned. The right hip demonstrates mild osteophyte formation most notable at the inferior aspect of the joint. There is minimal diffuse joint space narrowing. Similar changes are noted in the left hip with minimal osteophyte formation. There is preservation of the left hip joint space. No acute fractures. Dense vascular calcifications are noted. Soft tissue calcification lateral to the right iliac wing is visualized and was noted on prior abdominal and pelvic CT scan. Hypertrophic changes anterior/superior left iliac wing and left greater trochanter as before. Bilateral Hand 9/27/2010: Overall alignment is anatomic. The bones are demineralized overall. The joint spaces are relatively preserved throughout. Relatively mild degenerative changes are present in the DIP joints, and in the first CMC joints.  There appears to be some mild soft tissue swelling overlying the right MCP joints. There are also several equivocal erosions noted involving the metacarpal heads of the right hand which raise the possibility of early erosions related to inflammatory arthritis such as rheumatoid. These changes appear slightly more conspicuous and the 2008 examination. Incidentally, there are fairly prominent vascular calcifications present indicating that the patient is likely diabetic or perhaps as renal disease    Bilateral Knee 8/18/2010:  irregularity along the patella surface where there is a scalloped margin suggesting liner wear. In addition, the cement seen along the bone prosthetic interface of the anterior distal femoral cortex is less apparent, which also may reflect underlying liner wear and possibly early particulate disease. Heterotopic ossification has not changed in interval. Vascular calcifications reflecting atherosclerotic disease remains severe. Diffuse osteopenia is noted. CT Imaging    CT Head without contrast 3/24/2020: The ventricles and sulci are normal in size, shape and configuration and midline. Subcortical and deep white matter hypodensities. . There is a possible calcified pleural-based mass in the left frontal parietal region which is unchanged and may represent a small meningioma. There is no intracranial hemorrhage, extra-axial collection, mass, mass effect or midline shift. The basilar cisterns are open. No acute infarct is identified. The bone windows demonstrate no abnormalities. The visualized portions of the paranasal sinuses and mastoid air cells are clear. CT Cervical Spine without contrast 3/24/2020: The alignment is within normal limits. There is no fracture or subluxation. The odontoid process is intact. The craniocervical junction is within normal limits. The prevertebral soft tissues are within normal limits. C2-C3: No significant canal stenosis or foraminal narrowing. C3-C4: Posterior disc osteophyte complex with moderate canal stenosis.  Moderate bilateral foraminal narrowing. C4-C5: Posterior disc osteophyte complex and facet arthrosis with probable severe canal stenosis. Moderate to severe right and moderate left foraminal narrowing. C5-C6: Posterior disc osteophyte complex with moderate canal stenosis and moderate to severe left foraminal narrowing. Moderate right foraminal narrowing. C6-C7: Posterior disc osteophyte complex with moderate bilateral foraminal narrowing and moderate canal stenosis. C7-T1: Posterior disc osteophyte complex without canal stenosis or foraminal narrowing. Incidental note is made of bilateral cervical ribs. There are calcified nodules in the bilateral thyroid gland. CT Right Shoulder with arthrogram 9/26/2011: there is a large full-thickness tear seen involving the supraspinatus tendon extending to the level of the acromion. Additionally there is a partial tear of the subscapularis tendon. The biceps appears chronically torn. There are mild degenerative changes of the acromioclavicular joint. Some mild osteoarthritic changes are present of the glenohumeral joint. No evidence of fracture was seen. MR Imaging    None    DXA     DXA 5/04/2016:  (excluded L4 for spondylitic change, right hip) lumbar spine L1-L3 T score -1.5 (BMD 0.954 g/cm2), left femoral neck T score: -1.0 (0.805 g/cm2), left total hip T score: -0.1 (1.016 g/cm2), and distal one third left radius T score -1.9 (BMD 0.571 g/cm2). FRAX score 6.1 % probability in 10 years for major osteoporotic fracture and 0.3 % 10 year probability of hip fracture. Nuclear Studies    Triple Phase Scan 8/20/0218:  Patient status post bilateral knee replacements. Phase 1 (blood flow):  There is slight increased blood flow to the left knee. Phase 2 (blood pool/soft tissue):  There is slight increased peritracheal activity left knee. Phase 3 (delayed bone): Patient status post right knee replacement which is within normal limits. Patient is status post left knee replacement.  There are slight increased activity left tibial plateau    PATHOLOGY    Bone, right hallux, biopsy 11/18/2019: No evidence for osteomyelitis. Toe, right hallux, amputation 11/18/2019: Acute osteomyelitis. PROCEDURE    Kenalog 80 mg IA. (5/23/19)   Kenalog 80 mg IA. (02/18/19)   Kenalog 80 mg IA. (11/19/18)     ASSESSMENT AND PLAN    This is a follow-up visit for Ms. Rochelle Loyd. 1) Seronegative Rheumatoid Arthritis. She is maintained on leflunomide 20 mg daily and Humira 40 mg every Thursday with good tolerance but is reporting active pain and swelling. She does not feel Humira is helping. Her CDAI was previously 50 (previously 49.5, 52.5, 51, 53, 61, 63) with 19 tender and 18 swollen joints, consistent with high disease activity. I will change Humira to Xeljanz 11 mg XR daily (okay to substitute to Rinvoq)    Labs today. 2) Long Term Use of Immunosuppressants. The patient remains on immunomodulatory medications (leflunomide, Humira) and requires frequent toxicity monitoring by blood work. 3) Vitamin D Deficiency. Her vitamin D was 21.1 (previously 24.1, 28.0, 19.3). She is on weekly ergocalciferol 50,000. I will check her level today. I refilled it. 4) Osteopenia involving Multiple Sites. Her most recent DXA on 5/04/2016 lumbar spine L1-L3 T score -1.5 (BMD 0.954 g/cm2) and distal one third left radius T score -1.9 (BMD 0.571 g/cm2). This is likely due to her chronic Rheumatoid Arthritis. She is no longer on Fosamax. I ordered a repeat DXA and I asked her to schedule it. 5) MGUS. Her M-spike 0.2 (previously 0.2, 0.3) with immunofixation shows IgG monoclonal protein with lambda light chain. I referred her to hematology, Dr. Joi Mauro, who noted a low risk for myeloma. I will repeat. 6) Elevated AST. Repeat was normal.    7) Blurred Vision s/p Fall. This was not an active issue today. I had asked her to see an ophthalmology. 8) Falls. This is due to knee prothesis issues.  She is following with orthopedics. The patient voiced understanding of the aforementioned assessment and plan. The patient has consented for synchronous (real-time) Telemedicine (audio-video technology) on 8/31/2020 for their care to be delivered over telemedicine in place of their regularly scheduled office visit pursuant to the emergency declaration under the [facility], [facility] waiver authority and the [employer] Act, this Virtual  Visit was conducted, with patient's consent, to reduce the patient's risk of exposure to COVID-19 and provide continuity of care for an established patient. Services were provided through a video synchronous discussion virtually to substitute for in-person clinic visit. TODAY'S ORDERS    No orders of the defined types were placed in this encounter.     Future Appointments   Date Time Provider Dorothy Timmons   9/4/2020 12:40 PM Josh Richards MD [facility] AMB   9/28/2020 10:00 AM Sharla Ceballos MD [facility] BS Miriam Monroe MD, [address] [place] [address]    Adult Rheumatology   Rheumatology Ultrasound Certified  [facility]  A Part of [address]   Phone [phone]  Fax [phone]

## 2020-09-04 ENCOUNTER — HOSPITAL ENCOUNTER (OUTPATIENT)
Dept: BONE DENSITY | Age: 75
Discharge: HOME | End: 2020-09-04
Attending: INTERNAL MEDICINE
Payer: MEDICARE

## 2020-09-04 ENCOUNTER — OFFICE VISIT (OUTPATIENT)
Dept: FAMILY MEDICINE CLINIC | Age: 75
End: 2020-09-04
Payer: MEDICARE

## 2020-09-04 VITALS
HEART RATE: 84 BPM | BODY MASS INDEX: 32.33 KG/M2 | DIASTOLIC BLOOD PRESSURE: 62 MMHG | TEMPERATURE: 97.1 F | SYSTOLIC BLOOD PRESSURE: 135 MMHG | OXYGEN SATURATION: 97 % | WEIGHT: 189.4 LBS | RESPIRATION RATE: 16 BRPM | HEIGHT: 64 IN

## 2020-09-04 DIAGNOSIS — G44.329 CHRONIC POST-TRAUMATIC HEADACHE, NOT INTRACTABLE: ICD-10-CM

## 2020-09-04 DIAGNOSIS — M06.09 SERONEGATIVE RHEUMATOID ARTHRITIS OF MULTIPLE SITES (HCC): Primary | ICD-10-CM

## 2020-09-04 DIAGNOSIS — I25.83 CORONARY ARTERY DISEASE DUE TO LIPID RICH PLAQUE: ICD-10-CM

## 2020-09-04 DIAGNOSIS — Z96.653 STATUS POST BILATERAL KNEE REPLACEMENTS: ICD-10-CM

## 2020-09-04 DIAGNOSIS — Z78.0 POST-MENOPAUSAL: ICD-10-CM

## 2020-09-04 DIAGNOSIS — I10 ESSENTIAL HYPERTENSION: ICD-10-CM

## 2020-09-04 DIAGNOSIS — E78.2 MIXED HYPERLIPIDEMIA: ICD-10-CM

## 2020-09-04 DIAGNOSIS — Z95.820 STATUS POST ANGIOPLASTY WITH STENT: ICD-10-CM

## 2020-09-04 DIAGNOSIS — E11.8 TYPE 2 DIABETES MELLITUS WITH COMPLICATION, WITHOUT LONG-TERM CURRENT USE OF INSULIN (HCC): ICD-10-CM

## 2020-09-04 DIAGNOSIS — I25.10 CORONARY ARTERY DISEASE DUE TO LIPID RICH PLAQUE: ICD-10-CM

## 2020-09-04 PROCEDURE — G8399 PT W/DXA RESULTS DOCUMENT: HCPCS | Performed by: FAMILY MEDICINE

## 2020-09-04 PROCEDURE — 3288F FALL RISK ASSESSMENT DOCD: CPT | Performed by: FAMILY MEDICINE

## 2020-09-04 PROCEDURE — 77080 DXA BONE DENSITY AXIAL: CPT

## 2020-09-04 PROCEDURE — 1100F PTFALLS ASSESS-DOCD GE2>/YR: CPT | Performed by: FAMILY MEDICINE

## 2020-09-04 PROCEDURE — 3044F HG A1C LEVEL LT 7.0%: CPT | Performed by: FAMILY MEDICINE

## 2020-09-04 PROCEDURE — 1090F PRES/ABSN URINE INCON ASSESS: CPT | Performed by: FAMILY MEDICINE

## 2020-09-04 PROCEDURE — 3017F COLORECTAL CA SCREEN DOC REV: CPT | Performed by: FAMILY MEDICINE

## 2020-09-04 PROCEDURE — 2022F DILAT RTA XM EVC RTNOPTHY: CPT | Performed by: FAMILY MEDICINE

## 2020-09-04 PROCEDURE — G8432 DEP SCR NOT DOC, RNG: HCPCS | Performed by: FAMILY MEDICINE

## 2020-09-04 PROCEDURE — G8754 DIAS BP LESS 90: HCPCS | Performed by: FAMILY MEDICINE

## 2020-09-04 PROCEDURE — G8752 SYS BP LESS 140: HCPCS | Performed by: FAMILY MEDICINE

## 2020-09-04 PROCEDURE — G8536 NO DOC ELDER MAL SCRN: HCPCS | Performed by: FAMILY MEDICINE

## 2020-09-04 PROCEDURE — G8427 DOCREV CUR MEDS BY ELIG CLIN: HCPCS | Performed by: FAMILY MEDICINE

## 2020-09-04 PROCEDURE — G8417 CALC BMI ABV UP PARAM F/U: HCPCS | Performed by: FAMILY MEDICINE

## 2020-09-04 PROCEDURE — 99214 OFFICE O/P EST MOD 30 MIN: CPT | Performed by: FAMILY MEDICINE

## 2020-09-04 PROCEDURE — G9899 SCRN MAM PERF RSLTS DOC: HCPCS | Performed by: FAMILY MEDICINE

## 2020-09-04 RX ORDER — PREDNISONE 20 MG/1
TABLET ORAL
Qty: 13 TAB | Refills: 0 | Status: SHIPPED | OUTPATIENT
Start: 2020-09-04 | End: 2021-03-29 | Stop reason: ALTCHOICE

## 2020-09-04 NOTE — Clinical Note
Please call-please let her know I ordered an MRI of her brain and starting her on low-dose Topamax to help with her headaches. If things are not getting better, she will need to see neurology.   Thanks

## 2020-09-04 NOTE — PROGRESS NOTES
Chief Complaint   Patient presents with    Follow-up     4 week   1. Have you been to the ER, urgent care clinic since your last visit? Hospitalized since your last visit? No    2. Have you seen or consulted any other health care providers outside of the [address] [place] [address] since your last visit? Include any pap smears or colon screening.  No   Discuss headaches and 10 lbs weight gain

## 2020-09-04 NOTE — PROGRESS NOTES
Subjective:     Chief Complaint   Patient presents with    Follow-up     4 week        She  is a 69 y.o. female who presents for evaluation of:  Fall since last appt. Prev discussed this was most likely related to issues with bilat TKRs finally needing to be replaced. Had x-rays showing evidence of loosening of hardware. She has seen Dr. Drea Barry already and is planning to glet bone scan, sending to PT and may get scooter. She reports having bilateral knee replacements back at [facility] about 27 years ago. She has been using a cane to get around and a walker while at home but has had such difficulty with a walker because of her RA and OA in her hands, shoulders, and neck. Saw Dr. Eamon Fisher recently for virtual visit and meds to be changes for RA. Had DEXA recently and showed osteopenia. Today, RA flaring with hand swelling and pain. We reviewed her recent labs from 8/6/2020 showing slightly low WBCs at 3.1, nml Hgb, 1+ proteinuria, nml CMP, nml A1c at 5.3%, slightly abn U micro/alb at 45, and nml TSH. Lastly, patient reports having headaches for the past year. They seem to come and go and are mostly located in the left occipital region. Denies any recent head trauma or injury but has hit her head during falls in the past.  Had ER evaluation in 3/2020 after a fall which included a head CT that showed no major concerns except for chronic small vessel disease. She does take aspirin daily. Pain gets severe when the headache occurs. Headaches seem to last for a few hours. Headaches come on every few weeks. Denies aura, congestion, fainting, flashing lights, light sensitivity, loss of vision and stiff neck. Will use OTC meds to help with pain and rest.  Denies any clear triggers. ROS  Gen - no fever/chills  Resp - no dyspnea or cough  CV - no chest pain or WORKMAN. Nu stress test in 11/2019 with no concerns.   Rest per HPI    Past Medical History:   Diagnosis Date    Asthma     At risk for sleep apnea 11/11/2019    Stop Bang 4 - Sleep study in January per patient     DDD (degenerative disc disease), lumbar     Diabetes ([facility]     Gastritis     GERD (gastroesophageal reflux disease)     Glaucoma     Hiatal hernia     High cholesterol     Hypertension     Peripheral vascular disease (HCC)     RA [facility]umatoid arthritis) ([facility]     Sarcoidosis 1999    [facility]     Past Surgical History:   Procedure Laterality Date    HX GASTRIC BYPASS      HX HEART CATHETERIZATION      s/p PCI with stenting in 1998     HX KNEE REPLACEMENT Bilateral     HX SHOULDER REPLACEMENT Right     x 2      Current Outpatient Medications on File Prior to Visit   Medication Sig Dispense Refill    tofacitinib 11 mg Tb24 Take 11 mg by mouth daily. Indications: rheumatoid arthritis 30 Tab 11    metFORMIN (GLUCOPHAGE) 500 mg tablet Take 1 Tab by mouth daily (with dinner). Decreased dose (Patient taking differently: Take 500 mg by mouth two (2) times daily (with meals). Decreased dose) 90 Tab 0    simvastatin (ZOCOR) 20 mg tablet Take  by mouth nightly. 90 Tab 0    latanoprost (XALATAN) 0.005 % ophthalmic solution ADMINISTER 1 DROP INTO BOTH EYES NIGHTLY 2.5 mL 1    furosemide (LASIX) 20 mg tablet TAKE 1 TABLET BY MOUTH EVERY DAY 90 Tab 0    sertraline (ZOLOFT) 50 mg tablet TAKE 1 TABLET BY MOUTH EVERY DAY 90 Tab 1    leflunomide (ARAVA) 20 mg tablet TAKE 1 TABLET BY MOUTH EVERY DAY 90 Tab 0    ergocalciferol (ERGOCALCIFEROL) 1,250 mcg (50,000 unit) capsule Take 1 Cap by mouth every seven (7) days. Indications: vitamin D deficiency (high dose therapy) 12 Cap 3    fluticasone furoate-vilanteroL (Breo Ellipta) 100-25 mcg/dose inhaler Take 1 Puff by inhalation daily. 1 Inhaler 5    albuterol sulfate (PROVENTIL;VENTOLIN) 2.5 mg/0.5 mL nebu nebulizer solution 0.5 mL by Nebulization route every four (4) hours as needed for Wheezing.  Indications: asthma attack 30 mL 0    gabapentin (NEURONTIN) 100 mg capsule Take 1 capsule in the morning and take 3 capsules before bedtime 120 Cap 2    acetaminophen (Acetaminophen Extra Strength) 500 mg tablet Take 2 Tabs by mouth every six (6) hours as needed for Pain. 20 Tab 0    naproxen (NAPROSYN) 500 mg tablet Take 500 mg by mouth two (2) times daily (with meals).  adalimumab (HUMIRA,CF, PEN) 40 mg/0.4 mL pnkt 40 mg by SubCUTAneous route Every Thursday. Indications: rheumatoid arthritis 4 Kit 11    nortriptyline (PAMELOR) 50 mg capsule TAKE ONE CAPSULE EVERY DAY AT BEDTIME      aspirin delayed-release 81 mg tablet Take  by mouth daily.  metoprolol tartrate (LOPRESSOR) 25 mg tablet Take  by mouth two (2) times a day. No current facility-administered medications on file prior to visit. Objective:     Vitals:    09/04/20 1408   BP: 135/62   Pulse: 84   Resp: 16   Temp: 97.1 °F (36.2 °C)   TempSrc: Temporal   SpO2: 97%   Weight: 189 lb 6.4 oz (85.9 kg)   Height: 5' 4\" (1.626 m)       Physical Examination:  General appearance - alert, well appearing, and in no distress, using cane with slowed gait  Eyes -sclera anicteric  Neck - supple, no significant adenopathy, no thyromegaly, no bruits  Chest - clear to auscultation, no wheezes, rales or rhonchi, symmetric air entry  Heart - normal rate, regular rhythm, normal S1, S2, no murmurs, rubs, clicks or gallops  Neurological - alert, oriented, no focal findings or movement disorder noted  Extremities-no edema  Psych-normal mood and affect  Msk - knee surgical scars noted, sig MCP swelling echo in left hand as pictured:          Assessment/ Plan:   Diagnoses and all orders for this visit:    1. Seronegative rheumatoid arthritis of multiple sites [facility]) - acute flare today so using Prednisone taper. Working with Dr. Brianna Browning to adjust meds. -     predniSONE (DELTASONE) 20 mg tablet; Take 3 pills for 2 days, then 2 pills for 2 days, then 1 pill for 3 days    2. Status post bilateral knee replacements - working with Dr. Saira Yip after surgery on this    3. Type 2 diabetes mellitus with complication, without long-term current use of insulin (HCC) - well controlled with last A1c of 5.3%    4. Coronary artery disease due to lipid rich plaque  5. Status post angioplasty with stent  -Doing well with no new concerns. Last LDL 36 and continues on Zocor and aspirin    6. Essential hypertension-controlled    7. Mixed hyperlipidemia-controlled as above    8. Chronic post-traumatic headache, not intractable-ongoing issue. Head CT with no major concerns 6 months ago but continues to have headaches. Will get MRI brain and start on low-dose Topamax. If unrevealing, to neuro  -     MRI BRAIN W WO CONT; Future  -     topiramate (TOPAMAX) 25 mg tablet; Take 1 Tab by mouth nightly. I have discussed the diagnosis with the patient and the intended plan as seen in the above orders. The patient has received an after-visit summary and questions were answered concerning future plans. I have discussed medication side effects and warnings with the patient as well. The patient verbalizes understanding and agreement with the plan. Follow-up and Dispositions    · Return in about 3 months (around 12/4/2020), or if symptoms worsen or fail to improve.

## 2020-09-17 RX ORDER — TOPIRAMATE 25 MG/1
25 TABLET ORAL
Qty: 30 TAB | Refills: 1 | Status: SHIPPED | OUTPATIENT
Start: 2020-09-17 | End: 2021-08-27

## 2020-09-18 LAB
25(OH)D3+25(OH)D2 SERPL-MCNC: 41.3 NG/ML (ref 30–100)
ALBUMIN SERPL ELPH-MCNC: 3.6 G/DL (ref 2.9–4.4)
ALBUMIN/GLOB SERPL: 1.3 {RATIO} (ref 0.7–1.7)
ALPHA1 GLOB SERPL ELPH-MCNC: 0.1 G/DL (ref 0–0.4)
ALPHA2 GLOB SERPL ELPH-MCNC: 0.8 G/DL (ref 0.4–1)
B-GLOBULIN SERPL ELPH-MCNC: 1 G/DL (ref 0.7–1.3)
COMMENT, 144067: NORMAL
GAMMA GLOB SERPL ELPH-MCNC: 0.9 G/DL (ref 0.4–1.8)
GAMMA INTERFERON BACKGROUND BLD IA-ACNC: 0.06 IU/ML
GLOBULIN SER CALC-MCNC: 2.8 G/DL (ref 2.2–3.9)
HBV CORE AB SERPL QL IA: NEGATIVE
HBV CORE IGM SERPL QL IA: NEGATIVE
HBV E AB SERPL QL IA: NEGATIVE
HBV E AG SERPL QL IA: NEGATIVE
HBV SURFACE AB SER QL: NON REACTIVE
HBV SURFACE AG SERPL QL IA: NEGATIVE
HCV AB S/CO SERPL IA: 0.1 S/CO RATIO (ref 0–0.9)
IGA SERPL-MCNC: 248 MG/DL (ref 64–422)
IGG SERPL-MCNC: 1198 MG/DL (ref 586–1602)
IGM SERPL-MCNC: 49 MG/DL (ref 26–217)
INTERPRETATION SERPL IEP-IMP: NORMAL
LABORATORY REPORT: ABNORMAL
M PROTEIN SERPL ELPH-MCNC: 0.3 G/DL
M TB IFN-G BLD-IMP: NEGATIVE
M TB IFN-G CD4+ BCKGRND COR BLD-ACNC: 0.14 IU/ML
MITOGEN IGNF BLD-ACNC: >10 IU/ML
PLEASE NOTE, 011150: ABNORMAL
PROT PATTERN SERPL ELPH-IMP: ABNORMAL
PROT SERPL-MCNC: 6.4 G/DL (ref 6–8.5)
PTH-INTACT SERPL-MCNC: 78 PG/ML (ref 15–65)
QUANTIFERON INCUBATION, QF1T: NORMAL
QUANTIFERON TB2 AG: 0.12 IU/ML
SERVICE CMNT-IMP: NORMAL
TSH SERPL DL<=0.005 MIU/L-ACNC: 1.68 UIU/ML (ref 0.45–4.5)

## 2020-09-21 PROBLEM — H91.90 HEARING LOSS: Status: RESOLVED | Noted: 2020-05-08 | Resolved: 2020-09-21

## 2020-09-21 PROBLEM — E83.51 HYPOCALCEMIA: Status: RESOLVED | Noted: 2020-05-08 | Resolved: 2020-09-21

## 2020-09-21 PROBLEM — G89.29 CHRONIC PAIN: Status: RESOLVED | Noted: 2020-05-08 | Resolved: 2020-09-21

## 2020-09-21 PROBLEM — H40.9 GLAUCOMA: Status: RESOLVED | Noted: 2020-05-08 | Resolved: 2020-09-21

## 2020-09-22 ENCOUNTER — DOCUMENTATION ONLY (OUTPATIENT)
Dept: PHARMACY | Age: 75
End: 2020-09-22

## 2020-09-22 NOTE — PROGRESS NOTES
[facility] [address] [facility] Update    Date: 09/22/20    E[place] 1945     Medication: Arty Maple XR 11mg tablet    Co-pay = $0    Fill: 9/21/2020    Ship: 9/21/2020    Deliver: 9/22/2020    Next Fill Due: 10/14/2020    Pharmacist counseled the patient on:        - Use  - Dosing  - Administration  - Side effects    Handout was provided.     Katharine Zhong, [address] at THE [facility],  [place]bekah Wolfe, [address]  phone: [phone]   fax: [phone]

## 2020-09-26 NOTE — PROGRESS NOTES
The results were reviewed. Stable MGUS 0.3 (previously 0.2). PTH slightly elevated 78 --> increase calcium intake. Remaining labs are good.

## 2020-09-29 ENCOUNTER — HOSPITAL ENCOUNTER (OUTPATIENT)
Dept: MRI IMAGING | Age: 75
Discharge: HOME | End: 2020-09-29
Attending: FAMILY MEDICINE
Payer: MEDICARE

## 2020-09-29 DIAGNOSIS — G44.329 CHRONIC POST-TRAUMATIC HEADACHE, NOT INTRACTABLE: ICD-10-CM

## 2020-09-29 PROCEDURE — A9575 INJ GADOTERATE MEGLUMI 0.1ML: HCPCS | Performed by: FAMILY MEDICINE

## 2020-09-29 PROCEDURE — 70553 MRI BRAIN STEM W/O & W/DYE: CPT

## 2020-09-29 PROCEDURE — 74011250636 HC RX REV CODE- 250/636: Performed by: FAMILY MEDICINE

## 2020-09-29 RX ORDER — GADOTERATE MEGLUMINE 376.9 MG/ML
17 INJECTION INTRAVENOUS
Status: COMPLETED | OUTPATIENT
Start: 2020-09-29 | End: 2020-09-29

## 2020-09-29 RX ADMIN — GADOTERATE MEGLUMINE 17 ML: 376.9 INJECTION INTRAVENOUS at 09:57

## 2020-10-16 ENCOUNTER — TRANSCRIBE ORDER (OUTPATIENT)
Dept: FAMILY MEDICINE CLINIC | Age: 75
End: 2020-10-16

## 2020-10-16 ENCOUNTER — DOCUMENTATION ONLY (OUTPATIENT)
Dept: PHARMACY | Age: 75
End: 2020-10-16

## 2020-10-16 NOTE — PROGRESS NOTES
[facility] at [address] [facility] Update     Date: 10/16/2020     [place] 1945      Medication: Natalio Herter XR 11mg tablet     Co-pay = $0     Fill: 10/14/2020     Ship: 10/15/2020     Deliver: 10/16/2020     Next Fill Due: 11/9/2020     Pharmacist offered counseling.     Viktoria Quinonez, [address] at [facility],  Rebekah Kincaid   [address] [place] [address] Avenue  phone: [phone]   fax: [phone]

## 2020-10-22 RX ORDER — SIMVASTATIN 20 MG/1
TABLET, FILM COATED ORAL
Qty: 90 TAB | Refills: 0 | Status: SHIPPED | OUTPATIENT
Start: 2020-10-22 | End: 2021-03-24 | Stop reason: SDUPTHER

## 2020-11-05 DIAGNOSIS — M06.09 SERONEGATIVE RHEUMATOID ARTHRITIS OF MULTIPLE SITES (HCC): ICD-10-CM

## 2020-11-05 RX ORDER — LEFLUNOMIDE 20 MG/1
TABLET ORAL
Qty: 90 TAB | Refills: 1 | Status: SHIPPED | OUTPATIENT
Start: 2020-11-05 | End: 2021-01-08 | Stop reason: SDUPTHER

## 2020-11-05 RX ORDER — LATANOPROST 50 UG/ML
SOLUTION/ DROPS OPHTHALMIC
Qty: 2.5 ML | Refills: 0 | Status: SHIPPED | OUTPATIENT
Start: 2020-11-05 | End: 2021-01-18 | Stop reason: SDUPTHER

## 2020-11-13 ENCOUNTER — DOCUMENTATION ONLY (OUTPATIENT)
Dept: PHARMACY | Age: 75
End: 2020-11-13

## 2020-11-13 NOTE — PROGRESS NOTES
[facility] at [address] [facility] Update     Date: 11/10/2020     Mechelle Siddiqi 1945      Medication: Xeljanz XR 11mg tablet     Co-pay = $0     Fill: 11/9/2020     Ship: 11/9/2020     Deliver: 11/10/2020     Next Fill Due: 12/2/2020     Pharmacist offered counseling.     Thomas Berman, [address] at [facility],  [place]bekah Wolfe, [address]  phone: [phone]   fax: [phone]

## 2020-11-16 ENCOUNTER — TELEPHONE (OUTPATIENT)
Dept: RHEUMATOLOGY | Age: 75
End: 2020-11-16

## 2020-11-16 NOTE — TELEPHONE ENCOUNTER
Called patient on 11/16/2020 left voice message for patient to call back into the office to reschedule appt on 12/10/2020 due to a provider cancellation.  sdh

## 2021-01-03 DIAGNOSIS — J45.20 MILD INTERMITTENT ASTHMA, UNSPECIFIED WHETHER COMPLICATED: ICD-10-CM

## 2021-01-03 DIAGNOSIS — G25.81 RLS (RESTLESS LEGS SYNDROME): ICD-10-CM

## 2021-01-05 RX ORDER — GABAPENTIN 100 MG/1
CAPSULE ORAL
Qty: 120 CAP | Refills: 1 | Status: SHIPPED | OUTPATIENT
Start: 2021-01-05 | End: 2021-03-24 | Stop reason: SDUPTHER

## 2021-01-05 RX ORDER — FLUTICASONE FUROATE AND VILANTEROL TRIFENATATE 100; 25 UG/1; UG/1
POWDER RESPIRATORY (INHALATION)
Qty: 60 INHALER | Refills: 2 | Status: SHIPPED | OUTPATIENT
Start: 2021-01-05 | End: 2021-03-29 | Stop reason: SDUPTHER

## 2021-01-10 NOTE — PROGRESS NOTES
REASON FOR VISIT    This is a follow-up visit for Ms. Jackson Cooper for     ICD-10-CM   1. Seronegative rheumatoid arthritis of multiple sites [facility]) M06.09     The patient has consented for synchronous (real-time) Telemedicine (audio-video technology) on 1/11/2021 for their care to be delivered over telemedicine in place of their regularly scheduled office visit pursuant to the emergency declaration under the [address] [place] [address] and the [employer] Act, this Virtual  Visit was conducted, with patient's consent, to reduce the patient's risk of exposure to COVID-19 and provide continuity of care for an established patient. Services were provided through a video synchronous discussion virtually to substitute for in-person clinic visit.     Inflammatory arthritis phenotype includes:  Anti-CCP positive: no  Rheumatoid factor positive: no  Erosive disease: no  Extra-articular manifestations include: MGUS    Immunosuppression Screening (9/10/2020):  Quantiferon TB: negative  PPD:  Not performed  Hepatitis B: negative  Hepatitis C: negative    Therapy History includes:  Current DMARD therapy includes: leflunomide 20 mg daily (8/19/2019 to present), Manoj Devoid 11 mg XR daily (9/22/2020 to present)  Prior DMARD therapy includes: methotrexate 20 mg every Saturday, methotrexate 20 mg SubQ every Saturday (5/23/2019 to 7/29/2019) Simponi Aria (11/29/2018 to 5/23/2019), Humira 40 mg every 14 days (8/03/2019 to 11/22/2019), , Humira 40 mg every Thursday (11/22/2019 to 8/31/2020)  The following DMARDs have been ineffective: Simponi Aria, Humira every 14 days, Humira every 7 days  The following DMARDs were stopped because of side effects: methotrexate 20 mg SubQ (aphthous ulcer, injection site breakdown)  Contra-Indicated DMARDs because of history of diverticulitis: Actemra, Kevzara    HISTORY OF PRESENT ILLNESS    Ms. Jackson Cooper returns for a follow-up. On her last visit, I continued leflunomide 20 mg daily and changed Humira 40 mg every Thursday to Xeljanz 11 mg XR daily, which she has taken with good tolerance. I ordered labs and a DXA which she did. I reviewed her DXA which showed osteopenia involving multiple sites but improved compared to her prevsious DXA,    Today, she feels good. She reports some swelling in her hands at times about 2 days a week. She has little pain and stiffness that improves with activity and heat. She feels her home is cold in the morning, so she feels these symptoms more. Today, she denies pain, swelling, or stiffness in her hands. She denies fever, weight loss, blurred vision, vision loss, oral ulcers, ankle swelling, dry cough, dyspnea, nausea, vomiting, dysphagia, abdominal pain, black or bloody stool, fall since last visit, rash, easy bruising and increased thirst.    Last toxicity monitoring by blood work was done on 8/06/2020 and did not reveal any significant adverse effects. Most recent inflammatory markers from 2/24/2020 revealed a ESR 22 mm/hr and CRP 2 mg/L. The patient has not had any interval hospital admissions, infections, or surgeries. REVIEW OF SYSTEMS    A comprehensive review of systems was performed and pertinent results are documented in the HPI, review of systems is otherwise non-contributory. PAST MEDICAL HISTORY    She has a past medical history of Asthma, At risk for sleep apnea (11/11/2019), Chronic pain (5/8/2020), DDD (degenerative disc disease), lumbar, Diabetes ([address] Gastritis, GERD (gastroesophageal reflux disease), Glaucoma, Hearing loss (5/8/2020), Hiatal hernia, High cholesterol, Hypertension, Hypocalcemia (5/8/2020), Peripheral vascular disease ([address] RA (rheumatoid arthritis) ([address] and Sarcoidosis (1999). FAMILY HISTORY    Her family history includes Alcohol abuse in her brother; Heart Disease in her mother; Liver Disease in her father.     SOCIAL HISTORY    She reports that she has never smoked. She has never used smokeless tobacco. She reports current alcohol use of about 2.0 standard drinks of alcohol per week. She reports that she does not use drugs. MEDICATIONS    Current Outpatient Medications   Medication Sig    leflunomide (ARAVA) 20 mg tablet TAKE 1 TABLET BY MOUTH EVERY DAY    tofacitinib 11 mg Tb24 Take 11 mg by mouth daily. Indications: rheumatoid arthritis    Breo Ellipta 100-25 mcg/dose inhaler TAKE 1 PUFF BY MOUTH EVERY DAY    gabapentin (NEURONTIN) 100 mg capsule TAKE 1 CAPSULE IN THE MORNING AND TAKE 3 CAPSULES BEFORE BEDTIME    latanoprost (XALATAN) 0.005 % ophthalmic solution INSTILL 1 DROP INTO BOTH EYES NIGHTLY    simvastatin (ZOCOR) 20 mg tablet TAKE BY MOUTH NIGHTLY.  topiramate (TOPAMAX) 25 mg tablet Take 1 Tab by mouth nightly.  predniSONE (DELTASONE) 20 mg tablet Take 3 pills for 2 days, then 2 pills for 2 days, then 1 pill for 3 days    metFORMIN (GLUCOPHAGE) 500 mg tablet Take 1 Tab by mouth daily (with dinner). Decreased dose (Patient taking differently: Take 500 mg by mouth two (2) times daily (with meals). Decreased dose)    furosemide (LASIX) 20 mg tablet TAKE 1 TABLET BY MOUTH EVERY DAY    sertraline (ZOLOFT) 50 mg tablet TAKE 1 TABLET BY MOUTH EVERY DAY    ergocalciferol (ERGOCALCIFEROL) 1,250 mcg (50,000 unit) capsule Take 1 Cap by mouth every seven (7) days. Indications: vitamin D deficiency (high dose therapy)    albuterol sulfate (PROVENTIL;VENTOLIN) 2.5 mg/0.5 mL nebu nebulizer solution 0.5 mL by Nebulization route every four (4) hours as needed for Wheezing. Indications: asthma attack    acetaminophen (Acetaminophen Extra Strength) 500 mg tablet Take 2 Tabs by mouth every six (6) hours as needed for Pain.  naproxen (NAPROSYN) 500 mg tablet Take 500 mg by mouth two (2) times daily (with meals).     nortriptyline (PAMELOR) 50 mg capsule TAKE ONE CAPSULE EVERY DAY AT BEDTIME    aspirin delayed-release 81 mg tablet Take  by mouth daily.  metoprolol tartrate (LOPRESSOR) 25 mg tablet Take  by mouth two (2) times a day. No current facility-administered medications for this visit. ALLERGIES    Allergies   Allergen Reactions    Lisinopril Rash    Methotrexate Hives     PHYSICAL EXAMINATION    There were no vitals taken for this visit. There is no height or weight on file to calculate BMI. General: Patient is alert, oriented x 3, not in acute distress    HEENT:   Sclerae are not injected and appear moist.  There is no alopecia. Chest:  Breathing comfortably at room air    Musculoskeletal exam:  A comprehensive musculoskeletal exam was NOT performed for all joints of each upper and lower extremity and assessed for swelling, tenderness and range of motion. Positive results are documented as below:    Previous Exam    Decreased ROM of bilateral shoulders (right worse than left) due to pain  Bilateral knee replacements without effusion.   Bilateral ankle swelling with tenderness (RESOLVED)  Right foot exam deferred due to post-surgical boot placed    Z-Deformities:   no  Sharon Neck Deformities:  no  Boutonierre's Deformities:  no  Ulnar Deviation:   Yes (LEFT: 3rd digit)  MCP Subluxation:  no     Joint Count 2/24/2020 11/22/2019 8/23/2019 5/23/2019 2/18/2019 11/19/2018 8/22/2018   Patient pain (0-100) 75 45 40 50 60 90 90   MHAQ 1.25 0.625 0.75 0.75 0.75 1 0.5   Left shoulder - Tender - - - - - - 1   Left shoulder - Swollen - - - - - - 1   Left elbow - Tender 1 1 - 1 - 1 1   Left elbow - Swollen 0 0 - 0 - 0 1   Left wrist- Tender 1 1 1 1 1 1 -   Left wrist- Swollen 1 1 1 1 1 1 1   Left 1st MCP - Tender 0 1 1 1 1 1 1   Left 1st MCP - Swollen 1 1 1 1 1 1 1   Left 2nd MCP - Tender 1 1 1 1 1 1 1   Left 2nd MCP - Swollen 1 1 1 1 1 1 1   Left 3rd MCP - Tender 1 1 1 1 0 1 1   Left 3rd MCP - Swollen 1 1 1 1 1 1 1   Left 4th MCP - Tender 1 - 1 1 1 1 1   Left 4th MCP - Swollen 1 - 1 0 1 1 1   Left 5th MCP - Tender 1 1 1 1 1 - 1   Left 5th MCP - Swollen 0 1 0 1 1 - 1   Left thumb IP - Tender - - 1 1 1 1 1   Left thumb IP - Swollen - - 1 0 1 1 -   Left 2nd PIP - Tender 1 1 1 1 1 1 1   Left 2nd PIP - Swollen 1 1 1 1 1 1 1   Left 3rd PIP - Tender 1 1 1 1 1 1 1   Left 3rd PIP - Swollen 1 1 1 1 1 1 1   Left 4th PIP - Tender 1 1 1 1 1 1 1   Left 4th PIP - Swollen 1 1 1 1 1 1 1   Left 5th PIP - Tender 1 1 1 1 1 1 1   Left 5th PIP - Swollen 1 1 1 0 - 1 1   Left knee - Tender - - - - - 1 -   Left knee - Swollen - - - - - 1 -   Right shoulder - Tender - - - - - - 1   Right elbow - Tender - 1 - 1 - 1 1   Right elbow - Swollen - 0 - 0 - 1 1   Right wrist- Tender 1 1 1 1 1 1 1   Right wrist- Swollen 1 1 1 1 1 1 1   Right 1st MCP - Tender - 1 1 1 1 1 1   Right 1st MCP - Swollen - 0 1 1 0 1 -   Right 2nd MCP - Tender 1 1 1 1 1 1 1   Right 2nd MCP - Swollen 1 1 1 1 1 1 1   Right 3rd MCP - Tender 1 1 1 1 1 1 1   Right 3rd MCP - Swollen 1 1 1 1 1 1 1   Right 4th MCP - Tender 1 1 1 1 1 1 1   Right 4th MCP - Swollen 1 1 1 1 1 1 1   Right 5th MCP - Tender 1 1 1 1 1 1 1   Right 5th MCP - Swollen 1 1 1 1 1 1 1   Right thumb IP - Tender - - 1 - - - 1   Right thumb IP - Swollen - - 0 - - - -   Right 2nd PIP - Tender 1 1 1 1 1 1 1   Right 2nd PIP - Swollen 1 1 1 1 1 1 1   Right 3rd PIP - Tender 1 1 1 1 1 1 1   Right 3rd PIP - Swollen 1 1 1 1 1 1 1   Right 4th PIP - Tender 1 1 1 1 1 1 1   Right 4th PIP - Swollen 1 1 1 1 1 1 1   Right 5th PIP - Tender 1 1 1 1 1 1 1   Right 5th PIP - Swollen 1 1 1 1 1 1 1   Right knee - Tender - - - - 1 - -   Tender Joint Count (Total) 19 21 22 23 21 23 25   Swollen Joint Count (Total) 18 18 20 18 19 22 22   Physician Assessment (0-10) 6 5 4 5 6 7 7   Patient Assessment (0-10) 7 5.5 6.5 5 7 9 9   CDAI Total (calculated) 50 49.5 52.5 51 48 61 61     DATA REVIEW    Laboratory     Recent laboratory results were reviewed, summarized, and discussed with the patient.     Imaging    Musculoskeletal Ultrasound    None    Radiographs    Bilateral Knee 8/18/2020: LEFT: nonconstrained cemented 3 component total arthroplasty of the left knee. There is no acute fracture or dislocation demonstrated. There is mild diffuse thinning of the medial and lateral compartment joint spaces suggesting wearing of the polyethylene component. There is metal-bone interface lucency demonstrated anteriorly at the femoral component measuring up to 5 mm. There is cement-bone interface lucency at the lateral phalangeal of the tibial component measuring 2 to 3 mm. There is interface lucency of the distal peg of the tibial component measuring up to 4 mm. Extensive atherosclerotic calcifications are shown. RIGHT: a nonconstrained cemented 3 component total knee arthroplasty without demonstration of osseous or component fracture, displacement or dislocation. There is no metal-cement or cement-bone interface lucency is shown. There is uniform thickness of the polyethylene spacer. No substantial knee effusion is demonstrated. Extensive atherosclerotic calcifications are again noted. Sacrum and Coccyx 9/18/2018: no definite fracture or dislocation, however osteopenia and overlapping bowel gas somewhat limited evaluation of the sacrum. Severe multilevel degenerative disc disease is noted in the lower lumbar spine. Dense arterial vascular calcifications are present. Bilateral Hips 9/18/2018: no acute fracture or dislocation. Evaluation of the sacrum is limited by overlying bowel gas and osteopenia. Within these limitations, no fractures identified. Degenerative changes are present in the lower lumbar spine. Dense arterial vascular calcifications are present. Bilateral Hand 8/22/2018: moderate to severe diffuse osteopenia. Extensive atherosclerotic calcifications are shown extending into the fingers bilaterally. Fusiform soft tissue swelling is shown bilaterally throughout the metacarpophalangeal and proximal interphalangeal joints. There is mild joint space narrowing on the left throughout the metacarpophalangeal joints and on the right in the third through fifth metacarpophalangeal joints. Subtle marginal erosions are demonstrated at the lateral base of the right ring finger proximal phalanx and more extensively in the third through fifth left MCP joints. There are minimal-mild features of osteoarthritis at several DIP joints bilaterally. No substantial carpal joint space narrowing is shown. Calcium pyrophosphate dihydrate position is in the medial carpus bilaterally. Bilateral Foot 8/22/2018: moderate to severe diffuse osteopenia. Extensive atherosclerotic calcifications are shown extending into the digits. Bilateral talonavicular, naviculocuneiform and left greater than right diffuse tarsometatarsal joint space narrowing is noted with small marginal osteophytes. No substantial digital joint space narrowing is evident though fusiform soft tissue swelling is suggested at the metatarsophalangeal joints bilaterally. Moderate bilateral plantar and dorsal calcaneal spurs are shown. Bilateral Shoulder 9/08/2011: RIGHT: no evidence of fracture or dislocation. Alignment of the glenohumeral joint is anatomic. The patient is status post distal clavicle resection and acromioplasty. A small amount of heterotopic ossification is noted about the distal aspect of the clavicle. Mild marginal osteophytes noted at the the glenoid. Irregularity along the superolateral aspect of the humeral head may reflect rotator cuff pathology. An ovoid well-corticated calcific density projecting over the humeral head may represent an intraventricular loose body. The visualized right lung is clear. Soft tissues are unremarkable. LEFT: no evidence of fracture or dislocation. Alignment of the glenohumeral and acromioclavicular joints is anatomic. Moderate superiorly projecting osteophytes emanate from the distal clavicle at the acromioclavicular joint.  Bony hypertrophy irregularity about the greater tuberosity may indicate chronic rotator cuff pathology. There is a small subacromial spur. The visualized left lung is clear. Multiple mediastinal clips are noted. Bone mineralization is mildly diminished. Right Hip 8/03/2011: there is diffuse osteopenia. Degenerative changes noted in the lower lumbar spine. The SI joints and pubic symphysis are not widened. The right and left hip are normally aligned. The right hip demonstrates mild osteophyte formation most notable at the inferior aspect of the joint. There is minimal diffuse joint space narrowing. Similar changes are noted in the left hip with minimal osteophyte formation. There is preservation of the left hip joint space. No acute fractures. Dense vascular calcifications are noted. Soft tissue calcification lateral to the right iliac wing is visualized and was noted on prior abdominal and pelvic CT scan. Hypertrophic changes anterior/superior left iliac wing and left greater trochanter as before. Bilateral Hand 9/27/2010: Overall alignment is anatomic. The bones are demineralized overall. The joint spaces are relatively preserved throughout. Relatively mild degenerative changes are present in the DIP joints, and in the first CMC joints. There appears to be some mild soft tissue swelling overlying the right MCP joints. There are also several equivocal erosions noted involving the metacarpal heads of the right hand which raise the possibility of early erosions related to inflammatory arthritis such as rheumatoid. These changes appear slightly more conspicuous and the 2008 examination. Incidentally, there are fairly prominent vascular calcifications present indicating that the patient is likely diabetic or perhaps as renal disease    Bilateral Knee 8/18/2010:  irregularity along the patella surface where there is a scalloped margin suggesting liner wear.  In addition, the cement seen along the bone prosthetic interface of the anterior distal femoral cortex is less apparent, which also may reflect underlying liner wear and possibly early particulate disease. Heterotopic ossification has not changed in interval. Vascular calcifications reflecting atherosclerotic disease remains severe. Diffuse osteopenia is noted. CT Imaging    CT Head without contrast 3/24/2020: The ventricles and sulci are normal in size, shape and configuration and midline. Subcortical and deep white matter hypodensities. . There is a possible calcified pleural-based mass in the left frontal parietal region which is unchanged and may represent a small meningioma. There is no intracranial hemorrhage, extra-axial collection, mass, mass effect or midline shift. The basilar cisterns are open. No acute infarct is identified. The bone windows demonstrate no abnormalities. The visualized portions of the paranasal sinuses and mastoid air cells are clear. CT Cervical Spine without contrast 3/24/2020: The alignment is within normal limits. There is no fracture or subluxation. The odontoid process is intact. The craniocervical junction is within normal limits. The prevertebral soft tissues are within normal limits. C2-C3: No significant canal stenosis or foraminal narrowing. C3-C4: Posterior disc osteophyte complex with moderate canal stenosis. Moderate bilateral foraminal narrowing. C4-C5: Posterior disc osteophyte complex and facet arthrosis with probable severe canal stenosis. Moderate to severe right and moderate left foraminal narrowing. C5-C6: Posterior disc osteophyte complex with moderate canal stenosis and moderate to severe left foraminal narrowing. Moderate right foraminal narrowing. C6-C7: Posterior disc osteophyte complex with moderate bilateral foraminal narrowing and moderate canal stenosis. C7-T1: Posterior disc osteophyte complex without canal stenosis or foraminal narrowing. Incidental note is made of bilateral cervical ribs.  There are calcified nodules in the bilateral thyroid gland. CT Right Shoulder with arthrogram 9/26/2011: there is a large full-thickness tear seen involving the supraspinatus tendon extending to the level of the acromion. Additionally there is a partial tear of the subscapularis tendon. The biceps appears chronically torn. There are mild degenerative changes of the acromioclavicular joint. Some mild osteoarthritic changes are present of the glenohumeral joint. No evidence of fracture was seen. MR Imaging    None    DXA     DXA 9/04/2020: (excluded L3 and L4 due to spondylosis) lumbar spine L1-L2 T score -1.1 (BMD 1.038 g/cm2), left femoral neck T score: -1.2 (0.876 g/cm2), left total hip T score: -0.4 (0.962 g/cm2), right femoral neck T score: -1.3 (0.862 g/cm2), right total hip T score: -1.2 (0.857 g/cm2), and distal one third left radius T score -1.8 (BMD 0.715 g/cm2). FRAX score 10.8 % probability in 10 years for major osteoporotic fracture and 1.6 % 10 year probability of hip fracture. DXA 5/04/2016:  (excluded L4 for spondylitic change, right hip) lumbar spine L1-L3 T score -1.5 (BMD 0.954 g/cm2), left femoral neck T score: -1.0 (0.805 g/cm2), left total hip T score: -0.1 (1.016 g/cm2), and distal one third left radius T score -1.9 (BMD 0.571 g/cm2). FRAX score 6.1 % probability in 10 years for major osteoporotic fracture and 0.3 % 10 year probability of hip fracture. Nuclear Studies    Triple Phase Scan 8/20/0218:  Patient status post bilateral knee replacements. Phase 1 (blood flow): There is slight increased blood flow to the left knee. Phase 2 (blood pool/soft tissue):  There is slight increased peritracheal activity left knee. Phase 3 (delayed bone): Patient status post right knee replacement which is within normal limits. Patient is status post left knee replacement. There are slight increased activity left tibial plateau    PATHOLOGY    Bone, right hallux, biopsy 11/18/2019: No evidence for osteomyelitis.      Toe, right hallux, amputation 11/18/2019: Acute osteomyelitis. PROCEDURE    Kenalog 80 mg IA. (5/23/19)   Kenalog 80 mg IA. (02/18/19)   Kenalog 80 mg IA. (11/19/18)     ASSESSMENT AND PLAN    This is a follow-up visit for Ms. Katlin Rose. 1) Seronegative Rheumatoid Arthritis. She is maintained on leflunomide 20 mg daily and Xeljanz 11 mg XR daily with good tolerance. She has much less inflammatory symptoms now. Her CDAI was previously 50 (previously 49.5, 52.5, 51, 53, 61, 63) with 19 tender and 18 swollen joints, consistent with high disease activity. I will continue treatment. Refills send. Labs today. 2) Long Term Use of Immunosuppressants. The patient remains on immunomodulatory medications (leflunomide, Dot Spittle) and requires frequent toxicity monitoring by blood work. 3) Vitamin D Deficiency. Her vitamin D was 41.3 (previously 21.1, 24.1, 28.0, 19.3). She is on weekly ergocalciferol 50,000. I will check her level today. 4) Osteopenia involving Multiple Sites. Her most recent DXA on 9/04/2020 showed lumbar spine L1-L2 T score -1.1 (BMD 1.038 g/cm2), left femoral neck T score: -1.2 (0.876 g/cm2), left total hip T score: -0.4 (0.962 g/cm2), right femoral neck T score: -1.3 (0.862 g/cm2), right total hip T score: -1.2 (0.857 g/cm2), and distal one third left radius T score -1.8 (BMD 0.715 g/cm2). FRAX score 10.8 % probability in 10 years for major osteoporotic fracture and 1.6 % 10 year probability of hip fracture    5) MGUS. Her M-spike 0.3 (previously 0.2, 0.2, 0.3) with immunofixation shows IgG monoclonal protein with lambda light chain. I had referred her to hematology, Dr. Shannon Enciso, who noted a low risk for myeloma. I will repeat. 6) Elevated AST. Repeat was normal.    7) Blurred Vision s/p Fall. This was not an active issue today. I had asked her to see an ophthalmology. 8) Falls. This was due to knee prothesis issues. She is following with orthopedics.      The patient voiced understanding of the aforementioned assessment and plan. The patient has consented for synchronous (real-time) Telemedicine (audio-video technology) on 1/11/2021 for their care to be delivered over telemedicine in place of their regularly scheduled office visit pursuant to the emergency declaration under the [facility], [facility] waiver authority and the [employer] Act, this Virtual  Visit was conducted, with patient's consent, to reduce the patient's risk of exposure to COVID-19 and provide continuity of care for an established patient. Services were provided through a video synchronous discussion virtually to substitute for in-person clinic visit.     TODAY'S ORDERS    Orders Placed This Encounter    CBC WITH AUTOMATED DIFF    METABOLIC PANEL, COMPREHENSIVE    C REACTIVE PROTEIN, QT    SED RATE (ESR)    VITAMIN D, 25 HYDROXY    LIPID PANEL    leflunomide (ARAVA) 20 mg tablet    tofacitinib 11 mg Tb24     Future Appointments   Date Time Provider Dorothy Shanelle   4/6/2021  8:20 AM Buddy Nelson MD AOCR BS AMB     Heather Lambert MD, [address] [place] [address]    Adult Rheumatology   Rheumatology Ultrasound Certified  [facility]  A Part of [facility], [address] [place] [address]   Phone [phone]  Fax [phone]

## 2021-01-11 ENCOUNTER — VIRTUAL VISIT (OUTPATIENT)
Dept: RHEUMATOLOGY | Age: 76
End: 2021-01-11
Payer: MEDICARE

## 2021-01-11 DIAGNOSIS — Z79.60 LONG-TERM USE OF IMMUNOSUPPRESSANT MEDICATION: ICD-10-CM

## 2021-01-11 DIAGNOSIS — E55.9 VITAMIN D DEFICIENCY: ICD-10-CM

## 2021-01-11 DIAGNOSIS — E78.2 MIXED HYPERLIPIDEMIA: ICD-10-CM

## 2021-01-11 DIAGNOSIS — M06.09 SERONEGATIVE RHEUMATOID ARTHRITIS OF MULTIPLE SITES (HCC): Primary | ICD-10-CM

## 2021-01-11 PROCEDURE — 1100F PTFALLS ASSESS-DOCD GE2>/YR: CPT | Performed by: INTERNAL MEDICINE

## 2021-01-11 PROCEDURE — 3017F COLORECTAL CA SCREEN DOC REV: CPT | Performed by: INTERNAL MEDICINE

## 2021-01-11 PROCEDURE — 1090F PRES/ABSN URINE INCON ASSESS: CPT | Performed by: INTERNAL MEDICINE

## 2021-01-11 PROCEDURE — G8756 NO BP MEASURE DOC: HCPCS | Performed by: INTERNAL MEDICINE

## 2021-01-11 PROCEDURE — G8399 PT W/DXA RESULTS DOCUMENT: HCPCS | Performed by: INTERNAL MEDICINE

## 2021-01-11 PROCEDURE — 3288F FALL RISK ASSESSMENT DOCD: CPT | Performed by: INTERNAL MEDICINE

## 2021-01-11 PROCEDURE — 99215 OFFICE O/P EST HI 40 MIN: CPT | Performed by: INTERNAL MEDICINE

## 2021-01-11 PROCEDURE — G8432 DEP SCR NOT DOC, RNG: HCPCS | Performed by: INTERNAL MEDICINE

## 2021-01-11 PROCEDURE — G8428 CUR MEDS NOT DOCUMENT: HCPCS | Performed by: INTERNAL MEDICINE

## 2021-01-11 RX ORDER — LEFLUNOMIDE 20 MG/1
TABLET ORAL
Qty: 90 TAB | Refills: 1 | Status: SHIPPED | OUTPATIENT
Start: 2021-01-11 | End: 2021-08-31 | Stop reason: SDUPTHER

## 2021-01-19 RX ORDER — LATANOPROST 50 UG/ML
SOLUTION/ DROPS OPHTHALMIC
Qty: 2.5 ML | Refills: 0 | Status: SHIPPED | OUTPATIENT
Start: 2021-01-19 | End: 2021-03-29 | Stop reason: SDUPTHER

## 2021-01-25 DIAGNOSIS — E11.8 TYPE 2 DIABETES MELLITUS WITH COMPLICATION, WITHOUT LONG-TERM CURRENT USE OF INSULIN (HCC): ICD-10-CM

## 2021-01-25 RX ORDER — METFORMIN HYDROCHLORIDE 500 MG/1
500 TABLET ORAL
Qty: 90 TAB | Refills: 0 | Status: SHIPPED | OUTPATIENT
Start: 2021-01-25 | End: 2021-10-26 | Stop reason: SDUPTHER

## 2021-03-24 DIAGNOSIS — F33.0 MILD EPISODE OF RECURRENT MAJOR DEPRESSIVE DISORDER (HCC): ICD-10-CM

## 2021-03-24 DIAGNOSIS — G25.81 RLS (RESTLESS LEGS SYNDROME): ICD-10-CM

## 2021-03-24 NOTE — TELEPHONE ENCOUNTER
----- Message from Nicole Matias sent at 3/24/2021  8:36 AM EDT -----  Regarding: Dr. Nichelle Carlisle Message/Vendor Calls    Caller's first and last name:Micki Ahumada      Reason for call:medication       Callback required yes/no and why:yes      Best contact number(s):[phone]      Details to clarify the request:patient needs her medications she did not have the list available      Nicole Matias

## 2021-03-25 DIAGNOSIS — F33.0 MILD EPISODE OF RECURRENT MAJOR DEPRESSIVE DISORDER (HCC): ICD-10-CM

## 2021-03-25 RX ORDER — NAPROXEN 500 MG/1
TABLET ORAL
Qty: 60 TAB | Refills: 0 | Status: SHIPPED | OUTPATIENT
Start: 2021-03-25 | End: 2021-04-13 | Stop reason: ALTCHOICE

## 2021-03-25 RX ORDER — SIMVASTATIN 20 MG/1
TABLET, FILM COATED ORAL
Qty: 30 TAB | Refills: 0 | Status: SHIPPED | OUTPATIENT
Start: 2021-03-25 | End: 2021-03-25

## 2021-03-25 RX ORDER — GABAPENTIN 100 MG/1
CAPSULE ORAL
Qty: 120 CAP | Refills: 0 | Status: SHIPPED | OUTPATIENT
Start: 2021-03-25 | End: 2021-04-13

## 2021-03-25 RX ORDER — SIMVASTATIN 20 MG/1
TABLET, FILM COATED ORAL
Qty: 90 TAB | Refills: 0 | Status: SHIPPED | OUTPATIENT
Start: 2021-03-25 | End: 2021-05-26 | Stop reason: SDUPTHER

## 2021-03-25 RX ORDER — SERTRALINE HYDROCHLORIDE 50 MG/1
TABLET, FILM COATED ORAL
Qty: 90 TAB | Refills: 1 | Status: SHIPPED | OUTPATIENT
Start: 2021-03-25 | End: 2021-10-26 | Stop reason: SDUPTHER

## 2021-03-25 RX ORDER — SERTRALINE HYDROCHLORIDE 50 MG/1
TABLET, FILM COATED ORAL
Qty: 30 TAB | Refills: 0 | Status: SHIPPED | OUTPATIENT
Start: 2021-03-25 | End: 2021-03-25

## 2021-03-25 RX ORDER — METOPROLOL TARTRATE 25 MG/1
TABLET, FILM COATED ORAL
Qty: 60 TAB | Refills: 0 | Status: SHIPPED | OUTPATIENT
Start: 2021-03-25 | End: 2021-04-19

## 2021-03-29 ENCOUNTER — VIRTUAL VISIT (OUTPATIENT)
Dept: FAMILY MEDICINE CLINIC | Age: 76
End: 2021-03-29
Payer: MEDICARE

## 2021-03-29 DIAGNOSIS — I10 ESSENTIAL HYPERTENSION: ICD-10-CM

## 2021-03-29 DIAGNOSIS — Z12.31 ENCOUNTER FOR SCREENING MAMMOGRAM FOR BREAST CANCER: ICD-10-CM

## 2021-03-29 DIAGNOSIS — M06.09 SERONEGATIVE RHEUMATOID ARTHRITIS OF MULTIPLE SITES (HCC): ICD-10-CM

## 2021-03-29 DIAGNOSIS — D86.0 SARCOIDOSIS OF LUNG (HCC): ICD-10-CM

## 2021-03-29 DIAGNOSIS — M25.551 RIGHT HIP PAIN: ICD-10-CM

## 2021-03-29 DIAGNOSIS — I25.10 CORONARY ARTERY DISEASE DUE TO LIPID RICH PLAQUE: ICD-10-CM

## 2021-03-29 DIAGNOSIS — E66.01 SEVERE OBESITY (HCC): ICD-10-CM

## 2021-03-29 DIAGNOSIS — M23.50 CHRONIC INSTABILITY OF KNEE, UNSPECIFIED LATERALITY: ICD-10-CM

## 2021-03-29 DIAGNOSIS — I25.83 CORONARY ARTERY DISEASE DUE TO LIPID RICH PLAQUE: ICD-10-CM

## 2021-03-29 DIAGNOSIS — Z95.820 STATUS POST ANGIOPLASTY WITH STENT: ICD-10-CM

## 2021-03-29 DIAGNOSIS — Z79.899 ENCOUNTER FOR LONG-TERM (CURRENT) USE OF HIGH-RISK MEDICATION: ICD-10-CM

## 2021-03-29 DIAGNOSIS — F33.0 MILD EPISODE OF RECURRENT MAJOR DEPRESSIVE DISORDER (HCC): ICD-10-CM

## 2021-03-29 DIAGNOSIS — I73.9 PAD (PERIPHERAL ARTERY DISEASE) (HCC): ICD-10-CM

## 2021-03-29 DIAGNOSIS — J45.20 MILD INTERMITTENT ASTHMA, UNSPECIFIED WHETHER COMPLICATED: ICD-10-CM

## 2021-03-29 DIAGNOSIS — E55.9 VITAMIN D DEFICIENCY: ICD-10-CM

## 2021-03-29 DIAGNOSIS — E11.8 TYPE 2 DIABETES MELLITUS WITH COMPLICATION, WITHOUT LONG-TERM CURRENT USE OF INSULIN (HCC): Primary | ICD-10-CM

## 2021-03-29 DIAGNOSIS — Z79.899 ENCOUNTER FOR LONG-TERM (CURRENT) USE OF MEDICATIONS: ICD-10-CM

## 2021-03-29 DIAGNOSIS — N30.00 ACUTE CYSTITIS WITHOUT HEMATURIA: ICD-10-CM

## 2021-03-29 PROCEDURE — 99443 PR PHYS/QHP TELEPHONE EVALUATION 21-30 MIN: CPT | Performed by: FAMILY MEDICINE

## 2021-03-29 RX ORDER — LATANOPROST 50 UG/ML
SOLUTION/ DROPS OPHTHALMIC
Qty: 2.5 ML | Refills: 0 | Status: SHIPPED | OUTPATIENT
Start: 2021-03-29 | End: 2021-04-25

## 2021-03-29 RX ORDER — SULFAMETHOXAZOLE AND TRIMETHOPRIM 800; 160 MG/1; MG/1
1 TABLET ORAL 2 TIMES DAILY
Qty: 6 TAB | Refills: 0 | Status: SHIPPED | OUTPATIENT
Start: 2021-03-29 | End: 2021-04-01

## 2021-03-29 RX ORDER — FLUTICASONE FUROATE AND VILANTEROL TRIFENATATE 100; 25 UG/1; UG/1
POWDER RESPIRATORY (INHALATION)
Qty: 60 INHALER | Refills: 2 | Status: SHIPPED | OUTPATIENT
Start: 2021-03-29 | End: 2021-10-26 | Stop reason: SDUPTHER

## 2021-03-29 RX ORDER — ERGOCALCIFEROL 1.25 MG/1
50000 CAPSULE ORAL
Qty: 12 CAP | Refills: 3 | Status: SHIPPED | OUTPATIENT
Start: 2021-03-29 | End: 2021-10-26 | Stop reason: SDUPTHER

## 2021-03-29 RX ORDER — ALBUTEROL SULFATE 2.5 MG/.5ML
2.5 SOLUTION RESPIRATORY (INHALATION)
Qty: 30 ML | Refills: 0 | Status: SHIPPED | OUTPATIENT
Start: 2021-03-29 | End: 2021-10-26 | Stop reason: SDUPTHER

## 2021-03-29 NOTE — PROGRESS NOTES
Chief Complaint   Patient presents with    Follow-up     6 month    1. Have you been to the ER, urgent care clinic since your last visit? Hospitalized since your last visit? No    2. Have you seen or consulted any other health care providers outside of the [address] [place] [address] since your last visit? Include any pap smears or colon screening.  No     Discuss lower abdominal pain and urgency

## 2021-03-29 NOTE — PROGRESS NOTES
Twila Dejesus (: 1945) is a 70 y.o. female, established patient, here for evaluation of the following chief complaint(s):   Follow-up (6 month )       ASSESSMENT/PLAN:  Stable overall. Tx for UTI based on sx. Checking labs. Following with Rheum for RA and to return to Ortho for Knee instability after bilat TKR many yrs ago. Missed Ophtho appt and rescheduling. 1. Type 2 diabetes mellitus with complication, without long-term current use of insulin (HCC)  -     HEMOGLOBIN A1C WITH EAG; Future  -     LIPID PANEL; Future  -     METABOLIC PANEL, COMPREHENSIVE; Future  2. Essential hypertension  -     METABOLIC PANEL, COMPREHENSIVE; Future  3. Mild episode of recurrent major depressive disorder ([facility]  4. PAD (peripheral artery disease) ([facility])  -     LIPID PANEL; Future  5. Severe obesity ([facility]  6. Seronegative rheumatoid arthritis of multiple sites ([facility]  -     ergocalciferol (ERGOCALCIFEROL) 1,250 mcg (50,000 unit) capsule; Take 1 Cap by mouth every seven (7) days. Indications: vitamin D deficiency (high dose therapy), Normal, Disp-12 Cap, R-3  7. Sarcoidosis of lung ([facility]  -     ergocalciferol (ERGOCALCIFEROL) 1,250 mcg (50,000 unit) capsule; Take 1 Cap by mouth every seven (7) days. Indications: vitamin D deficiency (high dose therapy), Normal, Disp-12 Cap, R-3  8. Coronary artery disease due to lipid rich plaque  -     LIPID PANEL; Future  9. Status post angioplasty with stent  -     LIPID PANEL; Future  10. Mild intermittent asthma, unspecified whether complicated  -     albuterol sulfate (PROVENTIL;VENTOLIN) 2.5 mg/0.5 mL nebu nebulizer solution; 0.5 mL by Nebulization route every four (4) hours as needed for Wheezing. Indications: asthma attack, Normal, Disp-30 mL, R-0  -     fluticasone furoate-vilanteroL (Breo Ellipta) 100-25 mcg/dose inhaler; TAKE 1 PUFF BY MOUTH EVERY DAY, Normal, Disp-60 Inhaler, R-2  11.  Vitamin D deficiency  -     ergocalciferol (ERGOCALCIFEROL) 1,250 mcg (50,000 unit) capsule; Take 1 Cap by mouth every seven (7) days. Indications: vitamin D deficiency (high dose therapy), Normal, Disp-12 Cap, R-3  12. Right hip pain  -     XR HIP RT W OR WO PELV 2-3 VWS; Future  13. Encounter for screening mammogram for breast cancer  -     [facility] MAMMO BI SCREENING INCL CAD; Future  14. Acute cystitis without hematuria  -     trimethoprim-sulfamethoxazole (Bactrim DS) 160-800 mg per tablet; Take 1 Tab by mouth two (2) times a day for 3 days. , Normal, Disp-6 Tab, R-0  15. Encounter for long-term (current) use of medications  -     HEMOGLOBIN A1C WITH EAG; Future  -     LIPID PANEL; Future  -     METABOLIC PANEL, COMPREHENSIVE; Future  -     CBC W/O DIFF; Future  16. Encounter for long-term (current) use of high-risk medication  -     HEMOGLOBIN A1C WITH EAG; Future  -     LIPID PANEL; Future  -     METABOLIC PANEL, COMPREHENSIVE; Future  -     CBC W/O DIFF; Future  17. Chronic instability of knee, unspecified laterality  -     REFERRAL TO ORTHOPEDICS      Return in about 2 weeks (around 4/12/2021), or if symptoms worsen or fail to improve. SUBJECTIVE/OBJECTIVE:    Concerns for UTI today - suprapubic pain, freq, and urgency. No dysuria, n/v, f/c.    R hip pain - worse for last week. Worse with walking and standing. Sharp and achy. Generalized over her hip. No recent falls. No pain at rest.  No relief with APAP. Using cane at home and cane when going out. difficulty with a walker because of her RA and OA in her hands, shoulders, and neck. Hx of bilat TKRs many years ago about 28 yrs ago. Evidence for loosening of femoral and tibial components on recent x rays but did not follow back up with Ortho during pandemic. Ct to struggle with instability. Hx of osteopenia. Reviewed last set of labs - MGUS stable and PTH elevated from Dr. Erickson Suarez.     DM, Hyperlipidemia, & HTN  Pt is doing well on current meds with no medication side effects noted  No TIA's, no chest pain on exertion, no dyspnea on exertion, no swelling of ankles. Exercising - Minimal  Dieting - No  Smoking - No     Lab Results   Component Value Date/Time    Cholesterol, total 106 08/06/2020 03:47 PM    HDL Cholesterol 52 08/06/2020 03:47 PM    LDL, calculated 36 08/06/2020 03:47 PM    Triglyceride 89 08/06/2020 03:47 PM       ROS  Gen - no fever/chills  Resp - no dyspnea or cough  CV - no chest pain or WORKMAN. Nu stress test in 11/2019 with no concerns. Rest per HPI    No flowsheet data found. Physical exam:  General appearance - alert, well appearing, and in no distress  Eyes -sclera anicteric, no discharge  HEENT normocephalic, atraumatic, moist mucous membranes, no visualized neck mass  Chest -normal respiratory effort, no visualized signs of respiratory distress  Neurological - alert, awake, normal speech, no focal findings or movement disorder noted  Psych - normal mood and affect  Skin no apparent lesions      On this date 03/29/2021 I have spent 30 minutes reviewing previous notes, test results and audio with the patient discussing the diagnosis and importance of compliance with the treatment plan as well as documenting on the day of the visit. Natanael Everett, was evaluated through an audio encounter. The patient (or guardian if applicable) is aware that this is a billable service. Verbal consent to proceed has been obtained within the past 12 months. The visit was conducted pursuant to the emergency declaration under the [address] [place] [address], [address] [place] [address] authority and the [employer] General Act. Patient identification was verified, and a caregiver was present when appropriate. The patient was located in a state where the provider was credentialed to provide care. An electronic signature was used to authenticate this note.   -- Ender Beasley MD

## 2021-04-02 ENCOUNTER — HOSPITAL ENCOUNTER (EMERGENCY)
Age: 76
Discharge: HOME | End: 2021-04-02
Attending: EMERGENCY MEDICINE
Payer: MEDICARE

## 2021-04-02 ENCOUNTER — APPOINTMENT (OUTPATIENT)
Dept: CT IMAGING | Age: 76
End: 2021-04-02
Attending: EMERGENCY MEDICINE
Payer: MEDICARE

## 2021-04-02 VITALS
TEMPERATURE: 97.4 F | HEART RATE: 78 BPM | DIASTOLIC BLOOD PRESSURE: 53 MMHG | SYSTOLIC BLOOD PRESSURE: 142 MMHG | WEIGHT: 189 LBS | BODY MASS INDEX: 32.27 KG/M2 | HEIGHT: 64 IN | RESPIRATION RATE: 15 BRPM | OXYGEN SATURATION: 100 %

## 2021-04-02 DIAGNOSIS — R10.84 ABDOMINAL PAIN, GENERALIZED: ICD-10-CM

## 2021-04-02 DIAGNOSIS — K29.00 ACUTE GASTRITIS WITHOUT HEMORRHAGE, UNSPECIFIED GASTRITIS TYPE: Primary | ICD-10-CM

## 2021-04-02 LAB
ALBUMIN SERPL-MCNC: 3.5 G/DL (ref 3.5–5)
ALBUMIN/GLOB SERPL: 0.9 {RATIO} (ref 1.1–2.2)
ALP SERPL-CCNC: 68 U/L (ref 45–117)
ALT SERPL-CCNC: 19 U/L (ref 12–78)
ANION GAP SERPL CALC-SCNC: 6 MMOL/L (ref 5–15)
APPEARANCE UR: CLEAR
AST SERPL-CCNC: 45 U/L (ref 15–37)
BACTERIA URNS QL MICRO: NEGATIVE /HPF
BASOPHILS # BLD: 0.1 K/UL (ref 0–0.1)
BASOPHILS NFR BLD: 1 % (ref 0–1)
BILIRUB SERPL-MCNC: 0.9 MG/DL (ref 0.2–1)
BILIRUB UR QL: NEGATIVE
BUN SERPL-MCNC: 32 MG/DL (ref 6–20)
BUN/CREAT SERPL: 22 (ref 12–20)
CALCIUM SERPL-MCNC: 8.3 MG/DL (ref 8.5–10.1)
CHLORIDE SERPL-SCNC: 111 MMOL/L (ref 97–108)
CO2 SERPL-SCNC: 19 MMOL/L (ref 21–32)
COLOR UR: ABNORMAL
CREAT SERPL-MCNC: 1.48 MG/DL (ref 0.55–1.02)
DIFFERENTIAL METHOD BLD: ABNORMAL
EOSINOPHIL # BLD: 0.1 K/UL (ref 0–0.4)
EOSINOPHIL NFR BLD: 1 % (ref 0–7)
EPITH CASTS URNS QL MICRO: ABNORMAL /LPF
ERYTHROCYTE [DISTWIDTH] IN BLOOD BY AUTOMATED COUNT: 13.9 % (ref 11.5–14.5)
GLOBULIN SER CALC-MCNC: 3.8 G/DL (ref 2–4)
GLUCOSE SERPL-MCNC: 98 MG/DL (ref 65–100)
GLUCOSE UR STRIP.AUTO-MCNC: NEGATIVE MG/DL
HCT VFR BLD AUTO: 33.7 % (ref 35–47)
HGB BLD-MCNC: 10.7 G/DL (ref 11.5–16)
HGB UR QL STRIP: NEGATIVE
IMM GRANULOCYTES # BLD AUTO: 0.1 K/UL (ref 0–0.04)
IMM GRANULOCYTES NFR BLD AUTO: 1 % (ref 0–0.5)
KETONES UR QL STRIP.AUTO: NEGATIVE MG/DL
LACTATE SERPL-SCNC: 0.7 MMOL/L (ref 0.4–2)
LEUKOCYTE ESTERASE UR QL STRIP.AUTO: ABNORMAL
LIPASE SERPL-CCNC: 125 U/L (ref 73–393)
LYMPHOCYTES # BLD: 1.3 K/UL (ref 0.8–3.5)
LYMPHOCYTES NFR BLD: 20 % (ref 12–49)
MCH RBC QN AUTO: 30.7 PG (ref 26–34)
MCHC RBC AUTO-ENTMCNC: 31.8 G/DL (ref 30–36.5)
MCV RBC AUTO: 96.6 FL (ref 80–99)
MONOCYTES # BLD: 0.6 K/UL (ref 0–1)
MONOCYTES NFR BLD: 9 % (ref 5–13)
NEUTS SEG # BLD: 4.5 K/UL (ref 1.8–8)
NEUTS SEG NFR BLD: 68 % (ref 32–75)
NITRITE UR QL STRIP.AUTO: NEGATIVE
NRBC # BLD: 0 K/UL (ref 0–0.01)
NRBC BLD-RTO: 0 PER 100 WBC
PH UR STRIP: 5.5 [PH] (ref 5–8)
PLATELET # BLD AUTO: 197 K/UL (ref 150–400)
PMV BLD AUTO: 10.3 FL (ref 8.9–12.9)
POTASSIUM SERPL-SCNC: 5.4 MMOL/L (ref 3.5–5.1)
PROT SERPL-MCNC: 7.3 G/DL (ref 6.4–8.2)
PROT UR STRIP-MCNC: NEGATIVE MG/DL
RBC # BLD AUTO: 3.49 M/UL (ref 3.8–5.2)
RBC #/AREA URNS HPF: ABNORMAL /HPF (ref 0–5)
SODIUM SERPL-SCNC: 136 MMOL/L (ref 136–145)
SP GR UR REFRACTOMETRY: 1.02 (ref 1–1.03)
UA: UC IF INDICATED,UAUC: ABNORMAL
UROBILINOGEN UR QL STRIP.AUTO: 0.2 EU/DL (ref 0.2–1)
WBC # BLD AUTO: 6.6 K/UL (ref 3.6–11)
WBC URNS QL MICRO: ABNORMAL /HPF (ref 0–4)

## 2021-04-02 PROCEDURE — 85025 COMPLETE CBC W/AUTO DIFF WBC: CPT

## 2021-04-02 PROCEDURE — 83690 ASSAY OF LIPASE: CPT

## 2021-04-02 PROCEDURE — 99284 EMERGENCY DEPT VISIT MOD MDM: CPT

## 2021-04-02 PROCEDURE — 74011000636 HC RX REV CODE- 636: Performed by: EMERGENCY MEDICINE

## 2021-04-02 PROCEDURE — 81001 URINALYSIS AUTO W/SCOPE: CPT

## 2021-04-02 PROCEDURE — 80053 COMPREHEN METABOLIC PANEL: CPT

## 2021-04-02 PROCEDURE — 74177 CT ABD & PELVIS W/CONTRAST: CPT

## 2021-04-02 PROCEDURE — 99283 EMERGENCY DEPT VISIT LOW MDM: CPT

## 2021-04-02 PROCEDURE — 74011250637 HC RX REV CODE- 250/637: Performed by: EMERGENCY MEDICINE

## 2021-04-02 PROCEDURE — 74011000250 HC RX REV CODE- 250: Performed by: EMERGENCY MEDICINE

## 2021-04-02 PROCEDURE — 83605 ASSAY OF LACTIC ACID: CPT

## 2021-04-02 PROCEDURE — 36415 COLL VENOUS BLD VENIPUNCTURE: CPT

## 2021-04-02 RX ORDER — SUCRALFATE 1 G/1
1 TABLET ORAL
Qty: 30 TAB | Refills: 0 | Status: SHIPPED | OUTPATIENT
Start: 2021-04-02 | End: 2021-11-01

## 2021-04-02 RX ORDER — OMEPRAZOLE 20 MG/1
20 CAPSULE, DELAYED RELEASE ORAL DAILY
Qty: 20 CAP | Refills: 0 | Status: SHIPPED | OUTPATIENT
Start: 2021-04-02 | End: 2021-05-26 | Stop reason: SDUPTHER

## 2021-04-02 RX ADMIN — ALUMINUM HYDROXIDE AND MAGNESIUM HYDROXIDE 40 ML: 200; 200 SUSPENSION ORAL at 17:01

## 2021-04-02 RX ADMIN — IOPAMIDOL 100 ML: 755 INJECTION, SOLUTION INTRAVENOUS at 15:15

## 2021-04-02 NOTE — ED PROVIDER NOTES
EMERGENCY DEPARTMENT HISTORY AND PHYSICAL EXAM      Date: 4/2/2021  Patient Name: Tabitha Barber    Please note that this dictation was completed with viVood, the computer voice recognition software. Quite often unanticipated grammatical, syntax, homophones, and other interpretive errors are inadvertently transcribed by the computer software. Please disregard these errors. Please excuse any errors that have escaped final proofreading. History of Presenting Illness     Chief Complaint   Patient presents with    Pelvic Pain     lower pelvic right side pain this week; she thoughtshe had a uti and her doctor started her on medicinee for this. no diarrhea; no vomiting +nausea       History Provided By: Patient     HPI: Tabitha Barber, 70 y.o. female, presenting the emergency department complaining of lower abdominal pain that is been progressively worsening over the past week. Rates her pain as 7 out of 10, 10 out of 10 with movement. Pain is relieved by rest.  No associated vomiting, but admits to nausea. No change in stool, no hematochezia or melena. No vaginal bleeding or discharge. She called her primary care doctor who placed her on antibiotics, but she reports no improvement with antibiotics, not sure what she is taking. Per the medical record it looks like she is taking Bactrim for the presumptive UTI. No other exacerbating relieving factors. Patient denies any chest pain, shortness of breath, fever, chills, cough. Past medical history: Diabetes, hypertension, peripheral artery disease, rheumatoid arthritis, sarcoidosis, CAD    PCP: Ruben Reed MD    No current facility-administered medications on file prior to encounter.       Current Outpatient Medications on File Prior to Encounter   Medication Sig Dispense Refill    latanoprost (XALATAN) 0.005 % ophthalmic solution INSTILL 1 DROP INTO BOTH EYES NIGHTLY 2.5 mL 0    albuterol sulfate (PROVENTIL;VENTOLIN) 2.5 mg/0.5 mL nebu nebulizer solution 0.5 mL by Nebulization route every four (4) hours as needed for Wheezing. Indications: asthma attack 30 mL 0    fluticasone furoate-vilanteroL (Breo Ellipta) 100-25 mcg/dose inhaler TAKE 1 PUFF BY MOUTH EVERY DAY 60 Inhaler 2    ergocalciferol (ERGOCALCIFEROL) 1,250 mcg (50,000 unit) capsule Take 1 Cap by mouth every seven (7) days. Indications: vitamin D deficiency (high dose therapy) 12 Cap 3    naproxen (NAPROSYN) 500 mg tablet Take 500 mg by mouth two (2) times daily (with meals). 60 Tab 0    metoprolol tartrate (LOPRESSOR) 25 mg tablet Take  by mouth two (2) times a day. 60 Tab 0    gabapentin (NEURONTIN) 100 mg capsule TAKE 1 CAPSULE IN THE MORNING AND TAKE 3 CAPSULES BEFORE BEDTIME 120 Cap 0    sertraline (ZOLOFT) 50 mg tablet TAKE 1 TABLET BY MOUTH EVERY DAY 90 Tab 1    simvastatin (ZOCOR) 20 mg tablet TAKE BY MOUTH NIGHTLY. 90 Tab 0    metFORMIN (GLUCOPHAGE) 500 mg tablet Take 1 Tab by mouth daily (with dinner). Decreased dose 90 Tab 0    leflunomide (ARAVA) 20 mg tablet TAKE 1 TABLET BY MOUTH EVERY DAY 90 Tab 1    tofacitinib 11 mg Tb24 Take 11 mg by mouth daily. Indications: rheumatoid arthritis 30 Tab 11    topiramate (TOPAMAX) 25 mg tablet Take 1 Tab by mouth nightly. 30 Tab 1    furosemide (LASIX) 20 mg tablet TAKE 1 TABLET BY MOUTH EVERY DAY 90 Tab 0    acetaminophen (Acetaminophen Extra Strength) 500 mg tablet Take 2 Tabs by mouth every six (6) hours as needed for Pain. 20 Tab 0    nortriptyline (PAMELOR) 50 mg capsule TAKE ONE CAPSULE EVERY DAY AT BEDTIME      aspirin delayed-release 81 mg tablet Take  by mouth daily.          Past History     Past Medical History:  Past Medical History:   Diagnosis Date    Asthma     At risk for sleep apnea 11/11/2019    [facility] 4 - Sleep study in January per patient     Chronic pain 5/8/2020    DDD (degenerative disc disease), lumbar     Diabetes ([facility]     Gastritis     GERD (gastroesophageal reflux disease)     Glaucoma  Hearing loss 5/8/2020    Hiatal hernia     High cholesterol     Hypertension     Hypocalcemia 5/8/2020    Peripheral vascular disease (HCC)     RA (rheumatoid arthritis) (HCC)     Sarcoidosis 1999    [facility]       Past Surgical History:  Past Surgical History:   Procedure Laterality Date    HX GASTRIC BYPASS      HX HEART CATHETERIZATION      s/p PCI with stenting in 1998     HX KNEE REPLACEMENT Bilateral     HX SHOULDER REPLACEMENT Right     x 2        Family History:  Family History   Problem Relation Age of Onset    Heart Disease Mother     Liver Disease Father     Alcohol abuse Brother     Dementia Neg Hx     Cancer Neg Hx     Seizures Neg Hx        Social History:  Social History     Tobacco Use    Smoking status: Never Smoker    Smokeless tobacco: Never Used   Substance Use Topics    Alcohol use: Yes     Alcohol/week: 2.0 standard drinks     Types: 1 Glasses of wine, 1 Shots of liquor per week     Comment: 2-3 yearly    Drug use: No       Allergies: Allergies   Allergen Reactions    Lisinopril Rash    Methotrexate Hives         Review of Systems   Review of Systems   Constitutional: Negative for chills and fever. HENT: Negative for congestion and sore throat. Eyes: Negative for visual disturbance. Respiratory: Negative for cough and shortness of breath. Cardiovascular: Negative for chest pain and leg swelling. Gastrointestinal: Positive for abdominal pain and nausea. Negative for blood in stool, diarrhea and vomiting. Endocrine: Negative for polyuria. Genitourinary: Negative for dysuria, flank pain, vaginal bleeding and vaginal discharge. Musculoskeletal: Negative for myalgias. Skin: Negative for rash. Allergic/Immunologic: Negative for immunocompromised state. Neurological: Negative for weakness and headaches. Psychiatric/Behavioral: Negative for confusion. Physical Exam   Physical Exam  Vitals signs and nursing note reviewed.    Constitutional: Appearance: She is well-developed. HENT:      Head: Normocephalic and atraumatic. Eyes:      General:         Right eye: No discharge. Left eye: No discharge. Conjunctiva/sclera: Conjunctivae normal.      Pupils: Pupils are equal, round, and reactive to light. Neck:      Musculoskeletal: Normal range of motion and neck supple. Trachea: No tracheal deviation. Cardiovascular:      Rate and Rhythm: Normal rate and regular rhythm. Heart sounds: Normal heart sounds. No murmur. Pulmonary:      Effort: Pulmonary effort is normal. No respiratory distress. Breath sounds: Normal breath sounds. No wheezing or rales. Abdominal:      General: Bowel sounds are normal.      Palpations: Abdomen is soft. Tenderness: There is no abdominal tenderness. There is no guarding or rebound. Comments: Patient has no obvious reproducible tenderness on exam, she does have a midline surgical scar, no evidence of hernia. No guarding or rebound. No acute abdomen   Musculoskeletal: Normal range of motion. General: No tenderness or deformity. Skin:     General: Skin is warm and dry. Findings: No erythema or rash. Neurological:      Mental Status: She is alert and oriented to person, place, and time. Psychiatric:         Behavior: Behavior normal.         Diagnostic Study Results     Labs -   No results found for this or any previous visit (from the past 12 hour(s)). Radiologic Studies -   CT ABD PELV W CONT   Final Result   Gastritis        CT Results  (Last 48 hours)    None        CXR Results  (Last 48 hours)    None            Medical Decision Making   I am the first provider for this patient. I reviewed the vital signs, available nursing notes, past medical history, past surgical history, family history and social history. Vital Signs-Reviewed the patient's vital signs. No data found.         Records Reviewed:   Nursing notes, Prior visits     Provider Notes (Medical Decision Making):   Patient presenting with lower abdominal pain out of proportion to physical exam.  She reports the pain is 7 out of 10, I cannot reproduce it. Concern for the possibility of ischemic colitis is there. I clinically doubt this those patient looks well, nontoxic. Will obtain a lactic, if this is not elevated I think the chance the patient is having an ischemic event is low. Will obtain CT of the abdomen and pelvis. ED Course:   Initial assessment performed. The patients presenting problems have been discussed, and they are in agreement with the care plan formulated and outlined with them. I have encouraged them to ask questions as they arise throughout their visit. ED Course as of Apr 04 2046 Fri Apr 02, 2021   1610 Potassium is elevated at 5.4. Patient has a mild SHIRAZ with creatinine of 1.4, she is mildly acidotic. Potassium likely elevated secondary to recent Bactrim. She is on a loop diuretic this will likely be protective. At this time I would not aggressively treat the potassium as I suspect that it will likely lower after patient stopped taking Bactrim    [AR]      ED Course User Index  [AR] Mariana Salazar, DO               Critical Care Time:   none    Disposition:    DISCHARGE NOTE  Patients results have been reviewed with them. Patient and/or family have verbally conveyed their understanding and agreement of the patient's signs, symptoms, diagnosis, treatment and prognosis and additionally agree to follow up as recommended or return to the Emergency Room should their condition change or have any new concerns prior to their follow-up appointment. Patient verbally agrees with the care-plan and verbally conveys that all of their questions have been answered.    Discharge instructions have also been provided to the patient with some educational information regarding their diagnosis as well a list of reasons why they would want to return to the ER prior to their follow-up appointment should their condition change. PLAN:  1. Discharge Medication List as of 4/2/2021  4:23 PM      START taking these medications    Details   omeprazole (PRILOSEC) 20 mg capsule Take 1 Cap by mouth daily. , Normal, Disp-20 Cap, R-0      sucralfate (Carafate) 1 gram tablet Take 1 Tab by mouth Before breakfast, lunch, and dinner., Normal, Disp-30 Tab, R-0         CONTINUE these medications which have NOT CHANGED    Details   latanoprost (XALATAN) 0.005 % ophthalmic solution INSTILL 1 DROP INTO BOTH EYES NIGHTLY, Normal, Disp-2.5 mL, R-0      albuterol sulfate (PROVENTIL;VENTOLIN) 2.5 mg/0.5 mL nebu nebulizer solution 0.5 mL by Nebulization route every four (4) hours as needed for Wheezing. Indications: asthma attack, Normal, Disp-30 mL, R-0      fluticasone furoate-vilanteroL (Breo Ellipta) 100-25 mcg/dose inhaler TAKE 1 PUFF BY MOUTH EVERY DAY, Normal, Disp-60 Inhaler, R-2      ergocalciferol (ERGOCALCIFEROL) 1,250 mcg (50,000 unit) capsule Take 1 Cap by mouth every seven (7) days. Indications: vitamin D deficiency (high dose therapy), Normal, Disp-12 Cap, R-3      naproxen (NAPROSYN) 500 mg tablet Take 500 mg by mouth two (2) times daily (with meals). , Normal, Disp-60 Tab, R-0      metoprolol tartrate (LOPRESSOR) 25 mg tablet Take  by mouth two (2) times a day., Normal, Disp-60 Tab, R-0      gabapentin (NEURONTIN) 100 mg capsule TAKE 1 CAPSULE IN THE MORNING AND TAKE 3 CAPSULES BEFORE BEDTIME, Normal, Disp-120 Cap, R-0      sertraline (ZOLOFT) 50 mg tablet TAKE 1 TABLET BY MOUTH EVERY DAY, Normal, Disp-90 Tab, R-1      simvastatin (ZOCOR) 20 mg tablet TAKE BY MOUTH NIGHTLY., Normal, Disp-90 Tab, R-0      metFORMIN (GLUCOPHAGE) 500 mg tablet Take 1 Tab by mouth daily (with dinner). Decreased dose, Normal, Disp-90 Tab, R-0      leflunomide (ARAVA) 20 mg tablet TAKE 1 TABLET BY MOUTH EVERY DAY, Normal, Disp-90 Tab, R-1      tofacitinib 11 mg Tb24 Take 11 mg by mouth daily.  Indications: rheumatoid arthritis, Normal, Disp-30 Tab, R-11      topiramate (TOPAMAX) 25 mg tablet Take 1 Tab by mouth nightly., Normal, Disp-30 Tab,R-1      furosemide (LASIX) 20 mg tablet TAKE 1 TABLET BY MOUTH EVERY DAY, Normal, Disp-90 Tab,R-0      acetaminophen (Acetaminophen Extra Strength) 500 mg tablet Take 2 Tabs by mouth every six (6) hours as needed for Pain., Normal, Disp-20 Tab, R-0      nortriptyline (PAMELOR) 50 mg capsule TAKE ONE CAPSULE EVERY DAY AT BEDTIME, Historical Med      aspirin delayed-release 81 mg tablet Take  by mouth daily. , Historical Med         STOP taking these medications       trimethoprim-sulfamethoxazole (Bactrim DS) 160-800 mg per tablet Comments:   Reason for Stoppin.   Follow-up Information     Follow up With Specialties Details Why Contact Info    Lisette Tomlin MD Family Medicine Schedule an appointment as soon as possible for a visit   [facility] [phone] [id number]  [address] [facility]  Schedule an appointment as soon as possible for a visit   [address] [place] [address] [phone]    [facility] EMERGENCY DEPT Emergency Medicine  If symptoms worsen [address] [place], [place] [address] [address] [facility]  [phone]          Return to ED if worse     Diagnosis     Clinical Impression:   1. Acute gastritis without hemorrhage, unspecified gastritis type    2. Abdominal pain, generalized        Attestations:   This note was completed by Jemma Crooks DO

## 2021-04-02 NOTE — ED NOTES
Dr. Kenisha Farfan and Glenys Mac RN reviewed discharge instructions with the patient. The patient verbalized understanding. All questions and concerns were addressed. The patient was taken out of the department in a wheelchair and is discharged ambulatory in the care of family members with instructions and prescriptions in hand. Pt is alert and oriented x 4. Respirations are clear and unlabored.

## 2021-04-02 NOTE — ED NOTES
Pt is unable to provide urine sample, instructed to call out when she feels like she is able to go. MD is aware.

## 2021-04-05 ENCOUNTER — PATIENT OUTREACH (OUTPATIENT)
Dept: PRIMARY CARE CLINIC | Age: 76
End: 2021-04-05

## 2021-04-05 ENCOUNTER — HOSPITAL ENCOUNTER (OUTPATIENT)
Dept: GENERAL RADIOLOGY | Age: 76
Discharge: HOME | End: 2021-04-05
Payer: MEDICARE

## 2021-04-05 DIAGNOSIS — M25.551 RIGHT HIP PAIN: ICD-10-CM

## 2021-04-05 PROCEDURE — 73502 X-RAY EXAM HIP UNI 2-3 VIEWS: CPT

## 2021-04-05 NOTE — PROGRESS NOTES
ACM spoke briefly with patient. Patient reports at imaging center waiting to be called for hip imaging. Reports having R hip pain and keeps \"collapsing\" and that MD ordered hip imaging. Patient reports having ongoing abdominal pain on the left side that reminds her of when she has had diverticulitis in the past and needed treatment with antibiotics. Reports she did  rx's for carafate and prilosec but that she is still having pain and thinks she has diverticulitis. Patient requests this ACM call PCP for sooner appt for follow up. ACM called PCP office and placed message to request appt ASAP for follow up from ER. ACM will follow up this afternoon to notify patient after she is finished with her imaging appt. 2:40 pm    Patient notified that the PCP office should be contacting her to schedule. Patient contacted regarding recent discharge and COVID-19 risk. Discussed COVID-19 related testing which was not done at this time. Test results were not done. Patient informed of results, if available? no    Outreach made within 2 business days of discharge: Yes    Care Transition Nurse/ Ambulatory Care Manager/ LPN Care Coordinator contacted the patient by telephone to perform post discharge assessment. Verified name and  with patient as identifiers. Patient has following risk factors of: asthma and diabetes. CTN/ACM/LPN reviewed discharge instructions, medical action plan and red flags related to discharge diagnosis. Reviewed and educated them on any new and changed medications related to discharge diagnosis. Advised obtaining a 90-day supply of all daily and as-needed medications. Advance Care Planning:   Does patient have an Advance Directive: health care decision makers updated    Education provided regarding infection prevention, and signs and symptoms of COVID-19 and when to seek medical attention with patient who verbalized understanding.  Discussed exposure protocols and quarantine from [facility] Guidelines Are you at higher risk for severe illness 2019 and given an opportunity for questions and concerns. The patient agrees to contact the COVID-19 hotline [phone] or PCP office for questions related to their healthcare. CTN/ACM/LPN provided contact information for future reference. From CDC: Are you at higher risk for severe illness?  Wash your hands often.  Avoid close contact (6 feet, which is about two arm lengths) with people who are sick.  Put distance between yourself and other people if COVID-19 is spreading in your community.  Clean and disinfect frequently touched surfaces.  Avoid all cruise travel and non-essential air travel.  Call your healthcare professional if you have concerns about COVID-19 and your underlying condition or if you are sick. For more information on steps you can take to protect yourself, see CDC's How to Protect Yourself      Patient/family/caregiver given information for [facility] and agrees to enroll no    Plan for follow-up call in 7-14 days based on severity of symptoms and risk factors.

## 2021-04-13 ENCOUNTER — OFFICE VISIT (OUTPATIENT)
Dept: FAMILY MEDICINE CLINIC | Age: 76
End: 2021-04-13
Payer: MEDICARE

## 2021-04-13 VITALS
RESPIRATION RATE: 18 BRPM | HEIGHT: 64 IN | BODY MASS INDEX: 31.31 KG/M2 | TEMPERATURE: 96.3 F | HEART RATE: 85 BPM | OXYGEN SATURATION: 94 % | DIASTOLIC BLOOD PRESSURE: 69 MMHG | WEIGHT: 183.4 LBS | SYSTOLIC BLOOD PRESSURE: 137 MMHG

## 2021-04-13 DIAGNOSIS — N17.9 ACUTE RENAL FAILURE, UNSPECIFIED ACUTE RENAL FAILURE TYPE (HCC): ICD-10-CM

## 2021-04-13 DIAGNOSIS — I25.10 CORONARY ARTERY DISEASE DUE TO LIPID RICH PLAQUE: ICD-10-CM

## 2021-04-13 DIAGNOSIS — Z95.820 STATUS POST ANGIOPLASTY WITH STENT: ICD-10-CM

## 2021-04-13 DIAGNOSIS — Z79.60 LONG-TERM USE OF IMMUNOSUPPRESSANT MEDICATION: ICD-10-CM

## 2021-04-13 DIAGNOSIS — M25.551 RIGHT HIP PAIN: Primary | ICD-10-CM

## 2021-04-13 DIAGNOSIS — M85.89 OSTEOPENIA OF MULTIPLE SITES: ICD-10-CM

## 2021-04-13 DIAGNOSIS — G25.81 RLS (RESTLESS LEGS SYNDROME): ICD-10-CM

## 2021-04-13 DIAGNOSIS — R10.30 LOWER ABDOMINAL PAIN: ICD-10-CM

## 2021-04-13 DIAGNOSIS — K29.50 CHRONIC GASTRITIS, PRESENCE OF BLEEDING UNSPECIFIED, UNSPECIFIED GASTRITIS TYPE: ICD-10-CM

## 2021-04-13 DIAGNOSIS — K44.9 HIATAL HERNIA: ICD-10-CM

## 2021-04-13 DIAGNOSIS — I10 ESSENTIAL HYPERTENSION: ICD-10-CM

## 2021-04-13 DIAGNOSIS — M54.16 LUMBAR RADICULOPATHY: ICD-10-CM

## 2021-04-13 DIAGNOSIS — E78.2 MIXED HYPERLIPIDEMIA: ICD-10-CM

## 2021-04-13 DIAGNOSIS — D47.2 MGUS (MONOCLONAL GAMMOPATHY OF UNKNOWN SIGNIFICANCE): ICD-10-CM

## 2021-04-13 DIAGNOSIS — E55.9 VITAMIN D DEFICIENCY: ICD-10-CM

## 2021-04-13 DIAGNOSIS — E11.9 TYPE 2 DIABETES MELLITUS WITHOUT COMPLICATION, WITHOUT LONG-TERM CURRENT USE OF INSULIN (HCC): ICD-10-CM

## 2021-04-13 DIAGNOSIS — Z98.84 STATUS POST GASTRIC BYPASS FOR OBESITY: ICD-10-CM

## 2021-04-13 DIAGNOSIS — M06.09 RHEUMATOID ARTHRITIS OF MULTIPLE SITES WITH NEGATIVE RHEUMATOID FACTOR (HCC): ICD-10-CM

## 2021-04-13 DIAGNOSIS — I25.83 CORONARY ARTERY DISEASE DUE TO LIPID RICH PLAQUE: ICD-10-CM

## 2021-04-13 PROCEDURE — 99214 OFFICE O/P EST MOD 30 MIN: CPT | Performed by: FAMILY MEDICINE

## 2021-04-13 PROCEDURE — G8510 SCR DEP NEG, NO PLAN REQD: HCPCS | Performed by: FAMILY MEDICINE

## 2021-04-13 PROCEDURE — 1090F PRES/ABSN URINE INCON ASSESS: CPT | Performed by: FAMILY MEDICINE

## 2021-04-13 PROCEDURE — G8399 PT W/DXA RESULTS DOCUMENT: HCPCS | Performed by: FAMILY MEDICINE

## 2021-04-13 PROCEDURE — 1100F PTFALLS ASSESS-DOCD GE2>/YR: CPT | Performed by: FAMILY MEDICINE

## 2021-04-13 PROCEDURE — G8536 NO DOC ELDER MAL SCRN: HCPCS | Performed by: FAMILY MEDICINE

## 2021-04-13 PROCEDURE — 3044F HG A1C LEVEL LT 7.0%: CPT | Performed by: FAMILY MEDICINE

## 2021-04-13 PROCEDURE — G8417 CALC BMI ABV UP PARAM F/U: HCPCS | Performed by: FAMILY MEDICINE

## 2021-04-13 PROCEDURE — 3288F FALL RISK ASSESSMENT DOCD: CPT | Performed by: FAMILY MEDICINE

## 2021-04-13 PROCEDURE — G8754 DIAS BP LESS 90: HCPCS | Performed by: FAMILY MEDICINE

## 2021-04-13 PROCEDURE — G8427 DOCREV CUR MEDS BY ELIG CLIN: HCPCS | Performed by: FAMILY MEDICINE

## 2021-04-13 PROCEDURE — 3017F COLORECTAL CA SCREEN DOC REV: CPT | Performed by: FAMILY MEDICINE

## 2021-04-13 PROCEDURE — 2022F DILAT RTA XM EVC RTNOPTHY: CPT | Performed by: FAMILY MEDICINE

## 2021-04-13 PROCEDURE — G8752 SYS BP LESS 140: HCPCS | Performed by: FAMILY MEDICINE

## 2021-04-13 RX ORDER — GABAPENTIN 300 MG/1
CAPSULE ORAL
Qty: 90 CAP | Refills: 0 | Status: SHIPPED | OUTPATIENT
Start: 2021-04-13 | End: 2021-05-26 | Stop reason: SDUPTHER

## 2021-04-13 NOTE — PROGRESS NOTES
Chief Complaint   Patient presents with    Follow-up     ER Visit   1. Have you been to the ER, urgent care clinic since your last visit? Hospitalized since your last visit? Yes Where: ED [facility] ER 4/2/21 Abdominal pain    2. Have you seen or consulted any other health care providers outside of the [address] [place] [address] since your last visit? Include any pap smears or colon screening.  No     Discuss Right Hip pain, Lower abdominal pain

## 2021-04-13 NOTE — PROGRESS NOTES
Madie Aase (: 1945) is a 70 y.o. female, established patient, here for evaluation of the following chief complaint(s):  Follow-up (ER Visit)       ASSESSMENT/PLAN:  Diagnoses and all orders for this visit:    1. Right hip pain  2. Lumbar radiculopathy  -Continues with pain and history most consistent with lumbar pathology. Patient would like to check in with Dr. Trudy Ortiz tomorrow and discuss this. Sending to physical therapy and adjusting gabapentin. We will plan to take 600 mg nightly and see how she tolerates this.  -     gabapentin (NEURONTIN) 300 mg capsule; TAKE 1 CAPSULE IN THE MORNING AND TAKE 2 CAPSULES BEFORE BEDTIME  -     REFERRAL TO PHYSICAL THERAPY    3. Lower abdominal pain  4. Status post gastric bypass for obesity  5. Hiatal hernia  -Unclear etiology of her pain. May be coming from gastritis which was found on CT in the ER so treating with PPI and will send to GI. May be coming from lumbar spine pathology given anterolisthesis of L4  -     REFERRAL TO GASTROENTEROLOGY    6. Essential hypertension-controlled    7. Coronary artery disease due to lipid rich plaque  8. Status post angioplasty with stent  9. Mixed hyperlipidemia  -Stable with last LDL 56    10. Type 2 diabetes mellitus without complication, without long-term current use of insulin ([facility] [address]    11. Rheumatoid arthritis of multiple sites with negative rheumatoid factor (HCC)-working with Dr. Trudy Ortiz    12. Osteopenia of multiple sites-encouraged weightbearing exercise and good nutrition. Continues on vitamin D    13. Long-term use of immunosuppressant medication    14. Vitamin D deficiency    15.  RLS (restless legs syndrome)-  -     gabapentin (NEURONTIN) 300 mg capsule; TAKE 1 CAPSULE IN THE MORNING AND TAKE 2 CAPSULES BEFORE BEDTIME    16. Chronic gastritis, presence of bleeding unspecified, unspecified gastritis type-continue PPI and sending to GI  -     REFERRAL TO GASTROENTEROLOGY    17. Acute renal failure, unspecified acute renal failure type (HCC)-rechecking labs  -     METABOLIC PANEL, BASIC; Future    18. MGUS (monoclonal gammopathy of unknown significance)-has been stable          Return in about 6 weeks (around 5/25/2021), or if symptoms worsen or fail to improve. SUBJECTIVE/OBJECTIVE:    Seen in ER on 4/2/2021 for abdominal pain. Labs are unremarkable. She had a CT showed gastritis. Abdominal pain has been lower and has been ongoing for    CT abd/pelv   LOWER THORAX: Left ventricular hypertrophy and mild left atrial enlargement. Hiatal hernia. Clips at the GE junction. LIVER: No mass. BILIARY TREE: Prior cholecystectomy. CBD is not dilated. SPLEEN: within normal limits. PANCREAS: No mass or ductal dilatation. ADRENALS: Unremarkable. KIDNEYS: No mass, calculus, or hydronephrosis. 1 cm left renal cyst for which no additional evaluation is necessary. STOMACH: Thickening of the greater curvature of the stomach. .  SMALL BOWEL: No dilatation or wall thickening. COLON: No dilatation or wall thickening. APPENDIX: Normal appendix on axial image 44  PERITONEUM: No ascites or pneumoperitoneum. RETROPERITONEUM: No lymphadenopathy or aortic aneurysm. REPRODUCTIVE ORGANS: Small uterus with multiple fibroids  URINARY BLADDER: No mass or calculus. BONES: There is a 6.6 mm anterolisthesis of L4 relative to L5. ABDOMINAL WALL: No mass or hernia. IMPRESSION  Gastritis    Did also have some hypokalemia but this is likely related to Bactrim use acutely and repeat labs showed improvement in potassium. R hip pain - sx for 2-3 weeks. Worse with walking and standing. Sharp and achy. Generalized over her hip/low back/buttocks. No recent falls or injury. Some pain with sitting for too long. Pain is radiating down her right leg. No relief with APAP. Using cane at home and cane when going out prev and now having to use rollator. Difficulty with a walker because of her RA and OA in her hands, shoulders, and neck.   No saddle anesthesia or incontinence. X-rays of right hip on 4/5/2021 showed diffuse vascular calcifications but no significant arthritis. Previously discussed knee instability and referred to Ortho but she has an appointment. ROS  Gen - no fever/chills  Resp - no dyspnea or cough  CV - no chest pain or WORKMAN  Rest per HPI    Blood pressure 137/69, pulse 85, temperature (!) 96.3 °F (35.7 °C), temperature source Temporal, resp. rate 18, height 5' 4\" (1.626 m), weight 183 lb 6.4 oz (83.2 kg), SpO2 94 %. Physical exam:  General appearance - alert, well appearing, and in no distress  Eyes -sclera anicteric  Neck - supple, no significant adenopathy, no thyromegaly  Chest - clear to auscultation, no wheezes, rales or rhonchi, symmetric air entry  Heart - normal rate, regular rhythm, normal S1, S2, no murmurs, rubs, clicks or gallops  Neurological - alert, oriented, normal speech, no focal findings or movement disorder noted  Extr - no edema  Psych - normal mood and affect    On this date 04/13/2021 I have spent 30 minutes reviewing previous notes, test results and face to face with the patient discussing the diagnosis and importance of compliance with the treatment plan as well as documenting on the day of the visit. An electronic signature was used to authenticate this note.   -- Alejandrina Barker MD

## 2021-04-14 ENCOUNTER — OFFICE VISIT (OUTPATIENT)
Dept: RHEUMATOLOGY | Age: 76
End: 2021-04-14
Payer: MEDICARE

## 2021-04-14 VITALS
SYSTOLIC BLOOD PRESSURE: 163 MMHG | BODY MASS INDEX: 31.24 KG/M2 | HEART RATE: 118 BPM | HEIGHT: 64 IN | DIASTOLIC BLOOD PRESSURE: 82 MMHG | WEIGHT: 183 LBS

## 2021-04-14 DIAGNOSIS — M54.41 ACUTE BACK PAIN WITH SCIATICA, RIGHT: ICD-10-CM

## 2021-04-14 DIAGNOSIS — Z79.60 LONG-TERM USE OF IMMUNOSUPPRESSANT MEDICATION: ICD-10-CM

## 2021-04-14 DIAGNOSIS — M06.09 SERONEGATIVE RHEUMATOID ARTHRITIS OF MULTIPLE SITES (HCC): Primary | ICD-10-CM

## 2021-04-14 DIAGNOSIS — M54.16 LUMBAR RADICULOPATHY: Primary | ICD-10-CM

## 2021-04-14 DIAGNOSIS — E55.9 VITAMIN D DEFICIENCY: ICD-10-CM

## 2021-04-14 LAB
25(OH)D3+25(OH)D2 SERPL-MCNC: 49.5 NG/ML (ref 30–100)
ALBUMIN SERPL-MCNC: 4.6 G/DL (ref 3.7–4.7)
ALBUMIN/GLOB SERPL: 1.7 {RATIO} (ref 1.2–2.2)
ALP SERPL-CCNC: 68 IU/L (ref 39–117)
ALT SERPL-CCNC: 12 IU/L (ref 0–32)
AST SERPL-CCNC: 36 IU/L (ref 0–40)
BASOPHILS # BLD AUTO: 0.1 X10E3/UL (ref 0–0.2)
BASOPHILS NFR BLD AUTO: 2 %
BILIRUB SERPL-MCNC: 0.9 MG/DL (ref 0–1.2)
BUN SERPL-MCNC: 29 MG/DL (ref 8–27)
BUN/CREAT SERPL: 25 (ref 12–28)
CALCIUM SERPL-MCNC: 9.6 MG/DL (ref 8.7–10.3)
CHLORIDE SERPL-SCNC: 104 MMOL/L (ref 96–106)
CHOLEST SERPL-MCNC: 129 MG/DL (ref 100–199)
CO2 SERPL-SCNC: 17 MMOL/L (ref 20–29)
CREAT SERPL-MCNC: 1.17 MG/DL (ref 0.57–1)
CRP SERPL-MCNC: <1 MG/L (ref 0–10)
EOSINOPHIL # BLD AUTO: 0.1 X10E3/UL (ref 0–0.4)
EOSINOPHIL NFR BLD AUTO: 1 %
ERYTHROCYTE [DISTWIDTH] IN BLOOD BY AUTOMATED COUNT: 12.9 % (ref 11.7–15.4)
ERYTHROCYTE [SEDIMENTATION RATE] IN BLOOD BY WESTERGREN METHOD: 44 MM/HR (ref 0–40)
GLOBULIN SER CALC-MCNC: 2.7 G/DL (ref 1.5–4.5)
GLUCOSE SERPL-MCNC: 90 MG/DL (ref 65–99)
HCT VFR BLD AUTO: 36.8 % (ref 34–46.6)
HDLC SERPL-MCNC: 59 MG/DL
HGB BLD-MCNC: 12 G/DL (ref 11.1–15.9)
IMM GRANULOCYTES # BLD AUTO: 0 X10E3/UL (ref 0–0.1)
IMM GRANULOCYTES NFR BLD AUTO: 0 %
LDLC SERPL CALC-MCNC: 56 MG/DL (ref 0–99)
LYMPHOCYTES # BLD AUTO: 1.8 X10E3/UL (ref 0.7–3.1)
LYMPHOCYTES NFR BLD AUTO: 39 %
MCH RBC QN AUTO: 30.8 PG (ref 26.6–33)
MCHC RBC AUTO-ENTMCNC: 32.6 G/DL (ref 31.5–35.7)
MCV RBC AUTO: 95 FL (ref 79–97)
MONOCYTES # BLD AUTO: 0.4 X10E3/UL (ref 0.1–0.9)
MONOCYTES NFR BLD AUTO: 9 %
NEUTROPHILS # BLD AUTO: 2.2 X10E3/UL (ref 1.4–7)
NEUTROPHILS NFR BLD AUTO: 49 %
PLATELET # BLD AUTO: 197 X10E3/UL (ref 150–450)
POTASSIUM SERPL-SCNC: 4.5 MMOL/L (ref 3.5–5.2)
PROT SERPL-MCNC: 7.3 G/DL (ref 6–8.5)
RBC # BLD AUTO: 3.89 X10E6/UL (ref 3.77–5.28)
SODIUM SERPL-SCNC: 139 MMOL/L (ref 134–144)
TRIGL SERPL-MCNC: 68 MG/DL (ref 0–149)
VLDLC SERPL CALC-MCNC: 14 MG/DL (ref 5–40)
WBC # BLD AUTO: 4.6 X10E3/UL (ref 3.4–10.8)

## 2021-04-14 PROCEDURE — G8427 DOCREV CUR MEDS BY ELIG CLIN: HCPCS | Performed by: INTERNAL MEDICINE

## 2021-04-14 PROCEDURE — 1100F PTFALLS ASSESS-DOCD GE2>/YR: CPT | Performed by: INTERNAL MEDICINE

## 2021-04-14 PROCEDURE — 3017F COLORECTAL CA SCREEN DOC REV: CPT | Performed by: INTERNAL MEDICINE

## 2021-04-14 PROCEDURE — 1090F PRES/ABSN URINE INCON ASSESS: CPT | Performed by: INTERNAL MEDICINE

## 2021-04-14 PROCEDURE — G8753 SYS BP > OR = 140: HCPCS | Performed by: INTERNAL MEDICINE

## 2021-04-14 PROCEDURE — G8432 DEP SCR NOT DOC, RNG: HCPCS | Performed by: INTERNAL MEDICINE

## 2021-04-14 PROCEDURE — 99215 OFFICE O/P EST HI 40 MIN: CPT | Performed by: INTERNAL MEDICINE

## 2021-04-14 PROCEDURE — G9899 SCRN MAM PERF RSLTS DOC: HCPCS | Performed by: INTERNAL MEDICINE

## 2021-04-14 PROCEDURE — G8399 PT W/DXA RESULTS DOCUMENT: HCPCS | Performed by: INTERNAL MEDICINE

## 2021-04-14 PROCEDURE — G8417 CALC BMI ABV UP PARAM F/U: HCPCS | Performed by: INTERNAL MEDICINE

## 2021-04-14 PROCEDURE — 3288F FALL RISK ASSESSMENT DOCD: CPT | Performed by: INTERNAL MEDICINE

## 2021-04-14 PROCEDURE — G8754 DIAS BP LESS 90: HCPCS | Performed by: INTERNAL MEDICINE

## 2021-04-14 PROCEDURE — G8536 NO DOC ELDER MAL SCRN: HCPCS | Performed by: INTERNAL MEDICINE

## 2021-04-14 RX ORDER — TRAMADOL HYDROCHLORIDE 50 MG/1
50 TABLET ORAL
Qty: 20 TAB | Refills: 0 | Status: SHIPPED | OUTPATIENT
Start: 2021-04-14 | End: 2021-04-30 | Stop reason: SDUPTHER

## 2021-04-14 RX ORDER — PREDNISONE 20 MG/1
20 TABLET ORAL
Qty: 10 TAB | Refills: 0 | Status: SHIPPED | OUTPATIENT
Start: 2021-04-14 | End: 2021-05-26 | Stop reason: ALTCHOICE

## 2021-04-14 NOTE — PROGRESS NOTES
REASON FOR VISIT    This is a follow-up visit for Ms. Yari Lopez for     ICD-10-CM   1. Seronegative rheumatoid arthritis of multiple sites ([facility] M06.09     Inflammatory arthritis phenotype includes:  Anti-CCP positive: no  Rheumatoid factor positive: no  Erosive disease: no  Extra-articular manifestations include: MGUS    Immunosuppression Screening (9/10/2020):  Quantiferon TB: negative  PPD:  Not performed  Hepatitis B: negative  Hepatitis C: negative    Therapy History includes:  Current DMARD therapy includes: leflunomide 20 mg daily (8/19/2019 to present), Filomena Spells 11 mg XR daily (9/22/2020 to present)  Prior DMARD therapy includes: methotrexate 20 mg every Saturday, methotrexate 20 mg SubQ every Saturday (5/23/2019 to 7/29/2019) Simponi Aria (11/29/2018 to 5/23/2019), Humira 40 mg every 14 days (8/03/2019 to 11/22/2019), , Humira 40 mg every Thursday (11/22/2019 to 8/31/2020)  The following DMARDs have been ineffective: Simponi Aria, Humira every 14 days, Humira every 7 days  The following DMARDs were stopped because of side effects: methotrexate 20 mg SubQ (aphthous ulcer, injection site breakdown)  Contra-Indicated DMARDs because of history of diverticulitis: Actemra, Kevzara    HISTORY OF PRESENT ILLNESS    Ms. Yari Lopez returns for a follow-up. On her last visit, I continued leflunomide 20 mg daily and Xeljanz 11 mg XR daily, which she has taken with good tolerance. Today, she feels good. She complains of pain back radiating to her leg and is in severe pain. She saw her PCP yesterday who told her to discuss with me and increased her gabapentin and referred her to physical therapy. She endorses right knee pain. She denies pain, swelling, or stiffness in her hands.     She denies fever, weight loss, blurred vision, vision loss, oral ulcers, ankle swelling, dry cough, dyspnea, nausea, vomiting, dysphagia, abdominal pain, black or bloody stool, fall since last visit, rash, easy bruising and increased thirst.    Most recent toxicity monitoring by blood work was done on 4/13/2021 and did not reveal any significant adverse effects, except creatinine 1.17 mg/dL, eGFR 53. Most recent inflammatory markers from 4/13/2021 revealed a ESR 44 mm/hr and CRP <1 mg/L. I reviewed the patient's interval medical history, surgical history, medications, and allergies. The patient has not had any interval hospital admissions, infections, or surgeries. REVIEW OF SYSTEMS    A comprehensive review of systems was performed and pertinent results are documented in the HPI, review of systems is otherwise non-contributory. PAST MEDICAL HISTORY    She has a past medical history of Asthma, At risk for sleep apnea (11/11/2019), Chronic pain (5/8/2020), DDD (degenerative disc disease), lumbar, Diabetes ([address] Gastritis, GERD (gastroesophageal reflux disease), Glaucoma, Hearing loss (5/8/2020), Hiatal hernia, High cholesterol, Hypertension, Hypocalcemia (5/8/2020), Peripheral vascular disease ([address] RA (rheumatoid arthritis) ([facility] and Sarcoidosis (1999). FAMILY HISTORY    Her family history includes Alcohol abuse in her brother; Heart Disease in her mother; Liver Disease in her father. SOCIAL HISTORY    She reports that she has never smoked. She has never used smokeless tobacco. She reports current alcohol use of about 2.0 standard drinks of alcohol per week. She reports that she does not use drugs. MEDICATIONS    Current Outpatient Medications   Medication Sig    predniSONE (DELTASONE) 20 mg tablet Take 20 mg by mouth daily (with breakfast).  traMADoL (ULTRAM) 50 mg tablet Take 1 Tab by mouth every six (6) hours as needed for Pain for up to 7 days. Max Daily Amount: 200 mg.    gabapentin (NEURONTIN) 300 mg capsule TAKE 1 CAPSULE IN THE MORNING AND TAKE 2 CAPSULES BEFORE BEDTIME    omeprazole (PRILOSEC) 20 mg capsule Take 1 Cap by mouth daily.     sucralfate (Carafate) 1 gram tablet Take 1 Tab by mouth Before breakfast, lunch, and dinner.  latanoprost (XALATAN) 0.005 % ophthalmic solution INSTILL 1 DROP INTO BOTH EYES NIGHTLY    albuterol sulfate (PROVENTIL;VENTOLIN) 2.5 mg/0.5 mL nebu nebulizer solution 0.5 mL by Nebulization route every four (4) hours as needed for Wheezing. Indications: asthma attack    fluticasone furoate-vilanteroL (Breo Ellipta) 100-25 mcg/dose inhaler TAKE 1 PUFF BY MOUTH EVERY DAY    ergocalciferol (ERGOCALCIFEROL) 1,250 mcg (50,000 unit) capsule Take 1 Cap by mouth every seven (7) days. Indications: vitamin D deficiency (high dose therapy)    metoprolol tartrate (LOPRESSOR) 25 mg tablet Take  by mouth two (2) times a day.  sertraline (ZOLOFT) 50 mg tablet TAKE 1 TABLET BY MOUTH EVERY DAY    simvastatin (ZOCOR) 20 mg tablet TAKE BY MOUTH NIGHTLY.  metFORMIN (GLUCOPHAGE) 500 mg tablet Take 1 Tab by mouth daily (with dinner). Decreased dose    leflunomide (ARAVA) 20 mg tablet TAKE 1 TABLET BY MOUTH EVERY DAY    tofacitinib 11 mg Tb24 Take 11 mg by mouth daily. Indications: rheumatoid arthritis    topiramate (TOPAMAX) 25 mg tablet Take 1 Tab by mouth nightly.  furosemide (LASIX) 20 mg tablet TAKE 1 TABLET BY MOUTH EVERY DAY    acetaminophen (Acetaminophen Extra Strength) 500 mg tablet Take 2 Tabs by mouth every six (6) hours as needed for Pain.  nortriptyline (PAMELOR) 50 mg capsule TAKE ONE CAPSULE EVERY DAY AT BEDTIME    aspirin delayed-release 81 mg tablet Take  by mouth daily. No current facility-administered medications for this visit. ALLERGIES    Allergies   Allergen Reactions    Lisinopril Rash    Methotrexate Hives     PHYSICAL EXAMINATION    Visit Vitals  BP (!) 163/82   Pulse (!) 118   Ht 5' 4\" (1.626 m)   Wt 183 lb (83 kg)   BMI 31.41 kg/m²     Body mass index is 31.41 kg/m². General: Patient is alert, oriented x 3, not in acute distress    HEENT:   Sclerae are not injected and appear moist.  There is no alopecia.      Cardiovascular:  Heart is regular rate and rhythm, no murmurs. Chest:  Lungs are clear to auscultation bilaterally. No rhonchi, wheezes, or crackles. Extremities:  Free of clubbing, cyanosis, edema    Neurological exam:  Muscle strength is full in upper and lower extremities     Skin exam:  There are no alopecia, no discoid lesions, no active Raynaud's, no livedo reticularis, no periungual erythema. Musculoskeletal exam:  A comprehensive musculoskeletal exam was performed for all joints of each upper and lower extremity and assessed for swelling, tenderness and range of motion. Positive results are documented as below:    Decreased ROM of bilateral shoulders (right worse than left) due to pain  Bilateral knee replacements without effusion.   Bilateral ankle swelling with tenderness (RESOLVED)  Right foot exam deferred due to post-surgical boot placed    Z-Deformities:   no  [place] Neck Deformities:  no  Boutonierre's Deformities:  no  Ulnar Deviation:   Yes (LEFT: 3rd digit)  MCP Subluxation:  no     Joint Count 4/14/2021 2/24/2020 11/22/2019 8/23/2019 5/23/2019 2/18/2019 11/19/2018   Patient pain (0-100) 95 75 45 40 50 60 90   MHAQ 1.125 1.25 0.625 0.75 0.75 0.75 1   Left shoulder - Tender - - - - - - -   Left shoulder - Swollen - - - - - - -   Left elbow - Tender - 1 1 - 1 - 1   Left elbow - Swollen - 0 0 - 0 - 0   Left wrist- Tender - 1 1 1 1 1 1   Left wrist- Swollen - 1 1 1 1 1 1   Left 1st MCP - Tender - 0 1 1 1 1 1   Left 1st MCP - Swollen - 1 1 1 1 1 1   Left 2nd MCP - Tender 0 1 1 1 1 1 1   Left 2nd MCP - Swollen 1 1 1 1 1 1 1   Left 3rd MCP - Tender 1 1 1 1 1 0 1   Left 3rd MCP - Swollen 1 1 1 1 1 1 1   Left 4th MCP - Tender - 1 - 1 1 1 1   Left 4th MCP - Swollen - 1 - 1 0 1 1   Left 5th MCP - Tender 0 1 1 1 1 1 -   Left 5th MCP - Swollen 1 0 1 0 1 1 -   Left thumb IP - Tender - - - 1 1 1 1   Left thumb IP - Swollen - - - 1 0 1 1   Left 2nd PIP - Tender 1 1 1 1 1 1 1   Left 2nd PIP - Swollen 1 1 1 1 1 1 1   Left 3rd PIP - Tender 1 1 1 1 1 1 1   Left 3rd PIP - Swollen 1 1 1 1 1 1 1   Left 4th PIP - Tender 1 1 1 1 1 1 1   Left 4th PIP - Swollen 1 1 1 1 1 1 1   Left 5th PIP - Tender - 1 1 1 1 1 1   Left 5th PIP - Swollen - 1 1 1 0 - 1   Left knee - Tender - - - - - - 1   Left knee - Swollen - - - - - - 1   Right shoulder - Tender - - - - - - -   Right elbow - Tender - - 1 - 1 - 1   Right elbow - Swollen - - 0 - 0 - 1   Right wrist- Tender 0 1 1 1 1 1 1   Right wrist- Swollen 1 1 1 1 1 1 1   Right 1st MCP - Tender 1 - 1 1 1 1 1   Right 1st MCP - Swollen 1 - 0 1 1 0 1   Right 2nd MCP - Tender - 1 1 1 1 1 1   Right 2nd MCP - Swollen - 1 1 1 1 1 1   Right 3rd MCP - Tender 0 1 1 1 1 1 1   Right 3rd MCP - Swollen - 1 1 1 1 1 1   Right 4th MCP - Tender - 1 1 1 1 1 1   Right 4th MCP - Swollen - 1 1 1 1 1 1   Right 5th MCP - Tender 0 1 1 1 1 1 1   Right 5th MCP - Swollen 1 1 1 1 1 1 1   Right thumb IP - Tender - - - 1 - - -   Right thumb IP - Swollen 1 - - 0 - - -   Right 2nd PIP - Tender 1 1 1 1 1 1 1   Right 2nd PIP - Swollen 1 1 1 1 1 1 1   Right 3rd PIP - Tender 1 1 1 1 1 1 1   Right 3rd PIP - Swollen 1 1 1 1 1 1 1   Right 4th PIP - Tender 1 1 1 1 1 1 1   Right 4th PIP - Swollen 1 1 1 1 1 1 1   Right 5th PIP - Tender 0 1 1 1 1 1 1   Right 5th PIP - Swollen 1 1 1 1 1 1 1   Right knee - Tender - - - - - 1 -   Tender Joint Count (Total) 8 19 21 22 23 21 23   Swollen Joint Count (Total) 14 18 18 20 18 19 22   Physician Assessment (0-10) 3 6 5 4 5 6 7   Patient Assessment (0-10) 10 7 5.5 6.5 5 7 9   CDAI Total (calculated) 35 50 49.5 52.5 51 48 61     DATA REVIEW    Laboratory     Recent laboratory results were reviewed, summarized, and discussed with the patient. Imaging    Musculoskeletal Ultrasound    None    Radiographs    Bilateral Knee 8/18/2020: LEFT: nonconstrained cemented 3 component total arthroplasty of the left knee. There is no acute fracture or dislocation demonstrated.  There is mild diffuse thinning of the medial and lateral compartment joint spaces suggesting wearing of the polyethylene component. There is metal-bone interface lucency demonstrated anteriorly at the femoral component measuring up to 5 mm. There is cement-bone interface lucency at the lateral phalangeal of the tibial component measuring 2 to 3 mm. There is interface lucency of the distal peg of the tibial component measuring up to 4 mm. Extensive atherosclerotic calcifications are shown. RIGHT: a nonconstrained cemented 3 component total knee arthroplasty without demonstration of osseous or component fracture, displacement or dislocation. There is no metal-cement or cement-bone interface lucency is shown. There is uniform thickness of the polyethylene spacer. No substantial knee effusion is demonstrated. Extensive atherosclerotic calcifications are again noted. Sacrum and Coccyx 9/18/2018: no definite fracture or dislocation, however osteopenia and overlapping bowel gas somewhat limited evaluation of the sacrum. Severe multilevel degenerative disc disease is noted in the lower lumbar spine. Dense arterial vascular calcifications are present. Bilateral Hips 9/18/2018: no acute fracture or dislocation. Evaluation of the sacrum is limited by overlying bowel gas and osteopenia. Within these limitations, no fractures identified. Degenerative changes are present in the lower lumbar spine. Dense arterial vascular calcifications are present. Bilateral Hand 8/22/2018: moderate to severe diffuse osteopenia. Extensive atherosclerotic calcifications are shown extending into the fingers bilaterally. Fusiform soft tissue swelling is shown bilaterally throughout the metacarpophalangeal and proximal interphalangeal joints. There is mild joint space narrowing on the left throughout the metacarpophalangeal joints and on the right in the third through fifth metacarpophalangeal joints.  Subtle marginal erosions are demonstrated at the lateral base of the right ring finger proximal phalanx and more extensively in the third through fifth left MCP joints. There are minimal-mild features of osteoarthritis at several DIP joints bilaterally. No substantial carpal joint space narrowing is shown. Calcium pyrophosphate dihydrate position is in the medial carpus bilaterally. Bilateral Foot 8/22/2018: moderate to severe diffuse osteopenia. Extensive atherosclerotic calcifications are shown extending into the digits. Bilateral talonavicular, naviculocuneiform and left greater than right diffuse tarsometatarsal joint space narrowing is noted with small marginal osteophytes. No substantial digital joint space narrowing is evident though fusiform soft tissue swelling is suggested at the metatarsophalangeal joints bilaterally. Moderate bilateral plantar and dorsal calcaneal spurs are shown. Bilateral Shoulder 9/08/2011: RIGHT: no evidence of fracture or dislocation. Alignment of the glenohumeral joint is anatomic. The patient is status post distal clavicle resection and acromioplasty. A small amount of heterotopic ossification is noted about the distal aspect of the clavicle. Mild marginal osteophytes noted at the the glenoid. Irregularity along the superolateral aspect of the humeral head may reflect rotator cuff pathology. An ovoid well-corticated calcific density projecting over the humeral head may represent an intraventricular loose body. The visualized right lung is clear. Soft tissues are unremarkable. LEFT: no evidence of fracture or dislocation. Alignment of the glenohumeral and acromioclavicular joints is anatomic. Moderate superiorly projecting osteophytes emanate from the distal clavicle at the acromioclavicular joint. Bony hypertrophy irregularity about the greater tuberosity may indicate chronic rotator cuff pathology. There is a small subacromial spur. The visualized left lung is clear. Multiple mediastinal clips are noted. Bone mineralization is mildly diminished.     Right Hip 8/03/2011: there is diffuse osteopenia. Degenerative changes noted in the lower lumbar spine. The SI joints and pubic symphysis are not widened. The right and left hip are normally aligned. The right hip demonstrates mild osteophyte formation most notable at the inferior aspect of the joint. There is minimal diffuse joint space narrowing. Similar changes are noted in the left hip with minimal osteophyte formation. There is preservation of the left hip joint space. No acute fractures. Dense vascular calcifications are noted. Soft tissue calcification lateral to the right iliac wing is visualized and was noted on prior abdominal and pelvic CT scan. Hypertrophic changes anterior/superior left iliac wing and left greater trochanter as before. Bilateral Hand 9/27/2010: Overall alignment is anatomic. The bones are demineralized overall. The joint spaces are relatively preserved throughout. Relatively mild degenerative changes are present in the DIP joints, and in the first CMC joints. There appears to be some mild soft tissue swelling overlying the right MCP joints. There are also several equivocal erosions noted involving the metacarpal heads of the right hand which raise the possibility of early erosions related to inflammatory arthritis such as rheumatoid. These changes appear slightly more conspicuous and the 2008 examination. Incidentally, there are fairly prominent vascular calcifications present indicating that the patient is likely diabetic or perhaps as renal disease    Bilateral Knee 8/18/2010:  irregularity along the patella surface where there is a scalloped margin suggesting liner wear. In addition, the cement seen along the bone prosthetic interface of the anterior distal femoral cortex is less apparent, which also may reflect underlying liner wear and possibly early particulate disease.  Heterotopic ossification has not changed in interval. Vascular calcifications reflecting atherosclerotic disease remains severe. Diffuse osteopenia is noted. CT Imaging    CT Head without contrast 3/24/2020: The ventricles and sulci are normal in size, shape and configuration and midline. Subcortical and deep white matter hypodensities. . There is a possible calcified pleural-based mass in the left frontal parietal region which is unchanged and may represent a small meningioma. There is no intracranial hemorrhage, extra-axial collection, mass, mass effect or midline shift. The basilar cisterns are open. No acute infarct is identified. The bone windows demonstrate no abnormalities. The visualized portions of the paranasal sinuses and mastoid air cells are clear. CT Cervical Spine without contrast 3/24/2020: The alignment is within normal limits. There is no fracture or subluxation. The odontoid process is intact. The craniocervical junction is within normal limits. The prevertebral soft tissues are within normal limits. C2-C3: No significant canal stenosis or foraminal narrowing. C3-C4: Posterior disc osteophyte complex with moderate canal stenosis. Moderate bilateral foraminal narrowing. C4-C5: Posterior disc osteophyte complex and facet arthrosis with probable severe canal stenosis. Moderate to severe right and moderate left foraminal narrowing. C5-C6: Posterior disc osteophyte complex with moderate canal stenosis and moderate to severe left foraminal narrowing. Moderate right foraminal narrowing. C6-C7: Posterior disc osteophyte complex with moderate bilateral foraminal narrowing and moderate canal stenosis. C7-T1: Posterior disc osteophyte complex without canal stenosis or foraminal narrowing. Incidental note is made of bilateral cervical ribs. There are calcified nodules in the bilateral thyroid gland. CT Right Shoulder with arthrogram 9/26/2011: there is a large full-thickness tear seen involving the supraspinatus tendon extending to the level of the acromion.  Additionally there is a partial tear of the subscapularis tendon. The biceps appears chronically torn. There are mild degenerative changes of the acromioclavicular joint. Some mild osteoarthritic changes are present of the glenohumeral joint. No evidence of fracture was seen. MR Imaging    None    DXA     DXA 9/04/2020: (excluded L3 and L4 due to spondylosis) lumbar spine L1-L2 T score -1.1 (BMD 1.038 g/cm2), left femoral neck T score: -1.2 (0.876 g/cm2), left total hip T score: -0.4 (0.962 g/cm2), right femoral neck T score: -1.3 (0.862 g/cm2), right total hip T score: -1.2 (0.857 g/cm2), and distal one third left radius T score -1.8 (BMD 0.715 g/cm2). FRAX score 10.8 % probability in 10 years for major osteoporotic fracture and 1.6 % 10 year probability of hip fracture. DXA 5/04/2016:  (excluded L4 for spondylitic change, right hip) lumbar spine L1-L3 T score -1.5 (BMD 0.954 g/cm2), left femoral neck T score: -1.0 (0.805 g/cm2), left total hip T score: -0.1 (1.016 g/cm2), and distal one third left radius T score -1.9 (BMD 0.571 g/cm2). FRAX score 6.1 % probability in 10 years for major osteoporotic fracture and 0.3 % 10 year probability of hip fracture. Nuclear Studies    Triple Phase Scan 8/20/0218:  Patient status post bilateral knee replacements. Phase 1 (blood flow): There is slight increased blood flow to the left knee. Phase 2 (blood pool/soft tissue):  There is slight increased peritracheal activity left knee. Phase 3 (delayed bone): Patient status post right knee replacement which is within normal limits. Patient is status post left knee replacement. There are slight increased activity left tibial plateau    PATHOLOGY    Bone, right hallux, biopsy 11/18/2019: No evidence for osteomyelitis. Toe, right hallux, amputation 11/18/2019: Acute osteomyelitis. PROCEDURE    Kenalog 80 mg IA. (5/23/19)   Kenalog 80 mg IA. (02/18/19)   Kenalog 80 mg IA. (11/19/18)     ASSESSMENT AND PLAN    This is a follow-up visit for Ms. Penny Camargo.     1) Seronegative Rheumatoid Arthritis. She is maintained on leflunomide 20 mg daily and Xeljanz 11 mg XR daily with good tolerance. She has much less inflammatory symptoms now. Her CDAI was 35 (previously 50, 49.5, 52.5, 51, 53, 61, 63) with 8 tender and 14 swollen joints, consistent with high disease activity. She endorses adherence and denies inflammatory symptoms, but her exam shows active disease, albeit improved. I will continue treatment and reassess. 2) Long Term Use of Immunosuppressants. The patient remains on high risk immunomodulatory medications (leflunomide, Oly Erb) and requires frequent toxicity monitoring by blood work to evaluate for toxicities. 3) Vitamin D Deficiency. Her vitamin D was 41.3 (previously 21.1, 24.1, 28.0, 19.3). She is on weekly ergocalciferol 50,000. I will check her level today. 4) Osteopenia involving Multiple Sites. Her most recent DXA on 9/04/2020 showed lumbar spine L1-L2 T score -1.1 (BMD 1.038 g/cm2), left femoral neck T score: -1.2 (0.876 g/cm2), left total hip T score: -0.4 (0.962 g/cm2), right femoral neck T score: -1.3 (0.862 g/cm2), right total hip T score: -1.2 (0.857 g/cm2), and distal one third left radius T score -1.8 (BMD 0.715 g/cm2). FRAX score 10.8 % probability in 10 years for major osteoporotic fracture and 1.6 % 10 year probability of hip fracture    5) MGUS. Her M-spike 0.3 (previously 0.2, 0.2, 0.3) with immunofixation shows IgG monoclonal protein with lambda light chain. I had referred her to hematology, Dr. Salvador Kidd, who noted a low risk for myeloma. I will repeat. 6) Elevated AST. Repeat was normal.    7) Blurred Vision s/p Fall. This was not an active issue today. I had asked her to see an ophthalmology. 8) Right Low Back Pain with Sciatica. I referred her to Dr. Viry Cruz and gave her prednisone 20 mg daily for 14 days.     A total of 43 minutes was spent on this visit, reviewing interval notes, interval testing results, ordering tests, refilling medications, documenting the findings in the note, patient education, counseling, and coordination of care as described above. All questions asked and answered. The patient voiced understanding of the aforementioned assessment and plan.     TODAY'S ORDERS    Orders Placed This Encounter    Marisa (Spine) ORTHO ref [facility]    predniSONE (DELTASONE) 20 mg tablet     Future Appointments   Date Time Provider Dorothy Shanelle   5/26/2021 12:00 PM Renuka Elias MD [facility] BS AMB   6/9/2021 10:00 AM [address] SWEET COM   8/5/2021  9:00 AM Genoveva Nelson MD AO BS AMB     Nick Salinas MD, [address] [facility]    Adult Rheumatology   Rheumatology Ultrasound Certified  Han Garcia  A Part of [facility], [address] [place] [address], [address] [place] [address]   Phone [phone]  Fax [phone]

## 2021-04-14 NOTE — LETTER
4/14/2021 Patient: Silvia Ashley YOB: 1945 Date of Visit: 4/14/2021 Debo Lira MD 
 [employer] [place] [address] [address] [employer] Dear eDbo Liar MD, Thank you for referring Ms. Silvia Ashley to [facility] for evaluation. My notes for this consultation are attached. If you have questions, please do not hesitate to call me. I look forward to following your patient along with you.  
 
 
Sincerely, 
 
Leticia Foote MD

## 2021-04-14 NOTE — PROGRESS NOTES
The results were reviewed. Stable creatinine 1.17 mg/dL, eGFR 53, ESR 44 mm/hr. Otherwise normal labs.

## 2021-04-19 RX ORDER — METOPROLOL TARTRATE 25 MG/1
TABLET, FILM COATED ORAL
Qty: 60 TAB | Refills: 0 | Status: SHIPPED | OUTPATIENT
Start: 2021-04-19 | End: 2021-05-28

## 2021-04-21 ENCOUNTER — OFFICE VISIT (OUTPATIENT)
Dept: ORTHOPEDIC SURGERY | Age: 76
End: 2021-04-21
Payer: MEDICARE

## 2021-04-21 VITALS
HEIGHT: 64 IN | TEMPERATURE: 96.2 F | DIASTOLIC BLOOD PRESSURE: 67 MMHG | HEART RATE: 68 BPM | OXYGEN SATURATION: 98 % | WEIGHT: 182 LBS | BODY MASS INDEX: 31.07 KG/M2 | SYSTOLIC BLOOD PRESSURE: 137 MMHG

## 2021-04-21 DIAGNOSIS — T84.093A OTHER MECHANICAL COMPLICATION OF INTERNAL LEFT KNEE PROSTHESIS, INITIAL ENCOUNTER (HCC): Primary | ICD-10-CM

## 2021-04-21 DIAGNOSIS — T84.092A OTHER MECHANICAL COMPLICATION OF INTERNAL RIGHT KNEE PROSTHESIS, INITIAL ENCOUNTER (HCC): ICD-10-CM

## 2021-04-21 PROCEDURE — G8754 DIAS BP LESS 90: HCPCS | Performed by: ORTHOPAEDIC SURGERY

## 2021-04-21 PROCEDURE — 1100F PTFALLS ASSESS-DOCD GE2>/YR: CPT | Performed by: ORTHOPAEDIC SURGERY

## 2021-04-21 PROCEDURE — 1090F PRES/ABSN URINE INCON ASSESS: CPT | Performed by: ORTHOPAEDIC SURGERY

## 2021-04-21 PROCEDURE — 99213 OFFICE O/P EST LOW 20 MIN: CPT | Performed by: ORTHOPAEDIC SURGERY

## 2021-04-21 PROCEDURE — 3288F FALL RISK ASSESSMENT DOCD: CPT | Performed by: ORTHOPAEDIC SURGERY

## 2021-04-21 PROCEDURE — G8399 PT W/DXA RESULTS DOCUMENT: HCPCS | Performed by: ORTHOPAEDIC SURGERY

## 2021-04-21 PROCEDURE — G9899 SCRN MAM PERF RSLTS DOC: HCPCS | Performed by: ORTHOPAEDIC SURGERY

## 2021-04-21 PROCEDURE — G8427 DOCREV CUR MEDS BY ELIG CLIN: HCPCS | Performed by: ORTHOPAEDIC SURGERY

## 2021-04-21 PROCEDURE — G8536 NO DOC ELDER MAL SCRN: HCPCS | Performed by: ORTHOPAEDIC SURGERY

## 2021-04-21 PROCEDURE — G8510 SCR DEP NEG, NO PLAN REQD: HCPCS | Performed by: ORTHOPAEDIC SURGERY

## 2021-04-21 PROCEDURE — G8752 SYS BP LESS 140: HCPCS | Performed by: ORTHOPAEDIC SURGERY

## 2021-04-21 PROCEDURE — 3017F COLORECTAL CA SCREEN DOC REV: CPT | Performed by: ORTHOPAEDIC SURGERY

## 2021-04-21 PROCEDURE — G8417 CALC BMI ABV UP PARAM F/U: HCPCS | Performed by: ORTHOPAEDIC SURGERY

## 2021-04-21 NOTE — PROGRESS NOTES
Identified pt with two pt identifiers (name and ). Reviewed chart in preparation for visit and have obtained necessary documentation. Glenis Cui is a 70 y.o. female  Chief Complaint   Patient presents with    Follow-up     Bilateral Knee     Visit Vitals  /67 (BP 1 Location: Left upper arm, BP Patient Position: Sitting, BP Cuff Size: Large adult)   Pulse 68   Temp (!) 96.2 °F (35.7 °C) (Tympanic)   Ht 5' 4\" (1.626 m)   Wt 182 lb (82.6 kg)   SpO2 98%   BMI 31.24 kg/m²     1. Have you been to the ER, urgent care clinic since your last visit? Hospitalized since your last visit? No    2. Have you seen or consulted any other health care providers outside of the [address] [place] [address] since your last visit? Include any pap smears or colon screening.  No

## 2021-04-21 NOTE — LETTER
4/21/2021 Patient: Vinh Weeks YOB: 1945 Date of Visit: 4/21/2021 Lynn Rodríguez MD 
 [place] [id number] [address] [employer] Dear Lynn Rodríguez MD, Thank you for referring Ms. Vinh Weeks to [facility] for evaluation. My notes for this consultation are attached. If you have questions, please do not hesitate to call me. I look forward to following your patient along with you.  
 
 
Sincerely, 
 
Mable Singh, DO

## 2021-04-22 NOTE — PROGRESS NOTES
4/21/2021    Chief Complaint: Right knee pain    HPI: This is a(n) 70 y.o. female  who complains of Right knee pain. Onset was gradual.  The patient has had pain for years. The pain is in the medial knees, it is severe in intensity. The patient has tried activity modification, she has been physical therapy, injections have not been attempted. The pain causes some limitation with walking. The patient complains of feelings of instability in the knee. She has a history of bilateral knee replacement. Past Medical History:   Diagnosis Date    Asthma     At risk for sleep apnea 11/11/2019    Stop [facility] 4 - Sleep study in January per patient     Chronic pain 5/8/2020    DDD (degenerative disc disease), lumbar     Diabetes ([address]     Gastritis     GERD (gastroesophageal reflux disease)     Glaucoma     Hearing loss 5/8/2020    Hiatal hernia     High cholesterol     Hypertension     Hypocalcemia 5/8/2020    Peripheral vascular disease (HCC)     RA [facility]oid arthritis) [facility] [facility]     Sarcoidosis 1999    [facility]       Past Surgical History:   Procedure Laterality Date    HX GASTRIC BYPASS      HX HEART CATHETERIZATION      s/p PCI with stenting in 1998     HX KNEE REPLACEMENT Bilateral     HX SHOULDER REPLACEMENT Right     x 2        Current Outpatient Medications on File Prior to Visit   Medication Sig Dispense Refill    metoprolol tartrate (LOPRESSOR) 25 mg tablet TAKE 1 TABLET BY MOUTH TWICE A DAY 60 Tab 0    predniSONE (DELTASONE) 20 mg tablet Take 20 mg by mouth daily (with breakfast). 10 Tab 0    traMADoL (ULTRAM) 50 mg tablet Take 1 Tab by mouth every six (6) hours as needed for Pain for up to 7 days. Max Daily Amount: 200 mg. 20 Tab 0    gabapentin (NEURONTIN) 300 mg capsule TAKE 1 CAPSULE IN THE MORNING AND TAKE 2 CAPSULES BEFORE BEDTIME 90 Cap 0    omeprazole (PRILOSEC) 20 mg capsule Take 1 Cap by mouth daily.  20 Cap 0    sucralfate (Carafate) 1 gram tablet Take 1 Tab by mouth Before breakfast, lunch, and dinner. 30 Tab 0    latanoprost (XALATAN) 0.005 % ophthalmic solution INSTILL 1 DROP INTO BOTH EYES NIGHTLY 2.5 mL 0    albuterol sulfate (PROVENTIL;VENTOLIN) 2.5 mg/0.5 mL nebu nebulizer solution 0.5 mL by Nebulization route every four (4) hours as needed for Wheezing. Indications: asthma attack 30 mL 0    fluticasone furoate-vilanteroL (Breo Ellipta) 100-25 mcg/dose inhaler TAKE 1 PUFF BY MOUTH EVERY DAY 60 Inhaler 2    ergocalciferol (ERGOCALCIFEROL) 1,250 mcg (50,000 unit) capsule Take 1 Cap by mouth every seven (7) days. Indications: vitamin D deficiency (high dose therapy) 12 Cap 3    sertraline (ZOLOFT) 50 mg tablet TAKE 1 TABLET BY MOUTH EVERY DAY 90 Tab 1    simvastatin (ZOCOR) 20 mg tablet TAKE BY MOUTH NIGHTLY. 90 Tab 0    metFORMIN (GLUCOPHAGE) 500 mg tablet Take 1 Tab by mouth daily (with dinner). Decreased dose 90 Tab 0    leflunomide (ARAVA) 20 mg tablet TAKE 1 TABLET BY MOUTH EVERY DAY 90 Tab 1    tofacitinib 11 mg Tb24 Take 11 mg by mouth daily. Indications: rheumatoid arthritis 30 Tab 11    topiramate (TOPAMAX) 25 mg tablet Take 1 Tab by mouth nightly. 30 Tab 1    furosemide (LASIX) 20 mg tablet TAKE 1 TABLET BY MOUTH EVERY DAY 90 Tab 0    acetaminophen (Acetaminophen Extra Strength) 500 mg tablet Take 2 Tabs by mouth every six (6) hours as needed for Pain. 20 Tab 0    nortriptyline (PAMELOR) 50 mg capsule TAKE ONE CAPSULE EVERY DAY AT BEDTIME      aspirin delayed-release 81 mg tablet Take  by mouth daily. No current facility-administered medications on file prior to visit.         Allergies   Allergen Reactions    Lisinopril Rash    Methotrexate Hives       Family History   Problem Relation Age of Onset    Heart Disease Mother     Liver Disease Father     Alcohol abuse Brother     Dementia Neg Hx     Cancer Neg Hx     Seizures Neg Hx        Social History     Socioeconomic History    Marital status:      Spouse name: Not on file    Number of children: Not on file    Years of education: Not on file    Highest education level: Not on file   Tobacco Use    Smoking status: Never Smoker    Smokeless tobacco: Never Used   Substance and Sexual Activity    Alcohol use: Yes     Alcohol/week: 2.0 standard drinks     Types: 1 Glasses of wine, 1 Shots of liquor per week     Comment: 2-3 yearly    Drug use: No    Sexual activity: Yes     Partners: Male     Birth control/protection: None         Review of Systems:       General: Denies headache, lethargy, fever, weight loss  Ears/Nose/Throat: Denies ear discharge, drainage, nosebleeds, hoarse voice, dental problems  Cardiovascular: Denies chest pain, shortness of breath  Lungs: Denies chest pain, breathing problems, wheezing, pneumonia  Stomach: Denies stomach pain, heartburn, constipation, irritable bowel  Skin: Denies rash, sores, open wounds  Musculoskeletal: Admits to knee pain, no deformity. Genitourinary: Denies dysuria, hematuria, polyuria  Gastrointestinal: Denies constipation, obstipation, diarrhea  Neurological: Denies changes in sight, smell, hearing, taste, seizures. Denies loss of consciousness.   Psychiatric: Denies depression, sleep pattern changes, anxiety, change in personality  Endocrine: Denies mood swings, heat or cold intolerance  Hematologic/Lymphatic: Denies anemia, purpura, petechia  Allergic/Immunologic: Denies swelling of throat, pain or swelling at lymph nodes      Physical Examination:    Visit Vitals  /67 (BP 1 Location: Left upper arm, BP Patient Position: Sitting, BP Cuff Size: Large adult)   Pulse 68   Temp (!) 96.2 °F (35.7 °C) (Tympanic)   Ht 5' 4\" (1.626 m)   Wt 182 lb (82.6 kg)   SpO2 98%   BMI 31.24 kg/m²        General: AOX3, no apparent distress  Psychiatric: mood and affect appropriate  Lungs: breathing is symmetric and unlabored bilaterally  Heart: regular rate and rhythm  Abdomen: no guarding  Head: normocephalic, atraumatic  Skin: No significant abnormalities, good turgor  Sensation intact to light touch: L1-S1 dermatomes  Muscular exam: 5/5 strength in all major muscle groups unless noted in specialty exam.    Extremities:      Left upper extremity: Full active and passive range of motion without pain, deformity, no open wound, strength 5/5 in all major muscle groups. Right upper extremity: Full active and passive range of motion without pain, deformity, no open wound, strength 5/5 in all major muscle groups. Left lower extremity: Full active and passive range of motion without pain, deformity, no open wound, strength 5/5 in all major muscle groups. Right lower extremity:  No deformity is noted. Range of motion of the knee is 0-1 10. Ligamentous testing of the knee indicates stability of the the LCL, however mid flexion instability is significant on the right side and left side. Joint line tenderness to palpation medially. Popliteal area is unremarkable. No effusion. No patellar crepitus. Patella tracks centrally with a negative apprehension and grind test.  Pivot shift is negative. Strength testing is indicative of 5/5 strength at hip flexion, extension, knee flexion and extension, tibialis anterior, EHL, and FHL. Sensation is intact to light touch in the L1-S1 dermatomes. Capillary refill is less than 2 seconds in the toes. Diagnostics:    Pertinent Diagnostics:   None today    Assessment: Pain in bilateral knee, likely secondary to mechanical failure    Plan: This patient has the above-mentioned issue, plan will be to have her proceed with physical therapy, she may need an electric scooter as noted previously, we can help her to establish with a brace company for this. Additionally, she has done well with tramadol in the past, her primary has prescribed this for her, and she can continue this versus proceeding with pain management. Additionally, she prescribed a TENS unit as well. Follow-up with me will be as needed.     I have placed an order for electric scooter: she is unable to ambulate with walker, cane, crutch, or self propelled wheelchair and that the scooter is needed to perform ALDs. Ms. Chitra Moreno has a reminder for a \"due or due soon\" health maintenance. I have asked that she contact her primary care provider for follow-up on this health maintenance.

## 2021-04-29 ENCOUNTER — PATIENT OUTREACH (OUTPATIENT)
Dept: CASE MANAGEMENT | Age: 76
End: 2021-04-29

## 2021-04-29 NOTE — PROGRESS NOTES
ACM reviewed EMR. ACM attempted to reach patient for CM follow up . Unable to reach patient and left VM for return call with contact information provided. If no return call will try to reach patient again with in a week or so for CM follow up.

## 2021-04-30 DIAGNOSIS — M54.16 LUMBAR RADICULOPATHY: ICD-10-CM

## 2021-04-30 NOTE — TELEPHONE ENCOUNTER
----- Message from Verena Hayes sent at 4/30/2021 11:46 AM EDT -----  Regarding: /Telephone  General Message/Vendor Calls    Caller's first and last name:self      Reason for call:Pt advised feeling pain and wanting to know if she can be prescribed pain medication.       Callback required yes/no and why:yes, to clarify       Best contact number(s):[phone]      Details to clarify the request:n/a      Verena Hayes

## 2021-05-03 ENCOUNTER — TELEPHONE (OUTPATIENT)
Dept: ORTHOPEDIC SURGERY | Age: 76
End: 2021-05-03

## 2021-05-03 NOTE — TELEPHONE ENCOUNTER
----- Message from Alejandra Canales DO sent at 4/29/2021  9:21 AM EDT -----  I did the addendum and order. ----- Message -----  From: Lam Hong  Sent: 4/26/2021   3:08 PM EDT  To: Alejandra Canales DO    Otilio should be calling her for a tens. I will need an order to send to [facility] along with the office notes to get her a scooter. We will need the notes to state that she is unable to ambulate with walker, cane, crutch, or self propelled wheelchair and that the scooter is needed to perform ALDs.   ----- Message -----  From: Henrique Pool DO  Sent: 4/21/2021   9:19 PM EDT  To: Imtiaz Valle    Please send a tens unit to her  Also, how do we help her get an electric scooter?

## 2021-05-03 NOTE — TELEPHONE ENCOUNTER
Sent order for scooter along with office notes to [facility]. Confirmation rcvd

## 2021-05-12 RX ORDER — TRAMADOL HYDROCHLORIDE 50 MG/1
50 TABLET ORAL
Qty: 20 TAB | Refills: 0 | Status: SHIPPED | OUTPATIENT
Start: 2021-05-12 | End: 2021-08-27 | Stop reason: SDUPTHER

## 2021-05-26 ENCOUNTER — OFFICE VISIT (OUTPATIENT)
Dept: FAMILY MEDICINE CLINIC | Age: 76
End: 2021-05-26
Payer: MEDICARE

## 2021-05-26 VITALS
DIASTOLIC BLOOD PRESSURE: 72 MMHG | RESPIRATION RATE: 16 BRPM | WEIGHT: 185.8 LBS | TEMPERATURE: 98.1 F | BODY MASS INDEX: 31.72 KG/M2 | HEIGHT: 64 IN | SYSTOLIC BLOOD PRESSURE: 131 MMHG | HEART RATE: 97 BPM | OXYGEN SATURATION: 95 %

## 2021-05-26 DIAGNOSIS — M48.061 SPINAL STENOSIS OF LUMBAR REGION WITHOUT NEUROGENIC CLAUDICATION: ICD-10-CM

## 2021-05-26 DIAGNOSIS — M23.50 CHRONIC INSTABILITY OF KNEE, UNSPECIFIED LATERALITY: ICD-10-CM

## 2021-05-26 DIAGNOSIS — Z98.84 STATUS POST GASTRIC BYPASS FOR OBESITY: ICD-10-CM

## 2021-05-26 DIAGNOSIS — I25.10 CORONARY ARTERY DISEASE DUE TO LIPID RICH PLAQUE: ICD-10-CM

## 2021-05-26 DIAGNOSIS — Z96.653 S/P TKR (TOTAL KNEE REPLACEMENT), BILATERAL: ICD-10-CM

## 2021-05-26 DIAGNOSIS — K29.70 GASTRITIS WITHOUT BLEEDING, UNSPECIFIED CHRONICITY, UNSPECIFIED GASTRITIS TYPE: ICD-10-CM

## 2021-05-26 DIAGNOSIS — I25.83 CORONARY ARTERY DISEASE DUE TO LIPID RICH PLAQUE: ICD-10-CM

## 2021-05-26 DIAGNOSIS — H40.9 GLAUCOMA, UNSPECIFIED GLAUCOMA TYPE, UNSPECIFIED LATERALITY: ICD-10-CM

## 2021-05-26 DIAGNOSIS — M06.09 RHEUMATOID ARTHRITIS OF MULTIPLE SITES WITH NEGATIVE RHEUMATOID FACTOR (HCC): ICD-10-CM

## 2021-05-26 DIAGNOSIS — Z95.820 STATUS POST ANGIOPLASTY WITH STENT: ICD-10-CM

## 2021-05-26 DIAGNOSIS — M54.16 LUMBAR RADICULOPATHY: ICD-10-CM

## 2021-05-26 DIAGNOSIS — E78.2 MIXED HYPERLIPIDEMIA: ICD-10-CM

## 2021-05-26 DIAGNOSIS — G25.81 RLS (RESTLESS LEGS SYNDROME): ICD-10-CM

## 2021-05-26 DIAGNOSIS — I10 ESSENTIAL HYPERTENSION: Primary | ICD-10-CM

## 2021-05-26 DIAGNOSIS — E11.3593 PROLIFERATIVE DIABETIC RETINOPATHY OF BOTH EYES ASSOCIATED WITH TYPE 2 DIABETES MELLITUS, MACULAR EDEMA PRESENCE UNSPECIFIED (HCC): ICD-10-CM

## 2021-05-26 PROCEDURE — G8752 SYS BP LESS 140: HCPCS | Performed by: FAMILY MEDICINE

## 2021-05-26 PROCEDURE — 3288F FALL RISK ASSESSMENT DOCD: CPT | Performed by: FAMILY MEDICINE

## 2021-05-26 PROCEDURE — 99215 OFFICE O/P EST HI 40 MIN: CPT | Performed by: FAMILY MEDICINE

## 2021-05-26 PROCEDURE — 3017F COLORECTAL CA SCREEN DOC REV: CPT | Performed by: FAMILY MEDICINE

## 2021-05-26 PROCEDURE — 1090F PRES/ABSN URINE INCON ASSESS: CPT | Performed by: FAMILY MEDICINE

## 2021-05-26 PROCEDURE — G8399 PT W/DXA RESULTS DOCUMENT: HCPCS | Performed by: FAMILY MEDICINE

## 2021-05-26 PROCEDURE — G8536 NO DOC ELDER MAL SCRN: HCPCS | Performed by: FAMILY MEDICINE

## 2021-05-26 PROCEDURE — G8417 CALC BMI ABV UP PARAM F/U: HCPCS | Performed by: FAMILY MEDICINE

## 2021-05-26 PROCEDURE — 2022F DILAT RTA XM EVC RTNOPTHY: CPT | Performed by: FAMILY MEDICINE

## 2021-05-26 PROCEDURE — 1100F PTFALLS ASSESS-DOCD GE2>/YR: CPT | Performed by: FAMILY MEDICINE

## 2021-05-26 PROCEDURE — G8432 DEP SCR NOT DOC, RNG: HCPCS | Performed by: FAMILY MEDICINE

## 2021-05-26 PROCEDURE — G9899 SCRN MAM PERF RSLTS DOC: HCPCS | Performed by: FAMILY MEDICINE

## 2021-05-26 PROCEDURE — G8427 DOCREV CUR MEDS BY ELIG CLIN: HCPCS | Performed by: FAMILY MEDICINE

## 2021-05-26 PROCEDURE — G8754 DIAS BP LESS 90: HCPCS | Performed by: FAMILY MEDICINE

## 2021-05-26 PROCEDURE — 3044F HG A1C LEVEL LT 7.0%: CPT | Performed by: FAMILY MEDICINE

## 2021-05-26 RX ORDER — GABAPENTIN 300 MG/1
CAPSULE ORAL
Qty: 120 CAPSULE | Refills: 2 | Status: SHIPPED | OUTPATIENT
Start: 2021-05-26 | End: 2021-08-27 | Stop reason: SDUPTHER

## 2021-05-26 RX ORDER — OMEPRAZOLE 20 MG/1
20 CAPSULE, DELAYED RELEASE ORAL DAILY
Qty: 90 CAPSULE | Refills: 0 | Status: SHIPPED | OUTPATIENT
Start: 2021-05-26 | End: 2021-10-26 | Stop reason: SDUPTHER

## 2021-05-26 RX ORDER — SIMVASTATIN 20 MG/1
TABLET, FILM COATED ORAL
Qty: 90 TABLET | Refills: 1 | Status: SHIPPED | OUTPATIENT
Start: 2021-05-26 | End: 2021-10-26 | Stop reason: SDUPTHER

## 2021-05-26 RX ORDER — LATANOPROST 50 UG/ML
SOLUTION/ DROPS OPHTHALMIC
Qty: 2.5 ML | Refills: 0 | Status: SHIPPED | OUTPATIENT
Start: 2021-05-26 | End: 2021-08-07

## 2021-05-26 NOTE — PROGRESS NOTES
Chief Complaint   Patient presents with    Follow-up     6 week f/u        1. Have you been to the ER, urgent care clinic since your last visit? Hospitalized since your last visit? No    2. Have you seen or consulted any other health care providers outside of the [address] [place] [address] since your last visit? Include any pap smears or colon screening.  No

## 2021-05-26 NOTE — PROGRESS NOTES
Laura Mukherjee (: 1945) is a 70 y.o. female, established patient, here for evaluation of the following chief complaint(s):  Follow-up (6 week f/u )       ASSESSMENT/PLAN:  Stable overall with no new major concerns. Lumbar radiculopathy with suspected spinal stenosis is an ongoing issue. Had a previous CT that incidentally showed anterolisthesis of L4 compared to L5. Checked in with rheumatology and will send to PT and Ortho spine. Hypertension well controlled. Diabetes stable. Reviewed previous labs with patient and slightly elevated sed rate but normal CRP. Cholesterol well controlled. GFR improving plan slightly down overall. Continues following with Dr. Sonia Barclay, Dr. Zena Hunter, Dr. Roosevelt Guadarrama, and now seeing Dr. Philly Martínez. Working to get an electric scooter. Below is the assessment and plan developed based on review of pertinent history, physical exam, labs, studies, and medications. 1. Essential hypertension  2. Status post gastric bypass for obesity  3. RLS (restless legs syndrome)  -     gabapentin (NEURONTIN) 300 mg capsule; TAKE 1 CAPSULE IN THE MORNING AND TAKE 3 CAPSULES BEFORE BEDTIME, Normal, Disp-120 Capsule, R-2Increased dose. Thanks  4. Lumbar radiculopathy  -     gabapentin (NEURONTIN) 300 mg capsule; TAKE 1 CAPSULE IN THE MORNING AND TAKE 3 CAPSULES BEFORE BEDTIME, Normal, Disp-120 Capsule, R-2Increased dose. Thanks  5. Spinal stenosis of lumbar region without neurogenic claudication  6. Coronary artery disease due to lipid rich plaque  -     simvastatin (ZOCOR) 20 mg tablet; TAKE BY MOUTH NIGHTLY., Normal, Disp-90 Tablet, R-1  7. Status post angioplasty with stent  -     simvastatin (ZOCOR) 20 mg tablet; TAKE BY MOUTH NIGHTLY., Normal, Disp-90 Tablet, R-1  8. Mixed hyperlipidemia  -     simvastatin (ZOCOR) 20 mg tablet; TAKE BY MOUTH NIGHTLY., Normal, Disp-90 Tablet, R-1  9. Rheumatoid arthritis of multiple sites with negative rheumatoid factor ([facility] [address]  10.  Glaucoma, unspecified glaucoma type, unspecified laterality  -     latanoprost (XALATAN) 0.005 % ophthalmic solution; INSTILL 1 DROP INTO BOTH EYES NIGHTLY, Normal, Disp-2.5 mL, R-0Please send refills to Ophthalmology. thanks  11. Proliferative diabetic retinopathy of both eyes associated with type 2 diabetes mellitus, macular edema presence unspecified ([facility] [address]  12. Gastritis without bleeding, unspecified chronicity, unspecified gastritis type  -     omeprazole (PRILOSEC) 20 mg capsule; Take 1 Capsule by mouth daily. , Normal, Disp-90 Capsule, R-0  13. Chronic instability of knee, unspecified laterality  14. S/P TKR (total knee replacement), bilateral      Return in about 3 months (around 8/26/2021), or if symptoms worsen or fail to improve. SUBJECTIVE/OBJECTIVE:    R hip pain - worse for last week. Worse with walking and standing. Sharp and achy. Generalized over her hip. No recent falls. No pain at rest.  No relief with APAP. Using cane at home and cane when going out. difficulty with a walker because of her RA and OA in her hands, shoulders, and neck. Hx of bilat TKRs many years ago about 28 yrs ago. Evidence for loosening of femoral and tibial components on recent x rays but did not follow back up with Ortho during pandemic. Sent to Dr. Aman Arguelles at last appointment given struggle with instability and fall risk. Hx of osteopenia. Reviewed last set of labs - MGUS stable and PTH elevated from Dr. Sukumar Martinez. DM, Hyperlipidemia, & HTN  Pt is doing well on current meds with no medication side effects noted  No TIA's, no chest pain on exertion, no dyspnea on exertion, no swelling of ankles.   Exercising - Minimal  Dieting - No  Smoking - No    Lab Results   Component Value Date/Time    Hemoglobin A1c (POC) 5.4 09/24/2019 08:50 AM    Hemoglobin A1c (POC) 6.7 09/28/2018 10:30 AM    Hemoglobin A1c 5.2 04/05/2021 12:51 PM    Microalb/Creat ratio (ug/mg creat.) 45 (H) 08/06/2020 03:47 PM    LDL, calculated 56 04/13/2021 01:36 PM LDL, calculated 36 08/06/2020 03:47 PM    Creatinine 1.17 (H) 04/13/2021 01:36 PM      Lab Results   Component Value Date/Time    GFR est AA 53 (L) 04/13/2021 01:36 PM    GFR est non-AA 46 (L) 04/13/2021 01:36 PM      Lab Results   Component Value Date/Time    TSH 1.680 09/10/2020 01:54 PM    TSH 2.230 08/06/2020 03:47 PM       ROS  Gen - no fever/chills  Resp - no dyspnea or cough  CV - no chest pain or WORKMAN  Rest per HPI    Blood pressure 131/72, pulse 97, temperature 98.1 °F (36.7 °C), temperature source Oral, resp. rate 16, height 5' 4\" (1.626 m), weight 185 lb 12.8 oz (84.3 kg), SpO2 95 %. PE  General appearance - alert, well appearing, and in no distress  Eyes -sclera anicteric  Neck - supple, no significant adenopathy, no thyromegaly  Chest - clear to auscultation, no wheezes, rales or rhonchi, symmetric air entry  Heart - normal rate, regular rhythm, normal S1, S2, no murmurs, rubs, clicks or gallops  Neurological - alert, oriented, normal speech, no focal findings or movement disorder noted  Extr - no edema  Psych - normal mood and affect      On this date 05/26/2021 I have spent 45 minutes reviewing previous notes, test results and face to face with the patient discussing the diagnosis and importance of compliance with the treatment plan as well as documenting on the day of the visit. An electronic signature was used to authenticate this note.   -- Sulaiman Fuchs MD

## 2021-06-02 NOTE — PROGRESS NOTES
Neurology Note    Patient ID:  Rose Coates  [id number]  46 y.o.  1945      Date of Consultation:  Beverly 3, 2021    Referring Physician: Dr Amanda Barr    Reason for Consultation:  pain    Subjective: I have pain and weakness       History of Present Illness:   Rose Coates is a 70 y.o. female who was referred to the neurology clinic at [facility] for evaluation. She does have a history of diffuse lower extremity pain including her lumbar spine. She also has a longstanding history of rheumatoid arthritis and has been on immunosuppressant therapy. She did have bilateral knee replacement and right shoulder replacement. The patient states that she has had weakness for many years but she does feel that it has worsened over the past few months. She feels the weakness has gotten much worse. She is falling quite often. She has fallen 3 times within the last month. She states that her legs feel weak and at times they will buckle. She used to use a cane but is now using a rolling walker all the time. She states that she has been on prednisone intermittently and at times for long periods of time not only for her rheumatoid arthritis but for other respiratory ailments. She is currently not taking prednisone but on other immunosuppressive medications for her rheumatoid arthritis. She also states that she does have mild numbness in her feet and in her hands. She does have trouble getting out of chair. She has a significant difficulty getting up stairs and does not try to climb then. She can get up to 1 step in her house. She still does have lots of pain in her joints both in her upper and lower extremities. She reports that swallowing, speech are stable.        Past Medical History:   Diagnosis Date    Asthma     At risk for sleep apnea 11/11/2019    Stop Bang 4 - Sleep study in January per patient     Chronic pain 5/8/2020    DDD (degenerative disc disease), lumbar     Diabetes ([facility] [address]     Gastritis     GERD (gastroesophageal reflux disease)     Glaucoma     Hearing loss 5/8/2020    Hiatal hernia     High cholesterol     Hypertension     Hypocalcemia 5/8/2020    Peripheral vascular disease (HCC)     RA (rheumatoid arthritis) (HCC)     Sarcoidosis 1999    [facility]        Past Surgical History:   Procedure Laterality Date    HX GASTRIC BYPASS      HX HEART CATHETERIZATION      s/p PCI with stenting in 1998     HX KNEE REPLACEMENT Bilateral     HX SHOULDER REPLACEMENT Right     x 2         Family History   Problem Relation Age of Onset    Heart Disease Mother     Liver Disease Father     Alcohol abuse Brother     Dementia Neg Hx     Cancer Neg Hx     Seizures Neg Hx         Social History     Tobacco Use    Smoking status: Never Smoker    Smokeless tobacco: Never Used   Substance Use Topics    Alcohol use: Yes     Alcohol/week: 2.0 standard drinks     Types: 1 Glasses of wine, 1 Shots of liquor per week     Comment: 2-3 yearly        Allergies   Allergen Reactions    Lisinopril Rash    Methotrexate Hives        Prior to Admission medications    Medication Sig Start Date End Date Taking? Authorizing Provider   metoprolol tartrate (LOPRESSOR) 25 mg tablet TAKE 1 TABLET BY MOUTH TWICE A DAY 5/28/21  Yes Irish Hassan MD   gabapentin (NEURONTIN) 300 mg capsule TAKE 1 CAPSULE IN THE MORNING AND TAKE 3 CAPSULES BEFORE BEDTIME 5/26/21  Yes Irish Hassan MD   simvastatin (ZOCOR) 20 mg tablet TAKE BY MOUTH NIGHTLY. 5/26/21  Yes Irish Hassan MD   latanoprost (XALATAN) 0.005 % ophthalmic solution INSTILL 1 DROP INTO BOTH EYES NIGHTLY 5/26/21  Yes Irish Hassan MD   omeprazole (PRILOSEC) 20 mg capsule Take 1 Capsule by mouth daily. 5/26/21  Yes Irish Hassan MD   sucralfate (Carafate) 1 gram tablet Take 1 Tab by mouth Before breakfast, lunch, and dinner.  4/2/21  Yes Edmar Kingston DO   albuterol sulfate (PROVENTIL;VENTOLIN) 2.5 mg/0.5 mL nebu nebulizer solution 0.5 mL by Nebulization route every four (4) hours as needed for Wheezing. Indications: asthma attack 3/29/21  Yes Pino Cruz MD   fluticasone furoate-vilanteroL (Breo Ellipta) 100-25 mcg/dose inhaler TAKE 1 PUFF BY MOUTH EVERY DAY 3/29/21  Yes Pino Cruz MD   ergocalciferol (ERGOCALCIFEROL) 1,250 mcg (50,000 unit) capsule Take 1 Cap by mouth every seven (7) days. Indications: vitamin D deficiency (high dose therapy) 3/29/21  Yes Pino Cruz MD   sertraline (ZOLOFT) 50 mg tablet TAKE 1 TABLET BY MOUTH EVERY DAY 3/25/21  Yes Pino Cruz MD   metFORMIN (GLUCOPHAGE) 500 mg tablet Take 1 Tab by mouth daily (with dinner). Decreased dose 1/25/21  Yes Pino Cruz MD   leflunomide (ARAVA) 20 mg tablet TAKE 1 TABLET BY MOUTH EVERY DAY 1/11/21  Yes Natalya Bran MD   tofacitinib 11 mg Tb24 Take 11 mg by mouth daily. Indications: rheumatoid arthritis 1/11/21  Yes Natalya Bran MD   topiramate (TOPAMAX) 25 mg tablet Take 1 Tab by mouth nightly. 9/17/20  Yes Pino Cruz MD   furosemide (LASIX) 20 mg tablet TAKE 1 TABLET BY MOUTH EVERY DAY 7/23/20  Yes Pino Cruz MD   acetaminophen (Acetaminophen Extra Strength) 500 mg tablet Take 2 Tabs by mouth every six (6) hours as needed for Pain. 3/24/20  Yes Veronique Raymond, RUPINDER   nortriptyline (PAMELOR) 50 mg capsule TAKE ONE CAPSULE EVERY DAY AT BEDTIME 5/7/18  Yes Provider, Historical   aspirin delayed-release 81 mg tablet Take  by mouth daily.    Yes Provider, Historical       Review of Systems:    General, constitutional: Diffuse pain  Eyes, vision: negative  Ears, nose, throat: negative  Cardiovascular, heart: negative  Respiratory: negative  Gastrointestinal: negative  Genitourinary: negative  Musculoskeletal: negative  Skin and integumentary: negative  Psychiatric: negative  Endocrine: negative  Neurological: negative, except for HPI  Hematologic/lymphatic: negative  Allergy/immunology: negative    Objective:     Visit Vitals  /72 (BP 1 Location: Left upper arm, BP Patient Position: Sitting, BP Cuff Size: Adult)   Pulse 71   Temp 97.8 °F (36.6 °C)   Resp 16   Wt 186 lb (84.4 kg)   SpO2 99%   BMI 31.93 kg/m²       Physical Exam:  General:  appears well nourished in no acute distress  Neck: no carotid bruits  Lungs: clear to auscultation  Heart:  no murmurs, regular rate  Lower extremity: peripheral pulses palpable and no edema  Skin: intact. Right toe amputation. Arthritic joints throughout. Scars noted from prior surgeries    Neurological exam:    Awake, alert, oriented to person, place and time  Recent and remote memory were normal  Attention and concentration were intact  Language was intact. There was no aphasia  Speech: no dysarthria  Fund of knowledge was preserved    Cranial nerves:   II-XII were tested    Perrrla  Fundoscopic examination revealed venous pulsations and no clear abnormalities  Visual fields were full  Eomi, no evidence of nystagmus  Facial sensation:  normal and symmetric  Facial motor: normal and symmetric  Hearing intact  SCM strength intact  Tongue: midline without fasciculations    Motor: Tone normal    No evidence of fasciculations    Strength testing:   deltoid triceps biceps Wrist ext. Wrist flex. intrinsics Hip flex. Hip ext. Knee ext. Knee flex Dorsi flex Plantar flex   Right 4 4 4 4 4 4 4 4 4 4 4 4   Left 4 4 4 4 4 4 4 4 4 4 4 4         Sensory:  Upper extremity: pp decreased to level of the wrist bilaterally. Vibration normal  Lower extremity: pp decreased to mid shin. Vibration was 3 seconds in her toes, 6 seconds at her ankle      Reflexes:    Right Left  Biceps  1 1  Triceps 1 1  Brachiorad. 1 1  Patella  1 1  Achilles - -    Plantar response:  flexor bilaterally    Cerebellar testing:  no tremor apparent, finger/nose and juve were intact    Modified jean-paul's present    Gait: slow, slightly widebased, needs rolling walker for stability.     Labs: Lab Results   Component Value Date/Time    Hemoglobin A1c 5.2 04/05/2021 12:51 PM    Sodium 139 04/13/2021 01:36 PM    Potassium 4.5 04/13/2021 01:36 PM    Chloride 104 04/13/2021 01:36 PM    Glucose 90 04/13/2021 01:36 PM    BUN 29 (H) 04/13/2021 01:36 PM    Creatinine 1.17 (H) 04/13/2021 01:36 PM    Calcium 9.6 04/13/2021 01:36 PM    WBC 4.6 04/13/2021 01:36 PM    HCT 36.8 04/13/2021 01:36 PM    HGB 12.0 04/13/2021 01:36 PM    PLATELET 843 61/36/3415 01:36 PM       Imaging:    Results from Hospital Encounter encounter on 09/29/20    MRI BRAIN W WO CONT    Narrative  EXAM:  MRI BRAIN W WO CONT    INDICATION:    Chronic posttraumatic headache. COMPARISON:  MRI brain 12/2/2016. CONTRAST: 17 ml Dotarem. TECHNIQUE:  Multiplanar multisequence acquisition without and with contrast of the brain. FINDINGS:  Unchanged mild generalized parenchymal volume loss with commensurate dilation of  the sulci and ventricular system. Unchanged periventricular deep white matter  T2/FLAIR hyperintensities, patchy T2/FLAIR hyperintensity in the ramez, and  patchy T2/FLAIR hyperintensity in the bilateral thalami, consistent with mild to  moderate chronic microvascular ischemic disease. There is no acute infarct,  hemorrhage, extra-axial fluid collection, or mass effect. There is no cerebellar  tonsillar herniation. Expected arterial flow-voids are present. No evidence of  abnormal enhancement. Incidental small right cerebellar developmental venous  anomaly noted. The paranasal sinuses, mastoid air cells, and middle ears are clear. The orbital  contents are within normal limits with bilateral lens implants. No significant  osseous or scalp lesions are identified. Impression  IMPRESSION:    1. No acute intracranial abnormality. 2. Unchanged mild generalized parenchymal volume loss and mild to moderate  chronic microvascular ischemic disease.       Results from [place] encounter on 04/02/21    CT ABD PELV W CONT    Narrative  EXAM: CT ABD PELV W CONT    INDICATION: abdominal pain, pelvic pain, nausea    COMPARISON: 9/4/2019    CONTRAST: 100 mL of Isovue-370. TECHNIQUE:  Following the uneventful intravenous administration of contrast, thin axial  images were obtained through the abdomen and pelvis. Coronal and sagittal  reconstructions were generated. Oral contrast was not administered. CT dose  reduction was achieved through use of a standardized protocol tailored for this  examination and automatic exposure control for dose modulation. FINDINGS:  LOWER THORAX: Left ventricular hypertrophy and mild left atrial enlargement. Hiatal hernia. Clips at the GE junction. LIVER: No mass. BILIARY TREE: Prior cholecystectomy. CBD is not dilated. SPLEEN: within normal limits. PANCREAS: No mass or ductal dilatation. ADRENALS: Unremarkable. KIDNEYS: No mass, calculus, or hydronephrosis. 1 cm left renal cyst for which no  additional evaluation is necessary. STOMACH: Thickening of the greater curvature of the stomach. .  SMALL BOWEL: No dilatation or wall thickening. COLON: No dilatation or wall thickening. APPENDIX: Normal appendix on axial image 44  PERITONEUM: No ascites or pneumoperitoneum. RETROPERITONEUM: No lymphadenopathy or aortic aneurysm. REPRODUCTIVE ORGANS: Small uterus with multiple fibroids  URINARY BLADDER: No mass or calculus. BONES: There is a 6.6 mm anterolisthesis of L4 relative to L5. ABDOMINAL WALL: No mass or hernia. ADDITIONAL COMMENTS: N/A    Impression  Gastritis    I did independently review her brain MRI from September 29, 2020. There was no acute abnormalities. There was mild generalized parenchymal volume loss and significant chronic microvascular ischemic disease. Pertinent laboratory results include a normal CBC. Her creatinine was slightly elevated at 1.17. Hemoglobin A1c 5.2.   LDL 46  Vitamin D was normal  MGUS which was last seen on September 2020 - has seen heme    Assessment and Plan:    The patient is a pleasant 21-year-old female with multiple medical conditions which impact her neurological health who presents with progressive weakness in both her upper and lower extremities. Proximal greater than distal with reduced reflexes and a sensory neuropathy. Progressive weakness: The differential for this does include an underlying muscle disease, autoimmune disease associated with her rheumatologic condition causing a myositis, type II atrophy associated with prior intermittent steroid utilization, medication induced, vitamin deficiency, degenerative spine disease, sarcopenia. This very well may be multifactorial.  I will order a Cpk. I will also arrange for the patient to get an EMG/nerve conduction study. Peripheral neuropathy:  This very well could also be contributing to her generalized weakness  She has multiple underlying diseases that can be associated with her neuropathy including rheumatoid arthritis, dyslipidemia, monoclonal gammopathy, diabetes. The EMG/nerve conduction study will help to determine the severity and the nerve type involvement. Pending the results of the CK and EMG, additional testing including imaging or muscle biopsy may need to be considered. I did encourage her to continue to use her adaptive equipment in regards to ensuring safety and preventing falls.            Patient Active Problem List   Diagnosis Code    Long-term use of immunosuppressant medication Z79.899    Osteopenia of multiple sites M85.89    Vitamin D deficiency E55.9    Rheumatoid arthritis involving multiple sites ([facility] [address] M06.9    Essential hypertension I10    Mixed hyperlipidemia E78.2    RLS (restless legs syndrome) G25.81    Mild intermittent asthma J45.20    Status post angioplasty with stent Z95.820    Coronary artery disease due to lipid rich plaque I25.10, I25.83    Seronegative rheumatoid arthritis of multiple sites ([facility]) M06.09    Severe obesity (HCC) E66.01    MGUS (monoclonal gammopathy of unknown significance) D47.2    Type 2 diabetes mellitus without complication ([facility]) V87.1      The patient should return to clinic for testing    Renewed medication; none    I spent  65  minutes on the day of the encounter preparing the office visit by reviewing medical records, obtaining a history, performing examination, counseling and educating the patient  On possible  diagnosis, ordering  tests, documenting in the clinical medical record, and coordinating the care for the patient. The patient had the ability to ask questions and all questions were answered.                  Signed By:  Tamara Shipley DO FAAN    Beverly 3, 2021

## 2021-06-03 ENCOUNTER — OFFICE VISIT (OUTPATIENT)
Dept: NEUROLOGY | Age: 76
End: 2021-06-03
Payer: MEDICARE

## 2021-06-03 VITALS
OXYGEN SATURATION: 99 % | DIASTOLIC BLOOD PRESSURE: 72 MMHG | WEIGHT: 186 LBS | TEMPERATURE: 97.8 F | HEART RATE: 71 BPM | BODY MASS INDEX: 31.93 KG/M2 | SYSTOLIC BLOOD PRESSURE: 130 MMHG | RESPIRATION RATE: 16 BRPM

## 2021-06-03 DIAGNOSIS — G62.9 NEUROPATHY: ICD-10-CM

## 2021-06-03 DIAGNOSIS — M62.81 MUSCLE WEAKNESS: Primary | ICD-10-CM

## 2021-06-03 DIAGNOSIS — R52 PAIN: ICD-10-CM

## 2021-06-03 PROCEDURE — G8754 DIAS BP LESS 90: HCPCS | Performed by: PSYCHIATRY & NEUROLOGY

## 2021-06-03 PROCEDURE — 3017F COLORECTAL CA SCREEN DOC REV: CPT | Performed by: PSYCHIATRY & NEUROLOGY

## 2021-06-03 PROCEDURE — G8427 DOCREV CUR MEDS BY ELIG CLIN: HCPCS | Performed by: PSYCHIATRY & NEUROLOGY

## 2021-06-03 PROCEDURE — G8399 PT W/DXA RESULTS DOCUMENT: HCPCS | Performed by: PSYCHIATRY & NEUROLOGY

## 2021-06-03 PROCEDURE — G8432 DEP SCR NOT DOC, RNG: HCPCS | Performed by: PSYCHIATRY & NEUROLOGY

## 2021-06-03 PROCEDURE — 99205 OFFICE O/P NEW HI 60 MIN: CPT | Performed by: PSYCHIATRY & NEUROLOGY

## 2021-06-03 PROCEDURE — 1090F PRES/ABSN URINE INCON ASSESS: CPT | Performed by: PSYCHIATRY & NEUROLOGY

## 2021-06-03 PROCEDURE — G8417 CALC BMI ABV UP PARAM F/U: HCPCS | Performed by: PSYCHIATRY & NEUROLOGY

## 2021-06-03 PROCEDURE — G8536 NO DOC ELDER MAL SCRN: HCPCS | Performed by: PSYCHIATRY & NEUROLOGY

## 2021-06-03 PROCEDURE — G9899 SCRN MAM PERF RSLTS DOC: HCPCS | Performed by: PSYCHIATRY & NEUROLOGY

## 2021-06-03 PROCEDURE — 3288F FALL RISK ASSESSMENT DOCD: CPT | Performed by: PSYCHIATRY & NEUROLOGY

## 2021-06-03 PROCEDURE — 1100F PTFALLS ASSESS-DOCD GE2>/YR: CPT | Performed by: PSYCHIATRY & NEUROLOGY

## 2021-06-03 PROCEDURE — G8752 SYS BP LESS 140: HCPCS | Performed by: PSYCHIATRY & NEUROLOGY

## 2021-06-04 ENCOUNTER — PATIENT OUTREACH (OUTPATIENT)
Dept: CASE MANAGEMENT | Age: 76
End: 2021-06-04

## 2021-06-04 NOTE — PROGRESS NOTES
Ambulatory Care Management Note    Date/Time:  6/4/2021 2:15 PM    This Ambulatory Care Manager (ACM) reviewed and updated the following screenings during this call; general assessment, disease specific assessment, self management assessment, SDOH assessments, ACP assessment and note, medication reconciliation. Patient's challenges to self management identified:   functional physical ability, medical condition and transportation- Patient reports has transportation via a cab provided with Medicaid but that their arrival/ departure is not always timely and due to this she sometimes misses appts. Patient reports due to pain in knees/ back / and frequent falls she has had challenges completing ADL's independently. She lives in an apartment and has to try to cross the street with a walker and her laundry basket to get to the laundromat and the last time she attempted this she fell in the street and neighbors had to assist to get her up. She has had care aides in the past but has not had one in the last year. Medication Management:  good adherence and good understanding    Advance Care Planning:   Does patient have an Advance Directive:  not on file; education provided    [employer], Referrals, and Durable Medical Equipment: none/ patient reports ortho Dr Aman Arguelles is ordering her a electric scooter through [id number] [employer] [address] and also PT. She is awaiting for her PT to be set up. Goals       assist patient to get care aide in place (pt-stated)       6/4/21- Patient with recent increase in pain / frequent falls. Increased need for assistance with ADL's / housekeeping etc. Recommended patient look into who serviced her a year ago for care aide services as a care aide agency as they may be able to provide services again. She will look for this info and follow up with ACM on Monday.  LN               Health Maintenance Due   Topic Date Due    COVID-19 Vaccine (1) Never done    DTaP/Tdap/Td series (1 - Tdap) Never done    Shingrix Vaccine Age 50> (1 of 2) Never done    Colorectal Cancer Screening Combo  Never done    Breast Cancer Screen Mammogram  10/08/2019    Foot Exam Q1  01/17/2020     Health Maintenance reviewed - not reviewed. Patient was asked to consider health care goals that they would like to focus on with this ACM. ACM will follow up with patient to discuss goals and establish care plan in the next 7-14 days.        PCP/Specialist follow up:   Future Appointments   Date Time Provider Dorothy Timmons   6/9/2021 10:00 AM [address] 1 [facility]   6/23/2021 10:15 AM DO GRISEL MontagueOS BS AMB   [facility]2021 [facility]   8/5/2021  9:00 AM Jim Daly MD AORUBI BS AMB   8/27/2021 10:30 AM Ange Nova MD [facility] BS AMB

## 2021-06-21 ENCOUNTER — PATIENT OUTREACH (OUTPATIENT)
Dept: CASE MANAGEMENT | Age: 76
End: 2021-06-21

## 2021-06-21 NOTE — PROGRESS NOTES
Goals       assist patient to get care aide in place (pt-stated)       6/21/21- Patient reports She still has not looked into who previously provided her personal care service. Discussed that personal care aides are limited at this time. Recommend she call [facility] to get a list of personal care aide agencies available and call each one to be placed on their waiting lists for a care aide as this is the fastest way to get an aide at this time and she needs help with ADL's and personal care/ laundry etc to prevent further falls. Provided contact info for [facility] and she agrees to call today to request this information. ACM will call back next week to see if she has called agencies to be added to their waiting lists for an aide. LN    6/4/21- Patient with recent increase in pain / frequent falls. Increased need for assistance with ADL's / housekeeping etc. Recommended patient look into who serviced her a year ago for care aide services as a care aide agency as they may be able to provide services again. She will look for this info and follow up with ACM on Monday. LN         reduce risk of falls       6/21/21- discussed with patient whether she had begun PT yet as our last call she had mentioned she was waiting to hear from them to set it up. She notes she has had several falls recently outside in her yard and walking to her laundry mat. Patient reports still nobody has called. Reviewed ortho and PCP notes and noted ortho ordered in April and PCP noted in May visit. Referral was placed in April By Dr Sayda Arango to [facility] outpatient PT/OT and patient states that nobody ever called. Recommended patient call to schedule and provided contact info for [facility]. Patient agrees to call tomorrow to schedule. States that the [facility] place providing her scooter has been calling to ask if she has attended PT yet as she needs to be measured and evaluated by PT for her scooter.  ACM to follow up with her next week to see if she has schedule with PT. LN           Patient has no other complaints at this time. [facility] is closed at this time but patient reports they have asked if she has been to PT yet. Assuming they are waiting on measurements for scooter and/or PT notes. ACM will follow up with Malorie Hines after patient begins PT. Notes forwarded to PCP for care coordination.

## 2021-06-22 DIAGNOSIS — R29.6 RECURRENT FALLS: Primary | ICD-10-CM

## 2021-06-22 DIAGNOSIS — M06.09 RHEUMATOID ARTHRITIS OF MULTIPLE SITES WITH NEGATIVE RHEUMATOID FACTOR (HCC): ICD-10-CM

## 2021-06-22 DIAGNOSIS — M48.061 SPINAL STENOSIS OF LUMBAR REGION WITHOUT NEUROGENIC CLAUDICATION: ICD-10-CM

## 2021-06-22 DIAGNOSIS — M54.16 LUMBAR RADICULOPATHY: ICD-10-CM

## 2021-06-22 DIAGNOSIS — Z96.653 S/P TKR (TOTAL KNEE REPLACEMENT), BILATERAL: ICD-10-CM

## 2021-06-22 DIAGNOSIS — G62.9 NEUROPATHY: ICD-10-CM

## 2021-06-25 ENCOUNTER — OFFICE VISIT (OUTPATIENT)
Dept: NEUROLOGY | Age: 76
End: 2021-06-25
Payer: MEDICARE

## 2021-06-25 DIAGNOSIS — G56.21 ULNAR NEUROPATHY OF RIGHT UPPER EXTREMITY: ICD-10-CM

## 2021-06-25 DIAGNOSIS — G61.89 OTHER INFLAMMATORY POLYNEUROPATHIES (HCC): ICD-10-CM

## 2021-06-25 DIAGNOSIS — G62.9 POLYNEUROPATHY: ICD-10-CM

## 2021-06-25 DIAGNOSIS — G56.01 CARPAL TUNNEL SYNDROME OF RIGHT WRIST: ICD-10-CM

## 2021-06-25 PROCEDURE — 95886 MUSC TEST DONE W/N TEST COMP: CPT | Performed by: PSYCHIATRY & NEUROLOGY

## 2021-06-25 PROCEDURE — 95913 NRV CNDJ TEST 13/> STUDIES: CPT | Performed by: PSYCHIATRY & NEUROLOGY

## 2021-06-25 NOTE — PROCEDURES
ELECTRODIAGNOSTIC REPORT      Test Date:  2021    Patient: Belinda Fuller : 1945 Physician: Dr. Nani Henry   ID#: [id number] SEX: Female Ref. Phys: Dr. Nani Henry     Patient History / Exam:    The patient is a pleasant 79-year-old female with multiple medical conditions who presents with increasing balance difficulties, pain, weakness. Her neurological examination does reveal generalized weakness with a length dependent loss to pinprick and temperature. Her gait is wide-based. EMG & NCV Findings:    Nerve conduction studies as listed below were absent for the bilateral superficial fibular sensory and sural sensory. The right radial sensory was normal. The right median sensory revealed a prolongation of the peak latency with a normal amplitude. The right ulnar sensory revealed a prolongation of the peak latency with a reduced amplitude. The right fibular motor revealed a low normal amplitude with mild slowing of the conduction velocity. The amplitude does increase slightly when recording from the tibialis anterior, but still reduced. The bilateral tibial motor nerve conduction studies were absent. The right median motor nerve conduction study revealed a prolongation of the distal motor latency with normal amplitude and conduction velocity. The right ulnar motor nerve conduction study recording from both the ADM and FDI revealed a significantly reduced amplitude throughout with focal slowing across the elbow. Disposable concentric needle examination of the muscles listed below in the right upper and lower extremity revealed chronic neurogenic appearing motor units in the tibialis anterior and FDI. Impression: This study was abnormal.  There is electrodiagnostic evidence upon today's examination suggestin. 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     2.  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 A superimposed right distal median sensory motor neuropathy across the wrist as can be seen in a moderate grade right-sided carpal tunnel syndrome. 4.  There was no evidence of a myopathy or radiculopathy with active denervation. Clinical correlation is recommended.      ___________________________  Titus CAIN   FAAN        Nerve Conduction Studies  Anti Sensory Summary Table     Stim Site NR Onset (ms) Peak (ms) O-P Amp (µV) Norm Peak (ms) Norm O-P Amp Site1 Site2 Dist (cm) Norm Madan (m/s)   Right Median Anti Sensory (2nd Digit)   Wrist    3.7 4.9 15.5 <4 >11 Wrist 2nd Digit 14.0    Right Radial Anti Sensory (Base 1st Digit)  32°C   Wrist    1.8 2.7 33.2 <2.9 >15 Wrist Base 1st Digit 10.0    Left Sural Anti Sensory (Lat Mall)  32°C   Calf NR    <4.5 >4.0 Calf Lat Mall 14.0    Right Sural Anti Sensory (Lat Mall)   Calf NR    <4.5 >4.0 Calf Lat Mall 14.0    Right Ulnar Anti Sensory (5th Digit)  32°C   Wrist    2.9 4.6 7.1 <4.0 >10 Wrist 5th Digit 14.0      Left Superficial Fibublar Sensory   32°C   Calf NR    <4.5 >4.0 Calf  14.0    Right Superficial Fibular Sensory   32   Calf NR    <4.5 >4.0 Calf  14.0      Motor Summary Table     Stim Site NR Onset (ms) Norm Onset (ms) O-P Amp (mV) Norm O-P Amp P-T Amp (mV) Site1 Site2 Dist (cm) Madan (m/s)   Right Fibular Motor (Ext Dig Brev)   Ankle    4.5 <6.5 1.5 >1.1  Ankle Ext Dig Brev 8.0 18   B Fib    11.9  1.4   B Fib Ankle 26.0 35   Poplt    14.6  1.5   Poplt B Fib 10.0 37   Right Fibular TA Motor (Tib Ant)   Fib Head    3.2 <6.7 2.8 >5  Fib Head Tib Ant 10.0 31   Poplit    5.6  2.2   Poplit Fib Head 10.0 42   Right Median Motor (Abd Poll Brev)  32°C   Wrist    5.3 <4.5 4.5 >4.1  Wrist Abd Poll Brev 8.0 15   Elbow    10.0  4.4   Elbow Wrist 21.5 46   Left Tibial Motor (Abd Cisneros Brev)  32°C   Ankle NR  <6.1  >4.4  Ankle Abd Cisneros Brev 8.0    Right Tibial Motor (Abd Cisneros Brev)   Ankle NR  <6.1  >4.4  Ankle Abd Cisneros Brev 8.0 Right Ulnar Motor (Abd Dig Minimi)  32°C   Wrist    3.7 <3.1 1.7 >7.0  Wrist Abd Dig Minimi 8.0 22   B Elbow    8.0  1.3   B Elbow Wrist 20.0 47   A Elbow    11.4  1.1   A Elbow B Elbow 10.0 29   Right Ulnar FDI Motor (FDI)  32°C   Wrist    4.6 <4.5 1.9 >7.0  Wrist FDI 8.0 17   B Elbow    8.8  0.9   B Elbow Wrist 20.0 48   A Elbow    12.0  0.9   A Elbow B Elbow 10.0 31     Comparison Summary Table     Stim Site NR Peak (ms) P-T Amp (µV) Site1 Site2 Dist (cm) Delta-P (ms)   Right Median/Ulnar Palm Comparison (Wrist)  32°C   Median Palm    2.9 11.7 Median Palm Ulnar Palm 8.0    Ulnar Palm NR             EMG     Side Muscle Nerve Root Ins Act Fibs Psw Recrt Duration Amp Poly Comment   Right AntTibialis Dp Br Peron L4-5 Nml Nml Nml Nml Nml Incr 1+    Right MedGastroc Tibial S1-2 Nml Nml Nml Nml Nml Nml Nml    Right Peroneus Long   Nml Nml Nml Nml Nml Nml Nml    Right VastusLat Femoral L2-4 Nml Nml Nml Nml Nml Nml Nml    Right BicepsFemS Sciatic L5-S1 Nml Nml Nml Nml Nml Nml Nml    Right Deltoid Axillary C5-6 Nml Nml Nml Nml Nml Nml Nml    Right Triceps Radial C6-7-8 Nml Nml Nml Nml Nml Nml Nml    Right 1stDorInt Ulnar C8-T1 Nml Nml Nml Reduced Nml Incr Nml    Right PronatorTeres Median C6-7 Nml Nml Nml Nml Nml Nml Nml    Right ExtIndicis Radial (Post Int) C7-8 Nml Nml Nml Nml Nml Nml Nml      Waveforms:

## 2021-07-12 ENCOUNTER — PATIENT OUTREACH (OUTPATIENT)
Dept: CASE MANAGEMENT | Age: 76
End: 2021-07-12

## 2021-07-12 NOTE — PROGRESS NOTES
ACM reviewed EMR ,contacted patient for CM follow up. Patient reports she is currently out of town at her sisters home as she is in the hospital. Patient requests return call in half an hour. ACM will return call as requested in half an hour. 7/12/21 1:30 pm    Goals Addressed                    This Visit's Progress      assist patient to get care aide in place (pt-stated)         7/12/21- Patient reports out of town visiting her sister who is ill. Has not had a chance to contact [facility] as we discussed to get the list of personal care aide agencies , states she may do this while visiting with her sister this week. ACM will follow up in 1-2 weeks to see where she is in this process, she needs assistance at home, needs help with ADL's/ and housekeeping. LN     6/21/21- Patient reports She still has not looked into who previously provided her personal care service. Discussed that personal care aides are limited at this time. Recommend she call [facility] to get a list of personal care aide agencies available and call each one to be placed on their waiting lists for a care aide as this is the fastest way to get an aide at this time and she needs help with ADL's and personal care/ laundry etc to prevent further falls. Provided contact info for [facility] and she agrees to call today to request this information. ACM will call back next week to see if she has called agencies to be added to their waiting lists for an aide. LN    6/4/21- Patient with recent increase in pain / frequent falls. Increased need for assistance with ADL's / housekeeping etc. Recommended patient look into who serviced her a year ago for care aide services as a care aide agency as they may be able to provide services again. She will look for this info and follow up with ACM on Monday. LN         reduce risk of falls         7/12/21- Patient reports still has not started PT. Reports saw neurology Dr Kelley Coffey and had EMG which showed neuropathy.  Patient reports currently out of town visiting sister who is sick and will be back in 1-2 weeks. Reports tried calling [facility] to schedule but was told no referral was received. ACM contacted PCP's office / spoke with Mode Jose, Message sent to PCP's nurse requesting a new referral be faxed to [facility] at [phone]. ACM will follow up with patient once she returns to town to ensure she is able to get scheduled. NICOLE       6/21/21- discussed with patient whether she had begun PT yet as our last call she had mentioned she was waiting to hear from them to set it up. She notes she has had several falls recently outside in her yard and walking to her laundry mat. Patient reports still nobody has called. Reviewed ortho and PCP notes and noted ortho ordered in April and PCP noted in May visit. Referral was placed in April By Dr Kenji Queen to [facility] outpatient PT/OT and patient states that nobody ever called. Recommended patient call to schedule and provided contact info for [facility]. Patient agrees to call tomorrow to schedule. States that the DME place providing her scooter has been calling to ask if she has attended PT yet as she needs to be measured and evaluated by PT for her scooter.  ACM to follow up with her next week to see if she has schedule with PT. LN             Notes forwarded to Dr Sonny Nolan for care coordination

## 2021-07-27 DIAGNOSIS — H40.9 GLAUCOMA, UNSPECIFIED GLAUCOMA TYPE, UNSPECIFIED LATERALITY: ICD-10-CM

## 2021-07-30 ENCOUNTER — PATIENT OUTREACH (OUTPATIENT)
Dept: CASE MANAGEMENT | Age: 76
End: 2021-07-30

## 2021-07-30 NOTE — PROGRESS NOTES
ACM reviewed EMR and contacted patient for CM follow up. Unable to reach patient and left VM for return call with contact infor provided.      Will try to reach patient again in a week

## 2021-08-07 RX ORDER — LATANOPROST 50 UG/ML
SOLUTION/ DROPS OPHTHALMIC
Qty: 2.5 ML | Refills: 0 | Status: SHIPPED | OUTPATIENT
Start: 2021-08-07 | End: 2021-10-26 | Stop reason: SDUPTHER

## 2021-08-17 ENCOUNTER — TELEPHONE (OUTPATIENT)
Dept: RHEUMATOLOGY | Age: 76
End: 2021-08-17

## 2021-08-17 ENCOUNTER — TELEPHONE (OUTPATIENT)
Dept: FAMILY MEDICINE CLINIC | Age: 76
End: 2021-08-17

## 2021-08-17 DIAGNOSIS — M06.09 RHEUMATOID ARTHRITIS OF MULTIPLE SITES WITH NEGATIVE RHEUMATOID FACTOR (HCC): ICD-10-CM

## 2021-08-17 DIAGNOSIS — M79.604 PAIN IN BOTH LOWER EXTREMITIES: ICD-10-CM

## 2021-08-17 DIAGNOSIS — G25.81 RLS (RESTLESS LEGS SYNDROME): Primary | ICD-10-CM

## 2021-08-17 DIAGNOSIS — M79.605 PAIN IN BOTH LOWER EXTREMITIES: ICD-10-CM

## 2021-08-17 NOTE — TELEPHONE ENCOUNTER
Spoke with pt who stated that she is in a lot of pain and wanted something to be prescribed for pain. I explained to her that we do not prescribe pain medication and that she will need to check with her PCP. She has been taking a lot of tylenol and it is not working and needs something stronger. She is currently taking the leflunomide and Mathew Mckeon. She stated that she called her PCP last week and has not gotten a response. I told her to call them back until she speaks with someone. She stated that she will continue to call.

## 2021-08-17 NOTE — TELEPHONE ENCOUNTER
----- Message from Ozmota Buff sent at 8/17/2021 10:57 AM EDT -----  Regarding: [place]/Referral  Rob with [facility] is requesting a referral for PT on one of her legs. Pts number is [phone] and [facility]  and fax [phone]. He is requesting for you to call the pt when the referral is sent.

## 2021-08-17 NOTE — TELEPHONE ENCOUNTER
----- Message from Paul mccabe sent at 8/17/2021 11:31 AM EDT -----  Regarding: /Telephone     Caller's first and last name:Pt  Reason for call:Pt has been in a lot of pain and wants to know is she can get some pain meds because she has been taking a lot of tylenol and it is not working.   Callback required yes/no and why:Yes, to see if she can get Rx  Best contact number(s):[phone]  Details to clarify the request:n/a

## 2021-08-27 ENCOUNTER — OFFICE VISIT (OUTPATIENT)
Dept: FAMILY MEDICINE CLINIC | Age: 76
End: 2021-08-27

## 2021-08-27 ENCOUNTER — OFFICE VISIT (OUTPATIENT)
Dept: FAMILY MEDICINE CLINIC | Age: 76
End: 2021-08-27
Payer: MEDICARE

## 2021-08-27 VITALS
HEART RATE: 86 BPM | SYSTOLIC BLOOD PRESSURE: 139 MMHG | OXYGEN SATURATION: 96 % | RESPIRATION RATE: 18 BRPM | BODY MASS INDEX: 33.12 KG/M2 | HEIGHT: 64 IN | DIASTOLIC BLOOD PRESSURE: 71 MMHG | TEMPERATURE: 96.9 F | WEIGHT: 194 LBS

## 2021-08-27 VITALS
SYSTOLIC BLOOD PRESSURE: 139 MMHG | TEMPERATURE: 96.9 F | HEIGHT: 64 IN | RESPIRATION RATE: 18 BRPM | WEIGHT: 194 LBS | OXYGEN SATURATION: 96 % | BODY MASS INDEX: 33.12 KG/M2 | HEART RATE: 86 BPM | DIASTOLIC BLOOD PRESSURE: 71 MMHG

## 2021-08-27 DIAGNOSIS — G56.01 RIGHT CARPAL TUNNEL SYNDROME: ICD-10-CM

## 2021-08-27 DIAGNOSIS — Z79.60 LONG-TERM USE OF IMMUNOSUPPRESSANT MEDICATION: ICD-10-CM

## 2021-08-27 DIAGNOSIS — M48.061 SPINAL STENOSIS OF LUMBAR REGION WITHOUT NEUROGENIC CLAUDICATION: ICD-10-CM

## 2021-08-27 DIAGNOSIS — Z96.653 S/P TKR (TOTAL KNEE REPLACEMENT), BILATERAL: ICD-10-CM

## 2021-08-27 DIAGNOSIS — M54.16 LUMBAR RADICULOPATHY: ICD-10-CM

## 2021-08-27 DIAGNOSIS — E78.2 MIXED HYPERLIPIDEMIA: ICD-10-CM

## 2021-08-27 DIAGNOSIS — Z00.00 MEDICARE ANNUAL WELLNESS VISIT, SUBSEQUENT: Primary | ICD-10-CM

## 2021-08-27 DIAGNOSIS — Z95.820 STATUS POST ANGIOPLASTY WITH STENT: ICD-10-CM

## 2021-08-27 DIAGNOSIS — M06.09 RHEUMATOID ARTHRITIS OF MULTIPLE SITES WITH NEGATIVE RHEUMATOID FACTOR (HCC): ICD-10-CM

## 2021-08-27 DIAGNOSIS — G25.81 RLS (RESTLESS LEGS SYNDROME): ICD-10-CM

## 2021-08-27 DIAGNOSIS — E11.9 TYPE 2 DIABETES MELLITUS WITHOUT COMPLICATION, WITHOUT LONG-TERM CURRENT USE OF INSULIN (HCC): ICD-10-CM

## 2021-08-27 DIAGNOSIS — Z98.84 STATUS POST GASTRIC BYPASS FOR OBESITY: ICD-10-CM

## 2021-08-27 DIAGNOSIS — I25.10 CORONARY ARTERY DISEASE DUE TO LIPID RICH PLAQUE: ICD-10-CM

## 2021-08-27 DIAGNOSIS — Z12.11 SCREEN FOR COLON CANCER: ICD-10-CM

## 2021-08-27 DIAGNOSIS — I25.83 CORONARY ARTERY DISEASE DUE TO LIPID RICH PLAQUE: ICD-10-CM

## 2021-08-27 DIAGNOSIS — I10 ESSENTIAL HYPERTENSION: ICD-10-CM

## 2021-08-27 PROCEDURE — G9899 SCRN MAM PERF RSLTS DOC: HCPCS | Performed by: FAMILY MEDICINE

## 2021-08-27 PROCEDURE — G8752 SYS BP LESS 140: HCPCS | Performed by: FAMILY MEDICINE

## 2021-08-27 PROCEDURE — 1100F PTFALLS ASSESS-DOCD GE2>/YR: CPT | Performed by: FAMILY MEDICINE

## 2021-08-27 PROCEDURE — G8399 PT W/DXA RESULTS DOCUMENT: HCPCS | Performed by: FAMILY MEDICINE

## 2021-08-27 PROCEDURE — 99214 OFFICE O/P EST MOD 30 MIN: CPT | Performed by: FAMILY MEDICINE

## 2021-08-27 PROCEDURE — G8427 DOCREV CUR MEDS BY ELIG CLIN: HCPCS | Performed by: FAMILY MEDICINE

## 2021-08-27 PROCEDURE — 1090F PRES/ABSN URINE INCON ASSESS: CPT | Performed by: FAMILY MEDICINE

## 2021-08-27 PROCEDURE — G8417 CALC BMI ABV UP PARAM F/U: HCPCS | Performed by: FAMILY MEDICINE

## 2021-08-27 PROCEDURE — 3017F COLORECTAL CA SCREEN DOC REV: CPT | Performed by: FAMILY MEDICINE

## 2021-08-27 PROCEDURE — 3044F HG A1C LEVEL LT 7.0%: CPT | Performed by: FAMILY MEDICINE

## 2021-08-27 PROCEDURE — G8754 DIAS BP LESS 90: HCPCS | Performed by: FAMILY MEDICINE

## 2021-08-27 PROCEDURE — G8510 SCR DEP NEG, NO PLAN REQD: HCPCS | Performed by: FAMILY MEDICINE

## 2021-08-27 PROCEDURE — G8536 NO DOC ELDER MAL SCRN: HCPCS | Performed by: FAMILY MEDICINE

## 2021-08-27 PROCEDURE — G0439 PPPS, SUBSEQ VISIT: HCPCS | Performed by: FAMILY MEDICINE

## 2021-08-27 PROCEDURE — 2022F DILAT RTA XM EVC RTNOPTHY: CPT | Performed by: FAMILY MEDICINE

## 2021-08-27 PROCEDURE — 3288F FALL RISK ASSESSMENT DOCD: CPT | Performed by: FAMILY MEDICINE

## 2021-08-27 RX ORDER — GABAPENTIN 100 MG/1
CAPSULE ORAL
COMMUNITY
Start: 2021-08-18 | End: 2021-08-27 | Stop reason: DRUGHIGH

## 2021-08-27 RX ORDER — TRAMADOL HYDROCHLORIDE 50 MG/1
50 TABLET ORAL
Qty: 30 TABLET | Refills: 0 | Status: SHIPPED | OUTPATIENT
Start: 2021-08-27 | End: 2021-10-26 | Stop reason: SDUPTHER

## 2021-08-27 RX ORDER — GABAPENTIN 300 MG/1
CAPSULE ORAL
Qty: 120 CAPSULE | Refills: 2 | Status: SHIPPED | OUTPATIENT
Start: 2021-08-27 | End: 2021-10-26 | Stop reason: SDUPTHER

## 2021-08-27 NOTE — PROGRESS NOTES
Juan Palacios (: 1945) is a 78 y.o. female, established patient, here for evaluation of the following chief complaint(s): Other (Mobility Evaluation)       ASSESSMENT/PLAN: For power mobility exam but did not have clarity of provider/carrier so ended up not moving forward with this. Hx as follows but not billed. Below is the assessment and plan developed based on review of pertinent history, physical exam, labs, studies, and medications. 1. ERRONEOUS ENCOUNTER--DISREGARD      Return if symptoms worsen or fail to improve. SUBJECTIVE/OBJECTIVE:  Power Mobility exam:  Has frequent falls currently and using a Rollator. Needs power wheelchair to prevent falls due to right knee instability in setting of previous right knee replacement, Rheumatoid arthritis, polyneuropathy confirmed with EMG, and lumbar radiculopathy with spinal stenosis. Cannot walk 1 block without a walker. If using a walker, can walk about 1-2 blocks but has had falls while using a walker. She is also limited by her RA in the hands to use a walker or cane. Meds listed below. Walking has become worse over the past 2 years. Her pace is very slow. She is able to transition on her own. Able to use a walker but unable to consistently use for long periods of time without concern for falls. Unable to consistently use a manual wheelchair d/t significant rheumatoid arthritis in her hands. Her arthritis and neuropathy have worsened over the years. Able to do all ADLs on her own. She is trying to do IADLs including cleaning, driving, and cooking but does require some help. Pt is able to manage her meds and money and talk on the phone.     Current Outpatient Medications   Medication Instructions    albuterol sulfate (PROVENTIL;VENTOLIN) 2.5 mg, Nebulization, EVERY 4 HOURS AS NEEDED    aspirin delayed-release 325 mg, Oral, 2 TIMES DAILY    ergocalciferol (ERGOCALCIFEROL) 50,000 Units, Oral, EVERY 7 DAYS    fluticasone furoate-vilanteroL (Breo Ellipta) 100-25 mcg/dose inhaler TAKE 1 PUFF BY MOUTH EVERY DAY    furosemide (LASIX) 20 mg, Oral, DAILY    gabapentin (NEURONTIN) 300 mg capsule TAKE 1 CAPSULE IN THE MORNING AND TAKE 3 CAPSULES BEFORE BEDTIME    gabapentin (NEURONTIN) 900 mg, Oral, EVERY BEDTIME, TAKE 1 CAPSULE IN THE MORNING AND TAKE 3 CAPSULES BEFORE BEDTIME     latanoprost (XALATAN) 0.005 % ophthalmic solution INSTILL 1 DROP INTO BOTH EYES NIGHTLY    leflunomide (ARAVA) 20 mg tablet TAKE 1 TABLET BY MOUTH EVERY DAY    metFORMIN (GLUCOPHAGE) 500 mg, Oral, DAILY WITH DINNER, Decreased dose    metoprolol tartrate (LOPRESSOR) 25 mg, Oral, 2 TIMES DAILY    nortriptyline (PAMELOR) 50 mg, Oral, EVERY BEDTIME    omeprazole (PRILOSEC) 20 mg, Oral, DAILY    predniSONE (DELTASONE) 20 mg, Oral, DAILY WITH BREAKFAST    sertraline (ZOLOFT) 50 mg, Oral, DAILY    simvastatin (ZOCOR) 20 mg tablet TAKE BY MOUTH NIGHTLY.  tofacitinib 11 mg, Oral, DAILY       Blood pressure 139/71, pulse 86, temperature 96.9 °F (36.1 °C), temperature source Oral, resp. rate 18, height 5' 4\" (1.626 m), weight 194 lb (88 kg), SpO2 96 %. An electronic signature was used to authenticate this note.   -- Juliana Foote MD

## 2021-08-27 NOTE — PROGRESS NOTES
Chief Complaint   Patient presents with   [facility] Annual Wellness Visit     Medicare   1. Have you been to the ER, urgent care clinic since your last visit? Hospitalized since your last visit? No    2. Have you seen or consulted any other health care providers outside of the [address] [place] [address] since your last visit? Include any pap smears or colon screening.  No     Discuss pain medication

## 2021-08-27 NOTE — PROGRESS NOTES
Glenn Desouza (: 1945) is a 70 y.o. female, established patient, here for evaluation of the following chief complaint(s): Annual Wellness Visit (Medicare)       ASSESSMENT/PLAN:  Lumbar radiculopathy with suspected spinal stenosis is an ongoing issue. Working with Dr. Seng Guzman on this and recent EMG with concerns for polyneuropathy. Had a previous CT that incidentally showed anterolisthesis of L4 compared to L5. Checked in with rheumatology and will send to PT and Ortho spine. Working with Dr. Radha Barnes  HTN well controlled. DM stable. Reviewed previous labs with patient. Cholesterol well controlled. Ct seeing Dr. Denis Weeks for Wayne Inman and was trying to get an electric scooter but has not happened. Below is the assessment and plan developed based on review of pertinent history, physical exam, labs, studies, and medications. 1. Medicare annual wellness visit, subsequent  2. RLS (restless legs syndrome)  -     gabapentin (NEURONTIN) 300 mg capsule; TAKE 1 CAPSULE IN THE MORNING AND TAKE 3 CAPSULES BEFORE BEDTIME, Normal, Disp-120 Capsule, R-2Pls delete any prev gabapentin rx on file. thanks  3. Lumbar radiculopathy  -     REFERRAL TO PHYSICAL THERAPY  -     gabapentin (NEURONTIN) 300 mg capsule; TAKE 1 CAPSULE IN THE MORNING AND TAKE 3 CAPSULES BEFORE BEDTIME, Normal, Disp-120 Capsule, R-2Pls delete any prev gabapentin rx on file. thanks  -     MRI LUMB SPINE W WO CONT; Future  -     traMADoL (ULTRAM) 50 mg tablet; Take 1 Tablet by mouth daily as needed for Pain for up to 30 days. , Normal, Disp-30 Tablet, R-0  4. Essential hypertension  5. Rheumatoid arthritis of multiple sites with negative rheumatoid factor (HCC)  -     HEMOGLOBIN A1C WITH EAG; Future  -     CBC WITH AUTOMATED DIFF; Future  -     METABOLIC PANEL, COMPREHENSIVE; Future  -     SED RATE (ESR); Future  -     C REACTIVE PROTEIN, QT; Future  6. Status post gastric bypass for obesity  7.  Spinal stenosis of lumbar region without neurogenic claudication  -     REFERRAL TO PHYSICAL THERAPY  -     MRI LUMB SPINE W WO CONT; Future  8. Coronary artery disease due to lipid rich plaque  9. Status post angioplasty with stent  10. Mixed hyperlipidemia  -     HEMOGLOBIN A1C WITH EAG; Future  11. S/P TKR (total knee replacement), bilateral  -     REFERRAL TO PHYSICAL THERAPY  12. Type 2 diabetes mellitus without complication, without long-term current use of insulin (HCC)  -     MICROALBUMIN, UR, RAND W/ MICROALB/CREAT RATIO; Future  -     HEMOGLOBIN A1C WITH EAG; Future  13. Long-term use of immunosuppressant medication  -     MICROALBUMIN, UR, RAND W/ MICROALB/CREAT RATIO; Future  -     HEMOGLOBIN A1C WITH EAG; Future  -     CBC WITH AUTOMATED DIFF; Future  -     METABOLIC PANEL, COMPREHENSIVE; Future  -     SED RATE (ESR); Future  -     C REACTIVE PROTEIN, QT; Future  14. Right carpal tunnel syndrome  15. Screen for colon cancer  -     COLOGUARD TEST (FECAL DNA COLORECTAL CANCER SCREENING); Future      Return in about 6 weeks (around 10/8/2021), or if symptoms worsen or fail to improve. SUBJECTIVE/OBJECTIVE:    Knees giving out on her. Working with Dr. Nikita Santos. Hx of bilat TKRs many years ago about 28 yrs ago. Evidence for loosening of femoral and tibial components on recent x rays but did not follow back up with Ortho during pandemic. Sent to Dr. Nikita Santos at last appointment given struggle with instability and fall risk. Difficulty with a walker because of her RA and OA in her hands, shoulders, and neck. Seeing Dr. Marcie Lema for RA. Ct to struggle with lumbar radiculopathy - Sent to Dr. Valeriano Garces. Hx of osteopenia. Reviewed last set of labs - MGUS stable and PTH elevated from Dr. Marcie Lema. DM, Hyperlipidemia, & HTN  Pt is doing well on current meds with no medication side effects noted  No TIA's, no chest pain on exertion, no dyspnea on exertion, no swelling of ankles.   Exercising - Minimal  Dieting - No  Smoking - No    Lab Results   Component Value Date/Time    Hemoglobin A1c (POC) 5.4 2019 08:50 AM    Hemoglobin A1c (POC) 6.7 2018 10:30 AM    Hemoglobin A1c 5.2 2021 12:51 PM    Microalb/Creat ratio (ug/mg creat.) 45 (H) 2020 03:47 PM    LDL, calculated 56 2021 01:36 PM    LDL, calculated 36 2020 03:47 PM    Creatinine 1.17 (H) 2021 01:36 PM      Lab Results   Component Value Date/Time    GFR est AA 53 (L) 2021 01:36 PM    GFR est non-AA 46 (L) 2021 01:36 PM      Lab Results   Component Value Date/Time    TSH 1.680 09/10/2020 01:54 PM    TSH 2.230 2020 03:47 PM         Neuropathy - With numerous falls and weakness and numbness, sent to neuro - DrWisam Delaney. EMG done recently and showed: Impression: This study was abnormal.  There is electrodiagnostic evidence upon today's examination suggestin. 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 2.  A superimposed chronic right ulnar sensory motor neuropathy across the elbow with no signs of active denervation  3. A superimposed right distal median sensory motor neuropathy across the wrist as can be seen in a moderate grade right-sided carpal tunnel syndrome. 4.  There was no evidence of a myopathy or radiculopathy with active denervation. Clinical correlation is recommended.           CT C spine 3/2020:   The alignment is within normal limits. There is no fracture or subluxation. The odontoid process is intact. The craniocervical junction is within normal limits. The prevertebral soft tissues are within normal limits.   C2-C3: No significant canal stenosis or foraminal narrowing.   C3-C4: Posterior disc osteophyte complex with moderate canal stenosis. Moderate bilateral foraminal narrowing.   C4-C5: Posterior disc osteophyte complex and facet arthrosis with probable severe canal stenosis.  Moderate to severe right and moderate left foraminal narrowing.   C5-C6: Posterior disc osteophyte complex with moderate canal stenosis and moderate to severe left foraminal narrowing. Moderate right foraminal narrowing.   C6-C7: Posterior disc osteophyte complex with moderate bilateral foraminal narrowing and moderate canal stenosis.   C7-T1: Posterior disc osteophyte complex without canal stenosis or foraminal narrowing   Incidental note is made of bilateral cervical ribs. There are calcified nodules in the bilateral thyroid gland.     IMPRESSION  IMPRESSION:  1. No evidence of acute fracture or subluxation.     2. Extensive multilevel degenerative disc disease with canal stenosis and foraminal narrowing.     3.  Calcified thyroid nodules    ROS  Gen - no fever/chills  Resp - no dyspnea or cough  CV - no chest pain or WORKMAN  Rest per HPI    Blood pressure 139/71, pulse 86, temperature 96.9 °F (36.1 °C), temperature source Oral, resp. rate 18, height 5' 4\" (1.626 m), weight 194 lb (88 kg), SpO2 96 %. PE  General appearance - alert, well appearing, and in no distress  Eyes -sclera anicteric  Neck - supple, no significant adenopathy, no thyromegaly  Chest - clear to auscultation, no wheezes, rales or rhonchi, symmetric air entry  Heart - normal rate, regular rhythm, normal S1, S2, no murmurs, rubs, clicks or gallops  Neurological - alert, oriented, normal speech, no focal findings or movement disorder noted  Extr - no edema  Psych - normal mood and affect      On this date 08/27/2021 I have spent 45 minutes reviewing previous notes, test results and face to face with the patient discussing the diagnosis and importance of compliance with the treatment plan as well as documenting on the day of the visit. An electronic signature was used to authenticate this note.   -- Nany Hermosillo MD         This is the Subsequent Medicare Annual Wellness Exam, performed 12 months or more after the Initial AWV or the last Subsequent AWV    I have reviewed the patient's medical history in detail and updated the computerized patient record. Assessment/Plan   Education and counseling provided:  Are appropriate based on today's review and evaluation    1. Medicare annual wellness visit, subsequent  2. RLS (restless legs syndrome)  -     gabapentin (NEURONTIN) 300 mg capsule; TAKE 1 CAPSULE IN THE MORNING AND TAKE 3 CAPSULES BEFORE BEDTIME, Normal, Disp-120 Capsule, R-2Pls delete any prev gabapentin rx on file. thanks  3. Lumbar radiculopathy  -     REFERRAL TO PHYSICAL THERAPY  -     gabapentin (NEURONTIN) 300 mg capsule; TAKE 1 CAPSULE IN THE MORNING AND TAKE 3 CAPSULES BEFORE BEDTIME, Normal, Disp-120 Capsule, R-2Pls delete any prev gabapentin rx on file. thanks  -     MRI LUMB SPINE W WO CONT; Future  -     traMADoL (ULTRAM) 50 mg tablet; Take 1 Tablet by mouth daily as needed for Pain for up to 30 days. , Normal, Disp-30 Tablet, R-0  4. Essential hypertension  5. Rheumatoid arthritis of multiple sites with negative rheumatoid factor (HCC)  -     HEMOGLOBIN A1C WITH EAG; Future  -     CBC WITH AUTOMATED DIFF; Future  -     METABOLIC PANEL, COMPREHENSIVE; Future  -     SED RATE (ESR); Future  -     C REACTIVE PROTEIN, QT; Future  6. Status post gastric bypass for obesity  7. Spinal stenosis of lumbar region without neurogenic claudication  -     REFERRAL TO PHYSICAL THERAPY  -     MRI LUMB SPINE W WO CONT; Future  8. Coronary artery disease due to lipid rich plaque  9. Status post angioplasty with stent  10. Mixed hyperlipidemia  -     HEMOGLOBIN A1C WITH EAG; Future  11. S/P TKR (total knee replacement), bilateral  -     REFERRAL TO PHYSICAL THERAPY  12. Type 2 diabetes mellitus without complication, without long-term current use of insulin ([facility])  -     MICROALBUMIN, UR, RAND W/ MICROALB/CREAT RATIO; Future  -     HEMOGLOBIN A1C WITH EAG; Future  13. Long-term use of immunosuppressant medication  -     MICROALBUMIN, UR, RAND W/ MICROALB/CREAT RATIO; Future  -     HEMOGLOBIN A1C WITH EAG;  Future  -     CBC WITH AUTOMATED DIFF; Future  -     METABOLIC PANEL, COMPREHENSIVE; Future  -     SED RATE (ESR); Future  -     C REACTIVE PROTEIN, QT; Future  14. Right carpal tunnel syndrome  15. Screen for colon cancer  -     COLOGUARD TEST (FECAL DNA COLORECTAL CANCER SCREENING); Future       Depression Risk Factor Screening     3 most recent PHQ Screens 8/27/2021   Little interest or pleasure in doing things Several days   Feeling down, depressed, irritable, or hopeless Several days   Total Score PHQ 2 2   Trouble falling or staying asleep, or sleeping too much -   Feeling tired or having little energy -   Poor appetite, weight loss, or overeating -   Feeling bad about yourself - or that you are a failure or have let yourself or your family down -   Trouble concentrating on things such as school, work, reading, or watching TV -   Moving or speaking so slowly that other people could have noticed; or the opposite being so fidgety that others notice -   Thoughts of being better off dead, or hurting yourself in some way -   PHQ 9 Score -   How difficult have these problems made it for you to do your work, take care of your home and get along with others -       Alcohol Risk Screen    Do you average more than 1 drink per night or more than 7 drinks a week:  No    On any one occasion in the past three months have you have had more than 3 drinks containing alcohol:  No        Functional Ability and Level of Safety    Hearing: The patient wears hearing aids. but recently lost these. Activities of Daily Living: The home contains: no safety equipment. Patient does total self care      Ambulation: with no difficulty     Fall Risk:  Fall Risk Assessment, last 12 mths 8/27/2021   Able to walk? Yes   Fall in past 12 months? 1   Do you feel unsteady? 1   Are you worried about falling 0   Is TUG test greater than 12 seconds? 0   Is the gait abnormal? 0   Number of falls in past 12 months 2   Fall with injury?  1      Abuse Screen:  Patient is not abused Cognitive Screening    Has your family/caregiver stated any concerns about your memory: no     Cognitive Screening: Normal - Verbal Fluency Test    Health Maintenance Due     Health Maintenance Due   Topic Date Due    COVID-19 Vaccine (1) Never done    DTaP/Tdap/Td series (1 - Tdap) Never done    Colorectal Cancer Screening Combo  Never done    Shingrix Vaccine Age 50> (1 of 2) Never done    Breast Cancer Screen Mammogram  10/08/2019    Foot Exam Q1  01/17/2020    Eye Exam Retinal or Dilated  07/21/2021    MICROALBUMIN Q1  08/06/2021       Patient Care Team   Patient Care Team:  Danielle Mnan MD as PCP - General (Family Medicine)  Danielle Mann MD as PCP - [facility] Provider  Risa Augustin MD (Rheumatology)  Moriah Huertas DPM (Podiatry)  Americo Muller RN as Ambulatory Care Manager  Gracia Higuera DO (Orthopedic Surgery)    History     Patient Active Problem List   Diagnosis Code    Long-term use of immunosuppressant medication Z79.899    Osteopenia of multiple sites M85.89    Vitamin D deficiency E55.9    Rheumatoid arthritis involving multiple sites ([address] M06.9    Essential hypertension I10    Mixed hyperlipidemia E78.2    RLS (restless legs syndrome) G25.81    Mild intermittent asthma J45.20    Status post angioplasty with stent Z95.820    Coronary artery disease due to lipid rich plaque I25.10, I25.83    Seronegative rheumatoid arthritis of multiple sites ([address] M06.09    Severe obesity ([address] E66.01    MGUS (monoclonal gammopathy of unknown significance) D47.2    Type 2 diabetes mellitus without complication ([address] H35.4     Past Medical History:   Diagnosis Date    Asthma     At risk for sleep apnea 11/11/2019    [employer] 4 - Sleep study in January per patient     Chronic pain 5/8/2020    DDD (degenerative disc disease), lumbar     Diabetes ([address]     Gastritis     GERD (gastroesophageal reflux disease)     Glaucoma     Hearing loss 5/8/2020    Hiatal hernia     High cholesterol     Hypertension     Hypocalcemia 5/8/2020    Peripheral vascular disease (HCC)     RA (rheumatoid arthritis) (HCC)     Sarcoidosis 1999    [facility]      Past Surgical History:   Procedure Laterality Date    HX GASTRIC BYPASS      HX HEART CATHETERIZATION      s/p PCI with stenting in 1998     HX KNEE REPLACEMENT Bilateral     HX SHOULDER REPLACEMENT Right     x 2      Current Outpatient Medications   Medication Sig Dispense Refill    gabapentin (NEURONTIN) 300 mg capsule TAKE 1 CAPSULE IN THE MORNING AND TAKE 3 CAPSULES BEFORE BEDTIME 120 Capsule 2    traMADoL (ULTRAM) 50 mg tablet Take 1 Tablet by mouth daily as needed for Pain for up to 30 days. 30 Tablet 0    latanoprost (XALATAN) 0.005 % ophthalmic solution INSTILL 1 DROP INTO BOTH EYES NIGHTLY 2.5 mL 0    metoprolol tartrate (LOPRESSOR) 25 mg tablet TAKE 1 TABLET BY MOUTH TWICE A  Tablet 1    simvastatin (ZOCOR) 20 mg tablet TAKE BY MOUTH NIGHTLY. 90 Tablet 1    omeprazole (PRILOSEC) 20 mg capsule Take 1 Capsule by mouth daily. 90 Capsule 0    sucralfate (Carafate) 1 gram tablet Take 1 Tab by mouth Before breakfast, lunch, and dinner. 30 Tab 0    albuterol sulfate (PROVENTIL;VENTOLIN) 2.5 mg/0.5 mL nebu nebulizer solution 0.5 mL by Nebulization route every four (4) hours as needed for Wheezing. Indications: asthma attack 30 mL 0    fluticasone furoate-vilanteroL (Breo Ellipta) 100-25 mcg/dose inhaler TAKE 1 PUFF BY MOUTH EVERY DAY 60 Inhaler 2    ergocalciferol (ERGOCALCIFEROL) 1,250 mcg (50,000 unit) capsule Take 1 Cap by mouth every seven (7) days. Indications: vitamin D deficiency (high dose therapy) 12 Cap 3    sertraline (ZOLOFT) 50 mg tablet TAKE 1 TABLET BY MOUTH EVERY DAY 90 Tab 1    metFORMIN (GLUCOPHAGE) 500 mg tablet Take 1 Tab by mouth daily (with dinner).  Decreased dose 90 Tab 0    leflunomide (ARAVA) 20 mg tablet TAKE 1 TABLET BY MOUTH EVERY DAY 90 Tab 1    tofacitinib 11 mg Tb24 Take 11 mg by mouth daily. Indications: rheumatoid arthritis 30 Tab 11    furosemide (LASIX) 20 mg tablet TAKE 1 TABLET BY MOUTH EVERY DAY 90 Tab 0    acetaminophen (Acetaminophen Extra Strength) 500 mg tablet Take 2 Tabs by mouth every six (6) hours as needed for Pain. 20 Tab 0    nortriptyline (PAMELOR) 50 mg capsule TAKE ONE CAPSULE EVERY DAY AT BEDTIME      aspirin delayed-release 81 mg tablet Take  by mouth daily. Allergies   Allergen Reactions    Lisinopril Rash    Methotrexate Hives       Family History   Problem Relation Age of Onset    Heart Disease Mother     Liver Disease Father     Alcohol abuse Brother     Dementia Neg Hx     Cancer Neg Hx     Seizures Neg Hx      Social History     Tobacco Use    Smoking status: Never Smoker    Smokeless tobacco: Never Used   Substance Use Topics    Alcohol use:  Yes     Alcohol/week: 2.0 standard drinks     Types: 1 Glasses of wine, 1 Shots of liquor per week     Comment: 2-3 yearly         Leodan Purvis MD

## 2021-08-31 ENCOUNTER — OFFICE VISIT (OUTPATIENT)
Dept: RHEUMATOLOGY | Age: 76
End: 2021-08-31

## 2021-08-31 VITALS
SYSTOLIC BLOOD PRESSURE: 113 MMHG | TEMPERATURE: 98.1 F | DIASTOLIC BLOOD PRESSURE: 68 MMHG | WEIGHT: 182 LBS | HEART RATE: 92 BPM | RESPIRATION RATE: 18 BRPM | BODY MASS INDEX: 31.24 KG/M2

## 2021-08-31 DIAGNOSIS — M06.09 SERONEGATIVE RHEUMATOID ARTHRITIS OF MULTIPLE SITES (HCC): ICD-10-CM

## 2021-08-31 PROCEDURE — G8536 NO DOC ELDER MAL SCRN: HCPCS | Performed by: INTERNAL MEDICINE

## 2021-08-31 PROCEDURE — G8417 CALC BMI ABV UP PARAM F/U: HCPCS | Performed by: INTERNAL MEDICINE

## 2021-08-31 PROCEDURE — G8432 DEP SCR NOT DOC, RNG: HCPCS | Performed by: INTERNAL MEDICINE

## 2021-08-31 PROCEDURE — 3017F COLORECTAL CA SCREEN DOC REV: CPT | Performed by: INTERNAL MEDICINE

## 2021-08-31 PROCEDURE — 1090F PRES/ABSN URINE INCON ASSESS: CPT | Performed by: INTERNAL MEDICINE

## 2021-08-31 PROCEDURE — G8399 PT W/DXA RESULTS DOCUMENT: HCPCS | Performed by: INTERNAL MEDICINE

## 2021-08-31 PROCEDURE — G8754 DIAS BP LESS 90: HCPCS | Performed by: INTERNAL MEDICINE

## 2021-08-31 PROCEDURE — 3288F FALL RISK ASSESSMENT DOCD: CPT | Performed by: INTERNAL MEDICINE

## 2021-08-31 PROCEDURE — G8752 SYS BP LESS 140: HCPCS | Performed by: INTERNAL MEDICINE

## 2021-08-31 PROCEDURE — G9899 SCRN MAM PERF RSLTS DOC: HCPCS | Performed by: INTERNAL MEDICINE

## 2021-08-31 PROCEDURE — 99215 OFFICE O/P EST HI 40 MIN: CPT | Performed by: INTERNAL MEDICINE

## 2021-08-31 PROCEDURE — G8427 DOCREV CUR MEDS BY ELIG CLIN: HCPCS | Performed by: INTERNAL MEDICINE

## 2021-08-31 PROCEDURE — 1100F PTFALLS ASSESS-DOCD GE2>/YR: CPT | Performed by: INTERNAL MEDICINE

## 2021-08-31 RX ORDER — LEFLUNOMIDE 20 MG/1
TABLET ORAL
Qty: 90 TABLET | Refills: 1 | Status: SHIPPED | OUTPATIENT
Start: 2021-08-31

## 2021-08-31 NOTE — LETTER
8/31/2021    Patient: Patrick Levin   YOB: 1945   Date of Visit: 8/31/2021     [place] [id number]  Via In Basket    Dear Oly Lee MD,      Thank you for referring Ms. Patrick Levin to [facility] for evaluation. My notes for this consultation are attached. If you have questions, please do not hesitate to call me. I look forward to following your patient along with you.       Sincerely,    Jenny Walsh MD

## 2021-08-31 NOTE — PROGRESS NOTES
REASON FOR VISIT    This is a follow-up visit for Ms. Demetrius Gill for     ICD-10-CM   1. Seronegative rheumatoid arthritis of multiple sites ([facility] M06.09     Inflammatory arthritis phenotype includes:  Anti-CCP positive: no  Rheumatoid factor positive: no  Erosive disease: no  Extra-articular manifestations include: MGUS    Immunosuppression Screening (9/10/2020):  Quantiferon TB: negative  PPD:  Not performed  Hepatitis B: negative  Hepatitis C: negative    Therapy History includes:  Current DMARD therapy includes: leflunomide 20 mg daily (8/19/2019 to present), Yudy Waterbury 11 mg XR daily (9/22/2020 to present)  Prior DMARD therapy includes: methotrexate 20 mg every Saturday, methotrexate 20 mg SubQ every Saturday (5/23/2019 to 7/29/2019) Simponi Aria (11/29/2018 to 5/23/2019), Humira 40 mg every 14 days (8/03/2019 to 11/22/2019), , Humira 40 mg every Thursday (11/22/2019 to 8/31/2020)  The following DMARDs have been ineffective: Simponi Aria, Humira every 14 days, Humira every 7 days  The following DMARDs were stopped because of side effects: methotrexate 20 mg SubQ (aphthous ulcer, injection site breakdown)  Contra-Indicated DMARDs because of history of diverticulitis: Actemra, Kevzara    HISTORY OF PRESENT ILLNESS    Ms. Demetrius Gill returns for a follow-up. On her last visit, I continued leflunomide 20 mg daily and Xeljanz 11 mg XR daily, which she has taken with good tolerance. Duet to sciatica, I referred her to Dr. Michelet Child and gave her prednisone 20 mg daily for 7 days. She saw Dr. Rylan Kendall who ordered a lumbar MRI scheduled for 9/05/2021    Today, she feels good. She complains of legs giving out on her. . She has swelling in her hands with stiffness lasting 2 to 3 3 hours. She endorses interval fall without fracture.     She denies fever, weight loss, blurred vision, vision loss, oral ulcers, ankle swelling, dry cough, dyspnea, nausea, vomiting, dysphagia, abdominal pain, black or bloody stool, rash, easy bruising and increased thirst.    Most recent toxicity monitoring by blood work was done on 4/13/2021 and did not reveal any significant adverse effects, except creatinine 1.17 mg/dL, eGFR 53. She had labs drawn today. I reviewed the patient's interval medical history, surgical history, medications, and allergies. The patient has not had any interval hospital admissions, infections, or surgeries. REVIEW OF SYSTEMS    A comprehensive review of systems was performed and pertinent results are documented in the HPI, review of systems is otherwise non-contributory. PAST MEDICAL HISTORY    She has a past medical history of Asthma, At risk for sleep apnea (11/11/2019), Chronic pain (5/8/2020), DDD (degenerative disc disease), lumbar, Diabetes ([facility] [address] Gastritis, GERD (gastroesophageal reflux disease), Glaucoma, Hearing loss (5/8/2020), Hiatal hernia, High cholesterol, Hypertension, Hypocalcemia (5/8/2020), Peripheral vascular disease ([address] RA (rheumatoid arthritis) ([facility] [address] and Sarcoidosis (1999). FAMILY HISTORY    Her family history includes Alcohol abuse in her brother; Heart Disease in her mother; Liver Disease in her father. SOCIAL HISTORY    She reports that she has never smoked. She has never used smokeless tobacco. She reports current alcohol use of about 2.0 standard drinks of alcohol per week. She reports that she does not use drugs. MEDICATIONS    Current Outpatient Medications   Medication Sig    leflunomide (ARAVA) 20 mg tablet TAKE 1 TABLET BY MOUTH EVERY DAY    gabapentin (NEURONTIN) 300 mg capsule TAKE 1 CAPSULE IN THE MORNING AND TAKE 3 CAPSULES BEFORE BEDTIME    traMADoL (ULTRAM) 50 mg tablet Take 1 Tablet by mouth daily as needed for Pain for up to 30 days.  latanoprost (XALATAN) 0.005 % ophthalmic solution INSTILL 1 DROP INTO BOTH EYES NIGHTLY    metoprolol tartrate (LOPRESSOR) 25 mg tablet TAKE 1 TABLET BY MOUTH TWICE A DAY    simvastatin (ZOCOR) 20 mg tablet TAKE BY MOUTH NIGHTLY.     omeprazole (PRILOSEC) 20 mg capsule Take 1 Capsule by mouth daily.  sucralfate (Carafate) 1 gram tablet Take 1 Tab by mouth Before breakfast, lunch, and dinner.  albuterol sulfate (PROVENTIL;VENTOLIN) 2.5 mg/0.5 mL nebu nebulizer solution 0.5 mL by Nebulization route every four (4) hours as needed for Wheezing. Indications: asthma attack    fluticasone furoate-vilanteroL (Breo Ellipta) 100-25 mcg/dose inhaler TAKE 1 PUFF BY MOUTH EVERY DAY    ergocalciferol (ERGOCALCIFEROL) 1,250 mcg (50,000 unit) capsule Take 1 Cap by mouth every seven (7) days. Indications: vitamin D deficiency (high dose therapy)    sertraline (ZOLOFT) 50 mg tablet TAKE 1 TABLET BY MOUTH EVERY DAY    metFORMIN (GLUCOPHAGE) 500 mg tablet Take 1 Tab by mouth daily (with dinner). Decreased dose    tofacitinib 11 mg Tb24 Take 11 mg by mouth daily. Indications: rheumatoid arthritis    furosemide (LASIX) 20 mg tablet TAKE 1 TABLET BY MOUTH EVERY DAY    nortriptyline (PAMELOR) 50 mg capsule TAKE ONE CAPSULE EVERY DAY AT BEDTIME    aspirin delayed-release 81 mg tablet Take  by mouth daily. No current facility-administered medications for this visit. ALLERGIES    Allergies   Allergen Reactions    Lisinopril Rash    Methotrexate Hives     PHYSICAL EXAMINATION    Visit Vitals  /68   Pulse 92   Temp 98.1 °F (36.7 °C)   Resp 18   Wt 182 lb (82.6 kg)   BMI 31.24 kg/m²     Body mass index is 31.24 kg/m². General: Patient is alert, oriented x 3, not in acute distress    HEENT:   Sclerae are not injected and appear moist.  There is no alopecia. Cardiovascular:  Heart is regular rate and rhythm, no murmurs. Chest:  Lungs are clear to auscultation bilaterally. No rhonchi, wheezes, or crackles.     Extremities:  Free of clubbing, cyanosis, edema    Neurological exam:  Muscle strength is full in upper and lower extremities     Skin exam:  There are no alopecia, no discoid lesions, no active Raynaud's, no livedo reticularis, no periungual erythema. Musculoskeletal exam:  A comprehensive musculoskeletal exam was performed for all joints of each upper and lower extremity and assessed for swelling, tenderness and range of motion. Positive results are documented as below:    Decreased ROM of bilateral shoulders (right worse than left) due to pain  Bilateral knee replacements without effusion.   Bilateral ankle swelling with tenderness (RESOLVED)  Right foot exam deferred due to post-surgical boot placed    Z-Deformities:   no  Dubois Neck Deformities:  no  Boutonierre's Deformities:  no  Ulnar Deviation:   Yes (LEFT: 3rd digit)  MCP Subluxation:  no     Joint Count 8/31/2021 4/14/2021 2/24/2020 11/22/2019 8/23/2019 5/23/2019 2/18/2019   Patient pain (0-100) 85 95 75 45 40 50 60   MHAQ 0.875 1.125 1.25 0.625 0.75 0.75 0.75   Left shoulder - Tender - - - - - - -   Left shoulder - Swollen - - - - - - -   Left elbow - Tender - - 1 1 - 1 -   Left elbow - Swollen - - 0 0 - 0 -   Left wrist- Tender 0 - 1 1 1 1 1   Left wrist- Swollen 1 - 1 1 1 1 1   Left 1st MCP - Tender - - 0 1 1 1 1   Left 1st MCP - Swollen - - 1 1 1 1 1   Left 2nd MCP - Tender 1 0 1 1 1 1 1   Left 2nd MCP - Swollen 1 1 1 1 1 1 1   Left 3rd MCP - Tender - 1 1 1 1 1 0   Left 3rd MCP - Swollen - 1 1 1 1 1 1   Left 4th MCP - Tender - - 1 - 1 1 1   Left 4th MCP - Swollen - - 1 - 1 0 1   Left 5th MCP - Tender 1 0 1 1 1 1 1   Left 5th MCP - Swollen 0 1 0 1 0 1 1   Left thumb IP - Tender - - - - 1 1 1   Left thumb IP - Swollen - - - - 1 0 1   Left 2nd PIP - Tender 0 1 1 1 1 1 1   Left 2nd PIP - Swollen 1 1 1 1 1 1 1   Left 3rd PIP - Tender 1 1 1 1 1 1 1   Left 3rd PIP - Swollen 1 1 1 1 1 1 1   Left 4th PIP - Tender 1 1 1 1 1 1 1   Left 4th PIP - Swollen 0 1 1 1 1 1 1   Left 5th PIP - Tender 1 - 1 1 1 1 1   Left 5th PIP - Swollen 0 - 1 1 1 0 -   Left knee - Tender - - - - - - -   Left knee - Swollen - - - - - - -   Right shoulder - Tender - - - - - - -   Right elbow - Tender - - - 1 - 1 - Right elbow - Swollen - - - 0 - 0 -   Right wrist- Tender 1 0 1 1 1 1 1   Right wrist- Swollen 1 1 1 1 1 1 1   Right 1st MCP - Tender - 1 - 1 1 1 1   Right 1st MCP - Swollen - 1 - 0 1 1 0   Right 2nd MCP - Tender - - 1 1 1 1 1   Right 2nd MCP - Swollen - - 1 1 1 1 1   Right 3rd MCP - Tender 1 0 1 1 1 1 1   Right 3rd MCP - Swollen 1 - 1 1 1 1 1   Right 4th MCP - Tender - - 1 1 1 1 1   Right 4th MCP - Swollen - - 1 1 1 1 1   Right 5th MCP - Tender 0 0 1 1 1 1 1   Right 5th MCP - Swollen 1 1 1 1 1 1 1   Right thumb IP - Tender - - - - 1 - -   Right thumb IP - Swollen - 1 - - 0 - -   Right 2nd PIP - Tender 1 1 1 1 1 1 1   Right 2nd PIP - Swollen 1 1 1 1 1 1 1   Right 3rd PIP - Tender 1 1 1 1 1 1 1   Right 3rd PIP - Swollen 1 1 1 1 1 1 1   Right 4th PIP - Tender 0 1 1 1 1 1 1   Right 4th PIP - Swollen 1 1 1 1 1 1 1   Right 5th PIP - Tender - 0 1 1 1 1 1   Right 5th PIP - Swollen - 1 1 1 1 1 1   Right knee - Tender - - - - - - 1   Tender Joint Count (Total) 9 8 19 21 22 23 21   Swollen Joint Count (Total) 10 14 18 18 20 18 19   Physician Assessment (0-10) - 3 6 5 4 5 6   Patient Assessment (0-10) 9 10 7 5.5 6.5 5 7   CDAI Total (calculated) - 35 50 49.5 52.5 51 48     DATA REVIEW    Laboratory     Recent laboratory results were reviewed, summarized, and discussed with the patient. Imaging    Musculoskeletal Ultrasound    None    Radiographs    Bilateral Knee 8/18/2020: LEFT: nonconstrained cemented 3 component total arthroplasty of the left knee. There is no acute fracture or dislocation demonstrated. There is mild diffuse thinning of the medial and lateral compartment joint spaces suggesting wearing of the polyethylene component. There is metal-bone interface lucency demonstrated anteriorly at the femoral component measuring up to 5 mm. There is cement-bone interface lucency at the lateral phalangeal of the tibial component measuring 2 to 3 mm.  There is interface lucency of the distal peg of the tibial component measuring up to 4 mm. Extensive atherosclerotic calcifications are shown. RIGHT: a nonconstrained cemented 3 component total knee arthroplasty without demonstration of osseous or component fracture, displacement or dislocation. There is no metal-cement or cement-bone interface lucency is shown. There is uniform thickness of the polyethylene spacer. No substantial knee effusion is demonstrated. Extensive atherosclerotic calcifications are again noted. Sacrum and Coccyx 9/18/2018: no definite fracture or dislocation, however osteopenia and overlapping bowel gas somewhat limited evaluation of the sacrum. Severe multilevel degenerative disc disease is noted in the lower lumbar spine. Dense arterial vascular calcifications are present. Bilateral Hips 9/18/2018: no acute fracture or dislocation. Evaluation of the sacrum is limited by overlying bowel gas and osteopenia. Within these limitations, no fractures identified. Degenerative changes are present in the lower lumbar spine. Dense arterial vascular calcifications are present. Bilateral Hand 8/22/2018: moderate to severe diffuse osteopenia. Extensive atherosclerotic calcifications are shown extending into the fingers bilaterally. Fusiform soft tissue swelling is shown bilaterally throughout the metacarpophalangeal and proximal interphalangeal joints. There is mild joint space narrowing on the left throughout the metacarpophalangeal joints and on the right in the third through fifth metacarpophalangeal joints. Subtle marginal erosions are demonstrated at the lateral base of the right ring finger proximal phalanx and more extensively in the third through fifth left MCP joints. There are minimal-mild features of osteoarthritis at several DIP joints bilaterally. No substantial carpal joint space narrowing is shown. Calcium pyrophosphate dihydrate position is in the medial carpus bilaterally. Bilateral Foot 8/22/2018: moderate to severe diffuse osteopenia. Extensive atherosclerotic calcifications are shown extending into the digits. Bilateral talonavicular, naviculocuneiform and left greater than right diffuse tarsometatarsal joint space narrowing is noted with small marginal osteophytes. No substantial digital joint space narrowing is evident though fusiform soft tissue swelling is suggested at the metatarsophalangeal joints bilaterally. Moderate bilateral plantar and dorsal calcaneal spurs are shown. Bilateral Shoulder 9/08/2011: RIGHT: no evidence of fracture or dislocation. Alignment of the glenohumeral joint is anatomic. The patient is status post distal clavicle resection and acromioplasty. A small amount of heterotopic ossification is noted about the distal aspect of the clavicle. Mild marginal osteophytes noted at the the glenoid. Irregularity along the superolateral aspect of the humeral head may reflect rotator cuff pathology. An ovoid well-corticated calcific density projecting over the humeral head may represent an intraventricular loose body. The visualized right lung is clear. Soft tissues are unremarkable. LEFT: no evidence of fracture or dislocation. Alignment of the glenohumeral and acromioclavicular joints is anatomic. Moderate superiorly projecting osteophytes emanate from the distal clavicle at the acromioclavicular joint. Bony hypertrophy irregularity about the greater tuberosity may indicate chronic rotator cuff pathology. There is a small subacromial spur. The visualized left lung is clear. Multiple mediastinal clips are noted. Bone mineralization is mildly diminished. Right Hip 8/03/2011: there is diffuse osteopenia. Degenerative changes noted in the lower lumbar spine. The SI joints and pubic symphysis are not widened. The right and left hip are normally aligned. The right hip demonstrates mild osteophyte formation most notable at the inferior aspect of the joint. There is minimal diffuse joint space narrowing.  Similar changes are noted in the left hip with minimal osteophyte formation. There is preservation of the left hip joint space. No acute fractures. Dense vascular calcifications are noted. Soft tissue calcification lateral to the right iliac wing is visualized and was noted on prior abdominal and pelvic CT scan. Hypertrophic changes anterior/superior left iliac wing and left greater trochanter as before. Bilateral Hand 9/27/2010: Overall alignment is anatomic. The bones are demineralized overall. The joint spaces are relatively preserved throughout. Relatively mild degenerative changes are present in the DIP joints, and in the first CMC joints. There appears to be some mild soft tissue swelling overlying the right MCP joints. There are also several equivocal erosions noted involving the metacarpal heads of the right hand which raise the possibility of early erosions related to inflammatory arthritis such as rheumatoid. These changes appear slightly more conspicuous and the 2008 examination. Incidentally, there are fairly prominent vascular calcifications present indicating that the patient is likely diabetic or perhaps as renal disease    Bilateral Knee 8/18/2010:  irregularity along the patella surface where there is a scalloped margin suggesting liner wear. In addition, the cement seen along the bone prosthetic interface of the anterior distal femoral cortex is less apparent, which also may reflect underlying liner wear and possibly early particulate disease. Heterotopic ossification has not changed in interval. Vascular calcifications reflecting atherosclerotic disease remains severe. Diffuse osteopenia is noted. CT Imaging    CT Head without contrast 3/24/2020: The ventricles and sulci are normal in size, shape and configuration and midline. Subcortical and deep white matter hypodensities. . There is a possible calcified pleural-based mass in the left frontal parietal region which is unchanged and may represent a small meningioma. There is no intracranial hemorrhage, extra-axial collection, mass, mass effect or midline shift. The basilar cisterns are open. No acute infarct is identified. The bone windows demonstrate no abnormalities. The visualized portions of the paranasal sinuses and mastoid air cells are clear. CT Cervical Spine without contrast 3/24/2020: The alignment is within normal limits. There is no fracture or subluxation. The odontoid process is intact. The craniocervical junction is within normal limits. The prevertebral soft tissues are within normal limits. C2-C3: No significant canal stenosis or foraminal narrowing. C3-C4: Posterior disc osteophyte complex with moderate canal stenosis. Moderate bilateral foraminal narrowing. C4-C5: Posterior disc osteophyte complex and facet arthrosis with probable severe canal stenosis. Moderate to severe right and moderate left foraminal narrowing. C5-C6: Posterior disc osteophyte complex with moderate canal stenosis and moderate to severe left foraminal narrowing. Moderate right foraminal narrowing. C6-C7: Posterior disc osteophyte complex with moderate bilateral foraminal narrowing and moderate canal stenosis. C7-T1: Posterior disc osteophyte complex without canal stenosis or foraminal narrowing. Incidental note is made of bilateral cervical ribs. There are calcified nodules in the bilateral thyroid gland. CT Right Shoulder with arthrogram 9/26/2011: there is a large full-thickness tear seen involving the supraspinatus tendon extending to the level of the acromion. Additionally there is a partial tear of the subscapularis tendon. The biceps appears chronically torn. There are mild degenerative changes of the acromioclavicular joint. Some mild osteoarthritic changes are present of the glenohumeral joint. No evidence of fracture was seen.     MR Imaging    None    DXA     DXA 9/04/2020: (excluded L3 and L4 due to spondylosis) lumbar spine L1-L2 T score -1.1 (BMD 1.038 g/cm2), left femoral neck T score: -1.2 (0.876 g/cm2), left total hip T score: -0.4 (0.962 g/cm2), right femoral neck T score: -1.3 (0.862 g/cm2), right total hip T score: -1.2 (0.857 g/cm2), and distal one third left radius T score -1.8 (BMD 0.715 g/cm2). FRAX score 10.8 % probability in 10 years for major osteoporotic fracture and 1.6 % 10 year probability of hip fracture. DXA 5/04/2016:  (excluded L4 for spondylitic change, right hip) lumbar spine L1-L3 T score -1.5 (BMD 0.954 g/cm2), left femoral neck T score: -1.0 (0.805 g/cm2), left total hip T score: -0.1 (1.016 g/cm2), and distal one third left radius T score -1.9 (BMD 0.571 g/cm2). FRAX score 6.1 % probability in 10 years for major osteoporotic fracture and 0.3 % 10 year probability of hip fracture. Nuclear Studies    Triple Phase Scan 8/20/0218:  Patient status post bilateral knee replacements. Phase 1 (blood flow): There is slight increased blood flow to the left knee. Phase 2 (blood pool/soft tissue):  There is slight increased peritracheal activity left knee. Phase 3 (delayed bone): Patient status post right knee replacement which is within normal limits. Patient is status post left knee replacement. There are slight increased activity left tibial plateau    PATHOLOGY    Bone, right hallux, biopsy 11/18/2019: No evidence for osteomyelitis. Toe, right hallux, amputation 11/18/2019: Acute osteomyelitis. PROCEDURE    Kenalog 80 mg IA. (5/23/19)   Kenalog 80 mg IA. (02/18/19)   Kenalog 80 mg IA. (11/19/18)     ASSESSMENT AND PLAN    This is a follow-up visit for Ms. Nabila Pineda. 1) Seronegative Rheumatoid Arthritis. She is maintained on leflunomide 20 mg daily and Xeljanz 11 mg XR daily with good tolerance. Her main complaints are her back and knee. 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 Her CDAI was 35 (previously 50, 49.5, 52.5, 51, 53, 61, 63) with 8 tender and 14 swollen joints, consistent with high disease activity. I ordered repeat hand radiographs to assess for erosive osteoarthritis. 2) Long Term Use of Immunosuppressants. The patient remains on high risk immunomodulatory medications (leflunomide, Dai Fennel) and requires frequent toxicity monitoring by blood work to evaluate for toxicities. 3) Vitamin D Deficiency. Her vitamin D was 41.3 (previously 21.1, 24.1, 28.0, 19.3). She is on weekly ergocalciferol 50,000.     4) Osteopenia involving Multiple Sites. Her most recent DXA on 9/04/2020 showed lumbar spine L1-L2 T score -1.1 (BMD 1.038 g/cm2), left femoral neck T score: -1.2 (0.876 g/cm2), left total hip T score: -0.4 (0.962 g/cm2), right femoral neck T score: -1.3 (0.862 g/cm2), right total hip T score: -1.2 (0.857 g/cm2), and distal one third left radius T score -1.8 (BMD 0.715 g/cm2). FRAX score 10.8 % probability in 10 years for major osteoporotic fracture and 1.6 % 10 year probability of hip fracture    5) MGUS. Her M-spike 0.3 (previously 0.2, 0.2, 0.3) with immunofixation shows IgG monoclonal protein with lambda light chain. I had referred her to hematology, Dr. Martin Bender, who noted a low risk for myeloma. 6) Right Low Back Pain with Sciatica. She is following with Dr. Luis Skinner who ordered an MRI of her lumbar spine that was scheduled 9/05/2021 but she cannot do it since it is a Sunday and she does not have transportation. A total of 42 minutes was spent on this visit, reviewing interval notes, interval testing results, ordering tests, refilling medications, documenting the findings in the note, patient education, counseling, and coordination of care as described above. All questions asked and answered. The patient voiced understanding of the aforementioned assessment and plan.     TODAY'S ORDERS    Orders Placed This Encounter    XR HAND RT MIN 3 V    XR HAND LT MIN 3 V    leflunomide (ARAVA) 20 mg tablet     Future Appointments   Date Time Provider Dorothy Timmons   9/17/2021 12:45 PM [facility] MRI 1 [id number]   12/2/2021 10:20 AM Vinay Nelson MD AOCR BS AMB     Ronaldo Melo MD, [address] [facility]    Adult Rheumatology   Rheumatology Ultrasound Certified  [id number]  [facility], [facility], [address]   Phone [phone]  Fax [phone]

## 2021-09-01 LAB
ALBUMIN SERPL-MCNC: 3.7 G/DL (ref 3.7–4.7)
ALBUMIN/GLOB SERPL: 1.4 {RATIO} (ref 1.2–2.2)
ALP SERPL-CCNC: 83 IU/L (ref 48–121)
ALT SERPL-CCNC: 11 IU/L (ref 0–32)
AST SERPL-CCNC: 30 IU/L (ref 0–40)
BASOPHILS # BLD AUTO: 0.1 X10E3/UL (ref 0–0.2)
BASOPHILS NFR BLD AUTO: 2 %
BILIRUB SERPL-MCNC: 0.6 MG/DL (ref 0–1.2)
BUN SERPL-MCNC: 16 MG/DL (ref 8–27)
BUN/CREAT SERPL: 18 (ref 12–28)
CALCIUM SERPL-MCNC: 8.6 MG/DL (ref 8.7–10.3)
CHLORIDE SERPL-SCNC: 110 MMOL/L (ref 96–106)
CO2 SERPL-SCNC: 20 MMOL/L (ref 20–29)
CREAT SERPL-MCNC: 0.89 MG/DL (ref 0.57–1)
CRP SERPL-MCNC: 2 MG/L (ref 0–10)
EOSINOPHIL # BLD AUTO: 0.1 X10E3/UL (ref 0–0.4)
EOSINOPHIL NFR BLD AUTO: 2 %
ERYTHROCYTE [DISTWIDTH] IN BLOOD BY AUTOMATED COUNT: 13.2 % (ref 11.7–15.4)
ERYTHROCYTE [SEDIMENTATION RATE] IN BLOOD BY WESTERGREN METHOD: 44 MM/HR (ref 0–40)
EST. AVERAGE GLUCOSE BLD GHB EST-MCNC: 97 MG/DL
GLOBULIN SER CALC-MCNC: 2.7 G/DL (ref 1.5–4.5)
GLUCOSE SERPL-MCNC: 108 MG/DL (ref 65–99)
HBA1C MFR BLD: 5 % (ref 4.8–5.6)
HCT VFR BLD AUTO: 34 % (ref 34–46.6)
HGB BLD-MCNC: 10.6 G/DL (ref 11.1–15.9)
IMM GRANULOCYTES # BLD AUTO: 0 X10E3/UL (ref 0–0.1)
IMM GRANULOCYTES NFR BLD AUTO: 1 %
LYMPHOCYTES # BLD AUTO: 1.2 X10E3/UL (ref 0.7–3.1)
LYMPHOCYTES NFR BLD AUTO: 29 %
MCH RBC QN AUTO: 31.1 PG (ref 26.6–33)
MCHC RBC AUTO-ENTMCNC: 31.2 G/DL (ref 31.5–35.7)
MCV RBC AUTO: 100 FL (ref 79–97)
MONOCYTES # BLD AUTO: 0.5 X10E3/UL (ref 0.1–0.9)
MONOCYTES NFR BLD AUTO: 11 %
NEUTROPHILS # BLD AUTO: 2.3 X10E3/UL (ref 1.4–7)
NEUTROPHILS NFR BLD AUTO: 55 %
PLATELET # BLD AUTO: 284 X10E3/UL (ref 150–450)
POTASSIUM SERPL-SCNC: 4.3 MMOL/L (ref 3.5–5.2)
PROT SERPL-MCNC: 6.4 G/DL (ref 6–8.5)
RBC # BLD AUTO: 3.41 X10E6/UL (ref 3.77–5.28)
SODIUM SERPL-SCNC: 144 MMOL/L (ref 134–144)
WBC # BLD AUTO: 4.2 X10E3/UL (ref 3.4–10.8)

## 2021-09-02 LAB
ALBUMIN/CREAT UR: 74 MG/G CREAT (ref 0–29)
CREAT UR-MCNC: 115.9 MG/DL
MICROALBUMIN UR-MCNC: 85.3 UG/ML

## 2021-09-09 DIAGNOSIS — H40.9 GLAUCOMA, UNSPECIFIED GLAUCOMA TYPE, UNSPECIFIED LATERALITY: ICD-10-CM

## 2021-09-11 RX ORDER — LATANOPROST 50 UG/ML
SOLUTION/ DROPS OPHTHALMIC
Qty: 2.5 ML | Refills: 0 | OUTPATIENT
Start: 2021-09-11

## 2021-09-16 ENCOUNTER — PATIENT OUTREACH (OUTPATIENT)
Dept: CASE MANAGEMENT | Age: 76
End: 2021-09-16

## 2021-09-16 NOTE — PROGRESS NOTES
Goals       assist patient to ensure she is able to obtain electric wheelchair (pt-stated)       9/16/21- electric scooter ordered per Dr Aleks Soares ortho back in April has not been received, electric wheelchair order now reordered by PCP at last visit. ACM following up with Dr Demetrio Mac office to check the status of initial DME order. Left message with Dr Demetrio Mac office today for return call to verify status of DME order. LN        assist patient to get care aide in place (pt-stated)       9/16/21- Patient reports she has a family member who has found a family friend who may be able to act as her personal care aide . She is waiting on a call back to see if she will be able to provide her services. AC requested she contact [facility] as soon as she is aware if she will be able to help so other resources can be provided if she is not available. Patient agrees. Will follow up in 2 weeks. LN     7/12/21- Patient reports out of town visiting her sister who is ill. Has not had a chance to contact [facility] as we discussed to get the list of personal care aide agencies , states she may do this while visiting with her sister this week. AC will follow up in 1-2 weeks to see where she is in this process, she needs assistance at home, needs help with ADL's/ and housekeeping. LN     6/21/21- Patient reports She still has not looked into who previously provided her personal care service. Discussed that personal care aides are limited at this time. Recommend she call [facility] to get a list of personal care aide agencies available and call each one to be placed on their waiting lists for a care aide as this is the fastest way to get an aide at this time and she needs help with ADL's and personal care/ laundry etc to prevent further falls. Provided contact info for [facility] and she agrees to call today to request this information. [facility] will call back next week to see if she has called agencies to be added to their waiting lists for an aide. LN    6/4/21- Patient with recent increase in pain / frequent falls. Increased need for assistance with ADL's / housekeeping etc. Recommended patient look into who serviced her a year ago for care aide services as a care aide agency as they may be able to provide services again. She will look for this info and follow up with ACM on Monday. LN         assist with establishing transportation assistance (pt-stated)       9/16/21- Patient reports she does not have a car and doesn't drive, she often has to walk to the laundromat across the street or to the store with her walker. Do to unsteady gait and falls we discussed this is unsafe. She is not always able to find a ride. ACM will assist patient to apply for transportation assistance through [employer] rides. Appt was set to assist patient with online application on 5/16/97 . Patient agrees to meet with this writer on 9/21/21. LN         reduce risk of falls       9/16/21- Patient reports [facility] was unable to accept her insurance. PT referral has now been sent several times to [facility]. She was notified by PCP's nurse yesterday that referral was faxed to [facility] for PT. She has not received call yet to schedule. She originally had electric scooter ordered in April per Dr Philly Kaplan ortho but had not heard anything re DME and has not followed up with Dr Philly Kaplan. PCP has placed additional order for electric wheelchair . Noted in PCP notes patient with failed hardware in Knee replacements noted in recent imaging. Recommend patient schedule follow up with Dr Philly Kaplan and she agrees to call today to schedule for follow up on bracing as discussed in previous visit as well as on scooter. Patient with back pain as well and going for diagnostic MRI tomorrow per rheumatologist. LN      7/12/21- Patient reports still has not started PT. Reports saw neurology Dr Jan Rose and had EMG which showed neuropathy.  Patient reports currently out of town visiting sister who is sick and will be back in 1-2 weeks. Reports tried calling [facility] to schedule but was told no referral was received. ACM contacted PCP's office / spoke with [facility], Message sent to PCP's nurse requesting a new referral be faxed to [facility] at [phone]. ACM will follow up with patient once she returns to [facility] to ensure she is able to get scheduled. NICOLE       6/21/21- discussed with patient whether she had begun PT yet as our last call she had mentioned she was waiting to hear from them to set it up. She notes she has had several falls recently outside in her yard and walking to her laundry mat. Patient reports still nobody has called. Reviewed ortho and PCP notes and noted ortho ordered in April and PCP noted in May visit. Referral was placed in April By Dr Amilcar Batista to [facility] outpatient PT/OT and patient states that nobody ever called. Recommended patient call to schedule and provided contact info for [facility]. Patient agrees to call tomorrow to schedule. States that [place] [facility] [place]ooter has been calling to ask if she has attended PT yet as she needs to be measured and evaluated by PT for her scooter.  ACM to follow up with her next week to see if she has schedule with PT. NICOLE

## 2021-09-17 ENCOUNTER — HOSPITAL ENCOUNTER (OUTPATIENT)
Dept: MRI IMAGING | Age: 76
Discharge: HOME | End: 2021-09-17
Attending: FAMILY MEDICINE
Payer: MEDICARE

## 2021-09-17 ENCOUNTER — PATIENT OUTREACH (OUTPATIENT)
Dept: CASE MANAGEMENT | Age: 76
End: 2021-09-17

## 2021-09-17 DIAGNOSIS — M48.061 SPINAL STENOSIS OF LUMBAR REGION WITHOUT NEUROGENIC CLAUDICATION: ICD-10-CM

## 2021-09-17 DIAGNOSIS — M54.16 LUMBAR RADICULOPATHY: ICD-10-CM

## 2021-09-17 PROCEDURE — 74011250636 HC RX REV CODE- 250/636: Performed by: FAMILY MEDICINE

## 2021-09-17 PROCEDURE — 72158 MRI LUMBAR SPINE W/O & W/DYE: CPT

## 2021-09-17 PROCEDURE — A9576 INJ PROHANCE MULTIPACK: HCPCS | Performed by: FAMILY MEDICINE

## 2021-09-17 RX ADMIN — GADOTERIDOL 17 ML: 279.3 INJECTION, SOLUTION INTRAVENOUS at 14:02

## 2021-09-17 NOTE — PROGRESS NOTES
Goals Addressed                    This Visit's Progress      assist patient to ensure she is able to obtain electric wheelchair (pt-stated)         9/17/21-ACM received VM from Gurvinder at Dr Laura Martines (ortho surgeon) office stating that patients electric scooter was ordered from [facility] in May. They were under the impression that it was in process and near completed or delivered to the patient. Dia Arechiga states patient has scheduled an upcoming appt with Dr Jonny Marquez where they will plan to follow up on the status and discuss this DME. ACM contacted [facility] to check the status of the scooter. Was notified that patient was required to establish PT for the approval of scooter as no PT was established and Patient was contacted by them x4 they sent a letter and discontinued her order on 8/20/21. She was notified if she started PT they could reopen her order. ACM contacted Dr Julia Crenshaw office to see where current orders for electric w/c were sent so this ACM can follow up to ensure they were received and are in process. Left VM for nurse Lenora Hernández to return call. ACM spoke with Dia Arechiga at Dr Laura Martines office who will coordinate an appt for the patient to come in , will recommend [facility] instead which is much more appropriate due to poor mobility , falls and lack of transportation, once [facility] PT in place this ACM can request Alysha Xiao reopen initial scooter order for processing. ACM will follow up with PCP nurse to find out where other order was sent to call and cancel to limit confusion of ordering. LN        9/16/21- electric scooter ordered per Dr Jonny Marquez ortho back in April has not been received, electric wheelchair order now reordered by PCP at last visit. ACM following up with Dr Laura Martines office to check the status of initial DME order. Left message with Dr Laura Martines office today for return call to verify status of DME order.  LN

## 2021-09-21 ENCOUNTER — PATIENT OUTREACH (OUTPATIENT)
Dept: CASE MANAGEMENT | Age: 76
End: 2021-09-21

## 2021-09-21 NOTE — PROGRESS NOTES
Goals Addressed                    This Visit's Progress      assist with establishing transportation assistance (pt-stated)         9/21/21- ACM attempted to reach patient for scheduled appt to complete [employer] rides application. Patient was not available for 9 AM previously scheduled phone visit. Left  for return call to reschedule. ACM will follow up in 1-2 weeks if no return call. LN       9/16/21- Patient reports she does not have a car and doesn't drive, she often has to walk to the laundromat across the street or to the store with her walker. Do to unsteady gait and falls we discussed this is unsafe. She is not always able to find a ride. ACM will assist patient to apply for transportation assistance through [employer] rides. Appt was set to assist patient with online application on 4/72/11 . Patient agrees to meet with this writer on 9/21/21.  NICOLE

## 2021-09-24 ENCOUNTER — PATIENT OUTREACH (OUTPATIENT)
Dept: CASE MANAGEMENT | Age: 76
End: 2021-09-24

## 2021-09-24 NOTE — PROGRESS NOTES
Goals Addressed                    This Visit's Progress      assist patient to ensure she is able to obtain electric wheelchair (pt-stated)         9/24/21- Patient contacted [facility] for follow up patient has not scheduled ortho appt and hasn't returned call to ortho nurse . [facility] reiterated the importance of scheduling this visit today so that the ortho MD could order [place] [facility] for her as it will be much more productive and accessible for her than outpatient PT based on mobility challenges and lack of transportation. Patient notes understanding and agrees to call today to schedule appt. Once she sees ortho and HH PT is started then [address] will be able to reopen her order for her power scooter. ACM will follow up in two weeks to see if she has seen ortho and if [place] [facility] has begun. LN       9/17/21-M received VM from Gurvinder at Dr Deana Davalos (ortho surgeon) office stating that patients electric scooter was ordered from [address] in May. They were under the impression that it was in process and near completed or delivered to the patient. Kamran [place] states patient has scheduled an upcoming appt with Dr Nikita Santos where they will plan to follow up on the status and discuss this DME. ACM contacted [facility] to check the status of the scooter. Was notified that patient was required to establish PT for the approval of scooter as no PT was established and Patient was contacted by them x4 they sent a letter and discontinued her order on 8/20/21. She was notified if she started PT they could reopen her order. [facility] contacted Dr Maggie Canavan office to see where current orders for electric w/c were sent so this ACM can follow up to ensure they were received and are in process. Left VM for nurse Sanya Ulrich to return call.  [facility] spoke with Kamran [place] at Dr Deana Davalos office who will coordinate an appt for the patient to come in , will recommend [place] [facility] instead which is much more appropriate due to poor mobility , falls and lack of transportation, once [facility] PT in place this ACM can request [employer] [facility]ard initial scooter order for processing. ACM will follow up with PCP nurse to find out where other order was sent to call and cancel to limit confusion of ordering. LN        9/16/21- electric scooter ordered per Dr Yahir Sánchez ortho back in April has not been received, electric wheelchair order now reordered by PCP at last visit. ACM following up with Dr Samira Ng office to check the status of initial DME order. Left message with Dr Samira Ng office today for return call to verify status of DME order. LN        assist with establishing transportation assistance (pt-stated)         9/24/21- Patient returned call and apologized for missing appt on 9/21 to apply for transportation assistance. Appt with this ACM to work on this application was rescheduled for 9/29/21 at 11 am. Patient agrees to place this on her calendar and be available for this call. ACM to contact patient on 9/29 . LN    9/21/21- ACM attempted to reach patient for scheduled appt to complete [email or web address] application. Patient was not available for 9 AM previously scheduled phone visit. Left VM for return call to reschedule. ACM will follow up in 1-2 weeks if no return call. LN       9/16/21- Patient reports she does not have a car and doesn't drive, she [facility] or to the store with her walker. Do to unsteady gait and falls we discussed this is unsafe. She is not always able to find a ride. ACM will assist patient to apply for transportation assistance through [employer] rides. Appt was set to assist patient with online application on 5/15/82 . Patient agrees to meet with this writer on 9/21/21.  NICOLE

## 2021-09-29 ENCOUNTER — PATIENT OUTREACH (OUTPATIENT)
Dept: CASE MANAGEMENT | Age: 76
End: 2021-09-29

## 2021-09-29 NOTE — PROGRESS NOTES
Goals Addressed                    This Visit's Progress      assist patient to get care aide in place (pt-stated)         9/29/21- Patient reports she contacted [facility] and was told she needed a new screening for care. Recommended she call the [facility] and request a new UAI screening as she has not has a PCA In awhile. Patient provided contact info. She agrees to call the [facility] and request screening. [facility] will follow up 2 weeks to check progress . LN     9/16/21- Patient reports she has a family member who has found a family friend who may be able to act as her personal care aide . She is waiting on a call back to see if she will be able to provide her services. AC requested she contact [facility] as soon as she is aware if she will be able to help so other resources can be provided if she is not available. Patient agrees. Will follow up in 2 weeks. LN     7/12/21- Patient reports out of town visiting her sister who is ill. Has not had a chance to contact [facility] as we discussed to get the list of personal care aide agencies , states she may do this while visiting with her sister this week. AC will follow up in 1-2 weeks to see where she is in this process, she needs assistance at home, needs help with ADL's/ and housekeeping. LN     6/21/21- Patient reports She still has not looked into who previously provided her personal care service. Discussed that personal care aides are limited at this time. Recommend she call [facility] to get a list of personal care aide agencies available and call each one to be placed on their waiting lists for a care aide as this is the fastest way to get an aide at this time and she needs help with ADL's and personal care/ laundry etc to prevent further falls. Provided contact info for [facility] and she agrees to call today to request this information. AC will call back next week to see if she has called agencies to be added to their waiting lists for an aide.  LN    6/4/21- Patient with recent increase in pain / frequent falls. Increased need for assistance with ADL's / housekeeping etc. Recommended patient look into who serviced her a year ago for care aide services as a care aide agency as they may be able to provide services again. She will look for this info and follow up with ACM on Monday. LN         assist with establishing transportation assistance (pt-stated)         9/29/21- Patient contacted as scheduled to assist with applying for transportation. Patient had forgotten the appt and was at the store. Requests call later this afternoon to complete will return call this afternoon. 2:45pm- Assisted to complete [employer] rides application online and submitted today ACM will follow up in 2 weeks to check status of application. LN    9/24/21- Patient returned call and apologized for missing appt on 9/21 to apply for transportation assistance. Appt with this ACM to work on this application was rescheduled for 9/29/21 at 11 am. Patient agrees to place this on her calendar and be available for this call. ACM to contact patient on 9/29 . LN    9/21/21- ACM attempted to reach patient for scheduled appt to complete [employer] rides application. Patient was not available for 9 AM previously scheduled phone visit. Left VM for return call to reschedule. ACM will follow up in 1-2 weeks if no return call. LN       9/16/21- Patient reports she does not have a car and doesn't drive, she often has to walk to the laundromat across the street or to the store with her walker. Do to unsteady gait and falls we discussed this is unsafe. She is not always able to find a ride. ACM will assist patient to apply for transportation assistance through [employer] rides. Appt was set to assist patient with online application on 3/74/82 . Patient agrees to meet with this writer on 9/21/21.  NICOLE

## 2021-10-06 ENCOUNTER — OFFICE VISIT (OUTPATIENT)
Dept: ORTHOPEDIC SURGERY | Age: 76
End: 2021-10-06
Payer: MEDICARE

## 2021-10-06 VITALS
OXYGEN SATURATION: 100 % | WEIGHT: 180 LBS | TEMPERATURE: 96 F | SYSTOLIC BLOOD PRESSURE: 160 MMHG | BODY MASS INDEX: 30.73 KG/M2 | DIASTOLIC BLOOD PRESSURE: 89 MMHG | HEART RATE: 76 BPM | HEIGHT: 64 IN

## 2021-10-06 DIAGNOSIS — T84.092A OTHER MECHANICAL COMPLICATION OF INTERNAL RIGHT KNEE PROSTHESIS, INITIAL ENCOUNTER (HCC): ICD-10-CM

## 2021-10-06 DIAGNOSIS — T84.093A OTHER MECHANICAL COMPLICATION OF INTERNAL LEFT KNEE PROSTHESIS, INITIAL ENCOUNTER (HCC): Primary | ICD-10-CM

## 2021-10-06 PROCEDURE — G8510 SCR DEP NEG, NO PLAN REQD: HCPCS | Performed by: ORTHOPAEDIC SURGERY

## 2021-10-06 PROCEDURE — 99214 OFFICE O/P EST MOD 30 MIN: CPT | Performed by: ORTHOPAEDIC SURGERY

## 2021-10-06 PROCEDURE — G8417 CALC BMI ABV UP PARAM F/U: HCPCS | Performed by: ORTHOPAEDIC SURGERY

## 2021-10-06 PROCEDURE — 1090F PRES/ABSN URINE INCON ASSESS: CPT | Performed by: ORTHOPAEDIC SURGERY

## 2021-10-06 PROCEDURE — G8754 DIAS BP LESS 90: HCPCS | Performed by: ORTHOPAEDIC SURGERY

## 2021-10-06 PROCEDURE — 3017F COLORECTAL CA SCREEN DOC REV: CPT | Performed by: ORTHOPAEDIC SURGERY

## 2021-10-06 PROCEDURE — G8536 NO DOC ELDER MAL SCRN: HCPCS | Performed by: ORTHOPAEDIC SURGERY

## 2021-10-06 PROCEDURE — G8427 DOCREV CUR MEDS BY ELIG CLIN: HCPCS | Performed by: ORTHOPAEDIC SURGERY

## 2021-10-06 PROCEDURE — G8399 PT W/DXA RESULTS DOCUMENT: HCPCS | Performed by: ORTHOPAEDIC SURGERY

## 2021-10-06 PROCEDURE — 1101F PT FALLS ASSESS-DOCD LE1/YR: CPT | Performed by: ORTHOPAEDIC SURGERY

## 2021-10-06 PROCEDURE — G8753 SYS BP > OR = 140: HCPCS | Performed by: ORTHOPAEDIC SURGERY

## 2021-10-06 PROCEDURE — G9899 SCRN MAM PERF RSLTS DOC: HCPCS | Performed by: ORTHOPAEDIC SURGERY

## 2021-10-06 NOTE — PROGRESS NOTES
Identified pt with two pt identifiers (name and ). Reviewed chart in preparation for visit and have obtained necessary documentation. Monika Doran is a 70 y.o. female  Chief Complaint   Patient presents with    Follow-up     Bilateral knee     Visit Vitals  BP (!) 160/89 (BP 1 Location: Right arm, BP Patient Position: Sitting, BP Cuff Size: Large adult)   Pulse 76   Temp (!) 96 °F (35.6 °C) (Tympanic)   Ht 5' 4\" (1.626 m)   Wt 180 lb (81.6 kg)   SpO2 100%   BMI 30.90 kg/m²     1. Have you been to the ER, urgent care clinic since your last visit? Hospitalized since your last visit? No    2. Have you seen or consulted any other health care providers outside of the [address] [place] [address] since your last visit? Include any pap smears or colon screening.  No

## 2021-10-06 NOTE — LETTER
10/6/2021    Patient: Lg Echols   YOB: 1945   Date of Visit: 10/6/2021     Derrick Dugan [id number]  Via In Basket    Dear Leelee Albert MD,      Thank you for referring Ms. Lg Echols to [facility] for evaluation. My notes for this consultation are attached. If you have questions, please do not hesitate to call me. I look forward to following your patient along with you.       Sincerely,    Remy Moon, DO

## 2021-10-07 NOTE — PROGRESS NOTES
10/6/2021      CC: right knee pain    HPI:      This is a 70y.o. year old female who presents for a follow up visit. 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 The patient's treatments since the most recent visit have comprised of bracing, pain medications. The patient has had no relief of the chief complaint. PMH:  Past Medical History:   Diagnosis Date    Asthma     At risk for sleep apnea 11/11/2019    Stop Bang 4 - Sleep study in January per patient     Chronic pain 5/8/2020    DDD (degenerative disc disease), lumbar     Diabetes ([facility] [address]     Gastritis     GERD (gastroesophageal reflux disease)     Glaucoma     Hearing loss 5/8/2020    Hiatal hernia     High cholesterol     Hypertension     Hypocalcemia 5/8/2020    Peripheral vascular disease (HCC)     RA (rheumatoid arthritis) ([facility] [address]     Sarcoidosis 1999    [facility]       PSxHx:  Past Surgical History:   Procedure Laterality Date    HX GASTRIC BYPASS      HX HEART CATHETERIZATION      s/p PCI with stenting in 1998     HX KNEE REPLACEMENT Bilateral     HX SHOULDER REPLACEMENT Right     x 2        Meds:    Current Outpatient Medications:     leflunomide (ARAVA) 20 mg tablet, TAKE 1 TABLET BY MOUTH EVERY DAY, Disp: 90 Tablet, Rfl: 1    gabapentin (NEURONTIN) 300 mg capsule, TAKE 1 CAPSULE IN THE MORNING AND TAKE 3 CAPSULES BEFORE BEDTIME, Disp: 120 Capsule, Rfl: 2    latanoprost (XALATAN) 0.005 % ophthalmic solution, INSTILL 1 DROP INTO BOTH EYES NIGHTLY, Disp: 2.5 mL, Rfl: 0    metoprolol tartrate (LOPRESSOR) 25 mg tablet, TAKE 1 TABLET BY MOUTH TWICE A DAY, Disp: 180 Tablet, Rfl: 1    simvastatin (ZOCOR) 20 mg tablet, TAKE BY MOUTH NIGHTLY., Disp: 90 Tablet, Rfl: 1    omeprazole (PRILOSEC) 20 mg capsule, Take 1 Capsule by mouth daily. , Disp: 90 Capsule, Rfl: 0    sucralfate (Carafate) 1 gram tablet, Take 1 Tab by mouth Before breakfast, lunch, and dinner., Disp: 30 Tab, Rfl: 0    albuterol sulfate (PROVENTIL;VENTOLIN) 2.5 mg/0.5 mL nebu nebulizer solution, 0.5 mL by Nebulization route every four (4) hours as needed for Wheezing. Indications: asthma attack, Disp: 30 mL, Rfl: 0    fluticasone furoate-vilanteroL (Breo Ellipta) 100-25 mcg/dose inhaler, TAKE 1 PUFF BY MOUTH EVERY DAY, Disp: 60 Inhaler, Rfl: 2    ergocalciferol (ERGOCALCIFEROL) 1,250 mcg (50,000 unit) capsule, Take 1 Cap by mouth every seven (7) days. Indications: vitamin D deficiency (high dose therapy), Disp: 12 Cap, Rfl: 3    sertraline (ZOLOFT) 50 mg tablet, TAKE 1 TABLET BY MOUTH EVERY DAY, Disp: 90 Tab, Rfl: 1    metFORMIN (GLUCOPHAGE) 500 mg tablet, Take 1 Tab by mouth daily (with dinner). Decreased dose, Disp: 90 Tab, Rfl: 0    tofacitinib 11 mg Tb24, Take 11 mg by mouth daily. Indications: rheumatoid arthritis, Disp: 30 Tab, Rfl: 11    furosemide (LASIX) 20 mg tablet, TAKE 1 TABLET BY MOUTH EVERY DAY, Disp: 90 Tab, Rfl: 0    nortriptyline (PAMELOR) 50 mg capsule, TAKE ONE CAPSULE EVERY DAY AT BEDTIME, Disp: , Rfl:     aspirin delayed-release 81 mg tablet, Take  by mouth daily. , Disp: , Rfl:     All:  Allergies   Allergen Reactions    Lisinopril Rash    Methotrexate Hives       Social Hx:  Social History     Socioeconomic History    Marital status:      Spouse name: Not on file    Number of children: Not on file    Years of education: Not on file    Highest education level: Not on file   Tobacco Use    Smoking status: Never Smoker    Smokeless tobacco: Never Used   Vaping Use    Vaping Use: Never used   Substance and Sexual Activity    Alcohol use:  Yes     Alcohol/week: 2.0 standard drinks     Types: 1 Glasses of wine, 1 Shots of liquor per week     Comment: 2-3 yearly    Drug use: No    Sexual activity: Yes     Partners: Male     Birth control/protection: None     Social Determinants of Health     Financial Resource Strain: Low Risk     Difficulty of Paying Living Expenses: Not hard at all   Food Insecurity: No Food Insecurity    Worried About [facility] in the Last Year: Never true    Ran Out of Food in the Last Year: Never true   Transportation Needs:     Lack of Transportation (Medical):  Lack of Transportation (Non-Medical):    Physical Activity:     Days of Exercise per Week:     Minutes of Exercise per Session:    Stress:     Feeling of Stress :    Social Connections:     Frequency of Communication with Friends and Family:     Frequency of Social Gatherings with Friends and Family:     Attends Hinduism Services:     Active Member of Clubs or Organizations:     Attends Club or Organization Meetings:     Marital Status:        Family Hx:  Family History   Problem Relation Age of Onset    Heart Disease Mother     Liver Disease Father     Alcohol abuse Brother     Dementia Neg Hx     Cancer Neg Hx     Seizures Neg Hx          Review of Systems:       General: Denies headache, lethargy, fever, weight loss  Ears/Nose/Throat: Denies ear discharge, drainage, nosebleeds, hoarse voice, dental problems  Cardiovascular: Denies chest pain, shortness of breath  Lungs: Denies chest pain, breathing problems, wheezing, pneumonia  Stomach: Denies stomach pain, heartburn, constipation, irritable bowel  Skin: Denies rash, sores, open wounds  Musculoskeletal: Right knee pain, instability  Genitourinary: Denies dysuria, hematuria, polyuria  Gastrointestinal: Denies constipation, obstipation, diarrhea  Neurological: Denies changes in sight, smell, hearing, taste, seizures. Denies loss of consciousness.   Psychiatric: Denies depression, sleep pattern changes, anxiety, change in personality  Endocrine: Denies mood swings, heat or cold intolerance  Hematologic/Lymphatic: Denies anemia, purpura, petechia  Allergic/Immunologic: Denies swelling of throat, pain or swelling at lymph nodes      Physical Examination:    Visit Vitals  BP (!) 160/89 (BP 1 Location: Right arm, BP Patient Position: Sitting, BP Cuff Size: Large adult)   Pulse 76   Temp (!) 96 °F (35.6 °C) (Tympanic)   Ht 5' 4\" (1.626 m)   Wt 180 lb (81.6 kg)   SpO2 100%   BMI 30.90 kg/m²        General: AOX3, no apparent distress  Psychiatric: mood and affect appropriate  Lungs: breathing is symmetric and unlabored bilaterally  Heart: regular rate and rhythm  Abdomen: no guarding  Head: normocephalic, atraumatic  Skin: No significant abnormalities, good turgor  Sensation intact to light touch: C5-T1 dermatomes  Muscular exam: 5/5 strength in all major muscle groups unless noted in specialty exam.    Extremities        Right lower extremity: Well-healed surgical scar, range of motion 0-90. Mid flexion and lateral instability is noted, gapping to approximately 10 mm. Capillary refills less than 2 seconds in the toes. Sensation is intact light touch in the L1-S1 dermatomes. Left lower extremity: Extremity is examined, no evidence of gross deformity, no gross motor or sensory deficit. Full active and passive range of motion is noted. Muscle tone and bulk is within normal expected limits. Capillary refill is less than 2 seconds distally. Diagnostics:    Pertinent Diagnostics: None today    Assessment: Right knee mechanical complication, mid flexion instability  Plan: This patient I had a long discussion regarding her treatment options, she is very frustrated with the function of her knee, she would like to proceed with a revision arthroplasty. I advised her that this is a difficult case, though I may be able to just exchange the femoral component and constrained her knee, regaining range of motion as well as creating a pain-free knee probably has less than a 75% chance of full success. She stated her understanding with this but would still like to proceed based on her chances of successful management. We did discuss the risks of surgery which include but are not limited to infection, nerve or blood vessel damage, failure of fixation, failure of any possible implant, need for reoperation, postoperative pain and swelling, intra-or postoperative fracture, postoperative dislocation, leg length inequality, need for reoperation, implant failure, death, disability, organ dysfunction, wound healing issues, DVT, PE, and the need for further procedures. The patient did freely state their understanding and satisfaction with our discussion. We will proceed after medical clearances. The patient was counseled at length about the risks of gino Covid-19 during their perioperative period and any recovery window from their procedure. The patient was made aware that gino Covid-19  may worsen their prognosis for recovering from their procedure and lend to a higher morbidity and/or mortality risk. All material risks, benefits, and reasonable alternatives including postponing the procedure were discussed. The patient does  wish to proceed with the procedure at this time. She is aware also that her diabetes, rheumatological disease, peripheral vascular disease are all risk factors for failure as well as will have to be taken into consideration for preoperative clearances. Ms. Kt Childress has a reminder for a \"due or due soon\" health maintenance. I have asked that she contact her primary care provider for follow-up on this health maintenance.

## 2021-10-15 ENCOUNTER — TELEPHONE (OUTPATIENT)
Dept: ORTHOPEDIC SURGERY | Age: 76
End: 2021-10-15

## 2021-10-15 NOTE — TELEPHONE ENCOUNTER
----- Message from Forrest Henriquez DO sent at 10/6/2021  8:29 PM EDT -----  Correction: Equipment is not Omar, but [employer] and [employer] revision set, constraint available, add flexible osteotomes  ----- Message -----  From: Mariam Dooley DO  Sent: 10/6/2021   8:11 PM EDT  To: Kiah Rao MA, #    Diagnosis: Mechanical failure, right knee [id number]  Procedure: Right revision total knee arthroplasty  CPT: 51759 vs 54431  Operative minutes: 120  Inpatient  Location: [facility]  PAT: Yes  Class: Yes  Special Equipment: Regular table, Budny  foot zepeda, Omar cemented TKA, foot zepeda for prepping, culturesx3  Staffing: Retractor zepeda  Anesthesia: General with adductor canal block, shade

## 2021-10-15 NOTE — TELEPHONE ENCOUNTER
Contacted patient to schedule surgery. Scheduled for 11/9/2021. Advised patient that clearances from PCP would be required. She states that she has seen Cardio in the past, I provided her with the number to [facility] and advised her to call and make an appt. Patient was made aware that clearances would need to be received no less that 2 business days before surgery. Patient advised to contact the office(s) to make pre op appts as soon as possible. Patient verbalized understanding and was encouraged to contact our office with any questions or concerns.

## 2021-10-21 NOTE — PROGRESS NOTES
[facility] @ [address] , [place]  Subjective/HPI: New patient consultation cardiac clearance    Juan Palacios is a 70 y.o. female with a history of atherosclerotic heart disease  (reviewed [facility] records reporting PTCA 1996, no obstructive findings on subsequent cardiac catheterizations 1998 and 2005 ) hypertension, hyperlipidemia, type 2 diabetes, rheumatoid arthritis, sarcoidosis gastritis and asthma is here for cardiac clearance pending orthopedic surgery. Patient reports some mild dyspnea on exertion, overall her activities are significantly gated secondary to instability of the right knee and has suffered a few falls. Denies exertional chest pain. Denies orthopnea or paroxysmal nocturnal dyspnea.     PCP Provider  Cira Taylor MD  Past Medical History:   Diagnosis Date    Asthma     At risk for sleep apnea 11/11/2019    Stop Bang 4 - Sleep study in January per patient     Chronic pain 5/8/2020    DDD (degenerative disc disease), lumbar     Diabetes ([address]     Gastritis     GERD (gastroesophageal reflux disease)     Glaucoma     Hearing loss 5/8/2020    Hiatal hernia     High cholesterol     Hypertension     Hypocalcemia 5/8/2020    Peripheral vascular disease (HCC)     RA [facility]umatoid arthritis) ([facility] [address]     Sarcoidosis 1999    [facility]      Past Surgical History:   Procedure Laterality Date    HX GASTRIC BYPASS      HX HEART CATHETERIZATION      s/p PCI with stenting in 1998     HX KNEE REPLACEMENT Bilateral     HX SHOULDER REPLACEMENT Right     x 2      Allergies   Allergen Reactions    Lisinopril Rash    Methotrexate Hives      Family History   Problem Relation Age of Onset    Heart Disease Mother     Liver Disease Father     Alcohol abuse Brother     Dementia Neg Hx     Cancer Neg Hx     Seizures Neg Hx       Current Outpatient Medications   Medication Sig    leflunomide (ARAVA) 20 mg tablet TAKE 1 TABLET BY MOUTH EVERY DAY    gabapentin (NEURONTIN) 300 mg capsule TAKE 1 CAPSULE IN THE MORNING AND TAKE 3 CAPSULES BEFORE BEDTIME    latanoprost (XALATAN) 0.005 % ophthalmic solution INSTILL 1 DROP INTO BOTH EYES NIGHTLY    metoprolol tartrate (LOPRESSOR) 25 mg tablet TAKE 1 TABLET BY MOUTH TWICE A DAY    simvastatin (ZOCOR) 20 mg tablet TAKE BY MOUTH NIGHTLY.  omeprazole (PRILOSEC) 20 mg capsule Take 1 Capsule by mouth daily.  sucralfate (Carafate) 1 gram tablet Take 1 Tab by mouth Before breakfast, lunch, and dinner.  albuterol sulfate (PROVENTIL;VENTOLIN) 2.5 mg/0.5 mL nebu nebulizer solution 0.5 mL by Nebulization route every four (4) hours as needed for Wheezing. Indications: asthma attack    fluticasone furoate-vilanteroL (Breo Ellipta) 100-25 mcg/dose inhaler TAKE 1 PUFF BY MOUTH EVERY DAY    ergocalciferol (ERGOCALCIFEROL) 1,250 mcg (50,000 unit) capsule Take 1 Cap by mouth every seven (7) days. Indications: vitamin D deficiency (high dose therapy)    sertraline (ZOLOFT) 50 mg tablet TAKE 1 TABLET BY MOUTH EVERY DAY    metFORMIN (GLUCOPHAGE) 500 mg tablet Take 1 Tab by mouth daily (with dinner). Decreased dose    tofacitinib 11 mg Tb24 Take 11 mg by mouth daily. Indications: rheumatoid arthritis    furosemide (LASIX) 20 mg tablet TAKE 1 TABLET BY MOUTH EVERY DAY    nortriptyline (PAMELOR) 50 mg capsule TAKE ONE CAPSULE EVERY DAY AT BEDTIME    aspirin delayed-release 81 mg tablet Take  by mouth daily. No current facility-administered medications for this visit. There were no vitals filed for this visit.   Social History     Socioeconomic History    Marital status:      Spouse name: Not on file    Number of children: Not on file    Years of education: Not on file    Highest education level: Not on file   Occupational History    Not on file   Tobacco Use    Smoking status: Never Smoker    Smokeless tobacco: Never Used   Vaping Use    Vaping Use: Never used   Substance and Sexual Activity    Alcohol use: Yes     Alcohol/week: 2.0 standard drinks     Types: 1 Glasses of wine, 1 Shots of liquor per week     Comment: 2-3 yearly    Drug use: No    Sexual activity: Yes     Partners: Male     Birth control/protection: None   Other Topics Concern    Not on file   Social History Narrative    Not on file     Social Determinants of Health     Financial Resource Strain: Low Risk     Difficulty of Paying Living Expenses: Not hard at all   Food Insecurity: No Food Insecurity    Worried About Running Out of Food in the Last Year: Never true    [place]ur of Food in the Last Year: Never true   Transportation Needs:     Lack of Transportation (Medical):  Lack of Transportation (Non-Medical):    Physical Activity:     Days of Exercise per Week:     Minutes of Exercise per Session:    Stress:     Feeling of Stress :    Social Connections:     Frequency of Communication with Friends and Family:     Frequency of Social Gatherings with Friends and Family:     Attends Worship Services:     Active Member of Clubs or Organizations:     Attends Club or Organization Meetings:     Marital Status:    Intimate Partner Violence: Not At Risk    Fear of Current or Ex-Partner: No    Emotionally Abused: No    Physically Abused: No    Sexually Abused: No       I have reviewed the nurses notes, vitals, problem list, allergy list, medical history, family, social history and medications. Review of Symptoms  11 systems reviewed, negative other than as stated in the HPI      Physical Exam:      General: Well developed, in no acute distress, cooperative and alert  HEENT: No carotid bruits, no JVD, trach is midline. Neck Supple, PERRL, EOM intact. Heart:  Normal S1/S2 negative S3 or S4. Regular, no murmur, gallop or rub. Respiratory: Clear bilaterally x 4, no wheezing or rales  Abdomen:   Soft, non-tender, no masses, bowel sounds are active. Extremities:  No edema, normal cap refill, no cyanosis, atraumatic. Neuro: A&Ox3, speech clear, gait stable. Skin: Skin color is normal. No rashes or lesions. Non diaphoretic  Vascular: 2+ pulses symmetric in all extremities    Cardiographics    ECG:         Cardiology Labs:  Lab Results   Component Value Date/Time    Cholesterol, total 129 04/13/2021 01:36 PM    HDL Cholesterol 59 04/13/2021 01:36 PM    LDL, calculated 56 04/13/2021 01:36 PM    LDL, calculated 36 08/06/2020 03:47 PM    Triglyceride 68 04/13/2021 01:36 PM       Lab Results   Component Value Date/Time    Sodium 144 08/31/2021 03:53 PM    Potassium 4.3 08/31/2021 03:53 PM    Chloride 110 (H) 08/31/2021 03:53 PM    CO2 20 08/31/2021 03:53 PM    Anion gap 6 04/02/2021 12:50 PM    Glucose 108 (H) 08/31/2021 03:53 PM    BUN 16 08/31/2021 03:53 PM    Creatinine 0.89 08/31/2021 03:53 PM    BUN/Creatinine ratio 18 08/31/2021 03:53 PM    GFR est AA 74 08/31/2021 03:53 PM    GFR est non-AA 65 08/31/2021 03:53 PM    Calcium 8.6 (L) 08/31/2021 03:53 PM    Bilirubin, total 0.6 08/31/2021 03:53 PM    Alk. phosphatase 83 08/31/2021 03:53 PM    Protein, total 6.4 08/31/2021 03:53 PM    Albumin 3.7 08/31/2021 03:53 PM    Globulin 3.8 04/02/2021 12:50 PM    A-G Ratio 1.4 08/31/2021 03:53 PM    ALT (SGPT) 11 08/31/2021 03:53 PM           Assessment:     Assessment:     Diagnoses and all orders for this visit:    1. Coronary artery disease due to lipid rich plaque    2. Essential hypertension    3. Type 2 diabetes mellitus without complication, without long-term current use of insulin ([facility] [address]    4. Mixed hyperlipidemia        ICD-10-CM ICD-9-CM    1. Coronary artery disease due to lipid rich plaque  I25.10 414.00     I25.83 414.3    2. Essential hypertension  I10 401.9    3. Type 2 diabetes mellitus without complication, without long-term current use of insulin (HCC)  E11.9 250.00    4. Mixed hyperlipidemia  E78.2 272.2      No orders of the defined types were placed in this encounter. Plan:     1.   Atherosclerotic heart disease: History of PTCA 1996, subsequent cardiac cath 1998 and 2005 [facility] reporting nonobstructive findings on the consult. Reporting dyspnea on exertion multifactorial, given preop for total knee replacement rule out ischemia with Lexiscan nuclear stress test.  2.  Hypertension: 142/81 counseled patient on low-sodium diet  3. Type 2 diabetes: A1c 5.0% 8/21  4. Hyperlipidemia: On simvastatin LDL 94248    70year-old female with a history of obstructive atherosclerotic heart disease diagnosed in 1996 PTCA. Preop for right total knee replacement. Will evaluate for ischemia with Lexiscan nuclear stress test.  If negative she is cleared from cardiology to proceed with surgery. Stress test anticipated for early next week. Thank you for this consultation, will update an addendum this note once results of stress test posted. Pa Barclay NP    Addendum 10/29/2021 0911 hrs.: Nuclear stress test is negative for ischemia patient is cleared from cardiology standpoint to proceed with surgery. Pa Barclay DNP, ANP-BC   Please note that this dictation was completed with [employer], the computer voice recognition software. Quite often unanticipated grammatical, syntax, homophones, and other interpretive errors are inadvertently transcribed by the computer software. Please disregard these errors. Please excuse any errors that have escaped final proofreading. Thank you.

## 2021-10-22 ENCOUNTER — OFFICE VISIT (OUTPATIENT)
Dept: CARDIOLOGY CLINIC | Age: 76
End: 2021-10-22
Payer: MEDICARE

## 2021-10-22 VITALS
RESPIRATION RATE: 19 BRPM | DIASTOLIC BLOOD PRESSURE: 81 MMHG | HEART RATE: 73 BPM | HEIGHT: 64 IN | SYSTOLIC BLOOD PRESSURE: 142 MMHG | BODY MASS INDEX: 31.58 KG/M2 | WEIGHT: 185 LBS | OXYGEN SATURATION: 100 %

## 2021-10-22 DIAGNOSIS — E78.2 MIXED HYPERLIPIDEMIA: ICD-10-CM

## 2021-10-22 DIAGNOSIS — R06.09 DOE (DYSPNEA ON EXERTION): ICD-10-CM

## 2021-10-22 DIAGNOSIS — E11.9 TYPE 2 DIABETES MELLITUS WITHOUT COMPLICATION, WITHOUT LONG-TERM CURRENT USE OF INSULIN (HCC): ICD-10-CM

## 2021-10-22 DIAGNOSIS — I10 ESSENTIAL HYPERTENSION: ICD-10-CM

## 2021-10-22 DIAGNOSIS — I25.10 CORONARY ARTERY DISEASE DUE TO LIPID RICH PLAQUE: Primary | ICD-10-CM

## 2021-10-22 DIAGNOSIS — I25.83 CORONARY ARTERY DISEASE DUE TO LIPID RICH PLAQUE: Primary | ICD-10-CM

## 2021-10-22 PROCEDURE — G8417 CALC BMI ABV UP PARAM F/U: HCPCS | Performed by: NURSE PRACTITIONER

## 2021-10-22 PROCEDURE — 1101F PT FALLS ASSESS-DOCD LE1/YR: CPT | Performed by: NURSE PRACTITIONER

## 2021-10-22 PROCEDURE — 1090F PRES/ABSN URINE INCON ASSESS: CPT | Performed by: NURSE PRACTITIONER

## 2021-10-22 PROCEDURE — G8399 PT W/DXA RESULTS DOCUMENT: HCPCS | Performed by: NURSE PRACTITIONER

## 2021-10-22 PROCEDURE — 3017F COLORECTAL CA SCREEN DOC REV: CPT | Performed by: NURSE PRACTITIONER

## 2021-10-22 PROCEDURE — 99204 OFFICE O/P NEW MOD 45 MIN: CPT | Performed by: NURSE PRACTITIONER

## 2021-10-22 PROCEDURE — G8753 SYS BP > OR = 140: HCPCS | Performed by: NURSE PRACTITIONER

## 2021-10-22 PROCEDURE — G8427 DOCREV CUR MEDS BY ELIG CLIN: HCPCS | Performed by: NURSE PRACTITIONER

## 2021-10-22 PROCEDURE — G8754 DIAS BP LESS 90: HCPCS | Performed by: NURSE PRACTITIONER

## 2021-10-22 PROCEDURE — 2022F DILAT RTA XM EVC RTNOPTHY: CPT | Performed by: NURSE PRACTITIONER

## 2021-10-22 PROCEDURE — G8432 DEP SCR NOT DOC, RNG: HCPCS | Performed by: NURSE PRACTITIONER

## 2021-10-22 PROCEDURE — G8536 NO DOC ELDER MAL SCRN: HCPCS | Performed by: NURSE PRACTITIONER

## 2021-10-22 PROCEDURE — 3044F HG A1C LEVEL LT 7.0%: CPT | Performed by: NURSE PRACTITIONER

## 2021-10-22 PROCEDURE — 93000 ELECTROCARDIOGRAM COMPLETE: CPT | Performed by: NURSE PRACTITIONER

## 2021-10-22 PROCEDURE — G9899 SCRN MAM PERF RSLTS DOC: HCPCS | Performed by: NURSE PRACTITIONER

## 2021-10-22 NOTE — PROGRESS NOTES
Chief Complaint   Patient presents with    Surgical Clearance     for right knee replacement.  Referred by Dr. Daniel Montiel

## 2021-10-26 ENCOUNTER — OFFICE VISIT (OUTPATIENT)
Dept: FAMILY MEDICINE CLINIC | Age: 76
End: 2021-10-26
Payer: MEDICARE

## 2021-10-26 VITALS
TEMPERATURE: 97 F | BODY MASS INDEX: 31.92 KG/M2 | DIASTOLIC BLOOD PRESSURE: 57 MMHG | SYSTOLIC BLOOD PRESSURE: 135 MMHG | HEIGHT: 64 IN | HEART RATE: 67 BPM | RESPIRATION RATE: 20 BRPM | OXYGEN SATURATION: 98 % | WEIGHT: 187 LBS

## 2021-10-26 DIAGNOSIS — M54.16 LUMBAR RADICULOPATHY: ICD-10-CM

## 2021-10-26 DIAGNOSIS — D86.0 SARCOIDOSIS OF LUNG (HCC): ICD-10-CM

## 2021-10-26 DIAGNOSIS — Z98.84 STATUS POST GASTRIC BYPASS FOR OBESITY: ICD-10-CM

## 2021-10-26 DIAGNOSIS — I25.83 CORONARY ARTERY DISEASE DUE TO LIPID RICH PLAQUE: ICD-10-CM

## 2021-10-26 DIAGNOSIS — G25.81 RLS (RESTLESS LEGS SYNDROME): ICD-10-CM

## 2021-10-26 DIAGNOSIS — M06.09 SERONEGATIVE RHEUMATOID ARTHRITIS OF MULTIPLE SITES (HCC): ICD-10-CM

## 2021-10-26 DIAGNOSIS — E11.8 TYPE 2 DIABETES MELLITUS WITH COMPLICATION, WITHOUT LONG-TERM CURRENT USE OF INSULIN (HCC): ICD-10-CM

## 2021-10-26 DIAGNOSIS — Z01.818 PREOP EXAMINATION: Primary | ICD-10-CM

## 2021-10-26 DIAGNOSIS — M79.89 PAIN AND SWELLING OF RIGHT LOWER EXTREMITY: ICD-10-CM

## 2021-10-26 DIAGNOSIS — Z95.820 STATUS POST ANGIOPLASTY WITH STENT: ICD-10-CM

## 2021-10-26 DIAGNOSIS — F33.0 MILD EPISODE OF RECURRENT MAJOR DEPRESSIVE DISORDER (HCC): ICD-10-CM

## 2021-10-26 DIAGNOSIS — I25.10 CORONARY ARTERY DISEASE DUE TO LIPID RICH PLAQUE: ICD-10-CM

## 2021-10-26 DIAGNOSIS — Z96.653 S/P TKR (TOTAL KNEE REPLACEMENT), BILATERAL: ICD-10-CM

## 2021-10-26 DIAGNOSIS — J45.20 MILD INTERMITTENT ASTHMA, UNSPECIFIED WHETHER COMPLICATED: ICD-10-CM

## 2021-10-26 DIAGNOSIS — E55.9 VITAMIN D DEFICIENCY: ICD-10-CM

## 2021-10-26 DIAGNOSIS — M79.604 PAIN AND SWELLING OF RIGHT LOWER EXTREMITY: ICD-10-CM

## 2021-10-26 DIAGNOSIS — I10 ESSENTIAL HYPERTENSION: ICD-10-CM

## 2021-10-26 DIAGNOSIS — Z86.718 HISTORY OF DVT (DEEP VEIN THROMBOSIS): ICD-10-CM

## 2021-10-26 DIAGNOSIS — E78.2 MIXED HYPERLIPIDEMIA: ICD-10-CM

## 2021-10-26 DIAGNOSIS — H40.9 GLAUCOMA, UNSPECIFIED GLAUCOMA TYPE, UNSPECIFIED LATERALITY: ICD-10-CM

## 2021-10-26 DIAGNOSIS — M48.061 SPINAL STENOSIS OF LUMBAR REGION WITHOUT NEUROGENIC CLAUDICATION: ICD-10-CM

## 2021-10-26 DIAGNOSIS — Z87.19 HISTORY OF GASTRITIS: ICD-10-CM

## 2021-10-26 PROCEDURE — G8432 DEP SCR NOT DOC, RNG: HCPCS | Performed by: FAMILY MEDICINE

## 2021-10-26 PROCEDURE — 3044F HG A1C LEVEL LT 7.0%: CPT | Performed by: FAMILY MEDICINE

## 2021-10-26 PROCEDURE — G9899 SCRN MAM PERF RSLTS DOC: HCPCS | Performed by: FAMILY MEDICINE

## 2021-10-26 PROCEDURE — G8427 DOCREV CUR MEDS BY ELIG CLIN: HCPCS | Performed by: FAMILY MEDICINE

## 2021-10-26 PROCEDURE — G8754 DIAS BP LESS 90: HCPCS | Performed by: FAMILY MEDICINE

## 2021-10-26 PROCEDURE — 1101F PT FALLS ASSESS-DOCD LE1/YR: CPT | Performed by: FAMILY MEDICINE

## 2021-10-26 PROCEDURE — 99214 OFFICE O/P EST MOD 30 MIN: CPT | Performed by: FAMILY MEDICINE

## 2021-10-26 PROCEDURE — G8752 SYS BP LESS 140: HCPCS | Performed by: FAMILY MEDICINE

## 2021-10-26 PROCEDURE — 3017F COLORECTAL CA SCREEN DOC REV: CPT | Performed by: FAMILY MEDICINE

## 2021-10-26 PROCEDURE — 1090F PRES/ABSN URINE INCON ASSESS: CPT | Performed by: FAMILY MEDICINE

## 2021-10-26 PROCEDURE — G8417 CALC BMI ABV UP PARAM F/U: HCPCS | Performed by: FAMILY MEDICINE

## 2021-10-26 PROCEDURE — G8399 PT W/DXA RESULTS DOCUMENT: HCPCS | Performed by: FAMILY MEDICINE

## 2021-10-26 PROCEDURE — 2022F DILAT RTA XM EVC RTNOPTHY: CPT | Performed by: FAMILY MEDICINE

## 2021-10-26 PROCEDURE — G8536 NO DOC ELDER MAL SCRN: HCPCS | Performed by: FAMILY MEDICINE

## 2021-10-26 RX ORDER — SERTRALINE HYDROCHLORIDE 50 MG/1
50 TABLET, FILM COATED ORAL DAILY
Qty: 90 TABLET | Refills: 1 | Status: SHIPPED | OUTPATIENT
Start: 2021-10-26 | End: 2022-07-14 | Stop reason: SDUPTHER

## 2021-10-26 RX ORDER — ALBUTEROL SULFATE 2.5 MG/.5ML
2.5 SOLUTION RESPIRATORY (INHALATION)
Qty: 30 ML | Refills: 0 | Status: SHIPPED | OUTPATIENT
Start: 2021-10-26 | End: 2022-02-11

## 2021-10-26 RX ORDER — FUROSEMIDE 20 MG/1
20 TABLET ORAL DAILY
Qty: 90 TABLET | Refills: 0 | Status: SHIPPED | OUTPATIENT
Start: 2021-10-26 | End: 2022-02-11

## 2021-10-26 RX ORDER — OMEPRAZOLE 20 MG/1
20 CAPSULE, DELAYED RELEASE ORAL DAILY
Qty: 90 CAPSULE | Refills: 1 | Status: SHIPPED | OUTPATIENT
Start: 2021-10-26

## 2021-10-26 RX ORDER — METFORMIN HYDROCHLORIDE 500 MG/1
500 TABLET ORAL
Qty: 90 TABLET | Refills: 1 | Status: SHIPPED | OUTPATIENT
Start: 2021-10-26 | End: 2022-07-14 | Stop reason: SDUPTHER

## 2021-10-26 RX ORDER — GABAPENTIN 300 MG/1
CAPSULE ORAL
Qty: 120 CAPSULE | Refills: 2 | Status: ON HOLD | OUTPATIENT
Start: 2021-10-26 | End: 2022-04-01 | Stop reason: SDUPTHER

## 2021-10-26 RX ORDER — ERGOCALCIFEROL 1.25 MG/1
50000 CAPSULE ORAL
Qty: 12 CAPSULE | Refills: 3 | Status: SHIPPED | OUTPATIENT
Start: 2021-10-26

## 2021-10-26 RX ORDER — PREDNISONE 20 MG/1
20 TABLET ORAL
Qty: 10 TABLET | Refills: 0 | Status: SHIPPED | OUTPATIENT
Start: 2021-10-26 | End: 2022-02-11

## 2021-10-26 RX ORDER — FLUTICASONE FUROATE AND VILANTEROL TRIFENATATE 100; 25 UG/1; UG/1
POWDER RESPIRATORY (INHALATION)
Qty: 1 EACH | Refills: 3 | Status: SHIPPED | OUTPATIENT
Start: 2021-10-26

## 2021-10-26 RX ORDER — LATANOPROST 50 UG/ML
SOLUTION/ DROPS OPHTHALMIC
Qty: 2.5 ML | Refills: 1 | Status: SHIPPED | OUTPATIENT
Start: 2021-10-26 | End: 2022-07-14 | Stop reason: SDUPTHER

## 2021-10-26 RX ORDER — METOPROLOL TARTRATE 25 MG/1
25 TABLET, FILM COATED ORAL 2 TIMES DAILY
Qty: 180 TABLET | Refills: 1 | Status: SHIPPED | OUTPATIENT
Start: 2021-10-26 | End: 2022-07-14 | Stop reason: SDUPTHER

## 2021-10-26 RX ORDER — SIMVASTATIN 20 MG/1
TABLET, FILM COATED ORAL
Qty: 90 TABLET | Refills: 1 | Status: SHIPPED | OUTPATIENT
Start: 2021-10-26 | End: 2022-07-14 | Stop reason: SDUPTHER

## 2021-10-26 RX ORDER — TRAMADOL HYDROCHLORIDE 50 MG/1
50 TABLET ORAL
Qty: 30 TABLET | Refills: 0 | Status: SHIPPED | OUTPATIENT
Start: 2021-10-26 | End: 2021-11-17

## 2021-10-26 NOTE — PROGRESS NOTES
Yolanda Dickson (: 1945) is a 70 y.o. female, established patient, here for evaluation of the following chief complaint(s):  Pre-op Exam (Surgery 21 Dr Kim Weldon)       ASSESSMENT/PLAN: Cleared for surgery pending LE dopplers, cardiac clearance (awaiting stress test), and PAT labs. Also encouraged her to check in with Dr. Marika Mathews given use of DMARDs ([address] [place] [address] [facility]) for RA. Sig flaring of left hand so writing for Prednisone. Had 1st COVID vaccine recently and will plan for second after surgery. May benefit from holding RA meds for 1 week prior to 2nd COVID vaccine and would plan for 3rd dose another month later. Refilling meds and getting LE doppler for R LE pain with some swelling and hx of DVT in past to ensure no DVT prior to surgery. Scheduled this for same day as stress test.    Below is the assessment and plan developed based on review of pertinent history, physical exam, labs, studies, and medications. 1. Preop examination  2. Coronary artery disease due to lipid rich plaque  -     simvastatin (ZOCOR) 20 mg tablet; TAKE BY MOUTH NIGHTLY., Normal, Disp-90 Tablet, R-1  3. Status post angioplasty with stent  -     simvastatin (ZOCOR) 20 mg tablet; TAKE BY MOUTH NIGHTLY., Normal, Disp-90 Tablet, R-1  4. Mixed hyperlipidemia  -     simvastatin (ZOCOR) 20 mg tablet; TAKE BY MOUTH NIGHTLY., Normal, Disp-90 Tablet, R-1  5. Mild episode of recurrent major depressive disorder (HCC)  -     sertraline (ZOLOFT) 50 mg tablet; Take 1 Tablet by mouth daily. , Normal, Disp-90 Tablet, R-1  6. Seronegative rheumatoid arthritis of multiple sites ([facility]  -     ergocalciferol (ERGOCALCIFEROL) 1,250 mcg (50,000 unit) capsule; Take 1 Capsule by mouth every seven (7) days. Indications: vitamin D deficiency (high dose therapy), Normal, Disp-12 Capsule, R-3  -     predniSONE (DELTASONE) 20 mg tablet; Take 20 mg by mouth daily (with breakfast). , Normal, Disp-10 Tablet, R-0  7.  Type 2 diabetes mellitus with complication, without long-term current use of insulin (HCC)  -     metFORMIN (GLUCOPHAGE) 500 mg tablet; Take 1 Tablet by mouth daily (with dinner). Decreased dose, Normal, Disp-90 Tablet, R-1  8. Essential hypertension  -     metoprolol tartrate (LOPRESSOR) 25 mg tablet; Take 1 Tablet by mouth two (2) times a day., Normal, Disp-180 Tablet, R-1  -     furosemide (LASIX) 20 mg tablet; Take 1 Tablet by mouth daily. , Normal, Disp-90 Tablet, R-0  9. Glaucoma, unspecified glaucoma type, unspecified laterality  -     latanoprost (XALATAN) 0.005 % ophthalmic solution; INSTILL 1 DROP INTO BOTH EYES NIGHTLY, Normal, Disp-2.5 mL, R-1  10. RLS (restless legs syndrome)  -     gabapentin (NEURONTIN) 300 mg capsule; TAKE 1 CAPSULE IN THE MORNING AND TAKE 3 CAPSULES BEFORE BEDTIME, Normal, Disp-120 Capsule, R-2Pls delete any prev gabapentin rx on file. thanks  11. Spinal stenosis of lumbar region without neurogenic claudication  12. Lumbar radiculopathy  -     gabapentin (NEURONTIN) 300 mg capsule; TAKE 1 CAPSULE IN THE MORNING AND TAKE 3 CAPSULES BEFORE BEDTIME, Normal, Disp-120 Capsule, R-2Pls delete any prev gabapentin rx on file. thanks  -     traMADoL (ULTRAM) 50 mg tablet; Take 1 Tablet by mouth daily as needed for Pain for up to 30 days. , Normal, Disp-30 Tablet, R-0  13. Vitamin D deficiency  -     ergocalciferol (ERGOCALCIFEROL) 1,250 mcg (50,000 unit) capsule; Take 1 Capsule by mouth every seven (7) days. Indications: vitamin D deficiency (high dose therapy), Normal, Disp-12 Capsule, R-3  14. Sarcoidosis of lung ([address]  -     ergocalciferol (ERGOCALCIFEROL) 1,250 mcg (50,000 unit) capsule; Take 1 Capsule by mouth every seven (7) days. Indications: vitamin D deficiency (high dose therapy), Normal, Disp-12 Capsule, R-3  15.  Mild intermittent asthma, unspecified whether complicated  -     fluticasone furoate-vilanteroL (Breo Ellipta) 100-25 mcg/dose inhaler; TAKE 1 PUFF BY MOUTH EVERY DAY, Normal, Disp-1 Each, R-3  - albuterol sulfate (PROVENTIL;VENTOLIN) 2.5 mg/0.5 mL nebu nebulizer solution; 0.5 mL by Nebulization route every four (4) hours as needed for Wheezing. Indications: asthma attack, Normal, Disp-30 mL, R-0  16. S/P TKR (total knee replacement), bilateral  17. Status post gastric bypass for obesity  18. History of gastritis  -     omeprazole (PRILOSEC) 20 mg capsule; Take 1 Capsule by mouth daily. , Normal, Disp-90 Capsule, R-1  19. Pain and swelling of right lower extremity  -     furosemide (LASIX) 20 mg tablet; Take 1 Tablet by mouth daily. , Normal, Disp-90 Tablet, R-0  -     DUPLEX LOWER EXT VENOUS RIGHT; Future  20. History of DVT (deep vein thrombosis)  -     DUPLEX LOWER EXT VENOUS RIGHT; Future      Return in about 6 weeks (around 12/7/2021), or if symptoms worsen or fail to improve, for virtual visit. SUBJECTIVE/OBJECTIVE:  Here for preop. She is planning for revision of right TKR with Dr. Aleksandar Swartz under general anesthesia on 11/9/21. No concerns otherwise today. She has PAT testing soon. Knees giving out on her. Hx of bilat TKRs many years ago about 28 yrs ago. Evidence for loosening of femoral and tibial components on recent x rays and sent to Dr. Aleksandar Swartz given struggle with instability and fall risk. Difficulty with a walker because of her RA and OA in her hands, shoulders, and neck. Sent to Dr. Terris Canavan for severe lumbar spinal stenosis (worst at L4-5 on MRI) but does not recall seeing anyone for this. Understands this is contributing to her pain. Seeing Dr. Eliezer Campos for RA. Currently on: Arava 20 mg daily and Xeljanz 11 mg XR daily. Left hand with significant swelling and pain of MCPs    Denies latex allergy. Denies anesthesia complications or bleeding pathology. Able to perform 4 METS  Prev hx of MICKI but resolved after bariatric surgery  Diabetes is well controlled with last A1c of 5%.     ROS  Gen - no fever/chills  Resp - no dyspnea or cough  CV - no chest pain or WORKMAN  Rest per HPI    Blood pressure (!) 135/57, pulse 67, temperature 97 °F (36.1 °C), temperature source Oral, resp. rate 20, height 5' 4\" (1.626 m), weight 187 lb (84.8 kg), SpO2 98 %. Physical Exam  General appearance - alert, well appearing, and in no distress  Eyes -sclera anicteric  Neck - supple, no significant adenopathy, no thyromegaly  Chest - clear to auscultation, no wheezes, rales or rhonchi, symmetric air entry  Heart - normal rate, regular rhythm, normal S1, S2, no murmurs, rubs, clicks or gallops  Neurological - alert, oriented, normal speech, no focal findings or movement disorder noted  Extr - no edema  Psych - normal mood and affect  Msk:          On this date 10/26/2021 I have spent 30 minutes reviewing previous notes, test results and face to face with the patient discussing the diagnosis and importance of compliance with the treatment plan as well as documenting on the day of the visit. An electronic signature was used to authenticate this note.   -- Blank Puga MD

## 2021-10-26 NOTE — PROGRESS NOTES
Chief Complaint   Patient presents with   [facility] Pre-op Exam     Surgery 11/9/21 Dr Elizabeth Han   1. Have you been to the ER, urgent care clinic since your last visit? Hospitalized since your last visit? No    2. Have you seen or consulted any other health care providers outside of the [address] [place] [address] since your last visit? Include any pap smears or colon screening.  Yes Where: Ortho      Revision Total Right Knee Arthroplasty    Scheduled for Cardiac test this Thursday and PAT on 11/1/21

## 2021-10-26 NOTE — Clinical Note
Can you call for a virtual visit in 6 weeks? Would like to review COVID vaccine recommendations too echo with her on meds for RA. Thanks!

## 2021-10-28 ENCOUNTER — HOSPITAL ENCOUNTER (OUTPATIENT)
Dept: NON INVASIVE DIAGNOSTICS | Age: 76
Discharge: HOME | End: 2021-10-28
Payer: MEDICARE

## 2021-10-28 ENCOUNTER — HOSPITAL ENCOUNTER (OUTPATIENT)
Dept: NUCLEAR MEDICINE | Age: 76
Discharge: HOME | End: 2021-10-28
Payer: MEDICARE

## 2021-10-28 ENCOUNTER — HOSPITAL ENCOUNTER (OUTPATIENT)
Dept: VASCULAR SURGERY | Age: 76
Discharge: HOME | End: 2021-10-28
Attending: FAMILY MEDICINE
Payer: MEDICARE

## 2021-10-28 ENCOUNTER — APPOINTMENT (OUTPATIENT)
Dept: NUCLEAR MEDICINE | Age: 76
End: 2021-10-28
Payer: MEDICARE

## 2021-10-28 DIAGNOSIS — M79.604 PAIN AND SWELLING OF RIGHT LOWER EXTREMITY: ICD-10-CM

## 2021-10-28 DIAGNOSIS — R06.09 DOE (DYSPNEA ON EXERTION): ICD-10-CM

## 2021-10-28 DIAGNOSIS — I25.83 CORONARY ARTERY DISEASE DUE TO LIPID RICH PLAQUE: ICD-10-CM

## 2021-10-28 DIAGNOSIS — E11.9 TYPE 2 DIABETES MELLITUS WITHOUT COMPLICATION, WITHOUT LONG-TERM CURRENT USE OF INSULIN (HCC): ICD-10-CM

## 2021-10-28 DIAGNOSIS — Z86.718 HISTORY OF DVT (DEEP VEIN THROMBOSIS): ICD-10-CM

## 2021-10-28 DIAGNOSIS — E78.2 MIXED HYPERLIPIDEMIA: ICD-10-CM

## 2021-10-28 DIAGNOSIS — M79.89 PAIN AND SWELLING OF RIGHT LOWER EXTREMITY: ICD-10-CM

## 2021-10-28 DIAGNOSIS — I10 ESSENTIAL HYPERTENSION: ICD-10-CM

## 2021-10-28 DIAGNOSIS — I25.10 CORONARY ARTERY DISEASE DUE TO LIPID RICH PLAQUE: ICD-10-CM

## 2021-10-28 LAB
STRESS BASELINE DIAS BP: 63 MMHG
STRESS BASELINE HR: 60 BPM
STRESS BASELINE SYS BP: 128 MMHG
STRESS ESTIMATED WORKLOAD: 1 METS
STRESS EXERCISE DUR MIN: NORMAL
STRESS O2 SAT PEAK: 96 %
STRESS O2 SAT REST: 96 %
STRESS PEAK DIAS BP: 70 MMHG
STRESS PEAK SYS BP: 155 MMHG
STRESS PERCENT HR ACHIEVED: 61 %
STRESS POST PEAK HR: 89 BPM
STRESS RATE PRESSURE PRODUCT: NORMAL BPM*MMHG
STRESS ST DEPRESSION: 0 MM
STRESS ST ELEVATION: 0 MM
STRESS TARGET HR: 147 BPM

## 2021-10-28 PROCEDURE — 74011250636 HC RX REV CODE- 250/636: Performed by: NURSE PRACTITIONER

## 2021-10-28 PROCEDURE — A9500 TC99M SESTAMIBI: HCPCS

## 2021-10-28 PROCEDURE — 93971 EXTREMITY STUDY: CPT | Performed by: INTERNAL MEDICINE

## 2021-10-28 PROCEDURE — 93971 EXTREMITY STUDY: CPT

## 2021-10-28 RX ORDER — SODIUM CHLORIDE 9 MG/ML
10 INJECTION, SOLUTION INTRAVENOUS CONTINUOUS
Status: DISCONTINUED | OUTPATIENT
Start: 2021-10-28 | End: 2021-11-01 | Stop reason: HOSPADM

## 2021-10-28 RX ORDER — TETRAKIS(2-METHOXYISOBUTYLISOCYANIDE)COPPER(I) TETRAFLUOROBORATE 1 MG/ML
30 INJECTION, POWDER, LYOPHILIZED, FOR SOLUTION INTRAVENOUS
Status: COMPLETED | OUTPATIENT
Start: 2021-10-28 | End: 2021-10-28

## 2021-10-28 RX ORDER — SODIUM CHLORIDE 0.9 % (FLUSH) 0.9 %
5 SYRINGE (ML) INJECTION EVERY 8 HOURS
Status: DISCONTINUED | OUTPATIENT
Start: 2021-10-28 | End: 2021-11-01 | Stop reason: HOSPADM

## 2021-10-28 RX ORDER — TETRAKIS(2-METHOXYISOBUTYLISOCYANIDE)COPPER(I) TETRAFLUOROBORATE 1 MG/ML
10 INJECTION, POWDER, LYOPHILIZED, FOR SOLUTION INTRAVENOUS
Status: COMPLETED | OUTPATIENT
Start: 2021-10-28 | End: 2021-10-28

## 2021-10-28 RX ADMIN — Medication 5 ML: at 10:59

## 2021-10-28 RX ADMIN — TETRAKIS(2-METHOXYISOBUTYLISOCYANIDE)COPPER(I) TETRAFLUOROBORATE 10 MILLICURIE: 1 INJECTION, POWDER, LYOPHILIZED, FOR SOLUTION INTRAVENOUS at 08:30

## 2021-10-28 RX ADMIN — TETRAKIS(2-METHOXYISOBUTYLISOCYANIDE)COPPER(I) TETRAFLUOROBORATE 30 MILLICURIE: 1 INJECTION, POWDER, LYOPHILIZED, FOR SOLUTION INTRAVENOUS at 11:15

## 2021-10-28 RX ADMIN — REGADENOSON 0.4 MG: 0.08 INJECTION, SOLUTION INTRAVENOUS at 10:58

## 2021-10-29 NOTE — PROGRESS NOTES
Good morning Ms. Yuki Eladio, good news stress test of your heart is normal.  I will be sending your surgeon your cardiac clearance. Happy early birthday good luck on your surgery be well. [address] [facility]    [employer] message sent 10/29/2021 0911 hrs.

## 2021-11-01 ENCOUNTER — TRANSCRIBE ORDER (OUTPATIENT)
Dept: NEUROLOGY | Age: 76
End: 2021-11-01

## 2021-11-01 ENCOUNTER — HOSPITAL ENCOUNTER (OUTPATIENT)
Dept: PREADMISSION TESTING | Age: 76
Discharge: HOME | End: 2021-11-01
Attending: ORTHOPAEDIC SURGERY
Payer: MEDICARE

## 2021-11-01 VITALS
BODY MASS INDEX: 31.2 KG/M2 | DIASTOLIC BLOOD PRESSURE: 55 MMHG | WEIGHT: 182.76 LBS | HEART RATE: 73 BPM | SYSTOLIC BLOOD PRESSURE: 124 MMHG | TEMPERATURE: 97.5 F | OXYGEN SATURATION: 100 % | RESPIRATION RATE: 16 BRPM | HEIGHT: 64 IN

## 2021-11-01 LAB
ABO + RH BLD: NORMAL
ALBUMIN SERPL-MCNC: 3.1 G/DL (ref 3.5–5)
ALBUMIN/GLOB SERPL: 0.9 {RATIO} (ref 1.1–2.2)
ALP SERPL-CCNC: 65 U/L (ref 45–117)
ALT SERPL-CCNC: 24 U/L (ref 12–78)
ANION GAP SERPL CALC-SCNC: 8 MMOL/L (ref 5–15)
APPEARANCE UR: CLEAR
AST SERPL-CCNC: 43 U/L (ref 15–37)
BACTERIA URNS QL MICRO: NEGATIVE /HPF
BILIRUB SERPL-MCNC: 0.9 MG/DL (ref 0.2–1)
BILIRUB UR QL: NEGATIVE
BLOOD GROUP ANTIBODIES SERPL: NORMAL
BUN SERPL-MCNC: 33 MG/DL (ref 6–20)
BUN/CREAT SERPL: 27 (ref 12–20)
CALCIUM SERPL-MCNC: 7.9 MG/DL (ref 8.5–10.1)
CHLORIDE SERPL-SCNC: 112 MMOL/L (ref 97–108)
CO2 SERPL-SCNC: 20 MMOL/L (ref 21–32)
COLOR UR: ABNORMAL
CREAT SERPL-MCNC: 1.22 MG/DL (ref 0.55–1.02)
EPITH CASTS URNS QL MICRO: ABNORMAL /LPF
ERYTHROCYTE [DISTWIDTH] IN BLOOD BY AUTOMATED COUNT: 15.1 % (ref 11.5–14.5)
GLOBULIN SER CALC-MCNC: 3.5 G/DL (ref 2–4)
GLUCOSE SERPL-MCNC: 117 MG/DL (ref 65–100)
GLUCOSE UR STRIP.AUTO-MCNC: NEGATIVE MG/DL
HCT VFR BLD AUTO: 32.6 % (ref 35–47)
HGB BLD-MCNC: 10 G/DL (ref 11.5–16)
HGB UR QL STRIP: NEGATIVE
HYALINE CASTS URNS QL MICRO: ABNORMAL /LPF (ref 0–5)
INR PPP: 1 (ref 0.9–1.1)
KETONES UR QL STRIP.AUTO: NEGATIVE MG/DL
LEUKOCYTE ESTERASE UR QL STRIP.AUTO: NEGATIVE
MCH RBC QN AUTO: 31.7 PG (ref 26–34)
MCHC RBC AUTO-ENTMCNC: 30.7 G/DL (ref 30–36.5)
MCV RBC AUTO: 103.5 FL (ref 80–99)
NITRITE UR QL STRIP.AUTO: NEGATIVE
NRBC # BLD: 0 K/UL (ref 0–0.01)
NRBC BLD-RTO: 0 PER 100 WBC
PH UR STRIP: 5 [PH] (ref 5–8)
PLATELET # BLD AUTO: 216 K/UL (ref 150–400)
PMV BLD AUTO: 10.6 FL (ref 8.9–12.9)
POTASSIUM SERPL-SCNC: 4.4 MMOL/L (ref 3.5–5.1)
PROT SERPL-MCNC: 6.6 G/DL (ref 6.4–8.2)
PROT UR STRIP-MCNC: ABNORMAL MG/DL
PROTHROMBIN TIME: 10.1 SEC (ref 9–11.1)
RBC # BLD AUTO: 3.15 M/UL (ref 3.8–5.2)
RBC #/AREA URNS HPF: ABNORMAL /HPF (ref 0–5)
SODIUM SERPL-SCNC: 140 MMOL/L (ref 136–145)
SP GR UR REFRACTOMETRY: 1.02 (ref 1–1.03)
SPECIMEN EXP DATE BLD: NORMAL
UA: UC IF INDICATED,UAUC: ABNORMAL
UROBILINOGEN UR QL STRIP.AUTO: 0.2 EU/DL (ref 0.2–1)
WBC # BLD AUTO: 4.8 K/UL (ref 3.6–11)
WBC URNS QL MICRO: ABNORMAL /HPF (ref 0–4)

## 2021-11-01 PROCEDURE — 36415 COLL VENOUS BLD VENIPUNCTURE: CPT

## 2021-11-01 PROCEDURE — 86901 BLOOD TYPING SEROLOGIC RH(D): CPT

## 2021-11-01 PROCEDURE — 80053 COMPREHEN METABOLIC PANEL: CPT

## 2021-11-01 PROCEDURE — 97161 PT EVAL LOW COMPLEX 20 MIN: CPT

## 2021-11-01 PROCEDURE — 85610 PROTHROMBIN TIME: CPT

## 2021-11-01 PROCEDURE — 81001 URINALYSIS AUTO W/SCOPE: CPT

## 2021-11-01 PROCEDURE — 97110 THERAPEUTIC EXERCISES: CPT

## 2021-11-01 PROCEDURE — 85027 COMPLETE CBC AUTOMATED: CPT

## 2021-11-01 RX ORDER — GABAPENTIN 300 MG/1
900 CAPSULE ORAL
COMMUNITY
End: 2021-12-07 | Stop reason: SDUPTHER

## 2021-11-01 NOTE — ADVANCED PRACTICE NURSE
PAT Nurse Practitioner   Pre-Operative Chart Review/Assessment:-ORTHOPEDIC/NEUROSURGICAL SPINE                Patient Name:  Gayla Calles                                                           Age:   78 y.o.    :  1945     Today's Date:  11/3/2021     Date of PAT:   21      Date of Surgery:    2021      Procedure(s):  Right  Revision total knee arthroplasty     Surgeon:   Nelly Mcfarland     Medical Clearance:  Dr. Nabila Quiroz:      1)  Cardiac Clearance:  Melly Merino DNP       2)  Program for Diabetes Health Consult:  Not indicated-A1C 5.0 on 21      3)  Sleep Apnea evaluation:   MICKI 4-Has prior dx of MICKI but no longer compliant w/ CPAP after weight loss     4) Treatment for MRSA/Staph Aureus:  Negative      5) Additional Concerns:  RA, sarcoidosis, MICKI (noncompliant), CAD w/ SERINA , PVD, asthma, T2DM, PVD, gastritis, glaucoma.  Impaired renal functions                Vital Signs:         Visit Vitals  BP (!) 124/55 (BP 1 Location: Right upper arm, BP Patient Position: At rest;Sitting)   Pulse 73   Temp 97.5 °F (36.4 °C)   Resp 16   Ht 5' 4\" (1.626 m)   Wt 82.9 kg (182 lb 12.2 oz)   SpO2 100%   BMI 31.37 kg/m²                        ____________________________________________  PAST MEDICAL HISTORY  Past Medical History:   Diagnosis Date    Asthma     At risk for sleep apnea 2019    Stop Bang 4 - Sleep study in January per patient     Chronic pain 2020    DDD (degenerative disc disease), lumbar     Diabetes ([address]     Gastritis     GERD (gastroesophageal reflux disease)     Glaucoma     Hearing loss 2020    Hiatal hernia     High cholesterol     Hypertension     Hypocalcemia 2020    Peripheral vascular disease (HCC)     RA (rheumatoid arthritis) ([address]     Sarcoidosis     MCV    Sleep apnea     has not used cpap in years since weight loss      ____________________________________________  PAST SURGICAL HISTORY  Past Surgical History: Procedure Laterality Date    HX GASTRIC BYPASS      HX HEART CATHETERIZATION      s/p PCI with stenting in 1998     HX KNEE REPLACEMENT Bilateral     HX ORTHOPAEDIC Bilateral     carpal tunnel surgery     HX SHOULDER REPLACEMENT Right     x 2       ____________________________________________  HOME MEDICATIONS    Current Outpatient Medications   Medication Sig    gabapentin (NEURONTIN) 300 mg capsule Take 900 mg by mouth nightly. TAKE 1 CAPSULE IN THE MORNING AND TAKE 3 CAPSULES BEFORE BEDTIME    simvastatin (ZOCOR) 20 mg tablet TAKE BY MOUTH NIGHTLY. (Patient taking differently: Take 20 mg by mouth nightly. TAKE BY MOUTH NIGHTLY.)    sertraline (ZOLOFT) 50 mg tablet Take 1 Tablet by mouth daily.  omeprazole (PRILOSEC) 20 mg capsule Take 1 Capsule by mouth daily.  metoprolol tartrate (LOPRESSOR) 25 mg tablet Take 1 Tablet by mouth two (2) times a day.  metFORMIN (GLUCOPHAGE) 500 mg tablet Take 1 Tablet by mouth daily (with dinner). Decreased dose    latanoprost (XALATAN) 0.005 % ophthalmic solution INSTILL 1 DROP INTO BOTH EYES NIGHTLY (Patient taking differently: Administer 1 Drop to both eyes nightly. INSTILL 1 DROP INTO BOTH EYES NIGHTLY)    gabapentin (NEURONTIN) 300 mg capsule TAKE 1 CAPSULE IN THE MORNING AND TAKE 3 CAPSULES BEFORE BEDTIME (Patient taking differently: Take 300 mg by mouth daily. TAKE 1 CAPSULE IN THE MORNING AND TAKE 3 CAPSULES BEFORE BEDTIME)    furosemide (LASIX) 20 mg tablet Take 1 Tablet by mouth daily.  ergocalciferol (ERGOCALCIFEROL) 1,250 mcg (50,000 unit) capsule Take 1 Capsule by mouth every seven (7) days. Indications: vitamin D deficiency (high dose therapy) (Patient taking differently: Take 50,000 Units by mouth every seven (7) days.  Fridays  Indications: vitamin D deficiency (high dose therapy))    fluticasone furoate-vilanteroL (Breo Ellipta) 100-25 mcg/dose inhaler TAKE 1 PUFF BY MOUTH EVERY DAY (Patient taking differently: Take 1 Puff by inhalation daily. TAKE 1 PUFF BY MOUTH EVERY DAY)    albuterol sulfate (PROVENTIL;VENTOLIN) 2.5 mg/0.5 mL nebu nebulizer solution 0.5 mL by Nebulization route every four (4) hours as needed for Wheezing. Indications: asthma attack    traMADoL (ULTRAM) 50 mg tablet Take 1 Tablet by mouth daily as needed for Pain for up to 30 days.  predniSONE (DELTASONE) 20 mg tablet Take 20 mg by mouth daily (with breakfast).  leflunomide (ARAVA) 20 mg tablet TAKE 1 TABLET BY MOUTH EVERY DAY (Patient taking differently: Take 20 mg by mouth daily. TAKE 1 TABLET BY MOUTH EVERY DAY)    tofacitinib 11 mg Tb24 Take 11 mg by mouth daily. Indications: rheumatoid arthritis    nortriptyline (PAMELOR) 50 mg capsule Take 50 mg by mouth nightly.  aspirin delayed-release 81 mg tablet Take 81 mg by mouth daily. No current facility-administered medications for this encounter.      ____________________________________________  ALLERGIES  Allergies   Allergen Reactions    Lisinopril Rash    Methotrexate Hives      ____________________________________________  SOCIAL HISTORY  Social History     Tobacco Use    Smoking status: Never Smoker    Smokeless tobacco: Never Used   Substance Use Topics    Alcohol use:  Yes     Alcohol/week: 2.0 standard drinks     Types: 1 Glasses of wine, 1 Shots of liquor per week     Comment: 2-3 yearly      ____________________________________________  COVID VACCINATION STATUS:      Internal Administration   First Dose COVID-19, PFIZER, MRNA, LNP-S, PF, 30MCG/0.3ML DOSE  03/25/2021   Second Dose COVID-19, PFIZER, MRNA, LNP-S, PF, 30MCG/0.3ML DOSE  04/22/2021      Last COVID Lab No results found for: Sharon Rise, RCV2CT, CVD2M, COV2, XPLCVT, [id number], [id number], COVV, [facility] [address] Outpatient Visit on 11/01/2021   Component Date Value Ref Range Status    WBC 11/01/2021 4.8  3.6 - 11.0 K/uL Final    RBC 11/01/2021 3.15* 3.80 - 5.20 M/uL Final  HGB 11/01/2021 10.0* 11.5 - 16.0 g/dL Final    HCT 11/01/2021 32.6* 35.0 - 47.0 % Final    MCV 11/01/2021 103.5* 80.0 - 99.0 FL Final    MCH 11/01/2021 31.7  26.0 - 34.0 PG Final    MCHC 11/01/2021 30.7  30.0 - 36.5 g/dL Final    RDW 11/01/2021 15.1* 11.5 - 14.5 % Final    PLATELET 96/63/3299 519  150 - 400 K/uL Final    MPV 11/01/2021 10.6  8.9 - 12.9 FL Final    NRBC 11/01/2021 0.0  0  WBC Final    ABSOLUTE NRBC 11/01/2021 0.00  0.00 - 0.01 K/uL Final    Special Requests: 11/01/2021 NO SPECIAL REQUESTS    Final    Culture result: 11/01/2021 MRSA NOT PRESENT    Final    Culture result: 11/01/2021 Screening of patient nares for MRSA is for surveillance purposes and, if positive, to facilitate isolation considerations in high risk settings. It is not intended for automatic decolonization interventions per se as regimens are not sufficiently effective to warrant routine use. Final    INR 11/01/2021 1.0  0.9 - 1.1   Final    A single therapeutic range for Vit K antagonists may not be optimal for all indications - see June, 2008 issue of Chest, American College of Chest Physicians Evidence-Based Clinical Practice Guidelines, 8th Edition.     Prothrombin time 11/01/2021 10.1  9.0 - 11.1 sec Final    Color 11/01/2021 YELLOW/STRAW    Final    Color Reference Range: Straw, Yellow or Dark Yellow    Appearance 11/01/2021 CLEAR  CLEAR   Final    Specific gravity 11/01/2021 1.020  1.003 - 1.030   Final    pH (UA) 11/01/2021 5.0  5.0 - 8.0   Final    Protein 11/01/2021 TRACE* NEG mg/dL Final    Glucose 11/01/2021 Negative  NEG mg/dL Final    Ketone 11/01/2021 Negative  NEG mg/dL Final    Bilirubin 11/01/2021 Negative  NEG   Final    Blood 11/01/2021 Negative  NEG   Final    Urobilinogen 11/01/2021 0.2  0.2 - 1.0 EU/dL Final    Nitrites 11/01/2021 Negative  NEG   Final    Leukocyte Esterase 11/01/2021 Negative  NEG   Final    WBC 11/01/2021 0-4  0 - 4 /hpf Final    RBC 11/01/2021 0-5  0 - 5 /hpf Final    Epithelial cells 11/01/2021 FEW  FEW /lpf Final    Epithelial cell category consists of squamous cells and /or transitional urothelial cells. Renal tubular cells, if present, are separately identified as such.  Bacteria 11/01/2021 Negative  NEG /hpf Final    UA:UC IF INDICATED 11/01/2021 CULTURE NOT INDICATED BY UA RESULT  CNI   Final    Hyaline cast 11/01/2021 2-5  0 - 5 /lpf Final    Sodium 11/01/2021 140  136 - 145 mmol/L Final    Potassium 11/01/2021 4.4  3.5 - 5.1 mmol/L Final    Chloride 11/01/2021 112* 97 - 108 mmol/L Final    CO2 11/01/2021 20* 21 - 32 mmol/L Final    Anion gap 11/01/2021 8  5 - 15 mmol/L Final    Glucose 11/01/2021 117* 65 - 100 mg/dL Final    BUN 11/01/2021 33* 6 - 20 MG/DL Final    Creatinine 11/01/2021 1.22* 0.55 - 1.02 MG/DL Final    BUN/Creatinine ratio 11/01/2021 27* 12 - 20   Final    GFR est AA 11/01/2021 52* >60 ml/min/1.73m2 Final    GFR est non-AA 11/01/2021 43* >60 ml/min/1.73m2 Final    Estimated GFR is calculated using the IDMS-traceable Modification of Diet in Renal Disease (MDRD) Study equation, reported for both  Americans (GFRAA) and non- Americans (GFRNA), and normalized to 1.73m2 body surface area. The physician must decide which value applies to the patient.  Calcium 11/01/2021 7.9* 8.5 - 10.1 MG/DL Final    Bilirubin, total 11/01/2021 0.9  0.2 - 1.0 MG/DL Final    ALT (SGPT) 11/01/2021 24  12 - 78 U/L Final    AST (SGOT) 11/01/2021 43* 15 - 37 U/L Final    Alk.  phosphatase 11/01/2021 65  45 - 117 U/L Final    Protein, total 11/01/2021 6.6  6.4 - 8.2 g/dL Final    Albumin 11/01/2021 3.1* 3.5 - 5.0 g/dL Final    Globulin 11/01/2021 3.5  2.0 - 4.0 g/dL Final    A-G Ratio 11/01/2021 0.9* 1.1 - 2.2   Final    Crossmatch Expiration 11/01/2021 11/12/2021,2359   Final    ABO/Rh(D) 11/01/2021 A POSITIVE   Final    Antibody screen 11/01/2021 NEG   Final   Hospital Outpatient Visit on 10/28/2021   Component Date Value Ref Range Status    Target HR 10/28/2021 147  bpm Final    ST Elevation (mm) 10/28/2021 0  mm Final    ST Depression (mm) 10/28/2021 0  mm Final    Baseline HR 10/28/2021 60  BPM Final    Baseline BP 10/28/2021 128  mmHg Final    O2 sat rest 10/28/2021 96  % Final    Percent HR 10/28/2021 61  % Final    Post peak HR 10/28/2021 89  BPM Final    O2 sat peak 10/28/2021 96  % Final    Stress Base Diastolic BP 52/76/9418 63  mmHg Final    Stress Base Systolic BP 23/40/2558 715  mmHg Final    Stress Base Diastolic BP 78/19/2178 70  mmHg Final    Stress Rate Pressure Product 10/28/2021 13,795  bpm*mmHg Final    Exercise duration time 10/28/2021 00:03:22   Final    Estimated workload 10/28/2021 1.0  METS Final        XR Results (most recent):    Results from Appointment encounter on 08/31/21    XR HAND LT MIN 3 V    Narrative  EXAM: XR HAND RT MIN 3 V, XR HAND LT MIN 3 V    INDICATION: Multifocal joint pain, including bilateral hand pain. COMPARISON: Right hand views on 8/22/2018    TECHNIQUE: 3 views of the bilateral hands    FINDINGS: No fracture or dislocation on plain film. Bones are osteopenic. There  are vascular calcifications. Bilateral TFC chondrocalcinosis. Bilateral mild  triscaphe and first CMC joint space narrowing. Subtle erosions of the bilateral  MCP joints are greatest in the bilateral third MCP joints. There are subtle  central erosions of the right fifth PIP joint. DIP joints are within normal  limits. Soft tissue swelling is greatest around the bilateral second, third, and  fourth PIP joints. No significant change in the right hand. 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 Extensive vascular  calcifications. No fracture. No significant change in the right hand. Skin:   Denies open wounds, cuts, sores, rashes or other areas of concern in PAT assessment. Nas High NP    11/1/21 Msg to PCP regarding abnormal labs from PAT appt today.

## 2021-11-01 NOTE — PERIOP NOTES
Orthopedic and Spine Patients: Instructions on When You Can   Eat or Drink Before Surgery      You have been provided a pre-surgery drink received at your pre-admission testing appointment.  Night before surgery:  o You should drink 1/2 bottle of the  pre-surgery drink at bedtime. No food after midnight!  Day of Surgery:  o Complete 2nd half of the bottle of the pre-surgery drink 1 hour prior to arrival at hospital.  For questions call Pre-Admission Testing at [phone]. They are available from 8:00am-5:00pm, Monday through Friday.

## 2021-11-01 NOTE — PERIOP NOTES
The [address] \"Your Path to a More Active Life\" orthopedic total knee or total hip educational video and the [facility] patient handbook provided & reviewed during the patients pre-admission testing (PAT) appointment. An opportunity for questions was provided, patient verbalized understanding. HGB A1C last done 8/31/21. Was not repeated. Results in [facility].

## 2021-11-01 NOTE — PERIOP NOTES
Left message for Dr Adela Noonan asking for instructions for Emanuel Villaseñor and Juwan Henderson prior to surgery. 1030- received call from [facility] at  [facility] office and patient DOES NOT need to stop [place]. Called patient and verbalizes understanding.

## 2021-11-01 NOTE — PERIOP NOTES
Hibiclens/Chlorhexidine    Preventing Infections Before and After - Your Surgery    IMPORTANT INSTRUCTIONS    Please read and follow these instructions carefully. If you are unable to comply with the below instructions your procedure will be cancelled. Every Night for Three (3) nights before your surgery:  1. Shower with an antibacterial soap, such as Dial, or the soap provided at your preassessment appointment. A shower is better than a bath for cleaning your skin. 2. If needed, ask someone to help you reach all areas of your body. Dont forget to clean your belly button with every shower. The night before your surgery: If you lose your Hibiclens/chlorhexidine please contact surgery center or you can purchase it at a local pharmacy  1. On the night before your surgery, shower with an antibacterial soap, such as Dial, or the soap provided at your preassessment appointment. 2. With one packet of Hibiclens/Chlorhexidine in hand, turn water off.  3. Apply Hibiclens antiseptic skin cleanser with a clean, freshly washed washcloth. ? Gently apply to your body from chin to toes (except the genital area) and especially the area(s) where your incision(s) will be. ? Leave Hibiclens/Chlorhexidine on your skin for at least 20 seconds. CAUTION: If needed, Hibiclens/chlorhexidine may be used to clean the folds of skin of the legs (such as in the area of the groin) and on your buttocks and hips. However, do not use Hibiclens/Chlorhexidine above the neck or in the genital area (your bottom) or put inside any area of your body. 4. Turn the water back on and rinse. 5. Dry gently with a clean, freshly washed towel. 6. After your shower, do not use any powder, deodorant, perfumes or lotion. 7. Use clean, freshly washed towels and washcloths every time you shower. 8. Wear clean, freshly washed pajamas to bed the night before surgery. 9. Sleep on clean, freshly washed sheets.   10. Do not allow pets to sleep in your bed with you. The Morning of your surgery:  1. Shower again thoroughly with an antibacterial soap, such as Dial or the soap provided at your preassessment appointment. If needed, ask someone for help to reach all areas of your body. Dont forget to clean your belly button! Rinse. 2. Dry gently with a clean, freshly washed towel. 3. After your shower, do not use any powder, deodorant, perfumes or lotion prior to surgery. 4. Put on clean, freshly washed clothing. Tips to help prevent infections after your surgery:  1. Protect your surgical wound from germs:  ? Hand washing is the most important thing you and your caregivers can do to prevent infections. ? Keep your bandage clean and dry! ? Do not touch your surgical wound. 2. Use clean, freshly washed towels and washcloths every time you shower; do not share bath linens with others. 3. Until your surgical wound is healed, wear clothing and sleep on bed linens each day that are clean and freshly washed. 4. Do not allow pets to sleep in your bed with you or touch your surgical wound. 5. Do not smoke - smoking delays wound healing. This may be a good time to stop smoking. 6. If you have diabetes, it is important for you to manage your blood sugar levels properly before your surgery as well as after your surgery. Poorly managed blood sugar levels slow down wound healing and prevent you from healing completely. If you lose your Hibiclens/chlorhexidine, please call the [facility], or it is available for purchase at your pharmacy.                ___________________      ___________________      ________________  (Signature of Patient)          (Witness)                   (Date and Time)

## 2021-11-01 NOTE — PERIOP NOTES
[facility]  Joint/Spine Preoperative Instructions        Surgery Date 11/9/21          Time of Arrival 0545 am Contact # [phone]  Covid test scheduled for 11/5    1. On the day of your surgery, please report to the Surgical Services Registration Desk and sign in at your designated time. The Surgery Center is located to the right of the Emergency Room. 2. You must have someone with you to drive you home. You should not drive a car for 24 hours following surgery. Please make arrangements for a friend or family member to stay with you for the first 24 hours after your surgery. 3. No food after midnight . Medications morning of surgery should be taken with a sip of water. Please follow pre-surgery drink instructions that were given at your Pre Admission Testing appointment. 4. We recommend you do not drink any alcoholic beverages for 24 hours before and after your surgery. 5. Contact your surgeons office for instructions on the following medications: non-steroidal anti-inflammatory drugs (i.e. Advil, Aleve), vitamins, and supplements. (Some surgeons will want you to stop these medications prior to surgery and others may allow you to take them)  **If you are currently taking Plavix, Coumadin, Aspirin and/or other blood-thinning agents, contact your surgeon for instructions. ** Your surgeon will partner with the physician prescribing these medications to determine if it is safe to stop or if you need to continue taking. Please do not stop taking these medications without instructions from your surgeon    6. Wear comfortable clothes. Wear glasses instead of contacts. Do not bring any money or jewelry. Please bring picture ID, insurance card, and any prearranged co-payment or hospital payment. Do not wear make-up, particularly mascara the morning of your surgery. Do not wear nail polish, particularly if you are having foot /hand surgery.   Wear your hair loose or down, no ponytails, buns, lennox pins or clips. All body piercings must be removed. Please shower with antibacterial soap for three consecutive days before and on the morning of surgery, but do not apply any lotions, powders or deodorants after the shower on the day of surgery. Please use a fresh towels after each shower. Please sleep in clean clothes and change bed linens the night before surgery. Please do not shave for 48 hours prior to surgery. Shaving of the face is acceptable. 7. You should understand that if you do not follow these instructions your surgery may be cancelled. If your physical condition changes (I.e. fever, cold or flu) please contact your surgeon as soon as possible. 8. It is important that you be on time. If a situation occurs where you may be late, please call [phone] (OR Holding Area). 9. If you have any questions and or problems, please call [phone] (Pre-admission Testing). 10. Your surgery time may be subject to change. You will receive a phone call the evening prior if your time changes. 11.  If having outpatient surgery, you must have someone to drive you here, stay with you during the duration of your stay, and to drive you home at time of discharge. 12. The following link is for the educational video for patients and/or families. http://wheeler-neff.org/. com/locations/yvzmizrcu-fllglkw-kbpjryf/[place]/[facility]/educational-materials    Special Instructions: follow instructions per Dr Nelly Higuera regarding Levels Sender and Paula Jimenes  Follow instructions regarding aspirin to stop 1 week prior to surgery per Dr Calvillo Flavin: no exceptions, may take morning medications with a sip of water     I understand a pre-operative phone call will be made to verify my surgery time.   In the event that I am not available, I give permission for a message to be left on my answering service and/or with another person?   yes         ___________________      __________   _________    (Signature of Patient)             (Witness)                (Date and Time)

## 2021-11-01 NOTE — PROGRESS NOTES
[facility]  Physical Therapy Pre-surgery evaluation  [address]  [address], [address]    PHYSICAL THERAPY PRE TKR SURGERY EVALUATION  Patient: Yolanda Dickson (89 y.o. female)  Date: 11/1/2021  Primary Diagnosis: pat  Procedure(s) (LRB):  RIGHT REVISION TOTAL KNEE ARTHROPLASTY (GENERAL W/ADDUCTOR CANAL BLOCK) (Right)     Precautions:        ASSESSMENT :  Based on the objective data described below, the patient presents with impaired gait, balance, pain, and overall high level functional mobility due to end stage degenerative joint disease in the right knee. Pt states this is a revision surgery, original surgery was over 20 years ago. Discussed anticipated disposition to home with possible discharge within a 1 to 2 day time frame post-surgery. Patient in agreement. GOALS: (Goals have been discussed and agreed upon with patient.)  DISCHARGE GOALS: Time Frame: 1 DAY  1. Patient will demonstrate increased strength, range of motion, and pain control via a home exercise program in order to minimize functional deficits in preparation for their upcoming surgery. This will be achieved by using education, demonstration and through the use of an informational handout including a home exercise program.  REHABILITATION POTENTIAL FOR STATED GOALS: Good     RECOMMENDATIONS AND PLANNED INTERVENTIONS: (Benefits and precautions of physical therapy have been discussed with the patient.)  1. Home Exercise Program  TREATMENT PLAN EFFECTIVE DATES: 11/1/2021 TO 11/1/2021  FREQUENCY/DURATION: Patient to continue to perform home exercise program at least twice daily until her surgery. SUBJECTIVE:   Patient stated I was out with my friends when I fell, I was so glad my friend had a wheelchair.     OBJECTIVE DATA SUMMARY:   HISTORY:    Past Medical History:   Diagnosis Date    Asthma     At risk for sleep apnea 11/11/2019    Stop Bang 4 - Sleep study in January per patient     Chronic pain 5/8/2020    DDD (degenerative disc disease), lumbar     Diabetes ([facility] [address]     Gastritis     GERD (gastroesophageal reflux disease)     Glaucoma     Hearing loss 5/8/2020    Hiatal hernia     High cholesterol     Hypertension     Hypocalcemia 5/8/2020    Peripheral vascular disease (HCC)     RA (rheumatoid arthritis) (HCC)     Sarcoidosis 1999    [facility]    Sleep apnea     has not used cpap in years since weight loss     Past Surgical History:   Procedure Laterality Date    HX GASTRIC BYPASS      HX HEART CATHETERIZATION      s/p PCI with stenting in 1998     HX KNEE REPLACEMENT Bilateral     HX ORTHOPAEDIC Bilateral     carpal tunnel surgery     HX SHOULDER REPLACEMENT Right     x 2      Prior Level of Function/Home Situation: Mod I with use of rollator walker. Reports history of multiple falls, stating her R knee frequently estella on her. Lives alone and reports limited family support, stating both of her sons work full time. Reports that she is \"looking into\" getting a nurse to stay with her. Does not drive. Personal factors and/or comorbidities impacting plan of care:     Patient []   does  [x]   does not state signs/symptoms of shortness of breath/dyspnea on exertion/respiratory distress. Home Situation  Home Environment: Apartment  # Steps to Enter: 1  Rails to Enter: No  One/Two Story Residence: One story  Living Alone: Yes  Support Systems: Other Family Member(s), Child(reji)  Patient Expects to be Discharged to[de-identified] House  Current DME Used/Available at Home: Walker, rollator, Cane, straight, Blood pressure cuff, Glucometer, Nebulizer, Shower chair  Tub or Shower Type: Tub/Shower combination      EXAMINATION/PRESENTATION/DECISION MAKING:     ADLs (Current Functional Status):    Bathing/Showering:   [x] Independent  [] Requires Assistance from Someone  [] Sponge Bath Only   Ambulation:  [x] Independent  [] Walk Indoors Only  [] Walk Outdoors  [] Use Assistive Device  [] Use Wheelchair Only Dressing:  [x] [address] from Someone for:  [] Sock/Shoes  [] Pants  [] Everything   Household Activities:  [x] Routine house and yard work  [] Light Housework Only  [] None       Critical Behavior:                Strength:    Strength: Generally decreased, functional                    Tone & Sensation:   Tone: Normal                              Range Of Motion:  AROM: Generally decreased, functional                       Coordination:  Coordination: Within functional limits    Functional Mobility:  Transfers:  Sit to Stand: Modified independent, Additional time  Stand to Sit: Modified independent, Additional time                       Balance:   Sitting: Intact  Standing: Impaired, With support (rollator walker)  Standing - Static: Good, Constant support  Standing - Dynamic : Fair, Constant support  Ambulation/Gait Training:  Distance (ft): 20 Feet (ft)  Assistive Device: Walker, rollator  Ambulation - Level of Assistance: Modified independent        Gait Abnormalities: Decreased step clearance, Shuffling gait, Antalgic              Speed/Nicole: Slow  Step Length: Left shortened, Right shortened                    Therapeutic Exercises:   The patient was educated in, has demonstrated, and has received written instructions to complete for their home exercise program per total knee replacement protocol. Functional Measure:  Timed up and go:    Timed Get Up And Go Test: 44       < than 10 seconds=Normal  Greater then 13.5 seconds (in elderly)=Increased fall risk   Randi NAILS, Morena Ovalle. Predicting the probability for falls in community dwelling older adults using the Timed Up and Go Test. Phys Ther. 2000;80:896-903.            Pain:  Pain Scale 1: Numeric (0 - 10)  Pain Intensity 1: 9  Pain Location 1: Knee  Pain Orientation 1: Right  Pain Description 1: Aching       Activity Tolerance:   good  Patient []   does  [x]   does not demonstrate signs/symptoms of shortness of breath/dyspnea on exertion/respiratory distress. COMMUNICATION/EDUCATION:   The patient was educated on:  [x]         Importance of post-operative mobility to achieve their desired outcomes and restore biological function  [x]         The key post-operative time frame to address ROM to prevent additional complications    The patients plan of care was discussed with:   [x]         The patient verbalized understanding of her plan in preparation for their upcoming surgery  []         The patient's  was present for this session  []         The patient reports that he/she does not have a  identified at this time  []         The  verbalized understanding of the education regarding the patient's upcoming surgery  [x]         Patient/family agree to work toward stated goals and plan of care. []         Patient understands intent and goals of therapy, but is neutral about his/her participation. []         Patient is unable to participate in goal setting and plan of care.     Thank you for this referral.  Bree Currie, PT, DPT   Time Calculation: 18 mins

## 2021-11-01 NOTE — PERIOP NOTES
Incentive Spirometer        Using the incentive spirometer helps expand the small air sacs of your lungs, helps you breathe deeply, and helps improve your lung function. Use your incentive spirometer twice a day (10 breaths each time) prior to surgery. How to Use Your Incentive Spirometer:  1. Hold the incentive spirometer in an upright position. 2. Breathe out as usual.   3. Place the mouthpiece in your mouth and seal your lips tightly around it. 4. Take a deep breath. Breathe in slowly and as deeply as possible. Keep the blue flow rate guide between the arrows. 5. Hold your breath as long as possible. Then exhale slowly and allow the piston to fall to the bottom of the column. 6. Rest for a few seconds and repeat steps one through five at least 10 times. PAT Tidal Volume__1750________________  x_2_______________  Date__11/1/21_____________________    Virgle Maidens THE INCENTIVE SPIROMETER WITH YOU TO THE HOSPITAL ON THE DAY OF YOUR SURGERY. Opportunity given to ask and answer questions as well as to observe return demonstration.     Patient signature_____________________________    Witness____________________________

## 2021-11-02 LAB
BACTERIA SPEC CULT: NORMAL
BACTERIA SPEC CULT: NORMAL
SERVICE CMNT-IMP: NORMAL

## 2021-11-05 ENCOUNTER — HOSPITAL ENCOUNTER (OUTPATIENT)
Dept: PREADMISSION TESTING | Age: 76
Discharge: HOME | End: 2021-11-05
Payer: MEDICARE

## 2021-11-05 PROCEDURE — U0005 INFEC AGEN DETEC AMPLI PROBE: HCPCS

## 2021-11-06 LAB
SARS-COV-2, XPLCVT: NOT DETECTED
SOURCE, COVRS: NORMAL

## 2021-11-08 ENCOUNTER — TELEPHONE (OUTPATIENT)
Dept: ORTHOPEDIC SURGERY | Age: 76
End: 2021-11-08

## 2021-11-08 ENCOUNTER — DOCUMENTATION ONLY (OUTPATIENT)
Dept: FAMILY MEDICINE CLINIC | Age: 76
End: 2021-11-08

## 2021-11-08 NOTE — TELEPHONE ENCOUNTER
Alfredito Kelly with [address] authorization department called regarding patient. Patient is scheduled for surgery tomorrow and is scheduled as out patient, Alfredito Kelly checked insurance authorization shows it should be scheduled as in patient surgery.  Please give a call back to confirm in patient or out patient surgery for tomorrow    [facility]# [phone]

## 2021-11-08 NOTE — PROGRESS NOTES
Reviewed overall work-up from patient for preop including labs, nuclear stress test, and lower extremity Dopplers. She is cleared for surgery from my perspective.

## 2021-11-09 ENCOUNTER — APPOINTMENT (OUTPATIENT)
Dept: GENERAL RADIOLOGY | Age: 76
End: 2021-11-09
Attending: ORTHOPAEDIC SURGERY
Payer: MEDICARE

## 2021-11-09 ENCOUNTER — HOSPITAL ENCOUNTER (OUTPATIENT)
Age: 76
Discharge: NURSING, REHAB OR LONG-TERM CARE | End: 2021-11-17
Attending: ORTHOPAEDIC SURGERY | Admitting: ORTHOPAEDIC SURGERY
Payer: MEDICARE

## 2021-11-09 ENCOUNTER — ANESTHESIA (OUTPATIENT)
Dept: SURGERY | Age: 76
End: 2021-11-09
Payer: MEDICARE

## 2021-11-09 ENCOUNTER — ANESTHESIA EVENT (OUTPATIENT)
Dept: SURGERY | Age: 76
End: 2021-11-09
Payer: MEDICARE

## 2021-11-09 DIAGNOSIS — Z96.651 S/P REVISION OF TOTAL KNEE, RIGHT: Primary | ICD-10-CM

## 2021-11-09 PROBLEM — T84.9XXA COMPLICATION OF INTERNAL RIGHT KNEE PROSTHESIS (HCC): Status: ACTIVE | Noted: 2021-11-09

## 2021-11-09 LAB
GLUCOSE BLD STRIP.AUTO-MCNC: 135 MG/DL (ref 65–117)
GLUCOSE BLD STRIP.AUTO-MCNC: 218 MG/DL (ref 65–117)
GLUCOSE BLD STRIP.AUTO-MCNC: 262 MG/DL (ref 65–117)
GLUCOSE BLD STRIP.AUTO-MCNC: 68 MG/DL (ref 65–117)
SERVICE CMNT-IMP: ABNORMAL
SERVICE CMNT-IMP: NORMAL

## 2021-11-09 PROCEDURE — 77030041690 HC SYS PINNING KN JNJ -D: Performed by: ORTHOPAEDIC SURGERY

## 2021-11-09 PROCEDURE — 77030020061 HC IV BLD WRMR ADMIN SET 3M -B: Performed by: ANESTHESIOLOGY

## 2021-11-09 PROCEDURE — 74011250636 HC RX REV CODE- 250/636: Performed by: NURSE ANESTHETIST, CERTIFIED REGISTERED

## 2021-11-09 PROCEDURE — C1776 JOINT DEVICE (IMPLANTABLE): HCPCS | Performed by: ORTHOPAEDIC SURGERY

## 2021-11-09 PROCEDURE — 74011250636 HC RX REV CODE- 250/636: Performed by: ANESTHESIOLOGY

## 2021-11-09 PROCEDURE — 64450 NJX AA&/STRD OTHER PN/BRANCH: CPT | Performed by: ANESTHESIOLOGY

## 2021-11-09 PROCEDURE — 77030002933 HC SUT MCRYL J&J -A: Performed by: ORTHOPAEDIC SURGERY

## 2021-11-09 PROCEDURE — 87070 CULTURE OTHR SPECIMN AEROBIC: CPT

## 2021-11-09 PROCEDURE — 74011000250 HC RX REV CODE- 250: Performed by: NURSE ANESTHETIST, CERTIFIED REGISTERED

## 2021-11-09 PROCEDURE — 77030018723 HC ELCTRD BLD COVD -A: Performed by: ORTHOPAEDIC SURGERY

## 2021-11-09 PROCEDURE — 74011250636 HC RX REV CODE- 250/636: Performed by: ORTHOPAEDIC SURGERY

## 2021-11-09 PROCEDURE — 94640 AIRWAY INHALATION TREATMENT: CPT

## 2021-11-09 PROCEDURE — 77030031139 HC SUT VCRL2 J&J -A: Performed by: ORTHOPAEDIC SURGERY

## 2021-11-09 PROCEDURE — 87075 CULTR BACTERIA EXCEPT BLOOD: CPT

## 2021-11-09 PROCEDURE — 77030020274 HC MISC IMPL ORTHOPEDIC: Performed by: ORTHOPAEDIC SURGERY

## 2021-11-09 PROCEDURE — 77030008462 HC STPLR SKN PROX J&J -A: Performed by: ORTHOPAEDIC SURGERY

## 2021-11-09 PROCEDURE — 82962 GLUCOSE BLOOD TEST: CPT

## 2021-11-09 PROCEDURE — 76060000037 HC ANESTHESIA 3 TO 3.5 HR: Performed by: ORTHOPAEDIC SURGERY

## 2021-11-09 PROCEDURE — 87205 SMEAR GRAM STAIN: CPT

## 2021-11-09 PROCEDURE — 77030033067 HC SUT PDO STRATFX SPIR J&J -B: Performed by: ORTHOPAEDIC SURGERY

## 2021-11-09 PROCEDURE — 73560 X-RAY EXAM OF KNEE 1 OR 2: CPT

## 2021-11-09 PROCEDURE — 77030000032 HC CUF TRNQT ZIMM -B: Performed by: ORTHOPAEDIC SURGERY

## 2021-11-09 PROCEDURE — 77030038692 HC WND DEB SYS IRMX -B: Performed by: ORTHOPAEDIC SURGERY

## 2021-11-09 PROCEDURE — 77030029684 HC NEB SM VOL KT MONA -A

## 2021-11-09 PROCEDURE — 77030003601 HC NDL NRV BLK BBMI -A: Performed by: ANESTHESIOLOGY

## 2021-11-09 PROCEDURE — 76210000019 HC OR PH I REC 4 TO 4.5 HR: Performed by: ORTHOPAEDIC SURGERY

## 2021-11-09 PROCEDURE — 77030026438 HC STYL ET INTUB CARD -A: Performed by: ANESTHESIOLOGY

## 2021-11-09 PROCEDURE — 77030010785: Performed by: ORTHOPAEDIC SURGERY

## 2021-11-09 PROCEDURE — 77030041279 HC DRSG PRMSL AG MDII -B: Performed by: ORTHOPAEDIC SURGERY

## 2021-11-09 PROCEDURE — 87106 FUNGI IDENTIFICATION YEAST: CPT

## 2021-11-09 PROCEDURE — 77030013079 HC BLNKT BAIR HGGR 3M -A: Performed by: ANESTHESIOLOGY

## 2021-11-09 PROCEDURE — 74011250637 HC RX REV CODE- 250/637: Performed by: ORTHOPAEDIC SURGERY

## 2021-11-09 PROCEDURE — 74011636637 HC RX REV CODE- 636/637: Performed by: ORTHOPAEDIC SURGERY

## 2021-11-09 PROCEDURE — 2709999900 HC NON-CHARGEABLE SUPPLY

## 2021-11-09 PROCEDURE — 2709999900 HC NON-CHARGEABLE SUPPLY: Performed by: ORTHOPAEDIC SURGERY

## 2021-11-09 PROCEDURE — 74011000250 HC RX REV CODE- 250: Performed by: ORTHOPAEDIC SURGERY

## 2021-11-09 PROCEDURE — 76010000173 HC OR TIME 3 TO 3.5 HR INTENSV-TIER 1: Performed by: ORTHOPAEDIC SURGERY

## 2021-11-09 PROCEDURE — 64447 NJX AA&/STRD FEMORAL NRV IMG: CPT | Performed by: ANESTHESIOLOGY

## 2021-11-09 PROCEDURE — 77030018673: Performed by: ORTHOPAEDIC SURGERY

## 2021-11-09 PROCEDURE — 77030019908 HC STETH ESOPH SIMS -A: Performed by: ANESTHESIOLOGY

## 2021-11-09 PROCEDURE — 27487 REVISE/REPLACE KNEE JOINT: CPT | Performed by: ORTHOPAEDIC SURGERY

## 2021-11-09 PROCEDURE — C1713 ANCHOR/SCREW BN/BN,TIS/BN: HCPCS | Performed by: ORTHOPAEDIC SURGERY

## 2021-11-09 PROCEDURE — 77030008684 HC TU ET CUF COVD -B: Performed by: ANESTHESIOLOGY

## 2021-11-09 DEVICE — P.F.C. SIGMA TIBIAL INSERT FIXED BEARING TC3 2.5 12.5MM
Type: IMPLANTABLE DEVICE | Site: KNEE | Status: FUNCTIONAL
Brand: P.F.C. SIGMA

## 2021-11-09 DEVICE — IMPLANTABLE DEVICE: Type: IMPLANTABLE DEVICE | Site: KNEE | Status: FUNCTIONAL

## 2021-11-09 DEVICE — BONE PREPARATION KIT
Type: IMPLANTABLE DEVICE | Site: KNEE | Status: FUNCTIONAL
Brand: BIOPREP

## 2021-11-09 DEVICE — CEMENT BNE 20ML 41GM FULL DOSE PMMA W/ TOBRA M VISC RADPQ: Type: IMPLANTABLE DEVICE | Site: KNEE | Status: FUNCTIONAL

## 2021-11-09 DEVICE — P.F.C. SIGMA FEMORAL ADAPTER 5 DEGREE
Type: IMPLANTABLE DEVICE | Site: KNEE | Status: FUNCTIONAL
Brand: P.F.C. SIGMA

## 2021-11-09 DEVICE — P.F.C. TIBIAL STEM CEMENTED 15MM X 60MM 4/5/6
Type: IMPLANTABLE DEVICE | Site: KNEE | Status: FUNCTIONAL
Brand: P.F.C.

## 2021-11-09 DEVICE — SIGMA FEMORAL TC3 CEMENTED 2 RIGHT
Type: IMPLANTABLE DEVICE | Site: KNEE | Status: FUNCTIONAL
Brand: SIGMA

## 2021-11-09 DEVICE — ADAPTER FEM NEUT KNEE BOLT PFC SIG: Type: IMPLANTABLE DEVICE | Site: KNEE | Status: FUNCTIONAL

## 2021-11-09 RX ORDER — SODIUM CHLORIDE 0.9 % (FLUSH) 0.9 %
5-40 SYRINGE (ML) INJECTION EVERY 8 HOURS
Status: DISCONTINUED | OUTPATIENT
Start: 2021-11-09 | End: 2021-11-09 | Stop reason: HOSPADM

## 2021-11-09 RX ORDER — EPHEDRINE SULFATE/0.9% NACL/PF 50 MG/5 ML
SYRINGE (ML) INTRAVENOUS AS NEEDED
Status: DISCONTINUED | OUTPATIENT
Start: 2021-11-09 | End: 2021-11-09 | Stop reason: HOSPADM

## 2021-11-09 RX ORDER — POLYETHYLENE GLYCOL 3350 17 G/17G
17 POWDER, FOR SOLUTION ORAL DAILY
Status: DISCONTINUED | OUTPATIENT
Start: 2021-11-10 | End: 2021-11-17 | Stop reason: HOSPADM

## 2021-11-09 RX ORDER — FAMOTIDINE 20 MG/1
20 TABLET, FILM COATED ORAL 2 TIMES DAILY
Status: DISCONTINUED | OUTPATIENT
Start: 2021-11-09 | End: 2021-11-17 | Stop reason: HOSPADM

## 2021-11-09 RX ORDER — ASPIRIN 325 MG
325 TABLET, DELAYED RELEASE (ENTERIC COATED) ORAL 2 TIMES DAILY
Status: DISCONTINUED | OUTPATIENT
Start: 2021-11-09 | End: 2021-11-17 | Stop reason: HOSPADM

## 2021-11-09 RX ORDER — INSULIN LISPRO 100 [IU]/ML
INJECTION, SOLUTION INTRAVENOUS; SUBCUTANEOUS
Status: DISCONTINUED | OUTPATIENT
Start: 2021-11-09 | End: 2021-11-17 | Stop reason: HOSPADM

## 2021-11-09 RX ORDER — SODIUM CHLORIDE, SODIUM LACTATE, POTASSIUM CHLORIDE, CALCIUM CHLORIDE 600; 310; 30; 20 MG/100ML; MG/100ML; MG/100ML; MG/100ML
25 INJECTION, SOLUTION INTRAVENOUS CONTINUOUS
Status: DISCONTINUED | OUTPATIENT
Start: 2021-11-09 | End: 2021-11-09 | Stop reason: HOSPADM

## 2021-11-09 RX ORDER — FENTANYL CITRATE 50 UG/ML
25 INJECTION, SOLUTION INTRAMUSCULAR; INTRAVENOUS
Status: DISCONTINUED | OUTPATIENT
Start: 2021-11-09 | End: 2021-11-09 | Stop reason: HOSPADM

## 2021-11-09 RX ORDER — GLYCOPYRROLATE 0.2 MG/ML
INJECTION INTRAMUSCULAR; INTRAVENOUS AS NEEDED
Status: DISCONTINUED | OUTPATIENT
Start: 2021-11-09 | End: 2021-11-09 | Stop reason: HOSPADM

## 2021-11-09 RX ORDER — ACETAMINOPHEN 500 MG
1000 TABLET ORAL ONCE
Status: COMPLETED | OUTPATIENT
Start: 2021-11-09 | End: 2021-11-09

## 2021-11-09 RX ORDER — SODIUM CHLORIDE 9 MG/ML
INJECTION, SOLUTION INTRAVENOUS
Status: COMPLETED | OUTPATIENT
Start: 2021-11-09 | End: 2021-11-09

## 2021-11-09 RX ORDER — ACETAMINOPHEN 500 MG
1000 TABLET ORAL EVERY 6 HOURS
Status: DISCONTINUED | OUTPATIENT
Start: 2021-11-09 | End: 2021-11-17 | Stop reason: HOSPADM

## 2021-11-09 RX ORDER — ROPIVACAINE HYDROCHLORIDE 5 MG/ML
INJECTION, SOLUTION EPIDURAL; INFILTRATION; PERINEURAL AS NEEDED
Status: DISCONTINUED | OUTPATIENT
Start: 2021-11-09 | End: 2021-11-09 | Stop reason: HOSPADM

## 2021-11-09 RX ORDER — METFORMIN HYDROCHLORIDE 500 MG/1
500 TABLET ORAL
Status: DISCONTINUED | OUTPATIENT
Start: 2021-11-09 | End: 2021-11-17 | Stop reason: HOSPADM

## 2021-11-09 RX ORDER — SODIUM CHLORIDE 9 MG/ML
75 INJECTION, SOLUTION INTRAVENOUS CONTINUOUS
Status: DISPENSED | OUTPATIENT
Start: 2021-11-09 | End: 2021-11-10

## 2021-11-09 RX ORDER — OXYCODONE HYDROCHLORIDE 5 MG/1
5 TABLET ORAL
Status: DISCONTINUED | OUTPATIENT
Start: 2021-11-09 | End: 2021-11-17 | Stop reason: HOSPADM

## 2021-11-09 RX ORDER — ONDANSETRON 4 MG/1
4 TABLET, ORALLY DISINTEGRATING ORAL
Status: DISCONTINUED | OUTPATIENT
Start: 2021-11-09 | End: 2021-11-17 | Stop reason: HOSPADM

## 2021-11-09 RX ORDER — AMOXICILLIN 250 MG
1 CAPSULE ORAL 2 TIMES DAILY
Status: DISCONTINUED | OUTPATIENT
Start: 2021-11-09 | End: 2021-11-17 | Stop reason: HOSPADM

## 2021-11-09 RX ORDER — FENTANYL CITRATE 50 UG/ML
INJECTION, SOLUTION INTRAMUSCULAR; INTRAVENOUS AS NEEDED
Status: DISCONTINUED | OUTPATIENT
Start: 2021-11-09 | End: 2021-11-09 | Stop reason: HOSPADM

## 2021-11-09 RX ORDER — DIPHENHYDRAMINE HYDROCHLORIDE 50 MG/ML
12.5 INJECTION, SOLUTION INTRAMUSCULAR; INTRAVENOUS
Status: ACTIVE | OUTPATIENT
Start: 2021-11-09 | End: 2021-11-10

## 2021-11-09 RX ORDER — TRANEXAMIC ACID 100 MG/ML
INJECTION, SOLUTION INTRAVENOUS AS NEEDED
Status: DISCONTINUED | OUTPATIENT
Start: 2021-11-09 | End: 2021-11-09 | Stop reason: HOSPADM

## 2021-11-09 RX ORDER — PROPOFOL 10 MG/ML
INJECTION, EMULSION INTRAVENOUS AS NEEDED
Status: DISCONTINUED | OUTPATIENT
Start: 2021-11-09 | End: 2021-11-09 | Stop reason: HOSPADM

## 2021-11-09 RX ORDER — MORPHINE SULFATE 2 MG/ML
2 INJECTION, SOLUTION INTRAMUSCULAR; INTRAVENOUS
Status: DISCONTINUED | OUTPATIENT
Start: 2021-11-09 | End: 2021-11-09 | Stop reason: HOSPADM

## 2021-11-09 RX ORDER — DEXTROSE 50 % IN WATER (D50W) INTRAVENOUS SYRINGE
12.5-25 AS NEEDED
Status: DISCONTINUED | OUTPATIENT
Start: 2021-11-09 | End: 2021-11-17 | Stop reason: HOSPADM

## 2021-11-09 RX ORDER — ALBUTEROL SULFATE 0.83 MG/ML
2.5 SOLUTION RESPIRATORY (INHALATION)
Status: DISCONTINUED | OUTPATIENT
Start: 2021-11-09 | End: 2021-11-17 | Stop reason: HOSPADM

## 2021-11-09 RX ORDER — SODIUM CHLORIDE 0.9 % (FLUSH) 0.9 %
5-40 SYRINGE (ML) INJECTION AS NEEDED
Status: DISCONTINUED | OUTPATIENT
Start: 2021-11-09 | End: 2021-11-17 | Stop reason: HOSPADM

## 2021-11-09 RX ORDER — LIDOCAINE HYDROCHLORIDE 20 MG/ML
INJECTION, SOLUTION EPIDURAL; INFILTRATION; INTRACAUDAL; PERINEURAL AS NEEDED
Status: DISCONTINUED | OUTPATIENT
Start: 2021-11-09 | End: 2021-11-09 | Stop reason: HOSPADM

## 2021-11-09 RX ORDER — ATORVASTATIN CALCIUM 10 MG/1
10 TABLET, FILM COATED ORAL DAILY
Status: DISCONTINUED | OUTPATIENT
Start: 2021-11-10 | End: 2021-11-17 | Stop reason: HOSPADM

## 2021-11-09 RX ORDER — SODIUM CHLORIDE 0.9 % (FLUSH) 0.9 %
5-40 SYRINGE (ML) INJECTION AS NEEDED
Status: DISCONTINUED | OUTPATIENT
Start: 2021-11-09 | End: 2021-11-09 | Stop reason: HOSPADM

## 2021-11-09 RX ORDER — ROCURONIUM BROMIDE 10 MG/ML
INJECTION, SOLUTION INTRAVENOUS AS NEEDED
Status: DISCONTINUED | OUTPATIENT
Start: 2021-11-09 | End: 2021-11-09 | Stop reason: HOSPADM

## 2021-11-09 RX ORDER — MAGNESIUM SULFATE 100 %
4 CRYSTALS MISCELLANEOUS AS NEEDED
Status: DISCONTINUED | OUTPATIENT
Start: 2021-11-09 | End: 2021-11-17 | Stop reason: HOSPADM

## 2021-11-09 RX ORDER — ONDANSETRON 2 MG/ML
INJECTION INTRAMUSCULAR; INTRAVENOUS AS NEEDED
Status: DISCONTINUED | OUTPATIENT
Start: 2021-11-09 | End: 2021-11-09 | Stop reason: HOSPADM

## 2021-11-09 RX ORDER — FACIAL-BODY WIPES
10 EACH TOPICAL DAILY PRN
Status: DISCONTINUED | OUTPATIENT
Start: 2021-11-11 | End: 2021-11-17 | Stop reason: HOSPADM

## 2021-11-09 RX ORDER — METOPROLOL TARTRATE 25 MG/1
25 TABLET, FILM COATED ORAL 2 TIMES DAILY
Status: DISCONTINUED | OUTPATIENT
Start: 2021-11-09 | End: 2021-11-17 | Stop reason: HOSPADM

## 2021-11-09 RX ORDER — VANCOMYCIN HYDROCHLORIDE 1 G/20ML
INJECTION, POWDER, LYOPHILIZED, FOR SOLUTION INTRAVENOUS AS NEEDED
Status: DISCONTINUED | OUTPATIENT
Start: 2021-11-09 | End: 2021-11-09 | Stop reason: HOSPADM

## 2021-11-09 RX ORDER — OXYCODONE HYDROCHLORIDE 5 MG/1
10 TABLET ORAL
Status: DISCONTINUED | OUTPATIENT
Start: 2021-11-09 | End: 2021-11-17 | Stop reason: HOSPADM

## 2021-11-09 RX ORDER — HYDROMORPHONE HYDROCHLORIDE 1 MG/ML
.2-.5 INJECTION, SOLUTION INTRAMUSCULAR; INTRAVENOUS; SUBCUTANEOUS
Status: DISCONTINUED | OUTPATIENT
Start: 2021-11-09 | End: 2021-11-09 | Stop reason: HOSPADM

## 2021-11-09 RX ORDER — ONDANSETRON 2 MG/ML
4 INJECTION INTRAMUSCULAR; INTRAVENOUS AS NEEDED
Status: DISCONTINUED | OUTPATIENT
Start: 2021-11-09 | End: 2021-11-09 | Stop reason: HOSPADM

## 2021-11-09 RX ORDER — NALOXONE HYDROCHLORIDE 0.4 MG/ML
0.4 INJECTION, SOLUTION INTRAMUSCULAR; INTRAVENOUS; SUBCUTANEOUS AS NEEDED
Status: DISCONTINUED | OUTPATIENT
Start: 2021-11-09 | End: 2021-11-17 | Stop reason: HOSPADM

## 2021-11-09 RX ORDER — PHENYLEPHRINE HCL IN 0.9% NACL 0.4MG/10ML
SYRINGE (ML) INTRAVENOUS AS NEEDED
Status: DISCONTINUED | OUTPATIENT
Start: 2021-11-09 | End: 2021-11-09 | Stop reason: HOSPADM

## 2021-11-09 RX ORDER — LATANOPROST 50 UG/ML
1 SOLUTION/ DROPS OPHTHALMIC
Status: DISCONTINUED | OUTPATIENT
Start: 2021-11-09 | End: 2021-11-17 | Stop reason: HOSPADM

## 2021-11-09 RX ORDER — HYDROMORPHONE HYDROCHLORIDE 2 MG/ML
INJECTION, SOLUTION INTRAMUSCULAR; INTRAVENOUS; SUBCUTANEOUS AS NEEDED
Status: DISCONTINUED | OUTPATIENT
Start: 2021-11-09 | End: 2021-11-09 | Stop reason: HOSPADM

## 2021-11-09 RX ORDER — MIDAZOLAM HYDROCHLORIDE 1 MG/ML
INJECTION, SOLUTION INTRAMUSCULAR; INTRAVENOUS AS NEEDED
Status: DISCONTINUED | OUTPATIENT
Start: 2021-11-09 | End: 2021-11-09 | Stop reason: HOSPADM

## 2021-11-09 RX ORDER — DIPHENHYDRAMINE HYDROCHLORIDE 50 MG/ML
12.5 INJECTION, SOLUTION INTRAMUSCULAR; INTRAVENOUS AS NEEDED
Status: DISCONTINUED | OUTPATIENT
Start: 2021-11-09 | End: 2021-11-09 | Stop reason: HOSPADM

## 2021-11-09 RX ORDER — GABAPENTIN 300 MG/1
900 CAPSULE ORAL
Status: DISCONTINUED | OUTPATIENT
Start: 2021-11-09 | End: 2021-11-17 | Stop reason: HOSPADM

## 2021-11-09 RX ORDER — LIDOCAINE HYDROCHLORIDE 10 MG/ML
0.1 INJECTION, SOLUTION EPIDURAL; INFILTRATION; INTRACAUDAL; PERINEURAL AS NEEDED
Status: DISCONTINUED | OUTPATIENT
Start: 2021-11-09 | End: 2021-11-09 | Stop reason: HOSPADM

## 2021-11-09 RX ORDER — DEXAMETHASONE SODIUM PHOSPHATE 4 MG/ML
10 INJECTION, SOLUTION INTRA-ARTICULAR; INTRALESIONAL; INTRAMUSCULAR; INTRAVENOUS; SOFT TISSUE ONCE
Status: COMPLETED | OUTPATIENT
Start: 2021-11-09 | End: 2021-11-09

## 2021-11-09 RX ORDER — NEOSTIGMINE METHYLSULFATE 1 MG/ML
INJECTION, SOLUTION INTRAVENOUS AS NEEDED
Status: DISCONTINUED | OUTPATIENT
Start: 2021-11-09 | End: 2021-11-09 | Stop reason: HOSPADM

## 2021-11-09 RX ORDER — SODIUM CHLORIDE 0.9 % (FLUSH) 0.9 %
5-40 SYRINGE (ML) INJECTION EVERY 8 HOURS
Status: DISCONTINUED | OUTPATIENT
Start: 2021-11-09 | End: 2021-11-17 | Stop reason: HOSPADM

## 2021-11-09 RX ORDER — ACETAMINOPHEN 325 MG/1
650 TABLET ORAL
Status: DISCONTINUED | OUTPATIENT
Start: 2021-11-09 | End: 2021-11-11

## 2021-11-09 RX ORDER — GABAPENTIN 300 MG/1
300 CAPSULE ORAL DAILY
Status: DISCONTINUED | OUTPATIENT
Start: 2021-11-10 | End: 2021-11-17 | Stop reason: HOSPADM

## 2021-11-09 RX ORDER — HYDROMORPHONE HYDROCHLORIDE 1 MG/ML
0.5 INJECTION, SOLUTION INTRAMUSCULAR; INTRAVENOUS; SUBCUTANEOUS
Status: ACTIVE | OUTPATIENT
Start: 2021-11-09 | End: 2021-11-10

## 2021-11-09 RX ORDER — SUCCINYLCHOLINE CHLORIDE 20 MG/ML
INJECTION INTRAMUSCULAR; INTRAVENOUS AS NEEDED
Status: DISCONTINUED | OUTPATIENT
Start: 2021-11-09 | End: 2021-11-09 | Stop reason: HOSPADM

## 2021-11-09 RX ORDER — SERTRALINE HYDROCHLORIDE 50 MG/1
50 TABLET, FILM COATED ORAL DAILY
Status: DISCONTINUED | OUTPATIENT
Start: 2021-11-10 | End: 2021-11-17 | Stop reason: HOSPADM

## 2021-11-09 RX ADMIN — Medication 3 AMPULE: at 08:52

## 2021-11-09 RX ADMIN — Medication 20 MG: at 09:52

## 2021-11-09 RX ADMIN — SODIUM CHLORIDE 75 ML/HR: 9 INJECTION, SOLUTION INTRAVENOUS at 14:05

## 2021-11-09 RX ADMIN — PROPOFOL 150 MG: 10 INJECTION, EMULSION INTRAVENOUS at 09:44

## 2021-11-09 RX ADMIN — Medication 40 MCG: at 10:12

## 2021-11-09 RX ADMIN — SODIUM CHLORIDE, POTASSIUM CHLORIDE, SODIUM LACTATE AND CALCIUM CHLORIDE 25 ML/HR: 600; 310; 30; 20 INJECTION, SOLUTION INTRAVENOUS at 08:52

## 2021-11-09 RX ADMIN — Medication 60 MCG: at 12:19

## 2021-11-09 RX ADMIN — ASPIRIN 325 MG: 325 TABLET, DELAYED RELEASE ORAL at 18:03

## 2021-11-09 RX ADMIN — Medication 10 MG: at 10:16

## 2021-11-09 RX ADMIN — ACETAMINOPHEN 1000 MG: 500 TABLET ORAL at 18:03

## 2021-11-09 RX ADMIN — DOCUSATE SODIUM 50MG AND SENNOSIDES 8.6MG 1 TABLET: 8.6; 5 TABLET, FILM COATED ORAL at 18:03

## 2021-11-09 RX ADMIN — Medication 1 AMPULE: at 21:34

## 2021-11-09 RX ADMIN — Medication 10 ML: at 22:00

## 2021-11-09 RX ADMIN — SUCCINYLCHOLINE CHLORIDE 30 MG: 20 INJECTION, SOLUTION INTRAMUSCULAR; INTRAVENOUS at 12:27

## 2021-11-09 RX ADMIN — Medication 120 MCG: at 09:50

## 2021-11-09 RX ADMIN — SUCCINYLCHOLINE CHLORIDE 120 MG: 20 INJECTION, SOLUTION INTRAMUSCULAR; INTRAVENOUS at 09:44

## 2021-11-09 RX ADMIN — LATANOPROST 1 DROP: 50 SOLUTION OPHTHALMIC at 21:35

## 2021-11-09 RX ADMIN — INSULIN LISPRO 3 UNITS: 100 INJECTION, SOLUTION INTRAVENOUS; SUBCUTANEOUS at 21:34

## 2021-11-09 RX ADMIN — DEXAMETHASONE SODIUM PHOSPHATE 10 MG: 4 INJECTION, SOLUTION INTRAMUSCULAR; INTRAVENOUS at 09:51

## 2021-11-09 RX ADMIN — ACETAMINOPHEN 1000 MG: 500 TABLET ORAL at 08:15

## 2021-11-09 RX ADMIN — METOPROLOL TARTRATE 25 MG: 25 TABLET, FILM COATED ORAL at 18:03

## 2021-11-09 RX ADMIN — CEFAZOLIN SODIUM 2 G: 1 INJECTION, POWDER, FOR SOLUTION INTRAMUSCULAR; INTRAVENOUS at 18:04

## 2021-11-09 RX ADMIN — NEOSTIGMINE METHYLSULFATE 3 MG: 1 INJECTION, SOLUTION INTRAVENOUS at 12:10

## 2021-11-09 RX ADMIN — ARFORMOTEROL TARTRATE: 15 SOLUTION RESPIRATORY (INHALATION) at 20:33

## 2021-11-09 RX ADMIN — SODIUM CHLORIDE 10 MCG/MIN: 900 INJECTION, SOLUTION INTRAVENOUS at 10:16

## 2021-11-09 RX ADMIN — FENTANYL CITRATE 50 MCG: 50 INJECTION, SOLUTION INTRAMUSCULAR; INTRAVENOUS at 09:55

## 2021-11-09 RX ADMIN — HYDROMORPHONE HYDROCHLORIDE 0.3 MG: 2 INJECTION, SOLUTION INTRAMUSCULAR; INTRAVENOUS; SUBCUTANEOUS at 12:44

## 2021-11-09 RX ADMIN — ROCURONIUM BROMIDE 10 MG: 10 INJECTION INTRAVENOUS at 09:44

## 2021-11-09 RX ADMIN — ROPIVACAINE HYDROCHLORIDE 30 ML: 5 INJECTION, SOLUTION EPIDURAL; INFILTRATION; PERINEURAL at 08:53

## 2021-11-09 RX ADMIN — ROCURONIUM BROMIDE 20 MG: 10 INJECTION INTRAVENOUS at 09:56

## 2021-11-09 RX ADMIN — METFORMIN HYDROCHLORIDE 500 MG: 500 TABLET ORAL at 18:03

## 2021-11-09 RX ADMIN — TRANEXAMIC ACID 1 G: 100 INJECTION, SOLUTION INTRAVENOUS at 09:50

## 2021-11-09 RX ADMIN — FENTANYL CITRATE 25 MCG: 50 INJECTION, SOLUTION INTRAMUSCULAR; INTRAVENOUS at 15:54

## 2021-11-09 RX ADMIN — HYDROMORPHONE HYDROCHLORIDE 0.4 MG: 2 INJECTION, SOLUTION INTRAMUSCULAR; INTRAVENOUS; SUBCUTANEOUS at 10:38

## 2021-11-09 RX ADMIN — GABAPENTIN 900 MG: 300 CAPSULE ORAL at 21:34

## 2021-11-09 RX ADMIN — FENTANYL CITRATE 25 MCG: 50 INJECTION, SOLUTION INTRAMUSCULAR; INTRAVENOUS at 16:56

## 2021-11-09 RX ADMIN — HYDROMORPHONE HYDROCHLORIDE 0.1 MG: 2 INJECTION, SOLUTION INTRAMUSCULAR; INTRAVENOUS; SUBCUTANEOUS at 12:09

## 2021-11-09 RX ADMIN — WATER 2 G: 1 INJECTION INTRAMUSCULAR; INTRAVENOUS; SUBCUTANEOUS at 09:48

## 2021-11-09 RX ADMIN — HYDROMORPHONE HYDROCHLORIDE 0.2 MG: 2 INJECTION, SOLUTION INTRAMUSCULAR; INTRAVENOUS; SUBCUTANEOUS at 12:30

## 2021-11-09 RX ADMIN — MIDAZOLAM HYDROCHLORIDE 2 MG: 1 INJECTION, SOLUTION INTRAMUSCULAR; INTRAVENOUS at 08:51

## 2021-11-09 RX ADMIN — FAMOTIDINE 20 MG: 20 TABLET, FILM COATED ORAL at 18:03

## 2021-11-09 RX ADMIN — ROCURONIUM BROMIDE 10 MG: 10 INJECTION INTRAVENOUS at 10:37

## 2021-11-09 RX ADMIN — FENTANYL CITRATE 25 MCG: 50 INJECTION, SOLUTION INTRAMUSCULAR; INTRAVENOUS at 13:29

## 2021-11-09 RX ADMIN — FENTANYL CITRATE 25 MCG: 50 INJECTION, SOLUTION INTRAMUSCULAR; INTRAVENOUS at 14:28

## 2021-11-09 RX ADMIN — FENTANYL CITRATE 50 MCG: 50 INJECTION, SOLUTION INTRAMUSCULAR; INTRAVENOUS at 09:44

## 2021-11-09 RX ADMIN — Medication 10 ML: at 18:04

## 2021-11-09 RX ADMIN — GLYCOPYRROLATE 0.4 MG: 0.2 INJECTION, SOLUTION INTRAMUSCULAR; INTRAVENOUS at 12:10

## 2021-11-09 RX ADMIN — ONDANSETRON HYDROCHLORIDE 4 MG: 2 INJECTION, SOLUTION INTRAMUSCULAR; INTRAVENOUS at 12:05

## 2021-11-09 RX ADMIN — Medication 80 MCG: at 09:47

## 2021-11-09 RX ADMIN — Medication 40 MCG: at 10:06

## 2021-11-09 RX ADMIN — OXYCODONE 5 MG: 5 TABLET ORAL at 18:31

## 2021-11-09 RX ADMIN — LIDOCAINE HYDROCHLORIDE 100 MG: 20 INJECTION, SOLUTION INTRAVENOUS at 09:44

## 2021-11-09 NOTE — ANESTHESIA PROCEDURE NOTES
Peripheral Block    Start time: 11/9/2021 8:50 AM  End time: 11/9/2021 8:58 AM  Performed by: Ceasar Fitzgerald MD  Authorized by: Ceasar Fitzgerald MD       Pre-procedure: Indications: at surgeon's request and post-op pain management    Preanesthetic Checklist: patient identified, risks and benefits discussed, site marked, timeout performed, anesthesia consent given and patient being monitored    Timeout Time: 08:50 EST          Block Type:   Block Type:   Adductor canal  Laterality:  Right  Monitoring:  Standard ASA monitoring, continuous pulse ox, frequent vital sign checks, heart rate, responsive to questions and oxygen  Injection Technique:  Single shot  Procedures: ultrasound guided    Patient Position: supine  Prep: chlorhexidine    Location:  Mid thigh  Needle Type:  Stimuplex  Needle Gauge:  21 G  Needle Localization:  Anatomical landmarks and ultrasound guidance    Assessment:  Number of attempts:  1  Injection Assessment:  Incremental injection every 5 mL, local visualized surrounding nerve on ultrasound, negative aspiration for blood, no paresthesia and no intravascular symptoms  Patient tolerance:  Patient tolerated the procedure well with no immediate complications

## 2021-11-09 NOTE — PROGRESS NOTES
End of Shift Note    Bedside shift change report given to Janeth Arevalo LPN (oncoming nurse) by Luna Bhagat (offgoing nurse). Report included the following information SBAR, Kardex, OR Summary, MAR, Accordion and Recent Results    Shift worked:  Day     Shift summary and any significant changes:     POD #1. Laguerre in place no order to d/c. Pain manageable. Eating and drinking. Concerns for physician to address:  PT/OT     Zone phone for oncoming shift:   1349       Activity:  Activity Level: Up with Assistance  Number times ambulated in hallways past shift: 0  Number of times OOB to chair past shift: 0    Cardiac:   Cardiac Monitoring: No      Cardiac Rhythm: Sinus Rhythm    Access:   Current line(s): PIV     Genitourinary:   Urinary status: laguerre    Respiratory:   O2 Device: None (Room air)  Chronic home O2 use?: NO  Incentive spirometer at bedside: YES  Actual Volume (ml): 64491 ml  GI:  Last Bowel Movement Date: 11/09/21  Current diet:  ADULT DIET Regular; Low Sodium (2 gm)  Passing flatus: YES  Tolerating current diet: YES       Pain Management:   Patient states pain is manageable on current regimen: YES    Skin:  Jose Luis Score: 20  Interventions: PT/OT consult and limit briefs    Patient Safety:  Fall Score:  Total Score: 4  Interventions: gripper socks, pt to call before getting OOB and stay with me (per policy)  High Fall Risk: Yes    Length of Stay:  Expected LOS: - - -  Actual LOS: 0      Luna Bhagat

## 2021-11-09 NOTE — PERIOP NOTES
80 - PT'S COVID TEST RESULTED NEG - PT DENIES S/S OD COVID - NO FEVER, COLD, COUGH, SOB, N/V, DIARRHEA. ... PRE-OP TCHING DONE - PT VERBALIZES UNDERSTANDING. STRETCHER IN LOWEST POSITION, CB IN PLACE AND SR UP X2.     0850 - TIMEOUT DONE - DR. AREVALO AT BEDSIDE TO PLACE RIGHT KNEE BLOCK. PT NANCY WELL.  VSS. DR. Elvira Moore AWARE PT'S FS=68. PT STATED BS RUNS IN THE 60'S IN THE MORNING. WILL CONTINUE TO MONITOR.

## 2021-11-09 NOTE — ANESTHESIA PREPROCEDURE EVALUATION
Anesthetic History   No history of anesthetic complications            Review of Systems / Medical History  Patient summary reviewed, nursing notes reviewed and pertinent labs reviewed    Pulmonary        Sleep apnea    Asthma (no recent problems)     Comments: Sarcoidosis   Neuro/Psych   Within defined limits           Cardiovascular    Hypertension          CAD, PAD and cardiac stents (1998)    Exercise tolerance: >4 METS  Comments: Negative Stress 10/2021 test:IMPRESSION: No evidence of ischemia or infarction. Left ventricular ejection fraction measures 56%.    GI/Hepatic/Renal     GERD (improved/ takes med prn)      Hiatal hernia     Endo/Other    Diabetes: type 2    Obesity and arthritis (Rheumatoid)     Other Findings   Comments: DDD  Glaucoma  Osteomyelitis right great toe           Physical Exam    Airway  Mallampati: I  TM Distance: 4 - 6 cm  Neck ROM: normal range of motion   Mouth opening: Normal     Cardiovascular    Rhythm: regular  Rate: normal         Dental    Dentition: Poor dentition  Comments: Numerous chipped and cracked teeth   Pulmonary  Breath sounds clear to auscultation               Abdominal  GI exam deferred       Other Findings            Anesthetic Plan    ASA: 3  Anesthesia type: general      Post-op pain plan if not by surgeon: peripheral nerve block single    Induction: Intravenous  Anesthetic plan and risks discussed with: Patient

## 2021-11-09 NOTE — PERIOP NOTES
Irrisept Wound Debridement and Cleansing System  Ref: Palmira Byron: [id number] LOT: [id number] Expiration Date: 07/31/2023      1120- Poly and femoral component explanted from patient's right knee by Dr. Krish Farrell

## 2021-11-09 NOTE — PERIOP NOTES
Handoff Report from Operating Room to PACU    Report received from [address] and [address] [facility] regarding Matthew Bergeron. Surgeon(s):  Leonel Garcia DO  And Procedure(s) (LRB):  RIGHT REVISION TOTAL KNEE ARTHROPLASTY (Right)  confirmed   with allergies and dressings discussed. Anesthesia type, drugs, patient history, complications, estimated blood loss, vital signs, intake and output, and last pain medication, lines, reversal medications and temperature were reviewed. 1356- Xray at bedside. [id number]- Patient son updated on patient status. Patient tolerating ice chips well.    1711- TRANSFER - OUT REPORT:    Verbal report given to 120 Mariann Go RN(name) on Matthew Bergeron  being transferred to [facility](unit) for routine post - op       Report consisted of patients Situation, Background, Assessment and   Recommendations(SBAR). Information from the following report(s) OR Summary, Procedure Summary, Intake/Output and MAR was reviewed with the receiving nurse. Lines:   Peripheral IV 11/09/21 Anterior; Left; Proximal Forearm (Active)   Site Assessment Clean, dry, & intact 11/09/21 1700   Phlebitis Assessment 0 11/09/21 1700   Infiltration Assessment 0 11/09/21 1700   Dressing Status Clean, dry, & intact 11/09/21 1700   Dressing Type Tape; Transparent 11/09/21 1700   Hub Color/Line Status Pink; Infusing 11/09/21 1700        Opportunity for questions and clarification was provided.       Patient transported with:   Tech   Chart  Personal belongings  abebe int son updated on patient status and new room  umber

## 2021-11-09 NOTE — H&P
CC: right knee pain     HPI:      This is a 70y.o. year old female who presents for a follow up visit. 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 The patient's treatments since the most recent visit have comprised of bracing, pain medications. The patient has had no relief of the chief complaint.           PMH:       Past Medical History:   Diagnosis Date    Asthma      At risk for sleep apnea 11/11/2019     Stop Bang 4 - Sleep study in January per patient     Chronic pain 5/8/2020    DDD (degenerative disc disease), lumbar      Diabetes ([facility] [address]      Gastritis      GERD (gastroesophageal reflux disease)      Glaucoma      Hearing loss 5/8/2020    Hiatal hernia      High cholesterol      Hypertension      Hypocalcemia 5/8/2020    Peripheral vascular disease (HCC)      RA (rheumatoid arthritis) ([facility] [address]      Sarcoidosis 1999     MCV         PSxHx:        Past Surgical History:   Procedure Laterality Date    HX GASTRIC BYPASS        HX HEART CATHETERIZATION         s/p PCI with stenting in 1998     HX KNEE REPLACEMENT Bilateral      HX SHOULDER REPLACEMENT Right       x 2          Meds:     Current Outpatient Medications:     leflunomide (ARAVA) 20 mg tablet, TAKE 1 TABLET BY MOUTH EVERY DAY, Disp: 90 Tablet, Rfl: 1    gabapentin (NEURONTIN) 300 mg capsule, TAKE 1 CAPSULE IN THE MORNING AND TAKE 3 CAPSULES BEFORE BEDTIME, Disp: 120 Capsule, Rfl: 2    latanoprost (XALATAN) 0.005 % ophthalmic solution, INSTILL 1 DROP INTO BOTH EYES NIGHTLY, Disp: 2.5 mL, Rfl: 0    metoprolol tartrate (LOPRESSOR) 25 mg tablet, TAKE 1 TABLET BY MOUTH TWICE A DAY, Disp: 180 Tablet, Rfl: 1    simvastatin (ZOCOR) 20 mg tablet, TAKE BY MOUTH NIGHTLY., Disp: 90 Tablet, Rfl: 1    omeprazole (PRILOSEC) 20 mg capsule, Take 1 Capsule by mouth daily. , Disp: 90 Capsule, Rfl: 0    sucralfate (Carafate) 1 gram tablet, Take 1 Tab by mouth Before breakfast, lunch, and dinner., Disp: 30 Tab, Rfl: 0    albuterol sulfate (PROVENTIL;VENTOLIN) 2.5 mg/0.5 mL nebu nebulizer solution, 0.5 mL by Nebulization route every four (4) hours as needed for Wheezing. Indications: asthma attack, Disp: 30 mL, Rfl: 0    fluticasone furoate-vilanteroL (Breo Ellipta) 100-25 mcg/dose inhaler, TAKE 1 PUFF BY MOUTH EVERY DAY, Disp: 60 Inhaler, Rfl: 2    ergocalciferol (ERGOCALCIFEROL) 1,250 mcg (50,000 unit) capsule, Take 1 Cap by mouth every seven (7) days. Indications: vitamin D deficiency (high dose therapy), Disp: 12 Cap, Rfl: 3    sertraline (ZOLOFT) 50 mg tablet, TAKE 1 TABLET BY MOUTH EVERY DAY, Disp: 90 Tab, Rfl: 1    metFORMIN (GLUCOPHAGE) 500 mg tablet, Take 1 Tab by mouth daily (with dinner). Decreased dose, Disp: 90 Tab, Rfl: 0    tofacitinib 11 mg Tb24, Take 11 mg by mouth daily. Indications: rheumatoid arthritis, Disp: 30 Tab, Rfl: 11    furosemide (LASIX) 20 mg tablet, TAKE 1 TABLET BY MOUTH EVERY DAY, Disp: 90 Tab, Rfl: 0    nortriptyline (PAMELOR) 50 mg capsule, TAKE ONE CAPSULE EVERY DAY AT BEDTIME, Disp: , Rfl:     aspirin delayed-release 81 mg tablet, Take  by mouth daily. , Disp: , Rfl:      All: Allergies   Allergen Reactions    Lisinopril Rash    Methotrexate Hives         Social Hx:  Social History               Socioeconomic History    Marital status:        Spouse name: Not on file    Number of children: Not on file    Years of education: Not on file    Highest education level: Not on file   Tobacco Use    Smoking status: Never Smoker    Smokeless tobacco: Never Used   Vaping Use    Vaping Use: Never used   Substance and Sexual Activity    Alcohol use:  Yes       Alcohol/week: 2.0 standard drinks       Types: 1 Glasses of wine, 1 Shots of liquor per week       Comment: 2-3 yearly    Drug use: No    Sexual activity: Yes       Partners: Male       Birth control/protection: None      Social Determinants of Health     Financial Resource Strain: Low Risk     Difficulty of Paying Living Expenses: Not hard at all   Food Insecurity: No Food Insecurity    Worried About Running Out of Food in the Last Year: Never true    Mayur of Food in the Last Year: Never true   Transportation Needs:     Lack of Transportation (Medical):  Lack of Transportation (Non-Medical):    Physical Activity:     Days of Exercise per Week:     Minutes of Exercise per Session:    Stress:     Feeling of Stress :    Social Connections:     Frequency of Communication with Friends and Family:     Frequency of Social Gatherings with Friends and Family:     Attends Buddhist Services:     Active Member of Clubs or Organizations:     Attends Club or Organization Meetings:     Marital Status:             Family Hx:        Family History   Problem Relation Age of Onset    Heart Disease Mother      Liver Disease Father      Alcohol abuse Brother      Dementia Neg Hx      Cancer Neg Hx      Seizures Neg Hx              Review of Systems:         General: Denies headache, lethargy, fever, weight loss  Ears/Nose/Throat: Denies ear discharge, drainage, nosebleeds, hoarse voice, dental problems  Cardiovascular: Denies chest pain, shortness of breath  Lungs: Denies chest pain, breathing problems, wheezing, pneumonia  Stomach: Denies stomach pain, heartburn, constipation, irritable bowel  Skin: Denies rash, sores, open wounds  Musculoskeletal: Right knee pain, instability  Genitourinary: Denies dysuria, hematuria, polyuria  Gastrointestinal: Denies constipation, obstipation, diarrhea  Neurological: Denies changes in sight, smell, hearing, taste, seizures. Denies loss of consciousness.   Psychiatric: Denies depression, sleep pattern changes, anxiety, change in personality  Endocrine: Denies mood swings, heat or cold intolerance  Hematologic/Lymphatic: Denies anemia, purpura, petechia  Allergic/Immunologic: Denies swelling of throat, pain or swelling at lymph nodes        Physical Examination:     Visit Vitals  BP (!) 160/89 (BP 1 Location: Right arm, BP Patient Position: Sitting, BP Cuff Size: Large adult)   Pulse 76   Temp (!) 96 °F (35.6 °C) (Tympanic)   Ht 5' 4\" (1.626 m)   Wt 180 lb (81.6 kg)   SpO2 100%   BMI 30.90 kg/m²         General: AOX3, no apparent distress  Psychiatric: mood and affect appropriate  Lungs: breathing is symmetric and unlabored bilaterally  Heart: regular rate and rhythm  Abdomen: no guarding  Head: normocephalic, atraumatic  Skin: No significant abnormalities, good turgor  Sensation intact to light touch: C5-T1 dermatomes  Muscular exam: 5/5 strength in all major muscle groups unless noted in specialty exam.    Extremities           Right lower extremity: Well-healed surgical scar, range of motion 0-90. Mid flexion and lateral instability is noted, gapping to approximately 10 mm. Capillary refills less than 2 seconds in the toes. Sensation is intact light touch in the L1-S1 dermatomes. Left lower extremity: Extremity is examined, no evidence of gross deformity, no gross motor or sensory deficit. Full active and passive range of motion is noted. Muscle tone and bulk is within normal expected limits. Capillary refill is less than 2 seconds distally.        Diagnostics:     Pertinent Diagnostics: None today     Assessment: Right knee mechanical complication, mid flexion instability  Plan:     This patient I had a long discussion regarding her treatment options, she is very frustrated with the function of her knee, she would like to proceed with a revision arthroplasty. I advised her that this is a difficult case, though I may be able to just exchange the femoral component and constrained her knee, regaining range of motion as well as creating a pain-free knee probably has less than a 75% chance of full success. She stated her understanding with this but would still like to proceed based on her chances of successful management. We did discuss the risks of surgery which include but are not limited to infection, nerve or blood vessel damage, failure of fixation, failure of any possible implant, need for reoperation, postoperative pain and swelling, intra-or postoperative fracture, postoperative dislocation, leg length inequality, need for reoperation, implant failure, death, disability, organ dysfunction, wound healing issues, DVT, PE, and the need for further procedures. The patient did freely state their understanding and satisfaction with our discussion. We will proceed after medical clearances. The patient was counseled at length about the risks of gino Covid-19 during their perioperative period and any recovery window from their procedure.  The patient was made aware that gino Covid-19  may worsen their prognosis for recovering from their procedure and lend to a higher morbidity and/or mortality risk.  All material risks, benefits, and reasonable alternatives including postponing the procedure were discussed. The patient does  wish to proceed with the procedure at this time.        She is aware also that her diabetes, rheumatological disease, peripheral vascular disease are all risk factors for failure as well as will have to be taken into consideration for preoperative clearances.

## 2021-11-09 NOTE — ANESTHESIA POSTPROCEDURE EVALUATION
Procedure(s):  RIGHT REVISION TOTAL KNEE ARTHROPLASTY. general    Anesthesia Post Evaluation        Patient location during evaluation: PACU  Note status: Adequate. Level of consciousness: responsive to verbal stimuli and sleepy but conscious  Pain management: satisfactory to patient  Airway patency: patent  Anesthetic complications: no  Cardiovascular status: acceptable  Respiratory status: acceptable  Hydration status: acceptable  Comments: +Post-Anesthesia Evaluation and Assessment    Patient: Enrique Olvera MRN: [id number]  SSN: [id number]   YOB: 1945  Age: 78 y.o. Sex: female      Cardiovascular Function/Vital Signs    /67   Pulse 92   Temp 36.8 °C (98.3 °F)   Resp 15   Ht 5' 4\" (1.626 m)   Wt 81.7 kg (180 lb 1.9 oz)   SpO2 97%   BMI 30.92 kg/m²     Patient is status post Procedure(s):  RIGHT REVISION TOTAL KNEE ARTHROPLASTY. Nausea/Vomiting: Controlled. Postoperative hydration reviewed and adequate. Pain:  Pain Scale 1: Numeric (0 - 10) (11/09/21 1400)  Pain Intensity 1: 2 (11/09/21 1400)   Managed. Neurological Status:   Neuro (WDL): Exceptions to WDL (11/09/21 1306)   At baseline. Mental Status and Level of Consciousness: Arousable. Pulmonary Status:   O2 Device: Nasal cannula (11/09/21 1400)   Adequate oxygenation and airway patent. Complications related to anesthesia: None    Post-anesthesia assessment completed. No concerns. Signed By: Tari Tadeo MD    11/9/2021  Post anesthesia nausea and vomiting:  controlled      INITIAL Post-op Vital signs:   Vitals Value Taken Time   /67 11/09/21 1400   Temp 36.8 °C (98.3 °F) 11/09/21 1306   Pulse 94 11/09/21 1416   Resp 20 11/09/21 1416   SpO2 95 % 11/09/21 1416   Vitals shown include unvalidated device data.

## 2021-11-10 ENCOUNTER — HOME HEALTH ADMISSION (OUTPATIENT)
Dept: HOME HEALTH SERVICES | Facility: HOME HEALTH | Age: 76
End: 2021-11-10

## 2021-11-10 ENCOUNTER — PATIENT OUTREACH (OUTPATIENT)
Dept: CASE MANAGEMENT | Age: 76
End: 2021-11-10

## 2021-11-10 LAB
ANION GAP SERPL CALC-SCNC: 9 MMOL/L (ref 5–15)
BUN SERPL-MCNC: 19 MG/DL (ref 6–20)
BUN/CREAT SERPL: 18 (ref 12–20)
CALCIUM SERPL-MCNC: 7.7 MG/DL (ref 8.5–10.1)
CHLORIDE SERPL-SCNC: 112 MMOL/L (ref 97–108)
CO2 SERPL-SCNC: 20 MMOL/L (ref 21–32)
CREAT SERPL-MCNC: 1.06 MG/DL (ref 0.55–1.02)
GLUCOSE BLD STRIP.AUTO-MCNC: 116 MG/DL (ref 65–117)
GLUCOSE BLD STRIP.AUTO-MCNC: 173 MG/DL (ref 65–117)
GLUCOSE BLD STRIP.AUTO-MCNC: 182 MG/DL (ref 65–117)
GLUCOSE BLD STRIP.AUTO-MCNC: 81 MG/DL (ref 65–117)
GLUCOSE BLD STRIP.AUTO-MCNC: 87 MG/DL (ref 65–117)
GLUCOSE SERPL-MCNC: 82 MG/DL (ref 65–100)
HGB BLD-MCNC: 8.5 G/DL (ref 11.5–16)
POTASSIUM SERPL-SCNC: 4.2 MMOL/L (ref 3.5–5.1)
SERVICE CMNT-IMP: ABNORMAL
SERVICE CMNT-IMP: ABNORMAL
SERVICE CMNT-IMP: NORMAL
SODIUM SERPL-SCNC: 141 MMOL/L (ref 136–145)

## 2021-11-10 PROCEDURE — 77030019905 HC CATH URETH INTMIT MDII -A

## 2021-11-10 PROCEDURE — 94640 AIRWAY INHALATION TREATMENT: CPT

## 2021-11-10 PROCEDURE — 80048 BASIC METABOLIC PNL TOTAL CA: CPT

## 2021-11-10 PROCEDURE — 85018 HEMOGLOBIN: CPT

## 2021-11-10 PROCEDURE — 94760 N-INVAS EAR/PLS OXIMETRY 1: CPT

## 2021-11-10 PROCEDURE — 74011250637 HC RX REV CODE- 250/637: Performed by: ORTHOPAEDIC SURGERY

## 2021-11-10 PROCEDURE — 97161 PT EVAL LOW COMPLEX 20 MIN: CPT | Performed by: PHYSICAL THERAPIST

## 2021-11-10 PROCEDURE — 74011000250 HC RX REV CODE- 250: Performed by: ORTHOPAEDIC SURGERY

## 2021-11-10 PROCEDURE — 82962 GLUCOSE BLOOD TEST: CPT

## 2021-11-10 PROCEDURE — 97116 GAIT TRAINING THERAPY: CPT | Performed by: PHYSICAL THERAPIST

## 2021-11-10 PROCEDURE — 97530 THERAPEUTIC ACTIVITIES: CPT | Performed by: PHYSICAL THERAPIST

## 2021-11-10 PROCEDURE — 74011250636 HC RX REV CODE- 250/636: Performed by: ORTHOPAEDIC SURGERY

## 2021-11-10 PROCEDURE — 51798 US URINE CAPACITY MEASURE: CPT

## 2021-11-10 PROCEDURE — 36415 COLL VENOUS BLD VENIPUNCTURE: CPT

## 2021-11-10 RX ORDER — ASPIRIN 325 MG
325 TABLET, DELAYED RELEASE (ENTERIC COATED) ORAL 2 TIMES DAILY
Qty: 60 TABLET | Refills: 0 | Status: SHIPPED | OUTPATIENT
Start: 2021-11-10 | End: 2021-12-10

## 2021-11-10 RX ORDER — POLYETHYLENE GLYCOL 3350 17 G/17G
17 POWDER, FOR SOLUTION ORAL DAILY
Qty: 7 PACKET | Refills: 0 | Status: SHIPPED | OUTPATIENT
Start: 2021-11-11 | End: 2021-11-18

## 2021-11-10 RX ORDER — SODIUM CHLORIDE 9 MG/ML
75 INJECTION, SOLUTION INTRAVENOUS CONTINUOUS
Status: DISPENSED | OUTPATIENT
Start: 2021-11-10 | End: 2021-11-11

## 2021-11-10 RX ORDER — OXYCODONE HYDROCHLORIDE 5 MG/1
5-10 TABLET ORAL
Qty: 40 TABLET | Refills: 0 | Status: SHIPPED | OUTPATIENT
Start: 2021-11-10 | End: 2021-11-11

## 2021-11-10 RX ORDER — ACETAMINOPHEN 500 MG
1000 TABLET ORAL EVERY 6 HOURS
Qty: 56 TABLET | Refills: 0 | Status: SHIPPED | OUTPATIENT
Start: 2021-11-10 | End: 2021-11-17

## 2021-11-10 RX ORDER — AMOXICILLIN 250 MG
1 CAPSULE ORAL 2 TIMES DAILY
Qty: 14 TABLET | Refills: 0 | Status: SHIPPED | OUTPATIENT
Start: 2021-11-10 | End: 2021-11-17

## 2021-11-10 RX ADMIN — ASPIRIN 325 MG: 325 TABLET, DELAYED RELEASE ORAL at 17:05

## 2021-11-10 RX ADMIN — FAMOTIDINE 20 MG: 20 TABLET, FILM COATED ORAL at 08:02

## 2021-11-10 RX ADMIN — LATANOPROST 1 DROP: 50 SOLUTION OPHTHALMIC at 21:53

## 2021-11-10 RX ADMIN — SERTRALINE 50 MG: 50 TABLET, FILM COATED ORAL at 08:02

## 2021-11-10 RX ADMIN — ACETAMINOPHEN 1000 MG: 500 TABLET ORAL at 06:01

## 2021-11-10 RX ADMIN — Medication 1 AMPULE: at 21:53

## 2021-11-10 RX ADMIN — ASPIRIN 325 MG: 325 TABLET, DELAYED RELEASE ORAL at 08:02

## 2021-11-10 RX ADMIN — ARFORMOTEROL TARTRATE: 15 SOLUTION RESPIRATORY (INHALATION) at 22:20

## 2021-11-10 RX ADMIN — GABAPENTIN 900 MG: 300 CAPSULE ORAL at 21:52

## 2021-11-10 RX ADMIN — GABAPENTIN 300 MG: 300 CAPSULE ORAL at 08:02

## 2021-11-10 RX ADMIN — Medication 1 AMPULE: at 08:01

## 2021-11-10 RX ADMIN — FAMOTIDINE 20 MG: 20 TABLET, FILM COATED ORAL at 17:05

## 2021-11-10 RX ADMIN — CEFAZOLIN SODIUM 2 G: 1 INJECTION, POWDER, FOR SOLUTION INTRAMUSCULAR; INTRAVENOUS at 02:48

## 2021-11-10 RX ADMIN — ACETAMINOPHEN 1000 MG: 500 TABLET ORAL at 00:19

## 2021-11-10 RX ADMIN — DOCUSATE SODIUM 50MG AND SENNOSIDES 8.6MG 1 TABLET: 8.6; 5 TABLET, FILM COATED ORAL at 17:06

## 2021-11-10 RX ADMIN — SODIUM CHLORIDE 75 ML/HR: 9 INJECTION, SOLUTION INTRAVENOUS at 06:01

## 2021-11-10 RX ADMIN — ACETAMINOPHEN 1000 MG: 500 TABLET ORAL at 11:42

## 2021-11-10 RX ADMIN — ACETAMINOPHEN 1000 MG: 500 TABLET ORAL at 23:39

## 2021-11-10 RX ADMIN — Medication 10 ML: at 06:01

## 2021-11-10 RX ADMIN — METOPROLOL TARTRATE 25 MG: 25 TABLET, FILM COATED ORAL at 08:02

## 2021-11-10 RX ADMIN — METOPROLOL TARTRATE 25 MG: 25 TABLET, FILM COATED ORAL at 17:05

## 2021-11-10 RX ADMIN — POLYETHYLENE GLYCOL 3350 17 G: 17 POWDER, FOR SOLUTION ORAL at 08:01

## 2021-11-10 RX ADMIN — ATORVASTATIN CALCIUM 10 MG: 10 TABLET, FILM COATED ORAL at 08:02

## 2021-11-10 RX ADMIN — METFORMIN HYDROCHLORIDE 500 MG: 500 TABLET ORAL at 17:00

## 2021-11-10 RX ADMIN — DOCUSATE SODIUM 50MG AND SENNOSIDES 8.6MG 1 TABLET: 8.6; 5 TABLET, FILM COATED ORAL at 08:01

## 2021-11-10 RX ADMIN — OXYCODONE 10 MG: 5 TABLET ORAL at 08:02

## 2021-11-10 RX ADMIN — OXYCODONE 5 MG: 5 TABLET ORAL at 00:19

## 2021-11-10 RX ADMIN — ARFORMOTEROL TARTRATE: 15 SOLUTION RESPIRATORY (INHALATION) at 08:16

## 2021-11-10 RX ADMIN — OXYCODONE 10 MG: 5 TABLET ORAL at 17:06

## 2021-11-10 NOTE — PROGRESS NOTES
Ortho / Neurosurgery NP Note    POD# 1  s/p RIGHT REVISION TOTAL KNEE ARTHROPLASTY   Pt seen with no visitor present. Pt resting in bed, in NAD. Appear drowsy vs tired. Reports little sleep overnight. Reports post-op pain 5/10, relieved by oxycodone   Slow progress with therapy this morning. Tolerating regular diet. No nausea. VSS Afebrile. Room air. Visit Vitals  BP (!) 136/58   Pulse 66   Temp 97 °F (36.1 °C)   Resp 14   Ht 5' 4\" (1.626 m)   Wt 81.7 kg (180 lb 1.9 oz)   SpO2 97%   BMI 30.92 kg/m²       Voiding status: voiding   Output (mL)  Last Bowel Movement Date: 11/09/21 (11/09/21 2107)  Unmeasurable Output  Urine Occurrence(s): 1 (11/09/21 0754)  Straight Cath  Straight Cath: Nurse performed cath (11/10/21 1101)  Number of Attempts: 1 (11/10/21 1101)  Time Catheter Inserted: 9719 (11/10/21 1101)  Time Catheter Removed: 1101 (11/10/21 1101)  Urine: 350 mL (11/10/21 1101)      Labs    Lab Results   Component Value Date/Time    HGB 8.5 (L) 11/10/2021 04:27 AM      Lab Results   Component Value Date/Time    INR 1.0 11/01/2021 10:32 AM      Lab Results   Component Value Date/Time    Sodium 141 11/10/2021 04:27 AM    Potassium 4.2 11/10/2021 04:27 AM    Chloride 112 (H) 11/10/2021 04:27 AM    CO2 20 (L) 11/10/2021 04:27 AM    Glucose 82 11/10/2021 04:27 AM    BUN 19 11/10/2021 04:27 AM    Creatinine 1.06 (H) 11/10/2021 04:27 AM    Calcium 7.7 (L) 11/10/2021 04:27 AM     Recent Glucose Results:   Lab Results   Component Value Date/Time    GLU 82 11/10/2021 04:27 AM    GLUCPOC 87 11/10/2021 11:33 AM    GLUCPOC 81 11/10/2021 07:38 AM    GLUCPOC 262 (H) 11/09/2021 09:13 PM     WC - no growth so far      Body mass index is 30.92 kg/m². : A BMI > 30 is classified as obesity and > 40 is classified as morbid obesity. Primaseal dressing intact - shadowing down middle of dressing   Cryotherapy in place over incision  Calves soft and supple;  No pain with passive stretch  Sensation and motor intact SCDs for mechanical DVT proph while in bed     PLAN:  1) PT BID - WBAT. 2) Aspirin 325 mg PO BID for DVT Prophylaxis   3) GI Prophylaxis - Pepcid  4) POUR - laguerre d/c at midnight, unable to void. Straight cath now and continue to monitor output/PVR. 5) Readniess for discharge:     [x] Vital Signs stable    [x] Hgb stable    [] + Voiding    [x] Wound intact, drainage minimal    [x] Tolerating PO intake     [] Cleared by PT (OT if applicable)     [] Stair training completed (if applicable)    [] Independent / Contact Guard Assist (household distance)     [] Bed mobility     [] Car transfers     [] ADLs    [x] Adequate pain control on oral medication alone     Discharge pending voiding status and therapy clearance. Lives alone, states son and friends can assist at discharge.      Jihan Davila, NP

## 2021-11-10 NOTE — DISCHARGE INSTRUCTIONS
[facility]charge Instructions: [facility]  Surgery: Total Knee Replacement  Surgeon:   Tammy Dickerson DO  Surgery Date:  11/9/2021     To relieve pain:   Use ice/gel packs.  Put the ice pack directly over the wound, or anywhere you are hurting or swollen.  To control pain and swelling, keep ice on regularly, especially after physical activity.  The packs should stay cold for 3-4 hours. When it is not cold anymore, rotate with the packs in the freezer.  Elevate your leg. This will also keep swelling down.  Rest for at least 20 minutes between activity or exercises.  To keep track of your pain medications, write down what you take and when you take it.  The last dose of pain medication you got in the hospital was:       Medication    Dose    Date & Time           Choose your medications based on the pain scale below:     To keep your pain under control, take Tylenol every 6 hours for 14 days - even if you feel like you dont need it.  For mild to moderate pain (4-6 on pain scale), take one pain pill every 3-4 hours as needed.  For severe pain (7-10 on pain scale), take two pain pills every 3-4 hours as needed.  To prevent nausea, take your pain medications with food. Pain Scale                 As your pain lessens:     Slowly start taking less pain medication. You may do this by waiting longer between doses or by taking smaller doses.  Stop using the pain medications as soon as you no longer need it, usually in 2-3 weeks.  Aspirin   To prevent blood clots, you will need to take Aspirin 325 mg twice a day for 30 days.                To prevent stomach upset or bleeding:   Do not take non-steroidal anti-inflammatory medications (Ibuprofen, Advil, Motrin, Naproxen, etc.)    Take Pepcid 20 mg twice a day, or a similar home medication, while you are taking a blood thinner. Silver Dressing      Keep your waterproof dressing in place after your leave the hospital. Your surgeon will remove the dressing at your follow-up appointment.  If you are having drainage, you may need to change your dressing. You will be given one extra dressing to use at home.  You will have some swelling, warmth, and bruising around the knee and up and down the leg after surgery. This will may get worse in the first few days you are home and will slowly get better over the next few weeks. [employer] DRESSING INSTRUCTIONS  (extra dressing if needed)                                          DO NOTs:   Do not rub your surgical wound   Do not put lotion or oils on your wound.  Do not take a tub bath or go swimming until your doctor says it is ok.  You may shower with this dressing over your wound.  After showering pat the dressing dry.  To increase and promote healing:   Stop Smoking (or at least cut back on       Smoking).  Eat a well-balanced diet (high in protein       and vitamin C).  If you have a poor appetite, drink Ensure, Glucerna, or         Monon Instant Breakfast for the next 30 days.  If you are diabetic, you should control your blood         sugars to prevent infection and help your wound         to heal.                     f you have a poor appetite, drink Ensure, Glucerna, or Monon Instant Breakfast for the next 30 days.  If you are diabetic, you should control your blood                                                sugars to prevent infection and help your wound                                                to heal.     Prevent Infection    1. Wash your hands.  This is the most important thing you or your caregiver can do.  Wash your hands with soap and water (or use the hand  we gave you) before you touch any wounds. 2. Shower.      Use the antibacterial soap we gave you when you take a shower.  Shower with this soap until your wounds are healed. 3.  Use clean sheets.  Put freshly cleaned sheets on your bed after surgery.  To keep the surgery site clean, do not allow pets to sleep with you while your wound is still healing.  To prevent constipation, stay active and drink plenty of fluid.  While using pain medications, you should also take stool softeners and laxatives, such as Pericolace       and Miralax.  If you are having too many bowel       Movements, then you may need       to stop taking the laxatives.  You should have a bowel movement 3-4 days        after surgery and then at least every other day while       on pain medication.  To improve your recovery, you must be active!  Use your walker and take short walks (in your home)         about every 2 hours during the day.  Try to increase how far you walk each day.  You can put as much weight on your leg as you can tolerate while walking.  To avoid getting a stiff knee, work on getting your knee bent and straight as soon as possible.  Home health physical therapy will come to your       home the day after you leave the hospital.  The       therapist will visit about 4 times over the next        couple of weeks to teach you how to get out of        bed, to safely walk in your home, and to do your        exercises.  NO DRIVING until your surgeon tells you it is ok.  You can return to work when cleared by a physician.  Please call your physician immediately if you have:     Constant bleeding from your wound.  Increasing redness or swelling around your wound (Some warmth, bruising and swelling is normal).  Change in wound drainage (increase in amount, color, or bad smell).      Change in mental status (unusual behavior   Temperature over 101.5 degrees Fahrenheit      Pain or redness in the calf (back of your lower leg - see picture)     Increased swelling of the thigh, ankle, calf, or foot.  Emergency: CALL 911 if you have:     Shortness of breath     Chest pain when you cough or taking a deep breath     Please call your surgeons office at [phone] for a follow up appointment. _   You should call as soon as you get home or the next day after discharge. Ask to make an appointment in 2 weeks.  If you have questions or concerns during normal business hours, you may reach Dr. Armando Alarcon at [phone].

## 2021-11-10 NOTE — PROGRESS NOTES
Problem: Mobility Impaired (Adult and Pediatric)  Goal: *Acute Goals and Plan of Care (Insert Text)  Description: FUNCTIONAL STATUS PRIOR TO ADMISSION: Patient was modified independent using a rollator for functional mobility. Reports that her rollator does not have functioning brakes. HOME SUPPORT PRIOR TO ADMISSION: The patient lived alone with \"people that can check in on her\" but will otherwise be alone. Physical Therapy Goals  Initiated 11/10/2021    1. Patient will move from supine to sit and sit to supine  in bed with modified independence within 4 days. 2. Patient will perform sit to stand with modified independence within 4 days. 3. Patient will ambulate with modified independence for 150 feet with the least restrictive device within 4 days. 4. Patient will ascend/descend 1 stairs with 1 handrail(s) with modified independence within 4 days. 5. Patient will perform home exercise program per protocol with modified independence within 4 days. 6. Patient will demonstrate AROM 0-90 degrees in operative joint within 4 days. Outcome: Progressing Towards Goal   PHYSICAL THERAPY EVALUATION  Patient: Radha Cook (23 y.o. 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 Patient currently limited by pain, impaired balance and generalized weakness. Patient sitting EOB with no overt LOB. Osiel for transfers and able to amb to the bathroom and back (no success with voiding). Increased pain on operated side and unable to put full weight down on LE secondary to pain.   Returned to supine at end of session secondary to pain. Do not anticipate patient will be ready for DC home today. Will continue to follow. Other factors to consider for discharge: at risk for falls, below baseline     Patient will benefit from skilled therapy intervention to address the above noted impairments. PLAN :  Recommendations and Planned Interventions: bed mobility training, transfer training, gait training, therapeutic exercises, neuromuscular re-education, patient and family training/education and therapeutic activities      Frequency/Duration: Patient will be followed by physical therapy:  twice daily to address goals. Recommendation for discharge: (in order for the patient to meet his/her long term goals)  Physical therapy at least 2 days/week in the home       IF patient discharges home will need the following DME: rolling walker         SUBJECTIVE:   Patient stated I have so much pain today.     OBJECTIVE DATA SUMMARY:   HISTORY:    Past Medical History:   Diagnosis Date    Asthma     Chronic pain 5/8/2020    DDD (degenerative disc disease), lumbar     Diabetes ([facility] [address]     Gastritis     GERD (gastroesophageal reflux disease)     Glaucoma     Hearing loss 5/8/2020    Hiatal hernia     High cholesterol     Hypertension     Hypocalcemia 5/8/2020    Peripheral vascular disease (HCC)     RA (rheumatoid arthritis) (HCC)     Sarcoidosis 1999    [facility]    Sleep apnea     has not used cpap in years since weight loss     Past Surgical History:   Procedure Laterality Date    HX GASTRIC BYPASS      HX HEART CATHETERIZATION      s/p PCI with stenting in 1998     HX KNEE REPLACEMENT Bilateral     HX ORTHOPAEDIC Bilateral     carpal tunnel surgery     HX SHOULDER REPLACEMENT Right     x 2        Personal factors and/or comorbidities impacting plan of care:     Home Situation  Home Environment: Apartment  # Steps to Enter: 1  Rails to Enter: No  One/Two Story Residence: One story  Living Alone: Yes  Support Systems: Child(reji), Other Family Member(s)  Patient Expects to be Discharged to[de-identified] House  Current DME Used/Available at Home: Linward Flick, straight, Walker, rollator  Tub or Shower Type: Tub/Shower combination    EXAMINATION/PRESENTATION/DECISION MAKING:   Critical Behavior:  Neurologic State: Alert  Orientation Level: Oriented X4  Cognition: Follows commands     Hearing: Auditory  Auditory Impairment: Hard of hearing, bilateral  Hearing Aids/Status: At home, Bilateral    Range Of Motion:  AROM: Generally decreased, functional                       Strength:    Strength: Generally decreased, functional           Functional Mobility:  Bed Mobility:        Sit to Supine: Minimum assistance; Additional time; Assist x1  Scooting: Supervision  Transfers:  Sit to Stand: Minimum assistance  Stand to Sit: Contact guard assistance                       Balance:   Sitting: Intact  Standing: Impaired  Standing - Static: Constant support; Good  Standing - Dynamic : Constant support; Fair  Ambulation/Gait Training:  Distance (ft): 20 Feet (ft) (x 2)  Assistive Device: Gait belt; Walker, rolling  Ambulation - Level of Assistance: Contact guard assistance        Gait Abnormalities: Antalgic; Decreased step clearance; Step to gait        Base of Support: Widened  Stance: Right decreased  Speed/Nicole: Pace decreased (<100 feet/min); Slow  Step Length: Left shortened; Right shortened       Pain Rating:  Reports high pain levels but does not rate    Activity Tolerance:   Fair and requires rest breaks    After treatment patient left in no apparent distress:   Supine in bed, Call bell within reach and Caregiver / family present    COMMUNICATION/EDUCATION:   The patients plan of care was discussed with: Physical therapist, Occupational therapist and Registered nurse. Fall prevention education was provided and the patient/caregiver indicated understanding., Patient/family have participated as able in goal setting and plan of care.  and Patient/family agree to work toward stated goals and plan of care.     Thank you for this referral.  Krissy Farley, PT, DPT   Time Calculation: 25 mins

## 2021-11-10 NOTE — PROGRESS NOTES
EMR reviewed for CM follow up. Noted that patient was admitted for revision of right TKR yesterday by Dr Jn Reece. Still admitted as inpatient. ACM will follow up with patient following her transition of care.

## 2021-11-10 NOTE — PROGRESS NOTES
Problem: Mobility Impaired (Adult and Pediatric)  Goal: *Acute Goals and Plan of Care (Insert Text)  Description: FUNCTIONAL STATUS PRIOR TO ADMISSION: Patient was modified independent using a rollator for functional mobility. Reports that her rollator does not have functioning brakes. HOME SUPPORT PRIOR TO ADMISSION: The patient lived alone with \"people that can check in on her\" but will otherwise be alone. Physical Therapy Goals  Initiated 11/10/2021    1. Patient will move from supine to sit and sit to supine  in bed with modified independence within 4 days. 2. Patient will perform sit to stand with modified independence within 4 days. 3. Patient will ambulate with modified independence for 150 feet with the least restrictive device within 4 days. 4. Patient will ascend/descend 1 stairs with 1 handrail(s) with modified independence within 4 days. 5. Patient will perform home exercise program per protocol with modified independence within 4 days. 6. Patient will demonstrate AROM 0-90 degrees in operative joint within 4 days. PHYSICAL THERAPY TREATMENT  Patient: Anna Ribera (16 y.o. 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 ASSESSMENT  Patient continues with skilled PT services and is progressing towards goals. Patient overall limited by pain, gait instability, and decreased activity tolerance. Patient currently needing Micky for bed mobility and transfers. Amb approx 75 feet with RW and CGA with no overt LOB but extremely slow franky and decreased stance time on operated side.  Reporting very high pain levels throughout activity. Patient will need to navigate 1 stair to enter home. Has limited support at [place]. Other factors to consider for discharge: at risk for falls, limited assistance at home         PLAN :  Patient continues to benefit from skilled intervention to address the above impairments. Continue treatment per established plan of care. to address goals. Recommendation for discharge: (in order for the patient to meet his/her long term goals)  Physical therapy at least 2 days/week in the home       IF patient discharges home will need the following DME: rolling walker       SUBJECTIVE:   Patient stated I still can't go to the bathroom.     OBJECTIVE DATA SUMMARY:   Critical Behavior:  Neurologic State: Alert  Orientation Level: Oriented X4  Cognition: Follows commands       Range of Motion:  AROM: Generally decreased, functional                         Functional Mobility Training:  Bed Mobility:        Sit to Supine: Minimum assistance  Scooting: Supervision        Transfers:  Sit to Stand: Minimum assistance; Assist x1  Stand to Sit: Contact guard assistance                             Balance:  Sitting: Intact  Standing: Impaired  Standing - Static: Constant support; Good  Standing - Dynamic : Constant support; Fair  Ambulation/Gait Training:  Distance (ft): 75 Feet (ft)  Assistive Device: Gait belt; Walker, rolling  Ambulation - Level of Assistance: Contact guard assistance        Gait Abnormalities: Antalgic; Decreased step clearance; Step to gait        Base of Support: Widened  Stance: Right decreased  Speed/Nicole: Pace decreased (<100 feet/min);  Slow  Step Length: Left shortened; Right shortened         Pain Rating:  Reports pain but does not rate    Activity Tolerance:   Fair and requires rest breaks    After treatment patient left in no apparent distress:   Sitting in chair, Call bell within reach, and Caregiver / family present    COMMUNICATION/COLLABORATION:   The patients plan of care was discussed with: Physical therapist, Occupational therapist, and Registered nurse.      Bell Ying, PT, DPT   Time Calculation: 18 mins

## 2021-11-10 NOTE — PROGRESS NOTES
Problem: Falls - Risk of  Goal: *Absence of Falls  Description: Document Jose Munoz Fall Risk and appropriate interventions in the flowsheet. Outcome: Progressing Towards Goal  Note: Fall Risk Interventions:  Mobility Interventions: Bed/chair exit alarm, Communicate number of staff needed for ambulation/transfer         Medication Interventions: Patient to call before getting OOB    Elimination Interventions: Call light in reach    History of Falls Interventions:  Investigate reason for fall         Problem: Patient Education: Go to Patient Education Activity  Goal: Patient/Family Education  Outcome: Progressing Towards Goal     Problem: Patient Education: Go to Patient Education Activity  Goal: Patient/Family Education  Outcome: Progressing Towards Goal     Problem: Knee Replacement: Day of Surgery/Unit  Goal: Activity/Safety  Outcome: Progressing Towards Goal  Goal: Consults, if ordered  Outcome: Progressing Towards Goal  Goal: Diagnostic Test/Procedures  Outcome: Progressing Towards Goal  Goal: Nutrition/Diet  Outcome: Progressing Towards Goal  Goal: Medications  Outcome: Progressing Towards Goal  Goal: Respiratory  Outcome: Progressing Towards Goal  Goal: Treatments/Interventions/Procedures  Outcome: Progressing Towards Goal  Goal: Psychosocial  Outcome: Progressing Towards Goal  Goal: *Initiate mobility  Outcome: Progressing Towards Goal  Goal: *Optimal pain control at patient's stated goal  Outcome: Progressing Towards Goal  Goal: *Hemodynamically stable  Outcome: Progressing Towards Goal

## 2021-11-10 NOTE — PROGRESS NOTES
End of Shift Note    Bedside shift change report given to Rubi Nair RN (oncoming nurse) by Paulina Joseph LPN (offgoing nurse). Report included the following information SBAR, Kardex, Procedure Summary, Intake/Output, MAR and Recent Results    Shift worked:  7p-7:30a     Shift summary and any significant changes:          Concerns for physician to address:       Zone phone for oncoming shift:   [phone]       Activity:  Activity Level: Up with Assistance  Number times ambulated in hallways past shift: 0  Number of times OOB to chair past shift: 0    Cardiac:   Cardiac Monitoring: No      Cardiac Rhythm: Sinus Rhythm    Access:   Current line(s): PIV     Genitourinary:   Urinary status: due to void    Respiratory:   O2 Device: None (Room air)  Chronic home O2 use?: NO  Incentive spirometer at bedside: YES  Actual Volume (ml): 27252 ml  GI:  Last Bowel Movement Date: 11/09/21  Current diet:  ADULT DIET Regular; Low Sodium (2 gm)  Passing flatus: YES  Tolerating current diet: YES       Pain Management:   Patient states pain is manageable on current regimen: YES    Skin:  Jose Luis Score: 20  Interventions: float heels, increase time out of bed, PT/OT consult and nutritional support     Patient Safety:  Fall Score:  Total Score: 4  Interventions: bed/chair alarm, assistive device (walker, cane, etc), gripper socks, pt to call before getting OOB and stay with me (per policy)  High Fall Risk: Yes    Length of Stay:  Expected LOS: - - -  Actual LOS: 0      Paulina Joseph LPN

## 2021-11-10 NOTE — PROGRESS NOTES
Transition of Care Plan:  RUR: n/a outpatient status   Disposition: home with home health- bshc   Follow up appointments: ortho  DME needed: RW- referral sent to [facility]  Transportation at Discharge: edson Goldstein Drivers or means to access home: yes      IM Medicare Letter: n/a outpatient status   Is patient a BCPI-A Bundle: no   If yes, was Bundle Letter given?:     Caregiver Contact: edson Mittal [phone]  Discharge Caregiver contacted prior to discharge? To be contacted prior to d/c              Reason for Admission: right revision total knee arthroplasty                     RUR Score: n/a outpatient status                     Plan for utilizing home health: per recommendation         PCP: First and Last name:  Nga Carrillo MD   Name of Practice: [facility]   Are you a current patient: Yes/No: yes   Approximate date of last visit: 10/26/21   Can you participate in a virtual visit with your PCP: yes                    Current Advanced Directive/Advance Care Plan: Cinthia Allen (ACP) Conversation      Date of Conversation: 11/10/21  Conducted with: Patient with Decision Making Capacity    Content/Action Overview:   DECLINED ACP conversation - will revisit periodically     Healthcare Decision Maker:   Click here to complete [address] including selection of the Healthcare Decision Maker Relationship (ie \"Primary\")                         Transition of Care Plan:                      CM made room visit with patient who was alert and oriented. Pt confirmed demographics, insurance, and emergency contact on file. Pt uses [address] and [facility]. Pt lives alone in a single level home with 1 deedee. Pt has a cane and rollator. At baseline pt is independent with ADLs. Pt does not drive and relies on Medicaid transport and her son. Pt used HH a \"long time ago\" and has no hx of SNF or IPR.      Patient would like to use [facility] through [address]. FOC signed and placed on bedside chart. Referral sent to [address] and accepted. AVS updated. Referral for RW sent to [facility] and waiting for response. Pt's son to provide transportation at d/c. Care Management Interventions  PCP Verified by CM: Yes  Mode of Transport at Discharge:  Other (see comment)  Transition of Care Consult (CM Consult): Discharge Planning, [address]: Yes  MyChart Signup:  (RW)  Discharge Durable Medical Equipment: Yes  Physical Therapy Consult: Yes  Occupational Therapy Consult: No  Speech Therapy Consult: No  Support Systems: Child(reji), Other Family Member(s)  Confirm Follow Up Transport: Family  The Plan for Transition of Care is Related to the Following Treatment Goals : [facility]  The Patient and/or Patient Representative was Provided with a Choice of Provider and Agrees with the Discharge Plan?: Yes  Freedom of Choice List was Provided with Basic Dialogue that Supports the Patient's Individualized Plan of Care/Goals, Treatment Preferences and Shares the Quality Data Associated with the Providers?: Yes  Discharge Location  Discharge Placement: Home with home health    Courtney Stevenson, [address], [address]

## 2021-11-10 NOTE — OP NOTES
Date of Procedure: 11/9/2021  Preoperative Diagnosis: Mechanical failure, right knee arthroplasty  Postoperative Diagnosis: Same  Procedure Performed: Revision right knee arthroplasty, total  Surgeon: Rick Brannon DO  Assistant: None  Anesthesia: General with adductor canal block  Estimated Blood Loss: 200 cc  Specimens: None  Drains: None  Complications: None  Implants: DeP[employer] Sigma total constrained polyethylene, 12.5 mm with a size 2.5 tibial baseplate width, De[employer]uy Sigma total constrained femur, size 2, 60 mm stem. 4 mm augments distally medially and laterally    Preoperative range of motion 0-70  Postoperative range of motion: 0-1 10  Operative Indications: This is a 78 y.o. 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 We did discuss the risks of surgery which include but are not limited to infection, nerve or blood vessel damage, failure of fixation, failure of any possible implant, need for reoperation, postoperative pain and swelling, intra-or postoperative fracture, postoperative mechanical failure, need for reoperation, implant failure, death, disability, organ dysfunction, wound healing issues, DVT, PE, and the need for further procedures. The patient did freely state their understanding and satisfaction with our discussion. Appropriate medical clearances have been obtained. Description of Procedure:  After the correct site and side were identified by myself in the holding area, the patient was transported to the surgical suite, where, after induction of general anesthesia, the patient was positioned in the supine position. The right knee was then prepped and draped in the usual sterile orthopedic fashion.   After appropriate timeout, and confirmation of 2 g of Ancef have been infused prior to incision, previous incision was excised and the sharp dissection was carried down to fascia. Median parapatellar arthrotomy was performed, significant arthrofibrosis was noted throughout the joint, cultures were taken. Extensive synovectomy was performed, there is a significant amount of scar within the joint itself, this did provide significant motion. This was at its worst at the patella tendon as well as medially. Once I had good mobility of the soft tissues, I utilized a an osteotome to remove the polyethylene, this was inspected, there was good amount of oxidation at this point. Tibial component was found to be well fixed. I then turned my attention to the femur, flexible osteotomes and bone tamps were utilized in a meticulous manner to extract the femoral component, this was removed with a minimum of bone loss. Once this was performed, I irrigated the joint, inspection did indicate that there was good potential for revision to a constrained design, I then utilized an entry drill and then reamed up to a size 17 reamer in the canal of the femur. I then created a cleanup cut distally, I plan for 4 mm augments so as to not elevate the joint line too much. I then placed the 4-in-1 cutting jig, I did anterior eyes the femur so as to open the flexion space and allow for greater range of motion. This was pinned and then cuts were performed. Trial was placed and a trial constrained polyethylene was placed, good stability was noted. Range of motion at that point was 0-1 10. With that, all trials were removed, the wound was copiously irrigated with normal saline mixed with Betadine, I then took the knee into flexion, I prepared the femur with the use of a cement restrictor, I then irrigated and dried the femur. I then chose the final implants and built him on the back table.   I utilized antibiotic cement, I then injected in a retrograde fashion the cement, bone ends were covered in cement and then a precoated final implant was impacted into place, this was held in place while excess cement was removed and a final impactor held this in place. I then placed a trial 10mm polyethylene, this was taken into extension and allowed to fully cure. Did take intraoperative films at this point indicating good placement of the components. I then trialed a size 12.5 which had better coronal plane stability, I chose this as a final implant, I then impacted this into place and checked the locking mechanism, this was found to be very satisfactory. Wound was then copiously irrigated with chlorhexidine solution as well as standard Betadine solution. Soft tissues were infused with 1/2% ropivacaine. Capsule was then closed with #1 Vicryl in a strata fix. Knee was taken into flexion, deep stitches were placed with #1 Vicryl, 2-0 Vicryl, staples. Sterile dressing was applied. Patient was awoken and taken to PACU in stable condition without complication. Postoperative plan: Patient will be weightbearing as tolerated, she will work with physical therapy.   Plan will be to have her follow-up in 2 weeks for an x-ray of the right knee as well as staple removal.

## 2021-11-11 LAB
GLUCOSE BLD STRIP.AUTO-MCNC: 113 MG/DL (ref 65–117)
GLUCOSE BLD STRIP.AUTO-MCNC: 126 MG/DL (ref 65–117)
GLUCOSE BLD STRIP.AUTO-MCNC: 85 MG/DL (ref 65–117)
GLUCOSE BLD STRIP.AUTO-MCNC: 98 MG/DL (ref 65–117)
SERVICE CMNT-IMP: ABNORMAL
SERVICE CMNT-IMP: NORMAL

## 2021-11-11 PROCEDURE — 51798 US URINE CAPACITY MEASURE: CPT

## 2021-11-11 PROCEDURE — 77030005518 HC CATH URETH FOL 2W BARD -B

## 2021-11-11 PROCEDURE — 97530 THERAPEUTIC ACTIVITIES: CPT

## 2021-11-11 PROCEDURE — 97110 THERAPEUTIC EXERCISES: CPT

## 2021-11-11 PROCEDURE — 74011250637 HC RX REV CODE- 250/637: Performed by: ORTHOPAEDIC SURGERY

## 2021-11-11 PROCEDURE — 94760 N-INVAS EAR/PLS OXIMETRY 1: CPT

## 2021-11-11 PROCEDURE — 74011000250 HC RX REV CODE- 250: Performed by: ORTHOPAEDIC SURGERY

## 2021-11-11 PROCEDURE — 97116 GAIT TRAINING THERAPY: CPT

## 2021-11-11 PROCEDURE — 94640 AIRWAY INHALATION TREATMENT: CPT

## 2021-11-11 PROCEDURE — 82962 GLUCOSE BLOOD TEST: CPT

## 2021-11-11 RX ORDER — OXYCODONE HYDROCHLORIDE 5 MG/1
5-10 TABLET ORAL
Qty: 40 TABLET | Refills: 0 | Status: SHIPPED | OUTPATIENT
Start: 2021-11-11 | End: 2021-11-18

## 2021-11-11 RX ADMIN — ACETAMINOPHEN 1000 MG: 500 TABLET ORAL at 12:50

## 2021-11-11 RX ADMIN — LATANOPROST 1 DROP: 50 SOLUTION OPHTHALMIC at 21:55

## 2021-11-11 RX ADMIN — ASPIRIN 325 MG: 325 TABLET, DELAYED RELEASE ORAL at 08:28

## 2021-11-11 RX ADMIN — ASPIRIN 325 MG: 325 TABLET, DELAYED RELEASE ORAL at 17:33

## 2021-11-11 RX ADMIN — POLYETHYLENE GLYCOL 3350 17 G: 17 POWDER, FOR SOLUTION ORAL at 08:28

## 2021-11-11 RX ADMIN — DOCUSATE SODIUM 50MG AND SENNOSIDES 8.6MG 1 TABLET: 8.6; 5 TABLET, FILM COATED ORAL at 17:33

## 2021-11-11 RX ADMIN — SERTRALINE 50 MG: 50 TABLET, FILM COATED ORAL at 08:28

## 2021-11-11 RX ADMIN — ATORVASTATIN CALCIUM 10 MG: 10 TABLET, FILM COATED ORAL at 08:28

## 2021-11-11 RX ADMIN — OXYCODONE 10 MG: 5 TABLET ORAL at 14:45

## 2021-11-11 RX ADMIN — OXYCODONE 10 MG: 5 TABLET ORAL at 08:28

## 2021-11-11 RX ADMIN — DOCUSATE SODIUM 50MG AND SENNOSIDES 8.6MG 1 TABLET: 8.6; 5 TABLET, FILM COATED ORAL at 08:28

## 2021-11-11 RX ADMIN — METFORMIN HYDROCHLORIDE 500 MG: 500 TABLET ORAL at 17:33

## 2021-11-11 RX ADMIN — METOPROLOL TARTRATE 25 MG: 25 TABLET, FILM COATED ORAL at 08:28

## 2021-11-11 RX ADMIN — ARFORMOTEROL TARTRATE: 15 SOLUTION RESPIRATORY (INHALATION) at 08:23

## 2021-11-11 RX ADMIN — FAMOTIDINE 20 MG: 20 TABLET, FILM COATED ORAL at 08:28

## 2021-11-11 RX ADMIN — METOPROLOL TARTRATE 25 MG: 25 TABLET, FILM COATED ORAL at 17:33

## 2021-11-11 RX ADMIN — Medication 1 AMPULE: at 08:28

## 2021-11-11 RX ADMIN — GABAPENTIN 900 MG: 300 CAPSULE ORAL at 21:52

## 2021-11-11 RX ADMIN — FAMOTIDINE 20 MG: 20 TABLET, FILM COATED ORAL at 17:33

## 2021-11-11 RX ADMIN — ACETAMINOPHEN 1000 MG: 500 TABLET ORAL at 05:16

## 2021-11-11 RX ADMIN — Medication 1 AMPULE: at 21:52

## 2021-11-11 RX ADMIN — GABAPENTIN 300 MG: 300 CAPSULE ORAL at 08:28

## 2021-11-11 NOTE — PROGRESS NOTES
End of Shift Note    Bedside shift change report given to [address] (oncoming nurse) by Fabio Zelaya (offgoing nurse). Report included the following information SBAR, Kardex, Intake/Output, MAR, Accordion, Recent Results and Med Rec Status    Shift worked:  night     Shift summary and any significant changes:     laguerre inserted for retention     Concerns for physician to address:  retention     Zone phone for oncoming shift:   [phone]       Activity:  Activity Level: Up with Assistance  Number times ambulated in hallways past shift: 0  Number of times OOB to chair past shift: 0    Cardiac:   Cardiac Monitoring: No      Cardiac Rhythm: Sinus Rhythm    Access:   Current line(s): no access     Genitourinary:   Urinary status: voiding    Respiratory:   O2 Device: None (Room air)  Chronic home O2 use?: NO  Incentive spirometer at bedside: YES  Actual Volume (ml): 1300 ml  GI:  Last Bowel Movement Date: 11/09/21  Current diet:  ADULT DIET Regular; Low Sodium (2 gm)  Passing flatus: YES  Tolerating current diet: YES       Pain Management:   Patient states pain is manageable on current regimen: YES    Skin:  Jose Luis Score: 20  Interventions: float heels and increase time out of bed    Patient Safety:  Fall Score:  Total Score: 4  Interventions: assistive device (walker, cane, etc), gripper socks and pt to call before getting OOB  High Fall Risk: Yes    Length of Stay:  Expected LOS: - - -  Actual LOS: 0      Fabio Zelaya

## 2021-11-11 NOTE — PROGRESS NOTES
Problem: Falls - Risk of  Goal: *Absence of Falls  Description: Document [place] Fall Risk and appropriate interventions in the flowsheet. Outcome: Progressing Towards Goal  Note: Fall Risk Interventions:  Mobility Interventions: Communicate number of staff needed for ambulation/transfer         Medication Interventions: Patient to call before getting OOB    Elimination Interventions: Patient to call for help with toileting needs    History of Falls Interventions:  Investigate reason for fall           Problem: Knee Replacement: Post-Op Day 1  Goal: Off Pathway (Use only if patient is Off Pathway)  Outcome: Progressing Towards Goal  Goal: Activity/Safety  Outcome: Progressing Towards Goal  Goal: Diagnostic Test/Procedures  Outcome: Progressing Towards Goal  Goal: Nutrition/Diet  Outcome: Progressing Towards Goal  Goal: Medications  Outcome: Progressing Towards Goal  Goal: Respiratory  Outcome: Progressing Towards Goal  Goal: Treatments/Interventions/Procedures  Outcome: Progressing Towards Goal  Goal: Psychosocial  Outcome: Progressing Towards Goal  Goal: Discharge Planning  Outcome: Progressing Towards Goal  Goal: *Demonstrates progressive activity  Outcome: Progressing Towards Goal  Goal: *Optimal pain control at patient's stated goal  Outcome: Progressing Towards Goal  Goal: *Hemodynamically stable  Outcome: Progressing Towards Goal  Goal: *Discharge plan identified  Outcome: Progressing Towards Goal

## 2021-11-11 NOTE — PROGRESS NOTES
End of Shift Note    Bedside shift change report given to  RN (oncoming nurse) by Carol Johnson (offgoing nurse). Report included the following information SBAR, Kardex, Intake/Output, MAR, Accordion, Recent Results and Med Rec Status    Shift worked:  days     Shift summary and any significant changes:         Concerns for physician to address:  retention     Zone phone for oncnikunj shift:   [phone]       Activity:  Activity Level: Up with Assistance  Number times ambulated in hallways past shift: 0  Number of times OOB to chair past shift: 0    Cardiac:   Cardiac Monitoring: No      Cardiac Rhythm: Sinus Rhythm    Access:   Current line(s): no access     Genitourinary:   Urinary status: voiding    Respiratory:   O2 Device: None (Room air)  Chronic home O2 use?: NO  Incentive spirometer at bedside: YES  Actual Volume (ml): 1300 ml  GI:  Last Bowel Movement Date: 11/10/21  Current diet:  ADULT DIET Regular; Low Sodium (2 gm)  Passing flatus: YES  Tolerating current diet: YES       Pain Management:   Patient states pain is manageable on current regimen: YES    Skin:  Jose Luis Score: 20  Interventions: float heels and increase time out of bed    Patient Safety:  Fall Score:  Total Score: 4  Interventions: assistive device (walker, cane, etc), gripper socks and pt to call before getting OOB  High Fall Risk: Yes    Length of Stay:  Expected LOS: - - -  Actual LOS: 0      Carol Johnson

## 2021-11-11 NOTE — PROGRESS NOTES
Transition of Care Plan:  RUR: n/a outpatient status   Disposition: home with home health- [facility]- vs rehab pending progress  Follow up appointments: ortho  DME needed: RW- referral sent to [facility]  Transportation at Discharge: edson [employer] or means to access home: yes      IM Medicare Letter: n/a outpatient status   Is patient a BCPI-A Bundle: no              If yes, was Bundle Letter given?:     Caregiver Contact: edson Mott [phone]  Discharge Caregiver contacted prior to discharge? To be contacted prior to d/c     12:15pm  Ortho NP requested CM speak with patient about having SNF as a back up d/c plan if unable to progress enough with therapy in hospital to safely d/c home. Pt also lives alone with limited support. CM made room visit with patient and discussed the possibility of going to rehab upon d/c if unable to progress enough with therapy. Pt declined, reporting she does not want to go to a rehab facility. Pt reported she would find help for her to d/c home. Pt stated her Medicaid  is working on arranging home care aides for patient. CM informed patient that pt will need 24/7 assistance for a while. Pt reported she didn't think she would be able to get that amount of assistance through Medicaid. CM encouraged patient to talk with family to see who is available to help with recovery after this planned procedure. Pt declined to accept a SNF list to review. CM will discuss with therapy prior to working with pt again this afternoon.      Candi Rivas, [address] [facility]

## 2021-11-11 NOTE — PROGRESS NOTES
Problem: Mobility Impaired (Adult and Pediatric)  Goal: *Acute Goals and Plan of Care (Insert Text)  Description: FUNCTIONAL STATUS PRIOR TO ADMISSION: Patient was modified independent using a rollator for functional mobility. Reports that her rollator does not have functioning brakes. HOME SUPPORT PRIOR TO ADMISSION: The patient lived alone with \"people that can check in on her\" but will otherwise be alone. Physical Therapy Goals  Initiated 11/10/2021    1. Patient will move from supine to sit and sit to supine  in bed with modified independence within 4 days. 2. Patient will perform sit to stand with modified independence within 4 days. 3. Patient will ambulate with modified independence for 150 feet with the least restrictive device within 4 days. 4. Patient will ascend/descend 1 stairs with 1 handrail(s) with modified independence within 4 days. 5. Patient will perform home exercise program per protocol with modified independence within 4 days. 6. Patient will demonstrate AROM 0-90 degrees in operative joint within 4 days. Outcome: Progressing Towards Goal   PHYSICAL THERAPY TREATMENT  Patient: Vivek Easton (11 y.o. 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 ASSESSMENT  Patient continues with skilled PT services and is progressing towards goals. Pt presents with decreased strength and endurance. Pt performed bed mobility at min A with additional time. Pt performed sit to stand transfer at min A/CGA with additional time. Pt ambulated 55ft and 25ft with RW at [facility] with constant cueing for sequencing.  Pt with difficulty bearing weight on RLE and unable to stand upright. Pt able to correct for short period. Pt ascended/descended x1 steps with both railings at min A x2 and then with SPC and HHA x2 at mod A x2. Pt very fearful of putting weight on RLE. Pt performed a car transfer at [facility] with cueing for sequencing. Pt with improved foot clearance with time but will continue to improve gait safety. Current Level of Function Impacting Discharge (mobility/balance): bed mobility at min A, sit to stand transfer at min A/CGA, ambulation at [facility] with RW     Other factors to consider for discharge: pain, decreased strength, decreased endurance. PLAN :  Patient continues to benefit from skilled intervention to address the above impairments. Continue treatment per established plan of care. to address goals. Recommendation for discharge: (in order for the patient to meet his/her long term goals)  Therapy up to 5 days/week in SNF setting    This discharge recommendation:  Has been made in collaboration with the attending provider and/or case management    IF patient discharges home will need the following DME: bedside commode, gait belt, and rolling walker       SUBJECTIVE:   Patient stated  I didn't do a lot of walker before.     OBJECTIVE DATA SUMMARY:   Critical Behavior:  Neurologic State: Alert  Orientation Level: Oriented X4  Cognition: Appropriate decision making, Follows commands       Range of Motion:         AROM: Generally decreased, Functional                    Functional Mobility Training:  Bed Mobility:        Sit to Supine: Minimum assistance  Scooting: Contact guard assistance; Stand-by assistance        Transfers:  Sit to Stand: Minimum assistance; Contact guard assistance  Stand to Sit: Contact guard assistance                             Balance:  Sitting: Intact  Standing: Impaired  Standing - Static: Good; Constant support  Standing - Dynamic : Poor; Fair; Constant support  Ambulation/Gait Training:  Distance (ft): 80 Feet (ft) (55ft and 25ft)  Assistive Device: Gait belt; Walker, rolling  Ambulation - Level of Assistance: Contact guard assistance; Additional time        Gait Abnormalities: Antalgic; Decreased step clearance; Step to gait        Base of Support: Widened  Stance: Right decreased  Speed/Nicole: Pace decreased (<100 feet/min); Shuffled  Step Length: Right shortened; Left shortened               Stairs:  Number of Stairs Trained: 2  Stairs - Level of Assistance: Moderate assistance; Assist X2   Rail Use: Both (and  none with SPC and HHA x1)      Pain Rating:  Pt with increased pain with all mobility     Activity Tolerance:   Fair and requires rest breaks    After treatment patient left in no apparent distress:   Sitting in chair and Call bell within reach    COMMUNICATION/COLLABORATION:   The patients plan of care was discussed with: Registered nurse.      Lorenzo Sanchez PTA   Time Calculation: 39 mins

## 2021-11-11 NOTE — PROGRESS NOTES
Problem: Mobility Impaired (Adult and Pediatric)  Goal: *Acute Goals and Plan of Care (Insert Text)  Description: FUNCTIONAL STATUS PRIOR TO ADMISSION: Patient was modified independent using a rollator for functional mobility. Reports that her rollator does not have functioning brakes. HOME SUPPORT PRIOR TO ADMISSION: The patient lived alone with \"people that can check in on her\" but will otherwise be alone. Physical Therapy Goals  Initiated 11/10/2021    1. Patient will move from supine to sit and sit to supine  in bed with modified independence within 4 days. 2. Patient will perform sit to stand with modified independence within 4 days. 3. Patient will ambulate with modified independence for 150 feet with the least restrictive device within 4 days. 4. Patient will ascend/descend 1 stairs with 1 handrail(s) with modified independence within 4 days. 5. Patient will perform home exercise program per protocol with modified independence within 4 days. 6. Patient will demonstrate AROM 0-90 degrees in operative joint within 4 days. 11/11/2021 1611 by Joslyn Abad PTA  Outcome: Not Progressing Towards Goal  11/11/2021 1547 by Joslyn Abad, [facility]  Outcome: Progressing Towards Goal   PHYSICAL THERAPY TREATMENT  Patient: Gayla Calles (66 y.o. 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 ASSESSMENT  Patient continues with skilled PT services and is not progressing towards goals. Pt presents with decreased strength and increased pain with mobility. Pt performed bed mobility at min A with additional time.  Pt performed sit to stand transfer at min A with additional time. Pt ambulated 15ft with RW at min A/CGA with additional time. Pt with difficulty advance each foot due to pain and stiffness. Pt improved step  length with time. Pt performed supine exercises with assistance to improve ROM and stiffness. Will continue to improved ambulation distance and safety. Current Level of Function Impacting Discharge (mobility/balance): mobility at min A/CGA     Other factors to consider for discharge: pain, decreased strength and endurance, needs supervision for safety          PLAN :  Patient continues to benefit from skilled intervention to address the above impairments. Continue treatment per established plan of care. to address goals. Recommendation for discharge: (in order for the patient to meet his/her long term goals)  Therapy up to 5 days/week in SNF setting if pt refuses HHPT with 24/7 supervision     This discharge recommendation:  Has been made in collaboration with the attending provider and/or case management    IF patient discharges home will need the following DME: bedside commode, gait belt, and rolling walker       SUBJECTIVE:   Patient stated  I'm so stiff and I didn't want the pain meds.     OBJECTIVE DATA SUMMARY:   Critical Behavior:  Neurologic State: Alert  Orientation Level: Oriented X4  Cognition: Appropriate decision making, Follows commands       Range of Motion:         AROM: Generally decreased, Functional                    Functional Mobility Training:  Bed Mobility:     Supine to Sit: Minimum assistance  Sit to Supine: Minimum assistance  Scooting: Contact guard assistance; Stand-by assistance        Transfers:  Sit to Stand: Minimum assistance; Contact guard assistance  Stand to Sit: Contact guard assistance                             Balance:  Sitting: Intact  Standing: Impaired  Standing - Static: Good; Constant support  Standing - Dynamic : Poor; Constant support  Ambulation/Gait Training:  Distance (ft): 15 Feet (ft)  Assistive Device: Gait belt; Walker, rolling  Ambulation - Level of Assistance: Contact guard assistance; Additional time        Gait Abnormalities: Antalgic; Decreased step clearance; Step to gait        Base of Support: Widened  Stance: Right decreased  Speed/Nicole: Pace decreased (<100 feet/min);  Shuffled  Step Length: Right shortened; Left shortened                Therapeutic Exercises:     EXERCISE   Sets   Reps   Active Active Assist   Passive Self ROM   Comments   Ankle Pumps 1 10 [x]                                        []                                        []                                        []                                           Quad Sets 1 5 [x]                                        []                                        []                                        []                                           Hip Abduction  1 10 [x]                                        [x]                                        []                                        []                                           Short Arc Quads   []                                        []                                        []                                        []                                           Knee Extension Stretch     []                                          []                                          []                                          []                                           Heel Slides 1 10 [x]                                        [x]                                        []                                        []                                           Long Arc Quads   []                                        []                                        []                                        []                                           Knee Flexion Stretch   []                                        []                                        [] []                                           Straight Leg Raises 1 10 [x]                                        [x]                                        []                                        []                                               Pain Rating:  Pt with increased pain with all mobility. Activity Tolerance:   Fair, Poor, and requires rest breaks    After treatment patient left in no apparent distress:   Supine in bed, Call bell within reach, and Side rails x 3    COMMUNICATION/COLLABORATION:   The patients plan of care was discussed with: Registered nurse.      Algie Leventhal, PTA   Time Calculation: 26 mins

## 2021-11-11 NOTE — PROGRESS NOTES
Ortho / Neurosurgery NP Note    POD# 2  s/p RIGHT REVISION TOTAL KNEE ARTHROPLASTY   Pt seen with no visitor present. Pt resting in bed, in NAD. States post-op pain improved today, still rating 5/10, relieved by oxycodone   Slow progress with therapy this morning. Tolerating regular diet. No nausea. Poor appetite - has menu for food preferences, declined/dislikes supplements     VSS Afebrile. Room air. Visit Vitals  BP (!) 141/58 (BP 1 Location: Right upper arm, BP Patient Position: Supine)   Pulse 78   Temp 98.6 °F (37 °C)   Resp 18   Ht 5' 4\" (1.626 m)   Wt 81.7 kg (180 lb 1.9 oz)   SpO2 95%   BMI 30.92 kg/m²       Voiding status: POUR - [place]   Output (mL)  Urine Voided: 350 ml (11/11/21 0846)  Last Bowel Movement Date: 11/10/21 (11/11/21 0727)  Unmeasurable Output  Urine Occurrence(s): 1 (11/09/21 0754)  Straight Cath  Straight Cath: Nurse performed cath (11/10/21 1857)  Number of Attempts: 1 (11/10/21 1857)  Time Catheter Inserted: 1852 (11/10/21 1857)  Time Catheter Removed: 5608 (11/10/21 1857)  Urine: 500 mL (11/10/21 1857)      Labs    Lab Results   Component Value Date/Time    HGB 8.5 (L) 11/10/2021 04:27 AM      Lab Results   Component Value Date/Time    INR 1.0 11/01/2021 10:32 AM      Lab Results   Component Value Date/Time    Sodium 141 11/10/2021 04:27 AM    Potassium 4.2 11/10/2021 04:27 AM    Chloride 112 (H) 11/10/2021 04:27 AM    CO2 20 (L) 11/10/2021 04:27 AM    Glucose 82 11/10/2021 04:27 AM    BUN 19 11/10/2021 04:27 AM    Creatinine 1.06 (H) 11/10/2021 04:27 AM    Calcium 7.7 (L) 11/10/2021 04:27 AM     Recent Glucose Results:   Lab Results   Component Value Date/Time    GLUCPOC 85 11/11/2021 07:10 AM    GLUCPOC 173 (H) 11/10/2021 08:35 PM    GLUCPOC 182 (H) 11/10/2021 08:34 PM     WC - no growth so far      Body mass index is 30.92 kg/m². : A BMI > 30 is classified as obesity and > 40 is classified as morbid obesity.        Primaseal dressing intact - shadowing down middle of dressing (unchanged from yesterday)  Cryotherapy in place over incision  Calves soft and supple; No pain with passive stretch  Sensation and motor intact   SCDs for mechanical DVT proph while in bed     PLAN:  1) PT BID - WBAT. 2) Aspirin 325 mg PO BID for DVT Prophylaxis   3) GI Prophylaxis - Pepcid  4) POUR - laguerre d/c on POD0. Unable to void - straight cathx2, laguerre reinserted. Voiding trial in 5-7 days. Encourage PO hydration. 5) Readniess for discharge:     [x] Vital Signs stable    [x] Hgb stable    [] + Voiding - laguerre    [x] Wound intact, drainage minimal    [x] Tolerating PO intake     [] Cleared by PT (OT if applicable)     [] Stair training completed (if applicable)    [] Independent / Contact Guard Assist (household distance)     [] Bed mobility     [] Car transfers     [] ADLs    [x] Adequate pain control on oral medication alone     Discharge pending therapy clearance. Home vs Rehab. Lives alone, states son and friends can assist at discharge.      Bisi Ruiz, NP

## 2021-11-11 NOTE — PROGRESS NOTES
End of Shift Note    Bedside shift change report given to , RN (oncoming nurse) by Jordan Robert (offgoing nurse). Report included the following information SBAR, Kardex, Procedure Summary, Intake/Output, MAR and Recent Results    Shift worked: days   Shift summary and any significant changes:     pt was straight cathed on my shift twice it is in under nursing miss to insert a laguerre if pt retains 400 again   Concerns for physician to address:       Zone phone for oncoming shift:   [phone]       Activity:  Activity Level: Up with Assistance  Number times ambulated in hallways past shift: 1  Number of times OOB to chair past shift: 1  Cardiac:   Cardiac Monitoring: No      Cardiac Rhythm: Sinus Rhythm    Access:   Current line(s): PIV     Genitourinary:   Urinary status: due to void    Respiratory:   O2 Device: None (Room air)  Chronic home O2 use?: NO  Incentive spirometer at bedside: YES  Actual Volume (ml): 1250 ml  GI:  Last Bowel Movement Date: 11/09/21  Current diet:  ADULT DIET Regular; Low Sodium (2 gm)  Passing flatus: YES  Tolerating current diet: YES       Pain Management:   Patient states pain is manageable on current regimen: YES    Skin:  Jose Luis Score: 20  Interventions: float heels, increase time out of bed, PT/OT consult and nutritional support     Patient Safety:  Fall Score:  Total Score: 4  Interventions: bed/chair alarm, assistive device (walker, cane, etc), gripper socks, pt to call before getting OOB and stay with me (per policy)  High Fall Risk: Yes    Length of Stay:  Expected LOS: - - -  Actual LOS: 0      Jordan Robert

## 2021-11-12 LAB
GLUCOSE BLD STRIP.AUTO-MCNC: 114 MG/DL (ref 65–117)
GLUCOSE BLD STRIP.AUTO-MCNC: 126 MG/DL (ref 65–117)
GLUCOSE BLD STRIP.AUTO-MCNC: 198 MG/DL (ref 65–117)
GLUCOSE BLD STRIP.AUTO-MCNC: 92 MG/DL (ref 65–117)
SERVICE CMNT-IMP: ABNORMAL
SERVICE CMNT-IMP: ABNORMAL
SERVICE CMNT-IMP: NORMAL
SERVICE CMNT-IMP: NORMAL

## 2021-11-12 PROCEDURE — 74011000250 HC RX REV CODE- 250: Performed by: ORTHOPAEDIC SURGERY

## 2021-11-12 PROCEDURE — 97116 GAIT TRAINING THERAPY: CPT

## 2021-11-12 PROCEDURE — 94762 N-INVAS EAR/PLS OXIMTRY CONT: CPT

## 2021-11-12 PROCEDURE — 97530 THERAPEUTIC ACTIVITIES: CPT

## 2021-11-12 PROCEDURE — 74011250637 HC RX REV CODE- 250/637: Performed by: ORTHOPAEDIC SURGERY

## 2021-11-12 PROCEDURE — 82962 GLUCOSE BLOOD TEST: CPT

## 2021-11-12 PROCEDURE — 94640 AIRWAY INHALATION TREATMENT: CPT

## 2021-11-12 RX ADMIN — DOCUSATE SODIUM 50MG AND SENNOSIDES 8.6MG 1 TABLET: 8.6; 5 TABLET, FILM COATED ORAL at 17:24

## 2021-11-12 RX ADMIN — FAMOTIDINE 20 MG: 20 TABLET, FILM COATED ORAL at 10:05

## 2021-11-12 RX ADMIN — GABAPENTIN 900 MG: 300 CAPSULE ORAL at 23:58

## 2021-11-12 RX ADMIN — GABAPENTIN 300 MG: 300 CAPSULE ORAL at 10:05

## 2021-11-12 RX ADMIN — ACETAMINOPHEN 1000 MG: 500 TABLET ORAL at 23:58

## 2021-11-12 RX ADMIN — POLYETHYLENE GLYCOL 3350 17 G: 17 POWDER, FOR SOLUTION ORAL at 10:07

## 2021-11-12 RX ADMIN — ARFORMOTEROL TARTRATE: 15 SOLUTION RESPIRATORY (INHALATION) at 20:05

## 2021-11-12 RX ADMIN — METOPROLOL TARTRATE 25 MG: 25 TABLET, FILM COATED ORAL at 17:28

## 2021-11-12 RX ADMIN — Medication 1 AMPULE: at 23:58

## 2021-11-12 RX ADMIN — ASPIRIN 325 MG: 325 TABLET, DELAYED RELEASE ORAL at 10:04

## 2021-11-12 RX ADMIN — OXYCODONE 10 MG: 5 TABLET ORAL at 13:01

## 2021-11-12 RX ADMIN — METFORMIN HYDROCHLORIDE 500 MG: 500 TABLET ORAL at 17:22

## 2021-11-12 RX ADMIN — OXYCODONE 10 MG: 5 TABLET ORAL at 10:02

## 2021-11-12 RX ADMIN — ACETAMINOPHEN 1000 MG: 500 TABLET ORAL at 11:34

## 2021-11-12 RX ADMIN — SERTRALINE 50 MG: 50 TABLET, FILM COATED ORAL at 10:06

## 2021-11-12 RX ADMIN — ACETAMINOPHEN 1000 MG: 500 TABLET ORAL at 05:32

## 2021-11-12 RX ADMIN — DOCUSATE SODIUM 50MG AND SENNOSIDES 8.6MG 1 TABLET: 8.6; 5 TABLET, FILM COATED ORAL at 10:05

## 2021-11-12 RX ADMIN — Medication 1 AMPULE: at 10:08

## 2021-11-12 RX ADMIN — FAMOTIDINE 20 MG: 20 TABLET, FILM COATED ORAL at 17:24

## 2021-11-12 RX ADMIN — ASPIRIN 325 MG: 325 TABLET, DELAYED RELEASE ORAL at 17:24

## 2021-11-12 RX ADMIN — METOPROLOL TARTRATE 25 MG: 25 TABLET, FILM COATED ORAL at 10:29

## 2021-11-12 RX ADMIN — ATORVASTATIN CALCIUM 10 MG: 10 TABLET, FILM COATED ORAL at 10:06

## 2021-11-12 RX ADMIN — ACETAMINOPHEN 1000 MG: 500 TABLET ORAL at 17:22

## 2021-11-12 NOTE — PROGRESS NOTES
Problem: Falls - Risk of  Goal: *Absence of Falls  Description: Document Raúl Agustin Fall Risk and appropriate interventions in the flowsheet. Outcome: Progressing Towards Goal  Note: Fall Risk Interventions:  Mobility Interventions: Communicate number of staff needed for ambulation/transfer         Medication Interventions: Patient to call before getting OOB    Elimination Interventions: Patient to call for help with toileting needs    History of Falls Interventions:  Investigate reason for fall         Problem: Knee Replacement: Post-Op Day 2  Goal: Off Pathway (Use only if patient is Off Pathway)  Outcome: Progressing Towards Goal  Goal: Activity/Safety  Outcome: Progressing Towards Goal  Goal: Diagnostic Test/Procedures  Outcome: Progressing Towards Goal  Goal: Medications  Outcome: Progressing Towards Goal  Goal: Respiratory  Outcome: Progressing Towards Goal  Goal: Treatments/Interventions/Procedures  Outcome: Progressing Towards Goal  Goal: Psychosocial  Outcome: Progressing Towards Goal  Goal: *Met physical therapy criteria for discharge to the next level of care  Outcome: Progressing Towards Goal  Goal: *Optimal pain control with oral analgesia  Outcome: Progressing Towards Goal  Goal: *Hemodynamically stable  Outcome: Progressing Towards Goal  Goal: *Tolerating diet  Outcome: Progressing Towards Goal  Goal: *Patient verbalizes understanding of discharge instructions  Outcome: Progressing Towards Goal

## 2021-11-12 NOTE — PROGRESS NOTES
End of Shift Note    Bedside shift change report given to Shanique GUZMAN (oncoming nurse) by Sarita Jacobs (offgoing nurse). Report included the following information SBAR, Kardex, Intake/Output, MAR, Accordion, Recent Results and Med Rec Status    Shift worked:  night     Shift summary and any significant changes:     laguerre inserted for retention     Concerns for physician to address:  retention     Zone phone for oncoming shift:   [phone]       Activity:  Activity Level: Up with Assistance  Number times ambulated in hallways past shift: 0  Number of times OOB to chair past shift: 0    Cardiac:   Cardiac Monitoring: No      Cardiac Rhythm: Sinus Rhythm    Access:   Current line(s): no access     Genitourinary:   Urinary status: voiding    Respiratory:   O2 Device: None (Room air)  Chronic home O2 use?: NO  Incentive spirometer at bedside: YES  Actual Volume (ml): 1250 ml  GI:  Last Bowel Movement Date: 11/10/21  Current diet:  ADULT DIET Regular; Low Sodium (2 gm)  Passing flatus: YES  Tolerating current diet: YES       Pain Management:   Patient states pain is manageable on current regimen: YES    Skin:  Jose Luis Score: 20  Interventions: float heels and increase time out of bed    Patient Safety:  Fall Score:  Total Score: 4  Interventions: assistive device (walker, cane, etc), gripper socks and pt to call before getting OOB  High Fall Risk: Yes    Length of Stay:  Expected LOS: - - -  Actual LOS: 0      Sarita Jacobs

## 2021-11-12 NOTE — PROGRESS NOTES
Spiritual Care Assessment/Progress Note  [facility]      NAME: Mao Simon      MRN: [id number]  AGE: 78 y.o. SEX: female  Muslim Affiliation: Islam   Language: English     11/12/2021     Total Time (in minutes): 11     Spiritual Assessment begun in [facility] through conversation with:         []Patient        [] Family    [] Friend(s)        Reason for Consult: Initial/Spiritual assessment, patient floor     Spiritual beliefs: (Please include comment if needed)     [] Identifies with a cas tradition:         [] Supported by a cas community:            [] Claims no spiritual orientation:           [] Seeking spiritual identity:                [] Adheres to an individual form of spirituality:           [x] Not able to assess:                           Identified resources for coping:      [] Prayer                               [] Music                  [] Guided Imagery     [] Family/friends                 [] Pet visits     [] Devotional reading                         [x] Unknown     [] Other:                                          Interventions offered during this visit: (See comments for more details)    Patient Interventions: Initial visit           Plan of Care:     [x] Support spiritual and/or cultural needs    [] Support AMD and/or advance care planning process      [] Support grieving process   [] Coordinate Rites and/or Rituals    [] Coordination with community clergy   [] No spiritual needs identified at this time   [] Detailed Plan of Care below (See Comments)  [] Make referral to Music Therapy  [] Make referral to Pet Therapy     [] Make referral to Addiction services  [] Make referral to [facility]  [] Make referral to Spiritual Care Partner  [] No future visits requested        [x] Contact Spiritual Care for further referrals     Comments:  Reviewed chart prior to visit on Ortho for spiritual assessment. No family/friends present.  Patient was engaged with staff at time of attempted visit. Unable to assess for spiritual needs or concerns at this time.      FAINA Denny, [facility], Staff [address] [place] [address] [employer] Paging Service  [phone]

## 2021-11-12 NOTE — PROGRESS NOTES
Transition of Care Plan:  RUR: n/a outpatient status   Disposition: SNF- referrals sent to [facility] and [facility] (3rd choice pending)  Follow up appointments: ortho  JYOTI needed: RW- referral sent to [facility]  Transportation at [address] [place] [address] or means to access home: yes      IM Medicare Letter: n/a outpatient status   Is patient a BCPI-A Bundle: no              If yes, was Bundle Letter given?:     Caregiver Contact: edson Antonio [phone]  Discharge Caregiver contacted prior to discharge? To be contacted prior to d/c     2:00pm  CM made room visit with patient who requested referrals be sent to [facility] and [facility]. Pt reported she does not have a 3rd choice yet as she is waiting for a return call from her cousin who she is going to request a recommendation from. FOC signed and placed on bedside chart. Referral sent to [facility] and [facility]. 12:50pm  CM made room visit with patient who reported she hasnt had time to look at [facility] list yet. CM found SNF list on pt's bedside table and handed to patient. CM informed patient that CM needs 3 choices in an hour. 10:30am  CM informed by ortho PA that patient now agreeable to SNF. CM made room visit with patient who confirmed she is agreeable to SNF. CM provided list and requested pt to review and provide 3 choices by lunch time.      Bobbe Gowers, [place], [address]

## 2021-11-12 NOTE — PROGRESS NOTES
Problem: Falls - Risk of  Goal: *Absence of Falls  Description: Document Bobbi Myles Fall Risk and appropriate interventions in the flowsheet.   Outcome: Progressing Towards Goal  Note: Fall Risk Interventions:  Mobility Interventions: Communicate number of staff needed for ambulation/transfer, Patient to call before getting OOB, Utilize walker, cane, or other assistive device, Utilize gait belt for transfers/ambulation         Medication Interventions: Patient to call before getting OOB, Teach patient to arise slowly, Utilize gait belt for transfers/ambulation    Elimination Interventions: Call light in reach, Patient to call for help with toileting needs, Toilet paper/wipes in reach, Toileting schedule/hourly rounds    History of Falls Interventions: Consult care management for discharge planning, Utilize gait belt for transfer/ambulation, Vital signs minimum Q4HRs X 24 hrs (comment for end date)         Problem: Patient Education: Go to Patient Education Activity  Goal: Patient/Family Education  Outcome: Progressing Towards Goal     Problem: Patient Education: Go to Patient Education Activity  Goal: Patient/Family Education  Outcome: Progressing Towards Goal     Problem: Knee Replacement: Day of Surgery/Unit  Goal: Off Pathway (Use only if patient is Off Pathway)  Outcome: Progressing Towards Goal  Goal: Activity/Safety  Outcome: Progressing Towards Goal  Goal: Consults, if ordered  Outcome: Progressing Towards Goal  Goal: Diagnostic Test/Procedures  Outcome: Progressing Towards Goal  Goal: Nutrition/Diet  Outcome: Progressing Towards Goal  Goal: Medications  Outcome: Progressing Towards Goal  Goal: Respiratory  Outcome: Progressing Towards Goal  Goal: Treatments/Interventions/Procedures  Outcome: Progressing Towards Goal  Goal: Psychosocial  Outcome: Progressing Towards Goal  Goal: *Initiate mobility  Outcome: Progressing Towards Goal  Goal: *Optimal pain control at patient's stated goal  Outcome: Progressing Towards Goal  Goal: *Hemodynamically stable  Outcome: Progressing Towards Goal     Problem: Knee Replacement: Post-Op Day 1  Goal: Off Pathway (Use only if patient is Off Pathway)  Outcome: Progressing Towards Goal  Goal: Activity/Safety  Outcome: Progressing Towards Goal  Goal: Diagnostic Test/Procedures  Outcome: Progressing Towards Goal  Goal: Nutrition/Diet  Outcome: Progressing Towards Goal  Goal: Medications  Outcome: Progressing Towards Goal  Goal: Respiratory  Outcome: Progressing Towards Goal  Goal: Treatments/Interventions/Procedures  Outcome: Progressing Towards Goal  Goal: Psychosocial  Outcome: Progressing Towards Goal  Goal: Discharge Planning  Outcome: Progressing Towards Goal  Goal: *Demonstrates progressive activity  Outcome: Progressing Towards Goal  Goal: *Optimal pain control at patient's stated goal  Outcome: Progressing Towards Goal  Goal: *Hemodynamically stable  Outcome: Progressing Towards Goal  Goal: *Discharge plan identified  Outcome: Progressing Towards Goal     Problem: Knee Replacement: Post-Op Day 2  Goal: Off Pathway (Use only if patient is Off Pathway)  Outcome: Progressing Towards Goal  Goal: Activity/Safety  Outcome: Progressing Towards Goal  Goal: Diagnostic Test/Procedures  Outcome: Progressing Towards Goal  Goal: Medications  Outcome: Progressing Towards Goal  Goal: Respiratory  Outcome: Progressing Towards Goal  Goal: Treatments/Interventions/Procedures  Outcome: Progressing Towards Goal  Goal: Psychosocial  Outcome: Progressing Towards Goal  Goal: *Met physical therapy criteria for discharge to the next level of care  Outcome: Progressing Towards Goal  Goal: *Optimal pain control with oral analgesia  Outcome: Progressing Towards Goal  Goal: *Hemodynamically stable  Outcome: Progressing Towards Goal  Goal: *Tolerating diet  Outcome: Progressing Towards Goal  Goal: *Patient verbalizes understanding of discharge instructions  Outcome: Progressing Towards Goal     Problem: Patient Education: Go to Patient Education Activity  Goal: Patient/Family Education  Outcome: Progressing Towards Goal

## 2021-11-12 NOTE — PROGRESS NOTES
Problem: Mobility Impaired (Adult and Pediatric)  Goal: *Acute Goals and Plan of Care (Insert Text)  Description: FUNCTIONAL STATUS PRIOR TO ADMISSION: Patient was modified independent using a rollator for functional mobility. Reports that her rollator does not have functioning brakes. HOME SUPPORT PRIOR TO ADMISSION: The patient lived alone with \"people that can check in on her\" but will otherwise be alone. Physical Therapy Goals  Initiated 11/10/2021    1. Patient will move from supine to sit and sit to supine  in bed with modified independence within 4 days. 2. Patient will perform sit to stand with modified independence within 4 days. 3. Patient will ambulate with modified independence for 150 feet with the least restrictive device within 4 days. 4. Patient will ascend/descend 1 stairs with 1 handrail(s) with modified independence within 4 days. 5. Patient will perform home exercise program per protocol with modified independence within 4 days. 6. Patient will demonstrate AROM 0-90 degrees in operative joint within 4 days. 11/12/2021 1125 by [place]  Outcome: Progressing Towards Goal    PHYSICAL THERAPY TREATMENT  Patient: Weber Opitz (89 y.o. 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 ASSESSMENT  Patient continues with skilled PT services and is progressing towards goals. Pt received supine in bed and willing to work with therapy. Pt continues to present limited by pain with decreased strength, endurance and independence with this session.  Pt able to tolerate bed mobility to R side EOB with min, and mod A with scooting EOB. Pt continued with STS to RW with continued forward flexed posture but able to correct with VC for short time. Pt continued with gait trianing to recliner on opposite side of bed, ~15'. Noted slow gait speeding and continued decreased with L foot clearance. Pt able to amb to chair without rest break and completed session with LE elevated, call bell within reach and all needs met at the time. Rn notified of session. Current Level of Function Impacting Discharge (mobility/balance): min/mod a for bed mobility, CGA amb up to 15' with RW    Other factors to consider for discharge: fall risk, pain management, lives alone         PLAN :  Patient continues to benefit from skilled intervention to address the above impairments. Continue treatment per established plan of care. to address goals. Recommendation for discharge: (in order for the patient to meet his/her long term goals)  Therapy up to 5 days/week in SNF setting    This discharge recommendation:  Has not yet been discussed the attending provider and/or case management    IF patient discharges home will need the following DME: patient owns DME required for discharge       SUBJECTIVE:   Patient stated I haven't had anything to eat all day.     OBJECTIVE DATA SUMMARY:   Critical Behavior:  Neurologic State: Alert, Appropriate for age  Orientation Level: Oriented to time  Cognition: Appropriate decision making, Appropriate for age attention/concentration, Appropriate safety awareness, Follows commands         Functional Mobility Training:  Bed Mobility:  Rolling: Minimum assistance  Supine to Sit: Minimum assistance    Transfers:  Sit to Stand: Contact guard assistance; Additional time  Stand to Sit: Contact guard assistance; Additional time     Balance:  Sitting: Intact; With support  Standing: Impaired  Standing - Static: Constant support; Fair  Standing - Dynamic : Constant support;  Fair    Ambulation/Gait Training:  Distance (ft): 15 Feet (ft)  Assistive Device: Gait belt; Walker, rolling  Ambulation - Level of Assistance: Contact guard assistance (max VC for sequencing)  Gait Abnormalities: Antalgic; Decreased step clearance; Trunk sway increased  Base of Support: Widened  Stance: Right decreased  Speed/Nicole: Pace decreased (<100 feet/min)  Step Length: Right shortened; Left shortened     Pain Ratin/10 with R knee with activity    Activity Tolerance:   Fair    After treatment patient left in no apparent distress:   Sitting in chair and Call bell within reach    COMMUNICATION/COLLABORATION:   The patients plan of care was discussed with: Registered nurse.      Antione Chang PTA   Time Calculation: 17 mins

## 2021-11-12 NOTE — PROGRESS NOTES
Problem: Mobility Impaired (Adult and Pediatric)  Goal: *Acute Goals and Plan of Care (Insert Text)  Description: FUNCTIONAL STATUS PRIOR TO ADMISSION: Patient was modified independent using a rollator for functional mobility. Reports that her rollator does not have functioning brakes. HOME SUPPORT PRIOR TO ADMISSION: The patient lived alone with \"people that can check in on her\" but will otherwise be alone. Physical Therapy Goals  Initiated 11/10/2021    1. Patient will move from supine to sit and sit to supine  in bed with modified independence within 4 days. 2. Patient will perform sit to stand with modified independence within 4 days. 3. Patient will ambulate with modified independence for 150 feet with the least restrictive device within 4 days. 4. Patient will ascend/descend 1 stairs with 1 handrail(s) with modified independence within 4 days. 5. Patient will perform home exercise program per protocol with modified independence within 4 days. 6. Patient will demonstrate AROM 0-90 degrees in operative joint within 4 days. Outcome: Progressing Towards Goal    PHYSICAL THERAPY TREATMENT  Patient: Dalia Goldberg (89 y.o. 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 ASSESSMENT  Patient continues with skilled PT services and is slowly progressing towards goals. Pt received sitting EOB with Rn waiting to return to supine, but willing to work with therapy. Pt presents continued limited by pain with R knee, decreased strength, independence, and flexibility this session.  Pt able to tolerate heels slides with paper towel under foot x5 with VC for form. Pt able to tolerate STS to RW with CGA with additional time with elevated bed. Pt continued with gait training to door and back to bed ~6', CGA and max VC for sequencing to decreased pain with WBing with UE support. Pt presents limited L foot clearance with amb due to pain with R Knee. Once returned back to bed, pt reported pain with bilateral hands due RA. Pt assisted back to supine with min a for LE support. Pt completed session with HOB elevated set up for breakfast, call bell within reach and all needs met at the time. Rn notified of session. Current Level of Function Impacting Discharge (mobility/balance): min A for bed mobility, CGA STS, amb up to 6' with RW slow movements    Other factors to consider for discharge: RA, fall risk, lives alone         PLAN :  Patient continues to benefit from skilled intervention to address the above impairments. Continue treatment per established plan of care. to address goals. Recommendation for discharge: (in order for the patient to meet his/her long term goals)  Therapy up to 5 days/week in SNF setting    This discharge recommendation:  Has been made in collaboration with the attending provider and/or case management    IF patient discharges home will need the following DME: patient owns DME required for discharge       SUBJECTIVE:   Patient stated I didn't want to go to rehab, but I know I need to. Celine Chloe    OBJECTIVE DATA SUMMARY:   Critical Behavior:  Neurologic State: Alert  Orientation Level: Oriented X4  Cognition: Appropriate decision making       Functional Mobility Training:  Bed Mobility:  Sit to Supine: Minimum assistance  Scooting: Contact guard assistance     Transfers:  Sit to Stand: Contact guard assistance; Additional time  Stand to Sit: Contact guard assistance; Additional time     Balance:  Sitting: Intact; With support  Standing: Impaired  Standing - Static: Constant support;  Fair  Standing - Dynamic : Constant support; Fair    Ambulation/Gait Training:  Distance (ft): 6 Feet (ft)  Assistive Device: Gait belt; Walker, rolling  Ambulation - Level of Assistance: Contact guard assistance (max VC for sequencing)  Gait Abnormalities: Antalgic; Decreased step clearance; Trunk sway increased  Base of Support: Widened  Stance: Right decreased  Speed/Nicole: Pace decreased (<100 feet/min)  Step Length: Right shortened; Left shortened    Therapeutic Exercises:     EXERCISE   Sets   Reps   Active Active Assist   Passive Self ROM   Comments   Ankle Pumps   []                                        []                                        []                                        []                                           Quad Sets 1 10 [x]                                        []                                        []                                        []                                           Hamstring Sets   []                                        []                                        []                                        []                                           Short Arc Quads   []                                        []                                        []                                        []                                           Knee Extension Stretch     []                                          []                                          []                                          []                                           Heel Slides 1 5 []                                        [x]                                        []                                        []                                        Seated EOB   Long Arc Quads   []                                        []                                        []                                        []                                           Knee Flexion Stretch   []                                        [] []                                        []                                           Straight Leg Raises   []                                        []                                        []                                        []                                               Pain Ratin/10 with activity    Activity Tolerance:   Fair and requires rest breaks    After treatment patient left in no apparent distress:   Supine in bed, Call bell within reach, and cold pack on R knee    COMMUNICATION/COLLABORATION:   The patients plan of care was discussed with: Registered nurse.      Nas Chang PTA   Time Calculation: 32 mins

## 2021-11-12 NOTE — PROGRESS NOTES
ORTHO - Progress Note  Post Op day: 3 Days Post-Op    Sharee Oconnor     [id number]  female    78 y.o.    1945    Admit date:2021  Date of Surgery:2021   Procedures:Procedure(s):RIGHT REVISION TOTAL KNEE ARTHROPLASTY  Surgeon:Surgeon(s) and Role:   * Shani Mcdonald, DO - Primary        SUBJECTIVE:     Sharee Oconnor is a 78 y.o. female sitting on the edge of the bed. Patient has complaints of appropriate post-op pain, tolerating PO pain medications, Oxy 5-10MG. Denies F/C, nausea, vomiting, dizziness, lightheadedness, chest pain, or shortness of breath. OBJECTIVE:       Physical Exam:  General: alert, cooperative, no distress. Gastrointestinal:  non-distended . Cardiovascular: equal pulses in the lower extremities,  Brisk cap refill in all distal extremities   Genitourinary: [place]    Respiratory: No respiratory distress   Neurological:Neurovascular exam within normal limits. Senstion intact: LE bilat. Motor: + DF/PF/EHL. Musculoskeletal: No sig effusion. Flexion to 90. Lyndon's sign negative in bilateral lower extremities. Calves soft, supple, non-tender upon palpation or with passive stretch. Dressing/Wound:  Midline bloody/ dry and intact. No significant erythema or swelling.     Vital Signs:       Patient Vitals for the past 8 hrs:   BP Temp Pulse Resp SpO2   21 1118 (!) 86/54 98.7 °F (37.1 °C) 71 18 96 %   21 0732 (!) 111/44 98.6 °F (37 °C) 73 18 99 %                                          Temp (24hrs), Av.9 °F (37.2 °C), Min:98.3 °F (36.8 °C), Max:99.8 °F (37.7 °C)      Labs:        Recent Labs     11/10/21  0427   HGB 8.5*     PT/OT:        Gait  Base of Support: Widened  Speed/Nicole: Pace decreased (<100 feet/min)  Step Length: Right shortened, Left shortened  Stance: Right decreased  Gait Abnormalities: Antalgic, Decreased step clearance, Trunk sway increased  Ambulation - Level of Assistance: Contact guard assistance (max VC for sequencing)  Distance (ft): 6 Feet (ft)  Assistive Device: Gait belt, Walker, rolling  Rail Use: Both (and  none with SPC and HHA x1)  Stairs - Level of Assistance: Minimum assistance, Contact guard assistance, Additional time  Number of Stairs Trained: 2     ASSESSMENT / PLAN:   Active Problems:    Complication of internal right knee prosthesis ([facility] [address] (11/9/2021)       -  Pt very slow to progress w PT/OT, pain mgt continues to be a challenge.  Very little family support(both sons cant dedicated sig time to help)  -  Continue PT/OT WBAT  -  DVT prophylaxis- SCD w/  BID  -  DC planning - SNF    Signed By: Angelita Holguin PA-C

## 2021-11-13 LAB
GLUCOSE BLD STRIP.AUTO-MCNC: 102 MG/DL (ref 65–117)
GLUCOSE BLD STRIP.AUTO-MCNC: 103 MG/DL (ref 65–117)
GLUCOSE BLD STRIP.AUTO-MCNC: 123 MG/DL (ref 65–117)
GLUCOSE BLD STRIP.AUTO-MCNC: 94 MG/DL (ref 65–117)
SERVICE CMNT-IMP: ABNORMAL
SERVICE CMNT-IMP: NORMAL

## 2021-11-13 PROCEDURE — 74011250637 HC RX REV CODE- 250/637: Performed by: ORTHOPAEDIC SURGERY

## 2021-11-13 PROCEDURE — 94640 AIRWAY INHALATION TREATMENT: CPT

## 2021-11-13 PROCEDURE — 94760 N-INVAS EAR/PLS OXIMETRY 1: CPT

## 2021-11-13 PROCEDURE — 74011000250 HC RX REV CODE- 250: Performed by: ORTHOPAEDIC SURGERY

## 2021-11-13 PROCEDURE — 82962 GLUCOSE BLOOD TEST: CPT

## 2021-11-13 RX ADMIN — FAMOTIDINE 20 MG: 20 TABLET, FILM COATED ORAL at 18:09

## 2021-11-13 RX ADMIN — ASPIRIN 325 MG: 325 TABLET, DELAYED RELEASE ORAL at 18:09

## 2021-11-13 RX ADMIN — POLYETHYLENE GLYCOL 3350 17 G: 17 POWDER, FOR SOLUTION ORAL at 08:32

## 2021-11-13 RX ADMIN — Medication 1 AMPULE: at 21:22

## 2021-11-13 RX ADMIN — DOCUSATE SODIUM 50MG AND SENNOSIDES 8.6MG 1 TABLET: 8.6; 5 TABLET, FILM COATED ORAL at 08:32

## 2021-11-13 RX ADMIN — ACETAMINOPHEN 1000 MG: 500 TABLET ORAL at 23:54

## 2021-11-13 RX ADMIN — GABAPENTIN 900 MG: 300 CAPSULE ORAL at 21:22

## 2021-11-13 RX ADMIN — ARFORMOTEROL TARTRATE: 15 SOLUTION RESPIRATORY (INHALATION) at 07:47

## 2021-11-13 RX ADMIN — METOPROLOL TARTRATE 25 MG: 25 TABLET, FILM COATED ORAL at 18:09

## 2021-11-13 RX ADMIN — GABAPENTIN 300 MG: 300 CAPSULE ORAL at 08:32

## 2021-11-13 RX ADMIN — LATANOPROST 1 DROP: 50 SOLUTION OPHTHALMIC at 21:22

## 2021-11-13 RX ADMIN — ASPIRIN 325 MG: 325 TABLET, DELAYED RELEASE ORAL at 08:32

## 2021-11-13 RX ADMIN — ATORVASTATIN CALCIUM 10 MG: 10 TABLET, FILM COATED ORAL at 08:32

## 2021-11-13 RX ADMIN — ACETAMINOPHEN 1000 MG: 500 TABLET ORAL at 06:30

## 2021-11-13 RX ADMIN — METFORMIN HYDROCHLORIDE 500 MG: 500 TABLET ORAL at 18:09

## 2021-11-13 RX ADMIN — ACETAMINOPHEN 1000 MG: 500 TABLET ORAL at 18:09

## 2021-11-13 RX ADMIN — Medication 1 AMPULE: at 08:33

## 2021-11-13 RX ADMIN — OXYCODONE 10 MG: 5 TABLET ORAL at 10:27

## 2021-11-13 RX ADMIN — LATANOPROST 1 DROP: 50 SOLUTION OPHTHALMIC at 00:06

## 2021-11-13 RX ADMIN — ARFORMOTEROL TARTRATE: 15 SOLUTION RESPIRATORY (INHALATION) at 20:46

## 2021-11-13 RX ADMIN — METOPROLOL TARTRATE 25 MG: 25 TABLET, FILM COATED ORAL at 08:32

## 2021-11-13 RX ADMIN — SERTRALINE 50 MG: 50 TABLET, FILM COATED ORAL at 08:32

## 2021-11-13 RX ADMIN — OXYCODONE 10 MG: 5 TABLET ORAL at 02:29

## 2021-11-13 RX ADMIN — FAMOTIDINE 20 MG: 20 TABLET, FILM COATED ORAL at 08:32

## 2021-11-13 NOTE — PROGRESS NOTES
End of Shift Note    Bedside shift change report given to Malachi GUZMAN (oncoming nurse) by Olman Montejo RN (offgoing nurse). Report included the following information SBAR, Kardex, Intake/Output, MAR and Recent Results    Shift worked:  day     Shift summary and any significant changes:     2 bms today     Concerns for physician to address:       Zone phone for oncoming shift:   [phone]       Activity:  Activity Level: Up with Assistance  Number times ambulated in hallways past shift: 0  Number of times OOB to chair past shift: 1    Cardiac:   Cardiac Monitoring: No      Cardiac Rhythm: Sinus Rhythm    Access:   Current line(s): none    Genitourinary:   Urinary status: laguerre    Respiratory:   O2 Device: None (Room air)  Chronic home O2 use?: NO  Incentive spirometer at bedside: YES  Actual Volume (ml): 1250 ml  GI:  Last Bowel Movement Date: 11/13/21  Current diet:  ADULT DIET Regular; Low Sodium (2 gm)  Passing flatus: YES  Tolerating current diet: YES       Pain Management:   Patient states pain is manageable on current regimen: YES    Skin:  Jose Luis Score: 19  Interventions: float heels, increase time out of bed, PT/OT consult, limit briefs and internal/external urinary devices    Patient Safety:  Fall Score:  Total Score: 4  Interventions: assistive device (walker, cane, etc), gripper socks, pt to call before getting OOB and stay with me (per policy)  High Fall Risk: Yes    Length of Stay:  Expected LOS: - - -  Actual LOS: 0      Shanique Vitale RN

## 2021-11-13 NOTE — PROGRESS NOTES
ORTHO - Progress Note  Post Op day: 4 Days Post-Op    Monica Drew     [id number]  female    78 y.o.    1945    Admit date:2021  Date of Surgery:2021   Procedures:Procedure(s):RIGHT REVISION TOTAL KNEE ARTHROPLASTY  Surgeon:Surgeon(s) and Role:   * Unknown Rey, DO - Primary        SUBJECTIVE:     Monica Drew is a 78 y.o. female eating breakfast in the bed. Patient has complaints of appropriate post-op pain, tolerating PO pain medications Oxy--- states she feels a little better today     Denies F/C, nausea, vomiting, dizziness, lightheadedness, chest pain, or shortness of breath. OBJECTIVE:       Physical Exam:  General: alert, cooperative, no distress. Gastrointestinal:  non-distended . Cardiovascular: equal pulses in the lower extremities,  Brisk cap refill in all distal extremities   Genitourinary: Horton   Respiratory: No respiratory distress   Neurological:Neurovascular exam within normal limits. Senstion intact: LE bilat. Motor: + DF/PF/EHL. Musculoskeletal: Lyndon's sign negative in bilateral lower extremities. Calves soft, supple, non-tender upon palpation or with passive stretch. Dressing/Wound:  Midline bloody/ dry and intact. No significant erythema   Vital Signs:       Patient Vitals for the past 8 hrs:   BP Temp Pulse Resp SpO2   21 0747 -- -- -- -- 99 %   21 0731 (!) 113/49 98.9 °F (37.2 °C) 79 18 99 %   21 0342 (!) 111/50 98.9 °F (37.2 °C) 91 18 97 %                                          Temp (24hrs), Av.3 °F (36.8 °C), Min:97.3 °F (36.3 °C), Max:98.9 °F (37.2 °C)      Labs:      No results for input(s): HCT, HGB, INR, HCTEXT, HGBEXT, INREXT in the last 72 hours.   PT/OT:        Gait  Base of Support: Widened  Speed/Nicole: Pace decreased (<100 feet/min)  Step Length: Right shortened, Left shortened  Stance: Right decreased  Gait Abnormalities: Antalgic, Decreased step clearance, Trunk sway increased  Ambulation - Level of Assistance: Contact guard assistance (max VC for sequencing)  Distance (ft): 15 Feet (ft)  Assistive Device: Gait belt, Walker, rolling  Rail Use: Both (and  none with SPC and HHA x1)  Stairs - Level of Assistance: Moderate assistance, Assist X2  Number of Stairs Trained: 2     ASSESSMENT / PLAN:   Active Problems:    Complication of internal right knee prosthesis ([address] (11/9/2021)       -  Pt very slow to progress w PT/OT, pain mgt continues to be a challenge.  Very little family support(both sons cant dedicated sig time to help)  -  Continue PT/OT WBAT  -  DVT prophylaxis- SCD w/  BID  -  DC planning - SNF--- awaiting facility acceptance        Signed By: Danielle Puentes PA-C

## 2021-11-13 NOTE — PROGRESS NOTES
End of Shift Note    Bedside shift change report given to MEGAN Bay (oncoming nurse) by Yosi Wilson LPN (offgoing nurse). Report included the following information SBAR, Kardex, Intake/Output, MAR, Accordion, Recent Results and Med Rec Status    Shift worked:  night     Shift summary and any significant changes:     Pt has a laguerre for retention. Pt doesn't have IV access. Pt's vital signs are stable and pt given oxycodone PRN for pain management. Concerns for physician to address:  Discharge planning     Zone phone for oncoming shift:   [phone]     Activity:  Activity Level: Up with Assistance  Number times ambulated in hallways past shift: 0  Number of times OOB to chair past shift: 0    Cardiac:   Cardiac Monitoring: No      Cardiac Rhythm: Sinus Rhythm    Access:   Current line(s): no access     Genitourinary:   Urinary status: voiding    Respiratory:   O2 Device: None (Room air)  Chronic home O2 use?: NO  Incentive spirometer at bedside: YES  Actual Volume (ml): 1250 ml  GI:  Last Bowel Movement Date: 11/12/21  Current diet:  ADULT DIET Regular; Low Sodium (2 gm)  Passing flatus: YES  Tolerating current diet: YES       Pain Management:   Patient states pain is manageable on current regimen: YES    Skin:  Jose Luis Score: 19  Interventions: float heels and increase time out of bed    Patient Safety:  Fall Score:  Total Score: 4  Interventions: assistive device (walker, cane, etc), gripper socks and pt to call before getting OOB  High Fall Risk: Yes    Length of Stay:  Expected LOS: - - -  Actual LOS: [facility]

## 2021-11-13 NOTE — PROGRESS NOTES
Transition of Care Plan:  RUR: n/a outpatient status   Disposition: SNF- referrals sent to [facility] and [facility] (3rd choice pending)  Follow up appointments: ortho  DME needed: RW- referral sent to [facility]  Transportation at [address] [place] [address] or means to access home: yes      IM Medicare Letter: n/a outpatient status   Is patient a BCPI-A Bundle: no              If yes, was Bundle Letter given?:     Caregiver Contact: edson Chinchilla [phone]  Discharge Caregiver contacted prior to discharge? To be contacted prior to d/c     CM checked SNF referrals and [address] [facility] are still pending at this time. CM will continue to follow.     VITALY Nguyen  Care Management, [place]

## 2021-11-13 NOTE — PROGRESS NOTES
Problem: Falls - Risk of  Goal: *Absence of Falls  Description: Document Julia Spencer Fall Risk and appropriate interventions in the flowsheet.   Outcome: Progressing Towards Goal  Note: Fall Risk Interventions:  Mobility Interventions: Assess mobility with egress test, Communicate number of staff needed for ambulation/transfer, OT consult for ADLs, Patient to call before getting OOB, PT Consult for mobility concerns, PT Consult for assist device competence, Utilize walker, cane, or other assistive device, Utilize gait belt for transfers/ambulation, Strengthening exercises (ROM-active/passive)         Medication Interventions: Assess postural VS orthostatic hypotension, Evaluate medications/consider consulting pharmacy, Patient to call before getting OOB, Teach patient to arise slowly, Utilize gait belt for transfers/ambulation    Elimination Interventions: Call light in reach, Elevated toilet seat, Patient to call for help with toileting needs, Stay With Me (per policy), Toilet paper/wipes in reach, Toileting schedule/hourly rounds    History of Falls Interventions: Consult care management for discharge planning, Door open when patient unattended, Evaluate medications/consider consulting pharmacy, Room close to nurse's station, Utilize gait belt for transfer/ambulation, Assess for delayed presentation/identification of injury for 48 hrs (comment for end date), Vital signs minimum Q4HRs X 24 hrs (comment for end date)         Problem: Patient Education: Go to Patient Education Activity  Goal: Patient/Family Education  Outcome: Progressing Towards Goal     Problem: Patient Education: Go to Patient Education Activity  Goal: Patient/Family Education  Outcome: Progressing Towards Goal     Problem: Knee Replacement: Post-Op Day 2  Goal: Off Pathway (Use only if patient is Off Pathway)  Outcome: Progressing Towards Goal  Goal: Activity/Safety  Outcome: Progressing Towards Goal  Goal: Diagnostic Test/Procedures  Outcome: Progressing Towards Goal  Goal: Medications  Outcome: Progressing Towards Goal  Goal: Respiratory  Outcome: Progressing Towards Goal  Goal: Treatments/Interventions/Procedures  Outcome: Progressing Towards Goal  Goal: Psychosocial  Outcome: Progressing Towards Goal  Goal: *Met physical therapy criteria for discharge to the next level of care  Outcome: Progressing Towards Goal  Goal: *Optimal pain control with oral analgesia  Outcome: Progressing Towards Goal  Goal: *Hemodynamically stable  Outcome: Progressing Towards Goal  Goal: *Tolerating diet  Outcome: Progressing Towards Goal  Goal: *Patient verbalizes understanding of discharge instructions  Outcome: Progressing Towards Goal     Problem: Patient Education: Go to Patient Education Activity  Goal: Patient/Family Education  Outcome: Progressing Towards Goal

## 2021-11-13 NOTE — PROGRESS NOTES
Problem: Falls - Risk of  Goal: *Absence of Falls  Description: Document Sapna Cueva Fall Risk and appropriate interventions in the flowsheet.   Outcome: Progressing Towards Goal  Note: Fall Risk Interventions:  Mobility Interventions: Communicate number of staff needed for ambulation/transfer, Patient to call before getting OOB         Medication Interventions: Patient to call before getting OOB, Teach patient to arise slowly    Elimination Interventions: Call light in reach, Patient to call for help with toileting needs, Toileting schedule/hourly rounds    History of Falls Interventions: Consult care management for discharge planning, Utilize gait belt for transfer/ambulation         Problem: Patient Education: Go to Patient Education Activity  Goal: Patient/Family Education  Outcome: Progressing Towards Goal     Problem: Patient Education: Go to Patient Education Activity  Goal: Patient/Family Education  Outcome: Progressing Towards Goal     Problem: Knee Replacement: Post-Op Day 2  Goal: Off Pathway (Use only if patient is Off Pathway)  Outcome: Progressing Towards Goal  Goal: Activity/Safety  Outcome: Progressing Towards Goal  Goal: Diagnostic Test/Procedures  Outcome: Progressing Towards Goal  Goal: Medications  Outcome: Progressing Towards Goal  Goal: Respiratory  Outcome: Progressing Towards Goal  Goal: Psychosocial  Outcome: Progressing Towards Goal  Goal: *Met physical therapy criteria for discharge to the next level of care  Outcome: Progressing Towards Goal  Goal: *Optimal pain control with oral analgesia  Outcome: Progressing Towards Goal  Goal: *Hemodynamically stable  Outcome: Progressing Towards Goal  Goal: *Tolerating diet  Outcome: Progressing Towards Goal  Goal: *Patient verbalizes understanding of discharge instructions  Outcome: Progressing Towards Goal     Problem: Knee Replacement: Day of Surgery/Unit  Goal: Off Pathway (Use only if patient is Off Pathway)  Outcome: Resolved/Met  Goal: Activity/Safety  Outcome: Resolved/Met  Goal: Consults, if ordered  Outcome: Resolved/Met  Goal: Diagnostic Test/Procedures  Outcome: Resolved/Met  Goal: Nutrition/Diet  Outcome: Resolved/Met  Goal: Medications  Outcome: Resolved/Met  Goal: Respiratory  Outcome: Resolved/Met  Goal: Treatments/Interventions/Procedures  Outcome: Resolved/Met  Goal: Psychosocial  Outcome: Resolved/Met  Goal: *Initiate mobility  Outcome: Resolved/Met  Goal: *Optimal pain control at patient's stated goal  Outcome: Resolved/Met  Goal: *Hemodynamically stable  Outcome: Resolved/Met     Problem: Knee Replacement: Post-Op Day 1  Goal: Off Pathway (Use only if patient is Off Pathway)  Outcome: Resolved/Met  Goal: Activity/Safety  Outcome: Resolved/Met  Goal: Diagnostic Test/Procedures  Outcome: Resolved/Met  Goal: Nutrition/Diet  Outcome: Resolved/Met  Goal: Medications  Outcome: Resolved/Met  Goal: Respiratory  Outcome: Resolved/Met  Goal: Treatments/Interventions/Procedures  Outcome: Resolved/Met  Goal: Psychosocial  Outcome: Resolved/Met  Goal: Discharge Planning  Outcome: Resolved/Met  Goal: *Demonstrates progressive activity  Outcome: Resolved/Met  Goal: *Optimal pain control at patient's stated goal  Outcome: Resolved/Met  Goal: *Hemodynamically stable  Outcome: Resolved/Met  Goal: *Discharge plan identified  Outcome: Resolved/Met

## 2021-11-14 LAB
GLUCOSE BLD STRIP.AUTO-MCNC: 100 MG/DL (ref 65–117)
GLUCOSE BLD STRIP.AUTO-MCNC: 118 MG/DL (ref 65–117)
GLUCOSE BLD STRIP.AUTO-MCNC: 129 MG/DL (ref 65–117)
GLUCOSE BLD STRIP.AUTO-MCNC: 138 MG/DL (ref 65–117)
SERVICE CMNT-IMP: ABNORMAL
SERVICE CMNT-IMP: NORMAL

## 2021-11-14 PROCEDURE — 82962 GLUCOSE BLOOD TEST: CPT

## 2021-11-14 PROCEDURE — 94760 N-INVAS EAR/PLS OXIMETRY 1: CPT

## 2021-11-14 PROCEDURE — 74011250637 HC RX REV CODE- 250/637: Performed by: ORTHOPAEDIC SURGERY

## 2021-11-14 PROCEDURE — 97116 GAIT TRAINING THERAPY: CPT | Performed by: PHYSICAL THERAPIST

## 2021-11-14 PROCEDURE — 74011000250 HC RX REV CODE- 250: Performed by: ORTHOPAEDIC SURGERY

## 2021-11-14 PROCEDURE — 94761 N-INVAS EAR/PLS OXIMETRY MLT: CPT

## 2021-11-14 PROCEDURE — 94640 AIRWAY INHALATION TREATMENT: CPT

## 2021-11-14 PROCEDURE — 97116 GAIT TRAINING THERAPY: CPT

## 2021-11-14 RX ADMIN — ASPIRIN 325 MG: 325 TABLET, DELAYED RELEASE ORAL at 18:46

## 2021-11-14 RX ADMIN — Medication 1 AMPULE: at 08:27

## 2021-11-14 RX ADMIN — OXYCODONE 5 MG: 5 TABLET ORAL at 12:59

## 2021-11-14 RX ADMIN — ACETAMINOPHEN 1000 MG: 500 TABLET ORAL at 18:46

## 2021-11-14 RX ADMIN — OXYCODONE 10 MG: 5 TABLET ORAL at 09:56

## 2021-11-14 RX ADMIN — ATORVASTATIN CALCIUM 10 MG: 10 TABLET, FILM COATED ORAL at 08:30

## 2021-11-14 RX ADMIN — Medication 1 AMPULE: at 22:04

## 2021-11-14 RX ADMIN — ACETAMINOPHEN 1000 MG: 500 TABLET ORAL at 11:42

## 2021-11-14 RX ADMIN — OXYCODONE 5 MG: 5 TABLET ORAL at 16:52

## 2021-11-14 RX ADMIN — DOCUSATE SODIUM 50MG AND SENNOSIDES 8.6MG 1 TABLET: 8.6; 5 TABLET, FILM COATED ORAL at 08:28

## 2021-11-14 RX ADMIN — ARFORMOTEROL TARTRATE: 15 SOLUTION RESPIRATORY (INHALATION) at 07:47

## 2021-11-14 RX ADMIN — GABAPENTIN 900 MG: 300 CAPSULE ORAL at 22:04

## 2021-11-14 RX ADMIN — ARFORMOTEROL TARTRATE: 15 SOLUTION RESPIRATORY (INHALATION) at 20:19

## 2021-11-14 RX ADMIN — METOPROLOL TARTRATE 25 MG: 25 TABLET, FILM COATED ORAL at 08:28

## 2021-11-14 RX ADMIN — SERTRALINE 50 MG: 50 TABLET, FILM COATED ORAL at 08:29

## 2021-11-14 RX ADMIN — FAMOTIDINE 20 MG: 20 TABLET, FILM COATED ORAL at 08:30

## 2021-11-14 RX ADMIN — METFORMIN HYDROCHLORIDE 500 MG: 500 TABLET ORAL at 16:55

## 2021-11-14 RX ADMIN — POLYETHYLENE GLYCOL 3350 17 G: 17 POWDER, FOR SOLUTION ORAL at 08:26

## 2021-11-14 RX ADMIN — LATANOPROST 1 DROP: 50 SOLUTION OPHTHALMIC at 22:04

## 2021-11-14 RX ADMIN — ASPIRIN 325 MG: 325 TABLET, DELAYED RELEASE ORAL at 08:29

## 2021-11-14 RX ADMIN — FAMOTIDINE 20 MG: 20 TABLET, FILM COATED ORAL at 18:46

## 2021-11-14 RX ADMIN — ACETAMINOPHEN 1000 MG: 500 TABLET ORAL at 05:26

## 2021-11-14 RX ADMIN — METOPROLOL TARTRATE 25 MG: 25 TABLET, FILM COATED ORAL at 18:46

## 2021-11-14 RX ADMIN — GABAPENTIN 300 MG: 300 CAPSULE ORAL at 08:28

## 2021-11-14 NOTE — PROGRESS NOTES
Problem: Falls - Risk of  Goal: *Absence of Falls  Description: Document Green Salvia Fall Risk and appropriate interventions in the flowsheet.   Outcome: Progressing Towards Goal  Note: Fall Risk Interventions:  Mobility Interventions: Communicate number of staff needed for ambulation/transfer, Patient to call before getting OOB, Utilize walker, cane, or other assistive device, Utilize gait belt for transfers/ambulation         Medication Interventions: Patient to call before getting OOB, Teach patient to arise slowly, Utilize gait belt for transfers/ambulation    Elimination Interventions: Call light in reach, Patient to call for help with toileting needs, Toileting schedule/hourly rounds    History of Falls Interventions: Evaluate medications/consider consulting pharmacy, Utilize gait belt for transfer/ambulation         Problem: Patient Education: Go to Patient Education Activity  Goal: Patient/Family Education  Outcome: Progressing Towards Goal     Problem: Patient Education: Go to Patient Education Activity  Goal: Patient/Family Education  Outcome: Progressing Towards Goal     Problem: Knee Replacement: Post-Op Day 2  Goal: Off Pathway (Use only if patient is Off Pathway)  Outcome: Progressing Towards Goal  Goal: Activity/Safety  Outcome: Progressing Towards Goal  Goal: Diagnostic Test/Procedures  Outcome: Progressing Towards Goal  Goal: Medications  Outcome: Progressing Towards Goal  Goal: Respiratory  Outcome: Progressing Towards Goal  Goal: Treatments/Interventions/Procedures  Outcome: Progressing Towards Goal  Goal: Psychosocial  Outcome: Progressing Towards Goal  Goal: *Met physical therapy criteria for discharge to the next level of care  Outcome: Progressing Towards Goal  Goal: *Optimal pain control with oral analgesia  Outcome: Progressing Towards Goal  Goal: *Hemodynamically stable  Outcome: Progressing Towards Goal  Goal: *Tolerating diet  Outcome: Progressing Towards Goal  Goal: *Patient verbalizes understanding of discharge instructions  Outcome: Progressing Towards Goal     Problem: Patient Education: Go to Patient Education Activity  Goal: Patient/Family Education  Outcome: Progressing Towards Goal

## 2021-11-14 NOTE — PROGRESS NOTES
ORTHO - Progress Note  Post Op day: 5 Days Post-Op    Nano Gann     [id number]  female    78 y.o.    1945    Admit date:2021  Date of Surgery:2021   Procedures:Procedure(s):RIGHT REVISION TOTAL KNEE ARTHROPLASTY  Surgeon:Surgeon(s) and Role:   * Silvia Weems, DO - Primary        SUBJECTIVE:     Nano Gann is a 78 y.o. female resting in the bed. Patient has complaints of appropriate post-op pain, tolerating PO pain medications Oxy. Says she is feeling much better- states she was up walking with PT this morning. Denies F/C, nausea, vomiting, dizziness, lightheadedness, chest pain, or shortness of breath. OBJECTIVE:       Physical Exam:  General: alert, cooperative, no distress. Gastrointestinal:  non-distended . Cardiovascular: equal pulses in the lower extremities,  Brisk cap refill in all distal extremities   Genitourinary: Horton     Respiratory: No respiratory distress   Neurological:Neurovascular exam within normal limits. Senstion intact: LE bilat. Motor: + DF/PF/EHL. Musculoskeletal: Lyndon's sign negative in bilateral lower extremities. Calves soft, supple, non-tender upon palpation or with passive stretch. Dressing/Wound:  Dry bloody midline drainage- No change since Friday--- and intact. No significant erythema or swelling. Vital Signs:       Patient Vitals for the past 8 hrs:   BP Temp Pulse Resp SpO2   21 0814 (!) 124/59 98 °F (36.7 °C) 74 16 100 %   21 0749 -- -- -- -- 95 %   21 0319 132/66 97.8 °F (36.6 °C) 72 16 95 %                                          Temp (24hrs), Av.2 °F (36.8 °C), Min:97.8 °F (36.6 °C), Max:99 °F (37.2 °C)      Labs:      No results for input(s): HCT, HGB, INR, HCTEXT, HGBEXT, INREXT in the last 72 hours.   PT/OT:        Gait  Base of Support: Widened  Speed/Nicole: Pace decreased (<100 feet/min)  Step Length: Right shortened, Left shortened  Stance: Right decreased  Gait Abnormalities: Antalgic, Decreased step clearance, Trunk sway increased  Ambulation - Level of Assistance: Contact guard assistance (max VC for sequencing)  Distance (ft): 15 Feet (ft)  Assistive Device: Gait belt, Walker, rolling  Rail Use: Both (and  none with SPC and HHA x1)  Stairs - Level of Assistance: Moderate assistance, Assist X2  Number of Stairs Trained: 2     ASSESSMENT / PLAN:   Active Problems:    Complication of internal right knee prosthesis ([address] (11/9/2021)          -  Pt very slow to progress w PT/OT, pain mgt continues to be a challenge.  Very little family support(both sons cant dedicated sig time to help)  -  Continue PT/OT WBAT  -  DVT prophylaxis- SCD w/  BID  -  CBC/CMP ordered  - [facility] - SNF--- awaiting facility acceptance        Signed By: Angelita Holguin PA-C

## 2021-11-14 NOTE — PROGRESS NOTES
Problem: Mobility Impaired (Adult and Pediatric)  Goal: *Acute Goals and Plan of Care (Insert Text)  Description: FUNCTIONAL STATUS PRIOR TO ADMISSION: Patient was modified independent using a rollator for functional mobility. Reports that her rollator does not have functioning brakes. HOME SUPPORT PRIOR TO ADMISSION: The patient lived alone with \"people that can check in on her\" but will otherwise be alone. Physical Therapy Goals  Initiated 11/10/2021    1. Patient will move from supine to sit and sit to supine  in bed with modified independence within 4 days. 2. Patient will perform sit to stand with modified independence within 4 days. 3. Patient will ambulate with modified independence for 150 feet with the least restrictive device within 4 days. 4. Patient will ascend/descend 1 stairs with 1 handrail(s) with modified independence within 4 days. 5. Patient will perform home exercise program per protocol with modified independence within 4 days. 6. Patient will demonstrate AROM 0-90 degrees in operative joint within 4 days. Outcome: Progressing Towards Goal     PHYSICAL THERAPY TREATMENT  Patient: Marilyn Tafoya (14 y.o. 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 ASSESSMENT  Patient continues with skilled PT services and is progressing towards goals. Patient received lying supine in bed. Patient directed in sitting EOB with pt requiring increased time but able to complete with CGA and instruction for use LLE to assist RLE. Pt transferred from EOB with CGA and initiated gait with RW.  Patient directed in increasing weight through UEs if necessary when stepping with R LE. Pt demo fwd trunk flexion with instruction for trunk extension throughout gait training. Pt demo picking up of walker with education on proper technique for pushing walker with pt demo improvement. Patient ambulated 27' and 15' with CGA and RW. Pt directed in stepping fully to chairs in prior to sitting to ensure safety and for correct handplacement. Pt can continue to benefit from skilled PT to further increase independence with functional mobility prior to discharge when medically appropriate. Current Level of Function Impacting Discharge (mobility/balance): CGA    Other factors to consider for discharge: activity tolerance         PLAN :  Patient continues to benefit from skilled intervention to address the above impairments. Continue treatment per established plan of care. to address goals. Recommendation for discharge: (in order for the patient to meet his/her long term goals)  Therapy up to 5 days/week in SNF setting    This discharge recommendation:  Has been made in collaboration with the attending provider and/or case management    IF patient discharges home will need the following DME: rolling walker       SUBJECTIVE:   Patient stated I sat up all day yesterday.     OBJECTIVE DATA SUMMARY:   Critical Behavior:  Neurologic State: Alert, Appropriate for age, Eyes open spontaneously  Orientation Level: Oriented X4  Cognition: Appropriate decision making, Appropriate for age attention/concentration, Appropriate safety awareness, Follows commands     Functional Mobility Training:  Bed Mobility:     Supine to Sit: Contact guard assistance              Transfers:  Sit to Stand: Contact guard assistance;  Additional time  Stand to Sit: Contact guard assistance                             Balance:  Sitting: Intact  Standing: Impaired  Standing - Static: Fair; Constant support  Standing - Dynamic : Fair; Constant support  Ambulation/Gait Training:  Distance (ft): 30 Feet (ft)  Assistive Device: Gait belt; Walker, rolling  Ambulation - Level of Assistance: Contact guard assistance        Gait Abnormalities: Antalgic; Shuffling gait (fwd trunk flexion)        Base of Support: Widened  Stance: Right decreased  Speed/Nicole: Slow  Step Length: Left shortened                    Stairs: Therapeutic Exercises:     Pain Rating:  3/10    Activity Tolerance:   Fair    After treatment patient left in no apparent distress:   Sitting in chair and Call bell within reach    COMMUNICATION/COLLABORATION:   The patients plan of care was discussed with: Registered nurse.      Dot Rivas, PT   Time Calculation: 18 mins

## 2021-11-14 NOTE — PROGRESS NOTES
End of Shift Note    Bedside shift change report given to Shanique (oncoming nurse) by Sandi Murillo RN (offgoing nurse). Report included the following information SBAR, Kardex, Intake/Output, MAR and Accordion    Shift worked:  7p-7a     Shift summary and any significant changes:          Concerns for physician to address:      Zone phone for oncoming shift:   [phone]       Activity:  Activity Level: Up with Assistance  Number times ambulated in hallways past shift: 0  Number of times OOB to chair past shift: 0    Cardiac:   Cardiac Monitoring: No      Cardiac Rhythm: Sinus Rhythm    Access:   Current line(s): PIV     Genitourinary:   Urinary status: voiding and laguerre    Respiratory:   O2 Device: None (Room air)  Chronic home O2 use?: NO  Incentive spirometer at bedside: YES  Actual Volume (ml): 1250 ml  GI:  Last Bowel Movement Date: 11/13/21  Current diet:  ADULT DIET Regular; Low Sodium (2 gm)  Passing flatus: YES  Tolerating current diet: YES       Pain Management:   Patient states pain is manageable on current regimen: YES    Skin:  Jose Luis Score: 19  Interventions: float heels, increase time out of bed and foam dressing    Patient Safety:  Fall Score:  Total Score: 4  Interventions: assistive device (walker, cane, etc), gripper socks and pt to call before getting OOB  High Fall Risk: Yes    Length of Stay:  Expected LOS: - - -  Actual LOS: 0      Malachi Tucker RN

## 2021-11-14 NOTE — PROGRESS NOTES
Problem: Mobility Impaired (Adult and Pediatric)  Goal: *Acute Goals and Plan of Care (Insert Text)  Description: FUNCTIONAL STATUS PRIOR TO ADMISSION: Patient was modified independent using a rollator for functional mobility. Reports that her rollator does not have functioning brakes. HOME SUPPORT PRIOR TO ADMISSION: The patient lived alone with \"people that can check in on her\" but will otherwise be alone. Physical Therapy Goals  Initiated 11/10/2021    1. Patient will move from supine to sit and sit to supine  in bed with modified independence within 4 days. 2. Patient will perform sit to stand with modified independence within 4 days. 3. Patient will ambulate with modified independence for 150 feet with the least restrictive device within 4 days. 4. Patient will ascend/descend 1 stairs with 1 handrail(s) with modified independence within 4 days. 5. Patient will perform home exercise program per protocol with modified independence within 4 days. 6. Patient will demonstrate AROM 0-90 degrees in operative joint within 4 days. Outcome: Progressing Towards Goal  Note:   PHYSICAL THERAPY TREATMENT  Patient: Sharee Oconnor (89 y.o. 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 ASSESSMENT  Patient continues with skilled PT services and is progressing towards goals. Patient sitting EOB upon arrival; just used bedside commode. Patient able to come to stand with CGA. Patient ambulated 28' with rolling walker and CGA. Patient requires verbal cues for sequencing and to stand straight as patient has flexed hips while ambulating.   Patient becomes fatigued with increased knee pain after 35' so chair brought to patient. After a brief rest, patient ambulated back to room to chair where she performed TKR exercises. Recommend SNF at discharge. Current Level of Function Impacting Discharge (mobility/balance): CGA/min assist    Other factors to consider for discharge: pain control, activity tolerance         PLAN :  Patient continues to benefit from skilled intervention to address the above impairments. Continue treatment per established plan of care. to address goals. Recommendation for discharge: (in order for the patient to meet his/her long term goals)  Therapy up to 5 days/week in SNF setting    This discharge recommendation:  Has been made in collaboration with the attending provider and/or case management    IF patient discharges home will need the following DME: rolling walker       SUBJECTIVE:   Patient stated I can walk some more.     OBJECTIVE DATA SUMMARY:   Critical Behavior:  Neurologic State: Alert, Appropriate for age, Eyes open spontaneously  Orientation Level: Oriented X4  Cognition: Appropriate decision making, Appropriate for age attention/concentration, Appropriate safety awareness, Follows commands       Range of Motion:      Right knee approx. -10 - 80                      Functional Mobility Training:  Bed Mobility:                    Transfers:  Sit to Stand: Additional time; Contact guard assistance  Stand to Sit: Contact guard assistance                             Balance:  Sitting: Intact  Standing: Impaired  Standing - Static: Constant support; Fair  Standing - Dynamic : Constant support;  Fair  Ambulation/Gait Training:  Distance (ft): 70 Feet (ft) (35' x 2)  Assistive Device: Gait belt; Walker, rolling           Gait Abnormalities: Antalgic; Decreased step clearance; Shuffling gait (flexed hips)        Base of Support: Widened  Stance: Right decreased  Speed/Nicole: Slow  Step Length: Left shortened; Right shortened                               Therapeutic Exercises:     EXERCISE   Sets   Reps   Active Active Assist   Passive Self ROM   Comments   Ankle Pumps 1 10 [x]                                        []                                        []                                        []                                           Quad Sets 1 10 [x]                                        []                                        []                                        []                                           Hamstring Sets 1 5 [x]                                        []                                        []                                        []                                           Short Arc Quads 1 5 []                                        [x]                                        []                                        []                                           Knee Extension Stretch     []                                          []                                          []                                          []                                           Heel Slides 1 10 [x]                                        []                                        []                                        []                                           Long Arc Quads   []                                        []                                        []                                        []                                           Knee Flexion Stretch 1 10 [x]                                        []                                        []                                        []                                           Straight Leg Raises   []                                        []                                        []                                        []                                               Pain Ratin/10    Activity Tolerance:   Fair    After treatment patient left in no apparent distress:   Sitting in chair, Call bell within reach, and Bed / chair alarm activated    COMMUNICATION/COLLABORATION:   The patients plan of care was discussed with: Registered nurse.      Harshad Ledezma, PT   Time Calculation: 21 mins

## 2021-11-14 NOTE — PROGRESS NOTES
Problem: Falls - Risk of  Goal: *Absence of Falls  Description: Document Mariposa Blood Fall Risk and appropriate interventions in the flowsheet.   Outcome: Progressing Towards Goal  Note: Fall Risk Interventions:  Mobility Interventions: Assess mobility with egress test, Communicate number of staff needed for ambulation/transfer         Medication Interventions: Evaluate medications/consider consulting pharmacy    Elimination Interventions: Call light in reach, Elevated toilet seat    History of Falls Interventions: Consult care management for discharge planning         Problem: Patient Education: Go to Patient Education Activity  Goal: Patient/Family Education  Outcome: Progressing Towards Goal     Problem: Patient Education: Go to Patient Education Activity  Goal: Patient/Family Education  Outcome: Progressing Towards Goal     Problem: Patient Education: Go to Patient Education Activity  Goal: Patient/Family Education  Outcome: Progressing Towards Goal

## 2021-11-15 LAB
COVID-19 RAPID TEST, COVR: NOT DETECTED
GLUCOSE BLD STRIP.AUTO-MCNC: 103 MG/DL (ref 65–117)
GLUCOSE BLD STRIP.AUTO-MCNC: 114 MG/DL (ref 65–117)
GLUCOSE BLD STRIP.AUTO-MCNC: 119 MG/DL (ref 65–117)
GLUCOSE BLD STRIP.AUTO-MCNC: 98 MG/DL (ref 65–117)
SERVICE CMNT-IMP: ABNORMAL
SERVICE CMNT-IMP: NORMAL
SOURCE, COVRS: NORMAL

## 2021-11-15 PROCEDURE — 74011250637 HC RX REV CODE- 250/637: Performed by: ORTHOPAEDIC SURGERY

## 2021-11-15 PROCEDURE — 97110 THERAPEUTIC EXERCISES: CPT

## 2021-11-15 PROCEDURE — 94760 N-INVAS EAR/PLS OXIMETRY 1: CPT

## 2021-11-15 PROCEDURE — 82962 GLUCOSE BLOOD TEST: CPT

## 2021-11-15 PROCEDURE — 97116 GAIT TRAINING THERAPY: CPT

## 2021-11-15 PROCEDURE — 97530 THERAPEUTIC ACTIVITIES: CPT

## 2021-11-15 PROCEDURE — 87635 SARS-COV-2 COVID-19 AMP PRB: CPT

## 2021-11-15 RX ADMIN — SERTRALINE 50 MG: 50 TABLET, FILM COATED ORAL at 08:20

## 2021-11-15 RX ADMIN — METFORMIN HYDROCHLORIDE 500 MG: 500 TABLET ORAL at 17:24

## 2021-11-15 RX ADMIN — Medication 1 AMPULE: at 09:00

## 2021-11-15 RX ADMIN — ASPIRIN 325 MG: 325 TABLET, DELAYED RELEASE ORAL at 17:24

## 2021-11-15 RX ADMIN — ACETAMINOPHEN 1000 MG: 500 TABLET ORAL at 06:25

## 2021-11-15 RX ADMIN — Medication 1 AMPULE: at 22:19

## 2021-11-15 RX ADMIN — METOPROLOL TARTRATE 25 MG: 25 TABLET, FILM COATED ORAL at 08:20

## 2021-11-15 RX ADMIN — ATORVASTATIN CALCIUM 10 MG: 10 TABLET, FILM COATED ORAL at 08:20

## 2021-11-15 RX ADMIN — LATANOPROST 1 DROP: 50 SOLUTION OPHTHALMIC at 22:19

## 2021-11-15 RX ADMIN — GABAPENTIN 900 MG: 300 CAPSULE ORAL at 22:18

## 2021-11-15 RX ADMIN — ACETAMINOPHEN 1000 MG: 500 TABLET ORAL at 17:24

## 2021-11-15 RX ADMIN — ASPIRIN 325 MG: 325 TABLET, DELAYED RELEASE ORAL at 08:20

## 2021-11-15 RX ADMIN — METOPROLOL TARTRATE 25 MG: 25 TABLET, FILM COATED ORAL at 17:24

## 2021-11-15 RX ADMIN — FAMOTIDINE 20 MG: 20 TABLET, FILM COATED ORAL at 17:24

## 2021-11-15 RX ADMIN — ACETAMINOPHEN 1000 MG: 500 TABLET ORAL at 00:23

## 2021-11-15 RX ADMIN — GABAPENTIN 300 MG: 300 CAPSULE ORAL at 08:20

## 2021-11-15 RX ADMIN — FAMOTIDINE 20 MG: 20 TABLET, FILM COATED ORAL at 08:20

## 2021-11-15 NOTE — PROGRESS NOTES
Problem: Falls - Risk of  Goal: *Absence of Falls  Description: Document Levy Richey Fall Risk and appropriate interventions in the flowsheet.   Outcome: Progressing Towards Goal  Note: Fall Risk Interventions:  Mobility Interventions: Communicate number of staff needed for ambulation/transfer, Patient to call before getting OOB, Bed/chair exit alarm         Medication Interventions: Bed/chair exit alarm, Evaluate medications/consider consulting pharmacy, Patient to call before getting OOB    Elimination Interventions: Bed/chair exit alarm, Call light in reach, Patient to call for help with toileting needs    History of Falls Interventions: Bed/chair exit alarm, Evaluate medications/consider consulting pharmacy

## 2021-11-15 NOTE — PROGRESS NOTES
Transition of Care Plan:  RUR: n/a outpatient status   Disposition: SNF- referrals sent to [facility] and [address] [place] [address],[address] (3rd choice pending)  Follow up appointments: ortho  DME needed: RW- referral sent to [facility]  Transportation at [address] [place], [place] [address]in [facility] or means to access home: yes      IM Medicare Letter: n/a outpatient status   Is patient a BCPI-A Bundle: no              If yes, was Bundle Letter given?:     Caregiver Contact: edson Velez [phone]  Discharge Caregiver contacted prior to discharge? To be contacted prior to d/c     12:00pm  CM informed earlier this morning that patient may have a cousin, who is a CNA, be able to assist patient at d/c rather than going to SNF. However, if cousin is able to assist, cousin will need to be able to assist 24/7 for at least a week in order for this to be a safe d/c plan, per therapy. CM made room visit with patient who reported she has not been able to get in touch with cousin yet. CM encouraged patient to continue trying to reach out to see if cousin could provide 24/7 assistance for at least a week. Pt declined CM reaching out to pt's family to assist with trying to arrange help. CM received message from [address] [place] [address],[facility] Floor who inquired if patient's family would be able to bring pt's Arava medication (for rheumatoid arthritis) to SNF. CM asked patient about this who confirmed they could. CM mentioned to patient that [address] [place] [address],[facility] Floor would be the back up option if pt unable to find adequate assistance in the home. CM contacted [address] [place] [address],[address] [place] [address] and spoke with Sunil Worrell in admissions stating that patient's family can provide medication and requested insurance auth be initiated. Per Sunil Worrell she is going to double check to ensure there are no other questions regarding pt's referral and if accepted would initiated auth.      Candi Abbott, [facility]

## 2021-11-15 NOTE — PROGRESS NOTES
Problem: Mobility Impaired (Adult and Pediatric)  Goal: *Acute Goals and Plan of Care (Insert Text)  Description: FUNCTIONAL STATUS PRIOR TO ADMISSION: Patient was modified independent using a rollator for functional mobility. Reports that her rollator does not have functioning brakes. HOME SUPPORT PRIOR TO ADMISSION: The patient lived alone with \"people that can check in on her\" but will otherwise be alone. Physical Therapy Goals  Initiated 11/10/2021    1. Patient will move from supine to sit and sit to supine  in bed with modified independence within 4 days. 2. Patient will perform sit to stand with modified independence within 4 days. 3. Patient will ambulate with modified independence for 150 feet with the least restrictive device within 4 days. 4. Patient will ascend/descend 1 stairs with 1 handrail(s) with modified independence within 4 days. 5. Patient will perform home exercise program per protocol with modified independence within 4 days. 6. Patient will demonstrate AROM 0-90 degrees in operative joint within 4 days. Outcome: Not Met  PHYSICAL THERAPY TREATMENT  Patient: Glen Littlejohn (29 y.o. 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 ASSESSMENT  Patient continues with skilled PT services and is progressing towards goals. Pt presents with decreased strength and increased pain with all mobility. Pt performed sit to stand transfer at min A/CGA with additional time. Pt ambulated 10ft and 60ft with RW at [facility]. Pt requiring constant cueing to stand upright and keep walker closed.  Pt requiring several standing rest breaks due to pain and fatigue. Pt ambulated further but requiring supervision and constant cueing for safety. Current Level of Function Impacting Discharge (mobility/balance): sit to stand transfer at min A/CGA with additional time, ambulation at [facility] with RW     Other factors to consider for discharge: needs 24/7 supervision for safety. PLAN :  Patient continues to benefit from skilled intervention to address the above impairments. Continue treatment per established plan of care. to address goals. Recommendation for discharge: (in order for the patient to meet his/her long term goals)  Therapy up to 5 days/week in SNF setting    This discharge recommendation:  Has been made in collaboration with the attending provider and/or case management    IF patient discharges home will need the following DME: rolling walker       SUBJECTIVE:   Patient stated  I feel alright today but it still hurts.     OBJECTIVE DATA SUMMARY:   Critical Behavior:  Neurologic State: Alert  Orientation Level: Oriented X4  Cognition: Appropriate decision making       Range of Motion:      AROM: Generally decreased, Functional                       Functional Mobility Training:  Bed Mobility:                    Transfers:  Sit to Stand: Contact guard assistance; Minimum assistance; Additional time  Stand to Sit: Contact guard assistance                             Balance:  Sitting: Impaired  Sitting - Static: Good (unsupported)  Sitting - Dynamic: Fair (occasional)  Standing: Impaired  Standing - Static: Fair; Constant support  Standing - Dynamic : Fair; Constant support  Ambulation/Gait Training:  Distance (ft): 60 Feet (ft)  Assistive Device: Gait belt; Walker, rolling  Ambulation - Level of Assistance: Contact guard assistance;  Additional time        Gait Abnormalities: Antalgic; Decreased step clearance (trunk flexion )        Base of Support: Widened     Speed/Nicole: Slow; Shuffled  Step Length: Right shortened; Left shortened            Pain Rating:  Pt reported increased pain with all mobility. Activity Tolerance:   Fair and requires frequent rest breaks    After treatment patient left in no apparent distress:   Sitting in chair, Call bell within reach, and Bed / chair alarm activated    COMMUNICATION/COLLABORATION:   The patients plan of care was discussed with: Registered nurse.      Arben Dover PTA   Time Calculation: 29 mins

## 2021-11-15 NOTE — PROGRESS NOTES
End of Shift Note    Bedside shift change report given to Mya GUZMAN (oncoming nurse) by Linda Guo RN (offgoing nurse). Report included the following information SBAR, Kardex, Intake/Output, MAR and Recent Results    Shift worked:  day     Shift summary and any significant changes:          Concerns for physician to address:       Zone phone for oncoming shift:   [phone]       Activity:  Activity Level: Up with Assistance  Number times ambulated in hallways past shift: 0  Number of times OOB to chair past shift: 4    Cardiac:   Cardiac Monitoring: No      Cardiac Rhythm: Sinus Rhythm    Access:   Current line(s): PIV     Genitourinary:   Urinary status: laguerre    Respiratory:   O2 Device: None (Room air)  Chronic home O2 use?: NO  Incentive spirometer at bedside: YES  Actual Volume (ml): 1250 ml  GI:  Last Bowel Movement Date: 11/14/21  Current diet:  ADULT DIET Regular; Low Sodium (2 gm)  Passing flatus: YES  Tolerating current diet: YES       Pain Management:   Patient states pain is manageable on current regimen: YES    Skin:  Jose Luis Score: 19  Interventions: float heels, increase time out of bed, PT/OT consult, limit briefs and internal/external urinary devices    Patient Safety:  Fall Score:  Total Score: 4  Interventions: assistive device (walker, cane, etc), gripper socks, pt to call before getting OOB and stay with me (per policy)  High Fall Risk: Yes    Length of Stay:  Expected LOS: - - -  Actual LOS: 0      Shanique Payton RN

## 2021-11-15 NOTE — PROGRESS NOTES
Ortho / Neurosurgery NP Note    POD# 6  s/p RIGHT REVISION TOTAL KNEE ARTHROPLASTY   Pt seen with no visitor present. Pt sitting in chair, in NAD. States post-op pain improved today, still rating 5/10, relieved by oxycodone   Slow progress with therapy; gradually improving to CGA but requires verbal cues to stay inside walker. Tolerating regular diet. No nausea. Poor appetite - has menu for food preferences, declined/dislikes supplements     VSS Afebrile. Room air.      Visit Vitals  BP (!) 130/53   Pulse 63   Temp 97.7 °F (36.5 °C)   Resp 16   Ht 5' 4\" (1.626 m)   Wt 81.7 kg (180 lb 1.9 oz)   SpO2 98%   BMI 30.92 kg/m²       Voiding status: POUR - [place] reinserted 11/11   Output (mL)  Urine Voided: 350 ml (11/11/21 0846)  Emesis: 240 mL (11/12/21 1755)  Last Bowel Movement Date: 11/14/21 (11/14/21 1938)  Unmeasurable Output  Urine Occurrence(s): 1 (11/09/21 0754)  Stool Occurrence(s): 1 (11/14/21 1006)  Straight Cath  Straight Cath: Nurse performed cath (11/10/21 1857)  Number of Attempts: 1 (11/10/21 1857)  Time Catheter Inserted: 1852 (11/10/21 1857)  Time Catheter Removed: 4813 (11/10/21 1857)  Urine: 500 mL (11/10/21 1857)      Labs    Lab Results   Component Value Date/Time    HGB 8.5 (L) 11/10/2021 04:27 AM      Lab Results   Component Value Date/Time    INR 1.0 11/01/2021 10:32 AM      Lab Results   Component Value Date/Time    Sodium 141 11/10/2021 04:27 AM    Potassium 4.2 11/10/2021 04:27 AM    Chloride 112 (H) 11/10/2021 04:27 AM    CO2 20 (L) 11/10/2021 04:27 AM    Glucose 82 11/10/2021 04:27 AM    BUN 19 11/10/2021 04:27 AM    Creatinine 1.06 (H) 11/10/2021 04:27 AM    Calcium 7.7 (L) 11/10/2021 04:27 AM     Recent Glucose Results:   Lab Results   Component Value Date/Time    GLUCPOC 98 11/15/2021 06:55 AM    GLUCPOC 118 (H) 11/14/2021 09:33 PM    GLUCPOC 100 11/14/2021 04:04 PM     WC - no growth so far      Body mass index is 30.92 kg/m². : A BMI > 30 is classified as obesity and > 40 is classified as morbid obesity. Primaseal dressing intact - shadowing down middle of dressing (seems unchanged from surgery)  Cryotherapy in place over incision  Calves soft and supple; No pain with passive stretch  Sensation and motor intact   SCDs for mechanical DVT proph while in bed     PLAN:  1) PT BID - WBAT. 2) Aspirin 325 mg PO BID for DVT Prophylaxis   3) GI Prophylaxis - Pepcid  4) POUR - laguerre d/c on POD0. Unable to void - straight cathx2, laguerre reinserted. Encourage PO hydration. Pt asking to have laguerre removed - will attempt void trial today. 5) Readniess for discharge:     [x] Vital Signs stable    [x] Hgb stable    [] + Voiding - laguerre    [x] Wound intact, drainage minimal    [x] Tolerating PO intake     [] Cleared by PT (OT if applicable)     [] Stair training completed (if applicable)    [] Independent / Contact Guard Assist (household distance)     [] Bed mobility     [] Car transfers     [] ADLs    [x] Adequate pain control on oral medication alone     Discharge pending therapy clearance. Home vs Rehab. Pt states cousin Nancy Estrada can help her at home for a few days. PT concerned about safety with no assistance. CM to affirm assistance and/ or SNF authorization.   Void trial today      Saige Toscano NP

## 2021-11-15 NOTE — PROGRESS NOTES
Problem: Mobility Impaired (Adult and Pediatric)  Goal: *Acute Goals and Plan of Care (Insert Text)  Description: FUNCTIONAL STATUS PRIOR TO ADMISSION: Patient was modified independent using a rollator for functional mobility. Reports that her rollator does not have functioning brakes. HOME SUPPORT PRIOR TO ADMISSION: The patient lived alone with \"people that can check in on her\" but will otherwise be alone. Physical Therapy Goals  Initiated 11/10/2021    1. Patient will move from supine to sit and sit to supine  in bed with modified independence within 4 days. 2. Patient will perform sit to stand with modified independence within 4 days. 3. Patient will ambulate with modified independence for 150 feet with the least restrictive device within 4 days. 4. Patient will ascend/descend 1 stairs with 1 handrail(s) with modified independence within 4 days. 5. Patient will perform home exercise program per protocol with modified independence within 4 days. 6. Patient will demonstrate AROM 0-90 degrees in operative joint within 4 days. 11/15/2021 1601 by Esme Archuleta PTA  Outcome: Progressing Towards Goal  11/15/2021 1220 by Esme Archuleta, [facility]  Outcome: Not Met   PHYSICAL THERAPY TREATMENT  Patient: Virgen Garnica (89 y.o. 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 ASSESSMENT  Patient continues with skilled PT services and is progressing towards goals. Pt presents with decreased strength and endurance. Pt with increased pain with all mobility. Pt performed supine exercises with assistance.  Pt able to perform with improved ability. Pt performed supine to sit at min A/CGA with additional time. Pt performed sit to stand transfer at min A with additional time. Pt having to sit initially once standing due to pain. Pt then able to stand again. Pt ambulated 60ft and 30ft with RW at [facility]. Pt requiring constant cueing to stand upright an stay within walker. Pt able to correct for longer periods this afternoon. Current Level of Function Impacting Discharge (mobility/balance): bed mobility at min A, sit to stand transfer at min A, ambulation at [facility]    Other factors to consider for discharge: pain, decreased endurance an strength          PLAN :  Patient continues to benefit from skilled intervention to address the above impairments. Continue treatment per established plan of care. to address goals. Recommendation for discharge: (in order for the patient to meet his/her long term goals)  Therapy up to 5 days/week in SNF setting    This discharge recommendation:  Has been made in collaboration with the attending provider and/or case management    IF patient discharges home will need the following DME: rolling walker       SUBJECTIVE:   Patient stated  It hurting today.     OBJECTIVE DATA SUMMARY:   Critical Behavior:  Neurologic State: Alert  Orientation Level: Oriented X4  Cognition: Appropriate decision making       Range of Motion:     AROM: Generally decreased, Functional                        Functional Mobility Training:  Bed Mobility:     Supine to Sit: Contact guard assistance  Sit to Supine: Minimum assistance; Contact guard assistance  Scooting: Minimum assistance        Transfers:  Sit to Stand: Minimum assistance;  Additional time  Stand to Sit: Contact guard assistance                             Balance:  Sitting: Impaired  Sitting - Static: Good (unsupported)  Sitting - Dynamic: Fair (occasional)  Standing: Impaired  Standing - Static: Fair; Constant support  Standing - Dynamic : Fair; Constant support  Ambulation/Gait Training:  Distance (ft):  (60ft and 30ft )  Assistive Device: Gait belt; Walker, rolling  Ambulation - Level of Assistance: Contact guard assistance; Additional time        Gait Abnormalities: Antalgic; Decreased step clearance;  Step to gait        Base of Support: Widened     Speed/Nicole: Slow; Shuffled  Step Length: Right shortened; Left shortened              Therapeutic Exercises:     EXERCISE   Sets   Reps   Active Active Assist   Passive Self ROM   Comments   Ankle Pumps 1 10 [x]                                        []                                        []                                        []                                           Quad Sets 1 5 [x]                                        []                                        []                                        []                                           Hip abduction 1 5 [x]                                        []                                        []                                        []                                           Short Arc Quads   []                                        []                                        []                                        []                                           Knee Extension Stretch     []                                          []                                          []                                          []                                           Heel Slides 1 10 [x]                                        []                                        []                                        []                                           Long Arc Quads   []                                        []                                        []                                        []                                           Knee Flexion Stretch   []                                        []                                        [] []                                           Straight Leg Raises 1 10 [x]                                        []                                        []                                        []                                               Pain Rating:  Pt reported increased pain this afternoon     Activity Tolerance:   Fair and requires rest breaks    After treatment patient left in no apparent distress:   Supine in bed, Call bell within reach, and Side rails x 3    COMMUNICATION/COLLABORATION:   The patients plan of care was discussed with: Registered nurse.      Sharita Vaughan PTA   Time Calculation: 23 mins

## 2021-11-15 NOTE — PROGRESS NOTES
0045: Patient refused AM labs. 0700: Bedside shift change report given to Abdi Doyle RN (oncoming nurse) by Mina Cano RN (offgoing nurse). Report included the following information SBAR, Kardex, Intake/Output, MAR and Recent Results.

## 2021-11-16 LAB
GLUCOSE BLD STRIP.AUTO-MCNC: 102 MG/DL (ref 65–117)
GLUCOSE BLD STRIP.AUTO-MCNC: 115 MG/DL (ref 65–117)
GLUCOSE BLD STRIP.AUTO-MCNC: 127 MG/DL (ref 65–117)
SERVICE CMNT-IMP: ABNORMAL
SERVICE CMNT-IMP: NORMAL
SERVICE CMNT-IMP: NORMAL

## 2021-11-16 PROCEDURE — 97530 THERAPEUTIC ACTIVITIES: CPT

## 2021-11-16 PROCEDURE — 74011250637 HC RX REV CODE- 250/637: Performed by: ORTHOPAEDIC SURGERY

## 2021-11-16 PROCEDURE — 97116 GAIT TRAINING THERAPY: CPT

## 2021-11-16 PROCEDURE — 74011000250 HC RX REV CODE- 250: Performed by: ORTHOPAEDIC SURGERY

## 2021-11-16 PROCEDURE — 94640 AIRWAY INHALATION TREATMENT: CPT

## 2021-11-16 PROCEDURE — 94762 N-INVAS EAR/PLS OXIMTRY CONT: CPT

## 2021-11-16 PROCEDURE — 51798 US URINE CAPACITY MEASURE: CPT

## 2021-11-16 PROCEDURE — 82962 GLUCOSE BLOOD TEST: CPT

## 2021-11-16 RX ADMIN — OXYCODONE 5 MG: 5 TABLET ORAL at 09:40

## 2021-11-16 RX ADMIN — ACETAMINOPHEN 1000 MG: 500 TABLET ORAL at 05:55

## 2021-11-16 RX ADMIN — FAMOTIDINE 20 MG: 20 TABLET, FILM COATED ORAL at 09:22

## 2021-11-16 RX ADMIN — FAMOTIDINE 20 MG: 20 TABLET, FILM COATED ORAL at 17:29

## 2021-11-16 RX ADMIN — ASPIRIN 325 MG: 325 TABLET, DELAYED RELEASE ORAL at 17:29

## 2021-11-16 RX ADMIN — METOPROLOL TARTRATE 25 MG: 25 TABLET, FILM COATED ORAL at 09:19

## 2021-11-16 RX ADMIN — SERTRALINE 50 MG: 50 TABLET, FILM COATED ORAL at 09:20

## 2021-11-16 RX ADMIN — ACETAMINOPHEN 1000 MG: 500 TABLET ORAL at 11:17

## 2021-11-16 RX ADMIN — ATORVASTATIN CALCIUM 10 MG: 10 TABLET, FILM COATED ORAL at 09:21

## 2021-11-16 RX ADMIN — Medication 1 AMPULE: at 22:35

## 2021-11-16 RX ADMIN — GABAPENTIN 300 MG: 300 CAPSULE ORAL at 09:20

## 2021-11-16 RX ADMIN — OXYCODONE 5 MG: 5 TABLET ORAL at 15:17

## 2021-11-16 RX ADMIN — METOPROLOL TARTRATE 25 MG: 25 TABLET, FILM COATED ORAL at 17:29

## 2021-11-16 RX ADMIN — ARFORMOTEROL TARTRATE: 15 SOLUTION RESPIRATORY (INHALATION) at 07:52

## 2021-11-16 RX ADMIN — LATANOPROST 1 DROP: 50 SOLUTION OPHTHALMIC at 22:35

## 2021-11-16 RX ADMIN — ARFORMOTEROL TARTRATE: 15 SOLUTION RESPIRATORY (INHALATION) at 20:00

## 2021-11-16 RX ADMIN — ASPIRIN 325 MG: 325 TABLET, DELAYED RELEASE ORAL at 09:22

## 2021-11-16 RX ADMIN — Medication 1 AMPULE: at 09:24

## 2021-11-16 RX ADMIN — GABAPENTIN 900 MG: 300 CAPSULE ORAL at 22:35

## 2021-11-16 RX ADMIN — METFORMIN HYDROCHLORIDE 500 MG: 500 TABLET ORAL at 17:29

## 2021-11-16 RX ADMIN — ACETAMINOPHEN 1000 MG: 500 TABLET ORAL at 17:29

## 2021-11-16 NOTE — PROGRESS NOTES
Problem: Falls - Risk of  Goal: *Absence of Falls  Description: Document Sergonitamimi Dutta Fall Risk and appropriate interventions in the flowsheet.   Outcome: Progressing Towards Goal  Note: Fall Risk Interventions:  Mobility Interventions: Communicate number of staff needed for ambulation/transfer         Medication Interventions: Patient to call before getting OOB    Elimination Interventions: Patient to call for help with toileting needs    History of Falls Interventions: Room close to nurse's station

## 2021-11-16 NOTE — PROGRESS NOTES
Problem: Mobility Impaired (Adult and Pediatric)  Goal: *Acute Goals and Plan of Care (Insert Text)  Description: FUNCTIONAL STATUS PRIOR TO ADMISSION: Patient was modified independent using a rollator for functional mobility. Reports that her rollator does not have functioning brakes. HOME SUPPORT PRIOR TO ADMISSION: The patient lived alone with \"people that can check in on her\" but will otherwise be alone. Physical Therapy Goals  Initiated 11/10/2021    1. Patient will move from supine to sit and sit to supine  in bed with modified independence within 4 days. 2. Patient will perform sit to stand with modified independence within 4 days. 3. Patient will ambulate with modified independence for 150 feet with the least restrictive device within 4 days. 4. Patient will ascend/descend 1 stairs with 1 handrail(s) with modified independence within 4 days. 5. Patient will perform home exercise program per protocol with modified independence within 4 days. 6. Patient will demonstrate AROM 0-90 degrees in operative joint within 4 days. Outcome: Progressing Towards Goal   PHYSICAL THERAPY TREATMENT  Patient: Twila Martin (33 y.o. 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 ASSESSMENT  Patient continues with skilled PT services and is progressing towards goals. Pt presents with decreased strength and endurance. Pt performed supine to sit at [facility] with additional time. Pt was able to stand at min A/CGA with cueing for hand placement. Pt ambulated 75ft with RW at [facility]. Pt still requiring cueing to stay within walker and stand upright.  Pt with improved ability to maintain posture for longer periods today. Current Level of Function Impacting Discharge (mobility/balance): mobility at CGA/min A    Other factors to consider for discharge: decreased strength and endurance          PLAN :  Patient continues to benefit from skilled intervention to address the above impairments. Continue treatment per established plan of care. to address goals. Recommendation for discharge: (in order for the patient to meet his/her long term goals)  Therapy up to 5 days/week in SNF setting    This discharge recommendation:  Has been made in collaboration with the attending provider and/or case management    IF patient discharges home will need the following DME: rolling walker       SUBJECTIVE:   Patient stated  It was hurting this morning I had to get some pain meds.     OBJECTIVE DATA SUMMARY:   Critical Behavior:  Neurologic State: Alert  Orientation Level: Oriented X4  Cognition: Appropriate decision making       Range of Motion:         AROM: Generally decreased, Functional                    Functional Mobility Training:  Bed Mobility:     Supine to Sit: Contact guard assistance  Sit to Supine: Contact guard assistance  Scooting: Contact guard assistance        Transfers:  Sit to Stand: Contact guard assistance; Minimum assistance  Stand to Sit: Contact guard assistance                             Balance:  Sitting: Impaired  Sitting - Static: Good (unsupported)  Sitting - Dynamic: Good (unsupported)  Standing: Impaired  Standing - Static: Fair; Constant support  Standing - Dynamic : Fair; Constant support  Ambulation/Gait Training:  Distance (ft): 75 Feet (ft)  Assistive Device: Gait belt; Walker, rolling  Ambulation - Level of Assistance: Contact guard assistance; Additional time        Gait Abnormalities: Antalgic; Decreased step clearance        Base of Support: Widened     Speed/Nicole: Pace decreased (<100 feet/min);  Shuffled  Step Length: Right shortened; Left shortened             Pain Rating:  Pt reported increased pain with mobility     Activity Tolerance:   Fair and requires rest breaks    After treatment patient left in no apparent distress:   Sitting in chair, Call bell within reach and Bed / chair alarm activated    COMMUNICATION/COLLABORATION:   The patients plan of care was discussed with: Registered nurse.      Nico Pham PTA   Time Calculation: 23 mins

## 2021-11-16 NOTE — PROGRESS NOTES
End of Shift Note    Bedside shift change report given to  RN (oncoming nurse) by Chi Lynch (offgoing nurse). Report included the following information SBAR, Kardex, Intake/Output, MAR, Accordion, Recent Results and Med Rec Status    Shift worked:  days   Shift summary and any significant changes:         Concerns for physician to address:     Zone phone for oncoming shift:          Activity:  Activity Level: Bed Rest  Number times ambulated in hallways past shift: 2  Number of times OOB to chair past shift: 2    Cardiac:   Cardiac Monitoring: No      Cardiac Rhythm: Sinus Rhythm    Access:   Current line(s): PIV     Genitourinary:   Urinary status: voiding    Respiratory:   O2 Device: None (Room air)  Chronic home O2 use?: NO  Incentive spirometer at bedside: NO  Actual Volume (ml): 1250 ml  GI:  Last Bowel Movement Date: 11/14/21  Current diet:  ADULT DIET Regular  DIET ONE TIME MESSAGE  Passing flatus: YES  Tolerating current diet: YES       Pain Management:   Patient states pain is manageable on current regimen: YES    Skin:  Jose Luis Score: 19  Interventions: float heels and increase time out of bed    Patient Safety:  Fall Score:  Total Score: 4  Interventions: assistive device (walker, cane, etc), gripper socks and pt to call before getting OOB  High Fall Risk: Yes    Length of Stay:  Expected LOS: - - -  Actual LOS: 0      Chi Lynch

## 2021-11-16 NOTE — PROGRESS NOTES
Problem: Mobility Impaired (Adult and Pediatric)  Goal: *Acute Goals and Plan of Care (Insert Text)  Description: FUNCTIONAL STATUS PRIOR TO ADMISSION: Patient was modified independent using a rollator for functional mobility. Reports that her rollator does not have functioning brakes. HOME SUPPORT PRIOR TO ADMISSION: The patient lived alone with \"people that can check in on her\" but will otherwise be alone. Physical Therapy Goals  Initiated 11/10/2021    1. Patient will move from supine to sit and sit to supine  in bed with modified independence within 4 days. 2. Patient will perform sit to stand with modified independence within 4 days. 3. Patient will ambulate with modified independence for 150 feet with the least restrictive device within 4 days. 4. Patient will ascend/descend 1 stairs with 1 handrail(s) with modified independence within 4 days. 5. Patient will perform home exercise program per protocol with modified independence within 4 days. 6. Patient will demonstrate AROM 0-90 degrees in operative joint within 4 days. 11/16/2021 1545 by Gopi Mello, PTA  Outcome: Progressing Towards Goal  11/16/2021 1505 by Gopi Mello, PTA  Outcome: Progressing Towards Goal   PHYSICAL THERAPY TREATMENT  Patient: Radha Cook (89 y.o. 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 ASSESSMENT  Patient continues with skilled PT services and is progressing towards goals. Pt presents with decreased strength and endurance. Pt performed sit to stand transfer at [facility] with cueing for hand placement. Pt ambulated 90ft with RW at [facility].  Pt still requiring cueing for safety but able to correct for longer periods. Pt continues to improve mobility tolerance and safety. Current Level of Function Impacting Discharge (mobility/balance): mobility at CGA/min A    Other factors to consider for discharge: pain, decreased strength and endurance. PLAN :  Patient continues to benefit from skilled intervention to address the above impairments. Continue treatment per established plan of care. to address goals. Recommendation for discharge: (in order for the patient to meet his/her long term goals)  Therapy up to 5 days/week in SNF setting    This discharge recommendation:  Has been made in collaboration with the attending provider and/or case management    IF patient discharges home will need the following DME: rolling walker       SUBJECTIVE:   Patient stated Mar Marroquin been trying to move it more.     OBJECTIVE DATA SUMMARY:   Critical Behavior:  Neurologic State: Alert  Orientation Level: Oriented X4  Cognition: Appropriate decision making       Range of Motion:         AROM: Generally decreased, Functional                    Functional Mobility Training:  Bed Mobility:     Supine to Sit: Contact guard assistance  Sit to Supine: Minimum assistance  Scooting: Contact guard assistance        Transfers:  Sit to Stand: Contact guard assistance; Minimum assistance  Stand to Sit: Contact guard assistance                             Balance:  Sitting: Impaired  Sitting - Static: Good (unsupported)  Sitting - Dynamic: Good (unsupported)  Standing: Impaired  Standing - Static: Fair; Constant support  Standing - Dynamic : Fair; Constant support  Ambulation/Gait Training:  Distance (ft): 90 Feet (ft)  Assistive Device: Gait belt; Walker, rolling  Ambulation - Level of Assistance: Contact guard assistance;  Additional time        Gait Abnormalities: Antalgic; Decreased step clearance        Base of Support: Widened     Speed/Nicole: Pace decreased (<100 feet/min);  Shuffled  Step Length: Right shortened; Left shortened              Therapeutic Exercises:     EXERCISE   Sets   Reps   Active Active Assist   Passive Self ROM   Comments   Ankle Pumps   []                                        []                                        []                                        []                                           Quad Sets   []                                        []                                        []                                        []                                           Hamstring Sets   []                                        []                                        []                                        []                                           Short Arc Quads   []                                        []                                        []                                        []                                           Knee Extension Stretch     []                                          []                                          []                                          []                                           Heel Slides   []                                        []                                        []                                        []                                           Long Arc Quads 1 10 [x]                                        [x]                                        []                                        []                                           Knee Flexion Stretch 1 5 [x]                                        []                                        []                                        []                                           Straight Leg Raises   []                                        []                                        []                                        []                                               Pain Rating:  Pt reported increased pain with mobility. Activity Tolerance:   Good, Fair, and requires rest breaks    After treatment patient left in no apparent distress:   Supine in bed, Call bell within reach, and Side rails x 3    COMMUNICATION/COLLABORATION:   The patients plan of care was discussed with: Registered nurse.      Aura Bender PTA   Time Calculation: 14 mins

## 2021-11-16 NOTE — PROGRESS NOTES
Ortho / Neurosurgery NP Note    POD#  7 s/p RIGHT REVISION TOTAL KNEE ARTHROPLASTY   Pt seen with no visitor present. Pt sitting in chair after working with PT, in NAD. Denies pain presently, reports relief when she gets oxycodone   Slow progress with therapy; gradually improving to CGA but requires verbal cues to stay inside walker. Concern for safety at home with limited resources/help. Tolerating regular diet. No nausea. VSS Afebrile. Room air. Visit Vitals  BP (!) 127/51   Pulse 75   Temp 98 °F (36.7 °C)   Resp 16   Ht 5' 4\" (1.626 m)   Wt 81.7 kg (180 lb 1.9 oz)   SpO2 94%   BMI 30.92 kg/m²       Voiding status: POUR - [place] reinserted 11/11 ; removed on 11/15 and voiding now without difficulty.   Output (mL)  Urine Voided: 300 ml (11/16/21 0947)  Emesis: 240 mL (11/12/21 1755)  Last Bowel Movement Date: 11/14/21 (11/14/21 1938)  Unmeasurable Output  Urine Occurrence(s): 1 (11/09/21 0754)  Stool Occurrence(s): 1 (11/16/21 1021)  Straight Cath  Straight Cath: Nurse performed cath (11/10/21 1857)  Number of Attempts: 1 (11/10/21 1857)  Time Catheter Inserted: 1852 (11/10/21 1857)  Time Catheter Removed: 8765 (11/10/21 1857)  Urine: 500 mL (11/10/21 1857)      Labs    Lab Results   Component Value Date/Time    HGB 8.5 (L) 11/10/2021 04:27 AM      Lab Results   Component Value Date/Time    INR 1.0 11/01/2021 10:32 AM      Lab Results   Component Value Date/Time    Sodium 141 11/10/2021 04:27 AM    Potassium 4.2 11/10/2021 04:27 AM    Chloride 112 (H) 11/10/2021 04:27 AM    CO2 20 (L) 11/10/2021 04:27 AM    Glucose 82 11/10/2021 04:27 AM    BUN 19 11/10/2021 04:27 AM    Creatinine 1.06 (H) 11/10/2021 04:27 AM    Calcium 7.7 (L) 11/10/2021 04:27 AM     Recent Glucose Results:   Lab Results   Component Value Date/Time    GLUCPOC 102 11/16/2021 11:02 AM    GLUCPOC 119 (H) 11/15/2021 10:05 PM    GLUCPOC 114 11/15/2021 03:33 PM     WC - no growth so far    Body mass index is 30.92 kg/m². : A BMI > 30 is classified as obesity and > 40 is classified as morbid obesity. Primaseal dressing intact - shadowing down middle of dressing (seems unchanged from surgery)  Cryotherapy in place over incision  Calves soft and supple; No pain with passive stretch  Sensation and motor intact   SCDs for mechanical DVT proph while in bed     PLAN:  1) PT BID - WBAT. 2) Aspirin 325 mg PO BID for DVT Prophylaxis   3) GI Prophylaxis - Pepcid  4) POUR - resolved and void trial on 11/15 successful. 5) Readniess for discharge:     [x] Vital Signs stable    [x] Hgb stable    [x] + Voiding     [x] Wound intact, drainage minimal    [x] Tolerating PO intake     [] Cleared by PT (OT if applicable)     [] Stair training completed (if applicable)    [] Independent / Contact Guard Assist (household distance)     [] Bed mobility     [] Car transfers     [] ADLs    [x] Adequate pain control on oral medication alone     Discharge pending insurance authorization. Pt accepted to [facility].  Rapid Coivd test 11/15 negative    Jose Downs NP

## 2021-11-16 NOTE — PROGRESS NOTES
End of Shift Note    Bedside shift change report given to [address] (oncoming nurse) by Renae Gandhi (offgoing nurse). Report included the following information SBAR, Kardex, Intake/Output, MAR, Accordion, Recent Results and Med Rec Status    Shift worked:  night     Shift summary and any significant changes:     patient voiding overnight      Concerns for physician to address:  none     Zone phone for oncoming shift:   [phone]       Activity:  Activity Level: Up with Assistance  Number times ambulated in hallways past shift: 0  Number of times OOB to chair past shift: 0    Cardiac:   Cardiac Monitoring: No      Cardiac Rhythm: Sinus Rhythm    Access:   Current line(s): PIV     Genitourinary:   Urinary status: voiding    Respiratory:   O2 Device: None (Room air)  Chronic home O2 use?: NO  Incentive spirometer at bedside: NO  Actual Volume (ml): 1250 ml  GI:  Last Bowel Movement Date: 11/14/21  Current diet:  ADULT DIET Regular  Passing flatus: YES  Tolerating current diet: YES       Pain Management:   Patient states pain is manageable on current regimen: YES    Skin:  Jose Luis Score: 19  Interventions: float heels and increase time out of bed    Patient Safety:  Fall Score:  Total Score: 4  Interventions: assistive device (walker, cane, etc), gripper socks and pt to call before getting OOB  High Fall Risk: Yes    Length of Stay:  Expected LOS: - - -  Actual LOS: 0      Renae Gandhi

## 2021-11-16 NOTE — PROGRESS NOTES
Comprehensive Nutrition Assessment    Type and Reason for Visit: Initial, RD nutrition re-screen/LOS    Nutrition Recommendations/Plan:   · Continue regular diet  · Please document % meals and supplements consumed in flowsheet I/O's under intake. Nutrition Assessment:     11/16: Chart reviewed; med noted for right knee prosthesis, POD#7 right knee revision total knee arthoplasty. LOS screen. RD visited with pt at bedside, sleeping soundly. Tolerating a regular diet without difficulty. PO intake >50% of meals. No acute nutrition needs identified. Patient Vitals for the past 168 hrs:   % Diet Eaten   11/16/21 1245 76 - 100%   11/16/21 0947 1 - 25%   11/14/21 1127 76 - 100%   11/12/21 1300 51 - 75%   11/12/21 1118 1 - 25%   11/10/21 0934 51 - 75%     Last Weight Metric  Weight Loss Metrics 11/9/2021 11/1/2021 10/26/2021 10/22/2021 10/6/2021 8/31/2021 8/27/2021   Today's Wt 180 lb 1.9 oz 182 lb 12.2 oz 187 lb 185 lb 180 lb 182 lb 194 lb   BMI 30.92 kg/m2 31.37 kg/m2 32.1 kg/m2 31.76 kg/m2 30.9 kg/m2 31.24 kg/m2 33.3 kg/m2     Estimated Daily Nutrient Needs:  Energy (kcal): 1700 (BMR 1300 x 1. 3AF); Weight Used for Energy Requirements: Current  Protein (g): 82 (1.0 g/kg bw); Weight Used for Protein Requirements: Current  Fluid (ml/day): 1700 ml/day; Method Used for Fluid Requirements: 1 ml/kcal    Nutrition Related Findings:  BM: 11/14; Labs: reviewed; Meds: reviewed      Wounds:    Surgical incision       Current Nutrition Therapies:  ADULT DIET Regular  DIET ONE TIME MESSAGE    Anthropometric Measures:  · Height:  5' 4\" (162.6 cm)  · Current Body Wt:  81.7 kg (180 lb 1.9 oz)    · Ideal Body Wt:  120 lbs:  150.1 %   · BMI Category:  Obese class 1 (BMI 30.0-34. 9)       Nutrition Diagnosis:   No nutrition diagnosis at this time     Nutrition Interventions:   Food and/or Nutrient Delivery: Continue current diet  Nutrition Education and Counseling: No recommendations at this time  Coordination of Nutrition Care: Continue to monitor while inpatient    Goals:  Trend PO intake >50% of meals next 5-7 days       Nutrition Monitoring and Evaluation:   Behavioral-Environmental Outcomes: None identified  Food/Nutrient Intake Outcomes: Food and nutrient intake  Physical Signs/Symptoms Outcomes: Biochemical data, Weight, Skin    Discharge Planning:    No discharge needs at this time     Electronically signed by Genoveva Jeffers RD on 11/16/2021 at 4:06 PM

## 2021-11-17 VITALS
BODY MASS INDEX: 30.75 KG/M2 | HEART RATE: 66 BPM | RESPIRATION RATE: 16 BRPM | OXYGEN SATURATION: 99 % | HEIGHT: 64 IN | DIASTOLIC BLOOD PRESSURE: 63 MMHG | SYSTOLIC BLOOD PRESSURE: 112 MMHG | WEIGHT: 180.12 LBS | TEMPERATURE: 97.4 F

## 2021-11-17 LAB
GLUCOSE BLD STRIP.AUTO-MCNC: 87 MG/DL (ref 65–117)
SERVICE CMNT-IMP: NORMAL

## 2021-11-17 PROCEDURE — 74011250637 HC RX REV CODE- 250/637: Performed by: ORTHOPAEDIC SURGERY

## 2021-11-17 PROCEDURE — 90471 IMMUNIZATION ADMIN: CPT

## 2021-11-17 PROCEDURE — 94640 AIRWAY INHALATION TREATMENT: CPT

## 2021-11-17 PROCEDURE — 82962 GLUCOSE BLOOD TEST: CPT

## 2021-11-17 PROCEDURE — 97116 GAIT TRAINING THERAPY: CPT

## 2021-11-17 PROCEDURE — 74011000250 HC RX REV CODE- 250: Performed by: ORTHOPAEDIC SURGERY

## 2021-11-17 PROCEDURE — 90686 IIV4 VACC NO PRSV 0.5 ML IM: CPT | Performed by: ORTHOPAEDIC SURGERY

## 2021-11-17 PROCEDURE — 97530 THERAPEUTIC ACTIVITIES: CPT

## 2021-11-17 PROCEDURE — 74011250636 HC RX REV CODE- 250/636: Performed by: ORTHOPAEDIC SURGERY

## 2021-11-17 PROCEDURE — 94760 N-INVAS EAR/PLS OXIMETRY 1: CPT

## 2021-11-17 RX ADMIN — ARFORMOTEROL TARTRATE: 15 SOLUTION RESPIRATORY (INHALATION) at 07:41

## 2021-11-17 RX ADMIN — Medication 1 AMPULE: at 09:01

## 2021-11-17 RX ADMIN — OXYCODONE 5 MG: 5 TABLET ORAL at 04:45

## 2021-11-17 RX ADMIN — FAMOTIDINE 20 MG: 20 TABLET, FILM COATED ORAL at 09:03

## 2021-11-17 RX ADMIN — ACETAMINOPHEN 1000 MG: 500 TABLET ORAL at 06:18

## 2021-11-17 RX ADMIN — METOPROLOL TARTRATE 25 MG: 25 TABLET, FILM COATED ORAL at 09:03

## 2021-11-17 RX ADMIN — OXYCODONE 5 MG: 5 TABLET ORAL at 10:43

## 2021-11-17 RX ADMIN — ATORVASTATIN CALCIUM 10 MG: 10 TABLET, FILM COATED ORAL at 09:03

## 2021-11-17 RX ADMIN — ACETAMINOPHEN 1000 MG: 500 TABLET ORAL at 11:30

## 2021-11-17 RX ADMIN — ASPIRIN 325 MG: 325 TABLET, DELAYED RELEASE ORAL at 09:03

## 2021-11-17 RX ADMIN — GABAPENTIN 300 MG: 300 CAPSULE ORAL at 09:03

## 2021-11-17 RX ADMIN — SERTRALINE 50 MG: 50 TABLET, FILM COATED ORAL at 09:03

## 2021-11-17 RX ADMIN — INFLUENZA VIRUS VACCINE 0.5 ML: 15; 15; 15; 15 SUSPENSION INTRAMUSCULAR at 11:30

## 2021-11-17 NOTE — PROGRESS NOTES
Hospital to [facility] SBAR Handoff - Vivek Easton                                                                        78 y.o.   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) [T84.012A]  Other mechanical complication of internal right knee prosthesis, sequela [T84.092S]       Principal Problem: <principal problem not specified>    8 Days Post-Op of   Procedure(s):  RIGHT REVISION TOTAL KNEE ARTHROPLASTY   BY: Bev Malik DO             ON: 11/9/2021                  Code Status: Full Code                Advance Directives:   Advance Care Planning 11/9/2021   Confirm Advance Directive None   Patient Would Like to Complete Advance Directive No    (Send w/patient)   Not Received       Isolation:  There are currently no Active Isolations       MDRO: No current active infections    Pain Medications given:  oycodone    Last dose: 11/17/2021 at  1043    Special Equipment needed: no  Type of equipment:      (Not currently on dialysis)  (Not currently on dialysis)  (Not currently on dialysis)     BACKGROUND     Allergies:   Allergies   Allergen Reactions    Lisinopril Rash    Methotrexate Hives       Past Medical History:   Diagnosis Date    Asthma     Chronic pain 5/8/2020    DDD (degenerative disc disease), lumbar     Diabetes (HCC)     Gastritis     GERD (gastroesophageal reflux disease)     Glaucoma     Hearing loss 5/8/2020    Hiatal hernia     High cholesterol     Hypertension     Hypocalcemia 5/8/2020    Peripheral vascular disease (HCC)     RA (rheumatoid arthritis) (HCC)     Sarcoidosis 1999    [facility]    Sleep apnea     has not used cpap in years since weight loss       Past Surgical History:   Procedure Laterality Date    HX GASTRIC BYPASS      HX HEART CATHETERIZATION      s/p PCI with stenting in 1998     HX KNEE REPLACEMENT Bilateral     HX ORTHOPAEDIC Bilateral     carpal tunnel surgery     HX SHOULDER REPLACEMENT Right     x 2        Medications Prior to Admission   Medication Sig    gabapentin (NEURONTIN) 300 mg capsule Take 900 mg by mouth nightly. TAKE 1 CAPSULE IN THE MORNING AND TAKE 3 CAPSULES BEFORE BEDTIME    simvastatin (ZOCOR) 20 mg tablet TAKE BY MOUTH NIGHTLY. (Patient taking differently: Take 20 mg by mouth nightly. TAKE BY MOUTH NIGHTLY.)    sertraline (ZOLOFT) 50 mg tablet Take 1 Tablet by mouth daily.  omeprazole (PRILOSEC) 20 mg capsule Take 1 Capsule by mouth daily.  metoprolol tartrate (LOPRESSOR) 25 mg tablet Take 1 Tablet by mouth two (2) times a day.  metFORMIN (GLUCOPHAGE) 500 mg tablet Take 1 Tablet by mouth daily (with dinner). Decreased dose    latanoprost (XALATAN) 0.005 % ophthalmic solution INSTILL 1 DROP INTO BOTH EYES NIGHTLY (Patient taking differently: Administer 1 Drop to both eyes nightly. INSTILL 1 DROP INTO BOTH EYES NIGHTLY)    gabapentin (NEURONTIN) 300 mg capsule TAKE 1 CAPSULE IN THE MORNING AND TAKE 3 CAPSULES BEFORE BEDTIME (Patient taking differently: Take 300 mg by mouth daily. TAKE 1 CAPSULE IN THE MORNING AND TAKE 3 CAPSULES BEFORE BEDTIME)    furosemide (LASIX) 20 mg tablet Take 1 Tablet by mouth daily.  ergocalciferol (ERGOCALCIFEROL) 1,250 mcg (50,000 unit) capsule Take 1 Capsule by mouth every seven (7) days. Indications: vitamin D deficiency (high dose therapy) (Patient taking differently: Take 50,000 Units by mouth every seven (7) days. Fridays  Indications: vitamin D deficiency (high dose therapy))    fluticasone furoate-vilanteroL (Breo Ellipta) 100-25 mcg/dose inhaler TAKE 1 PUFF BY MOUTH EVERY DAY (Patient taking differently: Take 1 Puff by inhalation daily. TAKE 1 PUFF BY MOUTH EVERY DAY)    traMADoL (ULTRAM) 50 mg tablet Take 1 Tablet by mouth daily as needed for Pain for up to 30 days.  tofacitinib 11 mg Tb24 Take 11 mg by mouth daily. Indications: rheumatoid arthritis    nortriptyline (PAMELOR) 50 mg capsule Take 50 mg by mouth nightly.  aspirin delayed-release 81 mg tablet Take 81 mg by mouth daily.  albuterol sulfate (PROVENTIL;VENTOLIN) 2.5 mg/0.5 mL nebu nebulizer solution 0.5 mL by Nebulization route every four (4) hours as needed for Wheezing. Indications: asthma attack    predniSONE (DELTASONE) 20 mg tablet Take 20 mg by mouth daily (with breakfast).  leflunomide (ARAVA) 20 mg tablet TAKE 1 TABLET BY MOUTH EVERY DAY (Patient taking differently: Take 20 mg by mouth daily. TAKE 1 TABLET BY MOUTH EVERY DAY)       Hard scripts included in transfer packet yes    Vaccinations:    Immunization History   Administered Date(s) Administered    COVID-19, PFIZER, MRNA, LNP-S, PF, 30MCG/0.3ML DOSE 03/25/2021, 04/22/2021, 10/24/2021    Influenza High Dose Vaccine PF 09/07/2018    Influenza Vaccine (Tri) Adjuvanted (>65 Yrs FLUAD TRI 95090) 09/24/2019    Pneumococcal Polysaccharide (PPSV-23) 08/06/2020       Readmission Risks:    Known Risks: Falls        The Charlson CoMorbitiy Index tool is an evidenced based tool that has more automatic generated information. The tool looks at many different items such as the age of the patient, how many times they were admitted in the last calendar year, current length of stay in the hospital and their diagnosis.  All of these items are pulled automatically from information documented in the chart from various places and will generate a score that predicts whether a patient is at low (less than 13), medium (13-20) or high (21 or greater) risk of being readmitted. ASSESSMENT                Temp: 97.7 °F (36.5 °C) (11/17/21 0754) Pulse (Heart Rate): 74 (11/17/21 0754)     Resp Rate: 16 (11/17/21 0754)           BP: 111/66 (11/17/21 0754)     O2 Sat (%): 96 % (11/17/21 0754)     Weight: 81.7 kg (180 lb 1.9 oz)    Height: 5' 4\" (162.6 cm) (11/16/21 1604)       If above not within 1 hour of discharge:    BP:_____  P:____  R:____ T:_____ O2 Sat: ___%  O2: ______    Active Orders   Diet    ADULT DIET Regular         Orientation: oriented to time, place, person and situation     Active Behaviors: None                                   Active Lines/Drains:  (Peg Tube / Horton / CL or S/L?): no    Urinary Status: Voiding     Last BM: Last Bowel Movement Date: 11/16/21     Skin Integrity: Incision (comment), Wound (add Wound LDA)             Mobility: Very limited   Weight Bearing Status: WBAT (Weight Bearing as Tolerated)      Gait Training  Assistive Device: Gait belt, Walker, rolling  Ambulation - Level of Assistance: Contact guard assistance, Additional time  Distance (ft): 90 Feet (ft)  Stairs - Level of Assistance:  Moderate assistance, Assist X2  Number of Stairs Trained: 2  Rail Use: Both (and  none with SPC and HHA x1)         Lab Results   Component Value Date/Time    Glucose 82 11/10/2021 04:27 AM    Hemoglobin A1c 5.0 08/31/2021 03:53 PM    INR 1.0 11/01/2021 10:32 AM    HGB 8.5 (L) 11/10/2021 04:27 AM    HGB 10.0 (L) 11/01/2021 10:32 AM        RECOMMENDATION     See After Visit Summary (AVS) for:  · Discharge instructions  · After [address]   · Special equipment needed (entered pre-discharge by Care Management)  · Medication Reconciliation    · Follow up Appointment(s)         Report given/sent by:  Latonia Velásquez                    Verbal report given to: Lucho Tabor RN          Estimated discharge time:  11/17/2021 at 1200

## 2021-11-17 NOTE — PROGRESS NOTES
Problem: Falls - Risk of  Goal: *Absence of Falls  Description: Document John Sol Fall Risk and appropriate interventions in the flowsheet. Outcome: Progressing Towards Goal  Note: Fall Risk Interventions:  Mobility Interventions: Communicate number of staff needed for ambulation/transfer         Medication Interventions: Patient to call before getting OOB    Elimination Interventions: Patient to call for help with toileting needs    History of Falls Interventions:  Investigate reason for fall

## 2021-11-17 NOTE — PROGRESS NOTES
End of Shift Note    Bedside shift change report given to [facility] RN (oncoming nurse) by Malachy Krabbe (offgoing nurse). Report included the following information SBAR, Kardex, Intake/Output, MAR, Accordion, Recent Results and Med Rec Status    Shift worked:  night     Shift summary and any significant changes:     patient voiding overnight      Concerns for physician to address:  none     Zone phone for oncoming shift:   [phone]     Activity:  Activity Level: Up with Assistance  Number times ambulated in hallways past shift: 0  Number of times OOB to chair past shift: 0    Cardiac:   Cardiac Monitoring: No      Cardiac Rhythm: Sinus Rhythm    Access:   Current line(s): PIV     Genitourinary:   Urinary status: voiding    Respiratory:   O2 Device: None (Room air)  Chronic home O2 use?: NO  Incentive spirometer at bedside: NO  Actual Volume (ml): 1200 ml  GI:  Last Bowel Movement Date: 11/16/21  Current diet:  ADULT DIET Regular  DIET ONE TIME MESSAGE  Passing flatus: YES  Tolerating current diet: YES       Pain Management:   Patient states pain is manageable on current regimen: YES    Skin:  Jose Luis Score: 19  Interventions: float heels and increase time out of bed    Patient Safety:  Fall Score:  Total Score: 4  Interventions: assistive device (walker, cane, etc), gripper socks and pt to call before getting OOB  High Fall Risk: Yes    Length of Stay:  Expected LOS: - - -  Actual LOS: 0      Malachy Krabbe

## 2021-11-17 NOTE — DISCHARGE SUMMARY
Ortho Discharge Summary    Patient ID:  Sharee Oconnor  [id number]  female  78 y.o.  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 PMH:   Past Medical History:   Diagnosis Date    Asthma     Chronic pain 2020    DDD (degenerative disc disease), lumbar     Diabetes ([address]     Gastritis     GERD (gastroesophageal reflux disease)     Glaucoma     Hearing loss 2020    Hiatal hernia     High cholesterol     Hypertension     Hypocalcemia 2020    Peripheral vascular disease (HCC)     RA (rheumatoid arthritis) (HCC)     Sarcoidosis     MCV    Sleep apnea     has not used cpap in years since weight loss       Body mass index is 30.92 kg/m². : A BMI > 30 is classified as obesity and > 40 is classified as morbid obesity. Medications upon admission :   Prior to Admission Medications   Prescriptions Last Dose Informant Patient Reported? Taking?    albuterol sulfate (PROVENTIL;VENTOLIN) 2.5 mg/0.5 mL nebu nebulizer solution Unknown at Unknown time  No No   Si.5 mL by Nebulization route every four (4) hours as needed for Wheezing. Indications: asthma attack   aspirin delayed-release 81 mg tablet 11/2/2021 at Unknown time  Yes Yes   Sig: Take 81 mg by mouth daily. ergocalciferol (ERGOCALCIFEROL) 1,250 mcg (50,000 unit) capsule 11/2/2021 at Unknown time  No Yes   Sig: Take 1 Capsule by mouth every seven (7) days. Indications: vitamin D deficiency (high dose therapy)   Patient taking differently: Take 50,000 Units by mouth every seven (7) days. Fridays  Indications: vitamin D deficiency (high dose therapy)   fluticasone furoate-vilanteroL (Breo Ellipta) 100-25 mcg/dose inhaler 11/2/2021 at Unknown time  No Yes   Sig: TAKE 1 PUFF BY MOUTH EVERY DAY   Patient taking differently: Take 1 Puff by inhalation daily. TAKE 1 PUFF BY MOUTH EVERY DAY   furosemide (LASIX) 20 mg tablet 11/8/2021 at Unknown time  No Yes   Sig: Take 1 Tablet by mouth daily. gabapentin (NEURONTIN) 300 mg capsule 11/8/2021 at Unknown time  No Yes   Sig: TAKE 1 CAPSULE IN THE MORNING AND TAKE 3 CAPSULES BEFORE BEDTIME   Patient taking differently: Take 300 mg by mouth daily. TAKE 1 CAPSULE IN THE MORNING AND TAKE 3 CAPSULES BEFORE BEDTIME   gabapentin (NEURONTIN) 300 mg capsule 11/8/2021 at Unknown time  Yes Yes   Sig: Take 900 mg by mouth nightly. TAKE 1 CAPSULE IN THE MORNING AND TAKE 3 CAPSULES BEFORE BEDTIME   latanoprost (XALATAN) 0.005 % ophthalmic solution 11/8/2021 at Unknown time  No Yes   Sig: INSTILL 1 DROP INTO BOTH EYES NIGHTLY   Patient taking differently: Administer 1 Drop to both eyes nightly. INSTILL 1 DROP INTO BOTH EYES NIGHTLY   leflunomide (ARAVA) 20 mg tablet Unknown at Unknown time  No No   Sig: TAKE 1 TABLET BY MOUTH EVERY DAY   Patient taking differently: Take 20 mg by mouth daily. TAKE 1 TABLET BY MOUTH EVERY DAY   metFORMIN (GLUCOPHAGE) 500 mg tablet 11/8/2021 at Unknown time  No Yes   Sig: Take 1 Tablet by mouth daily (with dinner).  Decreased dose   metoprolol tartrate (LOPRESSOR) 25 mg tablet 11/8/2021 at 2100  No Yes   Sig: Take 1 Tablet by mouth two (2) times a day. nortriptyline (PAMELOR) 50 mg capsule 11/8/2021 at Unknown time  Yes Yes   Sig: Take 50 mg by mouth nightly. omeprazole (PRILOSEC) 20 mg capsule 11/8/2021 at Unknown time  No Yes   Sig: Take 1 Capsule by mouth daily. predniSONE (DELTASONE) 20 mg tablet Unknown at Unknown time  No No   Sig: Take 20 mg by mouth daily (with breakfast). sertraline (ZOLOFT) 50 mg tablet 11/8/2021 at Unknown time  No Yes   Sig: Take 1 Tablet by mouth daily. simvastatin (ZOCOR) 20 mg tablet 11/8/2021 at Unknown time  No Yes   Sig: TAKE BY MOUTH NIGHTLY. Patient taking differently: Take 20 mg by mouth nightly. TAKE BY MOUTH NIGHTLY. tofacitinib 11 mg Tb24 11/8/2021 at Unknown time  No Yes   Sig: Take 11 mg by mouth daily. Indications: rheumatoid arthritis   traMADoL (ULTRAM) 50 mg tablet 11/8/2021 at Unknown time  No Yes   Sig: Take 1 Tablet by mouth daily as needed for Pain for up to 30 days. Facility-Administered Medications: None        Allergies: Allergies   Allergen Reactions    Lisinopril Rash    Methotrexate Hives        Hospital Course: The patient underwent surgery. Complications:  None; patient tolerated the procedure well. Was taken to the PACU in stable condition and then transferred to the ortho floor. Perioperative Antibiotics:  Ancef     Postoperative Pain Management:  Oxycodone & Tylenol     DVT Prophylaxis: Aspirin 325 BID    Postoperative transfusions:    Number of units banked PRBCs =   none     Post Op complications: none    Hemoglobin at discharge:    Lab Results   Component Value Date/Time    HGB 8.5 (L) 11/10/2021 04:27 AM    INR 1.0 11/01/2021 10:32 AM       Dressing was changed on POD # 7. Incision - clean, dry and intact. No significant erythema or swelling. Wound appears to be healing without any evidence of infection. Neurovascular exam found to be within normal limits.      Physical Therapy started following surgery and participated in bed mobility, transfers and ambulation. Gait:  Gait  Base of Support: Widened  Speed/Nicole: Pace decreased (<100 feet/min), Shuffled  Step Length: Right shortened, Left shortened  Stance: Right decreased  Gait Abnormalities: Antalgic, Decreased step clearance  Ambulation - Level of Assistance: Contact guard assistance, Additional time  Distance (ft): 90 Feet (ft)  Assistive Device: Gait belt, Walker, rolling  Rail Use: Both (and  none with SPC and HHA x1)  Stairs - Level of Assistance: Moderate assistance, Assist X2  Number of Stairs Trained: 2                   Discharged to: [facility] - [address] [place] [address],[address]. Condition on Discharge:   stable    Discharge instructions:  - Anticoagulate with Aspirin 325 BID  - Take pain medications as prescribed  - Resume pre hospital diet      - Discharge activity: activity as tolerated  - Ambulate with assistive device as needed. - Weight bearing status - WBAT  - Wound Care Keep wound clean and dry. See discharge instruction sheet. -DISCHARGE MEDICATION LIST     Current Discharge Medication List      START taking these medications    Details   oxyCODONE IR (ROXICODONE) 5 mg immediate release tablet Take 1-2 Tablets by mouth every four (4) hours as needed for Pain for up to 7 days. Max Daily Amount: 60 mg. One tab for mild to moderate pain level 1-6, or 2 tabs for severe pain level 7-10  Qty: 40 Tablet, Refills: 0  Start date: 11/11/2021, End date: 11/18/2021    Associated Diagnoses: S/P revision of total knee, right      acetaminophen (TYLENOL) 500 mg tablet Take 2 Tablets by mouth every six (6) hours for 7 days. Qty: 56 Tablet, Refills: 0  Start date: 11/10/2021, End date: 11/17/2021      polyethylene glycol (MIRALAX) 17 gram packet Take 1 Packet by mouth daily for 7 days.   Qty: 7 Packet, Refills: 0  Start date: 11/11/2021, End date: 11/18/2021      senna-docusate (PERICOLACE) 8.6-50 mg per tablet Take 1 Tablet by mouth two (2) times a day for 7 days.  Celena Fusi: 14 Tablet, Refills: 0  Start date: 11/10/2021, End date: 11/17/2021         CONTINUE these medications which have CHANGED    Details   aspirin delayed-release 325 mg tablet Take 1 Tablet by mouth two (2) times a day for 30 days. Qty: 60 Tablet, Refills: 0  Start date: 11/10/2021, End date: 12/10/2021         CONTINUE these medications which have NOT CHANGED    Details   !! gabapentin (NEURONTIN) 300 mg capsule Take 900 mg by mouth nightly. TAKE 1 CAPSULE IN THE MORNING AND TAKE 3 CAPSULES BEFORE BEDTIME      simvastatin (ZOCOR) 20 mg tablet TAKE BY MOUTH NIGHTLY. Qty: 90 Tablet, Refills: 1    Associated Diagnoses: Coronary artery disease due to lipid rich plaque; Status post angioplasty with stent; Mixed hyperlipidemia      sertraline (ZOLOFT) 50 mg tablet Take 1 Tablet by mouth daily. Qty: 90 Tablet, Refills: 1    Associated Diagnoses: Mild episode of recurrent major depressive disorder (HCC)      omeprazole (PRILOSEC) 20 mg capsule Take 1 Capsule by mouth daily. Qty: 90 Capsule, Refills: 1    Associated Diagnoses: History of gastritis      metoprolol tartrate (LOPRESSOR) 25 mg tablet Take 1 Tablet by mouth two (2) times a day. Qty: 180 Tablet, Refills: 1    Associated Diagnoses: Essential hypertension      metFORMIN (GLUCOPHAGE) 500 mg tablet Take 1 Tablet by mouth daily (with dinner). Decreased dose  Qty: 90 Tablet, Refills: 1    Associated Diagnoses: Type 2 diabetes mellitus with complication, without long-term current use of insulin ([facility])      latanoprost (XALATAN) 0.005 % ophthalmic solution INSTILL 1 DROP INTO BOTH EYES NIGHTLY  Qty: 2.5 mL, Refills: 1    Associated Diagnoses: Glaucoma, unspecified glaucoma type, unspecified laterality      !! gabapentin (NEURONTIN) 300 mg capsule TAKE 1 CAPSULE IN THE MORNING AND TAKE 3 CAPSULES BEFORE BEDTIME  Qty: 120 Capsule, Refills: 2    Comments: Pls delete any prev gabapentin rx on file.  thanks  Associated Diagnoses: RLS (restless legs syndrome); Lumbar radiculopathy      furosemide (LASIX) 20 mg tablet Take 1 Tablet by mouth daily. Qty: 90 Tablet, Refills: 0    Associated Diagnoses: Pain and swelling of right lower extremity; Essential hypertension      ergocalciferol (ERGOCALCIFEROL) 1,250 mcg (50,000 unit) capsule Take 1 Capsule by mouth every seven (7) days. Indications: vitamin D deficiency (high dose therapy)  Qty: 12 Capsule, Refills: 3    Associated Diagnoses: Vitamin D deficiency; Seronegative rheumatoid arthritis of multiple sites [facility]); Sarcoidosis of lung ([facility])      fluticasone furoate-vilanteroL (Breo Ellipta) 100-25 mcg/dose inhaler TAKE 1 PUFF BY MOUTH EVERY DAY  Qty: 1 Each, Refills: 3    Associated Diagnoses: Mild intermittent asthma, unspecified whether complicated      tofacitinib 11 mg Tb24 Take 11 mg by mouth daily. Indications: rheumatoid arthritis  Qty: 30 Tab, Refills: 11    Associated Diagnoses: Seronegative rheumatoid arthritis of multiple sites ([facility]      nortriptyline (PAMELOR) 50 mg capsule Take 50 mg by mouth nightly. albuterol sulfate (PROVENTIL;VENTOLIN) 2.5 mg/0.5 mL nebu nebulizer solution 0.5 mL by Nebulization route every four (4) hours as needed for Wheezing. Indications: asthma attack  Qty: 30 mL, Refills: 0    Associated Diagnoses: Mild intermittent asthma, unspecified whether complicated      predniSONE (DELTASONE) 20 mg tablet Take 20 mg by mouth daily (with breakfast). Qty: 10 Tablet, Refills: 0    Associated Diagnoses: Seronegative rheumatoid arthritis of multiple sites ([facility])      leflunomide (ARAVA) 20 mg tablet TAKE 1 TABLET BY MOUTH EVERY DAY  Qty: 90 Tablet, Refills: 1    Associated Diagnoses: Seronegative rheumatoid arthritis of multiple sites [facility])       ! ! - Potential duplicate medications found. Please discuss with provider.       STOP taking these medications       traMADoL (ULTRAM) 50 mg tablet Comments:   Reason for Stopping:            per medical continuation form      -Follow up in office in 2 weeks      Signed:  Crissy Burton NP  Orthopaedic Nurse Practitioner    11/17/2021  10:36 AM

## 2021-11-17 NOTE — PROGRESS NOTES
Problem: Mobility Impaired (Adult and Pediatric)  Goal: *Acute Goals and Plan of Care (Insert Text)  Description: FUNCTIONAL STATUS PRIOR TO ADMISSION: Patient was modified independent using a rollator for functional mobility. Reports that her rollator does not have functioning brakes. HOME SUPPORT PRIOR TO ADMISSION: The patient lived alone with \"people that can check in on her\" but will otherwise be alone. Physical Therapy Goals  Goals reviewed and remain appropriate 11/17/2021  Initiated 11/10/2021  1. Patient will move from supine to sit and sit to supine  in bed with modified independence within 7 days. 2. Patient will perform sit to stand with modified independence within 7 days. 3. Patient will ambulate with modified independence for 150 feet with the least restrictive device within 7 days. 4. Patient will ascend/descend 1 stairs with 1 handrail(s) with modified independence within 7 days. 5. Patient will perform home exercise program per protocol with modified independence within 7 days. 6. Patient will demonstrate AROM 0-90 degrees in operative joint within 7 days. Outcome: Progressing Towards Goal   PHYSICAL THERAPY TREATMENT: WEEKLY REASSESSMENT  Patient: Lg Echols (89 y.o. 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 Patient continues to demonstrate limited functional mobility and decreased independence secondary to impaired standing balance, decreased R knee ROM and strength, impaired gait mechanics, increased R knee edema and pain, limited endurance, generalized weakness, and decreased activity tolerance.  Received sitting in bedside chair and agreeable to PT intervention. Reported 4/10 R knee pain at rest and 3/10 R knee pain post-mobility (was medicated by RN prior to PT arrival). Continues to require frequent VCs to improve safety during all mobility. Demonstrates significant trunk flexion during standing tasks and gait with minimal improvement noted despite frequent VCs to correct. Poor positioning of RW during gait due to increased trunk flexion that again was not improved despite frequent cueing. Needed VCs to improve safety during all mobility in the bathroom. Pt was left sitting in bedside chair with all needs met and RN aware following session. Continue to recommend SNF rehab at discharge to address functional impairments as noted above. Patient's progression toward goals since last assessment: Slow progress; goals remain appropriate    Current Level of Function Impacting Discharge (mobility/balance): CGA    Functional Outcome Measure: The patient scored 55/100 on the Barthel Index outcome measure which is indicative of 45% impairment in ADLs and mobility. Other factors to consider for discharge: increased risk for falls; decline from baseline mobility; decreased safety awareness; lives alone         PLAN :  Goals have been updated based on progression since last assessment. Patient continues to benefit from skilled intervention to address the above impairments. Recommendations and Planned Interventions: bed mobility training, transfer training, gait training, therapeutic exercises, patient and family training/education, and therapeutic activities      Frequency/Duration: Patient will be followed by physical therapy:  daily to address goals.     Recommendation for discharge: (in order for the patient to meet his/her long term goals)  Therapy up to 5 days/week in SNF setting    This discharge recommendation:  Has been made in collaboration with the attending provider and/or case management    IF patient discharges home will need the following DME: to be determined (TBD)         SUBJECTIVE:   Patient stated I can't put my legs together.     OBJECTIVE DATA SUMMARY:   HISTORY:    Past Medical History:   Diagnosis Date    Asthma     Chronic pain 5/8/2020    DDD (degenerative disc disease), lumbar     Diabetes ([facility]     Gastritis     GERD (gastroesophageal reflux disease)     Glaucoma     Hearing loss 5/8/2020    Hiatal hernia     High cholesterol     Hypertension     Hypocalcemia 5/8/2020    Peripheral vascular disease (HCC)     RA (rheumatoid arthritis) ([facility]     Sarcoidosis 1999    MCV    Sleep apnea     has not used cpap in years since weight loss     Past Surgical History:   Procedure Laterality Date    HX GASTRIC BYPASS      HX HEART CATHETERIZATION      s/p PCI with stenting in 1998     HX KNEE REPLACEMENT Bilateral     HX ORTHOPAEDIC Bilateral     carpal tunnel surgery     HX SHOULDER REPLACEMENT Right     x 2        Personal factors and/or comorbidities impacting plan of care: HTN; diabetes; RA; asthma; chronic pain    Home Situation  Home Environment: Apartment  # Steps to Enter: 1  Rails to Enter: No  One/Two Story Residence: One story  Living Alone: Yes  Support Systems: Other Family Member(s), Child(reji)  Patient Expects to be Discharged to[de-identified] Apartment  Current DME Used/Available at Home: Cane, straight  Tub or Shower Type: Tub/Shower combination    EXAMINATION/PRESENTATION/DECISION MAKING:   Critical Behavior:  Neurologic State: Alert, Appropriate for age  Orientation Level: Oriented X4  Cognition: Follows commands, Appropriate safety awareness, Appropriate for age attention/concentration     Hearing: Auditory  Auditory Impairment: Hard of hearing, bilateral  Hearing Aids/Status: At home, Bilateral  Skin:  R knee surgical incision  Edema:  Moderate R knee edema  Range Of Motion:  AROM: Generally decreased, functional           PROM: Generally decreased, functional           Strength:    Strength: Generally decreased, functional                    Tone & Sensation:   Tone: Normal                              Coordination:  Coordination: Within functional limits  Vision:      Functional Mobility:  Bed Mobility:  Rolling:  (Received and returned to recliner)        Scooting: Stand-by assistance  Transfers:  Sit to Stand: Contact guard assistance; Additional time  Stand to Sit: Contact guard assistance; Additional time                       Balance:   Sitting: Intact  Sitting - Static: Good (unsupported)  Sitting - Dynamic: Good (unsupported)  Standing: Impaired; With support  Standing - Static: Fair; Good; Constant support  Standing - Dynamic : Fair; Constant support  Ambulation/Gait Training:  Distance (ft): 100 Feet (ft)  Assistive Device: Gait belt; Walker, rolling  Ambulation - Level of Assistance: Contact guard assistance; Assist x1; Additional time; Adaptive equipment        Gait Abnormalities: Antalgic; Decreased step clearance; Step to gait (significant trunk flexion)  Right Side Weight Bearing: As tolerated  Left Side Weight Bearing: Full  Base of Support: Widened; Shift to left  Stance: Right decreased  Speed/Nicole: Pace decreased (<100 feet/min); Shuffled; Slow  Step Length: Left shortened; Right shortened         Functional Measure:  Barthel Index:    Bathin  Bladder: 10  Bowels: 10  Groomin  Dressin  Feeding: 10  Mobility: 0  Stairs: 0  Toilet Use: 5  Transfer (Bed to Chair and Back): 10  Total: 55/100       The Barthel ADL Index: Guidelines  1. The index should be used as a record of what a patient does, not as a record of what a patient could do. 2. The main aim is to establish degree of independence from any help, physical or verbal, however minor and for whatever reason. 3. The need for supervision renders the patient not independent. 4. A patient's performance should be established using the best available evidence.  Asking the patient, friends/relatives and nurses are the usual sources, but direct observation and common sense are also important. However direct testing is not needed. 5. Usually the patient's performance over the preceding 24-48 hours is important, but occasionally longer periods will be relevant. 6. Middle categories imply that the patient supplies over 50 per cent of the effort. 7. Use of aids to be independent is allowed. Rony Georges., Barthel, D.W. ([id number]). Functional evaluation: the Barthel Index. [address] (14)2. LASHAE Gabriel, Darian Lee., Stephanie Segura., [place], [address] (1999). Measuring the change indisability after inpatient rehabilitation; comparison of the responsiveness of the Barthel Index and Functional [place] Measure. Journal of Neurology, Neurosurgery, and Psychiatry, 66(4), 780-108. JOSE Rowe, RAH Rea, & Lyn Castañeda MMIAH. (2004.) Assessment of post-stroke quality of life in cost-effectiveness studies: The usefulness of the Barthel Index and the EuroQoL-5D. Quality of Life Research, 13, 427-43          Pain Rating:  3-4/10 R knee pain    Activity Tolerance:   Fair and requires rest breaks    After treatment patient left in no apparent distress:   Sitting in chair and Call bell within reach    COMMUNICATION/EDUCATION:   The patients plan of care was discussed with: Physical therapist and Registered nurse. Fall prevention education was provided and the patient/caregiver indicated understanding., Patient/family have participated as able in goal setting and plan of care. , and Patient/family agree to work toward stated goals and plan of care.     Thank you for this referral.  Kristina Quinonez, PT, DPT   Time Calculation: 23 mins

## 2021-11-17 NOTE — PROGRESS NOTES
Transition of Care Plan:  RUR: n/a outpatient status   Disposition: SNF- [facility] (insurance auth obtained)   Follow up appointments: ortho  DME needed: n/a pt to go to rehab  Transportation at [address] @ 12:00pm  [place] or means to access home: yes      IM Medicare Letter: n/a outpatient status   Is patient a BCPI-A Bundle: no              If yes, was Bundle Letter given?:     Caregiver Contact: son Toma Connolly [phone]  Discharge Caregiver contacted prior to discharge? yes    Call report   [facility] @ 12:00pm    Insurance auth obtained. [facility] confirmed they have a bed for patient today. [facility] arranged for 12:00pm. CM made room visit with patient and informed her of plan, pt agreed. Medicare pt has received, reviewed, and signed 2nd  letter informing them of their right to appeal the discharge. Signed copy has been placed on pt bedside chart. CM contacted patient's son Toma Connolly and informed him of plan, per pt's request.     Transition of Care Plan to SNF/Rehab    SNF/Rehab Transition:  Patient has been accepted to [facility] and meets criteria for admission. Patient will transported by [facility] and expected to leave at 12:00pm.    Communication to Patient/Family:  Met with patient and son (identified care giver) and they are agreeable to the transition plan. Communication to SNF/Rehab:  Bedside RN, Anil Ariza, has been notified to update the transition plan to the facility and call report (phone number [phone]). Discharge information has been updated on the AVS.     Discharge instructions to be fax'd to facility at [facility] # [phone]). Nursing Please include all hard scripts for controlled substances, med rec and dc summary, and AVS in packet.      Reviewed and confirmed with facility, [facility], can manage the patient care needs for the following:     Job Foots with (X) only those applicable:    Medication:  [x]  Medications will be available at the facility  []  IV Antibiotics [x]  Controlled Substance - hard copy to be sent with patient   []  Weekly Labs   Documents:  [x] Hard RX  [x] MAR  [x] Kardex  [x] AVS  [x]Transfer Summary  [x]Discharge   Equipment:  []  CPAP/BiPAP  []  Wound Vacuum  []  Horton or Urinary Device  []  PICC/Central Line  []  Nebulizer  []  Ventilator   Treatment:  []Isolation (for MRSA, VRE, etc.)  []Surgical Drain Management  []Tracheostomy Care  []Dressing Changes  []Dialysis with transportation and chair time. []PEG Care  []Oxygen  []Daily Weights for Heart Failure   Dietary:  []Any diet limitations  []Tube Feedings   []Total Parenteral Management (TPN)   Eligible for Medicaid Long Term Services and Supports  Yes:  [] Eligible for medical assistance or will become eligible within 180 days and UAI completed. [] Provider/Patient and/or support system has requested screening. [] UAI copy provided to patient or responsible party. [] UAI unavailable at discharge will send once processed to SNF provider. [] UAI unavailable at discharged mailed to patient  No:   [] Private pay and is not financially eligible for Medicaid within the next 180 days. [] Reside out-of-state. [] A residents of a state owned/operated facility that is licensed  by [address] or [facility]  [] Enrollment in [facility] hospice services  [] [address]  [x] Patient /Family declines to have screening completed or provide financial information for screening     Financial Resources:  Medicaid    [] Initiated and application pending   [] Full coverage     Advanced Care Plan:  []Surrogate Decision Maker of Care  []POA  [x]Communicated Code Status full    Other       Care Management Interventions  PCP Verified by CM:  Yes  Mode of Transport at Discharge: BLS  Transition of Care Consult (CM Consult): Discharge Planning, SNF  Physical Therapy Consult: Yes  Occupational Therapy Consult: Yes  Speech Therapy Consult: No  Support Systems: Other Family Member(s), Child(reji)  Confirm Follow Up Transport: Family  The Plan for Transition of Care is Related to the Following Treatment Goals : SNF  The Patient and/or Patient Representative was Provided with a Choice of Provider and Agrees with the Discharge Plan?: Yes  Freedom of Choice List was Provided with Basic Dialogue that Supports the Patient's Individualized Plan of Care/Goals, Treatment Preferences and Shares the Quality Data Associated with the Providers?: Yes  Discharge Location  Discharge Placement: Skilled nursing facility    Candi Justin, [address]\""

## 2021-11-17 NOTE — PROGRESS NOTES
Ortho / Neurosurgery NP Note    POD#  8 s/p RIGHT REVISION TOTAL KNEE ARTHROPLASTY   Pt seen with no visitor present. Pt sitting in chair, taking on phone, in NAD. Denies pain presently, reports relief when she gets oxycodone   Slow progress with therapy; gradually improving to CGA but requires verbal cues to stay inside walker. Concern for safety at home with limited resources/help. Tolerating regular diet. No nausea. VSS Afebrile. Room air. Visit Vitals  /66 (BP 1 Location: Right arm, BP Patient Position: At rest)   Pulse 74   Temp 97.7 °F (36.5 °C)   Resp 16   Ht 5' 4\" (1.626 m)   Wt 81.7 kg (180 lb 1.9 oz)   SpO2 96%   BMI 30.92 kg/m²       Voiding status: POUR - Horton reinserted 11/11 ; removed on 11/15 and voiding now without difficulty.   Output (mL)  Urine Voided: 250 ml (11/17/21 0852)  Emesis: 240 mL (11/12/21 1755)  Last Bowel Movement Date: 11/16/21 (11/16/21 2000)  Unmeasurable Output  Urine Occurrence(s): 1 (11/16/21 1245)  Stool Occurrence(s): 1 (11/16/21 1021)  Straight Cath  Straight Cath: Nurse performed cath (11/10/21 1857)  Number of Attempts: 1 (11/10/21 1857)  Time Catheter Inserted: 3851 (11/10/21 1857)  Time Catheter Removed: 2447 (11/10/21 1857)  Urine: 500 mL (11/10/21 1857)      Labs    Lab Results   Component Value Date/Time    HGB 8.5 (L) 11/10/2021 04:27 AM      Lab Results   Component Value Date/Time    INR 1.0 11/01/2021 10:32 AM      Lab Results   Component Value Date/Time    Sodium 141 11/10/2021 04:27 AM    Potassium 4.2 11/10/2021 04:27 AM    Chloride 112 (H) 11/10/2021 04:27 AM    CO2 20 (L) 11/10/2021 04:27 AM    Glucose 82 11/10/2021 04:27 AM    BUN 19 11/10/2021 04:27 AM    Creatinine 1.06 (H) 11/10/2021 04:27 AM    Calcium 7.7 (L) 11/10/2021 04:27 AM     Recent Glucose Results:   Lab Results   Component Value Date/Time    GLUCPOC 87 11/17/2021 08:01 AM    GLUCPOC 127 (H) 11/16/2021 09:35 PM    GLUCPOC 115 11/16/2021 03:52 PM     WC - no growth so far    Body mass index is 30.92 kg/m². : A BMI > 30 is classified as obesity and > 40 is classified as morbid obesity. Primaseal dressing intact - shadowing down middle of dressing (seems unchanged from surgery). Will change today. Cryotherapy in place over incision  Calves soft and supple; No pain with passive stretch  Sensation and motor intact   SCDs for mechanical DVT proph while in bed     PLAN:  1) PT BID - WBAT. 2) Aspirin 325 mg PO BID for DVT Prophylaxis   3) GI Prophylaxis - Pepcid  4) POUR - resolved and void trial on 11/15 successful. 5) Readniess for discharge:     [x] Vital Signs stable    [x] Hgb stable    [x] + Voiding     [x] Wound intact, drainage minimal    [x] Tolerating PO intake     [] Cleared by PT (OT if applicable)     [] Stair training completed (if applicable)    [] Independent / Contact Guard Assist (household distance)     [] Bed mobility     [] Car transfers     [] ADLs    [x] Adequate pain control on oral medication alone     Discharge to [facility] today if able to make transport arrangements.    Rapid Coivd test 11/15 negative    Gogo Canela NP

## 2021-11-18 LAB
BACTERIA SPEC CULT: ABNORMAL
GLUCOSE BLD STRIP.AUTO-MCNC: 92 MG/DL (ref 65–117)
GRAM STN SPEC: ABNORMAL
GRAM STN SPEC: ABNORMAL
SERVICE CMNT-IMP: ABNORMAL
SERVICE CMNT-IMP: NORMAL

## 2021-11-22 DIAGNOSIS — Z96.651 AFTERCARE FOLLOWING RIGHT KNEE JOINT REPLACEMENT SURGERY: Primary | ICD-10-CM

## 2021-11-22 DIAGNOSIS — Z47.1 AFTERCARE FOLLOWING RIGHT KNEE JOINT REPLACEMENT SURGERY: Primary | ICD-10-CM

## 2021-11-23 LAB
BACTERIA SPEC CULT: NORMAL
GRAM STN SPEC: NORMAL
SERVICE CMNT-IMP: NORMAL

## 2021-11-30 ENCOUNTER — TELEPHONE (OUTPATIENT)
Dept: RHEUMATOLOGY | Age: 76
End: 2021-11-30

## 2021-12-01 ENCOUNTER — HOSPITAL ENCOUNTER (OUTPATIENT)
Dept: GENERAL RADIOLOGY | Age: 76
Discharge: HOME | End: 2021-12-01
Payer: MEDICARE

## 2021-12-01 ENCOUNTER — OFFICE VISIT (OUTPATIENT)
Dept: ORTHOPEDIC SURGERY | Age: 76
End: 2021-12-01
Payer: MEDICARE

## 2021-12-01 VITALS
HEIGHT: 64 IN | WEIGHT: 180 LBS | OXYGEN SATURATION: 97 % | TEMPERATURE: 96 F | HEART RATE: 93 BPM | SYSTOLIC BLOOD PRESSURE: 96 MMHG | DIASTOLIC BLOOD PRESSURE: 61 MMHG | BODY MASS INDEX: 30.73 KG/M2

## 2021-12-01 DIAGNOSIS — Z47.1 AFTERCARE FOLLOWING RIGHT KNEE JOINT REPLACEMENT SURGERY: Primary | ICD-10-CM

## 2021-12-01 DIAGNOSIS — Z96.651 AFTERCARE FOLLOWING RIGHT KNEE JOINT REPLACEMENT SURGERY: Primary | ICD-10-CM

## 2021-12-01 DIAGNOSIS — Z47.1 AFTERCARE FOLLOWING RIGHT KNEE JOINT REPLACEMENT SURGERY: ICD-10-CM

## 2021-12-01 DIAGNOSIS — Z96.651 AFTERCARE FOLLOWING RIGHT KNEE JOINT REPLACEMENT SURGERY: ICD-10-CM

## 2021-12-01 PROCEDURE — 99024 POSTOP FOLLOW-UP VISIT: CPT | Performed by: ORTHOPAEDIC SURGERY

## 2021-12-01 PROCEDURE — 73560 X-RAY EXAM OF KNEE 1 OR 2: CPT

## 2021-12-01 RX ORDER — OXYCODONE HYDROCHLORIDE 5 MG/1
5 TABLET ORAL
Qty: 42 TABLET | Refills: 0 | Status: SHIPPED | OUTPATIENT
Start: 2021-12-01 | End: 2021-12-07 | Stop reason: SDUPTHER

## 2021-12-01 NOTE — PROGRESS NOTES
Identified pt with two pt identifiers (name and ). Reviewed chart in preparation for visit and have obtained necessary documentation. Virgen Garnica is a 78 y.o. female  Chief Complaint   Patient presents with    Surgical Follow-up     RT knee     Visit Vitals  BP 96/61 (BP 1 Location: Left upper arm, BP Patient Position: Sitting, BP Cuff Size: Large adult)   Pulse 93   Temp (!) 96 °F (35.6 °C) (Tympanic)   Ht 5' 4\" (1.626 m)   Wt 180 lb (81.6 kg)   SpO2 97%   BMI 30.90 kg/m²     1. Have you been to the ER, urgent care clinic since your last visit? Hospitalized since your last visit? No    2. Have you seen or consulted any other health care providers outside of the [address] [place] [address] since your last visit? Include any pap smears or colon screening.  No

## 2021-12-01 NOTE — PROGRESS NOTES
Lucy Miguel  is here for a follow up visit from a total knee arthroplasty on the right  side. The patient is doing well, with no medical complications since discharge. Pain has been appropriate since surgery,and the patient is progressing well with physical therapy. Patient is ambulating with a walker. Current Outpatient Medications on File Prior to Visit   Medication Sig Dispense Refill    aspirin delayed-release 325 mg tablet Take 1 Tablet by mouth two (2) times a day for 30 days. 60 Tablet 0    gabapentin (NEURONTIN) 300 mg capsule Take 900 mg by mouth nightly. TAKE 1 CAPSULE IN THE MORNING AND TAKE 3 CAPSULES BEFORE BEDTIME      simvastatin (ZOCOR) 20 mg tablet TAKE BY MOUTH NIGHTLY. (Patient taking differently: Take 20 mg by mouth nightly. TAKE BY MOUTH NIGHTLY. ) 90 Tablet 1    sertraline (ZOLOFT) 50 mg tablet Take 1 Tablet by mouth daily. 90 Tablet 1    omeprazole (PRILOSEC) 20 mg capsule Take 1 Capsule by mouth daily. 90 Capsule 1    metoprolol tartrate (LOPRESSOR) 25 mg tablet Take 1 Tablet by mouth two (2) times a day. 180 Tablet 1    metFORMIN (GLUCOPHAGE) 500 mg tablet Take 1 Tablet by mouth daily (with dinner). Decreased dose 90 Tablet 1    latanoprost (XALATAN) 0.005 % ophthalmic solution INSTILL 1 DROP INTO BOTH EYES NIGHTLY (Patient taking differently: Administer 1 Drop to both eyes nightly. INSTILL 1 DROP INTO BOTH EYES NIGHTLY) 2.5 mL 1    gabapentin (NEURONTIN) 300 mg capsule TAKE 1 CAPSULE IN THE MORNING AND TAKE 3 CAPSULES BEFORE BEDTIME (Patient taking differently: Take 300 mg by mouth daily. TAKE 1 CAPSULE IN THE MORNING AND TAKE 3 CAPSULES BEFORE BEDTIME) 120 Capsule 2    furosemide (LASIX) 20 mg tablet Take 1 Tablet by mouth daily. 90 Tablet 0    ergocalciferol (ERGOCALCIFEROL) 1,250 mcg (50,000 unit) capsule Take 1 Capsule by mouth every seven (7) days.  Indications: vitamin D deficiency (high dose therapy) (Patient taking differently: Take 50,000 Units by mouth every seven (7) days. Fridays  Indications: vitamin D deficiency (high dose therapy)) 12 Capsule 3    fluticasone furoate-vilanteroL (Breo Ellipta) 100-25 mcg/dose inhaler TAKE 1 PUFF BY MOUTH EVERY DAY (Patient taking differently: Take 1 Puff by inhalation daily. TAKE 1 PUFF BY MOUTH EVERY DAY) 1 Each 3    albuterol sulfate (PROVENTIL;VENTOLIN) 2.5 mg/0.5 mL nebu nebulizer solution 0.5 mL by Nebulization route every four (4) hours as needed for Wheezing. Indications: asthma attack 30 mL 0    predniSONE (DELTASONE) 20 mg tablet Take 20 mg by mouth daily (with breakfast). 10 Tablet 0    leflunomide (ARAVA) 20 mg tablet TAKE 1 TABLET BY MOUTH EVERY DAY (Patient taking differently: Take 20 mg by mouth daily. TAKE 1 TABLET BY MOUTH EVERY DAY) 90 Tablet 1    tofacitinib 11 mg Tb24 Take 11 mg by mouth daily. Indications: rheumatoid arthritis 30 Tab 11    nortriptyline (PAMELOR) 50 mg capsule Take 50 mg by mouth nightly. No current facility-administered medications on file prior to visit. ROS:  General: denies agitation, major chest pain, unexpected weakness  Patient complains of left knee pain which is well managed with oxycodone. Skin: healing wound is without issue or drainage  Strength: appropriate weakness of involved extremity is resolving since surgery      Physical Examination:    Blood pressure 96/61, pulse 93, temperature (!) 96 °F (35.6 °C), temperature source Tympanic, height 5' 4\" (1.626 m), weight 180 lb (81.6 kg), SpO2 97 %. Dressing: Clean, Dry, Intact  Skin: no significant drainage, no wound dehiscence.    Range of motion: 0-100  Coronal plane stability is excellent  Sensation intact to light touch at level of wound and distally  Strength is 5/5 with knee flexion and extension  Leg lengths are clinically equal  Distal swelling is noted, but appropriate for postoperative course  Distal capillary refill less than 2 seconds      Imaging:    Postoperative xrays are reviewed, they indicate a right total knee arthroplasty in excellent position without evidence of loosening or failure. Assessment: Status post right total knee arthroplasty, doing well    Plan:  Patient will continue physical therapy, with the goal of outpatient therapy and then home exercise program as soon as is possible    Wound care and dental prophylaxis instructions were reviewed  Continue to weight bear as tolerated without restriction, except to keep to the positions of comfort. Emphasis was placed on range of motion and strength exercises daily. Deep Venous Thrombosis Prophylaxis  Follow up will be at 4 weeks from now,  no xrays on follow up. Ms. David Hilliard has a reminder for a \"due or due soon\" health maintenance.  I have asked that she contact her primary care provider for follow-up on this health

## 2021-12-07 ENCOUNTER — OFFICE VISIT (OUTPATIENT)
Dept: FAMILY MEDICINE CLINIC | Age: 76
End: 2021-12-07
Payer: MEDICARE

## 2021-12-07 VITALS
WEIGHT: 179 LBS | HEART RATE: 69 BPM | BODY MASS INDEX: 30.56 KG/M2 | HEIGHT: 64 IN | DIASTOLIC BLOOD PRESSURE: 59 MMHG | RESPIRATION RATE: 18 BRPM | TEMPERATURE: 97 F | SYSTOLIC BLOOD PRESSURE: 109 MMHG

## 2021-12-07 DIAGNOSIS — Z47.1 AFTERCARE FOLLOWING RIGHT KNEE JOINT REPLACEMENT SURGERY: ICD-10-CM

## 2021-12-07 DIAGNOSIS — S89.91XD INJURY OF RIGHT KNEE, SUBSEQUENT ENCOUNTER: ICD-10-CM

## 2021-12-07 DIAGNOSIS — Z95.820 STATUS POST ANGIOPLASTY WITH STENT: ICD-10-CM

## 2021-12-07 DIAGNOSIS — M06.09 SERONEGATIVE RHEUMATOID ARTHRITIS OF MULTIPLE SITES (HCC): ICD-10-CM

## 2021-12-07 DIAGNOSIS — Z96.651 STATUS POST REVISION OF TOTAL KNEE REPLACEMENT, RIGHT: ICD-10-CM

## 2021-12-07 DIAGNOSIS — M79.89 PAIN AND SWELLING OF RIGHT LOWER EXTREMITY: ICD-10-CM

## 2021-12-07 DIAGNOSIS — Z09 HOSPITAL DISCHARGE FOLLOW-UP: Primary | ICD-10-CM

## 2021-12-07 DIAGNOSIS — Z96.651 AFTERCARE FOLLOWING RIGHT KNEE JOINT REPLACEMENT SURGERY: ICD-10-CM

## 2021-12-07 DIAGNOSIS — M79.604 PAIN AND SWELLING OF RIGHT LOWER EXTREMITY: ICD-10-CM

## 2021-12-07 DIAGNOSIS — N18.31 STAGE 3A CHRONIC KIDNEY DISEASE (HCC): ICD-10-CM

## 2021-12-07 DIAGNOSIS — I10 ESSENTIAL HYPERTENSION: ICD-10-CM

## 2021-12-07 DIAGNOSIS — D64.9 POSTOPERATIVE ANEMIA: ICD-10-CM

## 2021-12-07 DIAGNOSIS — E11.8 TYPE 2 DIABETES MELLITUS WITH COMPLICATION, WITHOUT LONG-TERM CURRENT USE OF INSULIN (HCC): ICD-10-CM

## 2021-12-07 PROCEDURE — G8752 SYS BP LESS 140: HCPCS | Performed by: FAMILY MEDICINE

## 2021-12-07 PROCEDURE — G8510 SCR DEP NEG, NO PLAN REQD: HCPCS | Performed by: FAMILY MEDICINE

## 2021-12-07 PROCEDURE — G8417 CALC BMI ABV UP PARAM F/U: HCPCS | Performed by: FAMILY MEDICINE

## 2021-12-07 PROCEDURE — 99214 OFFICE O/P EST MOD 30 MIN: CPT | Performed by: FAMILY MEDICINE

## 2021-12-07 PROCEDURE — G8399 PT W/DXA RESULTS DOCUMENT: HCPCS | Performed by: FAMILY MEDICINE

## 2021-12-07 PROCEDURE — G8427 DOCREV CUR MEDS BY ELIG CLIN: HCPCS | Performed by: FAMILY MEDICINE

## 2021-12-07 PROCEDURE — 1101F PT FALLS ASSESS-DOCD LE1/YR: CPT | Performed by: FAMILY MEDICINE

## 2021-12-07 PROCEDURE — G8754 DIAS BP LESS 90: HCPCS | Performed by: FAMILY MEDICINE

## 2021-12-07 PROCEDURE — 1090F PRES/ABSN URINE INCON ASSESS: CPT | Performed by: FAMILY MEDICINE

## 2021-12-07 PROCEDURE — G8536 NO DOC ELDER MAL SCRN: HCPCS | Performed by: FAMILY MEDICINE

## 2021-12-07 PROCEDURE — 3017F COLORECTAL CA SCREEN DOC REV: CPT | Performed by: FAMILY MEDICINE

## 2021-12-07 PROCEDURE — 2022F DILAT RTA XM EVC RTNOPTHY: CPT | Performed by: FAMILY MEDICINE

## 2021-12-07 PROCEDURE — G9899 SCRN MAM PERF RSLTS DOC: HCPCS | Performed by: FAMILY MEDICINE

## 2021-12-07 PROCEDURE — 1111F DSCHRG MED/CURRENT MED MERGE: CPT | Performed by: FAMILY MEDICINE

## 2021-12-07 PROCEDURE — 3044F HG A1C LEVEL LT 7.0%: CPT | Performed by: FAMILY MEDICINE

## 2021-12-07 RX ORDER — OXYCODONE HYDROCHLORIDE 5 MG/1
5 TABLET ORAL
Qty: 30 TABLET | Refills: 0 | Status: SHIPPED | OUTPATIENT
Start: 2021-12-08 | End: 2021-12-15 | Stop reason: SDUPTHER

## 2021-12-07 NOTE — PROGRESS NOTES
Chief Complaint   Patient presents with   [facility] Follow Up     [address] Admitted 11/9/21 Discahrged 11/17/21 Admitted to [address] [place] [address],[address] on 11/17/21 Discharged 11/22/21   1. Have you been to the ER, urgent care clinic since your last visit? Hospitalized since your last visit? Yes Where: [address]    2. Have you seen or consulted any other health care providers outside of the [address] [place] [address] since your last visit? Include any pap smears or colon screening.  No    Twisted Right Knee Friday night ,having pain and swelling

## 2021-12-07 NOTE — PROGRESS NOTES
Transitional Care Management Progress Note    Patient: Maria M Delgado  : 1945  PCP: Martha Renteria MD    Date of office visit: 2021   Date of admission: 21  Date of discharge: 21   Rossi Marte to [facility] for rehab discharge on 2021  Hospital: [facility]      Assessment/Plan: Doing well overall after revision of right total knee but had a fall after this. Encouraged f/u with ortho. Checking labs and treating pain acutely. Diagnoses and all orders for this visit:    1. Hospital discharge follow-up  -     LA DISCHARGE MEDS RECONCILED W/ CURRENT OUTPATIENT MED LIST  -     oxyCODONE IR (ROXICODONE) 5 mg immediate release tablet; Take 1 Tablet by mouth every four (4) hours as needed for Pain for up to 7 days. Max Daily Amount: 30 mg.  -     METABOLIC PANEL, BASIC; Future  -     D DIMER; Future  -     LIPID PANEL; Future  -     TSH 3RD GENERATION; Future  -     CBC W/O DIFF; Future    2. Status post revision of total knee replacement, right  -     oxyCODONE IR (ROXICODONE) 5 mg immediate release tablet; Take 1 Tablet by mouth every four (4) hours as needed for Pain for up to 7 days. Max Daily Amount: 30 mg.    3. Injury of right knee, subsequent encounter  -     oxyCODONE IR (ROXICODONE) 5 mg immediate release tablet; Take 1 Tablet by mouth every four (4) hours as needed for Pain for up to 7 days. Max Daily Amount: 30 mg.  -     D DIMER; Future    4. Aftercare following right knee joint replacement surgery  -     oxyCODONE IR (ROXICODONE) 5 mg immediate release tablet; Take 1 Tablet by mouth every four (4) hours as needed for Pain for up to 7 days. Max Daily Amount: 30 mg.    5. Type 2 diabetes mellitus with complication, without long-term current use of insulin (HCC)  -     METABOLIC PANEL, BASIC; Future  -     LIPID PANEL; Future  -     TSH 3RD GENERATION; Future    6. Essential hypertension  -     METABOLIC PANEL, BASIC; Future    7.  Status post angioplasty with stent    8. Seronegative rheumatoid arthritis of multiple sites ([facility]    9. Stage 3a chronic kidney disease ([facility] [facility]  -     METABOLIC PANEL, BASIC; Future  -     LIPID PANEL; Future  -     TSH 3RD GENERATION; Future  -     CBC W/O DIFF; Future    10. Pain and swelling of right lower extremity  -     METABOLIC PANEL, BASIC; Future  -     D DIMER; Future    11. Postoperative anemia  -     CBC W/O DIFF; Future      The complexity of medical decision making for this patient's transitional care is moderate   Follow-up and Dispositions    · Return in 1 month (on 1/7/2022), or if symptoms worsen or fail to improve. Subjective:   Maura Beatty is a 78 y.o. female presenting today for follow-up after hospital discharge. This encounter and supporting documentation was reviewed if available. Medication reconciliation was performed today. The main problem requiring admission was right TKA revision with Dr. Lesly Quezada. Complications during admission: none    Reviewed hospital discharge summary. No significant concerns. She has already followed up with Dr. Lesly Quezada and seems to be doing with PT. Patient had a fall and twisted her right knee 4 nights ago. She has developed pain and swelling. She has not checked it with ortho about this. Still able to walk with walker. She was trying to get up from bed and leg slipped and her right knee went medially and hit bed rail. Son caught her so did not hit ground. Tried ice and pain medication and seems to help. Medications marked \"taking\" at this time:  Prior to Admission medications    Medication Sig Start Date End Date Taking? Authorizing Provider   oxyCODONE IR (ROXICODONE) 5 mg immediate release tablet Take 1 Tablet by mouth every four (4) hours as needed for Pain for up to 7 days. Max Daily Amount: 30 mg. 12/8/21 12/15/21 Yes Kristina Antonio MD   aspirin delayed-release 325 mg tablet Take 1 Tablet by mouth two (2) times a day for 30 days. 11/10/21 12/10/21 Yes Farshad Frias NP   simvastatin (ZOCOR) 20 mg tablet TAKE BY MOUTH NIGHTLY. Patient taking differently: Take 20 mg by mouth nightly. TAKE BY MOUTH NIGHTLY. 10/26/21  Yes Yesy Mei MD   sertraline (ZOLOFT) 50 mg tablet Take 1 Tablet by mouth daily. 10/26/21  Yes Yesy Mei MD   omeprazole (PRILOSEC) 20 mg capsule Take 1 Capsule by mouth daily. 10/26/21  Yes Yesy Mei MD   metoprolol tartrate (LOPRESSOR) 25 mg tablet Take 1 Tablet by mouth two (2) times a day. 10/26/21  Yes Yesy Mei MD   metFORMIN (GLUCOPHAGE) 500 mg tablet Take 1 Tablet by mouth daily (with dinner). Decreased dose 10/26/21  Yes Yesy Mei MD   latanoprost (XALATAN) 0.005 % ophthalmic solution INSTILL 1 DROP INTO BOTH EYES NIGHTLY  Patient taking differently: Administer 1 Drop to both eyes nightly. INSTILL 1 DROP INTO BOTH EYES NIGHTLY 10/26/21  Yes Yesy Mei MD   gabapentin (NEURONTIN) 300 mg capsule TAKE 1 CAPSULE IN THE MORNING AND TAKE 3 CAPSULES BEFORE BEDTIME  Patient taking differently: Take 300 mg by mouth daily. TAKE 1 CAPSULE IN THE MORNING AND TAKE 3 CAPSULES BEFORE BEDTIME 10/26/21  Yes Yesy Mei MD   furosemide (LASIX) 20 mg tablet Take 1 Tablet by mouth daily. 10/26/21  Yes Yesy Mei MD   ergocalciferol (ERGOCALCIFEROL) 1,250 mcg (50,000 unit) capsule Take 1 Capsule by mouth every seven (7) days. Indications: vitamin D deficiency (high dose therapy)  Patient taking differently: Take 50,000 Units by mouth every seven (7) days. Fridays  Indications: vitamin D deficiency (high dose therapy) 10/26/21  Yes Yesy eMi MD   fluticasone furoate-vilanteroL (Breo Ellipta) 100-25 mcg/dose inhaler TAKE 1 PUFF BY MOUTH EVERY DAY  Patient taking differently: Take 1 Puff by inhalation daily.  TAKE 1 PUFF BY MOUTH EVERY DAY 10/26/21  Yes Yesy Mei MD   albuterol sulfate (PROVENTIL;VENTOLIN) 2.5 mg/0.5 mL nebu nebulizer solution 0.5 mL by Nebulization route every four (4) hours as needed for Wheezing. Indications: asthma attack 10/26/21  Yes Laxmi Lofton MD   predniSONE (DELTASONE) 20 mg tablet Take 20 mg by mouth daily (with breakfast). 10/26/21  Yes Laxmi Lofton MD   leflunomide (ARAVA) 20 mg tablet TAKE 1 TABLET BY MOUTH EVERY DAY  Patient taking differently: Take 20 mg by mouth daily. TAKE 1 TABLET BY MOUTH EVERY DAY 8/31/21  Yes Betty Agee MD   tofacitinib 11 mg Tb24 Take 11 mg by mouth daily. Indications: rheumatoid arthritis 1/11/21  Yes Betty Agee MD   nortriptyline (PAMELOR) 50 mg capsule Take 50 mg by mouth nightly. 5/7/18  Yes Provider, Historical   oxyCODONE IR (ROXICODONE) 5 mg immediate release tablet Take 1 Tablet by mouth every four (4) hours as needed for Pain for up to 7 days. Max Daily Amount: 30 mg. 12/1/21 12/7/21  Louie Guevara DO   gabapentin (NEURONTIN) 300 mg capsule Take 900 mg by mouth nightly.  TAKE 1 CAPSULE IN THE MORNING AND TAKE 3 CAPSULES BEFORE BEDTIME  12/7/21  Provider, Historical      ROS  Gen - no fever/chills  Resp - no dyspnea or cough  CV - no chest pain or WORKMAN  Rest per HPI    Past Medical History:   Diagnosis Date    Asthma     Chronic pain 5/8/2020    DDD (degenerative disc disease), lumbar     Diabetes ([facility] [address] [facility]     Gastritis     GERD (gastroesophageal reflux disease)     Glaucoma     Hearing loss 5/8/2020    Hiatal hernia     High cholesterol     Hypertension     Hypocalcemia 5/8/2020    Peripheral vascular disease (HCC)     RA (rheumatoid arthritis) (HCC)     Sarcoidosis 1999    [facility]    Sleep apnea     has not used cpap in years since weight loss     Past Surgical History:   Procedure Laterality Date    HX GASTRIC BYPASS      HX HEART CATHETERIZATION      s/p PCI with stenting in 1998     HX KNEE REPLACEMENT Bilateral     HX ORTHOPAEDIC Bilateral     carpal tunnel surgery     HX SHOULDER REPLACEMENT Right     x 2           Objective:     Visit Vitals  BP (!) 109/59 (BP 1 Location: Left upper arm, BP Patient Position: Sitting, BP Cuff Size: Adult)   Pulse 69   Temp 97 °F (36.1 °C) (Temporal)   Resp 18   Ht 5' 4\" (1.626 m)   Wt 179 lb (81.2 kg)   BMI 30.73 kg/m²        PE  General appearance - alert, well appearing, and in no distress  Eyes -sclera anicteric  Neck - supple, no significant adenopathy, no thyromegaly  Chest - clear to auscultation, no wheezes, rales or rhonchi, symmetric air entry  Heart - normal rate, regular rhythm, normal S1, S2, no murmurs, rubs, clicks or gallops  Neurological - alert, oriented, normal speech, no focal findings or movement disorder noted  MSK- R knee with edema, using walker. Extr - no edema  Psych - normal mood and affect      We discussed the expected course, resolution and complications of the diagnosis(es) in detail. Medication risks, benefits, costs, interactions, and alternatives were discussed as indicated. I advised her to contact the office if her condition worsens, changes or fails to improve as anticipated. She expressed understanding with the diagnosis(es) and plan.      Palmira Roberts MD

## 2021-12-11 LAB
BUN SERPL-MCNC: 27 MG/DL (ref 8–27)
BUN/CREAT SERPL: 31 (ref 12–28)
CALCIUM SERPL-MCNC: 8.3 MG/DL (ref 8.7–10.3)
CHLORIDE SERPL-SCNC: 107 MMOL/L (ref 96–106)
CHOLEST SERPL-MCNC: 148 MG/DL (ref 100–199)
CO2 SERPL-SCNC: 17 MMOL/L (ref 20–29)
CREAT SERPL-MCNC: 0.86 MG/DL (ref 0.57–1)
D DIMER PPP FEU-MCNC: 8.71 MG/L FEU (ref 0–0.49)
ERYTHROCYTE [DISTWIDTH] IN BLOOD BY AUTOMATED COUNT: 13.5 % (ref 11.7–15.4)
GLUCOSE SERPL-MCNC: 70 MG/DL (ref 65–99)
HCT VFR BLD AUTO: 30.8 % (ref 34–46.6)
HDLC SERPL-MCNC: 85 MG/DL
HGB BLD-MCNC: 9.6 G/DL (ref 11.1–15.9)
LDLC SERPL CALC-MCNC: 50 MG/DL (ref 0–99)
MCH RBC QN AUTO: 30.8 PG (ref 26.6–33)
MCHC RBC AUTO-ENTMCNC: 31.2 G/DL (ref 31.5–35.7)
MCV RBC AUTO: 99 FL (ref 79–97)
PLATELET # BLD AUTO: 287 X10E3/UL (ref 150–450)
POTASSIUM SERPL-SCNC: 4.6 MMOL/L (ref 3.5–5.2)
RBC # BLD AUTO: 3.12 X10E6/UL (ref 3.77–5.28)
SODIUM SERPL-SCNC: 139 MMOL/L (ref 134–144)
TRIGL SERPL-MCNC: 61 MG/DL (ref 0–149)
TSH SERPL DL<=0.005 MIU/L-ACNC: 2.26 UIU/ML (ref 0.45–4.5)
VLDLC SERPL CALC-MCNC: 13 MG/DL (ref 5–40)
WBC # BLD AUTO: 6 X10E3/UL (ref 3.4–10.8)

## 2021-12-15 DIAGNOSIS — Z47.1 AFTERCARE FOLLOWING RIGHT KNEE JOINT REPLACEMENT SURGERY: ICD-10-CM

## 2021-12-15 DIAGNOSIS — Z09 HOSPITAL DISCHARGE FOLLOW-UP: ICD-10-CM

## 2021-12-15 DIAGNOSIS — Z96.651 AFTERCARE FOLLOWING RIGHT KNEE JOINT REPLACEMENT SURGERY: ICD-10-CM

## 2021-12-15 DIAGNOSIS — S89.91XD INJURY OF RIGHT KNEE, SUBSEQUENT ENCOUNTER: ICD-10-CM

## 2021-12-15 DIAGNOSIS — Z96.651 STATUS POST REVISION OF TOTAL KNEE REPLACEMENT, RIGHT: ICD-10-CM

## 2021-12-15 RX ORDER — OXYCODONE HYDROCHLORIDE 5 MG/1
5 TABLET ORAL
Qty: 30 TABLET | Refills: 0 | Status: SHIPPED | OUTPATIENT
Start: 2021-12-15 | End: 2021-12-22

## 2021-12-22 ENCOUNTER — PATIENT MESSAGE (OUTPATIENT)
Dept: FAMILY MEDICINE CLINIC | Age: 76
End: 2021-12-22

## 2021-12-22 ENCOUNTER — PATIENT OUTREACH (OUTPATIENT)
Dept: CASE MANAGEMENT | Age: 76
End: 2021-12-22

## 2021-12-22 NOTE — PROGRESS NOTES
ACM reviewed EMR for CM follow up . ACM attempted to reach patient for CM follow up. Unable to reach patient or her son and left VM's for return call with contact info provided.     Will try to reach again in a week or so

## 2022-01-06 ENCOUNTER — TELEPHONE (OUTPATIENT)
Dept: ORTHOPEDIC SURGERY | Age: 77
End: 2022-01-06

## 2022-01-06 NOTE — TELEPHONE ENCOUNTER
Pt is requested to have pain medication sent to [address] [place] [address] in [place]. Informed pt that provider cannot send controlled substance to other pharmacies that are not in the state of [place]. Pt would like an antiflammatory or something to help with the pain. Pt gave name, address and phone # for the pharmacy if provider can send anything. [address], [address] [place] [address] phone # [phone]    Please advise.

## 2022-01-07 RX ORDER — DICLOFENAC SODIUM 50 MG/1
50 TABLET, DELAYED RELEASE ORAL 3 TIMES DAILY
Qty: 60 TABLET | Refills: 1 | Status: SHIPPED | OUTPATIENT
Start: 2022-01-07 | End: 2022-02-22 | Stop reason: SINTOL

## 2022-01-21 DIAGNOSIS — M06.09 SERONEGATIVE RHEUMATOID ARTHRITIS OF MULTIPLE SITES (HCC): ICD-10-CM

## 2022-01-21 RX ORDER — TOFACITINIB 11 MG/1
TABLET, FILM COATED, EXTENDED RELEASE ORAL
Qty: 30 TABLET | Refills: 11 | Status: SHIPPED | OUTPATIENT
Start: 2022-01-21 | End: 2022-03-07

## 2022-02-02 ENCOUNTER — TELEPHONE (OUTPATIENT)
Dept: FAMILY MEDICINE CLINIC | Age: 77
End: 2022-02-02

## 2022-02-02 NOTE — TELEPHONE ENCOUNTER
Pls call patient with the name of the doctor that did R toe surgery about a year and half ago         Best number to reach her is [phone]

## 2022-02-09 ENCOUNTER — OFFICE VISIT (OUTPATIENT)
Dept: ORTHOPEDIC SURGERY | Age: 77
End: 2022-02-09
Payer: MEDICARE

## 2022-02-09 VITALS
DIASTOLIC BLOOD PRESSURE: 67 MMHG | SYSTOLIC BLOOD PRESSURE: 140 MMHG | TEMPERATURE: 96.3 F | WEIGHT: 174 LBS | OXYGEN SATURATION: 100 % | BODY MASS INDEX: 29.71 KG/M2 | HEIGHT: 64 IN | HEART RATE: 77 BPM

## 2022-02-09 DIAGNOSIS — M86.9 OSTEOMYELITIS OF SECOND TOE OF RIGHT FOOT (HCC): Primary | ICD-10-CM

## 2022-02-09 PROCEDURE — 1101F PT FALLS ASSESS-DOCD LE1/YR: CPT | Performed by: ORTHOPAEDIC SURGERY

## 2022-02-09 PROCEDURE — 99213 OFFICE O/P EST LOW 20 MIN: CPT | Performed by: ORTHOPAEDIC SURGERY

## 2022-02-09 PROCEDURE — G8754 DIAS BP LESS 90: HCPCS | Performed by: ORTHOPAEDIC SURGERY

## 2022-02-09 PROCEDURE — G8399 PT W/DXA RESULTS DOCUMENT: HCPCS | Performed by: ORTHOPAEDIC SURGERY

## 2022-02-09 PROCEDURE — G8510 SCR DEP NEG, NO PLAN REQD: HCPCS | Performed by: ORTHOPAEDIC SURGERY

## 2022-02-09 PROCEDURE — G8753 SYS BP > OR = 140: HCPCS | Performed by: ORTHOPAEDIC SURGERY

## 2022-02-09 PROCEDURE — G8536 NO DOC ELDER MAL SCRN: HCPCS | Performed by: ORTHOPAEDIC SURGERY

## 2022-02-09 PROCEDURE — 3017F COLORECTAL CA SCREEN DOC REV: CPT | Performed by: ORTHOPAEDIC SURGERY

## 2022-02-09 PROCEDURE — G9899 SCRN MAM PERF RSLTS DOC: HCPCS | Performed by: ORTHOPAEDIC SURGERY

## 2022-02-09 PROCEDURE — G8417 CALC BMI ABV UP PARAM F/U: HCPCS | Performed by: ORTHOPAEDIC SURGERY

## 2022-02-09 PROCEDURE — G8427 DOCREV CUR MEDS BY ELIG CLIN: HCPCS | Performed by: ORTHOPAEDIC SURGERY

## 2022-02-09 PROCEDURE — 1090F PRES/ABSN URINE INCON ASSESS: CPT | Performed by: ORTHOPAEDIC SURGERY

## 2022-02-09 RX ORDER — TRAMADOL HYDROCHLORIDE 50 MG/1
50 TABLET ORAL
Qty: 28 TABLET | Refills: 0 | Status: SHIPPED | OUTPATIENT
Start: 2022-02-09 | End: 2022-02-15 | Stop reason: SDUPTHER

## 2022-02-09 RX ORDER — CLINDAMYCIN HYDROCHLORIDE 300 MG/1
300 CAPSULE ORAL 3 TIMES DAILY
Qty: 30 CAPSULE | Refills: 0 | Status: SHIPPED | OUTPATIENT
Start: 2022-02-09 | End: 2022-02-15 | Stop reason: SDUPTHER

## 2022-02-09 NOTE — PROGRESS NOTES
Identified pt with two pt identifiers (name and ). Reviewed chart in preparation for visit and have obtained necessary documentation. Maylin Pierre is a 78 y.o. female  Chief Complaint   Patient presents with    Follow-up     RT knee/ 2nd toe RT foot     Visit Vitals  BP (!) 140/67 (BP 1 Location: Left upper arm, BP Patient Position: Sitting, BP Cuff Size: Large adult)   Pulse 77   Temp (!) 96.3 °F (35.7 °C) (Tympanic)   Ht 5' 4\" (1.626 m)   Wt 174 lb (78.9 kg)   SpO2 100%   BMI 29.87 kg/m²     1. Have you been to the ER, urgent care clinic since your last visit? Hospitalized since your last visit? No    2. Have you seen or consulted any other health care providers outside of the [address] [place] [address] since your last visit? Include any pap smears or colon screening.  No

## 2022-02-09 NOTE — PROGRESS NOTES
2/9/2022      CC: right second toe pain    HPI:      This is a 78y.o. year old female who spontaneously lost her second toenail on the right. She has had pain since. She has a history of diabetic complication with partial amputation right great toe. She has had no treatment for this, she's still in recovery from a right revision knee replacement, which is going well. PMH:  Past Medical History:   Diagnosis Date    Asthma     Chronic pain 5/8/2020    DDD (degenerative disc disease), lumbar     Diabetes ([address]     Gastritis     GERD (gastroesophageal reflux disease)     Glaucoma     Hearing loss 5/8/2020    Hiatal hernia     High cholesterol     Hypertension     Hypocalcemia 5/8/2020    Peripheral vascular disease (HCC)     RA (rheumatoid arthritis) ([address]     Sarcoidosis 1999    [facility]    Sleep apnea     has not used cpap in years since weight loss       PSxHx:  Past Surgical History:   Procedure Laterality Date    HX GASTRIC BYPASS      HX HEART CATHETERIZATION      s/p PCI with stenting in 1998     HX KNEE REPLACEMENT Bilateral     HX ORTHOPAEDIC Bilateral     carpal tunnel surgery     HX SHOULDER REPLACEMENT Right     x 2        Meds:    Current Outpatient Medications:     clindamycin (CLEOCIN) 300 mg capsule, Take 1 Capsule by mouth three (3) times daily. , Disp: 30 Capsule, Rfl: 0    traMADoL (ULTRAM) 50 mg tablet, Take 1 Tablet by mouth every six (6) hours as needed for Pain for up to 7 days. Max Daily Amount: 200 mg., Disp: 28 Tablet, Rfl: 0    Xeljanz XR 11 mg Tb24, TAKE 1 TABLET BY MOUTH ONE TIME A DAY, Disp: 30 Tablet, Rfl: 11    diclofenac EC (VOLTAREN) 50 mg EC tablet, Take 1 Tablet by mouth three (3) times daily. , Disp: 60 Tablet, Rfl: 1    simvastatin (ZOCOR) 20 mg tablet, TAKE BY MOUTH NIGHTLY. (Patient taking differently: Take 20 mg by mouth nightly. TAKE BY MOUTH NIGHTLY.), Disp: 90 Tablet, Rfl: 1    sertraline (ZOLOFT) 50 mg tablet, Take 1 Tablet by mouth daily. , Disp: 90 Tablet, Rfl: 1    omeprazole (PRILOSEC) 20 mg capsule, Take 1 Capsule by mouth daily. , Disp: 90 Capsule, Rfl: 1    metoprolol tartrate (LOPRESSOR) 25 mg tablet, Take 1 Tablet by mouth two (2) times a day., Disp: 180 Tablet, Rfl: 1    metFORMIN (GLUCOPHAGE) 500 mg tablet, Take 1 Tablet by mouth daily (with dinner). Decreased dose, Disp: 90 Tablet, Rfl: 1    latanoprost (XALATAN) 0.005 % ophthalmic solution, INSTILL 1 DROP INTO BOTH EYES NIGHTLY (Patient taking differently: Administer 1 Drop to both eyes nightly. INSTILL 1 DROP INTO BOTH EYES NIGHTLY), Disp: 2.5 mL, Rfl: 1    gabapentin (NEURONTIN) 300 mg capsule, TAKE 1 CAPSULE IN THE MORNING AND TAKE 3 CAPSULES BEFORE BEDTIME (Patient taking differently: Take 300 mg by mouth daily. TAKE 1 CAPSULE IN THE MORNING AND TAKE 3 CAPSULES BEFORE BEDTIME), Disp: 120 Capsule, Rfl: 2    furosemide (LASIX) 20 mg tablet, Take 1 Tablet by mouth daily. , Disp: 90 Tablet, Rfl: 0    ergocalciferol (ERGOCALCIFEROL) 1,250 mcg (50,000 unit) capsule, Take 1 Capsule by mouth every seven (7) days. Indications: vitamin D deficiency (high dose therapy) (Patient taking differently: Take 50,000 Units by mouth every seven (7) days. Fridays  Indications: vitamin D deficiency (high dose therapy)), Disp: 12 Capsule, Rfl: 3    fluticasone furoate-vilanteroL (Breo Ellipta) 100-25 mcg/dose inhaler, TAKE 1 PUFF BY MOUTH EVERY DAY (Patient taking differently: Take 1 Puff by inhalation daily. TAKE 1 PUFF BY MOUTH EVERY DAY), Disp: 1 Each, Rfl: 3    albuterol sulfate (PROVENTIL;VENTOLIN) 2.5 mg/0.5 mL nebu nebulizer solution, 0.5 mL by Nebulization route every four (4) hours as needed for Wheezing. Indications: asthma attack, Disp: 30 mL, Rfl: 0    predniSONE (DELTASONE) 20 mg tablet, Take 20 mg by mouth daily (with breakfast). , Disp: 10 Tablet, Rfl: 0    leflunomide (ARAVA) 20 mg tablet, TAKE 1 TABLET BY MOUTH EVERY DAY (Patient taking differently: Take 20 mg by mouth daily.  TAKE 1 TABLET BY MOUTH EVERY DAY), Disp: 90 Tablet, Rfl: 1    nortriptyline (PAMELOR) 50 mg capsule, Take 50 mg by mouth nightly., Disp: , Rfl:     All:  Allergies   Allergen Reactions    Lisinopril Rash    Methotrexate Hives       Social Hx:  Social History     Socioeconomic History    Marital status:    Tobacco Use    Smoking status: Never Smoker    Smokeless tobacco: Never Used   Vaping Use    Vaping Use: Never used   Substance and Sexual Activity    Alcohol use:  Yes     Alcohol/week: 2.0 standard drinks     Types: 1 Glasses of wine, 1 Shots of liquor per week     Comment: 2-3 yearly    Drug use: No    Sexual activity: Yes     Partners: Male     Birth control/protection: None     Social Determinants of Health     Financial Resource Strain: Low Risk     Difficulty of Paying Living Expenses: Not hard at all   Food Insecurity: No Food Insecurity    Worried About Running Out of Food in the Last Year: Never true    Mayur of Food in the Last Year: Never true   Housing Stability: Low Risk     Unable to Pay for Housing in the Last Year: No    Number of Places Lived in the Last Year: 1    Unstable Housing in the Last Year: No       Family Hx:  Family History   Problem Relation Age of Onset    Heart Disease Mother     Liver Disease Father     Alcohol abuse Brother     Dementia Neg Hx     Cancer Neg Hx     Seizures Neg Hx          Review of Systems:       General: Denies headache, lethargy, fever, weight loss  Ears/Nose/Throat: Denies ear discharge, drainage, nosebleeds, hoarse voice, dental problems  Cardiovascular: Denies chest pain, shortness of breath  Lungs: Denies chest pain, breathing problems, wheezing, pneumonia  Stomach: Denies stomach pain, heartburn, constipation, irritable bowel  Skin: Denies rash, sores, open wounds  Musculoskeletal: right second toe pain  Genitourinary: Denies dysuria, hematuria, polyuria  Gastrointestinal: Denies constipation, obstipation, diarrhea  Neurological: Denies changes in sight, smell, hearing, taste, seizures. Denies loss of consciousness. Psychiatric: Denies depression, sleep pattern changes, anxiety, change in personality  Endocrine: Denies mood swings, heat or cold intolerance  Hematologic/Lymphatic: Denies anemia, purpura, petechia  Allergic/Immunologic: Denies swelling of throat, pain or swelling at lymph nodes      Physical Examination:    Visit Vitals  BP (!) 140/67 (BP 1 Location: Left upper arm, BP Patient Position: Sitting, BP Cuff Size: Large adult)   Pulse 77   Temp (!) 96.3 °F (35.7 °C) (Tympanic)   Ht 5' 4\" (1.626 m)   Wt 174 lb (78.9 kg)   SpO2 100%   BMI 29.87 kg/m²        General: AOX3, no apparent distress  Psychiatric: mood and affect appropriate  Lungs: breathing is symmetric and unlabored bilaterally  Heart: regular rate and rhythm  Abdomen: no guarding  Head: normocephalic, atraumatic  Skin: No significant abnormalities, good turgor  Sensation intact to light touch: C5-T1 dermatomes  Muscular exam: 5/5 strength in all major muscle groups unless noted in specialty exam.    Extremities          Right lower extremity: second toenail missing. Partial great toe amputation well healed. No drainage, but visible bone. Sensation diminished second toe. Capillary refill less than 2 seconds in toe. Knee range of motion 0-110 with excellent stability  Left lower extremity:  No gross deformity. No restriction to range of motion of the hip, knee, ankle. Muscle bulk is appropriate without wasting. Sensation is intact to light touch in the L1-S1 dermatomes. Capillary refill is less than 2 seconds in the fingers. Strength testing is 5/5 at the major muscle groups of the hip, knee, ankle. Diagnostics:    Pertinent Diagnostics: none today    Assessment:  Right second toe osteomyelitis  Plan:    I will place on antibiotics to ensure her knee does not get infected, I will give her tramadol for pain medication and follow up with podiatry asap.   She will follow up with me in one month after renewing PT. Ms. Ken Pacheco has a reminder for a \"due or due soon\" health maintenance. I have asked that she contact her primary care provider for follow-up on this health maintenance.

## 2022-02-09 NOTE — LETTER
2/9/2022    Patient: Henrique Cardoso   YOB: 1945   Date of Visit: 2/9/2022     [address] [place] [id number]  Via In Basket    Dear Jennifer Mistry MD,      Thank you for referring Ms. Henrique Cardoso to [facility] for evaluation. My notes for this consultation are attached. If you have questions, please do not hesitate to call me. I look forward to following your patient along with you.       Sincerely,    Mildred Curry, DO

## 2022-02-10 DIAGNOSIS — I10 ESSENTIAL HYPERTENSION: ICD-10-CM

## 2022-02-10 DIAGNOSIS — M79.89 PAIN AND SWELLING OF RIGHT LOWER EXTREMITY: ICD-10-CM

## 2022-02-10 DIAGNOSIS — M79.604 PAIN AND SWELLING OF RIGHT LOWER EXTREMITY: ICD-10-CM

## 2022-02-10 DIAGNOSIS — M06.09 SERONEGATIVE RHEUMATOID ARTHRITIS OF MULTIPLE SITES (HCC): ICD-10-CM

## 2022-02-10 DIAGNOSIS — J45.20 MILD INTERMITTENT ASTHMA, UNSPECIFIED WHETHER COMPLICATED: ICD-10-CM

## 2022-02-11 RX ORDER — FUROSEMIDE 20 MG/1
TABLET ORAL
Qty: 90 TABLET | Refills: 0 | Status: SHIPPED | OUTPATIENT
Start: 2022-02-11 | End: 2022-03-07

## 2022-02-11 RX ORDER — ALBUTEROL SULFATE 2.5 MG/.5ML
SOLUTION RESPIRATORY (INHALATION)
Qty: 30 NEBULE | Refills: 2 | Status: SHIPPED | OUTPATIENT
Start: 2022-02-11

## 2022-02-11 RX ORDER — PREDNISONE 20 MG/1
TABLET ORAL
Qty: 10 TABLET | Refills: 0 | Status: SHIPPED | OUTPATIENT
Start: 2022-02-11 | End: 2022-03-07

## 2022-02-15 DIAGNOSIS — M86.9 OSTEOMYELITIS OF SECOND TOE OF RIGHT FOOT (HCC): ICD-10-CM

## 2022-02-16 ENCOUNTER — TRANSCRIBE ORDER (OUTPATIENT)
Dept: SCHEDULING | Age: 77
End: 2022-02-16

## 2022-02-16 DIAGNOSIS — I73.9 PERIPHERAL VASCULAR DISEASE, UNSPECIFIED (HCC): Primary | ICD-10-CM

## 2022-02-16 RX ORDER — TRAMADOL HYDROCHLORIDE 50 MG/1
50 TABLET ORAL
Qty: 28 TABLET | Refills: 0 | Status: SHIPPED | OUTPATIENT
Start: 2022-02-16 | End: 2022-04-28 | Stop reason: SDUPTHER

## 2022-02-16 RX ORDER — CLINDAMYCIN HYDROCHLORIDE 300 MG/1
300 CAPSULE ORAL 3 TIMES DAILY
Qty: 30 CAPSULE | Refills: 0 | Status: SHIPPED | OUTPATIENT
Start: 2022-02-16 | End: 2022-03-07

## 2022-02-18 ENCOUNTER — TELEPHONE (OUTPATIENT)
Dept: ORTHOPEDIC SURGERY | Age: 77
End: 2022-02-18

## 2022-02-18 RX ORDER — CLINDAMYCIN HYDROCHLORIDE 300 MG/1
300 CAPSULE ORAL 3 TIMES DAILY
Qty: 30 CAPSULE | Refills: 0 | Status: CANCELLED | OUTPATIENT
Start: 2022-02-18

## 2022-02-18 NOTE — TELEPHONE ENCOUNTER
PT reports since taking the antibotic prescribed by the doctor she has had blood in her urine. She would like a call back to know what to do in regards to this.  The antibiotic is Clindamycin 300 mg

## 2022-02-21 ENCOUNTER — PATIENT OUTREACH (OUTPATIENT)
Dept: CASE MANAGEMENT | Age: 77
End: 2022-02-21

## 2022-02-21 NOTE — PROGRESS NOTES
ACM reviewed EMR and attempted to reach patient for CM follow up . Unable to reach patient on second attempt for CM follow up . Left a VM for return call with contact info provided.   Will try to reach again next week if no return call today

## 2022-02-22 ENCOUNTER — OFFICE VISIT (OUTPATIENT)
Dept: FAMILY MEDICINE CLINIC | Age: 77
End: 2022-02-22
Payer: MEDICARE

## 2022-02-22 VITALS
TEMPERATURE: 97.5 F | DIASTOLIC BLOOD PRESSURE: 68 MMHG | RESPIRATION RATE: 18 BRPM | BODY MASS INDEX: 29.81 KG/M2 | SYSTOLIC BLOOD PRESSURE: 146 MMHG | HEIGHT: 64 IN | WEIGHT: 174.6 LBS | HEART RATE: 86 BPM

## 2022-02-22 DIAGNOSIS — M06.09 SERONEGATIVE RHEUMATOID ARTHRITIS OF MULTIPLE SITES (HCC): ICD-10-CM

## 2022-02-22 DIAGNOSIS — F33.0 MILD EPISODE OF RECURRENT MAJOR DEPRESSIVE DISORDER (HCC): ICD-10-CM

## 2022-02-22 DIAGNOSIS — D86.0 SARCOIDOSIS OF LUNG (HCC): ICD-10-CM

## 2022-02-22 DIAGNOSIS — E11.8 TYPE 2 DIABETES MELLITUS WITH COMPLICATION, WITHOUT LONG-TERM CURRENT USE OF INSULIN (HCC): ICD-10-CM

## 2022-02-22 DIAGNOSIS — N30.90 CYSTITIS: Primary | ICD-10-CM

## 2022-02-22 DIAGNOSIS — L97.519 ULCER OF RIGHT FOOT, UNSPECIFIED ULCER STAGE (HCC): ICD-10-CM

## 2022-02-22 DIAGNOSIS — I73.9 PAD (PERIPHERAL ARTERY DISEASE) (HCC): ICD-10-CM

## 2022-02-22 DIAGNOSIS — N18.31 STAGE 3A CHRONIC KIDNEY DISEASE (HCC): ICD-10-CM

## 2022-02-22 DIAGNOSIS — R31.9 HEMATURIA, UNSPECIFIED TYPE: ICD-10-CM

## 2022-02-22 DIAGNOSIS — E11.3593 PROLIFERATIVE DIABETIC RETINOPATHY OF BOTH EYES ASSOCIATED WITH TYPE 2 DIABETES MELLITUS, MACULAR EDEMA PRESENCE UNSPECIFIED (HCC): ICD-10-CM

## 2022-02-22 LAB
BILIRUB UR QL STRIP: NEGATIVE
GLUCOSE UR-MCNC: NEGATIVE MG/DL
KETONES P FAST UR STRIP-MCNC: NEGATIVE MG/DL
PH UR STRIP: 5.5 [PH] (ref 4.6–8)
PROT UR QL STRIP: NORMAL
SP GR UR STRIP: 1.02 (ref 1–1.03)
UA UROBILINOGEN AMB POC: NORMAL (ref 0.2–1)
URINALYSIS CLARITY POC: NORMAL
URINALYSIS COLOR POC: YELLOW
URINE BLOOD POC: NORMAL
URINE LEUKOCYTES POC: NORMAL
URINE NITRITES POC: NEGATIVE

## 2022-02-22 PROCEDURE — G8399 PT W/DXA RESULTS DOCUMENT: HCPCS | Performed by: FAMILY MEDICINE

## 2022-02-22 PROCEDURE — 3046F HEMOGLOBIN A1C LEVEL >9.0%: CPT | Performed by: FAMILY MEDICINE

## 2022-02-22 PROCEDURE — G9899 SCRN MAM PERF RSLTS DOC: HCPCS | Performed by: FAMILY MEDICINE

## 2022-02-22 PROCEDURE — 3017F COLORECTAL CA SCREEN DOC REV: CPT | Performed by: FAMILY MEDICINE

## 2022-02-22 PROCEDURE — 1101F PT FALLS ASSESS-DOCD LE1/YR: CPT | Performed by: FAMILY MEDICINE

## 2022-02-22 PROCEDURE — 99214 OFFICE O/P EST MOD 30 MIN: CPT | Performed by: FAMILY MEDICINE

## 2022-02-22 PROCEDURE — G8510 SCR DEP NEG, NO PLAN REQD: HCPCS | Performed by: FAMILY MEDICINE

## 2022-02-22 PROCEDURE — G8753 SYS BP > OR = 140: HCPCS | Performed by: FAMILY MEDICINE

## 2022-02-22 PROCEDURE — 2022F DILAT RTA XM EVC RTNOPTHY: CPT | Performed by: FAMILY MEDICINE

## 2022-02-22 PROCEDURE — 1090F PRES/ABSN URINE INCON ASSESS: CPT | Performed by: FAMILY MEDICINE

## 2022-02-22 PROCEDURE — G8536 NO DOC ELDER MAL SCRN: HCPCS | Performed by: FAMILY MEDICINE

## 2022-02-22 PROCEDURE — G8417 CALC BMI ABV UP PARAM F/U: HCPCS | Performed by: FAMILY MEDICINE

## 2022-02-22 PROCEDURE — 81003 URINALYSIS AUTO W/O SCOPE: CPT | Performed by: FAMILY MEDICINE

## 2022-02-22 PROCEDURE — G8427 DOCREV CUR MEDS BY ELIG CLIN: HCPCS | Performed by: FAMILY MEDICINE

## 2022-02-22 PROCEDURE — G8754 DIAS BP LESS 90: HCPCS | Performed by: FAMILY MEDICINE

## 2022-02-22 RX ORDER — SULFAMETHOXAZOLE AND TRIMETHOPRIM 800; 160 MG/1; MG/1
1 TABLET ORAL 2 TIMES DAILY
Qty: 14 TABLET | Refills: 0 | Status: SHIPPED | OUTPATIENT
Start: 2022-02-22 | End: 2022-03-07

## 2022-02-22 RX ORDER — MUPIROCIN 20 MG/G
1 OINTMENT TOPICAL DAILY
COMMUNITY
Start: 2022-02-14 | End: 2022-05-13 | Stop reason: ALTCHOICE

## 2022-02-22 NOTE — PROGRESS NOTES
Chief Complaint   Patient presents with    Blood in Urine     Started two weeks ago   1. Have you been to the ER, urgent care clinic since your last visit? Hospitalized since your last visit? No    2. Have you seen or consulted any other health care providers outside of the [address] [place] [address] since your last visit? Include any pap smears or colon screening.  No     Became worse last week

## 2022-02-22 NOTE — PROGRESS NOTES
Maura Beatty (: 1945) is a 78 y.o. female, established patient, here for evaluation of the following chief complaint(s):  Blood in Urine (Started two weeks ago)       ASSESSMENT/PLAN:  Appears to be dealing with acute cystitis with hematuria. Treating with Bactrim today and sending urine for culture. Update-urine culture grew ESBL and reviewed specific resistance pattern. Called patient and she reports no recent blood in her urine and improvement in lower abdominal symptoms but not full resolution. Given symptoms, opting for 1 dose of fosfomycin. If this is too costly, she may be able to use nitrofurantoin despite the intermediate resistance pattern given the high urinary concentration of nitrofurantoin. Doing well otherwise. Reviewed her right foot ulcer and she continues working with podiatry on this. Rheumatoid arthritis and osteoarthritis ongoing stable issues. BP running slightly high today  CKD stable  DM controlled    Below is the assessment and plan developed based on review of pertinent history, physical exam, labs, studies, and medications. 1. Cystitis  -     trimethoprim-sulfamethoxazole (BACTRIM DS, SEPTRA DS) 160-800 mg per tablet; Take 1 Tablet by mouth two (2) times a day for 7 days. , Normal, Disp-14 Tablet, R-0  -     CULTURE, URINE; Future  -     fosfomycin (MONUROL) 3 gram pack oral packet; Take 1 Packet by mouth once for 1 dose., Normal, Disp-1 Packet, R-0  2. Hematuria, unspecified type  -     AMB POC URINALYSIS DIP STICK AUTO W/O MICRO  -     CULTURE, URINE; Future  -     fosfomycin (MONUROL) 3 gram pack oral packet; Take 1 Packet by mouth once for 1 dose., Normal, Disp-1 Packet, R-0  3. Ulcer of right foot, unspecified ulcer stage ([address]  4. Seronegative rheumatoid arthritis of multiple sites ([address]  5. Mild episode of recurrent major depressive disorder ([address]  6. Type 2 diabetes mellitus with complication, without long-term current use of insulin (HCC)  7.  PAD (peripheral artery disease) ([facility]  8. Proliferative diabetic retinopathy of both eyes associated with type 2 diabetes mellitus, macular edema presence unspecified ([facility]  9. Sarcoidosis of lung ([facility]  10. Stage 3a chronic kidney disease ([facility]      Return in about 3 months (around 5/22/2022), or if symptoms worsen or fail to improve. SUBJECTIVE/OBJECTIVE:  UTI - having hematuria, urgency, and frequently for past few days. No dysuria now. Some suprapubic pressure. Tried Azo. Sx initially started about 2 weeks ago and got a little better but worse now. No flank pain or nausea. Recently on clindamycin - Working with Dr. Vandana Weston for osteomyelitis of 2nd toe. Diabetes Mellitus: Well controlled  Not exercising. Eats a fairly healthy diet. Non-smoker  Lab Results   Component Value Date/Time    Hemoglobin A1c (POC) 5.4 09/24/2019 08:50 AM    Hemoglobin A1c (POC) 6.7 09/28/2018 10:30 AM    Hemoglobin A1c 5.0 08/31/2021 03:53 PM    Microalb/Creat ratio (ug/mg creat.) 74 (H) 09/01/2021 04:05 PM    LDL, calculated 50 12/10/2021 11:10 AM    LDL, calculated 36 08/06/2020 03:47 PM    Creatinine 0.86 12/10/2021 11:10 AM      Lab Results   Component Value Date/Time    GFR est AA 77 12/10/2021 11:10 AM    GFR est non-AA 67 12/10/2021 11:10 AM      Lab Results   Component Value Date/Time    TSH 2.260 12/10/2021 11:10 AM       Hyperlipidemia & HTN  Pt is doing well on current meds with no medication side effects noted  No new myalgias, no joint pains, no weakness  No TIA's, no chest pain on exertion, no dyspnea on exertion, no swelling of ankles.      Lab Results   Component Value Date/Time    Cholesterol, total 148 12/10/2021 11:10 AM    HDL Cholesterol 85 12/10/2021 11:10 AM    LDL, calculated 50 12/10/2021 11:10 AM    LDL, calculated 36 08/06/2020 03:47 PM    Triglyceride 61 12/10/2021 11:10 AM     ROS  Gen - no fever/chills  Resp - no dyspnea or cough  CV - no chest pain or WORKMAN  Rest per HPI    Blood pressure (!) 146/68, pulse 86, temperature 97.5 °F (36.4 °C), temperature source Temporal, resp. rate 18, height 5' 4\" (1.626 m), weight 174 lb 9.6 oz (79.2 kg). Physical Exam  General appearance - alert, well appearing, and in no distress  Eyes -sclera anicteric  Neck - supple, no significant adenopathy, no thyromegaly  Chest - clear to auscultation, no wheezes, rales or rhonchi, symmetric air entry  Heart - normal rate, regular rhythm, normal S1, S2, no murmurs, rubs, clicks or gallops  Abdomen-soft, slightly tender to palpation in suprapubic area, no distention  MSK-significant RA and OA findings on bilateral hands  Neurological - alert, oriented, normal speech, no focal findings or movement disorder noted  Extr - no edema  Psych - normal mood and affect    On this date 02/22/2022 I have spent 30 minutes reviewing previous notes, test results and face to face with the patient discussing the diagnosis and importance of compliance with the treatment plan as well as documenting on the day of the visit. An electronic signature was used to authenticate this note.   -- Neno Steinberg MD

## 2022-02-25 ENCOUNTER — HOSPITAL ENCOUNTER (OUTPATIENT)
Dept: VASCULAR SURGERY | Age: 77
Discharge: HOME | DRG: 690 | End: 2022-02-25
Attending: PODIATRIST
Payer: MEDICARE

## 2022-02-25 DIAGNOSIS — I73.9 PERIPHERAL VASCULAR DISEASE, UNSPECIFIED (HCC): ICD-10-CM

## 2022-02-25 PROCEDURE — 93922 UPR/L XTREMITY ART 2 LEVELS: CPT

## 2022-02-26 LAB
BACTERIA SPEC CULT: ABNORMAL
BACTERIA SPEC CULT: ABNORMAL
CC UR VC: ABNORMAL
SERVICE CMNT-IMP: ABNORMAL

## 2022-02-26 RX ORDER — GRANULES FOR ORAL 3 G/1
3 POWDER ORAL ONCE
Qty: 1 PACKET | Refills: 0 | Status: SHIPPED | OUTPATIENT
Start: 2022-02-26 | End: 2022-02-26

## 2022-02-28 ENCOUNTER — APPOINTMENT (OUTPATIENT)
Dept: CT IMAGING | Age: 77
DRG: 690 | End: 2022-02-28
Attending: EMERGENCY MEDICINE
Payer: MEDICARE

## 2022-02-28 ENCOUNTER — PATIENT OUTREACH (OUTPATIENT)
Dept: CASE MANAGEMENT | Age: 77
End: 2022-02-28

## 2022-02-28 ENCOUNTER — HOSPITAL ENCOUNTER (INPATIENT)
Age: 77
LOS: 7 days | Discharge: HOME HEALTH | DRG: 690 | End: 2022-03-07
Attending: EMERGENCY MEDICINE | Admitting: INTERNAL MEDICINE
Payer: MEDICARE

## 2022-02-28 DIAGNOSIS — M06.9 RHEUMATOID ARTHRITIS, INVOLVING UNSPECIFIED SITE, UNSPECIFIED WHETHER RHEUMATOID FACTOR PRESENT (HCC): ICD-10-CM

## 2022-02-28 DIAGNOSIS — E11.22 CONTROLLED TYPE 2 DIABETES MELLITUS WITH STAGE 2 CHRONIC KIDNEY DISEASE, UNSPECIFIED WHETHER LONG TERM INSULIN USE (HCC): ICD-10-CM

## 2022-02-28 DIAGNOSIS — R31.9 HEMATURIA, UNSPECIFIED TYPE: ICD-10-CM

## 2022-02-28 DIAGNOSIS — D72.825 BANDEMIA: ICD-10-CM

## 2022-02-28 DIAGNOSIS — E11.9 TYPE 2 DIABETES MELLITUS WITHOUT COMPLICATION, WITHOUT LONG-TERM CURRENT USE OF INSULIN (HCC): ICD-10-CM

## 2022-02-28 DIAGNOSIS — A41.9 SEVERE SEPSIS (HCC): ICD-10-CM

## 2022-02-28 DIAGNOSIS — Z79.60 LONG-TERM USE OF IMMUNOSUPPRESSANT MEDICATION: ICD-10-CM

## 2022-02-28 DIAGNOSIS — B96.89 URINARY TRACT INFECTION DUE TO ESBL KLEBSIELLA: ICD-10-CM

## 2022-02-28 DIAGNOSIS — N17.9 AKI (ACUTE KIDNEY INJURY) (HCC): ICD-10-CM

## 2022-02-28 DIAGNOSIS — N10 ACUTE PYELONEPHRITIS: ICD-10-CM

## 2022-02-28 DIAGNOSIS — E78.5 DYSLIPIDEMIA: ICD-10-CM

## 2022-02-28 DIAGNOSIS — D84.9 IMMUNE DEFICIENCY DISORDER (HCC): ICD-10-CM

## 2022-02-28 DIAGNOSIS — N18.2 CONTROLLED TYPE 2 DIABETES MELLITUS WITH STAGE 2 CHRONIC KIDNEY DISEASE, UNSPECIFIED WHETHER LONG TERM INSULIN USE (HCC): ICD-10-CM

## 2022-02-28 DIAGNOSIS — R65.20 SEVERE SEPSIS (HCC): ICD-10-CM

## 2022-02-28 DIAGNOSIS — N30.01 ACUTE CYSTITIS WITH HEMATURIA: ICD-10-CM

## 2022-02-28 DIAGNOSIS — N30.80 EMPHYSEMATOUS CYSTITIS: Primary | ICD-10-CM

## 2022-02-28 DIAGNOSIS — M06.09 RHEUMATOID ARTHRITIS OF MULTIPLE SITES WITH NEGATIVE RHEUMATOID FACTOR (HCC): ICD-10-CM

## 2022-02-28 DIAGNOSIS — N39.0 URINARY TRACT INFECTION DUE TO ESBL KLEBSIELLA: ICD-10-CM

## 2022-02-28 LAB
ALBUMIN SERPL-MCNC: 3.3 G/DL (ref 3.5–5)
ALBUMIN/GLOB SERPL: 1 {RATIO} (ref 1.1–2.2)
ALP SERPL-CCNC: 69 U/L (ref 45–117)
ALT SERPL-CCNC: 19 U/L (ref 12–78)
ANION GAP SERPL CALC-SCNC: 5 MMOL/L (ref 5–15)
APPEARANCE UR: ABNORMAL
AST SERPL-CCNC: 39 U/L (ref 15–37)
BACTERIA URNS QL MICRO: ABNORMAL /HPF
BASOPHILS # BLD: 0.1 K/UL (ref 0–0.1)
BASOPHILS NFR BLD: 2 % (ref 0–1)
BILIRUB SERPL-MCNC: 0.3 MG/DL (ref 0.2–1)
BILIRUB UR QL: NEGATIVE
BUN SERPL-MCNC: 35 MG/DL (ref 6–20)
BUN/CREAT SERPL: 25 (ref 12–20)
CALCIUM SERPL-MCNC: 8.9 MG/DL (ref 8.5–10.1)
CHLORIDE SERPL-SCNC: 113 MMOL/L (ref 97–108)
CO2 SERPL-SCNC: 21 MMOL/L (ref 21–32)
COLOR UR: ABNORMAL
CREAT SERPL-MCNC: 1.41 MG/DL (ref 0.55–1.02)
DIFFERENTIAL METHOD BLD: ABNORMAL
EOSINOPHIL # BLD: 0.1 K/UL (ref 0–0.4)
EOSINOPHIL NFR BLD: 2 % (ref 0–7)
EPITH CASTS URNS QL MICRO: ABNORMAL /LPF
ERYTHROCYTE [DISTWIDTH] IN BLOOD BY AUTOMATED COUNT: 14.5 % (ref 11.5–14.5)
GLOBULIN SER CALC-MCNC: 3.3 G/DL (ref 2–4)
GLUCOSE BLD STRIP.AUTO-MCNC: 87 MG/DL (ref 65–117)
GLUCOSE SERPL-MCNC: 88 MG/DL (ref 65–100)
GLUCOSE UR STRIP.AUTO-MCNC: NEGATIVE MG/DL
HCT VFR BLD AUTO: 31 % (ref 35–47)
HGB BLD-MCNC: 9.8 G/DL (ref 11.5–16)
HGB UR QL STRIP: ABNORMAL
HYALINE CASTS URNS QL MICRO: ABNORMAL /LPF (ref 0–5)
IMM GRANULOCYTES # BLD AUTO: 0 K/UL (ref 0–0.04)
IMM GRANULOCYTES NFR BLD AUTO: 0 % (ref 0–0.5)
KETONES UR QL STRIP.AUTO: NEGATIVE MG/DL
LACTATE BLD-SCNC: 0.41 MMOL/L (ref 0.4–2)
LEFT ARM BP: 151 MMHG
LEFT TBI: 0.72
LEFT TOE PRESSURE: 109 MMHG
LEUKOCYTE ESTERASE UR QL STRIP.AUTO: ABNORMAL
LYMPHOCYTES # BLD: 1.4 K/UL (ref 0.8–3.5)
LYMPHOCYTES NFR BLD: 36 % (ref 12–49)
MCH RBC QN AUTO: 32.3 PG (ref 26–34)
MCHC RBC AUTO-ENTMCNC: 31.6 G/DL (ref 30–36.5)
MCV RBC AUTO: 102.3 FL (ref 80–99)
MONOCYTES # BLD: 0.5 K/UL (ref 0–1)
MONOCYTES NFR BLD: 12 % (ref 5–13)
NEUTS SEG # BLD: 1.9 K/UL (ref 1.8–8)
NEUTS SEG NFR BLD: 48 % (ref 32–75)
NITRITE UR QL STRIP.AUTO: NEGATIVE
NRBC # BLD: 0 K/UL (ref 0–0.01)
NRBC BLD-RTO: 0 PER 100 WBC
PH UR STRIP: 6 [PH] (ref 5–8)
PLATELET # BLD AUTO: 207 K/UL (ref 150–400)
PMV BLD AUTO: 10.3 FL (ref 8.9–12.9)
POTASSIUM SERPL-SCNC: 4.6 MMOL/L (ref 3.5–5.1)
PROT SERPL-MCNC: 6.6 G/DL (ref 6.4–8.2)
PROT UR STRIP-MCNC: 30 MG/DL
RBC # BLD AUTO: 3.03 M/UL (ref 3.8–5.2)
RBC #/AREA URNS HPF: >100 /HPF (ref 0–5)
RIGHT ARM BP: 145 MMHG
SERVICE CMNT-IMP: NORMAL
SODIUM SERPL-SCNC: 139 MMOL/L (ref 136–145)
SP GR UR REFRACTOMETRY: 1.02 (ref 1–1.03)
UA: UC IF INDICATED,UAUC: ABNORMAL
UROBILINOGEN UR QL STRIP.AUTO: 0.2 EU/DL (ref 0.2–1)
WBC # BLD AUTO: 4 K/UL (ref 3.6–11)
WBC URNS QL MICRO: ABNORMAL /HPF (ref 0–4)

## 2022-02-28 PROCEDURE — 65660000000 HC RM CCU STEPDOWN

## 2022-02-28 PROCEDURE — 87186 SC STD MICRODIL/AGAR DIL: CPT

## 2022-02-28 PROCEDURE — 83605 ASSAY OF LACTIC ACID: CPT

## 2022-02-28 PROCEDURE — 82962 GLUCOSE BLOOD TEST: CPT

## 2022-02-28 PROCEDURE — 87040 BLOOD CULTURE FOR BACTERIA: CPT

## 2022-02-28 PROCEDURE — 74011000250 HC RX REV CODE- 250

## 2022-02-28 PROCEDURE — 87077 CULTURE AEROBIC IDENTIFY: CPT

## 2022-02-28 PROCEDURE — 74011250637 HC RX REV CODE- 250/637: Performed by: INTERNAL MEDICINE

## 2022-02-28 PROCEDURE — 87086 URINE CULTURE/COLONY COUNT: CPT

## 2022-02-28 PROCEDURE — 74011000250 HC RX REV CODE- 250: Performed by: INTERNAL MEDICINE

## 2022-02-28 PROCEDURE — 74011250637 HC RX REV CODE- 250/637: Performed by: NURSE PRACTITIONER

## 2022-02-28 PROCEDURE — 80053 COMPREHEN METABOLIC PANEL: CPT

## 2022-02-28 PROCEDURE — 74176 CT ABD & PELVIS W/O CONTRAST: CPT

## 2022-02-28 PROCEDURE — 94640 AIRWAY INHALATION TREATMENT: CPT

## 2022-02-28 PROCEDURE — 99285 EMERGENCY DEPT VISIT HI MDM: CPT

## 2022-02-28 PROCEDURE — 74011000258 HC RX REV CODE- 258: Performed by: INTERNAL MEDICINE

## 2022-02-28 PROCEDURE — 81001 URINALYSIS AUTO W/SCOPE: CPT

## 2022-02-28 PROCEDURE — 65270000029 HC RM PRIVATE

## 2022-02-28 PROCEDURE — 36415 COLL VENOUS BLD VENIPUNCTURE: CPT

## 2022-02-28 PROCEDURE — 85025 COMPLETE CBC W/AUTO DIFF WBC: CPT

## 2022-02-28 PROCEDURE — 74011250636 HC RX REV CODE- 250/636: Performed by: INTERNAL MEDICINE

## 2022-02-28 RX ORDER — POLYETHYLENE GLYCOL 3350 17 G/17G
17 POWDER, FOR SOLUTION ORAL DAILY PRN
Status: DISCONTINUED | OUTPATIENT
Start: 2022-02-28 | End: 2022-03-07 | Stop reason: HOSPADM

## 2022-02-28 RX ORDER — LABETALOL HYDROCHLORIDE 5 MG/ML
10 INJECTION, SOLUTION INTRAVENOUS
Status: DISCONTINUED | OUTPATIENT
Start: 2022-02-28 | End: 2022-03-07 | Stop reason: HOSPADM

## 2022-02-28 RX ORDER — SODIUM CHLORIDE 9 MG/ML
50 INJECTION, SOLUTION INTRAVENOUS CONTINUOUS
Status: DISCONTINUED | OUTPATIENT
Start: 2022-02-28 | End: 2022-03-03

## 2022-02-28 RX ORDER — MAGNESIUM SULFATE 100 %
4 CRYSTALS MISCELLANEOUS AS NEEDED
Status: DISCONTINUED | OUTPATIENT
Start: 2022-02-28 | End: 2022-03-07 | Stop reason: HOSPADM

## 2022-02-28 RX ORDER — SODIUM CHLORIDE 0.9 % (FLUSH) 0.9 %
5-40 SYRINGE (ML) INJECTION EVERY 8 HOURS
Status: DISCONTINUED | OUTPATIENT
Start: 2022-02-28 | End: 2022-03-07 | Stop reason: HOSPADM

## 2022-02-28 RX ORDER — METOPROLOL TARTRATE 25 MG/1
25 TABLET, FILM COATED ORAL 2 TIMES DAILY
Status: DISCONTINUED | OUTPATIENT
Start: 2022-02-28 | End: 2022-03-07 | Stop reason: HOSPADM

## 2022-02-28 RX ORDER — LATANOPROST 50 UG/ML
1 SOLUTION/ DROPS OPHTHALMIC
Status: DISCONTINUED | OUTPATIENT
Start: 2022-02-28 | End: 2022-03-07 | Stop reason: HOSPADM

## 2022-02-28 RX ORDER — ATORVASTATIN CALCIUM 20 MG/1
20 TABLET, FILM COATED ORAL DAILY
Status: DISCONTINUED | OUTPATIENT
Start: 2022-03-01 | End: 2022-03-07 | Stop reason: HOSPADM

## 2022-02-28 RX ORDER — SERTRALINE HYDROCHLORIDE 50 MG/1
50 TABLET, FILM COATED ORAL DAILY
Status: DISCONTINUED | OUTPATIENT
Start: 2022-03-01 | End: 2022-03-07 | Stop reason: HOSPADM

## 2022-02-28 RX ORDER — SODIUM CHLORIDE 0.9 % (FLUSH) 0.9 %
5-40 SYRINGE (ML) INJECTION AS NEEDED
Status: DISCONTINUED | OUTPATIENT
Start: 2022-02-28 | End: 2022-03-07 | Stop reason: HOSPADM

## 2022-02-28 RX ORDER — INSULIN LISPRO 100 [IU]/ML
INJECTION, SOLUTION INTRAVENOUS; SUBCUTANEOUS EVERY 6 HOURS
Status: DISCONTINUED | OUTPATIENT
Start: 2022-02-28 | End: 2022-03-02

## 2022-02-28 RX ORDER — ONDANSETRON 4 MG/1
4 TABLET, ORALLY DISINTEGRATING ORAL
Status: DISCONTINUED | OUTPATIENT
Start: 2022-02-28 | End: 2022-03-07 | Stop reason: HOSPADM

## 2022-02-28 RX ORDER — PANTOPRAZOLE SODIUM 40 MG/1
40 TABLET, DELAYED RELEASE ORAL
Status: DISCONTINUED | OUTPATIENT
Start: 2022-03-01 | End: 2022-03-07 | Stop reason: HOSPADM

## 2022-02-28 RX ORDER — ONDANSETRON 2 MG/ML
4 INJECTION INTRAMUSCULAR; INTRAVENOUS
Status: DISCONTINUED | OUTPATIENT
Start: 2022-02-28 | End: 2022-03-07 | Stop reason: HOSPADM

## 2022-02-28 RX ORDER — ACETAMINOPHEN 650 MG/1
650 SUPPOSITORY RECTAL
Status: DISCONTINUED | OUTPATIENT
Start: 2022-02-28 | End: 2022-03-02

## 2022-02-28 RX ORDER — GABAPENTIN 300 MG/1
300 CAPSULE ORAL DAILY
Status: DISCONTINUED | OUTPATIENT
Start: 2022-03-01 | End: 2022-03-07 | Stop reason: HOSPADM

## 2022-02-28 RX ORDER — NORTRIPTYLINE HYDROCHLORIDE 25 MG/1
50 CAPSULE ORAL
Status: DISCONTINUED | OUTPATIENT
Start: 2022-02-28 | End: 2022-03-07 | Stop reason: HOSPADM

## 2022-02-28 RX ORDER — GABAPENTIN 300 MG/1
300 CAPSULE ORAL
Status: DISCONTINUED | OUTPATIENT
Start: 2022-02-28 | End: 2022-03-07 | Stop reason: HOSPADM

## 2022-02-28 RX ORDER — ALBUTEROL SULFATE 2.5 MG/.5ML
2.5 SOLUTION RESPIRATORY (INHALATION)
Status: DISCONTINUED | OUTPATIENT
Start: 2022-02-28 | End: 2022-03-07 | Stop reason: HOSPADM

## 2022-02-28 RX ORDER — WATER FOR INJECTION,STERILE
VIAL (ML) INJECTION
Status: DISCONTINUED
Start: 2022-02-28 | End: 2022-02-28 | Stop reason: HOSPADM

## 2022-02-28 RX ORDER — ACETAMINOPHEN 325 MG/1
650 TABLET ORAL
Status: DISCONTINUED | OUTPATIENT
Start: 2022-02-28 | End: 2022-03-02

## 2022-02-28 RX ADMIN — ARFORMOTEROL TARTRATE: 15 SOLUTION RESPIRATORY (INHALATION) at 19:55

## 2022-02-28 RX ADMIN — MEROPENEM 1 G: 1 INJECTION, POWDER, FOR SOLUTION INTRAVENOUS at 23:07

## 2022-02-28 RX ADMIN — SODIUM CHLORIDE 75 ML/HR: 9 INJECTION, SOLUTION INTRAVENOUS at 23:06

## 2022-02-28 RX ADMIN — LATANOPROST 1 DROP: 50 SOLUTION/ DROPS OPHTHALMIC at 23:16

## 2022-02-28 RX ADMIN — ACETAMINOPHEN 325MG 650 MG: 325 TABLET ORAL at 23:23

## 2022-02-28 RX ADMIN — NORTRIPTYLINE HYDROCHLORIDE 50 MG: 25 CAPSULE ORAL at 23:16

## 2022-02-28 RX ADMIN — GABAPENTIN 300 MG: 300 CAPSULE ORAL at 23:52

## 2022-02-28 RX ADMIN — SODIUM CHLORIDE, PRESERVATIVE FREE 10 ML: 5 INJECTION INTRAVENOUS at 23:17

## 2022-02-28 NOTE — PROGRESS NOTES
[facility]'s 3rd attempt to reach patient for CM follow up unsuccessful. Will send patient a my chart message. [facility] has not received any return calls or messages from messages left for patient. [facility] will close this episode at this time due to inability to reach patient.

## 2022-03-01 LAB
ANION GAP SERPL CALC-SCNC: 7 MMOL/L (ref 5–15)
BUN SERPL-MCNC: 32 MG/DL (ref 6–20)
BUN/CREAT SERPL: 28 (ref 12–20)
CALCIUM SERPL-MCNC: 8.6 MG/DL (ref 8.5–10.1)
CHLORIDE SERPL-SCNC: 115 MMOL/L (ref 97–108)
CO2 SERPL-SCNC: 18 MMOL/L (ref 21–32)
CREAT SERPL-MCNC: 1.13 MG/DL (ref 0.55–1.02)
ERYTHROCYTE [DISTWIDTH] IN BLOOD BY AUTOMATED COUNT: 14.4 % (ref 11.5–14.5)
GLUCOSE BLD STRIP.AUTO-MCNC: 60 MG/DL (ref 65–117)
GLUCOSE BLD STRIP.AUTO-MCNC: 78 MG/DL (ref 65–117)
GLUCOSE BLD STRIP.AUTO-MCNC: 86 MG/DL (ref 65–117)
GLUCOSE BLD STRIP.AUTO-MCNC: 89 MG/DL (ref 65–117)
GLUCOSE BLD STRIP.AUTO-MCNC: 90 MG/DL (ref 65–117)
GLUCOSE BLD STRIP.AUTO-MCNC: 97 MG/DL (ref 65–117)
GLUCOSE SERPL-MCNC: 69 MG/DL (ref 65–100)
HCT VFR BLD AUTO: 29.1 % (ref 35–47)
HGB BLD-MCNC: 9.1 G/DL (ref 11.5–16)
MCH RBC QN AUTO: 31.8 PG (ref 26–34)
MCHC RBC AUTO-ENTMCNC: 31.3 G/DL (ref 30–36.5)
MCV RBC AUTO: 101.7 FL (ref 80–99)
NRBC # BLD: 0 K/UL (ref 0–0.01)
NRBC BLD-RTO: 0 PER 100 WBC
PLATELET # BLD AUTO: 187 K/UL (ref 150–400)
PMV BLD AUTO: 10.6 FL (ref 8.9–12.9)
POTASSIUM SERPL-SCNC: 4.2 MMOL/L (ref 3.5–5.1)
RBC # BLD AUTO: 2.86 M/UL (ref 3.8–5.2)
SERVICE CMNT-IMP: ABNORMAL
SERVICE CMNT-IMP: NORMAL
SODIUM SERPL-SCNC: 140 MMOL/L (ref 136–145)
WBC # BLD AUTO: 3.8 K/UL (ref 3.6–11)

## 2022-03-01 PROCEDURE — 65660000000 HC RM CCU STEPDOWN

## 2022-03-01 PROCEDURE — 85027 COMPLETE CBC AUTOMATED: CPT

## 2022-03-01 PROCEDURE — 82962 GLUCOSE BLOOD TEST: CPT

## 2022-03-01 PROCEDURE — 74011250636 HC RX REV CODE- 250/636: Performed by: INTERNAL MEDICINE

## 2022-03-01 PROCEDURE — 74011000250 HC RX REV CODE- 250: Performed by: INTERNAL MEDICINE

## 2022-03-01 PROCEDURE — 51798 US URINE CAPACITY MEASURE: CPT

## 2022-03-01 PROCEDURE — 74011250637 HC RX REV CODE- 250/637: Performed by: INTERNAL MEDICINE

## 2022-03-01 PROCEDURE — 74011250637 HC RX REV CODE- 250/637: Performed by: NURSE PRACTITIONER

## 2022-03-01 PROCEDURE — 36415 COLL VENOUS BLD VENIPUNCTURE: CPT

## 2022-03-01 PROCEDURE — 74011000258 HC RX REV CODE- 258: Performed by: INTERNAL MEDICINE

## 2022-03-01 PROCEDURE — 80048 BASIC METABOLIC PNL TOTAL CA: CPT

## 2022-03-01 RX ORDER — ENOXAPARIN SODIUM 100 MG/ML
40 INJECTION SUBCUTANEOUS EVERY 24 HOURS
Status: DISCONTINUED | OUTPATIENT
Start: 2022-03-01 | End: 2022-03-07 | Stop reason: HOSPADM

## 2022-03-01 RX ORDER — HEPARIN SODIUM 5000 [USP'U]/ML
5000 INJECTION, SOLUTION INTRAVENOUS; SUBCUTANEOUS EVERY 12 HOURS
Status: DISCONTINUED | OUTPATIENT
Start: 2022-03-01 | End: 2022-03-01 | Stop reason: CLARIF

## 2022-03-01 RX ADMIN — ACETAMINOPHEN 325MG 650 MG: 325 TABLET ORAL at 18:47

## 2022-03-01 RX ADMIN — METOPROLOL TARTRATE 25 MG: 25 TABLET, FILM COATED ORAL at 17:40

## 2022-03-01 RX ADMIN — MEROPENEM 500 MG: 500 INJECTION, POWDER, FOR SOLUTION INTRAVENOUS at 06:02

## 2022-03-01 RX ADMIN — Medication 16 G: at 06:00

## 2022-03-01 RX ADMIN — ATORVASTATIN CALCIUM 20 MG: 20 TABLET, FILM COATED ORAL at 11:21

## 2022-03-01 RX ADMIN — METOPROLOL TARTRATE 25 MG: 25 TABLET, FILM COATED ORAL at 11:21

## 2022-03-01 RX ADMIN — MEROPENEM 500 MG: 500 INJECTION, POWDER, FOR SOLUTION INTRAVENOUS at 22:55

## 2022-03-01 RX ADMIN — SODIUM CHLORIDE, PRESERVATIVE FREE 10 ML: 5 INJECTION INTRAVENOUS at 04:01

## 2022-03-01 RX ADMIN — NORTRIPTYLINE HYDROCHLORIDE 50 MG: 25 CAPSULE ORAL at 22:56

## 2022-03-01 RX ADMIN — SERTRALINE 50 MG: 50 TABLET, FILM COATED ORAL at 11:20

## 2022-03-01 RX ADMIN — PANTOPRAZOLE SODIUM 40 MG: 40 TABLET, DELAYED RELEASE ORAL at 11:21

## 2022-03-01 RX ADMIN — SODIUM CHLORIDE 75 ML/HR: 9 INJECTION, SOLUTION INTRAVENOUS at 04:00

## 2022-03-01 RX ADMIN — SODIUM CHLORIDE, PRESERVATIVE FREE 10 ML: 5 INJECTION INTRAVENOUS at 16:01

## 2022-03-01 RX ADMIN — MEROPENEM 500 MG: 500 INJECTION, POWDER, FOR SOLUTION INTRAVENOUS at 16:01

## 2022-03-01 RX ADMIN — GABAPENTIN 300 MG: 300 CAPSULE ORAL at 11:21

## 2022-03-01 RX ADMIN — SODIUM CHLORIDE, PRESERVATIVE FREE 10 ML: 5 INJECTION INTRAVENOUS at 23:00

## 2022-03-01 RX ADMIN — ENOXAPARIN SODIUM 40 MG: 100 INJECTION SUBCUTANEOUS at 16:15

## 2022-03-01 RX ADMIN — GABAPENTIN 300 MG: 300 CAPSULE ORAL at 22:56

## 2022-03-01 NOTE — PROGRESS NOTES
1433: This RN called to get report from ED. Primary RN not available. Primary RN will call this RN when she returns to the floor. 1450: TRANSFER - IN REPORT:    Verbal report received from Di Arreaga [facility] Timur (name) on Alessandro Limon  being received from ED (unit) for routine progression of care      Report consisted of patients Situation, Background, Assessment and   Recommendations(SBAR). Information from the following report(s) SBAR, Kardex, ED Summary, Intake/Output, MAR, Recent Results and Cardiac Rhythm SR was reviewed with the receiving nurse. Opportunity for questions and clarification was provided. Assessment completed upon patients arrival to unit and care assumed. 1520: Patient arrived on the floor. VSS. Dual skin completed with Becca Tucker RN. Unable to complete PTA med list because patient doesn't remember her home medications off the top of her head. This RN asked the patient if one of her family members could bring a copy in. Patient stated the end of her phone  that pulls into the outlet is missing. This RN called the ED and asked if someone can look for in the patient's old room. 1900: End of Shift Note    Bedside shift change report given to Atif Mo RN (oncoming nurse) by Brianna Espitia RN (offgoing nurse). Report included the following information SBAR, Kardex, Intake/Output, MAR, Recent Results and Cardiac Rhythm SR    Shift worked:  3p-7p     Shift summary and any significant changes:    Continue with PVR checks   Concerns for physician to address:      Zone phone for oncoming shift:  [phone]       Activity:  Activity Level:  Up with Assistance  Number times ambulated in hallways past shift: 0  Number of times OOB to chair past shift: 0    Cardiac:   Cardiac Monitoring: Yes      Cardiac Rhythm: Sinus Rhythm    Access:   Current line(s): PIV     Genitourinary:   Urinary status: voiding    Respiratory:   O2 Device: None (Room air)  Chronic home O2 use?: NO  Incentive spirometer at bedside: NO       GI:  Last Bowel Movement Date: 03/01/22  Current diet:  ADULT DIET Regular; Low Fat/Low Chol/High Fiber/2 gm Na  Passing flatus: YES  Tolerating current diet: YES       Pain Management:   Patient states pain is manageable on current regimen: YES    Skin:  Jose Luis Score: 18  Interventions: float heels and increase time out of bed    Patient Safety:  Fall Score:  Total Score: 3  Interventions: bed/chair alarm, assistive device (walker, cane, etc), gripper socks, pt to call before getting OOB and stay with me (per policy)  High Fall Risk: Yes    Length of Stay:  Expected LOS: - - -  Actual LOS: 1      Vincent Olivarez RN

## 2022-03-01 NOTE — ED NOTES
0715  Change of shift report received from offgoing RN. Patient to be seen by urology today. NPO after midnight. 0840  Patient alert oriented x 4. Resting in bed quietly. Complaints of mild lower abdominal pain. No nausea at this time. Respirations not labored. 1100  Urology NP at bedside for consultation evaluation. 1200  Bladder scan done 0 mL urine    1400   Bladder scan shows 25 mL in bladder after voiding. Having loose stools now at this time. 1452  TRANSFER - OUT REPORT:    Verbal report given to Luisa Johnson RN(name) on Gino Senior  being transferred to [facility] (unit) for routine progression of care       Report consisted of patients Situation, Background, Assessment and   Recommendations(SBAR). Information from the following report(s) SBAR, Kardex, ED Summary, MAR, Recent Results and Cardiac Rhythm NSR was reviewed with the receiving nurse. Lines:   Peripheral IV 02/28/22 Posterior;Right Forearm (Active)   Site Assessment Clean, dry, & intact; Clean;Dry; Intact 03/01/22 0318   Phlebitis Assessment 0 03/01/22 0318   Infiltration Assessment 0 03/01/22 0318   Dressing Status Clean, dry, & intact 03/01/22 0318   Dressing Type Transparent 03/01/22 0318   Hub Color/Line Status Blue; Infusing;Flushed 03/01/22 0318        Opportunity for questions and clarification was provided.       Patient transported with:   [place]

## 2022-03-01 NOTE — ED PROVIDER NOTES
EMERGENCY DEPARTMENT HISTORY AND PHYSICAL EXAM      Date: 2/28/2022  Patient Name: Tari Boykin    History of Presenting Illness     Chief Complaint   Patient presents with    Blood in Urine     pt into triage with walker, pt cc of blood in urine x3 weeks. pt states she is having lower abdominal pain. pt denies fevers at home       History Provided By: Patient    HPI: Tari Boykin, 78 y.o. female presents to the ED with cc of hematuria. The patient symptoms started a month ago. She noticed pink-tinged urine off and on, which resolved. Then 2 weeks ago, she noticed pink-tinged urine which later became blood tinged urine. She denies dysuria, but states she has had suprapubic discomfort intermittently, which is of mild severity. She denies fever, back pain, lightheadedness, chest pain, cough, shortness of breath. She denies nausea or change in bowel habits. She says she has had UTIs in the past, which required her to be admitted into the hospital.  She says her doctor put her on some antibiotic, but stated that it may not work. She is not on a blood thinner. There are no other complaints, changes, or physical findings at this time. PCP: Sol Rucker MD    Current Facility-Administered Medications on File Prior to Encounter   Medication Dose Route Frequency Provider Last Rate Last Admin    sterile water (preservative free) injection              Current Outpatient Medications on File Prior to Encounter   Medication Sig Dispense Refill    mupirocin (BACTROBAN) 2 % ointment  (Patient not taking: Reported on 2/22/2022)      trimethoprim-sulfamethoxazole (BACTRIM DS, SEPTRA DS) 160-800 mg per tablet Take 1 Tablet by mouth two (2) times a day for 7 days. 14 Tablet 0    clindamycin (CLEOCIN) 300 mg capsule Take 1 Capsule by mouth three (3) times daily.  (Patient not taking: Reported on 2/22/2022) 30 Capsule 0    albuterol sulfate (PROVENTIL;VENTOLIN) 2.5 mg/0.5 mL nebu nebulizer solution USE 1 VIAL IN NEBULIZER EVERY 4 HOURS AS NEEDED FOR WHEEZING 30 Nebule 2    furosemide (LASIX) 20 mg tablet Take 1 tablet by mouth once daily 90 Tablet 0    predniSONE (DELTASONE) 20 mg tablet Take 1 tablet by mouth once daily with breakfast (Patient not taking: Reported on 2/22/2022) 10 Tablet 0    Xeljanz XR 11 mg Tb24 TAKE 1 TABLET BY MOUTH ONE TIME A DAY 30 Tablet 11    simvastatin (ZOCOR) 20 mg tablet TAKE BY MOUTH NIGHTLY. (Patient taking differently: Take 20 mg by mouth nightly. TAKE BY MOUTH NIGHTLY. ) 90 Tablet 1    sertraline (ZOLOFT) 50 mg tablet Take 1 Tablet by mouth daily. 90 Tablet 1    omeprazole (PRILOSEC) 20 mg capsule Take 1 Capsule by mouth daily. 90 Capsule 1    metoprolol tartrate (LOPRESSOR) 25 mg tablet Take 1 Tablet by mouth two (2) times a day. 180 Tablet 1    metFORMIN (GLUCOPHAGE) 500 mg tablet Take 1 Tablet by mouth daily (with dinner). Decreased dose 90 Tablet 1    latanoprost (XALATAN) 0.005 % ophthalmic solution INSTILL 1 DROP INTO BOTH EYES NIGHTLY (Patient taking differently: Administer 1 Drop to both eyes nightly. INSTILL 1 DROP INTO BOTH EYES NIGHTLY) 2.5 mL 1    gabapentin (NEURONTIN) 300 mg capsule TAKE 1 CAPSULE IN THE MORNING AND TAKE 3 CAPSULES BEFORE BEDTIME (Patient taking differently: Take 300 mg by mouth daily. TAKE 1 CAPSULE IN THE MORNING AND TAKE 3 CAPSULES BEFORE BEDTIME) 120 Capsule 2    ergocalciferol (ERGOCALCIFEROL) 1,250 mcg (50,000 unit) capsule Take 1 Capsule by mouth every seven (7) days. Indications: vitamin D deficiency (high dose therapy) (Patient taking differently: Take 50,000 Units by mouth every seven (7) days. Fridays  Indications: vitamin D deficiency (high dose therapy)) 12 Capsule 3    fluticasone furoate-vilanteroL (Breo Ellipta) 100-25 mcg/dose inhaler TAKE 1 PUFF BY MOUTH EVERY DAY (Patient taking differently: Take 1 Puff by inhalation daily.  TAKE 1 PUFF BY MOUTH EVERY DAY) 1 Each 3    leflunomide (ARAVA) 20 mg tablet TAKE 1 TABLET BY MOUTH EVERY DAY (Patient taking differently: Take 20 mg by mouth daily. TAKE 1 TABLET BY MOUTH EVERY DAY) 90 Tablet 1    nortriptyline (PAMELOR) 50 mg capsule Take 50 mg by mouth nightly. Past History     Past Medical History:  Past Medical History:   Diagnosis Date    Asthma     Chronic pain 5/8/2020    DDD (degenerative disc disease), lumbar     Diabetes ([address]     Gastritis     GERD (gastroesophageal reflux disease)     Glaucoma     Hearing loss 5/8/2020    Hiatal hernia     High cholesterol     Hypertension     Hypocalcemia 5/8/2020    Peripheral vascular disease (HCC)     RA (rheumatoid arthritis) (HCC)     Sarcoidosis 1999    [facility]    Sleep apnea     has not used cpap in years since weight loss       Past Surgical History:  Past Surgical History:   Procedure Laterality Date    HX GASTRIC BYPASS      HX HEART CATHETERIZATION      s/p PCI with stenting in 1998     HX KNEE REPLACEMENT Bilateral     HX ORTHOPAEDIC Bilateral     carpal tunnel surgery     HX SHOULDER REPLACEMENT Right     x 2        Family History:  Family History   Problem Relation Age of Onset    Heart Disease Mother     Liver Disease Father     Alcohol abuse Brother     Dementia Neg Hx     Cancer Neg Hx     Seizures Neg Hx        Social History:  Social History     Tobacco Use    Smoking status: Never Smoker    Smokeless tobacco: Never Used   Vaping Use    Vaping Use: Never used   Substance Use Topics    Alcohol use: Yes     Alcohol/week: 2.0 standard drinks     Types: 1 Glasses of wine, 1 Shots of liquor per week     Comment: 2-3 yearly    Drug use: No       Allergies: Allergies   Allergen Reactions    Lisinopril Rash    Methotrexate Hives         Review of Systems   Review of Systems   Constitutional: Negative for fever. HENT: Negative for congestion. Eyes: Negative. Respiratory: Negative for shortness of breath. Cardiovascular: Negative for chest pain.    Gastrointestinal: Positive for abdominal pain. Endocrine: Negative for heat intolerance. Genitourinary: Positive for hematuria. Musculoskeletal: Negative for back pain. Skin: Negative for rash. Allergic/Immunologic: Negative for immunocompromised state. Neurological: Negative for dizziness. Hematological: Does not bruise/bleed easily. Psychiatric/Behavioral: Negative. All other systems reviewed and are negative. Physical Exam   Physical Exam  Vitals and nursing note reviewed. Constitutional:       General: She is not in acute distress. Appearance: She is well-developed. HENT:      Head: Normocephalic. Cardiovascular:      Rate and Rhythm: Normal rate and regular rhythm. Pulses: Normal pulses. Heart sounds: Normal heart sounds. Pulmonary:      Effort: Pulmonary effort is normal.      Breath sounds: Normal breath sounds. Abdominal:      General: Bowel sounds are normal.      Palpations: Abdomen is soft. Tenderness: There is no abdominal tenderness. Musculoskeletal:         General: Normal range of motion. Cervical back: Normal range of motion and neck supple. Skin:     General: Skin is warm and dry. Neurological:      General: No focal deficit present. Mental Status: She is alert.    Psychiatric:         Mood and Affect: Mood normal.         Behavior: Behavior normal.         Diagnostic Study Results     Labs -     Recent Results (from the past 12 hour(s))   CBC WITH AUTOMATED DIFF    Collection Time: 02/28/22  4:33 PM   Result Value Ref Range    WBC 4.0 3.6 - 11.0 K/uL    RBC 3.03 (L) 3.80 - 5.20 M/uL    HGB 9.8 (L) 11.5 - 16.0 g/dL    HCT 31.0 (L) 35.0 - 47.0 %    .3 (H) 80.0 - 99.0 FL    MCH 32.3 26.0 - 34.0 PG    MCHC 31.6 30.0 - 36.5 g/dL    RDW 14.5 11.5 - 14.5 %    PLATELET 371 937 - 408 K/uL    MPV 10.3 8.9 - 12.9 FL    NRBC 0.0 0  WBC    ABSOLUTE NRBC 0.00 0.00 - 0.01 K/uL    NEUTROPHILS 48 32 - 75 %    LYMPHOCYTES 36 12 - 49 %    MONOCYTES 12 5 - 13 % EOSINOPHILS 2 0 - 7 %    BASOPHILS 2 (H) 0 - 1 %    IMMATURE GRANULOCYTES 0 0.0 - 0.5 %    ABS. NEUTROPHILS 1.9 1.8 - 8.0 K/UL    ABS. LYMPHOCYTES 1.4 0.8 - 3.5 K/UL    ABS. MONOCYTES 0.5 0.0 - 1.0 K/UL    ABS. EOSINOPHILS 0.1 0.0 - 0.4 K/UL    ABS. BASOPHILS 0.1 0.0 - 0.1 K/UL    ABS. IMM. GRANS. 0.0 0.00 - 0.04 K/UL    DF AUTOMATED     METABOLIC PANEL, COMPREHENSIVE    Collection Time: 02/28/22  4:33 PM   Result Value Ref Range    Sodium 139 136 - 145 mmol/L    Potassium 4.6 3.5 - 5.1 mmol/L    Chloride 113 (H) 97 - 108 mmol/L    CO2 21 21 - 32 mmol/L    Anion gap 5 5 - 15 mmol/L    Glucose 88 65 - 100 mg/dL    BUN 35 (H) 6 - 20 MG/DL    Creatinine 1.41 (H) 0.55 - 1.02 MG/DL    BUN/Creatinine ratio 25 (H) 12 - 20      GFR est AA 44 (L) >60 ml/min/1.73m2    GFR est non-AA 36 (L) >60 ml/min/1.73m2    Calcium 8.9 8.5 - 10.1 MG/DL    Bilirubin, total 0.3 0.2 - 1.0 MG/DL    ALT (SGPT) 19 12 - 78 U/L    AST (SGOT) 39 (H) 15 - 37 U/L    Alk.  phosphatase 69 45 - 117 U/L    Protein, total 6.6 6.4 - 8.2 g/dL    Albumin 3.3 (L) 3.5 - 5.0 g/dL    Globulin 3.3 2.0 - 4.0 g/dL    A-G Ratio 1.0 (L) 1.1 - 2.2     URINALYSIS W/ REFLEX CULTURE    Collection Time: 02/28/22  4:54 PM    Specimen: Urine   Result Value Ref Range    Color DARK YELLOW      Appearance CLOUDY (A) CLEAR      Specific gravity 1.017 1.003 - 1.030      pH (UA) 6.0 5.0 - 8.0      Protein 30 (A) NEG mg/dL    Glucose Negative NEG mg/dL    Ketone Negative NEG mg/dL    Bilirubin Negative NEG      Blood LARGE (A) NEG      Urobilinogen 0.2 0.2 - 1.0 EU/dL    Nitrites Negative NEG      Leukocyte Esterase MODERATE (A) NEG      WBC 20-50 0 - 4 /hpf    RBC >100 (H) 0 - 5 /hpf    Epithelial cells FEW FEW /lpf    Bacteria 4+ (A) NEG /hpf    UA:UC IF INDICATED URINE CULTURE ORDERED (A) CNI      Hyaline cast 2-5 0 - 5 /lpf   GLUCOSE, POC    Collection Time: 02/28/22  8:45 PM   Result Value Ref Range    Glucose (POC) 87 65 - 117 mg/dL    Performed by Kamlesh Castillo RN Radiologic Studies -   CT ABD PELV WO CONT   Final Result   Cast within the wall of the bladder compatible with emphysematous cystitis        CT Results  (Last 48 hours)               02/28/22 1601  CT ABD PELV WO CONT Final result    Impression:  Cast within 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 ORAL CONTRAST: None       TECHNIQUE:    Thin axial images were obtained through the abdomen and pelvis. Coronal and   sagittal reformats were generated. CT dose reduction was achieved through use of   a standardized protocol tailored for this examination and automatic exposure   control for dose modulation. The absence of intravenous contrast material reduces the sensitivity for   evaluation of the vasculature and solid organs. FINDINGS:    LOWER THORAX: Small hiatal hernia. Extensive coronary artery calcifications. No   consolidation   LIVER: No mass. BILIARY TREE: Gallbladder is within normal limits. CBD is not dilated. SPLEEN: within normal limits. PANCREAS: No focal abnormality. ADRENALS: Unremarkable. KIDNEYS/URETERS: Mild perinephric stranding on the left. Possible small cyst   left kidney   STOMACH: Unremarkable. SMALL BOWEL: No dilatation or wall thickening. COLON: No dilatation or wall thickening. APPENDIX: Partially visualized. The visualized portions are not inflamed. PERITONEUM: No ascites or pneumoperitoneum. RETROPERITONEUM: Severe vascular calcifications without aneurysm. No pathologic   adenopathy   REPRODUCTIVE ORGANS: Not enlarged   URINARY BLADDER: Gas within the wall of the bladder which is incompletely   distended   BONES: No destructive bone lesion. ABDOMINAL WALL: No mass or hernia.    ADDITIONAL COMMENTS: N/A               CXR Results  (Last 48 hours)    None          Medical Decision Making   I am the first provider for this patient. I reviewed the vital signs, available nursing notes, past medical history, past surgical history, family history and social history. Vital Signs-Reviewed the patient's vital signs. Patient Vitals for the past 12 hrs:   Temp Pulse Resp BP SpO2   02/28/22 1845 -- 72 -- (!) 163/78 98 %   02/28/22 1522 98.2 °F (36.8 °C) 86 16 (!) 141/68 99 %       Records Reviewed: Nursing Notes, Old Medical Records and Previous Laboratory Studies    Provider Notes (Medical Decision Making):   UTI, kidney stone, hematuria, anemia    ED Course:   Initial assessment performed. The patients presenting problems have been discussed, and they are in agreement with the care plan formulated and outlined with them. I have encouraged them to ask questions as they arise throughout their visit. Consult note: The patient has been admitted by the hospitalist, Rafa Tellez. He recommends meropenem, because the patient has had resistance in the past and is immunocompromise    Consult note:    Spoke to representative from [facility]. They will see the patient in the morning         Critical Care Time:   CRITICAL CARE NOTE :    7:32 PM    IMPENDING DETERIORATION -Metabolic and Renal  ASSOCIATED RISK FACTORS - Hypotension, Metabolic changes and Dehydration  MANAGEMENT- Bedside Assessment and Supervision of Care  INTERPRETATION -  CT Scan and Blood Pressure  INTERVENTIONS - hemodynamic mngmt and Metobolic interventions  CASE REVIEW - Hospitalist/Intensivist, Medical Sub-Specialist and Nursing  TREATMENT RESPONSE -Unchanged   PERFORMED BY - Self    NOTES   :  I have spent 40 minutes of critical care time involved in lab review, consultations with specialist, family decision- making, bedside attention and documentation. This time excludes time spent in any separate billed procedures. During this entire length of time I was immediately available to the patient .     Rise Going, MD      Disposition:  admit    DISCHARGE PLAN:  1. Current Discharge Medication List        2. Follow-up Information    None       3. Return to ED if worse     Diagnosis     Clinical Impression:   1. Emphysematous cystitis    2. Hematuria, unspecified type        Attestations:    Negra Aldridge MD    Please note that this dictation was completed with [employer], the computer voice recognition software. Quite often unanticipated grammatical, syntax, homophones, and other interpretive errors are inadvertently transcribed by the computer software. Please disregard these errors. Please excuse any errors that have escaped final proofreading. Thank you.

## 2022-03-01 NOTE — H&P
Hospitalist Admission Note    NAME: Lizzy Galicia   :  1945   MRN:  [id number]     Date/Time:  2022 9:15 PM    Patient PCP: Moses Valverde MD  ________________________________________________________________________    My assessment of this patient's clinical condition and my plan of care is as follows. Assessment / Plan:  Emphysematous cystitis  -CT of abdomen shows emphysematous cystitis  -She has a history of ESBL Klebsiella pneumonia so we will start meropenem.  -Urine culture sent in the ED. Check blood cultures. Keep n.p.o. from midnight. Consult urology. ER physician Dr. Violet Watts is reaching out to urologist on call for recommendations    Acute kidney injury  -Baseline creatinine is around 0.8 and currently creatinine is 1.41. Likely related to urinary tract infection  -Start IV fluids with normal saline. Repeat BMP in AM.  Treat urinary tract infection. Monitor urine output    Diabetes mellitus type 2 with neuropathy  -Hold home Metformin. Start insulin sliding scale with blood sugar checks  -Continue home gabapentin    Hypertension  Dyslipidemia  Osteoarthritis  Peripheral arterial disease  History of rheumatoid arthritis  History of sarcoid  Obstructive sleep apnea  GERD  Depression  -Continue home metoprolol, Lipitor, PPI, Zoloft  -Holding home Rice Locus and leflunomide due to active infection      Code Status: Full code  Surrogate Decision Maker: Saroj Sloan    DVT Prophylaxis: SCDs due to anticipated procedures in a.m. Baseline: From home, independent of ADLs        Subjective:   CHIEF COMPLAINT: Lower abdominal pain    HISTORY OF PRESENT ILLNESS:     Lizzy Husbands is a 78 y.o.   female who presents with past medical history of hypertension, diabetes mellitus, rheumatoid arthritis is coming the hospital chief complaints of lower abdominal pain and also blood in the urine.   Patient reports that she was in his usual health until about 3 weeks ago when she started noticing blood in the urine and also lower abdominal pain. She reports abdominal pain is about 5 x 10, increases with pressure, radiating to the back and without any aggravating or relieving factors. Reports mild nausea but no vomiting. Does not report any fever. Does not report any chest pain or shortness of breath. On arrival to ED, noted to have stable vitals. On labs hemoglobin is 9.8, platelet count 840. BMP shows a creatinine of 1.41. LFTs are normal.  CT abdomen shows emphysematous cystitis. We were asked to admit for work up and evaluation of the above problems. Past Medical History:   Diagnosis Date    Asthma     Chronic pain 5/8/2020    DDD (degenerative disc disease), lumbar     Diabetes ([facility] [address]     Gastritis     GERD (gastroesophageal reflux disease)     Glaucoma     Hearing loss 5/8/2020    Hiatal hernia     High cholesterol     Hypertension     Hypocalcemia 5/8/2020    Peripheral vascular disease (HCC)     RA (rheumatoid arthritis) (HCC)     Sarcoidosis 1999    [facility]    Sleep apnea     has not used cpap in years since weight loss        Past Surgical History:   Procedure Laterality Date    HX GASTRIC BYPASS      HX HEART CATHETERIZATION      s/p PCI with stenting in 1998     HX KNEE REPLACEMENT Bilateral     HX ORTHOPAEDIC Bilateral     carpal tunnel surgery     HX SHOULDER REPLACEMENT Right     x 2        Social History     Tobacco Use    Smoking status: Never Smoker    Smokeless tobacco: Never Used   Substance Use Topics    Alcohol use:  Yes     Alcohol/week: 2.0 standard drinks     Types: 1 Glasses of wine, 1 Shots of liquor per week     Comment: 2-3 yearly        Family History   Problem Relation Age of Onset    Heart Disease Mother     Liver Disease Father     Alcohol abuse Brother     Dementia Neg Hx     Cancer Neg Hx     Seizures Neg Hx      Allergies   Allergen Reactions    Lisinopril Rash    Methotrexate Hives        Prior to Admission medications    Medication Sig Start Date End Date Taking? Authorizing Provider   mupirocin (BACTROBAN) 2 % ointment  2/14/22   Provider, Historical   trimethoprim-sulfamethoxazole (BACTRIM DS, SEPTRA DS) 160-800 mg per tablet Take 1 Tablet by mouth two (2) times a day for 7 days. 2/22/22 3/1/22  Sarah Morse MD   clindamycin (CLEOCIN) 300 mg capsule Take 1 Capsule by mouth three (3) times daily. Patient not taking: Reported on 2/22/2022 2/16/22   Kanwal Solorzano DO   albuterol sulfate (PROVENTIL;VENTOLIN) 2.5 mg/0.5 mL nebu nebulizer solution USE 1 VIAL IN NEBULIZER EVERY 4 HOURS AS NEEDED FOR WHEEZING 2/11/22   Sarah Morse MD   furosemide (LASIX) 20 mg tablet Take 1 tablet by mouth once daily 2/11/22   Sarah Morse MD   predniSONE (DELTASONE) 20 mg tablet Take 1 tablet by mouth once daily with breakfast  Patient not taking: Reported on 2/22/2022 2/11/22   Sarah Morse MD   Evlyrenard Lambing XR 11 mg Tb24 TAKE 1 TABLET BY MOUTH ONE TIME A DAY 1/21/22   Shirley Berrios MD   simvastatin (ZOCOR) 20 mg tablet TAKE BY MOUTH NIGHTLY. Patient taking differently: Take 20 mg by mouth nightly. TAKE BY MOUTH NIGHTLY. 10/26/21   Sarah Morse MD   sertraline (ZOLOFT) 50 mg tablet Take 1 Tablet by mouth daily. 10/26/21   Sarah Morse MD   omeprazole (PRILOSEC) 20 mg capsule Take 1 Capsule by mouth daily. 10/26/21   Sarah Morse MD   metoprolol tartrate (LOPRESSOR) 25 mg tablet Take 1 Tablet by mouth two (2) times a day. 10/26/21   Sarah Morse MD   metFORMIN (GLUCOPHAGE) 500 mg tablet Take 1 Tablet by mouth daily (with dinner). Decreased dose 10/26/21   Sarah Morse MD   latanoprost (XALATAN) 0.005 % ophthalmic solution INSTILL 1 DROP INTO BOTH EYES NIGHTLY  Patient taking differently: Administer 1 Drop to both eyes nightly.  INSTILL 1 DROP INTO BOTH EYES NIGHTLY 10/26/21   Sarah Morse MD   gabapentin (NEURONTIN) 300 mg capsule TAKE 1 CAPSULE IN THE MORNING AND TAKE 3 CAPSULES BEFORE BEDTIME  Patient taking differently: Take 300 mg by mouth daily. TAKE 1 CAPSULE IN THE MORNING AND TAKE 3 CAPSULES BEFORE BEDTIME 10/26/21   Alysha Rodrigez MD   ergocalciferol (ERGOCALCIFEROL) 1,250 mcg (50,000 unit) capsule Take 1 Capsule by mouth every seven (7) days. Indications: vitamin D deficiency (high dose therapy)  Patient taking differently: Take 50,000 Units by mouth every seven (7) days. Fridays  Indications: vitamin D deficiency (high dose therapy) 10/26/21   Alysha Rodrigez MD   fluticasone furoate-vilanteroL (Breo Ellipta) 100-25 mcg/dose inhaler TAKE 1 PUFF BY MOUTH EVERY DAY  Patient taking differently: Take 1 Puff by inhalation daily. TAKE 1 PUFF BY MOUTH EVERY DAY 10/26/21   Alysha Rodrigez MD   leflunomide (ARAVA) 20 mg tablet TAKE 1 TABLET BY MOUTH EVERY DAY  Patient taking differently: Take 20 mg by mouth daily. TAKE 1 TABLET BY MOUTH EVERY DAY 8/31/21   Tyson Cerda MD   nortriptyline (PAMELOR) 50 mg capsule Take 50 mg by mouth nightly. 5/7/18   Provider, Historical       REVIEW OF SYSTEMS:     I am not able to complete the review of systems because:    The patient is intubated and sedated    The patient has altered mental status due to his acute medical problems    The patient has baseline aphasia from prior stroke(s)    The patient has baseline dementia and is not reliable historian    The patient is in acute medical distress and unable to provide information           Total of 12 systems reviewed as follows:       POSITIVE= underlined text  Negative = text not underlined  General:  fever, chills, sweats, generalized weakness, weight loss/gain,      loss of appetite   Eyes:    blurred vision, eye pain, loss of vision, double vision  ENT:    rhinorrhea, pharyngitis   Respiratory:   cough, sputum production, SOB, WORKMAN, wheezing, pleuritic pain   Cardiology:   chest pain, palpitations, orthopnea, PND, edema, syncope   Gastrointestinal:  abdominal pain , N/V, diarrhea, dysphagia, constipation, bleeding   Genitourinary:  frequency, urgency, dysuria, hematuria, incontinence   Muskuloskeletal :  arthralgia, myalgia, back pain  Hematology:  easy bruising, nose or gum bleeding, lymphadenopathy   Dermatological: rash, ulceration, pruritis, color change / jaundice  Endocrine:   hot flashes or polydipsia   Neurological:  headache, dizziness, confusion, focal weakness, paresthesia,     Speech difficulties, memory loss, gait difficulty  Psychological: Feelings of anxiety, depression, agitation    Objective:   VITALS:    Visit Vitals  BP (!) 163/78   Pulse 72   Temp 98.2 °F (36.8 °C)   Resp 16   Ht 5' 4\" (1.626 m)   Wt 76.9 kg (169 lb 8.5 oz)   SpO2 98%   BMI 29.10 kg/m²       PHYSICAL EXAM:    General:    Alert, cooperative, no distress, appears stated age. HEENT: Atraumatic, anicteric sclerae, pink conjunctivae     No oral ulcers, mucosa moist, throat clear, dentition fair  Neck:  Supple, symmetrical,  thyroid: non tender  Lungs:   Clear to auscultation bilaterally. No Wheezing or Rhonchi. No rales. Chest wall:  No tenderness  No Accessory muscle use. Heart:   Regular  rhythm,  No  murmur   No edema  Abdomen:   Soft, suprapubic tenderness present, no guarding or rigidity  Extremities: No cyanosis. No clubbing,      Skin turgor normal, Capillary refill normal, Radial dial pulse 2+  Skin:     Not pale. Not Jaundiced  No rashes   Psych:  Good insight. Not depressed. Not anxious or agitated. Neurologic: EOMs intact. No facial asymmetry. No aphasia or slurred speech. Symmetrical strength, Sensation grossly intact.  Alert and oriented X 4.     _______________________________________________________________________  Care Plan discussed with:    Comments   Patient y    Family      RN y    Care Manager                    Consultant:      _______________________________________________________________________  Expected  Disposition:   Home with Family y   HH/PT/OT/RN    ABA/LTC    WU ________________________________________________________________________  TOTAL TIME:  60  Minutes    Critical Care Provided     Minutes non procedure based      Comments    y Reviewed previous records   >50% of visit spent in counseling and coordination of care y Discussion with patient and/or family and questions answered       ________________________________________________________________________  Signed: Deborah Garrett MD    Procedures: see electronic medical records for all procedures/Xrays and details which were not copied into this note but were reviewed prior to creation of Plan. LAB DATA REVIEWED:    Recent Results (from the past 24 hour(s))   CBC WITH AUTOMATED DIFF    Collection Time: 02/28/22  4:33 PM   Result Value Ref Range    WBC 4.0 3.6 - 11.0 K/uL    RBC 3.03 (L) 3.80 - 5.20 M/uL    HGB 9.8 (L) 11.5 - 16.0 g/dL    HCT 31.0 (L) 35.0 - 47.0 %    .3 (H) 80.0 - 99.0 FL    MCH 32.3 26.0 - 34.0 PG    MCHC 31.6 30.0 - 36.5 g/dL    RDW 14.5 11.5 - 14.5 %    PLATELET 851 700 - 288 K/uL    MPV 10.3 8.9 - 12.9 FL    NRBC 0.0 0  WBC    ABSOLUTE NRBC 0.00 0.00 - 0.01 K/uL    NEUTROPHILS 48 32 - 75 %    LYMPHOCYTES 36 12 - 49 %    MONOCYTES 12 5 - 13 %    EOSINOPHILS 2 0 - 7 %    BASOPHILS 2 (H) 0 - 1 %    IMMATURE GRANULOCYTES 0 0.0 - 0.5 %    ABS. NEUTROPHILS 1.9 1.8 - 8.0 K/UL    ABS. LYMPHOCYTES 1.4 0.8 - 3.5 K/UL    ABS. MONOCYTES 0.5 0.0 - 1.0 K/UL    ABS. EOSINOPHILS 0.1 0.0 - 0.4 K/UL    ABS. BASOPHILS 0.1 0.0 - 0.1 K/UL    ABS. IMM.  GRANS. 0.0 0.00 - 0.04 K/UL    DF AUTOMATED     METABOLIC PANEL, COMPREHENSIVE    Collection Time: 02/28/22  4:33 PM   Result Value Ref Range    Sodium 139 136 - 145 mmol/L    Potassium 4.6 3.5 - 5.1 mmol/L    Chloride 113 (H) 97 - 108 mmol/L    CO2 21 21 - 32 mmol/L    Anion gap 5 5 - 15 mmol/L    Glucose 88 65 - 100 mg/dL    BUN 35 (H) 6 - 20 MG/DL    Creatinine 1.41 (H) 0.55 - 1.02 MG/DL    BUN/Creatinine ratio 25 (H) 12 - 20      GFR est AA 44 (L) >60 ml/min/1.73m2    GFR est non-AA 36 (L) >60 ml/min/1.73m2    Calcium 8.9 8.5 - 10.1 MG/DL    Bilirubin, total 0.3 0.2 - 1.0 MG/DL    ALT (SGPT) 19 12 - 78 U/L    AST (SGOT) 39 (H) 15 - 37 U/L    Alk.  phosphatase 69 45 - 117 U/L    Protein, total 6.6 6.4 - 8.2 g/dL    Albumin 3.3 (L) 3.5 - 5.0 g/dL    Globulin 3.3 2.0 - 4.0 g/dL    A-G Ratio 1.0 (L) 1.1 - 2.2     URINALYSIS W/ REFLEX CULTURE    Collection Time: 02/28/22  4:54 PM    Specimen: Urine   Result Value Ref Range    Color DARK YELLOW      Appearance CLOUDY (A) CLEAR      Specific gravity 1.017 1.003 - 1.030      pH (UA) 6.0 5.0 - 8.0      Protein 30 (A) NEG mg/dL    Glucose Negative NEG mg/dL    Ketone Negative NEG mg/dL    Bilirubin Negative NEG      Blood LARGE (A) NEG      Urobilinogen 0.2 0.2 - 1.0 EU/dL    Nitrites Negative NEG      Leukocyte Esterase MODERATE (A) NEG      WBC 20-50 0 - 4 /hpf    RBC >100 (H) 0 - 5 /hpf    Epithelial cells FEW FEW /lpf    Bacteria 4+ (A) NEG /hpf    UA:UC IF INDICATED URINE CULTURE ORDERED (A) CNI      Hyaline cast 2-5 0 - 5 /lpf   GLUCOSE, POC    Collection Time: 02/28/22  8:45 PM   Result Value Ref Range    Glucose (POC) 87 65 - 117 mg/dL    Performed by Donald Griffin RN

## 2022-03-01 NOTE — PROGRESS NOTES
Problem: Pressure Injury - Risk of  Goal: *Prevention of pressure injury  Description: Document Jose Luis Scale and appropriate interventions in the flowsheet. Outcome: Progressing Towards Goal  Note: Pressure Injury Interventions:  Sensory Interventions: Assess changes in LOC,Assess need for specialty bed,Avoid rigorous massage over bony prominences,Discuss PT/OT consult with provider,Float heels,Keep linens dry and wrinkle-free,Maintain/enhance activity level,Minimize linen layers    Moisture Interventions: Absorbent underpads,Apply protective barrier, creams and emollients,Assess need for specialty bed,Minimize layers    Activity Interventions: Assess need for specialty bed,Increase time out of bed,Pressure redistribution bed/mattress(bed type),PT/OT evaluation    Mobility Interventions: Assess need for specialty bed,HOB 30 degrees or less,Float heels,Pressure redistribution bed/mattress (bed type),PT/OT evaluation    Nutrition Interventions: Document food/fluid/supplement intake                     Problem: Patient Education: Go to Patient Education Activity  Goal: Patient/Family Education  Outcome: Progressing Towards Goal     Problem: Falls - Risk of  Goal: *Absence of Falls  Description: Document Zeferino Quintero Fall Risk and appropriate interventions in the flowsheet.   Outcome: Progressing Towards Goal  Note: Fall Risk Interventions:  Mobility Interventions: Bed/chair exit alarm,Communicate number of staff needed for ambulation/transfer,Patient to call before getting OOB         Medication Interventions: Bed/chair exit alarm,Evaluate medications/consider consulting pharmacy,Patient to call before getting OOB    Elimination Interventions: Bed/chair exit alarm,Call light in reach,Patient to call for help with toileting needs,Stay With Me (per policy),Toileting schedule/hourly rounds              Problem: Patient Education: Go to Patient Education Activity  Goal: Patient/Family Education  Outcome: Progressing Towards Goal     Problem: Risk for Spread of Infection  Goal: Prevent transmission of infectious organism to others  Description: Prevent the transmission of infectious organisms to other patients, staff members, and visitors.   Outcome: Progressing Towards Goal     Problem: Patient Education:  Go to Education Activity  Goal: Patient/Family Education  Outcome: Progressing Towards Goal     Problem: Hypertension  Goal: *Blood pressure within specified parameters  Outcome: Progressing Towards Goal  Goal: *Fluid volume balance  Outcome: Progressing Towards Goal  Goal: *Labs within defined limits  Outcome: Progressing Towards Goal     Problem: Patient Education: Go to Patient Education Activity  Goal: Patient/Family Education  Outcome: Progressing Towards Goal

## 2022-03-01 NOTE — PROGRESS NOTES
P&T-Approved DVT Prophylaxis Dosing    Per P&T Committee-approved protocol Heparin has been changed to Lovenox 40mg SQ daily based on weight and renal function as shown in the table below.          Rosmery Last

## 2022-03-01 NOTE — CONSULTS
Urology Consult    Patient: Shelley Rhodes MRN: [id number]  SSN: [id number]    YOB: 1945  Age: 78 y.o. Sex: female          Date of Consultation:  March 1, 2022  Requesting Physician: Taylor Bertrand MD  Reason for Consultation: emphysematous cystitis            Assessment/Plan:  Emphysematous cystitis on CT abd  SHIRAZ- now resolved  Gross hematuria- likely related to infection   Recurrent UTI with poss sepsis- pending cultures. Recent ESBL Klebsiella uti on 2/22/22    -nursing to check strict post void residuals. If retention >300ml place laguerre. Record accurate I/Os  -daily labs  -abx per primary team, follow cultures and target therapy once able  -will follow while inpatient, and require further OP follow up     Supervising MD, Dr. Dom Jiang       History of Present Illness:  Patient is a 78 y.o. female admitted 2/28/2022 to the hospital for Emphysematous cystitis [N30.80]. She presented with gross hematuria. Not a known [facility] patient. Sven Hassan following with urology before or  hx. States starting with suprapubic discomfort and light pink UA about 3 weeks ago. Hematuria was intermittent, no clots. She noticed recently the pink UA had turned cherry red. Denied dysuria. States having mild chills yesterday, afebrile at admission. Denies n/v/d. She says she has had UTIs in the past, which required her to be admitted into the hospital. Denies [facility] use    Urology consulted after CT abd wo 2/28/22 resulted emphysematous cystitis. Bladder non distended on imaging. Images reviewed personally and by Dr. Dom Jiang. Chart reviewed:  Af, vss  No recent output recorded  hgb 9.1 (9.8). stable  Wbc nl, blood cx pending  plts 187  UA with 4+ bacteria, >100rbc, 20-50 wbc, culture pending   Received one dose of zosyn, now on merrem        Past Medical History:   Allergies   Allergen Reactions    Lisinopril Rash    Methotrexate Hives      Prior to Admission medications    Medication Sig Start Date End Date Taking? Authorizing Provider   mupirocin (BACTROBAN) 2 % ointment  2/14/22   Provider, Historical   trimethoprim-sulfamethoxazole (BACTRIM DS, SEPTRA DS) 160-800 mg per tablet Take 1 Tablet by mouth two (2) times a day for 7 days. 2/22/22 3/1/22  Odalis Phan MD   clindamycin (CLEOCIN) 300 mg capsule Take 1 Capsule by mouth three (3) times daily. Patient not taking: Reported on 2/22/2022 2/16/22   Mayi Leyva DO   albuterol sulfate (PROVENTIL;VENTOLIN) 2.5 mg/0.5 mL nebu nebulizer solution USE 1 VIAL IN NEBULIZER EVERY 4 HOURS AS NEEDED FOR WHEEZING 2/11/22   Odalis Phan MD   furosemide (LASIX) 20 mg tablet Take 1 tablet by mouth once daily 2/11/22   Odalis Phan MD   predniSONE (DELTASONE) 20 mg tablet Take 1 tablet by mouth once daily with breakfast  Patient not taking: Reported on 2/22/2022 2/11/22   Odalis Phan MD   Willistine Read XR 11 mg Tb24 TAKE 1 TABLET BY MOUTH ONE TIME A DAY 1/21/22   Abel Romero MD   simvastatin (ZOCOR) 20 mg tablet TAKE BY MOUTH NIGHTLY. Patient taking differently: Take 20 mg by mouth nightly. TAKE BY MOUTH NIGHTLY. 10/26/21   Odalis Phan MD   sertraline (ZOLOFT) 50 mg tablet Take 1 Tablet by mouth daily. 10/26/21   Odalis Phan MD   omeprazole (PRILOSEC) 20 mg capsule Take 1 Capsule by mouth daily. 10/26/21   Odalis Phan MD   metoprolol tartrate (LOPRESSOR) 25 mg tablet Take 1 Tablet by mouth two (2) times a day. 10/26/21   Odalis Phan MD   metFORMIN (GLUCOPHAGE) 500 mg tablet Take 1 Tablet by mouth daily (with dinner). Decreased dose 10/26/21   Odalis Phan MD   latanoprost (XALATAN) 0.005 % ophthalmic solution INSTILL 1 DROP INTO BOTH EYES NIGHTLY  Patient taking differently: Administer 1 Drop to both eyes nightly.  INSTILL 1 DROP INTO BOTH EYES NIGHTLY 10/26/21   Odalis Phan MD   gabapentin (NEURONTIN) 300 mg capsule TAKE 1 CAPSULE IN THE MORNING AND TAKE 3 CAPSULES BEFORE BEDTIME  Patient taking differently: Take 300 mg by mouth daily. TAKE 1 CAPSULE IN THE MORNING AND TAKE 3 CAPSULES BEFORE BEDTIME 10/26/21   Isaiah Pena MD   ergocalciferol (ERGOCALCIFEROL) 1,250 mcg (50,000 unit) capsule Take 1 Capsule by mouth every seven (7) days. Indications: vitamin D deficiency (high dose therapy)  Patient taking differently: Take 50,000 Units by mouth every seven (7) days. Fridays  Indications: vitamin D deficiency (high dose therapy) 10/26/21   Isaiah Pena MD   fluticasone furoate-vilanteroL (Breo Ellipta) 100-25 mcg/dose inhaler TAKE 1 PUFF BY MOUTH EVERY DAY  Patient taking differently: Take 1 Puff by inhalation daily. TAKE 1 PUFF BY MOUTH EVERY DAY 10/26/21   Isaiah Pena MD   leflunomide (ARAVA) 20 mg tablet TAKE 1 TABLET BY MOUTH EVERY DAY  Patient taking differently: Take 20 mg by mouth daily. TAKE 1 TABLET BY MOUTH EVERY DAY 8/31/21   Mark Fenton MD   nortriptyline (PAMELOR) 50 mg capsule Take 50 mg by mouth nightly. 5/7/18   Provider, Historical      PMHx:  has a past medical history of Asthma, Chronic pain (5/8/2020), DDD (degenerative disc disease), lumbar, Diabetes ([address] Gastritis, GERD (gastroesophageal reflux disease), Glaucoma, Hearing loss (5/8/2020), Hiatal hernia, High cholesterol, Hypertension, Hypocalcemia (5/8/2020), Peripheral vascular disease ([address] RA (rheumatoid arthritis) ([address] Sarcoidosis (1999), and Sleep apnea. PSurgHx:  has a past surgical history that includes hx gastric bypass; hx heart catheterization; hx knee replacement (Bilateral); hx shoulder replacement (Right); and hx orthopaedic (Bilateral). PSocHx:  reports that she has never smoked. She has never used smokeless tobacco. She reports current alcohol use of about 2.0 standard drinks of alcohol per week. She reports that she does not use drugs. ROS:  Admission ROS by Eleazar Rhodes MD from 2/28/2022 were reviewed with the patient and changes (other than per HPI) include: none.     Physical Exam    General Appearance: NAD, awake  HENT: atraumatic, normal ears  Cardiovascular: not tachycardic, no LE edema  Respiratory: no distress, room air  Abdomen: soft, mild suprapubic tenderness  : no CVA tenderness  Extremities: moves all  Musculoskeletal: normal alignment of neck and head  Neuro: Appropriate, no focal neurological deficits  Mood/Affect: appropriate, A&O x 3      Lab Results   Component Value Date/Time    WBC 3.8 03/01/2022 02:26 AM    HCT 29.1 (L) 03/01/2022 02:26 AM    PLATELET 191 41/88/3600 02:26 AM    Sodium 140 03/01/2022 02:26 AM    Potassium 4.2 03/01/2022 02:26 AM    Chloride 115 (H) 03/01/2022 02:26 AM    CO2 18 (L) 03/01/2022 02:26 AM    BUN 32 (H) 03/01/2022 02:26 AM    Creatinine 1.13 (H) 03/01/2022 02:26 AM    Glucose 69 03/01/2022 02:26 AM    Calcium 8.6 03/01/2022 02:26 AM    Magnesium 1.5 (L) 12/23/2016 11:34 AM    INR 1.0 11/01/2021 10:32 AM       UA:   Lab Results   Component Value Date/Time    Color DARK YELLOW 02/28/2022 04:54 PM    Appearance CLOUDY (A) 02/28/2022 04:54 PM    Specific gravity 1.017 02/28/2022 04:54 PM    pH (UA) 6.0 02/28/2022 04:54 PM    Protein 30 (A) 02/28/2022 04:54 PM    Glucose Negative 02/28/2022 04:54 PM    Ketone Negative 02/28/2022 04:54 PM    Bilirubin Negative 02/28/2022 04:54 PM    Urobilinogen 0.2 02/28/2022 04:54 PM    Nitrites Negative 02/28/2022 04:54 PM    Leukocyte Esterase MODERATE (A) 02/28/2022 04:54 PM    Epithelial cells FEW 02/28/2022 04:54 PM    Bacteria 4+ (A) 02/28/2022 04:54 PM    WBC 20-50 02/28/2022 04:54 PM    RBC >100 (H) 02/28/2022 04:54 PM           Signed By: RUPINDER Quintanilla March 1, 2022

## 2022-03-01 NOTE — PROGRESS NOTES
Received notification from bedside RN about patient with regards to: requesting bedtime PTA dose of Gabapentin  VS: /73, HR 76, RR 18, O2 sat 97% on RA     Intervention given: Gabapentin 300 mg q hs as per PTA regimen ordered

## 2022-03-01 NOTE — ED NOTES
Report given to [facility] RN. They were informed of patient chief complaint, current status, orders completed (to include IV access/medications/radiology testing), outstanding orders that still need to be completed, and the treatment plan. Ensured no questions or concerns regarding the patient prior to departure. Updated [facility] RN on current IV access status and medication/lab draw delay due to lack of access at this time.

## 2022-03-01 NOTE — PROGRESS NOTES
Hospitalist Progress Note    NAME: Mikey Gonzalez   :  1945   MRN:  [id number]       Assessment / Plan:  Emphysematous cystitis  -CT of abdomen shows emphysematous cystitis  -She has a history of ESBL Klebsiella pneumonia so we will continue meropenem.  -Urine culture sent in the ED.  f/u blood cultures.   -Urology following, Monitor I/O. PVR, if > 300 will need laguerre   -PT/OT     Acute kidney injury  -Baseline creatinine is around 0.8 and currently creatinine is 1.41 on admission and now 1. 13. Likely related to urinary tract infection  -c/w IV fluids with normal saline. Repeat BMP in AM.  Treat urinary tract infection. Monitor urine output     Diabetes mellitus type 2 with neuropathy  -Hold home Metformin. Start insulin sliding scale with blood sugar checks  -Continue home gabapentin     Hypertension  Dyslipidemia  Osteoarthritis  Peripheral arterial disease  History of rheumatoid arthritis  History of sarcoid  Obstructive sleep apnea  GERD  Depression  -Continue home metoprolol, Lipitor, PPI, Zoloft  -Holding home Lark [place] and leflunomide due to active infection     Anemia  Appears to be at baseline  Check Iron panel  Transfuse for hgb < 7  Trend     Code Status: Full code  Surrogate Decision Maker: Saroj Martinez     DVT Prophylaxis: heparin        Baseline: From home, independent of ADLs      25.0 - 29.9 Overweight / Body mass index is 29.1 kg/m². Estimated discharge date: March 3  Barriers: Urine cultures         Subjective:     Chief Complaint / Reason for Physician Visit  Patient seen and examined at the bedside. She currently states that she feels no better since coming to the hospital still with suprapubic tenderness. Discussed with RN events overnight.      Review of Systems:  Symptom Y/N Comments  Symptom Y/N Comments   Fever/Chills    Chest Pain     Poor Appetite    Edema     Cough    Abdominal Pain     Sputum    Joint Pain     SOB/WORKMAN    Pruritis/Rash     Nausea/vomit    Tolerating PT/OT     Diarrhea    Tolerating Diet     Constipation    Other       Could NOT obtain due to:      Objective:     VITALS:   Last 24hrs VS reviewed since prior progress note. Most recent are:  Patient Vitals for the past 24 hrs:   Temp Pulse Resp BP SpO2   03/01/22 0820 -- -- -- -- 99 %   03/01/22 0704 97.1 °F (36.2 °C) 80 14 115/60 99 %   03/01/22 0330 97.1 °F (36.2 °C) 76 13 130/67 99 %   02/28/22 2302 98.4 °F (36.9 °C) 76 18 134/73 97 %   02/28/22 1956 -- -- -- -- 98 %   02/28/22 1845 -- 72 -- (!) 163/78 98 %   02/28/22 1522 98.2 °F (36.8 °C) 86 16 (!) 141/68 99 %       Intake/Output Summary (Last 24 hours) at 3/1/2022 1212  Last data filed at 3/1/2022 0316  Gross per 24 hour   Intake 240 ml   Output --   Net 240 ml        I had a face to face encounter and independently examined this patient on 3/1/2022, as outlined below:  PHYSICAL EXAM:  General: WD, WN. Alert, cooperative, no acute distress    EENT:  EOMI. Anicteric sclerae. MMM  Resp:  CTA bilaterally, no wheezing or rales. No accessory muscle use  CV:  Regular  rhythm,  No edema  GI:  Soft, Non distended, Non tender. +Bowel sounds  Neurologic:  Alert and oriented X 3, normal speech,   Psych:   Good insight. Not anxious nor agitated  Skin:  No rashes. No jaundice    Reviewed most current lab test results and cultures  YES  Reviewed most current radiology test results   YES  Review and summation of old records today    NO  Reviewed patient's current orders and MAR    YES  PMH/SH reviewed - no change compared to H&P  ________________________________________________________________________  Care Plan discussed with:    Comments   Patient     Family      RN     Care Manager     Consultant                        Multidiciplinary team rounds were held today with , nursing, pharmacist and clinical coordinator. Patient's plan of care was discussed; medications were reviewed and discharge planning was addressed. ________________________________________________________________________  Total NON critical care TIME:  31   Minutes    Total CRITICAL CARE TIME Spent:   Minutes non procedure based      Comments   >50% of visit spent in counseling and coordination of care     ________________________________________________________________________  Alex Bender MD     Procedures: see electronic medical records for all procedures/Xrays and details which were not copied into this note but were reviewed prior to creation of Plan. LABS:  I reviewed today's most current labs and imaging studies.   Pertinent labs include:  Recent Labs     03/01/22 0226 02/28/22  1633   WBC 3.8 4.0   HGB 9.1* 9.8*   HCT 29.1* 31.0*    207     Recent Labs     03/01/22 0226 02/28/22  1633    139   K 4.2 4.6   * 113*   CO2 18* 21   GLU 69 88   BUN 32* 35*   CREA 1.13* 1.41*   CA 8.6 8.9   ALB  --  3.3*   TBILI  --  0.3   ALT  --  19       Signed: Alex Bender MD

## 2022-03-02 LAB
ANION GAP SERPL CALC-SCNC: 4 MMOL/L (ref 5–15)
BACTERIA SPEC CULT: ABNORMAL
BACTERIA SPEC CULT: ABNORMAL
BASOPHILS # BLD: 0.1 K/UL (ref 0–0.1)
BASOPHILS NFR BLD: 2 % (ref 0–1)
BUN SERPL-MCNC: 25 MG/DL (ref 6–20)
BUN/CREAT SERPL: 22 (ref 12–20)
CALCIUM SERPL-MCNC: 8.9 MG/DL (ref 8.5–10.1)
CC UR VC: ABNORMAL
CHLORIDE SERPL-SCNC: 114 MMOL/L (ref 97–108)
CO2 SERPL-SCNC: 22 MMOL/L (ref 21–32)
CREAT SERPL-MCNC: 1.13 MG/DL (ref 0.55–1.02)
DIFFERENTIAL METHOD BLD: ABNORMAL
EOSINOPHIL # BLD: 0.1 K/UL (ref 0–0.4)
EOSINOPHIL NFR BLD: 3 % (ref 0–7)
ERYTHROCYTE [DISTWIDTH] IN BLOOD BY AUTOMATED COUNT: 14.6 % (ref 11.5–14.5)
GLUCOSE BLD STRIP.AUTO-MCNC: 106 MG/DL (ref 65–117)
GLUCOSE BLD STRIP.AUTO-MCNC: 152 MG/DL (ref 65–117)
GLUCOSE BLD STRIP.AUTO-MCNC: 61 MG/DL (ref 65–117)
GLUCOSE BLD STRIP.AUTO-MCNC: 74 MG/DL (ref 65–117)
GLUCOSE BLD STRIP.AUTO-MCNC: 84 MG/DL (ref 65–117)
GLUCOSE SERPL-MCNC: 96 MG/DL (ref 65–100)
HCT VFR BLD AUTO: 30.9 % (ref 35–47)
HGB BLD-MCNC: 9.6 G/DL (ref 11.5–16)
IMM GRANULOCYTES # BLD AUTO: 0 K/UL (ref 0–0.04)
IMM GRANULOCYTES NFR BLD AUTO: 0 % (ref 0–0.5)
IRON SATN MFR SERPL: 23 % (ref 20–50)
IRON SERPL-MCNC: 52 UG/DL (ref 35–150)
LYMPHOCYTES # BLD: 1.3 K/UL (ref 0.8–3.5)
LYMPHOCYTES NFR BLD: 36 % (ref 12–49)
MAGNESIUM SERPL-MCNC: 1.9 MG/DL (ref 1.6–2.4)
MCH RBC QN AUTO: 32.2 PG (ref 26–34)
MCHC RBC AUTO-ENTMCNC: 31.1 G/DL (ref 30–36.5)
MCV RBC AUTO: 103.7 FL (ref 80–99)
MONOCYTES # BLD: 0.4 K/UL (ref 0–1)
MONOCYTES NFR BLD: 11 % (ref 5–13)
NEUTS SEG # BLD: 1.8 K/UL (ref 1.8–8)
NEUTS SEG NFR BLD: 48 % (ref 32–75)
NRBC # BLD: 0 K/UL (ref 0–0.01)
NRBC BLD-RTO: 0 PER 100 WBC
PHOSPHATE SERPL-MCNC: 2.9 MG/DL (ref 2.6–4.7)
PLATELET # BLD AUTO: 192 K/UL (ref 150–400)
PMV BLD AUTO: 10.3 FL (ref 8.9–12.9)
POTASSIUM SERPL-SCNC: 4.5 MMOL/L (ref 3.5–5.1)
RBC # BLD AUTO: 2.98 M/UL (ref 3.8–5.2)
SERVICE CMNT-IMP: ABNORMAL
SERVICE CMNT-IMP: NORMAL
SODIUM SERPL-SCNC: 140 MMOL/L (ref 136–145)
TIBC SERPL-MCNC: 227 UG/DL (ref 250–450)
WBC # BLD AUTO: 3.7 K/UL (ref 3.6–11)

## 2022-03-02 PROCEDURE — 97165 OT EVAL LOW COMPLEX 30 MIN: CPT

## 2022-03-02 PROCEDURE — 36415 COLL VENOUS BLD VENIPUNCTURE: CPT

## 2022-03-02 PROCEDURE — 83540 ASSAY OF IRON: CPT

## 2022-03-02 PROCEDURE — 74011250636 HC RX REV CODE- 250/636: Performed by: INTERNAL MEDICINE

## 2022-03-02 PROCEDURE — 74011000258 HC RX REV CODE- 258: Performed by: INTERNAL MEDICINE

## 2022-03-02 PROCEDURE — 97530 THERAPEUTIC ACTIVITIES: CPT

## 2022-03-02 PROCEDURE — 97161 PT EVAL LOW COMPLEX 20 MIN: CPT

## 2022-03-02 PROCEDURE — 82962 GLUCOSE BLOOD TEST: CPT

## 2022-03-02 PROCEDURE — 65660000000 HC RM CCU STEPDOWN

## 2022-03-02 PROCEDURE — 74011000250 HC RX REV CODE- 250: Performed by: INTERNAL MEDICINE

## 2022-03-02 PROCEDURE — 97116 GAIT TRAINING THERAPY: CPT

## 2022-03-02 PROCEDURE — 74011250637 HC RX REV CODE- 250/637: Performed by: INTERNAL MEDICINE

## 2022-03-02 PROCEDURE — 51798 US URINE CAPACITY MEASURE: CPT

## 2022-03-02 PROCEDURE — 74011250637 HC RX REV CODE- 250/637: Performed by: NURSE PRACTITIONER

## 2022-03-02 PROCEDURE — 80048 BASIC METABOLIC PNL TOTAL CA: CPT

## 2022-03-02 PROCEDURE — 84100 ASSAY OF PHOSPHORUS: CPT

## 2022-03-02 PROCEDURE — 85025 COMPLETE CBC W/AUTO DIFF WBC: CPT

## 2022-03-02 PROCEDURE — 97535 SELF CARE MNGMENT TRAINING: CPT

## 2022-03-02 PROCEDURE — 83735 ASSAY OF MAGNESIUM: CPT

## 2022-03-02 RX ORDER — IBUPROFEN 400 MG/1
400 TABLET ORAL ONCE
Status: COMPLETED | OUTPATIENT
Start: 2022-03-02 | End: 2022-03-02

## 2022-03-02 RX ORDER — IBUPROFEN 400 MG/1
400 TABLET ORAL
Status: DISCONTINUED | OUTPATIENT
Start: 2022-03-02 | End: 2022-03-02

## 2022-03-02 RX ORDER — ACETAMINOPHEN 500 MG
500 TABLET ORAL
Status: DISCONTINUED | OUTPATIENT
Start: 2022-03-02 | End: 2022-03-07 | Stop reason: HOSPADM

## 2022-03-02 RX ORDER — INSULIN LISPRO 100 [IU]/ML
INJECTION, SOLUTION INTRAVENOUS; SUBCUTANEOUS
Status: DISCONTINUED | OUTPATIENT
Start: 2022-03-02 | End: 2022-03-07 | Stop reason: HOSPADM

## 2022-03-02 RX ADMIN — SODIUM CHLORIDE 50 ML/HR: 9 INJECTION, SOLUTION INTRAVENOUS at 15:41

## 2022-03-02 RX ADMIN — MEROPENEM 500 MG: 500 INJECTION, POWDER, FOR SOLUTION INTRAVENOUS at 05:21

## 2022-03-02 RX ADMIN — METOPROLOL TARTRATE 25 MG: 25 TABLET, FILM COATED ORAL at 09:45

## 2022-03-02 RX ADMIN — ENOXAPARIN SODIUM 40 MG: 100 INJECTION SUBCUTANEOUS at 13:34

## 2022-03-02 RX ADMIN — IBUPROFEN 400 MG: 400 TABLET, FILM COATED ORAL at 04:05

## 2022-03-02 RX ADMIN — GABAPENTIN 300 MG: 300 CAPSULE ORAL at 09:48

## 2022-03-02 RX ADMIN — GABAPENTIN 300 MG: 300 CAPSULE ORAL at 21:19

## 2022-03-02 RX ADMIN — METOPROLOL TARTRATE 25 MG: 25 TABLET, FILM COATED ORAL at 18:43

## 2022-03-02 RX ADMIN — NORTRIPTYLINE HYDROCHLORIDE 50 MG: 25 CAPSULE ORAL at 21:19

## 2022-03-02 RX ADMIN — ATORVASTATIN CALCIUM 20 MG: 20 TABLET, FILM COATED ORAL at 09:45

## 2022-03-02 RX ADMIN — SERTRALINE 50 MG: 50 TABLET, FILM COATED ORAL at 09:45

## 2022-03-02 RX ADMIN — LATANOPROST 1 DROP: 50 SOLUTION/ DROPS OPHTHALMIC at 21:21

## 2022-03-02 RX ADMIN — PANTOPRAZOLE SODIUM 40 MG: 40 TABLET, DELAYED RELEASE ORAL at 09:45

## 2022-03-02 RX ADMIN — MEROPENEM 500 MG: 500 INJECTION, POWDER, FOR SOLUTION INTRAVENOUS at 21:18

## 2022-03-02 RX ADMIN — SODIUM CHLORIDE, PRESERVATIVE FREE 10 ML: 5 INJECTION INTRAVENOUS at 05:22

## 2022-03-02 RX ADMIN — SODIUM CHLORIDE, PRESERVATIVE FREE 10 ML: 5 INJECTION INTRAVENOUS at 13:34

## 2022-03-02 RX ADMIN — MEROPENEM 500 MG: 500 INJECTION, POWDER, FOR SOLUTION INTRAVENOUS at 13:34

## 2022-03-02 RX ADMIN — ACETAMINOPHEN 500 MG: 500 TABLET ORAL at 23:49

## 2022-03-02 NOTE — PROGRESS NOTES
Hospitalist Progress Note    NAME: Tari Boykin   :  1945   MRN:  [id number]       Assessment / Plan:  Emphysematous cystitis  -CT of abdomen shows emphysematous cystitis  -She has a history of ESBL Klebsiella pneumonia so we will continue meropenem.  -Follow-up urine culture and blood culture, urine culture growing gram-negative so far  -Urology following, Monitor I/O. PVR, if > 300 will need laguerre   -PT/OT     Acute kidney injury  -renal function improving  -Patient developed some swelling  -Decrease IV fluid rate to 50 mL     Diabetes mellitus type 2 with neuropathy  -Hold home Metformin. Start insulin sliding scale with blood sugar checks  -Continue home gabapentin     Hypertension  Dyslipidemia  Osteoarthritis  Peripheral arterial disease  History of rheumatoid arthritis  History of sarcoid  Obstructive sleep apnea  GERD  Depression  -Continue home metoprolol, Lipitor, PPI, Zoloft  -Holding home [place] and leflunomide due to active infection     Anemia  Appears to be at baseline  Check Iron panel  Transfuse for hgb < 7  Trend     Code Status: Full code  Surrogate Decision Maker: Son Shelva Spurling     DVT Prophylaxis: heparin        Baseline: From home, independent of ADLs      25.0 - 29.9 Overweight / Body mass index is 29.1 kg/m². Estimated discharge date: March 3  Barriers: Urine cultures         Subjective:     Chief Complaint / Reason for Physician Visit  Patient seen and examined at the bedside. She currently states that she feels no better since coming to the hospital still with suprapubic tenderness. Discussed with RN events overnight.   Follow-up final urine culture    Review of Systems:  Symptom Y/N Comments  Symptom Y/N Comments   Fever/Chills n   Chest Pain n    Poor Appetite    Edema     Cough    Abdominal Pain     Sputum n   Joint Pain     SOB/WORKMAN    Pruritis/Rash     Nausea/vomit    Tolerating PT/OT     Diarrhea    Tolerating Diet y    Constipation n   Other       Could NOT obtain due to:      Objective:     VITALS:   Last 24hrs VS reviewed since prior progress note. Most recent are:  Patient Vitals for the past 24 hrs:   Temp Pulse Resp BP SpO2   03/02/22 1025 97.7 °F (36.5 °C) 71 16 (!) 119/96 95 %   03/02/22 0958 -- -- -- (!) 119/96 --   03/02/22 0945 -- 81 -- 123/60 --   03/02/22 0757 97.5 °F (36.4 °C) 69 16 (!) 140/77 93 %   03/02/22 0248 97.6 °F (36.4 °C) 65 16 138/63 94 %   03/01/22 2232 97.8 °F (36.6 °C) 66 15 (!) 142/75 97 %   03/01/22 1925 97.3 °F (36.3 °C) 62 12 (!) 148/71 96 %   03/01/22 1740 -- 72 -- (!) 150/76 --   03/01/22 1552 97.5 °F (36.4 °C) 66 16 137/72 97 %   03/01/22 1215 97.6 °F (36.4 °C) 69 16 125/61 98 %       Intake/Output Summary (Last 24 hours) at 3/2/2022 1134  Last data filed at 3/2/2022 0746  Gross per 24 hour   Intake 1970 ml   Output 1000 ml   Net 970 ml        I had a face to face encounter and independently examined this patient on 3/2/2022, as outlined below:  PHYSICAL EXAM:  General: WD, WN. Alert, cooperative, no acute distress    EENT:  EOMI. Anicteric sclerae. MMM  Resp:  CTA bilaterally, no wheezing or rales. No accessory muscle use  CV:  Regular  rhythm,  No edema  GI:  Soft, Non distended, Non tender. +Bowel sounds  Neurologic:  Alert and oriented X 3, normal speech,   Psych:   Good insight. Not anxious nor agitated  Skin:  No rashes. No jaundice    Reviewed most current lab test results and cultures  YES  Reviewed most current radiology test results   YES  Review and summation of old records today    NO  Reviewed patient's current orders and MAR    YES  PMH/SH reviewed - no change compared to H&P  ________________________________________________________________________  Care Plan discussed with:    Comments   Patient y    Family      RN y    Care Manager     Consultant                        Multidiciplinary team rounds were held today with , nursing, pharmacist and clinical coordinator.   Patient's plan of care was discussed; medications were reviewed and discharge planning was addressed. ________________________________________________________________________  Total NON critical care TIME:  35    Minutes    Total CRITICAL CARE TIME Spent:   Minutes non procedure based      Comments   >50% of visit spent in counseling and coordination of care y    ________________________________________________________________________  Carole Mccurdy MD     Procedures: see electronic medical records for all procedures/Xrays and details which were not copied into this note but were reviewed prior to creation of Plan. LABS:  I reviewed today's most current labs and imaging studies. Pertinent labs include:  Recent Labs     03/02/22 0425 03/01/22 0226 02/28/22  1633   WBC 3.7 3.8 4.0   HGB 9.6* 9.1* 9.8*   HCT 30.9* 29.1* 31.0*    187 207     Recent Labs     03/02/22 0425 03/01/22 0226 02/28/22  1633    140 139   K 4.5 4.2 4.6   * 115* 113*   CO2 22 18* 21   GLU 96 69 88   BUN 25* 32* 35*   CREA 1.13* 1.13* 1.41*   CA 8.9 8.6 8.9   MG 1.9  --   --    PHOS 2.9  --   --    ALB  --   --  3.3*   TBILI  --   --  0.3   ALT  --   --  19       Signed:  Carole Mccurdy MD

## 2022-03-02 NOTE — PROGRESS NOTES
Patient: Naina Malave MRN: [id number]  SSN: [id number]    YOB: 1945  Age: 78 y.o. Sex: female        ADMITTED: 2022 to Hannah Vigil MD by Idania Hu MD for Emphysematous cystitis [N30.80]  POD# * No surgery found *     Naina Malave is doing fair , c/o bladder discomfort , states hematuria intermittent now . States burning with urination , explained likely related to UTI . Pt request Tylenol 500 mg for pain management . Vitals: Temp (24hrs), Av.6 °F (36.4 °C), Min:97.3 °F (36.3 °C), Max:97.8 °F (36.6 °C)    Blood pressure (!) 140/77, pulse 69, temperature 97.5 °F (36.4 °C), resp. rate 16, height 5' 4\" (1.626 m), weight 76.9 kg (169 lb 8.5 oz), SpO2 93 %. Intake and Output:  1901 -  0700  In: 2260 [P.O.:600; I.V.:1660]  Out: 800 [Urine:800]   07 -  1900  In: -   Out: 200 [Urine:200]  KENNA Output lats 24 hrs: No data found. KENNA Output last 8 hrs: No data found. BM over last 24 hrs: No data found. Exam:  EXAMINATION:     Appearance: well-developed NAD.  Elderly    Conjunctiva/Lids: conjunctivae and lids normal   External Ears/Nose: normal no lesions or deformities   Neck: trachea midline   Respiratory Effort: breathing easily   Lower Extremity: no edema   Abdomen/Flank: Soft mildly tender without masses; no CVA tenderness   Liver/Spleen: no organomegaly   Hernia: no ventral hernia    Gait/Station: normal   Skin Inspection: warm and dry   Mood/Affect: normal         Labs:  CBC:   Lab Results   Component Value Date/Time    WBC 3.7 2022 04:25 AM    HCT 30.9 (L) 2022 04:25 AM    PLATELET 262  04:25 AM     BMP:   Lab Results   Component Value Date/Time    Glucose 96 2022 04:25 AM    Sodium 140 2022 04:25 AM    Potassium 4.5 2022 04:25 AM    Chloride 114 (H) 2022 04:25 AM    CO2 22 2022 04:25 AM    BUN 25 (H) 2022 04:25 AM    Creatinine 1.13 (H) 2022 04:25 AM    Calcium 8.9 03/02/2022 04:25 AM     Cultures: BC NGTD    UCX  GNR Meropenem     Imaging: N/A  Assessment/Plan:     1. Emphysematous cystitis - low PVR , hematuria improved   - Ucx GNR specificity pending  Cont w/ meropenem   2. SHIRAZ - resolved     Supervising Md : Dr. Sapphire Rodriguez By: Isabel Herndon.  Ce Aquino NP - March 2, 2022

## 2022-03-02 NOTE — PROGRESS NOTES
Problem: Mobility Impaired (Adult and Pediatric)  Goal: *Acute Goals and Plan of Care (Insert Text)  Description: FUNCTIONAL STATUS PRIOR TO ADMISSION: Patient was modified independent using a rolling walker for functional mobility. HOME SUPPORT PRIOR TO ADMISSION: The patient lived alone with local son to provide assistance. Physical Therapy Goals  Initiated 3/2/2022  1. Patient will move from supine to sit and sit to supine , scoot up and down, and roll side to side in bed with modified independence within 7 day(s). 2.  Patient will transfer from bed to chair and chair to bed with modified independence using the least restrictive device within 7 day(s). 3.  Patient will perform sit to stand with modified independence within 7 day(s). 4.  Patient will ambulate with modified independence for 100 feet with the least restrictive device within 7 day(s). Outcome: Not Met   PHYSICAL THERAPY EVALUATION  Patient: Zachery Olszewski (74 y.o. female)  Date: 3/2/2022  Primary Diagnosis: Emphysematous cystitis [N30.80]        Precautions: Falls       ASSESSMENT  Based on the objective data described below, the patient presents with decreased activity tolerance, generalized weakness, impaired transfers, impaired gait mechanics, and decreased overall independence following admission for emphysematous cystitis. PmHx significant for R TKA revision 11/2021, RA, OA, and PAD. VSS during mobility. Patient completes supine>sit with supervision, sit<>stand SBA with cuing for hand placement, and ambulation 2x15ft with RW SBA. No LOB or unsteadiness noted during ambulation. Patient requires cuing for safety during transfers as she tends to sit without reaching for surface. Patient presents below baseline functioning status and will require skilled PT services in acute setting to address above impairments. Recommend [facility] therapy and increased family support at home.      Current Level of Function Impacting Discharge (mobility/balance): SBA    Functional Outcome Measure: The patient scored 60/100 on the Barthel Index outcome measure which is indicative of moderately impaired functional ability. Other factors to consider for discharge: lives alone     Patient will benefit from skilled therapy intervention to address the above noted impairments. PLAN :  Recommendations and Planned Interventions: bed mobility training, transfer training, gait training, therapeutic exercises, patient and family training/education and therapeutic activities      Frequency/Duration: Patient will be followed by physical therapy:  4 times a week to address goals. Recommendation for discharge: (in order for the patient to meet his/her long term goals)  Physical therapy at least 2 days/week in the home     This discharge recommendation:  Has been made in collaboration with the attending provider and/or case management    IF patient discharges home will need the following DME: patient owns DME required for discharge     SUBJECTIVE:   Patient stated I'm still having pain.     OBJECTIVE DATA SUMMARY:   HISTORY:    Past Medical History:   Diagnosis Date    Asthma     Chronic pain 5/8/2020    DDD (degenerative disc disease), lumbar     Diabetes ([facility] [address]     Gastritis     GERD (gastroesophageal reflux disease)     Glaucoma     Hearing loss 5/8/2020    Hiatal hernia     High cholesterol     Hypertension     Hypocalcemia 5/8/2020    Peripheral vascular disease (HCC)     RA (rheumatoid arthritis) (HCC)     Sarcoidosis 1999    [facility]    Sleep apnea     has not used cpap in years since weight loss     Past Surgical History:   Procedure Laterality Date    HX GASTRIC BYPASS      HX HEART CATHETERIZATION      s/p PCI with stenting in 1998     HX KNEE REPLACEMENT Bilateral     HX ORTHOPAEDIC Bilateral     carpal tunnel surgery     HX SHOULDER REPLACEMENT Right     x 2      Personal factors and/or comorbidities impacting plan of care: chronic pain, DM, HTN, PVD, RA, OA    Home Situation  Home Environment: Apartment  # Steps to Enter: 0  One/Two Story Residence: One story  Living Alone: Yes  Support Systems: Child(reji)  Patient Expects to be Discharged to[de-identified] Home with home health  Current DME Used/Available at Home: Shower chair,Walker, rolling  Tub or Shower Type: Tub/Shower combination    EXAMINATION/PRESENTATION/DECISION MAKING:   Critical Behavior:  Neurologic State: Alert  Orientation Level: Oriented X4        Hearing: Auditory  Auditory Impairment: Hard of hearing, bilateral,Hearing aid(s)  Hearing Aids/Status: Bilateral,With patient    Range Of Motion:  AROM: Generally decreased, functional (bilat shoulder limited shoulder flex past 90 deg)  Strength:    Strength: Generally decreased, functional  Tone & Sensation:   Tone: Normal  Sensation: Impaired (decreased sensation bilat feet)    Coordination:  Coordination: Generally decreased, functional  Functional Mobility:  Bed Mobility:  Supine to Sit: Supervision; Additional time  Scooting: Supervision  Transfers:  Sit to Stand: Stand-by assistance  Stand to Sit: Stand-by assistance  Balance:   Sitting: Intact  Standing: Impaired  Standing - Static: Good  Standing - Dynamic : Fair  Ambulation/Gait Training:  Distance (ft): 15 Feet (ft) (x2)  Assistive Device: Walker, rolling  Ambulation - Level of Assistance: Stand-by assistance  Gait Abnormalities: Decreased step clearance  Base of Support: Widened  Speed/Nicole: Slow  Step Length: Right shortened;Left shortened    Functional Measure:  Barthel Index:    Bathin  Bladder: 10  Bowels: 10  Groomin  Dressin  Feeding: 10  Mobility: 0  Stairs: 0  Toilet Use: 10  Transfer (Bed to Chair and Back): 10  Total: 60/100       The Barthel ADL Index: Guidelines  1. The index should be used as a record of what a patient does, not as a record of what a patient could do.   2. The main aim is to establish degree of independence from any help, physical or verbal, however minor and for whatever reason. 3. The need for supervision renders the patient not independent. 4. A patient's performance should be established using the best available evidence. Asking the patient, friends/relatives and nurses are the usual sources, but direct observation and common sense are also important. However direct testing is not needed. 5. Usually the patient's performance over the preceding 24-48 hours is important, but occasionally longer periods will be relevant. 6. Middle categories imply that the patient supplies over 50 per cent of the effort. 7. Use of aids to be independent is allowed. Mariam Vicente., Barthel, DWisamW. (6268). Functional evaluation: the Barthel Index. [address] (14)2. Demetrio Trevizo kinjal LASHAE Flores, Nicola Dial., Laura Zelaya., [place], [address] [place] [address] Ave (). Measuring the change indisability after inpatient rehabilitation; comparison of the responsiveness of the Barthel Index and Functional [place] Measure. Journal of Neurology, Neurosurgery, and Psychiatry, 66(4), 845-597. Mone Spain N.JULIO CESAR.JAQUI, RAH Rea, & Angle Holloway, MWisamA. (2004.) Assessment of post-stroke quality of life in cost-effectiveness studies: The usefulness of the Barthel Index and the EuroQoL-5D.  Quality of Life Research, 15, 364-65      Physical Therapy Evaluation Charge Determination   History Examination Presentation Decision-Making   HIGH Complexity :3+ comorbidities / personal factors will impact the outcome/ POC  MEDIUM Complexity : 3 Standardized tests and measures addressing body structure, function, activity limitation and / or participation in recreation  LOW Complexity : Stable, uncomplicated  Other outcome measures Barthel Index 60/100  MEDIUM      Based on the above components, the patient evaluation is determined to be of the following complexity level: LOW     Pain Ratin/10 abdominal pain    Activity Tolerance:   Fair, SpO2 stable on RA and requires rest breaks    After treatment patient left in no apparent distress:   Sitting in chair, Call bell within reach and Bed / chair alarm activated    COMMUNICATION/EDUCATION:   The patients plan of care was discussed with: Occupational therapist, Registered nurse and Case management. Fall prevention education was provided and the patient/caregiver indicated understanding., Patient/family have participated as able in goal setting and plan of care. and Patient/family agree to work toward stated goals and plan of care.     Thank you for this referral.  Candice Fontana, PT   Time Calculation: 24 mins

## 2022-03-02 NOTE — WOUND CARE
Wound care consult for \"Right second toe recent amputation healing wound\":  Chart reviewed and patient assessed today for this. Patient is 78 yrs old and admitted with a life threatening condition called emphysematous cystitis. Pt. Has DM 2 with good control in general. The last A1C in our records was from August of 2021 and it was 5.0. other results have been the same prior to this as well. Co-morbid conditions include: kidney disease, HTN, Dyslipidemia, osteoarthritis, PAD, R-Arthritis, Sarcoid, MICKI, GERD and depression. Pt. Has been sick for about 3 weeks with her abdominal pain and blood in her urine. We are asked to see this patient for her recent toe amputation site. No surgeries done here recently. Assessment: Patient talking on the phone and she is apparently hard of hearing. The right great toe (1st) is a well healed amputation site from about 1.5 yrs ago. About 2 months ago she had her second toenail removed by her podiatrist.          Plan: The site is healing well and has a good partial thickness wound bed. It only needs a dry band-aid at this point.    Giselle Young, RN, BSN, [employer]

## 2022-03-02 NOTE — PROGRESS NOTES
End of Shift Note          Shift worked:  [id number]       Shift summary and any significant changes:          Concerns for physician to address:       Zone phone for oncoming shift:   0770       Activity:  Activity Level: Up with Assistance  Number times ambulated in hallways past shift: 0  Number of times OOB to chair past shift: 0    Cardiac:   Cardiac Monitoring: Yes      Cardiac Rhythm: Sinus Rhythm    Access:   Current line(s): PIV     Genitourinary:   Urinary status: voiding    Respiratory:   O2 Device: None (Room air)  Chronic home O2 use?: NO  Incentive spirometer at bedside: NO       GI:  Last Bowel Movement Date: 03/01/22  Current diet:  ADULT DIET Regular; Low Fat/Low Chol/High Fiber/2 gm Na  Passing flatus: YES  Tolerating current diet: YES       Pain Management:   Patient states pain is manageable on current regimen: YES    Skin:  Jose Luis Score: 18  Interventions: float heels, increase time out of bed and PT/OT consult    Patient Safety:  Fall Score:  Total Score: 3  Interventions: bed/chair alarm, assistive device (walker, cane, etc) and gripper socks  High Fall Risk: Yes    Length of Stay:  Expected LOS: - - -  Actual LOS: 1      Dean Ibanez RN

## 2022-03-02 NOTE — PROGRESS NOTES
Received notification from bedside RN about patient with regards to: bladder discomfort secondary to UTI, requesting medication for relief  VS: /63, HR 65, RR 16, O2 sat 94% on RA    Intervention given: Motrin 400 mg PO x 1 dose ordered

## 2022-03-02 NOTE — PROGRESS NOTES
Problem: Self Care Deficits Care Plan (Adult)  Goal: *Acute Goals and Plan of Care (Insert Text)  Description: FUNCTIONAL STATUS PRIOR TO ADMISSION: Patient was modified independent using a rolling walker for functional mobility. Patient was modified independent for basic and instrumental ADLs. HOME SUPPORT: The patient lived alone with son nearby to provide assistance if needed. Occupational Therapy Goals  Initiated 3/2/2022  1. Patient will perform grooming standing at sink with modified independence within 7 day(s). 2.  Patient will perform lower body dressing with modified independence within 7 day(s). 3.  Patient will perform sponge bathing with modified independence within 7 day(s). 4.  Patient will perform toilet transfers with modified independence within 7 day(s). 5.  Patient will perform all aspects of toileting with modified independence within 7 day(s). Outcome: Not Met   OCCUPATIONAL THERAPY EVALUATION  Patient: Daisy Lambert (5 y.o. 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 Patient is functioning below her mod I baseline for self-care and functional mobility, now completing self-care with set-up to min assist and functional mobility with supervision to contact guard/min assist using rolling walker. Patient received semisupine in bed and agreeable for therapy. Patient completed supine > sit EOB without assist and demonstrated intact sitting balance. Patient stood and walked into bathroom using rolling walker. When transferring to toileting, patient required min assist when sitting with cues provided for hand placement and safety awareness.  Patient completed toileting/hygiene and washed hands at sink with occasional assist for balance. Patient agreed to end session sitting in chair and completed LB dressing while seated. Patient was left sitting in chair with all needs met, VSS, and chair alarmed. Patient would benefit from skilled OT services during acute hospital stay. Anticipate patient can return home with HHOT/PT, pending progress. Current Level of Function Impacting Discharge (ADLs/self-care): set-up to min assist for self-care, supervision to contact guard/min assist for functional mobility using rolling walker     Functional Outcome Measure: The patient scored 60/100 on the Barthel Index outcome measure which is indicative of independence in ALDs. Other factors to consider for discharge: fall risk, [place]     Patient will benefit from skilled therapy intervention to address the above noted impairments. PLAN :  Recommendations and Planned Interventions: self care training, functional mobility training, therapeutic exercise, balance training, therapeutic activities, endurance activities, patient education, home safety training, and family training/education    Frequency/Duration: Patient will be followed by occupational therapy 3 times a week to address goals. Recommendation for discharge: (in order for the patient to meet his/her long term goals)  Occupational therapy at least 2 days/week in the home     This discharge recommendation:  Has been made in collaboration with the attending provider and/or case management    IF patient discharges home will need the following DME: patient owns DME required for discharge       SUBJECTIVE:   Patient stated I want to get into that shower but they told me I couldn't do that.     OBJECTIVE DATA SUMMARY:   HISTORY:   Past Medical History:   Diagnosis Date    Asthma     Chronic pain 5/8/2020    DDD (degenerative disc disease), lumbar     Diabetes ([facility] [address]     Gastritis     GERD (gastroesophageal reflux disease)     Glaucoma Hearing loss 5/8/2020    Hiatal hernia     High cholesterol     Hypertension     Hypocalcemia 5/8/2020    Peripheral vascular disease (HCC)     RA (rheumatoid arthritis) (HCC)     Sarcoidosis 1999    MCV    Sleep apnea     has not used cpap in years since weight loss     Past Surgical History:   Procedure Laterality Date    HX GASTRIC BYPASS      HX HEART CATHETERIZATION      s/p PCI with stenting in 1998     HX KNEE REPLACEMENT Bilateral     HX ORTHOPAEDIC Bilateral     carpal tunnel surgery     HX SHOULDER REPLACEMENT Right     x 2        Expanded or extensive additional review of patient history:     Home Situation  Home Environment: Apartment  # Steps to Enter: 0  One/Two Story Residence: One story  Living Alone: Yes  Support Systems: Child(reji)  Patient Expects to be Discharged to[de-identified] Home with home health  Current DME Used/Available at Home: Shower chair,Walker, rolling  Tub or Shower Type: Tub/Shower combination    Hand dominance: Right    EXAMINATION OF PERFORMANCE DEFICITS:  Cognitive/Behavioral Status:  Neurologic State: Alert  Orientation Level: Oriented X4  Cognition: Appropriate decision making; Follows commands  Perception: Appears intact  Perseveration: No perseveration noted  Safety/Judgement: Awareness of environment;Decreased insight into deficits    Hearing: Auditory  Auditory Impairment: Hard of hearing, bilateral  Hearing Aids/Status: Bilateral,With patient    Vision/Perceptual:    Acuity: Within Defined Limits    Corrective Lenses: Glasses    Range of Motion:  AROM: Generally decreased, functional (bilat shoulder limited shoulder flex past 90 deg)    Strength:  Strength: Generally decreased, functional    Coordination:  Coordination: Generally decreased, functional  Fine Motor Skills-Upper: Left Intact; Right Intact    Gross Motor Skills-Upper: Left Intact; Right Intact    Tone & Sensation:  Tone: Normal  Sensation: Impaired (decreased sensation bilat feet)    Balance:  Sitting: Intact  Standing: Impaired  Standing - Static: Good  Standing - Dynamic : Fair    Functional Mobility and Transfers for ADLs:  Bed Mobility:  Supine to Sit: Supervision; Additional time  Scooting: Supervision    Transfers:  Sit to Stand: Stand-by assistance  Stand to Sit: Stand-by assistance  Bed to Chair: Contact guard assistance  Bathroom Mobility: Contact guard assistance  Toilet Transfer : Contact guard assistance;Minimum assistance    ADL Assessment:  Feeding: Setup  Oral Facial Hygiene/Grooming: Contact guard assistance  Bathing: Minimum assistance  Upper Body Dressing: Setup;Supervision  Lower Body Dressing: Contact guard assistance  Toileting: Minimum assistance    ADL Intervention and task modifications:    Grooming  Position Performed: Standing;Seated in chair (at sink)  Washing Face: Set-up; Supervision (seated )  Washing Hands: Contact guard assistance (standing at sink)  Cues: Tactile cues provided;Verbal cues provided    Lower Body Dressing Assistance  Socks: Contact guard assistance  Leg Crossed Method Used: Yes  Position Performed: Seated in chair  Cues: Doff;Don;Verbal cues provided    Toileting  Bladder Hygiene: Contact guard assistance  Clothing Management: Minimum assistance  Cues: Tactile cues provided;Verbal cues provided    Cognitive Retraining  Safety/Judgement: Awareness of environment;Decreased insight into deficits    Functional Measure:    Barthel Index:  Bathin  Bladder: 10  Bowels: 10  Groomin  Dressin  Feeding: 10  Mobility: 0  Stairs: 0  Toilet Use: 10  Transfer (Bed to Chair and Back): 10  Total: 60/100      The Barthel ADL Index: Guidelines  1. The index should be used as a record of what a patient does, not as a record of what a patient could do. 2. The main aim is to establish degree of independence from any help, physical or verbal, however minor and for whatever reason. 3. The need for supervision renders the patient not independent.   4. A patient's performance should be established using the best available evidence. Asking the patient, friends/relatives and nurses are the usual sources, but direct observation and common sense are also important. However direct testing is not needed. 5. Usually the patient's performance over the preceding 24-48 hours is important, but occasionally longer periods will be relevant. 6. Middle categories imply that the patient supplies over 50 per cent of the effort. 7. Use of aids to be independent is allowed. Score Interpretation (from [facility])    Independent   60-79 Minimally independent   40-59 Partially dependent   20-39 Very dependent   <20 Totally dependent     -Mona Barber., Barthel, D.W. (1965). Functional evaluation: the Barthel Index. [address] ([address] (1997). The Barthel activities of daily living index: self-reporting versus actual performance in the old (> or = 75 years). Journal of [address] [place] [address] 45(7), [facility], LASHAE, Cynthia Little., [facility]. (1999). Measuring the change in disability after inpatient rehabilitation; comparison of the responsiveness of the Barthel Index and Functional Hardeman Measure. Journal of Neurology, Neurosurgery, and Psychiatry, 66(4), 369-472. Riya Rodriguez, N.J.A, RAH Rea, & Lucinda Arias, M.A. (2004) Assessment of post-stroke quality of life in cost-effectiveness studies: The usefulness of the Barthel Index and the EuroQoL-5D. Quality of Life Research, 13, 801-03      Based on the above components, the patient evaluation is determined to be of the following complexity level: LOW   Pain Rating:  Patient did not c/o pain during session.     Activity Tolerance:   Fair, SpO2 stable on RA, and requires rest breaks    After treatment patient left in no apparent distress:    Sitting in chair, Call bell within reach, and Bed / chair alarm activated    COMMUNICATION/EDUCATION:   The patients plan of care was discussed with: Physical therapist and Registered nurse. Home safety education was provided and the patient/caregiver indicated understanding., Patient/family have participated as able in goal setting and plan of care. , and Patient/family agree to work toward stated goals and plan of care. This patients plan of care is appropriate for delegation to [facility].     Thank you for this referral.  Chet Feliciano OTR/L  Time Calculation: 31 mins

## 2022-03-03 LAB
ANION GAP SERPL CALC-SCNC: 5 MMOL/L (ref 5–15)
BUN SERPL-MCNC: 23 MG/DL (ref 6–20)
BUN/CREAT SERPL: 20 (ref 12–20)
CALCIUM SERPL-MCNC: 8.7 MG/DL (ref 8.5–10.1)
CHLORIDE SERPL-SCNC: 116 MMOL/L (ref 97–108)
CO2 SERPL-SCNC: 18 MMOL/L (ref 21–32)
CREAT SERPL-MCNC: 1.13 MG/DL (ref 0.55–1.02)
GLUCOSE BLD STRIP.AUTO-MCNC: 105 MG/DL (ref 65–117)
GLUCOSE BLD STRIP.AUTO-MCNC: 70 MG/DL (ref 65–117)
GLUCOSE BLD STRIP.AUTO-MCNC: 81 MG/DL (ref 65–117)
GLUCOSE BLD STRIP.AUTO-MCNC: 92 MG/DL (ref 65–117)
GLUCOSE SERPL-MCNC: 79 MG/DL (ref 65–100)
POTASSIUM SERPL-SCNC: 4.8 MMOL/L (ref 3.5–5.1)
SERVICE CMNT-IMP: NORMAL
SODIUM SERPL-SCNC: 139 MMOL/L (ref 136–145)

## 2022-03-03 PROCEDURE — 74011000250 HC RX REV CODE- 250: Performed by: INTERNAL MEDICINE

## 2022-03-03 PROCEDURE — 74011250637 HC RX REV CODE- 250/637: Performed by: NURSE PRACTITIONER

## 2022-03-03 PROCEDURE — 74011250636 HC RX REV CODE- 250/636: Performed by: INTERNAL MEDICINE

## 2022-03-03 PROCEDURE — 2709999900 HC NON-CHARGEABLE SUPPLY

## 2022-03-03 PROCEDURE — 36415 COLL VENOUS BLD VENIPUNCTURE: CPT

## 2022-03-03 PROCEDURE — 80048 BASIC METABOLIC PNL TOTAL CA: CPT

## 2022-03-03 PROCEDURE — 51798 US URINE CAPACITY MEASURE: CPT

## 2022-03-03 PROCEDURE — 65660000000 HC RM CCU STEPDOWN

## 2022-03-03 PROCEDURE — 82962 GLUCOSE BLOOD TEST: CPT

## 2022-03-03 PROCEDURE — 74011000258 HC RX REV CODE- 258: Performed by: INTERNAL MEDICINE

## 2022-03-03 PROCEDURE — 76937 US GUIDE VASCULAR ACCESS: CPT

## 2022-03-03 PROCEDURE — 74011250637 HC RX REV CODE- 250/637: Performed by: INTERNAL MEDICINE

## 2022-03-03 RX ORDER — TRAMADOL HYDROCHLORIDE 50 MG/1
50 TABLET ORAL
COMMUNITY
End: 2022-04-28 | Stop reason: SDUPTHER

## 2022-03-03 RX ORDER — TRAMADOL HYDROCHLORIDE 50 MG/1
50 TABLET ORAL ONCE
Status: COMPLETED | OUTPATIENT
Start: 2022-03-03 | End: 2022-03-03

## 2022-03-03 RX ADMIN — PANTOPRAZOLE SODIUM 40 MG: 40 TABLET, DELAYED RELEASE ORAL at 08:59

## 2022-03-03 RX ADMIN — SODIUM CHLORIDE, PRESERVATIVE FREE 10 ML: 5 INJECTION INTRAVENOUS at 14:39

## 2022-03-03 RX ADMIN — MEROPENEM 500 MG: 500 INJECTION, POWDER, FOR SOLUTION INTRAVENOUS at 05:21

## 2022-03-03 RX ADMIN — GABAPENTIN 300 MG: 300 CAPSULE ORAL at 21:44

## 2022-03-03 RX ADMIN — ACETAMINOPHEN 500 MG: 500 TABLET ORAL at 21:44

## 2022-03-03 RX ADMIN — METOPROLOL TARTRATE 25 MG: 25 TABLET, FILM COATED ORAL at 09:00

## 2022-03-03 RX ADMIN — LATANOPROST 1 DROP: 50 SOLUTION/ DROPS OPHTHALMIC at 21:44

## 2022-03-03 RX ADMIN — GABAPENTIN 300 MG: 300 CAPSULE ORAL at 09:00

## 2022-03-03 RX ADMIN — SODIUM CHLORIDE, PRESERVATIVE FREE 10 ML: 5 INJECTION INTRAVENOUS at 05:21

## 2022-03-03 RX ADMIN — ATORVASTATIN CALCIUM 20 MG: 20 TABLET, FILM COATED ORAL at 09:00

## 2022-03-03 RX ADMIN — METOPROLOL TARTRATE 25 MG: 25 TABLET, FILM COATED ORAL at 18:12

## 2022-03-03 RX ADMIN — MEROPENEM 500 MG: 500 INJECTION, POWDER, FOR SOLUTION INTRAVENOUS at 21:44

## 2022-03-03 RX ADMIN — MEROPENEM 500 MG: 500 INJECTION, POWDER, FOR SOLUTION INTRAVENOUS at 14:37

## 2022-03-03 RX ADMIN — ENOXAPARIN SODIUM 40 MG: 100 INJECTION SUBCUTANEOUS at 12:59

## 2022-03-03 RX ADMIN — TRAMADOL HYDROCHLORIDE 50 MG: 50 TABLET, COATED ORAL at 22:27

## 2022-03-03 RX ADMIN — SODIUM CHLORIDE, PRESERVATIVE FREE 10 ML: 5 INJECTION INTRAVENOUS at 21:44

## 2022-03-03 RX ADMIN — SERTRALINE 50 MG: 50 TABLET, FILM COATED ORAL at 08:59

## 2022-03-03 RX ADMIN — NORTRIPTYLINE HYDROCHLORIDE 50 MG: 25 CAPSULE ORAL at 21:44

## 2022-03-03 NOTE — PROGRESS NOTES
Hospitalist Progress Note    NAME: Mindi Shoemaker   :  1945   MRN:  [id number]       Assessment / Plan:  Emphysematous cystitis  -CT of abdomen shows emphysematous cystitis  -She has a history of ESBL Klebsiella pneumonia so we will continue meropenem. -urine culture growing Klebsiella  -Urology following, Monitor I/O. PVR, if > 300 will need laguerre   -PT/OT       Acute kidney injury  -renal function improving  -Patient developed some swelling  -DC IV fluid     Diabetes mellitus type 2 with neuropathy  -Hold home Metformin. Start insulin sliding scale with blood sugar checks  -Continue home gabapentin     Hypertension  Dyslipidemia  Osteoarthritis  Peripheral arterial disease  History of rheumatoid arthritis  History of sarcoid  Obstructive sleep apnea  GERD  Depression  -Continue home metoprolol, Lipitor, PPI, Zoloft  -Holding home Marce Duffel and leflunomide due to active infection     Anemia  Appears to be at baseline  Check Iron panel  Transfuse for hgb < 7  Trend     Code Status: Full code  Surrogate Decision Maker: Saroj Styles     DVT Prophylaxis: heparin        Baseline: From home, independent of ADLs      25.0 - 29.9 Overweight / Body mass index is 29.1 kg/m². Estimated discharge date: March 3  Barriers: Urine cultures         Subjective:     Chief Complaint / Reason for Physician Visit  Patient seen and examined at the bedside. She currently states that she feels no better since coming to the hospital still with suprapubic tenderness. Discussed with RN events overnight.   Continue IV antibiotic, patient may need placement    Review of Systems:  Symptom Y/N Comments  Symptom Y/N Comments   Fever/Chills n   Chest Pain n    Poor Appetite    Edema     Cough    Abdominal Pain     Sputum n   Joint Pain     SOB/WORKMAN    Pruritis/Rash     Nausea/vomit    Tolerating PT/OT     Diarrhea    Tolerating Diet y    Constipation n   Other       Could NOT obtain due to:      Objective:     VITALS:   Last 24hrs VS reviewed since prior progress note. Most recent are:  Patient Vitals for the past 24 hrs:   Temp Pulse Resp BP SpO2   03/03/22 0721 97.6 °F (36.4 °C) 70 18 125/70 98 %   03/03/22 0301 97.6 °F (36.4 °C) 69 18 134/80 --   03/02/22 2240 98.3 °F (36.8 °C) 84 16 107/64 99 %   03/02/22 1930 97.9 °F (36.6 °C) 70 18 (!) 155/67 --   03/02/22 1843 -- 76 -- (!) 148/72 --   03/02/22 1514 98 °F (36.7 °C) 73 16 127/75 96 %       Intake/Output Summary (Last 24 hours) at 3/3/2022 1136  Last data filed at 3/2/2022 1930  Gross per 24 hour   Intake 1840.83 ml   Output 300 ml   Net 1540.83 ml        I had a face to face encounter and independently examined this patient on 3/3/2022, as outlined below:  PHYSICAL EXAM:  General: WD, WN. Alert, cooperative, no acute distress    EENT:  EOMI. Anicteric sclerae. MMM  Resp:  CTA bilaterally, no wheezing or rales. No accessory muscle use  CV:  Regular  rhythm,  No edema  GI:  Soft, Non distended, Non tender. +Bowel sounds  Neurologic:  Alert and oriented X 3, normal speech,   Psych:   Good insight. Not anxious nor agitated  Skin:  No rashes. No jaundice    Reviewed most current lab test results and cultures  YES  Reviewed most current radiology test results   YES  Review and summation of old records today    NO  Reviewed patient's current orders and MAR    YES  PMH/SH reviewed - no change compared to H&P  ________________________________________________________________________  Care Plan discussed with:    Comments   Patient y    Family      RN y    Care Manager     Consultant                        Multidiciplinary team rounds were held today with , nursing, pharmacist and clinical coordinator. Patient's plan of care was discussed; medications were reviewed and discharge planning was addressed.      ________________________________________________________________________  Total NON critical care TIME:  35    Minutes    Total CRITICAL CARE TIME Spent:   Minutes non procedure based      Comments   >50% of visit spent in counseling and coordination of care y    ________________________________________________________________________  Evelia Gonzalez MD     Procedures: see electronic medical records for all procedures/Xrays and details which were not copied into this note but were reviewed prior to creation of Plan. LABS:  I reviewed today's most current labs and imaging studies. Pertinent labs include:  Recent Labs     03/02/22  0425 03/01/22 0226 02/28/22  1633   WBC 3.7 3.8 4.0   HGB 9.6* 9.1* 9.8*   HCT 30.9* 29.1* 31.0*    187 207     Recent Labs     03/03/22  0141 03/02/22  0425 03/01/22 0226 02/28/22  1633 02/28/22  1633    140 140   < > 139   K 4.8 4.5 4.2   < > 4.6   * 114* 115*   < > 113*   CO2 18* 22 18*   < > 21   GLU 79 96 69   < > 88   BUN 23* 25* 32*   < > 35*   CREA 1.13* 1.13* 1.13*   < > 1.41*   CA 8.7 8.9 8.6   < > 8.9   MG  --  1.9  --   --   --    PHOS  --  2.9  --   --   --    ALB  --   --   --   --  3.3*   TBILI  --   --   --   --  0.3   ALT  --   --   --   --  19    < > = values in this interval not displayed. Signed:  Evelia Gonzalez MD

## 2022-03-03 NOTE — PROGRESS NOTES
Problem: Pressure Injury - Risk of  Goal: *Prevention of pressure injury  Description: Document Jose Luis Scale and appropriate interventions in the flowsheet. Outcome: Progressing Towards Goal  Note: Pressure Injury Interventions:  Sensory Interventions: Assess changes in LOC,Float heels,Keep linens dry and wrinkle-free,Minimize linen layers    Moisture Interventions: Apply protective barrier, creams and emollients,Absorbent underpads,Check for incontinence Q2 hours and as needed,Minimize layers    Activity Interventions: Assess need for specialty bed,Increase time out of bed,PT/OT evaluation    Mobility Interventions: HOB 30 degrees or less,Float heels,PT/OT evaluation    Nutrition Interventions: Document food/fluid/supplement intake                     Problem: Patient Education: Go to Patient Education Activity  Goal: Patient/Family Education  Outcome: Progressing Towards Goal     Problem: Falls - Risk of  Goal: *Absence of Falls  Description: Document Sharifa Fall Risk and appropriate interventions in the flowsheet. Outcome: Progressing Towards Goal  Note: Fall Risk Interventions:  Mobility Interventions: Bed/chair exit alarm,Communicate number of staff needed for ambulation/transfer,Patient to call before getting OOB,Utilize walker, cane, or other assistive device         Medication Interventions: Bed/chair exit alarm,Patient to call before getting OOB,Teach patient to arise slowly    Elimination Interventions: Bed/chair exit alarm,Call light in reach,Patient to call for help with toileting needs,Toileting schedule/hourly rounds              Problem: Patient Education: Go to Patient Education Activity  Goal: Patient/Family Education  Outcome: Progressing Towards Goal     Problem: Risk for Spread of Infection  Goal: Prevent transmission of infectious organism to others  Description: Prevent the transmission of infectious organisms to other patients, staff members, and visitors.   Outcome: Progressing Towards Goal     Problem: Patient Education:  Go to Education Activity  Goal: Patient/Family Education  Outcome: Progressing Towards Goal     Problem: Hypertension  Goal: *Blood pressure within specified parameters  Outcome: Progressing Towards Goal  Goal: *Fluid volume balance  Outcome: Progressing Towards Goal  Goal: *Labs within defined limits  Outcome: Progressing Towards Goal     Problem: Patient Education: Go to Patient Education Activity  Goal: Patient/Family Education  Outcome: Progressing Towards Goal

## 2022-03-03 NOTE — PROGRESS NOTES
Patient has a 20guage 8cm  ENDURANCE Extended dwell  CATHETER in her Right forearm. May remain in place 29 days  Blood sample can be obtained from this catheter. Dressing needs to be changed every 7 days by nurse. Please use central line dressing kit using bio patch and stat lock.      For Lab draws:  Tourniquet may be used  Flush with 10ml  Discard 3ml (waste)  Draw labs  Flush with 20ml    Inserted by Omid Velázquez, RN, BSN, YAZAN Franklin, RN,BSN, [facility]  Vascular Access Team  a Problem: Potential for hypothermia related to immature thermoregulation  Goal: Winter will maintain body temperature between 97.6 degrees axillary F and 99.6 degrees axillary F in an open crib   bundled. Temperature stable.    Problem: Potential for impaired gas exchange  Goal: Patient will not exhibit signs/symptoms of respiratory distress    Intervention: Implement Transition and Routine  Care Protocol  No s/s of respiratory distress noted. Lungs clear bilaterally.

## 2022-03-03 NOTE — PROGRESS NOTES
Chart reviewed. Attempted to see pt for PT intervention however pt requesting she be allowed to eat lunch and therapy return at a later time. Will defer however continue to follow.  Thank you    Terrence Lake, PT, DPT

## 2022-03-03 NOTE — PROGRESS NOTES
Transition of Care Plan:    RUR: 14%  Disposition: Home with [facility] - referrals pending-   Sent via [facility]  and [place]  Follow up appointments: PCP  DME needed: none - has cane and walker  Transportation at Discharge: CM to arrange medicaid to provide transportation   [address] or means to access home:   Pt has access to  home   IM Medicare Letter: 2nd IM Letter to be given  Is patient a BCPI-A Bundle:        n/a   If yes, was Bundle Letter given?:    Is patient a [place] and connected with the [address] [facility]?        n/a        If yes, was [employer] transfer form completed and [facility] notified? Caregiver Contact: Sahra Conrad - [phone]  Discharge Caregiver contacted prior to discharge? CM to discuss if pt desires  Care Conference needed?:        n/a           CM introduced self and role to pt at bedside, verified pt demographics, insurance info and emergency contact. Pt lives alone in lower level apartment. Pt independent with ADL's, ambulating with walker or cane and uses family or medicaid for transportation. DME: showerchair. HH and SNF in the past- could not recall name. Uses [facility] on [address]. Reason for Admission:  Emphysematous cystitis                     RUR Score:   14%                  Plan for utilizing home health: CM discussed with pt. Pt agreed. FOC completed and signed, signed copy placed on chart. PCP: First and Last name:  Ilsa Zendejas MD     Name of Practice:    Are you a current patient: Yes/No: Yes   Approximate date of last visit:  2/22   Can you participate in a virtual visit with your PCP: Yes                    Current Advanced Directive/Advance Care Plan: Full Code  Cinthia 13 (ACP) Conversation      Date of Conversation: 03/3/22  Conducted with: Patient with [address]:   No healthcare decision makers have been documented.    Click here to complete HealthCare Decision Makers including selection of the Healthcare Decision Maker Relationship (ie \"Primary\")    Today we documented Decision Maker(s) consistent with Legal Next of Kin hierarchy. Content/Action Overview:   Has NO ACP documents/care preferences - information provided, considering goals and options  Reviewed DNR/DNI and patient elects Full Code (Attempt Resuscitation)    Length of Voluntary ACP Conversation in minutes:  21 minutes    Bryson Lopez RN               Healthcare Decision Maker:   Click here to complete [employer] including selection of the Healthcare Decision Maker Relationship (ie \"Primary\")         Tee Snow - [phone]                    Transition of Care Plan:         Home with [address] Management Interventions  PCP Verified by CM:  Yes  Mode of Transport at Discharge: BLS (CM to arrange Medicaid transportation)  Transition of Care Consult (CM Consult): Discharge Planning  Support Systems: Child(reji)  Confirm Follow Up Transport: Other (see comment) (Medicaid transportation)  Discharge Location  Patient Expects to be Discharged to[de-identified] Home with home health      [address]  Phone: [phone]

## 2022-03-03 NOTE — PROGRESS NOTES
Occupational Therapy note:    Chart reviewed and discussed with nursing. Per RN, patient requesting therapy return once she has finished her lunch. Will defer and continue to follow as able.     Julian Perkins OTR/L

## 2022-03-03 NOTE — PROGRESS NOTES
Progress Note    Patient: Mindi Shoemaker MRN: [id number]  SSN: [id number]    YOB: 1945  Age: 78 y.o. 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 Urology following. Dysuria improved. Continues with good urine output. Vitals:  Temp (24hrs), Av.9 °F (36.6 °C), Min:97.6 °F (36.4 °C), Max:98.3 °F (36.8 °C)     Blood pressure 125/70, pulse 70, temperature 97.6 °F (36.4 °C), resp. rate 18, height 5' 4\" (1.626 m), weight 76.9 kg (169 lb 8.5 oz), SpO2 98 %. I&O's:   1901 -  0700  In: 3450.8 [P.O.:240; I.V.:3210.8]  Out: 1100 [Urine:1100]   No intake/output data recorded. Exam:    EXAMINATION:      Appearance: well-developed NAD. Elderly    Conjunctiva/Lids: conjunctivae and lids normal   External Ears/Nose: normal no lesions or deformities   Neck: trachea midline   Respiratory Effort: breathing easily   Lower Extremity: no edema   Abdomen/Flank: Soft mildly tender without masses; no CVA tenderness   Liver/Spleen: no organomegaly   Hernia: no ventral hernia    Gait/Station: normal   Skin Inspection: warm and dry   Mood/Affect: normal          Labs:   Recent Labs     22  0425 22  0226 22  1633   WBC 3.7 3.8 4.0   HGB 9.6* 9.1* 9.8*   HCT 30.9* 29.1* 31.0*    187 207     Recent Labs     22  0141 22  0425 22  0226    140 140   K 4.8 4.5 4.2   * 114* 115*   CO2 18* 22 18*   GLU 79 96 69   BUN 23* 25* 32*   CREA 1.13* 1.13* 1.13*   CA 8.7 8.9 8.6        Cultures:      Imaging:       Assessment:     - Active Problems:    Emphysematous cystitis (2022)    ESBL Klebsiella pneumonia on repeat urine cx- meropenum    Gross hematuria-likely in setting of infection. Plan:     - 1.  Emphysematous cystitis- known DM hx - low PVR , hematuria improved   -Cont w/ meropenem per sensitivities. Continue daily PVR checks. Place laguerre for retention >300ml   2. SHIRAZ - resolved     Will arrange OP follow up.      Signed By: Mitchell Roberts NP - March 3, 2022

## 2022-03-03 NOTE — PROGRESS NOTES
Bedside and Verbal shift change report received from [facility]. Report included the following information SBAR, Kardex and Recent Results. End of Shift Note        Shift worked:  [id number]     Shift summary and any significant changes:     IV infiltrated at 3/2 @ 2030. ED tech attempting to get new access. Patient a very hard stick.        Concerns for physician to address:       Zone phone for oncoming shift:     [phone]

## 2022-03-03 NOTE — PROGRESS NOTES
End of Shift Note    Bedside shift change report given to [facility] [address] (oncoming nurse) by Karina Atkinson RN (offgoing nurse). Report included the following information SBAR, Kardex, MAR and Recent Results    Shift worked:  Days     Shift summary and any significant changes:    Patient has had multiple IVs infiltrate during her stay. PICC team inserted an Endurance catheter in right forearm after another infiltrated IV today. Patient up to bathroom a couple times this shift. Uses walker and calls for assistance. Post void (225 ml)  residual bladder scan this afternoon showed 33 ml. Place laguerre for retention >300 as per Urology NP. Plan to discharge tomorrow. Concerns for physician to address:       Zone phone for oncoming shift:          Activity:  Activity Level: Up with Assistance  Number times ambulated in hallways past shift: 0  Number of times OOB to chair past shift: 0    Cardiac:   Cardiac Monitoring: Yes      Cardiac Rhythm: Sinus Rhythm    Access:   Current line(s): PIV     Genitourinary:   Urinary status: voiding    Respiratory:   O2 Device: None (Room air)  Chronic home O2 use?: NO  Incentive spirometer at bedside: NO       GI:  Last Bowel Movement Date: 03/01/22  Current diet:  ADULT DIET Regular; Low Fat/Low Chol/High Fiber/2 gm Na  Passing flatus: YES  Tolerating current diet: YES       Pain Management:   Patient states pain is manageable on current regimen: YES    Skin:  Jose Luis Score: 18  Interventions: float heels, increase time out of bed, PT/OT consult and nutritional support     Patient Safety:  Fall Score:  Total Score: 3  Interventions: bed/chair alarm, assistive device (walker, cane, etc), gripper socks and pt to call before getting OOB  High Fall Risk: Yes    Length of Stay:  Expected LOS: 2d 21h  Actual LOS: [address] MEGAN Mai

## 2022-03-03 NOTE — PROGRESS NOTES
End of Shift Note    Bedside shift change report given to Jerzy Johnson RN (oncoming nurse) by Lisette Choudhury RN (offgoing nurse). Report included the following information SBAR, Kardex and Recent Results    Shift worked:  Days     Shift summary and any significant changes:     Uneventful shift. IV infiltrated and PICC team inserted new one. Concerns for physician to address:       Zone phone for oncoming shift:          Activity:  Activity Level: Up with Assistance  Number times ambulated in hallways past shift: 0  Number of times OOB to chair past shift: 1    Cardiac:   Cardiac Monitoring: Yes      Cardiac Rhythm: Sinus Rhythm    Access:   Current line(s): PIV     Genitourinary:   Urinary status: voiding    Respiratory:   O2 Device: None (Room air)  Chronic home O2 use?: NO  Incentive spirometer at bedside: NO       GI:  Last Bowel Movement Date: 03/01/22  Current diet:  ADULT DIET Regular; Low Fat/Low Chol/High Fiber/2 gm Na  Passing flatus: YES  Tolerating current diet: YES       Pain Management:   Patient states pain is manageable on current regimen: YES    Skin:  Jose Luis Score: 18  Interventions: increase time out of bed, PT/OT consult and nutritional support     Patient Safety:  Fall Score:  Total Score: 3  Interventions: bed/chair alarm, gripper socks and pt to call before getting OOB  High Fall Risk: Yes    Length of Stay:  Expected LOS: - - -  Actual LOS: 2      Lisette Choudhury RN

## 2022-03-04 LAB
EST. AVERAGE GLUCOSE BLD GHB EST-MCNC: 85 MG/DL
GLUCOSE BLD STRIP.AUTO-MCNC: 112 MG/DL (ref 65–117)
GLUCOSE BLD STRIP.AUTO-MCNC: 83 MG/DL (ref 65–117)
GLUCOSE BLD STRIP.AUTO-MCNC: 86 MG/DL (ref 65–117)
GLUCOSE BLD STRIP.AUTO-MCNC: 87 MG/DL (ref 65–117)
HBA1C MFR BLD: 4.6 % (ref 4–5.6)
SERVICE CMNT-IMP: NORMAL

## 2022-03-04 PROCEDURE — 74011250637 HC RX REV CODE- 250/637: Performed by: NURSE PRACTITIONER

## 2022-03-04 PROCEDURE — 2709999900 HC NON-CHARGEABLE SUPPLY

## 2022-03-04 PROCEDURE — 02HV33Z INSERTION OF INFUSION DEVICE INTO SUPERIOR VENA CAVA, PERCUTANEOUS APPROACH: ICD-10-PCS | Performed by: INTERNAL MEDICINE

## 2022-03-04 PROCEDURE — 65660000000 HC RM CCU STEPDOWN

## 2022-03-04 PROCEDURE — 82962 GLUCOSE BLOOD TEST: CPT

## 2022-03-04 PROCEDURE — 74011000258 HC RX REV CODE- 258: Performed by: INTERNAL MEDICINE

## 2022-03-04 PROCEDURE — 97535 SELF CARE MNGMENT TRAINING: CPT

## 2022-03-04 PROCEDURE — 97116 GAIT TRAINING THERAPY: CPT

## 2022-03-04 PROCEDURE — 99223 1ST HOSP IP/OBS HIGH 75: CPT | Performed by: INTERNAL MEDICINE

## 2022-03-04 PROCEDURE — 76937 US GUIDE VASCULAR ACCESS: CPT

## 2022-03-04 PROCEDURE — 74011250636 HC RX REV CODE- 250/636: Performed by: INTERNAL MEDICINE

## 2022-03-04 PROCEDURE — 74011250637 HC RX REV CODE- 250/637: Performed by: INTERNAL MEDICINE

## 2022-03-04 PROCEDURE — C1751 CATH, INF, PER/CENT/MIDLINE: HCPCS

## 2022-03-04 PROCEDURE — 83036 HEMOGLOBIN GLYCOSYLATED A1C: CPT

## 2022-03-04 PROCEDURE — 36573 INSJ PICC RS&I 5 YR+: CPT | Performed by: INTERNAL MEDICINE

## 2022-03-04 PROCEDURE — 74011000250 HC RX REV CODE- 250: Performed by: INTERNAL MEDICINE

## 2022-03-04 PROCEDURE — 77030018786 HC NDL GD F/USND BARD -B

## 2022-03-04 PROCEDURE — 36415 COLL VENOUS BLD VENIPUNCTURE: CPT

## 2022-03-04 RX ORDER — HEPARIN 100 UNIT/ML
300 SYRINGE INTRAVENOUS AS NEEDED
Status: CANCELLED | OUTPATIENT
Start: 2022-03-04

## 2022-03-04 RX ORDER — TRAMADOL HYDROCHLORIDE 50 MG/1
50 TABLET ORAL
Status: DISCONTINUED | OUTPATIENT
Start: 2022-03-04 | End: 2022-03-07 | Stop reason: HOSPADM

## 2022-03-04 RX ADMIN — ENOXAPARIN SODIUM 40 MG: 100 INJECTION SUBCUTANEOUS at 17:27

## 2022-03-04 RX ADMIN — MEROPENEM 500 MG: 500 INJECTION, POWDER, FOR SOLUTION INTRAVENOUS at 06:49

## 2022-03-04 RX ADMIN — PANTOPRAZOLE SODIUM 40 MG: 40 TABLET, DELAYED RELEASE ORAL at 09:16

## 2022-03-04 RX ADMIN — SERTRALINE 50 MG: 50 TABLET, FILM COATED ORAL at 09:16

## 2022-03-04 RX ADMIN — SODIUM CHLORIDE, PRESERVATIVE FREE 10 ML: 5 INJECTION INTRAVENOUS at 17:30

## 2022-03-04 RX ADMIN — METOPROLOL TARTRATE 25 MG: 25 TABLET, FILM COATED ORAL at 17:27

## 2022-03-04 RX ADMIN — ERTAPENEM SODIUM 1 G: 1 INJECTION, POWDER, LYOPHILIZED, FOR SOLUTION INTRAMUSCULAR; INTRAVENOUS at 17:27

## 2022-03-04 RX ADMIN — NORTRIPTYLINE HYDROCHLORIDE 50 MG: 25 CAPSULE ORAL at 20:58

## 2022-03-04 RX ADMIN — METOPROLOL TARTRATE 25 MG: 25 TABLET, FILM COATED ORAL at 09:15

## 2022-03-04 RX ADMIN — GABAPENTIN 300 MG: 300 CAPSULE ORAL at 09:15

## 2022-03-04 RX ADMIN — TRAMADOL HYDROCHLORIDE 50 MG: 50 TABLET, COATED ORAL at 17:35

## 2022-03-04 RX ADMIN — SODIUM CHLORIDE, PRESERVATIVE FREE 10 ML: 5 INJECTION INTRAVENOUS at 06:49

## 2022-03-04 RX ADMIN — ATORVASTATIN CALCIUM 20 MG: 20 TABLET, FILM COATED ORAL at 09:16

## 2022-03-04 RX ADMIN — GABAPENTIN 300 MG: 300 CAPSULE ORAL at 20:57

## 2022-03-04 RX ADMIN — LATANOPROST 1 DROP: 50 SOLUTION/ DROPS OPHTHALMIC at 21:01

## 2022-03-04 NOTE — CONSULTS
Infectious Disease Consult    Date of Consultation:  March 4, 2022  Date of Admission: 2/28/2022   Referring Physician:  Dr Howie Silverman:     Patient is a 78 y.o. female who is being seen for -.  Emphysematous cystitis        IMPRESSION:     -Emphysematous cystitis, acute pyelonephritis  CT abdomen/pelvis-2/28  Mild left perinephric stranding, Gas within the wall of the bladder which is incompletely  Distended. Emphysematous cystitis is typically caused by E. coli, Klebsiella. Proteus, Pseudomonas, Enterococcus, Candida have also been  implicated. Diabetes mellitus, urinary obstruction are major risk factors. -ESBL Klebsiella UTI, complicated  Urine culture 2/22+ for >100,000 ESBL Klebsiella pneumoniae  Failed outpatient fosfomycin x 1 ,    Urine culture 2/28+ for >100,000 Klebsiella pneumoniae  On meropenem from 2/28. Negative blood cultures.      -Hematuria  2ry to infection    -SHIRAZ  Resolved    -Diabetes mellitus with neuropathy  A1c 5 (8/2021)    -H/o rheumatoid arthritis, sarcoid  On leflunomide, immune suppressed    -Hypertension, dyslipidemia             PLAN:          -Continue Meropenem 500 mg IV every 8 hourly until complete resolution of infection with clinical and  Radiological improvement. Recommend evaluation with follow-up CT in 2 weeks. Ertapenem IV for discharge.   Pharmacy to give 1 dose of ertapenem tomorrow.  -Adequate fluids, blood sugar control  -Complete emptying of bladder  -Urology input.  -Please contact ID on call with questions, concerns over the weekend    Antibiotic orders for discharge  -Ertapenem 1 g IV every 24 hours end date 2/15, do not pull PICC at end of therapy  -Weekly CBC, CMP fax reports to [phone], call with critical labs at [phone]  -Encourage adequate fluids, daily probiotic/yogurt  -Frequent emptying of the bladder, adequate blood sugar control  -ID virtual or in person follow-up on 3/15 at 3 PM.          Possible adverse effects of long term antibiotics are inclusive of but not limited to following  BM suppression, neutropenia , cytopenias , aplastic anemia hemorrhage liver & renal dysfunction/ liver , renal failure  , GI dysfunction- N, V  Diarrhea,C.difficile disease, rash , allergy , anaphylaxis. toxic epidermal necrolysis  Neuro toxicity , seizure disorder  Side effects tend to be more pronounced in the elderly               Raul Ndiaye is a 78 y.o.   female with past medical history significant for  hypertension, diabetes mellitus, rheumatoid arthritis who presented to hospital with chief complaints of lower abdominal pain and also blood in the urine. Patient reported that she was in  usual health until about 3 weeks ago when she started noticing blood in the urine and also lower abdominal pain. She reports abdominal pain is about 5 x 10, increases with pressure, radiating to the back and without any aggravating or relieving factors. Reports mild nausea but no vomiting. No fever. Does not report any chest pain or shortness of breath.   On arrival to ED, she was noted to have stable vitals. On labs hemoglobin is 9.8, platelet count 134. BMP shows a creatinine of 1.41. LFTs are normal.  CT abdomen showed emphysematous cystitis, left perinephric  stranding. CT report as follows  FINDINGS:   LOWER THORAX: Small hiatal hernia. Extensive coronary artery calcifications. No  consolidation  LIVER: No mass. BILIARY TREE: Gallbladder is within normal limits. CBD is not dilated. SPLEEN: within normal limits. PANCREAS: No focal abnormality. ADRENALS: Unremarkable. KIDNEYS/URETERS: Mild perinephric stranding on the left. Possible small cyst  left kidney  STOMACH: Unremarkable. SMALL BOWEL: No dilatation or wall thickening. COLON: No dilatation or wall thickening. APPENDIX: Partially visualized. The visualized portions are not inflamed. PERITONEUM: No ascites or pneumoperitoneum. RETROPERITONEUM: Severe vascular calcifications without aneurysm.  No pathologic  adenopathy  REPRODUCTIVE ORGANS: Not enlarged  URINARY BLADDER: Gas within the wall of the bladder which is incompletely  distended  BONES: No destructive bone lesion. ABDOMINAL WALL: No mass or hernia. ADDITIONAL COMMENTS: N/A     IMPRESSION  Cast within the wall of the bladder compatible with emphysematous cystitis      Urine culture done on 2/22 was + for ESBL Klebsiella. Patient has been treated by PCP with fosfomycin x1, Bactrim p.o. Patient says symptoms did not improve. Repeat urine cultures done 2/28 also positive for ESBL Klebsiella. Patient has been on meropenem IV since 2/28  Patient seen today. She says she is comfortable. Denies dysuria  Some pain over bladder & going up left side of abdomen. .  Urine now clear, not bloodstained per patient. Patient Active Problem List   Diagnosis Code    Long-term use of immunosuppressant medication Z79.899    Osteopenia of multiple sites M85.89    Vitamin D deficiency E55.9    Rheumatoid arthritis involving multiple sites ([address] M06.9    Essential hypertension I10    Mixed hyperlipidemia E78.2    RLS (restless legs syndrome) G25.81    Mild intermittent asthma J45.20    Status post angioplasty with stent Z95.820    Coronary artery disease due to lipid rich plaque I25.10, I25.83    Seronegative rheumatoid arthritis of multiple sites (HCC) M06.09    Severe obesity (HCC) E66.01    MGUS (monoclonal gammopathy of unknown significance) D47.2    Type 2 diabetes mellitus without complication (HCC) H37.0    Complication of internal right knee prosthesis ([address] T84. 9XXA, [id number]    Emphysematous cystitis N30.80     Past Medical History:   Diagnosis Date    Asthma     Chronic pain 5/8/2020    DDD (degenerative disc disease), lumbar     Diabetes (HCC)     Gastritis     GERD (gastroesophageal reflux disease)     Glaucoma     Hearing loss 5/8/2020    Hiatal hernia     High cholesterol     Hypertension     Hypocalcemia 5/8/2020  Peripheral vascular disease (HCC)     RA (rheumatoid arthritis) (HCC)     Sarcoidosis 1999    [facility]    Sleep apnea     has not used cpap in years since weight loss      Family History   Problem Relation Age of Onset    Heart Disease Mother     Liver Disease Father     Alcohol abuse Brother     Dementia Neg Hx     Cancer Neg Hx     Seizures Neg Hx       Social History     Tobacco Use    Smoking status: Never Smoker    Smokeless tobacco: Never Used   Substance Use Topics    Alcohol use: Yes     Alcohol/week: 2.0 standard drinks     Types: 1 Glasses of wine, 1 Shots of liquor per week     Comment: 2-3 yearly     Past Surgical History:   Procedure Laterality Date    HX GASTRIC BYPASS      HX HEART CATHETERIZATION      s/p PCI with stenting in 1998     HX KNEE REPLACEMENT Bilateral     HX ORTHOPAEDIC Bilateral     carpal tunnel surgery     HX SHOULDER REPLACEMENT Right     x 2       Prior to Admission medications    Medication Sig Start Date End Date Taking? Authorizing Provider   traMADoL (ULTRAM) 50 mg tablet Take 50 mg by mouth every six (6) hours as needed for Pain. Yes Provider, Historical   mupirocin (BACTROBAN) 2 % ointment  2/14/22   Provider, Historical   clindamycin (CLEOCIN) 300 mg capsule Take 1 Capsule by mouth three (3) times daily. Patient not taking: Reported on 2/22/2022 2/16/22   Nenita Prudent, DO   albuterol sulfate (PROVENTIL;VENTOLIN) 2.5 mg/0.5 mL nebu nebulizer solution USE 1 VIAL IN NEBULIZER EVERY 4 HOURS AS NEEDED FOR WHEEZING 2/11/22   Martha Renteria MD   furosemide (LASIX) 20 mg tablet Take 1 tablet by mouth once daily 2/11/22   Martha Renteria MD   predniSONE (DELTASONE) 20 mg tablet Take 1 tablet by mouth once daily with breakfast  Patient not taking: Reported on 2/22/2022 2/11/22   Martha Renteria MD   Onetha Hoose XR 11 mg Tb24 TAKE 1 TABLET BY MOUTH ONE TIME A DAY 1/21/22   Yash Cardenas MD   simvastatin (ZOCOR) 20 mg tablet TAKE BY MOUTH NIGHTLY.   Patient taking differently: Take 20 mg by mouth nightly. TAKE BY MOUTH NIGHTLY. 10/26/21   [place]ana Su MD   sertraline (ZOLOFT) 50 mg tablet Take 1 Tablet by mouth daily. 10/26/21   [place] Su MD   omeprazole (PRILOSEC) 20 mg capsule Take 1 Capsule by mouth daily. 10/26/21   [place] Su MD   metoprolol tartrate (LOPRESSOR) 25 mg tablet Take 1 Tablet by mouth two (2) times a day. 10/26/21   [place]ana Su MD   metFORMIN (GLUCOPHAGE) 500 mg tablet Take 1 Tablet by mouth daily (with dinner). Decreased dose 10/26/21   [place]ana Su MD   latanoprost (XALATAN) 0.005 % ophthalmic solution INSTILL 1 DROP INTO BOTH EYES NIGHTLY  Patient taking differently: Administer 1 Drop to both eyes nightly. INSTILL 1 DROP INTO BOTH EYES NIGHTLY 10/26/21   [place]ana Su MD   gabapentin (NEURONTIN) 300 mg capsule TAKE 1 CAPSULE IN THE MORNING AND TAKE 3 CAPSULES BEFORE BEDTIME  Patient taking differently: Take 300 mg by mouth daily. TAKE 1 CAPSULE IN THE MORNING AND TAKE 3 CAPSULES BEFORE BEDTIME 10/26/21   [place]ana Su MD   ergocalciferol (ERGOCALCIFEROL) 1,250 mcg (50,000 unit) capsule Take 1 Capsule by mouth every seven (7) days. Indications: vitamin D deficiency (high dose therapy)  Patient taking differently: Take 50,000 Units by mouth every seven (7) days. Fridays  Indications: vitamin D deficiency (high dose therapy) 10/26/21   [place]ana Su MD   fluticasone furoate-vilanteroL (Breo Ellipta) 100-25 mcg/dose inhaler TAKE 1 PUFF BY MOUTH EVERY DAY  Patient taking differently: Take 1 Puff by inhalation daily. TAKE 1 PUFF BY MOUTH EVERY DAY 10/26/21   [place]ana Su MD   leflunomide (ARAVA) 20 mg tablet TAKE 1 TABLET BY MOUTH EVERY DAY  Patient taking differently: Take 20 mg by mouth daily. TAKE 1 TABLET BY MOUTH EVERY DAY 8/31/21   Jorge Arias MD   nortriptyline (PAMELOR) 50 mg capsule Take 50 mg by mouth nightly.  5/7/18   Provider, Historical     Allergies   Allergen Reactions    Lisinopril Rash    Methotrexate Hives        Review of Systems:  A comprehensive review of systems was negative except for that written in the History of Present Illness. 14 point review of systems obtained . All other systems negative    Objective:   Blood pressure 132/61, pulse 70, temperature 98 °F (36.7 °C), resp. rate 16, height 5' 4\" (1.626 m), weight 169 lb 8.5 oz (76.9 kg), SpO2 98 %.   Temp (24hrs), Av.2 °F (36.8 °C), Min:98 °F (36.7 °C), Max:98.4 °F (36.9 °C)    Current Facility-Administered Medications   Medication Dose Route Frequency    traMADoL (ULTRAM) tablet 50 mg  50 mg Oral Q6H PRN    acetaminophen (TYLENOL) tablet 500 mg  500 mg Oral Q6H PRN    insulin lispro (HUMALOG) injection   SubCUTAneous AC&HS    enoxaparin (LOVENOX) injection 40 mg  40 mg SubCUTAneous Q24H    albuterol CONCENTRATE 2.5mg/0.5 mL neb soln  2.5 mg Nebulization Q4H PRN    gabapentin (NEURONTIN) capsule 300 mg  300 mg Oral DAILY    latanoprost (XALATAN) 0.005 % ophthalmic solution 1 Drop  1 Drop Both Eyes QHS    metoprolol tartrate (LOPRESSOR) tablet 25 mg  25 mg Oral BID    nortriptyline (PAMELOR) capsule 50 mg  50 mg Oral QHS    pantoprazole (PROTONIX) tablet 40 mg  40 mg Oral ACB    sertraline (ZOLOFT) tablet 50 mg  50 mg Oral DAILY    atorvastatin (LIPITOR) tablet 20 mg  20 mg Oral DAILY    sodium chloride (NS) flush 5-40 mL  5-40 mL IntraVENous Q8H    sodium chloride (NS) flush 5-40 mL  5-40 mL IntraVENous PRN    polyethylene glycol (MIRALAX) packet 17 g  17 g Oral DAILY PRN    ondansetron (ZOFRAN ODT) tablet 4 mg  4 mg Oral Q8H PRN    Or    ondansetron (ZOFRAN) injection 4 mg  4 mg IntraVENous Q6H PRN    labetaloL (NORMODYNE;TRANDATE) injection 10 mg  10 mg IntraVENous Q4H PRN    glucose chewable tablet 16 g  4 Tablet Oral PRN    glucagon (GLUCAGEN) injection 1 mg  1 mg IntraMUSCular PRN    dextrose 10 % infusion 0-250 mL  0-250 mL IntraVENous PRN    meropenem (MERREM) 500 mg in 0.9% sodium chloride (MBP/ADV) 50 mL MBP  0.5 g IntraVENous Q8H    gabapentin (NEURONTIN) capsule 300 mg  300 mg Oral QHS        Exam:    General:  Alert, cooperative, well noursished, well developed, appears stated age   Eyes:  Sclera anicteric. Pupils equally round and reactive to light. Mouth/Throat: Mucous membranes normal, oral pharynx clear   Neck: Supple   Lungs:   Clear to auscultation bilaterally, good effort   CV:  Regular rate and rhythm,no murmur, click, rub or gallop   Abdomen:   Soft, non-tender. bowel sounds normal. non-distended   Extremities: No cyanosis or edema   Skin: Skin color, texture, turgor normal. no acute rash or lesions   Lymph nodes: Cervical and supraclavicular normal   Musculoskeletal: No swelling or deformity   Lines/Devices:  Intact, no erythema, drainage or tenderness   Psych: Alert and oriented, normal mood affect given the setting       Data Reviewed:   CBC:   Recent Labs     03/02/22  0425   WBC 3.7   RBC 2.98*   HGB 9.6*   HCT 30.9*      GRANS 48   LYMPH 36   EOS 3     CMP:   Recent Labs     03/03/22  0141 03/02/22  0425   GLU 79 96    140   K 4.8 4.5   * 114*   CO2 18* 22   BUN 23* 25*   CREA 1.13* 1.13*   CA 8.7 8.9   AGAP 5 4*   BUCR 20 22*       Lab Results   Component Value Date/Time    Culture result: NO GROWTH 4 DAYS 02/28/2022 10:30 PM    Culture result: (A) 02/28/2022 04:54 PM     KLEBSIELLA PNEUMONIAE ** (EXTENDED SPECTRUM BETA LACTAMASE ) **    Culture result:  02/28/2022 04:54 PM     (NOTE) ESBL RESULT CALLED TO MEGAN REAVES,AT 1410 ON 3/2/22. RF    Culture result: (A) 02/22/2022 10:13 AM     KLEBSIELLA PNEUMONIAE ** (EXTENDED SPECTRUM BETA LACTAMASE ) ** ** (EXTENDED SPECTRUM BETA LACTAMASE ) **    Culture result:  02/22/2022 10:13 AM     (NOTE) ESBL CALLED TO Christin Bland MD AT 3675 ON 02/26/22 BY ESBL CALLED TO AND READ Ifeaniy Middleton MD AT 1506 ON 02/26/22 BY MD.          XR Results (most recent)reviewed:  Results from Hospital Encounter encounter on 12/01/21    XR KNEE RT MAX 2 VWS    Narrative  EXAM: XR KNEE RT MAX 2 VWS    INDICATION: pain. COMPARISON: None. FINDINGS: Two views of the right knee demonstrate total knee prosthesis in  place, recent postsurgical changes. Diffuse soft tissue swelling and joint  effusion. Impression  Recent total knee prosthesis. ICD-10-CM ICD-9-CM    1. Emphysematous cystitis  N30.80 595.89    2. Hematuria, unspecified type  R31.9 599.70            Antibiotic History    I have discussed the diagnosis with the patient and the intended plan as seen in the above orders. I have discussed medication side effects and warnings with the patient as well.     Reviewed test results at length with patient    Signed By: Han Cerrato MD FACP     March 4, 2022

## 2022-03-04 NOTE — PROGRESS NOTES
Bedside and Verbal shift change report given to Thomas Go (oncoming nurse) by REMI Abarca RN (offgoing nurse). Report given with SBAR, Kardex, Intake/Output, MAR and Recent Results.

## 2022-03-04 NOTE — PROGRESS NOTES
Problem: Mobility Impaired (Adult and Pediatric)  Goal: *Acute Goals and Plan of Care (Insert Text)  Description: FUNCTIONAL STATUS PRIOR TO ADMISSION: Patient was modified independent using a rolling walker for functional mobility. HOME SUPPORT PRIOR TO ADMISSION: The patient lived alone with local son to provide assistance. Physical Therapy Goals  Initiated 3/2/2022  1. Patient will move from supine to sit and sit to supine , scoot up and down, and roll side to side in bed with modified independence within 7 day(s). 2.  Patient will transfer from bed to chair and chair to bed with modified independence using the least restrictive device within 7 day(s). 3.  Patient will perform sit to stand with modified independence within 7 day(s). 4.  Patient will ambulate with modified independence for 100 feet with the least restrictive device within 7 day(s). Outcome: Progressing Towards Goal   PHYSICAL THERAPY TREATMENT  Patient: Grecia Torres (53 y.o. 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 ASSESSMENT  Patient continues with skilled PT services and is progressing towards goals. Patient resting in bed upon arrival, agreeable to PT session. Completes bed mobility and scooting with Mod Indep. Sit<>stand with supervision and cuing for proper hand placement. Ambulation x60ft with RW supervision, cuing to keep walker closer to AGUSTIN for increased safety. No unsteadiness or LOB noted during ambulation. Patient in bedside chair at end of session, all needs in reach. Recommend HH therapy at discharge. Current Level of Function Impacting Discharge (mobility/balance):  Mod Indep bed mobility; supervision transfers; supervision ambulation x60ft with RW       PLAN :  Patient continues to benefit from skilled intervention to address the above impairments. Continue treatment per established plan of care. to address goals. Recommendation for discharge: (in order for the patient to meet his/her long term goals)  Physical therapy at least 2 days/week in the home     This discharge recommendation:  Has been made in collaboration with the attending provider and/or case management    IF patient discharges home will need the following DME: patient owns DME required for discharge     SUBJECTIVE:   Patient stated I am not having any pain right now.     OBJECTIVE DATA SUMMARY:   Critical Behavior:  Neurologic State: Alert  Orientation Level: Oriented X4  Cognition: Appropriate decision making,Appropriate for age attention/concentration,Appropriate safety awareness,Follows commands  Safety/Judgement: Awareness of environment,Decreased insight into deficits  Functional Mobility Training:  Bed Mobility:  Supine to Sit: Modified independent  Scooting: Modified independent  Transfers:  Sit to Stand: Supervision  Stand to Sit: Supervision  Bed to Chair: Supervision  Balance:  Sitting: Intact  Standing: Impaired  Standing - Static: Good  Standing - Dynamic : Fair  Ambulation/Gait Training:  Distance (ft): 60 Feet (ft)  Assistive Device: Walker, rolling;Gait belt  Ambulation - Level of Assistance: Supervision  Gait Abnormalities: Decreased step clearance  Base of Support: Widened  Speed/Nicole: Pace decreased (<100 feet/min)  Step Length: Left shortened;Right shortened    Pain Rating:  Patient denies pain at start of session    Activity Tolerance:   Fair, SpO2 stable on RA and requires rest breaks    After treatment patient left in no apparent distress:   Sitting in chair, Call bell within reach and Bed / chair alarm activated    COMMUNICATION/COLLABORATION:   The patients plan of care was discussed with: Occupational therapist and Registered nurse.      Candice Fontana PT   Time Calculation: 15 mins

## 2022-03-04 NOTE — PROGRESS NOTES
Hospitalist Progress Note    NAME: Harry Marcus   :  1945   MRN:  [id number]       Assessment / Plan:  Emphysematous cystitis  -CT of abdomen shows emphysematous cystitis  -She has a history of ESBL Klebsiella pneumonia so we will continue meropenem. -urine culture growing Klebsiella  -Urology following, Monitor I/O. PVR, if > 300 will need laguerre   -PT/OT  -ID consultation       Acute kidney injury  -renal function improving  -Patient developed some swelling  -DC IV fluid     Diabetes mellitus type 2 with neuropathy  -Hold home Metformin. Start insulin sliding scale with blood sugar checks  -Continue home gabapentin     Hypertension  Dyslipidemia  Osteoarthritis  Peripheral arterial disease  History of rheumatoid arthritis  History of sarcoid  Obstructive sleep apnea  GERD  Depression  -Continue home metoprolol, Lipitor, PPI, Zoloft  -Holding home Norma Peyer and leflunomide due to active infection     Anemia  Appears to be at baseline  Check Iron panel  Transfuse for hgb < 7  Trend     Code Status: Full code  Surrogate Decision Maker: Saroj Alvarez     DVT Prophylaxis: heparin        Baseline: From home, independent of ADLs      25.0 - 29.9 Overweight / Body mass index is 29.1 kg/m². Estimated discharge date:   Barriers: Urine cultures         Subjective:     Chief Complaint / Reason for Physician Visit  Patient seen and examined at the bedside. She currently states that she feels no better since coming to the hospital still with suprapubic tenderness. Discussed with RN events overnight.   Continue IV antibiotic, patient may need placement    Review of Systems:  Symptom Y/N Comments  Symptom Y/N Comments   Fever/Chills n   Chest Pain n    Poor Appetite    Edema     Cough    Abdominal Pain     Sputum n   Joint Pain     SOB/WORKMAN    Pruritis/Rash     Nausea/vomit    Tolerating PT/OT     Diarrhea    Tolerating Diet y    Constipation n   Other       Could NOT obtain due to:      Objective: VITALS:   Last 24hrs VS reviewed since prior progress note. Most recent are:  Patient Vitals for the past 24 hrs:   Temp Pulse Resp BP SpO2   03/04/22 1127 97.4 °F (36.3 °C) 64 20 (!) 159/77 97 %   03/04/22 0733 98 °F (36.7 °C) 70 16 132/61 98 %   03/04/22 0447 98.2 °F (36.8 °C) 72 18 132/86 98 %   03/03/22 2330 98.4 °F (36.9 °C) 69 18 (!) 120/59 97 %   03/03/22 1954 98 °F (36.7 °C) 68 16 (!) 158/63 93 %   03/03/22 1812 -- 87 -- 128/61 --   03/03/22 1445 98.2 °F (36.8 °C) 73 18 125/75 98 %       Intake/Output Summary (Last 24 hours) at 3/4/2022 1221  Last data filed at 3/4/2022 1120  Gross per 24 hour   Intake 580 ml   Output 225 ml   Net 355 ml        I had a face to face encounter and independently examined this patient on 3/4/2022, as outlined below:  PHYSICAL EXAM:  General: WD, WN. Alert, cooperative, no acute distress    EENT:  EOMI. Anicteric sclerae. MMM  Resp:  CTA bilaterally, no wheezing or rales. No accessory muscle use  CV:  Regular  rhythm,  No edema  GI:  Soft, Non distended, Non tender. +Bowel sounds  Neurologic:  Alert and oriented X 3, normal speech,   Psych:   Good insight. Not anxious nor agitated  Skin:  No rashes. No jaundice    Reviewed most current lab test results and cultures  YES  Reviewed most current radiology test results   YES  Review and summation of old records today    NO  Reviewed patient's current orders and MAR    YES  PMH/SH reviewed - no change compared to H&P  ________________________________________________________________________  Care Plan discussed with:    Comments   Patient y    Family      RN y    Care Manager     Consultant                        Multidiciplinary team rounds were held today with , nursing, pharmacist and clinical coordinator. Patient's plan of care was discussed; medications were reviewed and discharge planning was addressed.      ________________________________________________________________________  Total NON critical care TIME: 35    Minutes    Total CRITICAL CARE TIME Spent:   Minutes non procedure based      Comments   >50% of visit spent in counseling and coordination of care y    ________________________________________________________________________  Verena De Paz MD     Procedures: see electronic medical records for all procedures/Xrays and details which were not copied into this note but were reviewed prior to creation of Plan. LABS:  I reviewed today's most current labs and imaging studies. Pertinent labs include:  Recent Labs     03/02/22  0425   WBC 3.7   HGB 9.6*   HCT 30.9*        Recent Labs     03/03/22  0141 03/02/22  0425    140   K 4.8 4.5   * 114*   CO2 18* 22   GLU 79 96   BUN 23* 25*   CREA 1.13* 1.13*   CA 8.7 8.9   MG  --  1.9   PHOS  --  2.9       Signed:  Verena De Paz MD

## 2022-03-04 NOTE — PROGRESS NOTES
Received notification from bedside RN about patient with regards to: pain issues related to her RA, reports taking Tramadol PTA and requesting it to be resumed.  Not reflected on current PTA medlist  VS: /63, HR 68, RR 16, O2 sat 93% on RA    Intervention given: Tramadol 50 mg PO x 1 dose, RN to add Tramadol to PTA medlist and will reorder it PRN from there

## 2022-03-04 NOTE — PROGRESS NOTES
0720: Bedside shift change report given to MEGAN Roque (oncoming nurse) by Damon Sandoval RN (offgoing nurse). Report included the following information SBAR, Kardex, ED Summary, Procedure Summary and Intake/Output. 1910:   End of Shift Note    Bedside shift change report given to Damon Sandoval RN (oncoming nurse) by Verna Pinon RN (offgoing nurse). Report included the following information SBAR, Kardex, ED Summary, Procedure Summary and Intake/Output    Shift worked:  9942-5270     Shift summary and any significant changes:     No significant changes. PICC line placed this afternoon for pt to go home with for long term IV ABX. Infectious Disease was consulted and met with patient. Concerns for physician to address:       Zone phone for oncoming shift:          Activity:  Activity Level: Up with Assistance  Number times ambulated in hallways past shift: 0  Number of times OOB to chair past shift: 3    Cardiac:   Cardiac Monitoring: Yes      Cardiac Rhythm: Sinus Rhythm    Access:   Current line(s): PICC     Genitourinary:   Urinary status: voiding    Respiratory:   O2 Device: None (Room air)  Chronic home O2 use?: N/A  Incentive spirometer at bedside: N/A       GI:  Last Bowel Movement Date: 03/03/22  Current diet:  ADULT DIET Regular; Low Fat/Low Chol/High Fiber/2 gm Na  Passing flatus: YES  Tolerating current diet: YES       Pain Management:   Patient states pain is manageable on current regimen: N/A    Skin:  Jose Luis Score: 18  Interventions: increase time out of bed and PT/OT consult    Patient Safety:  Fall Score:  Total Score: 3  Interventions: bed/chair alarm, assistive device (walker, cane, etc), gripper socks and pt to call before getting OOB  High Fall Risk: Yes    Length of Stay:  Expected LOS: 2d 21h  Actual LOS: 44 Rebekah Rachel RN

## 2022-03-04 NOTE — PROGRESS NOTES
Problem: Self Care Deficits Care Plan (Adult)  Goal: *Acute Goals and Plan of Care (Insert Text)  Description: FUNCTIONAL STATUS PRIOR TO ADMISSION: Patient was modified independent using a rolling walker for functional mobility. Patient was modified independent for basic and instrumental ADLs. HOME SUPPORT: The patient lived alone with son nearby to provide assistance if needed. Occupational Therapy Goals  Initiated 3/2/2022  1. Patient will perform grooming standing at sink with modified independence within 7 day(s). 2.  Patient will perform lower body dressing with modified independence within 7 day(s). 3.  Patient will perform sponge bathing with modified independence within 7 day(s). 4.  Patient will perform toilet transfers with modified independence within 7 day(s). 5.  Patient will perform all aspects of toileting with modified independence within 7 day(s). Outcome: Progressing Towards Goal   OCCUPATIONAL THERAPY TREATMENT  Patient: Fatuma Vincent (61 y.o. 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 ASSESSMENT  Patient continues with skilled OT services and is progressing towards goals. Patient received semisupine in bed and agreeable for therapy. Overall, patient completed bed mobility with mod I, sit <> stand with supervision, bathroom mobility with stand-by assist, and standing grooming with stand-by assist. Patient came EOB without assist and demonstrated intact sitting balance. Patient stood and walked around room x2 times using rolling walker with PT present. Patient walked into bathroom and completed grooming tasks while standing at sink, tolerating well. Patient agreed to end session sitting in chair. Cues provided for hand placement and safety awareness during ADLs/mobility tasks.  Patient was left sitting in chair with all needs met, VSS, and chair alarmed. Patient would continue to benefit from skilled OT services during acute hospital stay. Recommend patient discharge home with HHOT/PT. Current Level of Function Impacting Discharge (ADLs): stand-by assist for grooming, mod I to stand-by assist for functional mobility     Other factors to consider for discharge: fall risk         PLAN :  Patient continues to benefit from skilled intervention to address the above impairments. Continue treatment per established plan of care to address goals. Recommendation for discharge: (in order for the patient to meet his/her long term goals)  Occupational therapy at least 2 days/week in the home     This discharge recommendation:  Has been made in collaboration with the attending provider and/or case management    IF patient discharges home will need the following DME: patient owns DME required for discharge       SUBJECTIVE:   Patient stated I want to get out of this bed.     OBJECTIVE DATA SUMMARY:   Cognitive/Behavioral Status:  Neurologic State: Alert  Orientation Level: Oriented X4  Cognition: Appropriate decision making; Appropriate for age attention/concentration; Follows commands  Perception: Appears intact  Perseveration: No perseveration noted  Safety/Judgement: Awareness of environment;Good awareness of safety precautions    Functional Mobility and Transfers for ADLs:  Bed Mobility:  Supine to Sit: Modified independent  Scooting: Modified independent    Transfers:  Sit to Stand: Supervision  Stand to Sit: Supervision  Functional Transfers  Bathroom Mobility: Stand-by assistance  Bed to Chair: Supervision    Balance:  Sitting: Intact  Standing: Impaired  Standing - Static: Good  Standing - Dynamic : Fair    ADL Intervention:    Grooming  Position Performed: Standing (at sink)  Washing Hands: Stand-by assistance  Cues: Verbal cues provided    Cognitive Retraining  Safety/Judgement: Awareness of environment;Good awareness of safety precautions    Pain:  Patient did not c/o pain during session. Activity Tolerance:   Good and requires rest breaks    After treatment patient left in no apparent distress:   Sitting in chair, Call bell within reach, and Bed / chair alarm activated    COMMUNICATION/COLLABORATION:   The patients plan of care was discussed with: Physical therapist and Registered nurse.      Araceli Dietz OTR/L  Time Calculation: 14 mins

## 2022-03-04 NOTE — PROGRESS NOTES
PICC Education: Explained reason and rationale for PICC placement along with providing education in order to make an informed consent including nature, risks, benefits, potential complications, care and maintenance of PICC line. The opportunity for questions or concerns was given. Patient  gave written consent for PICC procedure to be done at the bedside. Patient  verbalizes understanding at this time. Procedure:  Time out completed. Pre procedure assessment done. Maximum sterile barrier precautions observed throughout procedure. Lidocaine 1% 3.0 ml sc given prior to cannulation  Cannulated Brachial vein using ultrasound guidance and modified seldinger technique. Inserted SINGLE lumen 4 fr PICC in  RIGHT arm using, Gig[employer] Tip Location System and 3CG   Patient has sinus rhythm. Tip Positioning System indicating tall P wave and no negative deflection before P wave which would indicate the PICC tip is properly placed in the distal SVC or the CAJ. PICC tip was confirmed by 2 PICC nurses and 3CG printout was placed on patients chart. Blood return verified and flushed with 20ml NS in each port. Sterile dressing applied with Biopatch, Stat loc, occlusive dressing per protocol. Curios caps applied to each port. Reason for access (two weeks of antibiotics). Complications related to insertion (None). Patient tolerated procedure well with minimal blood loss. PICC procedure performed by: Clara Otero RN, BSN, [facility]/ Vascular Access Nurse  Assisted by: Bri Castro RN, BSN, [facility] / Vascular Access Nurse    RIGHT  arm circumference: 29 cm. Catheter internal length:  32 cm  Catheter external length: 4 cm  TOTAL Length:36 cm  PICC catheter occupies 10% of vein    Brand of catheter:  Izzui  Lot #[id number]   REF# [id number]    EXP 08/31/2023. Primary nurse Mya GUZMAN, notified PICC line may be used and to hang new infusion tubing prior to use.         Clara Otero RN, BSN, [facility]  Vascular Access Team

## 2022-03-04 NOTE — PROGRESS NOTES
Spiritual Care Assessment/Progress Note  [facility]      NAME: Naina Malave      MRN: [id number]  AGE: 78 y.o. SEX: female  Worship Affiliation: Nondenominational   Language: English     3/4/2022     Total Time (in minutes): 9     Spiritual Assessment begun in [facility] through conversation with:         []Patient        [] Family    [] Friend(s)        Reason for Consult: Initial/Spiritual assessment, patient floor     Spiritual beliefs: (Please include comment if needed)     [] Identifies with a cas tradition:         [] Supported by a cas community:            [] Claims no spiritual orientation:           [] Seeking spiritual identity:                [] Adheres to an individual form of spirituality:           [x] Not able to assess:                           Identified resources for coping:      [] Prayer                               [] Music                  [] Guided Imagery     [] Family/friends                 [] Pet visits     [] Devotional reading                         [x] Unknown     [] Other:                                              Interventions offered during this visit: (See comments for more details)    Patient Interventions: Initial visit           Plan of Care:     [x] Support spiritual and/or cultural needs    [] Support AMD and/or advance care planning process      [] Support grieving process   [] Coordinate Rites and/or Rituals    [] Coordination with community clergy   [] No spiritual needs identified at this time   [] Detailed Plan of Care below (See Comments)  [] Make referral to Music Therapy  [] Make referral to Pet Therapy     [] Make referral to Addiction services  [] Make referral to [facility]  [] Make referral to Spiritual Care Partner  [] No future visits requested        [x] Contact Spiritual Care for further referrals     Comments:  Reviewed chart prior to visit on [facility] unit for spiritual assessment. No family/friends present. Patient appeared to be sleeping soundly in her recliner; she did not respond when  knocked on door and called her name. Unable to assess for spiritual needs or concerns at this time.    available upon referral by nurse or by patient request.       FAINA Light, [facility], Staff [address] [place] [address] Avenue    [address] [place] [address] Road Paging Service  [phone])

## 2022-03-04 NOTE — PROGRESS NOTES
Problem: Pressure Injury - Risk of  Goal: *Prevention of pressure injury  Description: Document Jose Luis Scale and appropriate interventions in the flowsheet. Outcome: Progressing Towards Goal  Note: Pressure Injury Interventions:  Sensory Interventions: Assess changes in LOC,Assess need for specialty bed    Moisture Interventions: Absorbent underpads,Apply protective barrier, creams and emollients,Assess need for specialty bed    Activity Interventions: Assess need for specialty bed,Increase time out of bed,Pressure redistribution bed/mattress(bed type),PT/OT evaluation    Mobility Interventions: Assess need for specialty bed,HOB 30 degrees or less,Pressure redistribution bed/mattress (bed type),PT/OT evaluation    Nutrition Interventions: Document food/fluid/supplement intake                     Problem: Patient Education: Go to Patient Education Activity  Goal: Patient/Family Education  Outcome: Progressing Towards Goal     Problem: Falls - Risk of  Goal: *Absence of Falls  Description: Document Karan Blight Fall Risk and appropriate interventions in the flowsheet.   Outcome: Progressing Towards Goal  Note: Fall Risk Interventions:  Mobility Interventions: Assess mobility with egress test,Bed/chair exit alarm,OT consult for ADLs,Patient to call before getting OOB,PT Consult for mobility concerns,PT Consult for assist device competence         Medication Interventions: Assess postural VS orthostatic hypotension,Bed/chair exit alarm,Patient to call before getting OOB,Teach patient to arise slowly    Elimination Interventions: Bed/chair exit alarm,Call light in reach,Patient to call for help with toileting needs,Toilet paper/wipes in reach,Toileting schedule/hourly rounds              Problem: Patient Education: Go to Patient Education Activity  Goal: Patient/Family Education  Outcome: Progressing Towards Goal     Problem: Risk for Spread of Infection  Goal: Prevent transmission of infectious organism to others  Description: Prevent the transmission of infectious organisms to other patients, staff members, and visitors.   Outcome: Progressing Towards Goal     Problem: Patient Education:  Go to Education Activity  Goal: Patient/Family Education  Outcome: Progressing Towards Goal     Problem: Hypertension  Goal: *Blood pressure within specified parameters  Outcome: Progressing Towards Goal  Goal: *Fluid volume balance  Outcome: Progressing Towards Goal  Goal: *Labs within defined limits  Outcome: Progressing Towards Goal     Problem: Patient Education: Go to Patient Education Activity  Goal: Patient/Family Education  Outcome: Progressing Towards Goal     Problem: Patient Education: Go to Patient Education Activity  Goal: Patient/Family Education  Outcome: Progressing Towards Goal     Problem: Patient Education: Go to Patient Education Activity  Goal: Patient/Family Education  Outcome: Progressing Towards Goal

## 2022-03-05 ENCOUNTER — HOME HEALTH ADMISSION (OUTPATIENT)
Dept: HOME HEALTH SERVICES | Facility: HOME HEALTH | Age: 77
End: 2022-03-05
Payer: MEDICARE

## 2022-03-05 LAB
ANION GAP SERPL CALC-SCNC: 5 MMOL/L (ref 5–15)
BACTERIA SPEC CULT: NORMAL
BASOPHILS # BLD: 0.1 K/UL (ref 0–0.1)
BASOPHILS NFR BLD: 2 % (ref 0–1)
BUN SERPL-MCNC: 20 MG/DL (ref 6–20)
BUN/CREAT SERPL: 24 (ref 12–20)
CALCIUM SERPL-MCNC: 7.5 MG/DL (ref 8.5–10.1)
CHLORIDE SERPL-SCNC: 116 MMOL/L (ref 97–108)
CO2 SERPL-SCNC: 19 MMOL/L (ref 21–32)
CREAT SERPL-MCNC: 0.82 MG/DL (ref 0.55–1.02)
DIFFERENTIAL METHOD BLD: ABNORMAL
EOSINOPHIL # BLD: 0.1 K/UL (ref 0–0.4)
EOSINOPHIL NFR BLD: 4 % (ref 0–7)
ERYTHROCYTE [DISTWIDTH] IN BLOOD BY AUTOMATED COUNT: 14.3 % (ref 11.5–14.5)
GLUCOSE BLD STRIP.AUTO-MCNC: 105 MG/DL (ref 65–117)
GLUCOSE BLD STRIP.AUTO-MCNC: 109 MG/DL (ref 65–117)
GLUCOSE BLD STRIP.AUTO-MCNC: 78 MG/DL (ref 65–117)
GLUCOSE BLD STRIP.AUTO-MCNC: 86 MG/DL (ref 65–117)
GLUCOSE SERPL-MCNC: 66 MG/DL (ref 65–100)
HCT VFR BLD AUTO: 27 % (ref 35–47)
HGB BLD-MCNC: 8.5 G/DL (ref 11.5–16)
IMM GRANULOCYTES # BLD AUTO: 0 K/UL (ref 0–0.04)
IMM GRANULOCYTES NFR BLD AUTO: 1 % (ref 0–0.5)
LYMPHOCYTES # BLD: 1.4 K/UL (ref 0.8–3.5)
LYMPHOCYTES NFR BLD: 35 % (ref 12–49)
MCH RBC QN AUTO: 32.3 PG (ref 26–34)
MCHC RBC AUTO-ENTMCNC: 31.5 G/DL (ref 30–36.5)
MCV RBC AUTO: 102.7 FL (ref 80–99)
MONOCYTES # BLD: 0.5 K/UL (ref 0–1)
MONOCYTES NFR BLD: 12 % (ref 5–13)
NEUTS SEG # BLD: 1.9 K/UL (ref 1.8–8)
NEUTS SEG NFR BLD: 46 % (ref 32–75)
NRBC # BLD: 0 K/UL (ref 0–0.01)
NRBC BLD-RTO: 0 PER 100 WBC
PLATELET # BLD AUTO: 160 K/UL (ref 150–400)
PMV BLD AUTO: 10.4 FL (ref 8.9–12.9)
POTASSIUM SERPL-SCNC: 4.4 MMOL/L (ref 3.5–5.1)
RBC # BLD AUTO: 2.63 M/UL (ref 3.8–5.2)
SERVICE CMNT-IMP: NORMAL
SODIUM SERPL-SCNC: 140 MMOL/L (ref 136–145)
WBC # BLD AUTO: 4 K/UL (ref 3.6–11)

## 2022-03-05 PROCEDURE — 74011250637 HC RX REV CODE- 250/637: Performed by: INTERNAL MEDICINE

## 2022-03-05 PROCEDURE — 85025 COMPLETE CBC W/AUTO DIFF WBC: CPT

## 2022-03-05 PROCEDURE — 36415 COLL VENOUS BLD VENIPUNCTURE: CPT

## 2022-03-05 PROCEDURE — 82962 GLUCOSE BLOOD TEST: CPT

## 2022-03-05 PROCEDURE — 65660000000 HC RM CCU STEPDOWN

## 2022-03-05 PROCEDURE — 74011250637 HC RX REV CODE- 250/637: Performed by: NURSE PRACTITIONER

## 2022-03-05 PROCEDURE — 74011000250 HC RX REV CODE- 250: Performed by: INTERNAL MEDICINE

## 2022-03-05 PROCEDURE — 74011250636 HC RX REV CODE- 250/636: Performed by: INTERNAL MEDICINE

## 2022-03-05 PROCEDURE — 74011000258 HC RX REV CODE- 258: Performed by: INTERNAL MEDICINE

## 2022-03-05 PROCEDURE — 80048 BASIC METABOLIC PNL TOTAL CA: CPT

## 2022-03-05 RX ADMIN — GABAPENTIN 300 MG: 300 CAPSULE ORAL at 20:29

## 2022-03-05 RX ADMIN — PANTOPRAZOLE SODIUM 40 MG: 40 TABLET, DELAYED RELEASE ORAL at 09:22

## 2022-03-05 RX ADMIN — SODIUM CHLORIDE, PRESERVATIVE FREE 10 ML: 5 INJECTION INTRAVENOUS at 14:35

## 2022-03-05 RX ADMIN — ERTAPENEM SODIUM 1 G: 1 INJECTION, POWDER, LYOPHILIZED, FOR SOLUTION INTRAMUSCULAR; INTRAVENOUS at 17:19

## 2022-03-05 RX ADMIN — ATORVASTATIN CALCIUM 20 MG: 20 TABLET, FILM COATED ORAL at 09:22

## 2022-03-05 RX ADMIN — ENOXAPARIN SODIUM 40 MG: 100 INJECTION SUBCUTANEOUS at 14:34

## 2022-03-05 RX ADMIN — SODIUM CHLORIDE, PRESERVATIVE FREE 10 ML: 5 INJECTION INTRAVENOUS at 20:30

## 2022-03-05 RX ADMIN — SERTRALINE 50 MG: 50 TABLET, FILM COATED ORAL at 09:22

## 2022-03-05 RX ADMIN — LATANOPROST 1 DROP: 50 SOLUTION/ DROPS OPHTHALMIC at 20:32

## 2022-03-05 RX ADMIN — SODIUM CHLORIDE, PRESERVATIVE FREE 10 ML: 5 INJECTION INTRAVENOUS at 03:42

## 2022-03-05 RX ADMIN — GABAPENTIN 300 MG: 300 CAPSULE ORAL at 09:22

## 2022-03-05 RX ADMIN — METOPROLOL TARTRATE 25 MG: 25 TABLET, FILM COATED ORAL at 09:22

## 2022-03-05 RX ADMIN — METOPROLOL TARTRATE 25 MG: 25 TABLET, FILM COATED ORAL at 17:19

## 2022-03-05 RX ADMIN — NORTRIPTYLINE HYDROCHLORIDE 50 MG: 25 CAPSULE ORAL at 20:29

## 2022-03-05 NOTE — DISCHARGE SUMMARY
Hospitalist Discharge Summary     Patient ID:  Henrique Cardoso  [id number]  70 y.o.  1945  2/28/2022    PCP on record: Vance Melendez MD    Admit date: 2/28/2022  Discharge date and time: 3/5/2022    DISCHARGE DIAGNOSIS:  Emphysematous cystitis, acute pyelonephritis      CONSULTATIONS:  IP CONSULT TO UROLOGY  IP CONSULT TO UROLOGY  IP CONSULT TO INFECTIOUS DISEASES    Excerpted HPI from H&P of Duong Miguel MD:    ______________________________________________________________________  DISCHARGE SUMMARY/HOSPITAL COURSE:  for full details see H&P, daily progress notes, labs, consult notes. Emphysematous cystitis  -CT of abdomen shows emphysematous cystitis  -She has a history of ESBL Klebsiella pneumonia so we will continue meropenem. -urine culture growing Klebsiella  -Urology following, Monitor I/O. PVR, if > 300 will need laguerre   -PT/OT  -ID consultation        Acute kidney injury  -renal function improving  -Patient developed some swelling  -DC IV fluid     Diabetes mellitus type 2 with neuropathy  -Hold home Metformin.  Start insulin sliding scale with blood sugar checks  -Continue home gabapentin     Hypertension  Dyslipidemia  Osteoarthritis  Peripheral arterial disease  History of rheumatoid arthritis  History of sarcoid  Obstructive sleep apnea  GERD  Depression  -Continue home metoprolol, Lipitor, PPI, Zoloft  -Holding home Dorethia Greenhouse and leflunomide due to active infection     Anemia  Appears to be at baseline  Check Iron panel  Transfuse for hgb < 7  Trend     Code Status: Full code  Surrogate Decision Maker: Saroj Mcgrath     DVT Prophylaxis: heparin      _______________________________________________________________________  Patient seen and examined by me on discharge day. Pertinent Findings:  Gen:    Not in distress  Chest: Clear lungs  CVS:   Regular rhythm.   No edema  Abd:  Soft, not distended, not tender  Neuro:  Alert, _______________________________________________________________________  DISCHARGE MEDICATIONS:   Current Discharge Medication List      START taking these medications    Details   ertapenem 1 gram 1 g IVPB 1 g by IntraVENous route every twenty-four (24) hours for 10 days. Qty: 10 Dose, Refills: 0  Start date: 3/5/2022, End date: 3/15/2022         CONTINUE these medications which have NOT CHANGED    Details   traMADoL (ULTRAM) 50 mg tablet Take 50 mg by mouth every six (6) hours as needed for Pain. mupirocin (BACTROBAN) 2 % ointment       albuterol sulfate (PROVENTIL;VENTOLIN) 2.5 mg/0.5 mL nebu nebulizer solution USE 1 VIAL IN NEBULIZER EVERY 4 HOURS AS NEEDED FOR WHEEZING  Qty: 30 Nebule, Refills: 2    Associated Diagnoses: Mild intermittent asthma, unspecified whether complicated      simvastatin (ZOCOR) 20 mg tablet TAKE BY MOUTH NIGHTLY. Qty: 90 Tablet, Refills: 1    Associated Diagnoses: Coronary artery disease due to lipid rich plaque; Status post angioplasty with stent; Mixed hyperlipidemia      sertraline (ZOLOFT) 50 mg tablet Take 1 Tablet by mouth daily. Qty: 90 Tablet, Refills: 1    Associated Diagnoses: Mild episode of recurrent major depressive disorder (HCC)      omeprazole (PRILOSEC) 20 mg capsule Take 1 Capsule by mouth daily. Qty: 90 Capsule, Refills: 1    Associated Diagnoses: History of gastritis      metoprolol tartrate (LOPRESSOR) 25 mg tablet Take 1 Tablet by mouth two (2) times a day. Qty: 180 Tablet, Refills: 1    Associated Diagnoses: Essential hypertension      metFORMIN (GLUCOPHAGE) 500 mg tablet Take 1 Tablet by mouth daily (with dinner).  Decreased dose  Qty: 90 Tablet, Refills: 1    Associated Diagnoses: Type 2 diabetes mellitus with complication, without long-term current use of insulin (HCC)      latanoprost (XALATAN) 0.005 % ophthalmic solution INSTILL 1 DROP INTO BOTH EYES NIGHTLY  Qty: 2.5 mL, Refills: 1    Associated Diagnoses: Glaucoma, unspecified glaucoma type, unspecified laterality      gabapentin (NEURONTIN) 300 mg capsule TAKE 1 CAPSULE IN THE MORNING AND TAKE 3 CAPSULES BEFORE BEDTIME  Qty: 120 Capsule, Refills: 2    Comments: Pls delete any prev gabapentin rx on file. thanks  Associated Diagnoses: RLS (restless legs syndrome); Lumbar radiculopathy      ergocalciferol (ERGOCALCIFEROL) 1,250 mcg (50,000 unit) capsule Take 1 Capsule by mouth every seven (7) days. Indications: vitamin D deficiency (high dose therapy)  Qty: 12 Capsule, Refills: 3    Associated Diagnoses: Vitamin D deficiency; Seronegative rheumatoid arthritis of multiple sites [facility]); Sarcoidosis of lung ([facility])      fluticasone furoate-vilanteroL (Breo Ellipta) 100-25 mcg/dose inhaler TAKE 1 PUFF BY MOUTH EVERY DAY  Qty: 1 Each, Refills: 3    Associated Diagnoses: Mild intermittent asthma, unspecified whether complicated      nortriptyline (PAMELOR) 50 mg capsule Take 50 mg by mouth nightly. STOP taking these medications       trimethoprim-sulfamethoxazole (BACTRIM DS, SEPTRA DS) 160-800 mg per tablet Comments:   Reason for Stopping:         clindamycin (CLEOCIN) 300 mg capsule Comments:   Reason for Stopping:         furosemide (LASIX) 20 mg tablet Comments:   Reason for Stopping:         predniSONE (DELTASONE) 20 mg tablet Comments:   Reason for Stopping:         Xeljanz XR 11 mg Tb24 Comments:   Reason for Stopping:                 Patient Follow Up Instructions: Activity: PT/OT Eval and Treat  Diet: Diabetic Diet  Wound Care: As directed    Follow-up with PCP  in 5 days.   Follow-up tests/labs     Follow-up Information     Follow up With Specialties Details [facility]  On 3/10/2022 9:10 am follow up on this date with Dr. Silvia Guidry [id number]    Martha Renteria MD Family Medicine In 5 days  [address]  [phone] ________________________________________________________________    Risk of deterioration: Moderate    Condition at Discharge:  Stable  __________________________________________________________________    Disposition  Home with family and home health services    ____________________________________________________________________    Code Status: Full Code  ___________________________________________________________________      Total time in minutes spent coordinating this discharge (includes going over instructions, follow-up, prescriptions, and preparing report for sign off to her PCP) :  >30 minutes    Signed:   Michael Coyle MD

## 2022-03-05 NOTE — PROGRESS NOTES
Problem: Falls - Risk of  Goal: *Absence of Falls  Description: Document Philly Rodriguez Fall Risk and appropriate interventions in the flowsheet.   Outcome: Progressing Towards Goal  Note: Fall Risk Interventions:  Mobility Interventions: Assess mobility with egress test,Bed/chair exit alarm,OT consult for ADLs,Patient to call before getting OOB,PT Consult for mobility concerns,PT Consult for assist device competence         Medication Interventions: Assess postural VS orthostatic hypotension,Bed/chair exit alarm,Patient to call before getting OOB,Teach patient to arise slowly    Elimination Interventions: Bed/chair exit alarm,Call light in reach,Patient to call for help with toileting needs,Toilet paper/wipes in reach,Toileting schedule/hourly rounds

## 2022-03-05 NOTE — PROGRESS NOTES
Transition of Care Plan:     RUR: 14% - low   Disposition: Home with [address] and IV Abx   Follow up appointments: PCP  DME needed: IV Abx - referrals sent to [employer] and [place] [address], pending. Transportation at Discharge: CM to arrange medicaid to provide transportation   Kary Yeung or means to access home:   Pt has access to  home   IM Medicare Letter: 2nd IM Letter to be given  Is patient a BCPI-A Bundle:        n/a              If yes, was Bundle Letter given?:    Is patient a [place] and connected with the [facility]?        n/a        If yes, was [employer] transfer form completed and [address] notified? Caregiver Contact: Zita Michelle - [phone]  Discharge Caregiver contacted prior to discharge? CM to discuss if pt desires  Care Conference needed?:        n/a      Update 3:42 PM: CM rec'd updates from both [employer] and [facility] [place] [id number] - Medicare benefits can not be run until Monday. Kimberly Murillo 26 [phone] noted that if pt receives a dose at the hospital on Sunday, the facility can arrange to complete teaching at pt's home on Monday assuming the pt is in network. Likely discharge delay until Monday 3/7/22. Update 12:03 PM: CM rec'd a phone call from [address] [place], [place] [address]in [facility] rep Antoine Gamez [phone] who is reviewing referral - rep wants to know if pt has family to assist with IV abx administration. CM phoned pt's son Boris Martinez [phone] who indicated that he would be assisting pt daily and will be available for IV abx teaching tomorrow at 12pm. [address] [place], [place] [address] rep updated - have accepted. Discharge will be delayed until tomorrow. Attending updated. CM will continue to follow. Initial Note: Chart reviewed. CM aware of dc order. CM consult regarding IV abx was marked completed on 3/4 but no arrangements were made. [facility] referrals previously sent to [facility] and [place] declined due to [place]. CM sent additional referrals to [address] [place], [place] [address]in [facility], [facility] [address], [facility], [address] and [facility].  IV abx referral sent to [employer] and [facility]. CM phoned bedside RN and reported potential delay due to services being arranged. CM will continue to follow.      Shell James MSW  Care Manager, [address]

## 2022-03-05 NOTE — DISCHARGE INSTRUCTIONS
Patient Education        Blood in the Urine: Care Instructions  Your Care Instructions     Blood in the urine, or hematuria, may make the urine look red, brown, or pink. There may be blood every time you urinate or just from time to time. You cannot always see blood in the urine, but it will show up in a urine test.  Blood in the urine may be serious. It should always be checked by a doctor. Your doctor may recommend more tests, including an X-ray, a CT scan, or a cystoscopy (which lets a doctor look inside the urethra and bladder). Blood in the urine can be a sign of another problem. Common causes are bladder infections and kidney stones. An injury to your groin or your genital area can also cause bleeding in the urinary tract. Very hard exercise--such as running a marathon--can cause blood in the urine. Blood in the urine can also be a sign of kidney disease or cancer in the bladder or kidney. Many cases of blood in the urine are caused by a harmless condition that runs in families. This is called benign familial hematuria. It does not need any treatment. Sometimes your urine may look red or brown even though it does not contain blood. For example, not getting enough fluids (dehydration), taking certain medicines, or having a liver problem can change the color of your urine. Eating foods such as beets, rhubarb, or blackberries or foods with red food coloring can make your urine look red or pink. Follow-up care is a key part of your treatment and safety. Be sure to make and go to all appointments, and call your doctor if you are having problems. It's also a good idea to know your test results and keep a list of the medicines you take. When should you call for help? Call your doctor now or seek immediate medical care if:    · You have symptoms of a urinary infection. For example:  ? You have pus in your urine. ? You have pain in your back just below your rib cage. This is called flank pain. ?  You have a fever, chills, or body aches. ? It hurts to urinate. ? You have groin or belly pain.     · You have more blood in your urine. Watch closely for changes in your health, and be sure to contact your doctor if:    · You have new urination problems.     · You do not get better as expected. Where can you learn more? Go to http://www.gray.com/  Enter L554 in the search box to learn more about \"Blood in the Urine: Care Instructions. \"  Current as of: February 10, 2021               Content Version: 13.0  © 8793-4459 [employer]. Care instructions adapted under license by [employer] (which disclaims liability or warranty for this information). If you have questions about a medical condition or this instruction, always ask your healthcare professional. [address] any warranty or liability for your use of this information.        Antibiotic orders for discharge  -Ertapenem 1 g IV every 24 hours end date 2/15, do not pull PICC at end of therapy  -Weekly CBC, CMP fax reports to [phone], call with critical labs at [phone]  -Encourage adequate fluids, daily probiotic/yogurt  -Frequent emptying of the bladder, adequate blood sugar control  -ID virtual or in person follow-up on 3/15 at 3 PM.

## 2022-03-05 NOTE — PROGRESS NOTES
2259: Bedside shift change report given to MEGAN Roque (oncoming nurse) by Negar Shepherd RN (offgoing nurse). Report included the following information SBAR, Kardex, ED Summary, Procedure Summary and Intake/Output. 1900:   End of Shift Note    Bedside shift change report given to Negar Shepherd RN (oncoming nurse) by Valarie Mccann RN (offgoing nurse). Report included the following information SBAR, Kardex, ED Summary and Intake/Output    Shift worked:  7035-3815     Shift summary and any significant changes:     Patient was supposed to be discharged today but case management needed to set up home health and IV ABX for the patient and the son needs to learn how to administer the IV ABX but he had to go to work at [employer] today. Case Management scheduled with the son to come in tomorrow around 12PM to learn how to give the patient IV ABX. Pt should be getting discharged tomorrow afternoon. Concerns for physician to address:       Zone phone for oncoming shift:          Activity:  Activity Level: Up with Assistance  Number times ambulated in hallways past shift: 0  Number of times OOB to chair past shift: 4    Cardiac:   Cardiac Monitoring: Yes      Cardiac Rhythm: Sinus Rhythm    Access:   Current line(s): PICC     Genitourinary:   Urinary status: voiding    Respiratory:   O2 Device: None (Room air)  Chronic home O2 use?: N/A  Incentive spirometer at bedside: N/A       GI:  Last Bowel Movement Date: 03/03/22  Current diet:  ADULT DIET Regular; Low Fat/Low Chol/High Fiber/2 gm Na  Passing flatus: YES  Tolerating current diet: YES       Pain Management:   Patient states pain is manageable on current regimen: N/A    Skin:  Jose Luis Score: 18  Interventions: increase time out of bed and PT/OT consult    Patient Safety:  Fall Score:  Total Score: 3  Interventions: bed/chair alarm, assistive device (walker, cane, etc), gripper socks and pt to call before getting OOB  High Fall Risk: Yes    Length of Stay:  Expected LOS: 2d 21h  Actual LOS: Carson 1808, RN

## 2022-03-06 ENCOUNTER — APPOINTMENT (OUTPATIENT)
Dept: GENERAL RADIOLOGY | Age: 77
DRG: 690 | End: 2022-03-06
Attending: INTERNAL MEDICINE
Payer: MEDICARE

## 2022-03-06 ENCOUNTER — APPOINTMENT (OUTPATIENT)
Dept: ULTRASOUND IMAGING | Age: 77
DRG: 690 | End: 2022-03-06
Attending: INTERNAL MEDICINE
Payer: MEDICARE

## 2022-03-06 LAB
GLUCOSE BLD STRIP.AUTO-MCNC: 112 MG/DL (ref 65–117)
GLUCOSE BLD STRIP.AUTO-MCNC: 119 MG/DL (ref 65–117)
GLUCOSE BLD STRIP.AUTO-MCNC: 79 MG/DL (ref 65–117)
GLUCOSE BLD STRIP.AUTO-MCNC: 81 MG/DL (ref 65–117)
SERVICE CMNT-IMP: ABNORMAL
SERVICE CMNT-IMP: NORMAL

## 2022-03-06 PROCEDURE — 74011250637 HC RX REV CODE- 250/637: Performed by: INTERNAL MEDICINE

## 2022-03-06 PROCEDURE — 74011250637 HC RX REV CODE- 250/637: Performed by: NURSE PRACTITIONER

## 2022-03-06 PROCEDURE — 74011000250 HC RX REV CODE- 250: Performed by: INTERNAL MEDICINE

## 2022-03-06 PROCEDURE — 74011000258 HC RX REV CODE- 258: Performed by: INTERNAL MEDICINE

## 2022-03-06 PROCEDURE — 65660000000 HC RM CCU STEPDOWN

## 2022-03-06 PROCEDURE — 73620 X-RAY EXAM OF FOOT: CPT

## 2022-03-06 PROCEDURE — 82962 GLUCOSE BLOOD TEST: CPT

## 2022-03-06 PROCEDURE — 74011250636 HC RX REV CODE- 250/636: Performed by: INTERNAL MEDICINE

## 2022-03-06 PROCEDURE — 93971 EXTREMITY STUDY: CPT

## 2022-03-06 RX ADMIN — SODIUM CHLORIDE, PRESERVATIVE FREE 10 ML: 5 INJECTION INTRAVENOUS at 21:32

## 2022-03-06 RX ADMIN — SERTRALINE 50 MG: 50 TABLET, FILM COATED ORAL at 08:45

## 2022-03-06 RX ADMIN — PANTOPRAZOLE SODIUM 40 MG: 40 TABLET, DELAYED RELEASE ORAL at 08:44

## 2022-03-06 RX ADMIN — METOPROLOL TARTRATE 25 MG: 25 TABLET, FILM COATED ORAL at 18:08

## 2022-03-06 RX ADMIN — SODIUM CHLORIDE, PRESERVATIVE FREE 40 ML: 5 INJECTION INTRAVENOUS at 18:09

## 2022-03-06 RX ADMIN — GABAPENTIN 300 MG: 300 CAPSULE ORAL at 21:32

## 2022-03-06 RX ADMIN — GABAPENTIN 300 MG: 300 CAPSULE ORAL at 08:44

## 2022-03-06 RX ADMIN — ERTAPENEM SODIUM 1 G: 1 INJECTION, POWDER, LYOPHILIZED, FOR SOLUTION INTRAMUSCULAR; INTRAVENOUS at 18:15

## 2022-03-06 RX ADMIN — NORTRIPTYLINE HYDROCHLORIDE 50 MG: 25 CAPSULE ORAL at 21:32

## 2022-03-06 RX ADMIN — ENOXAPARIN SODIUM 40 MG: 100 INJECTION SUBCUTANEOUS at 12:33

## 2022-03-06 RX ADMIN — ATORVASTATIN CALCIUM 20 MG: 20 TABLET, FILM COATED ORAL at 08:44

## 2022-03-06 RX ADMIN — METOPROLOL TARTRATE 25 MG: 25 TABLET, FILM COATED ORAL at 08:44

## 2022-03-06 RX ADMIN — LATANOPROST 1 DROP: 50 SOLUTION/ DROPS OPHTHALMIC at 21:32

## 2022-03-06 NOTE — PROGRESS NOTES
Transition of Care Plan:     RUR: 14% - low   Disposition: Home with [facility] and IV Abx   Follow up appointments: PCP  DME needed: IV Abx - referrals sent to [employer] and [address], pending. Transportation at Discharge: CM to arrange medicaid to provide transportation   Keys or means to access home:   Pt has access to  home   IM Medicare Letter: 2nd IM Letter to be given  Is patient a BCPI-A Bundle:        n/a              If yes, was Bundle Letter given?:    Is patient a  and [facility]?        n/a        If yes, was [employer] transfer form completed and [facility] notified? Caregiver Contact: SonJayda Bermeo - [phone]  Discharge Caregiver contacted prior to discharge? CM to discuss if pt desires  Care Conference needed? :        n/a    11:29 a.m. CM received a call from [address] [place] [address] who stated pt has the option of self-pay which would be $112.00 a day for the medication and supplies if she wanted to d/c today. CM spoke to pt; however, pt stated she could not afford it. CM will follow up tomorrow after insurance runs benefits. 9:32 a.m.  CM called to inform pt's son. Pt's son is available to learn teachings tomorrow before noon. CM will also speak to pt to give her an update. CM spoke with Carlos Cordova who cannot verify insurance on weekends. CM advised to wait until tomorrow.     Danika Kelsey, MSW  Care Management, [place]

## 2022-03-06 NOTE — PROGRESS NOTES
Hospitalist Progress Note    NAME: Raul Ndiaye   :  1945   MRN:  [id number]       Assessment / Plan:  Emphysematous cystitis  -CT of abdomen shows emphysematous cystitis  -She has a history of ESBL Klebsiella pneumonia so we will continue meropenem. -urine culture growing Klebsiella  -Urology following, Monitor I/O. PVR, if > 300 will need laguerre   -PT/OT  -ID consultation       Acute kidney injury  -renal function improving  -Patient developed some swelling  -DC IV fluid     Diabetes mellitus type 2 with neuropathy  -Hold home Metformin. Start insulin sliding scale with blood sugar checks  -Continue home gabapentin     Hypertension  Dyslipidemia  Osteoarthritis  Peripheral arterial disease  History of rheumatoid arthritis  History of sarcoid  Obstructive sleep apnea  GERD  Depression  -Continue home metoprolol, Lipitor, PPI, Zoloft  -Holding home Dhara Juniper and leflunomide due to active infection     Anemia  Appears to be at baseline  Check Iron panel  Transfuse for hgb < 7  Trend     Code Status: Full code  Surrogate Decision Maker: Saroj Nuñez     DVT Prophylaxis: heparin        Baseline: From home, independent of ADLs      25.0 - 29.9 Overweight / Body mass index is 29.1 kg/m². Estimated discharge date:   Barriers: Urine cultures         Subjective:     Chief Complaint / Reason for Physician Visit  Patient seen and examined at the bedside. She currently states that she feels no better since coming to the hospital still with suprapubic tenderness. Discussed with RN events overnight.   Continue IV antibiotic, patient may need placement    Review of Systems:  Symptom Y/N Comments  Symptom Y/N Comments   Fever/Chills n   Chest Pain n    Poor Appetite    Edema     Cough    Abdominal Pain     Sputum n   Joint Pain     SOB/WORKMAN    Pruritis/Rash     Nausea/vomit    Tolerating PT/OT     Diarrhea    Tolerating Diet y    Constipation n   Other       Could NOT obtain due to:      Objective: VITALS:   Last 24hrs VS reviewed since prior progress note. Most recent are:  Patient Vitals for the past 24 hrs:   Temp Pulse Resp BP SpO2   03/06/22 0844 -- 79 -- -- --   03/06/22 0750 -- 83 18 126/69 100 %   03/06/22 0312 98.2 °F (36.8 °C) 68 18 (!) 147/59 100 %   03/05/22 2306 97.3 °F (36.3 °C) 75 18 (!) 180/60 100 %   03/05/22 1921 97.9 °F (36.6 °C) 73 18 (!) 143/72 100 %   03/05/22 1719 -- 76 -- (!) 145/75 --   03/05/22 1459 97.7 °F (36.5 °C) 75 16 (!) 137/112 96 %   03/05/22 1106 97.6 °F (36.4 °C) 69 16 (!) 115/58 99 %       Intake/Output Summary (Last 24 hours) at 3/6/2022 0940  Last data filed at 3/5/2022 1435  Gross per 24 hour   Intake 240 ml   Output --   Net 240 ml        I had a face to face encounter and independently examined this patient on 3/6/2022, as outlined below:  PHYSICAL EXAM:  General: WD, WN. Alert, cooperative, no acute distress    EENT:  EOMI. Anicteric sclerae. MMM  Resp:  CTA bilaterally, no wheezing or rales. No accessory muscle use  CV:  Regular  rhythm,  No edema  GI:  Soft, Non distended, Non tender. +Bowel sounds  Neurologic:  Alert and oriented X 3, normal speech,   Psych:   Good insight. Not anxious nor agitated  Skin:  No rashes. No jaundice    Reviewed most current lab test results and cultures  YES  Reviewed most current radiology test results   YES  Review and summation of old records today    NO  Reviewed patient's current orders and MAR    YES  PMH/SH reviewed - no change compared to H&P  ________________________________________________________________________  Care Plan discussed with:    Comments   Patient y    Family      RN y    Care Manager     Consultant                        Multidiciplinary team rounds were held today with , nursing, pharmacist and clinical coordinator. Patient's plan of care was discussed; medications were reviewed and discharge planning was addressed. ________________________________________________________________________  Total NON critical care TIME:  35    Minutes    Total CRITICAL CARE TIME Spent:   Minutes non procedure based      Comments   >50% of visit spent in counseling and coordination of care y    ________________________________________________________________________  Diana Evans MD     Procedures: see electronic medical records for all procedures/Xrays and details which were not copied into this note but were reviewed prior to creation of Plan. LABS:  I reviewed today's most current labs and imaging studies. Pertinent labs include:  Recent Labs     03/05/22 0333   WBC 4.0   HGB 8.5*   HCT 27.0*        Recent Labs     03/05/22 0333      K 4.4   *   CO2 19*   GLU 66   BUN 20   CREA 0.82   CA 7.5*       Signed:  Diana Evans MD

## 2022-03-07 VITALS
TEMPERATURE: 97.4 F | HEIGHT: 64 IN | SYSTOLIC BLOOD PRESSURE: 110 MMHG | RESPIRATION RATE: 19 BRPM | BODY MASS INDEX: 28.94 KG/M2 | DIASTOLIC BLOOD PRESSURE: 75 MMHG | OXYGEN SATURATION: 99 % | WEIGHT: 169.53 LBS | HEART RATE: 97 BPM

## 2022-03-07 LAB
GLUCOSE BLD STRIP.AUTO-MCNC: 75 MG/DL (ref 65–117)
SERVICE CMNT-IMP: NORMAL

## 2022-03-07 PROCEDURE — 74011000250 HC RX REV CODE- 250: Performed by: INTERNAL MEDICINE

## 2022-03-07 PROCEDURE — 74011250637 HC RX REV CODE- 250/637: Performed by: NURSE PRACTITIONER

## 2022-03-07 PROCEDURE — 82962 GLUCOSE BLOOD TEST: CPT

## 2022-03-07 PROCEDURE — 74011250637 HC RX REV CODE- 250/637: Performed by: INTERNAL MEDICINE

## 2022-03-07 RX ADMIN — SERTRALINE 50 MG: 50 TABLET, FILM COATED ORAL at 08:36

## 2022-03-07 RX ADMIN — GABAPENTIN 300 MG: 300 CAPSULE ORAL at 08:36

## 2022-03-07 RX ADMIN — TRAMADOL HYDROCHLORIDE 50 MG: 50 TABLET, COATED ORAL at 04:25

## 2022-03-07 RX ADMIN — SODIUM CHLORIDE, PRESERVATIVE FREE 10 ML: 5 INJECTION INTRAVENOUS at 05:39

## 2022-03-07 RX ADMIN — METOPROLOL TARTRATE 25 MG: 25 TABLET, FILM COATED ORAL at 08:36

## 2022-03-07 RX ADMIN — ATORVASTATIN CALCIUM 20 MG: 20 TABLET, FILM COATED ORAL at 08:36

## 2022-03-07 RX ADMIN — PANTOPRAZOLE SODIUM 40 MG: 40 TABLET, DELAYED RELEASE ORAL at 08:36

## 2022-03-07 NOTE — PROGRESS NOTES
[address] [place] follow-up PCP transitional care appointment has been scheduled with Dr. Julius Lundberg on 3/15/22 at 1000. Pending patient discharge. Dasha Grant, Care Management Specialist     Attempted to schedule hospital follow up PCP appointment. Awaiting callback from PCP office. Pending patient discharge.  Dasha Grant Care Management Specialist

## 2022-03-07 NOTE — PROGRESS NOTES
End of Shift Note    Bedside shift change report given to Rigo Walton RN (oncoming nurse) by Vance Song RN (offgoing nurse).   Report included the following information SBAR    Shift worked:  7p-7a     Shift summary and any significant changes:    No significant changes    Plan for son to visit in the morning to learn how to administer IV Abx before patient is discharged (CM aware)    Patient able to walk to bathroom with RW and standby assist    0430: Tramadol given for 10/10 Left Leg pain (arthritis)   Concerns for physician to address:       Zone phone for oncoming shift:             Vance Song RN

## 2022-03-07 NOTE — PROGRESS NOTES
0730: Bedside shift change report given to [facility], RN (oncoming nurse) by Jamie Martell RN (offgoing nurse). Report included the following information SBAR, Kardex, Intake/Output, MAR, Recent Results and Cardiac Rhythm SR.     5700: DISCHARGE SUMMARY FROM [address]    The patient is stable for discharge. I have reviewed the discharge instructions with the patient. The patient verbalized understanding. All questions were fully answered. The patient verbalized no complaints. Hard scripts and medication handouts were given and reviewed with the patient. Appropriate educational materials and medication side effects teaching were also provided. Cardiac monitor and IV line(s) were removed. The following personal items collected during admission were returned to the patient/family  Home medications:   Dental Appliance: Dental Appliances: None  Vision: Visual Aid: Glasses,With patient  Hearing Aid: Hearing Aid: None  Jewelry: Jewelry: Ring (2 rings with patient)  Clothing: Clothing: At bedside,Pants,Undergarments,Shirt,Socks,Footwear,Jacket/Coat  Other Valuables: Other Valuables: Cell Phone,Wallet,Purse (cell phone , wallet with patient)  Valuables sent to safe: There were no personal belongings, valuables or home medications left at patient's bedside,  or safe.

## 2022-03-07 NOTE — PROGRESS NOTES
Transition of Care Plan:     RUR: 14% - low   Disposition: Home with [facility] and IV Abx   Follow up appointments: PCP  DME needed: IV Abx via [address] at [employer] at [employer] or means to access home:   Pt has access to [facility] Medicare Letter: given 3/7/22  Is patient a BCPI-A Bundle:        n/a              If yes, was Bundle Letter given?:    Is patient a  and connected with the [facility]?        n/a        If yes, was [employer] transfer form completed and VA notified? Caregiver Contact: Ben Bond - [phone]  Discharge Caregiver contacted prior to discharge? CM to discuss if pt desires  Care Conference needed? :        n/a     Update 0930:  CM received a call from [facility] [place] [id number] and the pt is covered 100%. 0$ copay. She spoke with the pts son and he will be here at 80 for teaching. CM contacted son to confirm this. CM met with pt to discuss d/c and abx teaching. She is agreeable. 2nd IM letter discussed. See form. Pt already has a PCP appt. AVS updated. The pt is ready for d/c from a CM standpoint. Initial note:  Chart reviewed. CM spoke with [facility] at [place] [id number] and Charolet Hamman with  [employer] and they are both awaiting insurance verification of benefits for abx for this pt. They will call when this is completed. CM contacted pts son Remington Greenberg to discuss transportation if the pt is d/c today. Prior to 12pm he will be able to transport the pt. Later than that CM will set up Medicaid transport. CM to f/u and continue to monitor for d/c needs. Care Management Interventions  PCP Verified by CM:  Yes  Palliative Care Criteria Met (RRAT>21 & CHF Dx)?: No  Mode of Transport at Discharge:  (pt son will transport)  Transition of Care Consult (CM Consult): Discharge Planning  Discharge Durable Medical Equipment: Yes (abx with [facility])  Physical Therapy Consult: Yes  Occupational Therapy Consult: Yes  Speech Therapy Consult: No  Support Systems: Child(reji)  Confirm Follow Up Transport: Family  The Patient and/or Patient Representative was Provided with a Choice of Provider and Agrees with the Discharge Plan?: Yes  Name of the Patient Representative Who was Provided with a Choice of Provider and Agrees with the Discharge Plan: Pietro Hale  Freedom of Choice List was Provided with Basic Dialogue that Supports the Patient's Individualized Plan of Care/Goals, Treatment Preferences and Shares the Quality Data Associated with the Providers?: Yes  Discharge Location  Patient Expects to be Discharged to[de-identified] Home with home health     ZITA Sharif  Care Manager  [phone]

## 2022-03-07 NOTE — PROGRESS NOTES
Hospitalist Progress Note    NAME: Daisy Lambert   :  1945   MRN:  [id number]       Assessment / Plan:  Emphysematous cystitis  -CT of abdomen shows emphysematous cystitis  -She has a history of ESBL Klebsiella pneumonia so we will continue meropenem. -urine culture growing Klebsiella  -Urology following, Monitor I/O. PVR, if > 300 will need laguerre   -PT/OT  -ID consultation       Acute kidney injury  -renal function improving  -Patient developed some swelling  -DC IV fluid     Diabetes mellitus type 2 with neuropathy  -Hold home Metformin. Start insulin sliding scale with blood sugar checks  -Continue home gabapentin    Right leg swelling   -venous doppler negative   -xray foot show no acute changes      Hypertension  Dyslipidemia  Osteoarthritis  Peripheral arterial disease  History of rheumatoid arthritis  History of sarcoid  Obstructive sleep apnea  GERD  Depression  -Continue home metoprolol, Lipitor, PPI, Zoloft  -Holding home Forestine Sovereign and leflunomide due to active infection     Anemia  Appears to be at baseline  Check Iron panel  Transfuse for hgb < 7  Trend     Code Status: Full code  Surrogate Decision Maker: Saroj Hinojosa     DVT Prophylaxis: heparin        Baseline: From home, independent of ADLs      25.0 - 29.9 Overweight / Body mass index is 29.1 kg/m². Estimated discharge date:   Barriers: Urine cultures         Subjective:     Chief Complaint / Reason for Physician Visit  Patient seen and examined at the bedside. She currently states that she feels no better since coming to the hospital still with suprapubic tenderness. Discussed with RN events overnight.   Continue IV antibiotic, patient may need placement    Review of Systems:  Symptom Y/N Comments  Symptom Y/N Comments   Fever/Chills n   Chest Pain n    Poor Appetite    Edema     Cough    Abdominal Pain     Sputum n   Joint Pain     SOB/WORKMAN    Pruritis/Rash     Nausea/vomit    Tolerating PT/OT     Diarrhea    Tolerating Diet y    Constipation n   Other       Could NOT obtain due to:      Objective:     VITALS:   Last 24hrs VS reviewed since prior progress note. Most recent are:  Patient Vitals for the past 24 hrs:   Temp Pulse Resp BP SpO2   03/07/22 0836 -- 97 -- 110/75 --   03/07/22 0756 97.4 °F (36.3 °C) 78 19 126/68 99 %   03/07/22 0333 98.1 °F (36.7 °C) 73 20 (!) 158/85 99 %   03/06/22 2311 98.2 °F (36.8 °C) 70 19 (!) 149/68 --   03/06/22 1945 97.1 °F (36.2 °C) 72 19 (!) 107/92 --   03/06/22 1438 98 °F (36.7 °C) 71 18 (!) 148/77 100 %       Intake/Output Summary (Last 24 hours) at 3/7/2022 1407  Last data filed at 3/7/2022 0836  Gross per 24 hour   Intake 360 ml   Output --   Net 360 ml        I had a face to face encounter and independently examined this patient on 3/7/2022, as outlined below:  PHYSICAL EXAM:  General: WD, WN. Alert, cooperative, no acute distress    EENT:  EOMI. Anicteric sclerae. MMM  Resp:  CTA bilaterally, no wheezing or rales. No accessory muscle use  CV:  Regular  rhythm,  No edema  GI:  Soft, Non distended, Non tender. +Bowel sounds  Neurologic:  Alert and oriented X 3, normal speech,   Psych:   Good insight. Not anxious nor agitated  Skin:  No rashes. No jaundice    Reviewed most current lab test results and cultures  YES  Reviewed most current radiology test results   YES  Review and summation of old records today    NO  Reviewed patient's current orders and MAR    YES  PMH/SH reviewed - no change compared to H&P  ________________________________________________________________________  Care Plan discussed with:    Comments   Patient y    Family      RN y    Care Manager     Consultant                        Multidiciplinary team rounds were held today with , nursing, pharmacist and clinical coordinator. Patient's plan of care was discussed; medications were reviewed and discharge planning was addressed. ________________________________________________________________________  Total NON critical care TIME:  35    Minutes    Total CRITICAL CARE TIME Spent:   Minutes non procedure based      Comments   >50% of visit spent in counseling and coordination of care y    ________________________________________________________________________  Reche Blizzard, MD     Procedures: see electronic medical records for all procedures/Xrays and details which were not copied into this note but were reviewed prior to creation of Plan. LABS:  I reviewed today's most current labs and imaging studies. Pertinent labs include:  Recent Labs     03/05/22  0333   WBC 4.0   HGB 8.5*   HCT 27.0*        Recent Labs     03/05/22  0333      K 4.4   *   CO2 19*   GLU 66   BUN 20   CREA 0.82   CA 7.5*       Signed:  Reche Blizzard, MD

## 2022-03-08 ENCOUNTER — HOME CARE VISIT (OUTPATIENT)
Dept: SCHEDULING | Facility: HOME HEALTH | Age: 77
End: 2022-03-08
Payer: MEDICARE

## 2022-03-08 PROCEDURE — 400013 HH SOC

## 2022-03-08 PROCEDURE — G0299 HHS/HOSPICE OF RN EA 15 MIN: HCPCS

## 2022-03-09 ENCOUNTER — HOME CARE VISIT (OUTPATIENT)
Dept: HOME HEALTH SERVICES | Facility: HOME HEALTH | Age: 77
End: 2022-03-09
Payer: MEDICARE

## 2022-03-09 ENCOUNTER — TELEPHONE (OUTPATIENT)
Dept: ORTHOPEDIC SURGERY | Age: 77
End: 2022-03-09

## 2022-03-09 ENCOUNTER — HOME CARE VISIT (OUTPATIENT)
Dept: SCHEDULING | Facility: HOME HEALTH | Age: 77
End: 2022-03-09
Payer: MEDICARE

## 2022-03-09 PROCEDURE — G0152 HHCP-SERV OF OT,EA 15 MIN: HCPCS

## 2022-03-09 NOTE — TELEPHONE ENCOUNTER
PSR called PT LVM stating her appointment for 3/9/22 for 10:30 am would need to be reschedule as she was over 15 minutes late. PSR requested a return phone call.

## 2022-03-10 ENCOUNTER — HOME CARE VISIT (OUTPATIENT)
Dept: SCHEDULING | Facility: HOME HEALTH | Age: 77
End: 2022-03-10
Payer: MEDICARE

## 2022-03-10 PROCEDURE — G0299 HHS/HOSPICE OF RN EA 15 MIN: HCPCS

## 2022-03-11 VITALS
HEART RATE: 95 BPM | DIASTOLIC BLOOD PRESSURE: 80 MMHG | SYSTOLIC BLOOD PRESSURE: 138 MMHG | TEMPERATURE: 96.7 F | OXYGEN SATURATION: 96 %

## 2022-03-11 LAB
ALBUMIN SERPL-MCNC: 4 G/DL (ref 3.7–4.7)
ALBUMIN/GLOB SERPL: 1.9 {RATIO} (ref 1.2–2.2)
ALP SERPL-CCNC: 88 IU/L (ref 44–121)
ALT SERPL-CCNC: 23 IU/L (ref 0–32)
AST SERPL-CCNC: 57 IU/L (ref 0–40)
BILIRUB SERPL-MCNC: 0.6 MG/DL (ref 0–1.2)
BUN SERPL-MCNC: 11 MG/DL (ref 8–27)
BUN/CREAT SERPL: 13 (ref 12–28)
CALCIUM SERPL-MCNC: 8.5 MG/DL (ref 8.7–10.3)
CHLORIDE SERPL-SCNC: 102 MMOL/L (ref 96–106)
CO2 SERPL-SCNC: 21 MMOL/L (ref 20–29)
CREAT SERPL-MCNC: 0.83 MG/DL (ref 0.57–1)
EGFR: 74 ML/MIN/1.73
GLOBULIN SER CALC-MCNC: 2.1 G/DL (ref 1.5–4.5)
GLUCOSE SERPL-MCNC: 115 MG/DL (ref 65–99)
POTASSIUM SERPL-SCNC: 5 MMOL/L (ref 3.5–5.2)
PROT SERPL-MCNC: 6.1 G/DL (ref 6–8.5)
SODIUM SERPL-SCNC: 137 MMOL/L (ref 134–144)

## 2022-03-12 ENCOUNTER — HOME CARE VISIT (OUTPATIENT)
Dept: HOME HEALTH SERVICES | Facility: HOME HEALTH | Age: 77
End: 2022-03-12
Payer: MEDICARE

## 2022-03-14 ENCOUNTER — TELEPHONE (OUTPATIENT)
Dept: RHEUMATOLOGY | Age: 77
End: 2022-03-14

## 2022-03-14 ENCOUNTER — HOME CARE VISIT (OUTPATIENT)
Dept: HOME HEALTH SERVICES | Facility: HOME HEALTH | Age: 77
End: 2022-03-14
Payer: MEDICARE

## 2022-03-14 NOTE — TELEPHONE ENCOUNTER
Called to schedule follow up appt due to [facility] DALJIT leaving practice, left vm to call back.

## 2022-03-15 ENCOUNTER — VIRTUAL VISIT (OUTPATIENT)
Dept: INFECTIOUS DISEASES | Age: 77
End: 2022-03-15
Payer: MEDICARE

## 2022-03-15 ENCOUNTER — HOME CARE VISIT (OUTPATIENT)
Dept: SCHEDULING | Facility: HOME HEALTH | Age: 77
End: 2022-03-15
Payer: MEDICARE

## 2022-03-15 ENCOUNTER — VIRTUAL VISIT (OUTPATIENT)
Dept: FAMILY MEDICINE CLINIC | Age: 77
End: 2022-03-15
Payer: MEDICARE

## 2022-03-15 DIAGNOSIS — E11.9 TYPE 2 DIABETES MELLITUS WITHOUT COMPLICATION, WITHOUT LONG-TERM CURRENT USE OF INSULIN (HCC): ICD-10-CM

## 2022-03-15 DIAGNOSIS — N10 ACUTE PYELONEPHRITIS: ICD-10-CM

## 2022-03-15 DIAGNOSIS — M06.9 RHEUMATOID ARTHRITIS, INVOLVING UNSPECIFIED SITE, UNSPECIFIED WHETHER RHEUMATOID FACTOR PRESENT (HCC): ICD-10-CM

## 2022-03-15 DIAGNOSIS — R26.81 UNSTEADY GAIT WHEN WALKING: ICD-10-CM

## 2022-03-15 DIAGNOSIS — B96.89 URINARY TRACT INFECTION DUE TO ESBL KLEBSIELLA: ICD-10-CM

## 2022-03-15 DIAGNOSIS — E11.21 TYPE II DIABETES MELLITUS WITH NEPHROPATHY (HCC): ICD-10-CM
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
## 2022-03-15 DIAGNOSIS — N30.80 EMPHYSEMATOUS CYSTITIS: Primary | ICD-10-CM

## 2022-03-15 DIAGNOSIS — N39.0 URINARY TRACT INFECTION DUE TO ESBL KLEBSIELLA: ICD-10-CM

## 2022-03-15 DIAGNOSIS — M25.561 ACUTE PAIN OF RIGHT KNEE: ICD-10-CM

## 2022-03-15 DIAGNOSIS — R31.0 GROSS HEMATURIA: ICD-10-CM

## 2022-03-15 DIAGNOSIS — D84.9 IMMUNE DEFICIENCY DISORDER (HCC): ICD-10-CM

## 2022-03-15 DIAGNOSIS — Z09 HOSPITAL DISCHARGE FOLLOW-UP: Primary | ICD-10-CM

## 2022-03-15 DIAGNOSIS — N30.80 EMPHYSEMATOUS CYSTITIS: ICD-10-CM

## 2022-03-15 DIAGNOSIS — M06.09 RHEUMATOID ARTHRITIS OF MULTIPLE SITES WITH NEGATIVE RHEUMATOID FACTOR (HCC): ICD-10-CM

## 2022-03-15 PROCEDURE — 99214 OFFICE O/P EST MOD 30 MIN: CPT | Performed by: FAMILY MEDICINE

## 2022-03-15 PROCEDURE — G0299 HHS/HOSPICE OF RN EA 15 MIN: HCPCS

## 2022-03-15 PROCEDURE — G8427 DOCREV CUR MEDS BY ELIG CLIN: HCPCS | Performed by: FAMILY MEDICINE

## 2022-03-15 PROCEDURE — 1111F DSCHRG MED/CURRENT MED MERGE: CPT | Performed by: FAMILY MEDICINE

## 2022-03-15 PROCEDURE — 99443 PR PHYS/QHP TELEPHONE EVALUATION 21-30 MIN: CPT | Performed by: INTERNAL MEDICINE

## 2022-03-15 PROCEDURE — 3044F HG A1C LEVEL LT 7.0%: CPT | Performed by: INTERNAL MEDICINE

## 2022-03-15 NOTE — PROGRESS NOTES
Virtual Video Transitional Care Management Progress Note    Patient: Tari Boykin  : 1945  PCP: Sol Rucker MD    Date of office visit: 3/15/2022   Date of admission: 22  Date of discharge: 3/7/22  Hospital: [facility]    Call initiated w/i 2 business dates of discharge: *No response documented in the last 14 days   Date of the most recent call to the patient: *No documented post hospital discharge outreach found in the last 14 days        Assessment/Plan: Much improved with IV abx. To check in with ID - Dr. Faith Lawrence today. Fall with knee pain, symptomatic care. If not improving, will get xrays. Needs new rollator. Diagnoses and all orders for this visit:    1. Hospital discharge follow-up  -     HI DISCHARGE MEDS RECONCILED W/ CURRENT OUTPATIENT MED LIST    2. Acute pyelonephritis    3. Emphysematous cystitis    4. Rheumatoid arthritis of multiple sites with negative rheumatoid factor ([facility]    5. Type 2 diabetes mellitus without complication, without long-term current use of insulin ([facility]    6. Acute pain of right knee  -     XR KNEE RT MAX 2 VWS; Future    7. Fall, initial encounter  -     AMB SUPPLY ORDER  -     XR KNEE RT MAX 2 VWS; Future    8. Unsteady gait when walking  -     AMB SUPPLY ORDER      The complexity of medical decision making for this patient's transitional care is moderate   Follow-up and Dispositions    · Return in 1 month (on 4/15/2022), or if symptoms worsen or fail to improve. Subjective:   Tari Boykin is a 78 y.o. female presenting today for follow-up after hospital discharge. This encounter and supporting documentation was reviewed if available. Medication reconciliation was performed today. The main problem requiring admission was pyelo with emphysematous cystitis. Complications during admission: none    Pt grew ESBL on UCx so tx with Fosfamycin. Did not improve and asked her to go to ER if not improving. She was found to have emphysematous cystitis with acute pyelo. Getting better already. On ertapenem 1 g IV daily x 10 days. Recently fell using walker. Sig knee pain. Admitting symptoms have: significantly improved    Hosp d/c notes reviewed:  \"Emphysematous cystitis  -CT of abdomen shows emphysematous cystitis  -She has a history of ESBL Klebsiella pneumonia so we will continue meropenem. -urine culture growing Klebsiella  -Urology following, Monitor I/O. PVR, if > 300 will need laguerre   -PT/OT  -ID consultation        Acute kidney injury  -renal function improving  -Patient developed some swelling  DC IV fluid     Diabetes mellitus type 2 with neuropathy  -Hold home Metformin.  Start insulin sliding scale with blood sugar checks  -Continue home gabapentin     Hypertension  Dyslipidemia  Osteoarthritis  Peripheral arterial disease  History of rheumatoid arthritis  History of sarcoid  Obstructive sleep apnea  GERD  Depression  -Continue home metoprolol, Lipitor, PPI, Zoloft  -Holding home Willistine Read and leflunomide due to active infection     Anemia  Appears to be at baseline  Check Iron panel  Transfuse for hgb < 7  Trend\"    Medications marked \"taking\" at this time:  Home Medications    Medication Sig Start Date End Date Taking? Authorizing Provider   traMADoL (ULTRAM) 50 mg tablet Take 50 mg by mouth every six (6) hours as needed for Pain. Yes Provider, Historical   mupirocin (BACTROBAN) 2 % ointment Apply 1 Tube to affected area daily. 1 application right 2nd toe 2/14/22  Yes Provider, Historical   albuterol sulfate (PROVENTIL;VENTOLIN) 2.5 mg/0.5 mL nebu nebulizer solution USE 1 VIAL IN NEBULIZER EVERY 4 HOURS AS NEEDED FOR WHEEZING 2/11/22  Yes Odalis Phan MD   simvastatin (ZOCOR) 20 mg tablet TAKE BY MOUTH NIGHTLY. Patient taking differently: Take 20 mg by mouth nightly. TAKE BY MOUTH NIGHTLY. 10/26/21  Yes Odalis Phan MD   sertraline (ZOLOFT) 50 mg tablet Take 1 Tablet by mouth daily. 10/26/21  Yes Bushra Fleming MD   omeprazole (PRILOSEC) 20 mg capsule Take 1 Capsule by mouth daily. 10/26/21  Yes Bushra Fleming MD   metoprolol tartrate (LOPRESSOR) 25 mg tablet Take 1 Tablet by mouth two (2) times a day. 10/26/21  Yes Bushra Fleming MD   metFORMIN (GLUCOPHAGE) 500 mg tablet Take 1 Tablet by mouth daily (with dinner). Decreased dose 10/26/21  Yes Bushra Fleming MD   latanoprost (XALATAN) 0.005 % ophthalmic solution INSTILL 1 DROP INTO BOTH EYES NIGHTLY  Patient taking differently: Administer 1 Drop to both eyes nightly. INSTILL 1 DROP INTO BOTH EYES NIGHTLY 10/26/21  Yes Bushra Fleming MD   gabapentin (NEURONTIN) 300 mg capsule TAKE 1 CAPSULE IN THE MORNING AND TAKE 3 CAPSULES BEFORE BEDTIME  Patient taking differently: Take 300 mg by mouth daily. TAKE 1 CAPSULE IN THE MORNING AND TAKE 3 CAPSULES BEFORE BEDTIME 10/26/21  Yes Bushra Fleming MD   ergocalciferol (ERGOCALCIFEROL) 1,250 mcg (50,000 unit) capsule Take 1 Capsule by mouth every seven (7) days. Indications: vitamin D deficiency (high dose therapy)  Patient taking differently: Take 50,000 Units by mouth every seven (7) days. saturdays  Indications: vitamin D deficiency (high dose therapy) 10/26/21  Yes Bushra Fleming MD   fluticasone furoate-vilanteroL (Breo Ellipta) 100-25 mcg/dose inhaler TAKE 1 PUFF BY MOUTH EVERY DAY  Patient taking differently: Take 1 Puff by inhalation daily. TAKE 1 PUFF BY MOUTH EVERY DAY 10/26/21  Yes Bushra Fleming MD   leflunomide (ARAVA) 20 mg tablet TAKE 1 TABLET BY MOUTH EVERY DAY  Patient taking differently: Take 20 mg by mouth daily. TAKE 1 TABLET BY MOUTH EVERY DAY 8/31/21  Yes Alessandro Fitzgerald MD   nortriptyline (PAMELOR) 50 mg capsule Take 50 mg by mouth nightly. 5/7/18  Yes Provider, Historical   ertapenem 1 gram 1 g IVPB 1 g by IntraVENous route every twenty-four (24) hours for 10 days. Patient taking differently: 1 g by IntraVENous route every twenty-four (24) hours.  in 100ml NS 3/5/22 3/15/22  Gely Drummond MD        ROS  Gen - no fever/chills  Resp - no dyspnea or cough  CV - no chest pain or WORKMAN  Rest per HPI      Patient Active Problem List   Diagnosis Code    Long-term use of immunosuppressant medication Z79.899    Osteopenia of multiple sites M85.89    Vitamin D deficiency E55.9    Rheumatoid arthritis involving multiple sites ([facility] M06.9    Essential hypertension I10    Mixed hyperlipidemia E78.2    RLS (restless legs syndrome) G25.81    Mild intermittent asthma J45.20    Status post angioplasty with stent Z95.820    Coronary artery disease due to lipid rich plaque I25.10, I25.83    Seronegative rheumatoid arthritis of multiple sites (HCC) M06.09    Severe obesity ([facility]) E66.01    MGUS (monoclonal gammopathy of unknown significance) D47.2    Type 2 diabetes mellitus without complication ([facility]) N84.0    Complication of internal right knee prosthesis ([facility] T84. 9XXA, [id number]    Emphysematous cystitis N30.80     Current Outpatient Medications   Medication Sig Dispense Refill    traMADoL (ULTRAM) 50 mg tablet Take 50 mg by mouth every six (6) hours as needed for Pain.  mupirocin (BACTROBAN) 2 % ointment Apply 1 Tube to affected area daily. 1 application right 2nd toe      albuterol sulfate (PROVENTIL;VENTOLIN) 2.5 mg/0.5 mL nebu nebulizer solution USE 1 VIAL IN NEBULIZER EVERY 4 HOURS AS NEEDED FOR WHEEZING 30 Nebule 2    simvastatin (ZOCOR) 20 mg tablet TAKE BY MOUTH NIGHTLY. (Patient taking differently: Take 20 mg by mouth nightly. TAKE BY MOUTH NIGHTLY. ) 90 Tablet 1    sertraline (ZOLOFT) 50 mg tablet Take 1 Tablet by mouth daily. 90 Tablet 1    omeprazole (PRILOSEC) 20 mg capsule Take 1 Capsule by mouth daily. 90 Capsule 1    metoprolol tartrate (LOPRESSOR) 25 mg tablet Take 1 Tablet by mouth two (2) times a day. 180 Tablet 1    metFORMIN (GLUCOPHAGE) 500 mg tablet Take 1 Tablet by mouth daily (with dinner).  Decreased dose 90 Tablet 1    latanoprost (XALATAN) 0.005 % ophthalmic solution INSTILL 1 DROP INTO BOTH EYES NIGHTLY (Patient taking differently: Administer 1 Drop to both eyes nightly. INSTILL 1 DROP INTO BOTH EYES NIGHTLY) 2.5 mL 1    gabapentin (NEURONTIN) 300 mg capsule TAKE 1 CAPSULE IN THE MORNING AND TAKE 3 CAPSULES BEFORE BEDTIME (Patient taking differently: Take 300 mg by mouth daily. TAKE 1 CAPSULE IN THE MORNING AND TAKE 3 CAPSULES BEFORE BEDTIME) 120 Capsule 2    ergocalciferol (ERGOCALCIFEROL) 1,250 mcg (50,000 unit) capsule Take 1 Capsule by mouth every seven (7) days. Indications: vitamin D deficiency (high dose therapy) (Patient taking differently: Take 50,000 Units by mouth every seven (7) days. saturdays  Indications: vitamin D deficiency (high dose therapy)) 12 Capsule 3    fluticasone furoate-vilanteroL (Breo Ellipta) 100-25 mcg/dose inhaler TAKE 1 PUFF BY MOUTH EVERY DAY (Patient taking differently: Take 1 Puff by inhalation daily. TAKE 1 PUFF BY MOUTH EVERY DAY) 1 Each 3    leflunomide (ARAVA) 20 mg tablet TAKE 1 TABLET BY MOUTH EVERY DAY (Patient taking differently: Take 20 mg by mouth daily. TAKE 1 TABLET BY MOUTH EVERY DAY) 90 Tablet 1    nortriptyline (PAMELOR) 50 mg capsule Take 50 mg by mouth nightly.  ertapenem 1 gram 1 g IVPB 1 g by IntraVENous route every twenty-four (24) hours for 10 days. (Patient taking differently: 1 g by IntraVENous route every twenty-four (24) hours.  in 100ml NS) 10 Dose 0     Allergies   Allergen Reactions    Lisinopril Rash    Methotrexate Hives     Past Medical History:   Diagnosis Date    Asthma     Chronic pain 5/8/2020    DDD (degenerative disc disease), lumbar     Diabetes ([address]     Gastritis     GERD (gastroesophageal reflux disease)     Glaucoma     Hearing loss 5/8/2020    Hiatal hernia     High cholesterol     Hypertension     Hypocalcemia 5/8/2020    Peripheral vascular disease (HCC)     RA (rheumatoid arthritis) ([address]     Sarcoidosis 1999    [facility]    Sleep apnea     has not used cpap in years since weight loss     Past Surgical History:   Procedure Laterality Date    HX GASTRIC BYPASS      HX HEART CATHETERIZATION      s/p PCI with stenting in 1998     HX KNEE REPLACEMENT Bilateral     HX ORTHOPAEDIC Bilateral     carpal tunnel surgery     HX SHOULDER REPLACEMENT Right     x 2          Objective:  Last /80 on 3/10/22   No flowsheet data found. General: alert, cooperative, no distress   Mental  status: normal mood, behavior, speech, dress, motor activity, and thought processes, able to follow commands   HENT: NCAT   Neck: no visualized mass   Resp: no respiratory distress   Neuro: no gross deficits   Skin: no discoloration or lesions of concern on visible areas   Psychiatric: normal affect, consistent with stated mood, no evidence of hallucinations     Additional exam findings: We discussed the expected course, resolution and complications of the diagnosis(es) in detail. Medication risks, benefits, costs, interactions, and alternatives were discussed as indicated. I advised her to contact the office if her condition worsens, changes or fails to improve as anticipated. She expressed understanding with the diagnosis(es) and plan. Chantal Angus, who was evaluated through a synchronous (real-time) audio-video encounter and/or her healthcare decision maker, is aware that it is a billable service, with coverage as determined by her insurance carrier. She provided verbal consent to proceed: Yes, and patient identification was verified. It was conducted pursuant to the emergency declaration under the [address] [place] [address], [address] [place] [address] authority and the [employer] General Act. A caregiver was present when appropriate. Ability to conduct physical exam was limited. I was at home. The patient was at home.       Alejandrina Mcmahon MD

## 2022-03-15 NOTE — PROGRESS NOTES
Darrion Eagle (: 1945) is a 78 y.o. female, established patient, here for evaluation of the following chief complaint(s):   Follow-up (send link to: [phone])         Darrion Eagle, was evaluated through a synchronous (real-time) audio-video encounter. The patient (or guardian if applicable) is aware that this is a billable service, which includes applicable co-pays. Verbal consent to proceed has been obtained. The visit was conducted pursuant to the emergency declaration under the [address] [place] [address], [address] [place] [address] authority and the [employer] General Act. Patient identification was verified, and a caregiver was present when appropriate. The patient was located at home in a state where the provider was licensed to provide care. An electronic signature was used to authenticate this note.   -- Richard Vogel

## 2022-03-15 NOTE — PROGRESS NOTES
Infectious Disease follow-up        IMPRESSION:     -Emphysematous cystitis, acute pyelonephritis  Patient complains of pain in bladder, limited , sharp painspain in left side. CT abdomen/pelvis2/28  Mild left perinephric stranding, Gas within the wall of the bladder which is incompletely  Distended. Emphysematous cystitis is typically caused by E. coli, Klebsiella. Proteus, Pseudomonas, Enterococcus, Candida have also been  implicated. Diabetes mellitus, urinary obstruction are major risk factors. -ESBL Klebsiella UTI, complicated  Urine culture 2/22+ for >100,000 ESBL Klebsiella pneumoniae  Failed outpatient fosfomycin x 1 ,    Urine culture 2/28+ for >100,000 Klebsiella pneumoniae  On meropenem from 2/28. Negative blood cultures.      -Hematuria  2ry to infection    -SHIRAZ  Resolved    -Diabetes mellitus with neuropathy  A1c 5 (8/2021)    -H/o rheumatoid arthritis, sarcoid  On leflunomide, immune suppressed    -Hypertension, dyslipidemia             PLAN:        -Ertapenem 1 g IV every 24 hours planned end date was 3/15, extend until 3/22  -Weekly CBC, CMP fax reports to [phone], call with critical labs at [phone]  -Patient would require repeat imaging-CT abdomen/pelvis  -Encourage adequate fluids, daily probiotic/yogurt  -Frequent emptying of the bladder, adequate blood sugar control- D/w patient  -Plan of care discussed with patient, patient is agreeable. Possible adverse effects of long term antibiotics are inclusive of but not limited to following  BM suppression, neutropenia , cytopenias , aplastic anemia hemorrhage liver & renal dysfunction/ liver , renal failure  , GI dysfunction- N, V  Diarrhea,C.difficile disease, rash , allergy , anaphylaxis. toxic epidermal necrolysis  Neuro toxicity , seizure disorder  Side effects tend to be more pronounced in the elderly               Patient is seen on follow-up from hospital visit for emphysematous cystitis and acute pyelonephritis.   This is an audio only follow-up visit  Patient stated she is feeling a little better, but not a lot. He has intermittent sharp pains over bladder, also has pain in left side  Denies dysuria. Lab 3/8-wbc-4, hemoglobin 10, BUN/creatinine-7/0.27  Calcium 4.1, repeat on 3/10- 8.5  Patient advised that we would need to do repeat imaging prior to DC of antibiotics. Patient agreeable, voiced understanding. Pursuant to the emergency declaration under the [facility], [facility] waiver authority and the [employer] Act, this Virtual Visit was conducted, with patient's consent, to reduce the patient's risk of exposure to COVID-19 and provide continuity of care for an established patient. Services were provided through a video synchronous discussion virtually to substitute for in-person visit. Pt is  aware that this Telehealth encounter is a billable service, with coverage as determined by her insurance carrier. She is aware that she may receive a bill and has provided verbal consent to proceed: Yes. Colleen Stone is a 78 y.o.   female with past medical history significant for  hypertension, diabetes mellitus, rheumatoid arthritis who presented to hospital with chief complaints of lower abdominal pain and also blood in the urine. Patient reported that she was in  usual health until about 3 weeks ago when she started noticing blood in the urine and also lower abdominal pain. She reports abdominal pain is about 5 x 10, increases with pressure, radiating to the back and without any aggravating or relieving factors. Reports mild nausea but no vomiting. No fever. Does not report any chest pain or shortness of breath.   On arrival to ED, she was noted to have stable vitals. On labs hemoglobin is 9.8, platelet count 893. BMP shows a creatinine of 1.41.   LFTs are normal.  CT abdomen showed emphysematous cystitis, left perinephric stranding. CT report as follows  FINDINGS:   LOWER THORAX: Small hiatal hernia. Extensive coronary artery calcifications. No  consolidation  LIVER: No mass. BILIARY TREE: Gallbladder is within normal limits. CBD is not dilated. SPLEEN: within normal limits. PANCREAS: No focal abnormality. ADRENALS: Unremarkable. KIDNEYS/URETERS: Mild perinephric stranding on the left. Possible small cyst  left kidney  STOMACH: Unremarkable. SMALL BOWEL: No dilatation or wall thickening. COLON: No dilatation or wall thickening. APPENDIX: Partially visualized. The visualized portions are not inflamed. PERITONEUM: No ascites or pneumoperitoneum. RETROPERITONEUM: Severe vascular calcifications without aneurysm. No pathologic  adenopathy  REPRODUCTIVE ORGANS: Not enlarged  URINARY BLADDER: Gas within the wall of the bladder which is incompletely  distended  BONES: No destructive bone lesion. ABDOMINAL WALL: No mass or hernia. ADDITIONAL COMMENTS: N/A     IMPRESSION  Cast within the wall of the bladder compatible with emphysematous cystitis      Urine culture done on 2/22 was + for ESBL Klebsiella. Patient has been treated by PCP with fosfomycin x1, Bactrim p.o. Patient says symptoms did not improve. Repeat urine cultures done 2/28 also positive for ESBL Klebsiella. Patient has been on meropenem IV since 2/28  Patient seen today. She says she is comfortable. Denies dysuria  Some pain over bladder & going up left side of abdomen. .  Urine now clear, not bloodstained per patient.         Patient Active Problem List   Diagnosis Code    Long-term use of immunosuppressant medication Z79.899    Osteopenia of multiple sites M85.89    Vitamin D deficiency E55.9    Rheumatoid arthritis involving multiple sites ([facility] M06.9    Essential hypertension I10    Mixed hyperlipidemia E78.2    RLS (restless legs syndrome) G25.81    Mild intermittent asthma J45.20    Status post angioplasty with stent Z95.820    Coronary artery disease due to lipid rich plaque I25.10, I25.83    Seronegative rheumatoid arthritis of multiple sites (HCC) M06.09    Severe obesity (HCC) E66.01    MGUS (monoclonal gammopathy of unknown significance) D47.2    Type 2 diabetes mellitus without complication (HCC) M24.5    Complication of internal right knee prosthesis ([address] T84. 9XXA, [id number]    Emphysematous cystitis N30.80     Past Medical History:   Diagnosis Date    Asthma     Chronic pain 5/8/2020    DDD (degenerative disc disease), lumbar     Diabetes (HCC)     Gastritis     GERD (gastroesophageal reflux disease)     Glaucoma     Hearing loss 5/8/2020    Hiatal hernia     High cholesterol     Hypertension     Hypocalcemia 5/8/2020    Peripheral vascular disease (HCC)     RA (rheumatoid arthritis) (HCC)     Sarcoidosis 1999    [facility]    Sleep apnea     has not used cpap in years since weight loss      Family History   Problem Relation Age of Onset    Heart Disease Mother     Liver Disease Father     Alcohol abuse Brother     Dementia Neg Hx     Cancer Neg Hx     Seizures Neg Hx       Social History     Tobacco Use    Smoking status: Never Smoker    Smokeless tobacco: Never Used   Substance Use Topics    Alcohol use: Yes     Alcohol/week: 2.0 standard drinks     Types: 1 Glasses of wine, 1 Shots of liquor per week     Comment: 2-3 yearly     Past Surgical History:   Procedure Laterality Date    HX GASTRIC BYPASS      HX HEART CATHETERIZATION      s/p PCI with stenting in 1998     HX KNEE REPLACEMENT Bilateral     HX ORTHOPAEDIC Bilateral     carpal tunnel surgery     HX SHOULDER REPLACEMENT Right     x 2       Prior to Admission medications    Medication Sig Start Date End Date Taking? Authorizing Provider   traMADoL (ULTRAM) 50 mg tablet Take 50 mg by mouth every six (6) hours as needed for Pain. Yes Provider, Historical   mupirocin (BACTROBAN) 2 % ointment Apply 1 Tube to affected area daily.  1 application right 2nd toe 2/14/22  Yes Provider, Historical   albuterol sulfate (PROVENTIL;VENTOLIN) 2.5 mg/0.5 mL nebu nebulizer solution USE 1 VIAL IN NEBULIZER EVERY 4 HOURS AS NEEDED FOR WHEEZING 2/11/22  Yes Gregg Sanchez MD   sertraline (ZOLOFT) 50 mg tablet Take 1 Tablet by mouth daily. 10/26/21  Yes Gregg Sanchez MD   omeprazole (PRILOSEC) 20 mg capsule Take 1 Capsule by mouth daily. 10/26/21  Yes Gregg Sanchez MD   metoprolol tartrate (LOPRESSOR) 25 mg tablet Take 1 Tablet by mouth two (2) times a day. 10/26/21  Yes Gregg Sanchez MD   metFORMIN (GLUCOPHAGE) 500 mg tablet Take 1 Tablet by mouth daily (with dinner). Decreased dose 10/26/21  Yes Gregg Sanchez MD   nortriptyline (PAMELOR) 50 mg capsule Take 50 mg by mouth nightly. 5/7/18  Yes Provider, Historical   ertapenem 1 gram 1 g IVPB 1 g by IntraVENous route every twenty-four (24) hours for 10 days. Patient taking differently: 1 g by IntraVENous route every twenty-four (24) hours. in 100ml NS 3/5/22 3/15/22  Nancy Park MD   simvastatin (ZOCOR) 20 mg tablet TAKE BY MOUTH NIGHTLY. Patient taking differently: Take 20 mg by mouth nightly. TAKE BY MOUTH NIGHTLY. 10/26/21   Gregg Sanchez MD   latanoprost (XALATAN) 0.005 % ophthalmic solution INSTILL 1 DROP INTO BOTH EYES NIGHTLY  Patient taking differently: Administer 1 Drop to both eyes nightly. INSTILL 1 DROP INTO BOTH EYES NIGHTLY 10/26/21   Gregg Sancehz MD   gabapentin (NEURONTIN) 300 mg capsule TAKE 1 CAPSULE IN THE MORNING AND TAKE 3 CAPSULES BEFORE BEDTIME  Patient taking differently: Take 300 mg by mouth daily. TAKE 1 CAPSULE IN THE MORNING AND TAKE 3 CAPSULES BEFORE BEDTIME 10/26/21   Gregg Sanchez MD   ergocalciferol (ERGOCALCIFEROL) 1,250 mcg (50,000 unit) capsule Take 1 Capsule by mouth every seven (7) days. Indications: vitamin D deficiency (high dose therapy)  Patient taking differently: Take 50,000 Units by mouth every seven (7) days.  saturdays  Indications: vitamin D deficiency (high dose therapy) 10/26/21   Patrick Grady MD   fluticasone furoate-vilanteroL (Breo Ellipta) 100-25 mcg/dose inhaler TAKE 1 PUFF BY MOUTH EVERY DAY  Patient taking differently: Take 1 Puff by inhalation daily. TAKE 1 PUFF BY MOUTH EVERY DAY 10/26/21   Patrick Grady MD   leflunomide (ARAVA) 20 mg tablet TAKE 1 TABLET BY MOUTH EVERY DAY  Patient taking differently: Take 20 mg by mouth daily. TAKE 1 TABLET BY MOUTH EVERY DAY 8/31/21   Vinayak Cunningham MD     Allergies   Allergen Reactions    Lisinopril Rash    Methotrexate Hives        Review of Systems:  A comprehensive review of systems was negative except for that written in the History of Present Illness. 14 point review of systems obtained . All other systems negative    Objective: There were no vitals taken for this visit. Current Outpatient Medications   Medication Sig    traMADoL (ULTRAM) 50 mg tablet Take 50 mg by mouth every six (6) hours as needed for Pain.  mupirocin (BACTROBAN) 2 % ointment Apply 1 Tube to affected area daily. 1 application right 2nd toe    albuterol sulfate (PROVENTIL;VENTOLIN) 2.5 mg/0.5 mL nebu nebulizer solution USE 1 VIAL IN NEBULIZER EVERY 4 HOURS AS NEEDED FOR WHEEZING    sertraline (ZOLOFT) 50 mg tablet Take 1 Tablet by mouth daily.  omeprazole (PRILOSEC) 20 mg capsule Take 1 Capsule by mouth daily.  metoprolol tartrate (LOPRESSOR) 25 mg tablet Take 1 Tablet by mouth two (2) times a day.  metFORMIN (GLUCOPHAGE) 500 mg tablet Take 1 Tablet by mouth daily (with dinner). Decreased dose    nortriptyline (PAMELOR) 50 mg capsule Take 50 mg by mouth nightly.  ertapenem 1 gram 1 g IVPB 1 g by IntraVENous route every twenty-four (24) hours for 10 days. (Patient taking differently: 1 g by IntraVENous route every twenty-four (24) hours. in 100ml NS)    simvastatin (ZOCOR) 20 mg tablet TAKE BY MOUTH NIGHTLY. (Patient taking differently: Take 20 mg by mouth nightly. TAKE BY MOUTH NIGHTLY. )  latanoprost (XALATAN) 0.005 % ophthalmic solution INSTILL 1 DROP INTO BOTH EYES NIGHTLY (Patient taking differently: Administer 1 Drop to both eyes nightly. INSTILL 1 DROP INTO BOTH EYES NIGHTLY)    gabapentin (NEURONTIN) 300 mg capsule TAKE 1 CAPSULE IN THE MORNING AND TAKE 3 CAPSULES BEFORE BEDTIME (Patient taking differently: Take 300 mg by mouth daily. TAKE 1 CAPSULE IN THE MORNING AND TAKE 3 CAPSULES BEFORE BEDTIME)    ergocalciferol (ERGOCALCIFEROL) 1,250 mcg (50,000 unit) capsule Take 1 Capsule by mouth every seven (7) days. Indications: vitamin D deficiency (high dose therapy) (Patient taking differently: Take 50,000 Units by mouth every seven (7) days. saturdays  Indications: vitamin D deficiency (high dose therapy))    fluticasone furoate-vilanteroL (Breo Ellipta) 100-25 mcg/dose inhaler TAKE 1 PUFF BY MOUTH EVERY DAY (Patient taking differently: Take 1 Puff by inhalation daily. TAKE 1 PUFF BY MOUTH EVERY DAY)    leflunomide (ARAVA) 20 mg tablet TAKE 1 TABLET BY MOUTH EVERY DAY (Patient taking differently: Take 20 mg by mouth daily. TAKE 1 TABLET BY MOUTH EVERY DAY)     No current facility-administered medications for this visit. Exam: No exam          Data Reviewed:   See above. Lab Results   Component Value Date/Time    Culture result: NO GROWTH 5 DAYS 02/28/2022 10:30 PM    Culture result: (A) 02/28/2022 04:54 PM     KLEBSIELLA PNEUMONIAE ** (EXTENDED SPECTRUM BETA LACTAMASE ) **    Culture result:  02/28/2022 04:54 PM     (NOTE) ESBL RESULT CALLED TO MEGAN REAVES,AT 1410 ON 3/2/22. RF    Culture result: (A) 02/22/2022 10:13 AM     KLEBSIELLA PNEUMONIAE ** (EXTENDED SPECTRUM BETA LACTAMASE ) ** ** (EXTENDED SPECTRUM BETA LACTAMASE ) **    Culture result:  02/22/2022 10:13 AM     (NOTE) ESBL CALLED TO Sejal Barbour MD AT 1976 ON 02/26/22 BY ESBL CALLED TO AND READ BACK BY Tin Awad MD AT 1292 ON 02/26/22 BY MD.          XR Results (most recent)reviewed:  Results from [place] encounter on 02/28/22    XR FOOT RT AP/LAT    Narrative  EXAM: XR FOOT RT AP/LAT    INDICATION: foot swelling. COMPARISON: None. FINDINGS: Two views of the right foot demonstrate no fracture or other acute  osseous or articular abnormality. Patient status post amputation of the distal  first digit at the level of the head of the proximal phalanx. The soft tissues  of the edematous but otherwise are within normal limits. There is degenerative  midfoot osteoarthritic change and diffuse osteopenia. Small vessel vascular  calcifications are noted. Impression  Broad soft tissue swelling with no evident acute bony abnormality. Chronic changes as above. Antibiotic History    I have discussed the diagnosis with the patient and the intended plan as seen in the above orders. I have discussed medication side effects and warnings with the patient as well. Reviewed test results at length with patient.   Total time 24 minutes    Signed By: Mathew Basilio MD FACP     March 15, 2022

## 2022-03-15 NOTE — PROGRESS NOTES
Chief Complaint   Patient presents with   [facility] Follow Up     Admitted 2/28/22 Discharged 3/7/22   1. Have you been to the ER, urgent care clinic since your last visit? Hospitalized since your last visit? Yes When: ED [facility]    2. Have you seen or consulted any other health care providers outside of the [address] [place] [address] since your last visit? Include any pap smears or colon screening.  No     Fell yesterday when out to store

## 2022-03-16 NOTE — PATIENT INSTRUCTIONS
[employer] Activation    Thank you for requesting access to [employer]. Please follow the instructions below to securely access and download your online medical record. [employer]To[employer] allows you to send messages to your doctor, view your test results, renew your prescriptions, schedule appointments, and more. How Do I Sign Up? 1. In your internet browser, go to [email or web address]. 2. Click on the First Time User? Click Here link in the Sign In box. You will see the New Member Sign Up page. 3. Enter your [employer] Access Code exactly as it appears below. You will not need to use this code after youve completed the sign-up process. If you do not sign up before the expiration date, you must request a new code. [employer]To[employer] Access Code: Activation code not generated  Current SwipeTo[employer] Status: Active (This is the date your SwipeTo[employer] access code will )    4. Enter the last four digits of your Social Security Number (xxxx) and Date of Birth (mm/dd/yyyy) as indicated and click Submit. You will be taken to the next sign-up page. 5. Create a SwipeToSpin ID. This will be your [employer] login ID and cannot be changed, so think of one that is secure and easy to remember. 6. Create a [employer] password. You can change your password at any time. 7. Enter your Password Reset Question and Answer. This can be used at a later time if you forget your password. 8. Enter your e-mail address. You will receive e-mail notification when new information is available in [address] 9. Click Sign Up. You can now view and download portions of your medical record. 10. Click the Download Summary menu link to download a portable copy of your medical information. Additional Information    If you have questions, please visit the Frequently Asked Questions section of the [employer] website at [email or web address] Remember, [employer] is NOT to be used for urgent needs. For medical emergencies, dial 911.

## 2022-03-17 ENCOUNTER — HOME CARE VISIT (OUTPATIENT)
Dept: SCHEDULING | Facility: HOME HEALTH | Age: 77
End: 2022-03-17
Payer: MEDICARE

## 2022-03-17 VITALS
HEART RATE: 64 BPM | RESPIRATION RATE: 18 BRPM | TEMPERATURE: 98 F | DIASTOLIC BLOOD PRESSURE: 72 MMHG | OXYGEN SATURATION: 96 % | SYSTOLIC BLOOD PRESSURE: 128 MMHG

## 2022-03-17 PROCEDURE — G0300 HHS/HOSPICE OF LPN EA 15 MIN: HCPCS

## 2022-03-18 PROBLEM — E11.9 TYPE 2 DIABETES MELLITUS WITHOUT COMPLICATION (HCC): Status: ACTIVE | Noted: 2020-05-08

## 2022-03-18 PROBLEM — E78.2 MIXED HYPERLIPIDEMIA: Status: ACTIVE | Noted: 2018-09-28

## 2022-03-19 PROBLEM — T84.9XXA COMPLICATION OF INTERNAL RIGHT KNEE PROSTHESIS (HCC): Status: ACTIVE | Noted: 2021-11-09

## 2022-03-19 PROBLEM — M85.89 OSTEOPENIA OF MULTIPLE SITES: Status: ACTIVE | Noted: 2018-08-22

## 2022-03-19 PROBLEM — I25.10 CORONARY ARTERY DISEASE DUE TO LIPID RICH PLAQUE: Status: ACTIVE | Noted: 2018-09-28

## 2022-03-19 PROBLEM — M06.9 RHEUMATOID ARTHRITIS INVOLVING MULTIPLE SITES (HCC): Status: ACTIVE | Noted: 2018-09-28

## 2022-03-19 PROBLEM — N30.80 EMPHYSEMATOUS CYSTITIS: Status: ACTIVE | Noted: 2022-02-28

## 2022-03-19 PROBLEM — M06.09 SERONEGATIVE RHEUMATOID ARTHRITIS OF MULTIPLE SITES (HCC): Status: ACTIVE | Noted: 2019-02-18

## 2022-03-19 PROBLEM — E55.9 VITAMIN D DEFICIENCY: Status: ACTIVE | Noted: 2018-08-22

## 2022-03-19 PROBLEM — D47.2 MGUS (MONOCLONAL GAMMOPATHY OF UNKNOWN SIGNIFICANCE): Status: ACTIVE | Noted: 2019-11-22

## 2022-03-19 PROBLEM — I10 ESSENTIAL HYPERTENSION: Status: ACTIVE | Noted: 2018-09-28

## 2022-03-19 PROBLEM — I25.83 CORONARY ARTERY DISEASE DUE TO LIPID RICH PLAQUE: Status: ACTIVE | Noted: 2018-09-28

## 2022-03-19 PROBLEM — J45.20 MILD INTERMITTENT ASTHMA: Status: ACTIVE | Noted: 2018-09-28

## 2022-03-19 PROBLEM — Z96.651 COMPLICATION OF INTERNAL RIGHT KNEE PROSTHESIS (HCC): Status: ACTIVE | Noted: 2021-11-09

## 2022-03-20 PROBLEM — G25.81 RLS (RESTLESS LEGS SYNDROME): Status: ACTIVE | Noted: 2018-09-28

## 2022-03-20 PROBLEM — Z95.820 STATUS POST ANGIOPLASTY WITH STENT: Status: ACTIVE | Noted: 2018-09-28

## 2022-03-20 PROBLEM — Z79.60 LONG-TERM USE OF IMMUNOSUPPRESSANT MEDICATION: Status: ACTIVE | Noted: 2018-08-22

## 2022-03-20 PROBLEM — E66.01 SEVERE OBESITY (HCC): Status: ACTIVE | Noted: 2019-05-07

## 2022-03-21 ENCOUNTER — TELEPHONE (OUTPATIENT)
Dept: INFECTIOUS DISEASES | Age: 77
End: 2022-03-21

## 2022-03-21 ENCOUNTER — HOME CARE VISIT (OUTPATIENT)
Dept: HOME HEALTH SERVICES | Facility: HOME HEALTH | Age: 77
End: 2022-03-21
Payer: MEDICARE

## 2022-03-21 ENCOUNTER — HOME CARE VISIT (OUTPATIENT)
Dept: SCHEDULING | Facility: HOME HEALTH | Age: 77
End: 2022-03-21
Payer: MEDICARE

## 2022-03-21 VITALS
DIASTOLIC BLOOD PRESSURE: 60 MMHG | HEART RATE: 90 BPM | OXYGEN SATURATION: 99 % | SYSTOLIC BLOOD PRESSURE: 113 MMHG | RESPIRATION RATE: 18 BRPM | TEMPERATURE: 97.6 F

## 2022-03-21 VITALS
TEMPERATURE: 97 F | OXYGEN SATURATION: 96 % | RESPIRATION RATE: 18 BRPM | HEART RATE: 72 BPM | DIASTOLIC BLOOD PRESSURE: 60 MMHG | SYSTOLIC BLOOD PRESSURE: 102 MMHG

## 2022-03-21 PROCEDURE — G0299 HHS/HOSPICE OF RN EA 15 MIN: HCPCS

## 2022-03-21 NOTE — TELEPHONE ENCOUNTER
Please inform home [facility] ( [address] [phone]) to extend treatment end date  3/28/22  ( was 3/22/2022)  Home health needs to fax us her recent labs. Please call and remind patient that she needs to have her CT scan done.

## 2022-03-22 ENCOUNTER — TELEPHONE (OUTPATIENT)
Dept: FAMILY MEDICINE CLINIC | Age: 77
End: 2022-03-22

## 2022-03-22 ENCOUNTER — DOCUMENTATION ONLY (OUTPATIENT)
Dept: FAMILY MEDICINE CLINIC | Age: 77
End: 2022-03-22

## 2022-03-22 LAB
BASOPHILS # BLD AUTO: 0.1 X10E3/UL (ref 0–0.2)
BASOPHILS NFR BLD AUTO: 2 %
EOSINOPHIL # BLD AUTO: 0.1 X10E3/UL (ref 0–0.4)
EOSINOPHIL NFR BLD AUTO: 3 %
ERYTHROCYTE [DISTWIDTH] IN BLOOD BY AUTOMATED COUNT: 13.5 % (ref 11.7–15.4)
HCT VFR BLD AUTO: 31.9 % (ref 34–46.6)
HGB BLD-MCNC: 10.3 G/DL (ref 11.1–15.9)
IMM GRANULOCYTES # BLD AUTO: 0 X10E3/UL (ref 0–0.1)
IMM GRANULOCYTES NFR BLD AUTO: 0 %
LYMPHOCYTES # BLD AUTO: 1 X10E3/UL (ref 0.7–3.1)
LYMPHOCYTES NFR BLD AUTO: 23 %
MCH RBC QN AUTO: 32.7 PG (ref 26.6–33)
MCHC RBC AUTO-ENTMCNC: 32.3 G/DL (ref 31.5–35.7)
MCV RBC AUTO: 101 FL (ref 79–97)
MONOCYTES # BLD AUTO: 0.5 X10E3/UL (ref 0.1–0.9)
MONOCYTES NFR BLD AUTO: 12 %
NEUTROPHILS # BLD AUTO: 2.4 X10E3/UL (ref 1.4–7)
NEUTROPHILS NFR BLD AUTO: 60 %
PLATELET # BLD AUTO: 260 X10E3/UL (ref 150–450)
RBC # BLD AUTO: 3.15 X10E6/UL (ref 3.77–5.28)
WBC # BLD AUTO: 4.1 X10E3/UL (ref 3.4–10.8)

## 2022-03-22 NOTE — PROGRESS NOTES
Called patient and given scheduling number to make appointment for CT scan. She was explained importance of having test completed. Patient verbalize understanding and states she will call and schedule appointment.

## 2022-03-22 NOTE — TELEPHONE ENCOUNTER
Maura Beatty is a 78 y.o. female,patient call  voice message left to remind her to get CT scan done on her abdomen. [address] also called ,spoke to [facility] to extend IV treatment until 3/28/22 and weekly labs also. I am waiting for a call from [facility] who draws blood to fax over most recent labs.

## 2022-03-24 ENCOUNTER — APPOINTMENT (OUTPATIENT)
Dept: GENERAL RADIOLOGY | Age: 77
End: 2022-03-24
Attending: EMERGENCY MEDICINE
Payer: MEDICARE

## 2022-03-24 ENCOUNTER — HOSPITAL ENCOUNTER (OUTPATIENT)
Age: 77
Setting detail: OBSERVATION
Discharge: NURSING, REHAB OR LONG-TERM CARE | End: 2022-04-01
Attending: EMERGENCY MEDICINE | Admitting: INTERNAL MEDICINE
Payer: MEDICARE

## 2022-03-24 DIAGNOSIS — R41.3 MEMORY LOSS: ICD-10-CM

## 2022-03-24 DIAGNOSIS — N30.91 HEMATURIA DUE TO CYSTITIS: ICD-10-CM

## 2022-03-24 DIAGNOSIS — S80.01XA CONTUSION OF RIGHT KNEE, INITIAL ENCOUNTER: Primary | ICD-10-CM

## 2022-03-24 DIAGNOSIS — N10 ACUTE PYELONEPHRITIS: ICD-10-CM

## 2022-03-24 DIAGNOSIS — E53.8 VITAMIN B12 DEFICIENCY: ICD-10-CM

## 2022-03-24 DIAGNOSIS — G62.9 NEUROPATHY: ICD-10-CM

## 2022-03-24 DIAGNOSIS — D84.9 IMMUNE DEFICIENCY DISORDER (HCC): ICD-10-CM

## 2022-03-24 DIAGNOSIS — R26.2 INABILITY TO WALK: ICD-10-CM

## 2022-03-24 DIAGNOSIS — N30.80 EMPHYSEMATOUS CYSTITIS: ICD-10-CM

## 2022-03-24 DIAGNOSIS — B96.89 URINARY TRACT INFECTION DUE TO ESBL KLEBSIELLA: ICD-10-CM

## 2022-03-24 DIAGNOSIS — I10 ESSENTIAL HYPERTENSION: ICD-10-CM

## 2022-03-24 DIAGNOSIS — M54.16 LUMBAR RADICULOPATHY: ICD-10-CM

## 2022-03-24 DIAGNOSIS — G25.81 RLS (RESTLESS LEGS SYNDROME): ICD-10-CM

## 2022-03-24 DIAGNOSIS — R44.3 HALLUCINATION: ICD-10-CM

## 2022-03-24 DIAGNOSIS — M06.9 RHEUMATOID ARTHRITIS INVOLVING MULTIPLE SITES, UNSPECIFIED WHETHER RHEUMATOID FACTOR PRESENT (HCC): ICD-10-CM

## 2022-03-24 DIAGNOSIS — N39.0 URINARY TRACT INFECTION DUE TO ESBL KLEBSIELLA: ICD-10-CM

## 2022-03-24 DIAGNOSIS — E11.40 CONTROLLED TYPE 2 DIABETES WITH NEUROPATHY (HCC): ICD-10-CM

## 2022-03-24 DIAGNOSIS — N17.9 AKI (ACUTE KIDNEY INJURY) (HCC): ICD-10-CM

## 2022-03-24 LAB
ALBUMIN SERPL-MCNC: 3.2 G/DL (ref 3.5–5)
ALBUMIN/GLOB SERPL: 0.8 {RATIO} (ref 1.1–2.2)
ALP SERPL-CCNC: 84 U/L (ref 45–117)
ALT SERPL-CCNC: 19 U/L (ref 12–78)
ANION GAP SERPL CALC-SCNC: 5 MMOL/L (ref 5–15)
APPEARANCE UR: CLEAR
AST SERPL-CCNC: 44 U/L (ref 15–37)
BACTERIA URNS QL MICRO: NEGATIVE /HPF
BASOPHILS # BLD: 0.1 K/UL (ref 0–0.1)
BASOPHILS NFR BLD: 1 % (ref 0–1)
BILIRUB SERPL-MCNC: 0.7 MG/DL (ref 0.2–1)
BILIRUB UR QL CFM: NEGATIVE
BUN SERPL-MCNC: 31 MG/DL (ref 6–20)
BUN/CREAT SERPL: 23 (ref 12–20)
CALCIUM SERPL-MCNC: 8.8 MG/DL (ref 8.5–10.1)
CHLORIDE SERPL-SCNC: 109 MMOL/L (ref 97–108)
CO2 SERPL-SCNC: 27 MMOL/L (ref 21–32)
COLOR UR: ABNORMAL
CREAT SERPL-MCNC: 1.34 MG/DL (ref 0.55–1.02)
DIFFERENTIAL METHOD BLD: ABNORMAL
EOSINOPHIL # BLD: 0 K/UL (ref 0–0.4)
EOSINOPHIL NFR BLD: 1 % (ref 0–7)
EPITH CASTS URNS QL MICRO: ABNORMAL /LPF
ERYTHROCYTE [DISTWIDTH] IN BLOOD BY AUTOMATED COUNT: 13.2 % (ref 11.5–14.5)
GLOBULIN SER CALC-MCNC: 3.8 G/DL (ref 2–4)
GLUCOSE SERPL-MCNC: 80 MG/DL (ref 65–100)
GLUCOSE UR STRIP.AUTO-MCNC: NEGATIVE MG/DL
HCT VFR BLD AUTO: 31.3 % (ref 35–47)
HGB BLD-MCNC: 10.1 G/DL (ref 11.5–16)
HGB UR QL STRIP: NEGATIVE
HYALINE CASTS URNS QL MICRO: ABNORMAL /LPF (ref 0–5)
IMM GRANULOCYTES # BLD AUTO: 0 K/UL (ref 0–0.04)
IMM GRANULOCYTES NFR BLD AUTO: 1 % (ref 0–0.5)
KETONES UR QL STRIP.AUTO: NEGATIVE MG/DL
LEUKOCYTE ESTERASE UR QL STRIP.AUTO: NEGATIVE
LYMPHOCYTES # BLD: 0.9 K/UL (ref 0.8–3.5)
LYMPHOCYTES NFR BLD: 13 % (ref 12–49)
MCH RBC QN AUTO: 31.9 PG (ref 26–34)
MCHC RBC AUTO-ENTMCNC: 32.3 G/DL (ref 30–36.5)
MCV RBC AUTO: 98.7 FL (ref 80–99)
MONOCYTES # BLD: 0.6 K/UL (ref 0–1)
MONOCYTES NFR BLD: 10 % (ref 5–13)
NEUTS SEG # BLD: 5 K/UL (ref 1.8–8)
NEUTS SEG NFR BLD: 74 % (ref 32–75)
NITRITE UR QL STRIP.AUTO: NEGATIVE
NRBC # BLD: 0 K/UL (ref 0–0.01)
NRBC BLD-RTO: 0 PER 100 WBC
PH UR STRIP: 5.5 [PH] (ref 5–8)
PLATELET # BLD AUTO: 261 K/UL (ref 150–400)
PMV BLD AUTO: 10 FL (ref 8.9–12.9)
POTASSIUM SERPL-SCNC: 4.7 MMOL/L (ref 3.5–5.1)
PROT SERPL-MCNC: 7 G/DL (ref 6.4–8.2)
PROT UR STRIP-MCNC: NEGATIVE MG/DL
RBC # BLD AUTO: 3.17 M/UL (ref 3.8–5.2)
RBC #/AREA URNS HPF: ABNORMAL /HPF (ref 0–5)
SODIUM SERPL-SCNC: 141 MMOL/L (ref 136–145)
SP GR UR REFRACTOMETRY: 1.02 (ref 1–1.03)
SPECIMEN STATUS REPORT, ROLRST: NORMAL
TROPONIN-HIGH SENSITIVITY: 18 NG/L (ref 0–51)
UA: UC IF INDICATED,UAUC: ABNORMAL
UROBILINOGEN UR QL STRIP.AUTO: 0.2 EU/DL (ref 0.2–1)
WBC # BLD AUTO: 6.6 K/UL (ref 3.6–11)
WBC URNS QL MICRO: ABNORMAL /HPF (ref 0–4)

## 2022-03-24 PROCEDURE — 93005 ELECTROCARDIOGRAM TRACING: CPT

## 2022-03-24 PROCEDURE — 84484 ASSAY OF TROPONIN QUANT: CPT

## 2022-03-24 PROCEDURE — 73560 X-RAY EXAM OF KNEE 1 OR 2: CPT

## 2022-03-24 PROCEDURE — 81001 URINALYSIS AUTO W/SCOPE: CPT

## 2022-03-24 PROCEDURE — 80053 COMPREHEN METABOLIC PANEL: CPT

## 2022-03-24 PROCEDURE — 73562 X-RAY EXAM OF KNEE 3: CPT

## 2022-03-24 PROCEDURE — 74011250637 HC RX REV CODE- 250/637: Performed by: EMERGENCY MEDICINE

## 2022-03-24 PROCEDURE — 36415 COLL VENOUS BLD VENIPUNCTURE: CPT

## 2022-03-24 PROCEDURE — 73620 X-RAY EXAM OF FOOT: CPT

## 2022-03-24 PROCEDURE — 71045 X-RAY EXAM CHEST 1 VIEW: CPT

## 2022-03-24 PROCEDURE — 73600 X-RAY EXAM OF ANKLE: CPT

## 2022-03-24 PROCEDURE — 99285 EMERGENCY DEPT VISIT HI MDM: CPT

## 2022-03-24 PROCEDURE — 74011250637 HC RX REV CODE- 250/637: Performed by: INTERNAL MEDICINE

## 2022-03-24 PROCEDURE — 85025 COMPLETE CBC W/AUTO DIFF WBC: CPT

## 2022-03-24 PROCEDURE — 74011250636 HC RX REV CODE- 250/636: Performed by: EMERGENCY MEDICINE

## 2022-03-24 PROCEDURE — G0378 HOSPITAL OBSERVATION PER HR: HCPCS

## 2022-03-24 RX ORDER — NORTRIPTYLINE HYDROCHLORIDE 25 MG/1
50 CAPSULE ORAL
Status: DISCONTINUED | OUTPATIENT
Start: 2022-03-24 | End: 2022-04-01 | Stop reason: HOSPADM

## 2022-03-24 RX ORDER — METOPROLOL TARTRATE 25 MG/1
25 TABLET, FILM COATED ORAL 2 TIMES DAILY
Status: DISCONTINUED | OUTPATIENT
Start: 2022-03-24 | End: 2022-04-01 | Stop reason: HOSPADM

## 2022-03-24 RX ORDER — LATANOPROST 50 UG/ML
1 SOLUTION/ DROPS OPHTHALMIC
Status: DISCONTINUED | OUTPATIENT
Start: 2022-03-24 | End: 2022-04-01 | Stop reason: HOSPADM

## 2022-03-24 RX ORDER — ACETAMINOPHEN 325 MG/1
650 TABLET ORAL
Status: DISCONTINUED | OUTPATIENT
Start: 2022-03-24 | End: 2022-04-01 | Stop reason: HOSPADM

## 2022-03-24 RX ORDER — ALBUTEROL SULFATE 0.83 MG/ML
2.5 SOLUTION RESPIRATORY (INHALATION)
Status: DISCONTINUED | OUTPATIENT
Start: 2022-03-24 | End: 2022-04-01 | Stop reason: HOSPADM

## 2022-03-24 RX ORDER — GABAPENTIN 300 MG/1
300 CAPSULE ORAL DAILY
Status: DISCONTINUED | OUTPATIENT
Start: 2022-03-25 | End: 2022-04-01 | Stop reason: HOSPADM

## 2022-03-24 RX ORDER — IBUPROFEN 400 MG/1
800 TABLET ORAL ONCE
Status: COMPLETED | OUTPATIENT
Start: 2022-03-24 | End: 2022-03-24

## 2022-03-24 RX ORDER — ALBUTEROL SULFATE 2.5 MG/.5ML
2.5 SOLUTION RESPIRATORY (INHALATION)
Status: DISCONTINUED | OUTPATIENT
Start: 2022-03-24 | End: 2022-03-24 | Stop reason: CLARIF

## 2022-03-24 RX ORDER — SERTRALINE HYDROCHLORIDE 50 MG/1
50 TABLET, FILM COATED ORAL DAILY
Status: DISCONTINUED | OUTPATIENT
Start: 2022-03-25 | End: 2022-04-01 | Stop reason: HOSPADM

## 2022-03-24 RX ORDER — HYDROCODONE BITARTRATE AND ACETAMINOPHEN 7.5; 325 MG/1; MG/1
1 TABLET ORAL ONCE
Status: COMPLETED | OUTPATIENT
Start: 2022-03-24 | End: 2022-03-24

## 2022-03-24 RX ORDER — OXYCODONE AND ACETAMINOPHEN 5; 325 MG/1; MG/1
1 TABLET ORAL
Status: DISCONTINUED | OUTPATIENT
Start: 2022-03-24 | End: 2022-04-01 | Stop reason: HOSPADM

## 2022-03-24 RX ORDER — METFORMIN HYDROCHLORIDE 500 MG/1
500 TABLET ORAL
Status: DISCONTINUED | OUTPATIENT
Start: 2022-03-24 | End: 2022-03-27

## 2022-03-24 RX ORDER — PANTOPRAZOLE SODIUM 40 MG/1
40 TABLET, DELAYED RELEASE ORAL
Status: DISCONTINUED | OUTPATIENT
Start: 2022-03-25 | End: 2022-04-01 | Stop reason: HOSPADM

## 2022-03-24 RX ORDER — HYDROCODONE BITARTRATE AND ACETAMINOPHEN 5; 325 MG/1; MG/1
1 TABLET ORAL ONCE
Status: DISCONTINUED | OUTPATIENT
Start: 2022-03-24 | End: 2022-03-24

## 2022-03-24 RX ORDER — LEFLUNOMIDE 10 MG/1
20 TABLET ORAL DAILY
Status: DISCONTINUED | OUTPATIENT
Start: 2022-03-25 | End: 2022-04-01 | Stop reason: HOSPADM

## 2022-03-24 RX ORDER — ATORVASTATIN CALCIUM 10 MG/1
10 TABLET, FILM COATED ORAL DAILY
Status: DISCONTINUED | OUTPATIENT
Start: 2022-03-25 | End: 2022-04-01 | Stop reason: HOSPADM

## 2022-03-24 RX ORDER — ACETAMINOPHEN 325 MG/1
650 TABLET ORAL ONCE
Status: COMPLETED | OUTPATIENT
Start: 2022-03-24 | End: 2022-03-24

## 2022-03-24 RX ADMIN — METOPROLOL TARTRATE 25 MG: 25 TABLET, FILM COATED ORAL at 21:38

## 2022-03-24 RX ADMIN — SODIUM CHLORIDE 500 ML: 9 INJECTION, SOLUTION INTRAVENOUS at 18:04

## 2022-03-24 RX ADMIN — HYDROCODONE BITARTRATE AND ACETAMINOPHEN 1 TABLET: 7.5; 325 TABLET ORAL at 17:23

## 2022-03-24 RX ADMIN — NORTRIPTYLINE HYDROCHLORIDE 50 MG: 25 CAPSULE ORAL at 22:49

## 2022-03-24 RX ADMIN — ACETAMINOPHEN 650 MG: 325 TABLET ORAL at 14:18

## 2022-03-24 RX ADMIN — IBUPROFEN 800 MG: 400 TABLET ORAL at 14:18

## 2022-03-24 NOTE — ED NOTES
Bedside and Verbal shift change report given to [employer] (oncoming nurse) by Nicole Washington (offgoing nurse). Report included the following information SBAR, Kardex, ED Summary and MAR.

## 2022-03-24 NOTE — ED PROVIDER NOTES
EMERGENCY DEPARTMENT HISTORY AND PHYSICAL EXAM      Date: 3/24/2022  Patient Name: Rqauel Licea  Patient Age and Sex: 78 y.o. female     History of Presenting Illness     Chief Complaint   Patient presents with    Knee Pain    Ankle Pain    Dizziness       History Provided By: Patient    HPI: Raquel Licea is a 46-year-old history RA, chronic pain presenting with knee pain following fall. Patient states she was getting out of bed when she fell out with ground striking her right knee. This occurred approximately 1 week ago. She had a subsequent twisting injury to her left knee within the past 24 hours. Pain is relatively severe, right-sided. He has been able to ambulate with some difficulty. Reports no dizziness no near syncope no syncope no lightheadedness. Only had bilateral knee pain. No fever chills nausea or vomiting. There are no other complaints, changes, or physical findings at this time. PCP: Kenya Morton MD    No current facility-administered medications on file prior to encounter. Current Outpatient Medications on File Prior to Encounter   Medication Sig Dispense Refill    0.9% sodium chloride solution 10 mL by IntraVENous route daily. before and after IV antibiotic using SASH method      heparin sod,porcine/0.9 % NaCl (HEPARIN FLUSH IV) 5 mL by IntraVENous route daily. after antibiotic using SASH method      ertapenem (INVANZ) 1 gram injection 1 g by IntraVENous route daily.  traMADoL (ULTRAM) 50 mg tablet Take 50 mg by mouth every six (6) hours as needed for Pain.  mupirocin (BACTROBAN) 2 % ointment Apply 1 Tube to affected area daily. 1 application right 2nd toe      albuterol sulfate (PROVENTIL;VENTOLIN) 2.5 mg/0.5 mL nebu nebulizer solution USE 1 VIAL IN NEBULIZER EVERY 4 HOURS AS NEEDED FOR WHEEZING 30 Nebule 2    simvastatin (ZOCOR) 20 mg tablet TAKE BY MOUTH NIGHTLY. (Patient taking differently: Take 20 mg by mouth nightly.  TAKE BY MOUTH NIGHTLY. ) 90 Tablet 1    sertraline (ZOLOFT) 50 mg tablet Take 1 Tablet by mouth daily. 90 Tablet 1    omeprazole (PRILOSEC) 20 mg capsule Take 1 Capsule by mouth daily. 90 Capsule 1    metoprolol tartrate (LOPRESSOR) 25 mg tablet Take 1 Tablet by mouth two (2) times a day. 180 Tablet 1    metFORMIN (GLUCOPHAGE) 500 mg tablet Take 1 Tablet by mouth daily (with dinner). Decreased dose (Patient not taking: Reported on 3/17/2022) 90 Tablet 1    latanoprost (XALATAN) 0.005 % ophthalmic solution INSTILL 1 DROP INTO BOTH EYES NIGHTLY (Patient taking differently: Administer 1 Drop to both eyes nightly. INSTILL 1 DROP INTO BOTH EYES NIGHTLY) 2.5 mL 1    gabapentin (NEURONTIN) 300 mg capsule TAKE 1 CAPSULE IN THE MORNING AND TAKE 3 CAPSULES BEFORE BEDTIME (Patient taking differently: Take 300 mg by mouth daily. TAKE 1 CAPSULE IN THE MORNING AND TAKE 3 CAPSULES BEFORE BEDTIME) 120 Capsule 2    ergocalciferol (ERGOCALCIFEROL) 1,250 mcg (50,000 unit) capsule Take 1 Capsule by mouth every seven (7) days. Indications: vitamin D deficiency (high dose therapy) (Patient taking differently: Take 50,000 Units by mouth every seven (7) days. saturdays  Indications: vitamin D deficiency (high dose therapy)) 12 Capsule 3    fluticasone furoate-vilanteroL (Breo Ellipta) 100-25 mcg/dose inhaler TAKE 1 PUFF BY MOUTH EVERY DAY (Patient taking differently: Take 1 Puff by inhalation daily. TAKE 1 PUFF BY MOUTH EVERY DAY) 1 Each 3    leflunomide (ARAVA) 20 mg tablet TAKE 1 TABLET BY MOUTH EVERY DAY (Patient taking differently: Take 20 mg by mouth daily. TAKE 1 TABLET BY MOUTH EVERY DAY) 90 Tablet 1    nortriptyline (PAMELOR) 50 mg capsule Take 50 mg by mouth nightly.          Past History     Past Medical History:  Past Medical History:   Diagnosis Date    Asthma     Chronic pain 5/8/2020    DDD (degenerative disc disease), lumbar     Diabetes ([facility] [address]     Gastritis     GERD (gastroesophageal reflux disease)     Glaucoma     Hearing loss 5/8/2020    Hiatal hernia     High cholesterol     Hypertension     Hypocalcemia 5/8/2020    Peripheral vascular disease (HCC)     RA (rheumatoid arthritis) (HCC)     Sarcoidosis 1999    [facility]    Sleep apnea     has not used cpap in years since weight loss       Past Surgical History:  Past Surgical History:   Procedure Laterality Date    HX GASTRIC BYPASS      HX HEART CATHETERIZATION      s/p PCI with stenting in 1998     HX KNEE REPLACEMENT Bilateral     HX ORTHOPAEDIC Bilateral     carpal tunnel surgery     HX SHOULDER REPLACEMENT Right     x 2        Family History:  Family History   Problem Relation Age of Onset    Heart Disease Mother     Liver Disease Father     Alcohol abuse Brother     Dementia Neg Hx     Cancer Neg Hx     Seizures Neg Hx        Social History:  Social History     Tobacco Use    Smoking status: Never Smoker    Smokeless tobacco: Never Used   Vaping Use    Vaping Use: Never used   Substance Use Topics    Alcohol use: Yes     Alcohol/week: 2.0 standard drinks     Types: 1 Glasses of wine, 1 Shots of liquor per week     Comment: 2-3 yearly    Drug use: No       Allergies: Allergies   Allergen Reactions    Lisinopril Rash    Methotrexate Hives       Review of Systems   Review of Systems   Constitutional: Negative for chills and fever. HENT: Negative for congestion and rhinorrhea. Respiratory: Negative for shortness of breath. Cardiovascular: Negative for chest pain. Gastrointestinal: Negative for abdominal pain, nausea and vomiting. Genitourinary: Negative for dysuria and frequency. Musculoskeletal: Positive for arthralgias. Negative for myalgias. All other systems reviewed and are negative. Physical Exam   Physical Exam  Vitals and nursing note reviewed. Constitutional:       General: She is not in acute distress. Appearance: Normal appearance. She is not ill-appearing. HENT:      Head: Normocephalic.       Mouth/Throat: Mouth: Mucous membranes are moist.   Eyes:      Conjunctiva/sclera: Conjunctivae normal.   Cardiovascular:      Rate and Rhythm: Normal rate and regular rhythm. Pulses: Normal pulses. Pulmonary:      Effort: Pulmonary effort is normal.      Breath sounds: Normal breath sounds. Abdominal:      General: Abdomen is flat. Palpations: Abdomen is soft. Musculoskeletal:         General: Swelling present. No deformity. Comments: Pain with range of motion of bilateral knees, no crepitus. She has tenderness palpation of patella medial and lateral joint lines on the right. She does not tolerate ligamentous laxity test.  Extensor mechanism is intact bilaterally   Skin:     General: Skin is warm and dry. Neurological:      Mental Status: She is alert and oriented to person, place, and time. Mental status is at baseline. Psychiatric:         Behavior: Behavior normal.         Thought Content:  Thought content normal.         Diagnostic Study Results     Labs     Recent Results (from the past 12 hour(s))   GLUCOSE, POC    Collection Time: 03/28/22  7:34 AM   Result Value Ref Range    Glucose (POC) 86 65 - 117 mg/dL    Performed by Julien Moreno (MARIA LUZ RN)    GLUCOSE, POC    Collection Time: 03/28/22 12:30 PM   Result Value Ref Range    Glucose (POC) 72 65 - 117 mg/dL    Performed by Bertrand Goodpaure, POC    Collection Time: 03/28/22 12:32 PM   Result Value Ref Range    Glucose (POC) 60 (L) 65 - 117 mg/dL    Performed by Bertrand Goodpasture, POC    Collection Time: 03/28/22 12:35 PM   Result Value Ref Range    Glucose (POC) 69 65 - 117 mg/dL    Performed by Bertrand Goodpasture, POC    Collection Time: 03/28/22 12:51 PM   Result Value Ref Range    Glucose (POC) 118 (H) 65 - 117 mg/dL    Performed by Bill Richards    TSH 3RD GENERATION    Collection Time: 03/28/22  2:35 PM   Result Value Ref Range    TSH 1.48 0.36 - 3.74 uIU/mL       Radiologic Studies -   MRI BRAIN WO CONT   Final Result 1. Evaluation is markedly limited by motion. No definite acute intracranial   abnormality. 2. Unchanged mild generalized parenchymal volume loss and mild to moderate   chronic microvascular ischemic disease. CT ABD PELV W WO CONT   Final Result   1. 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 2.  Bilateral perinephric stranding which is nonspecific. No evidence of   perinephric abscess or hydronephrosis. XR FOOT RT AP/LAT   Final Result   1. No visible fracture. XR ANKLE RT AP/LAT   Final Result   1. No visible fracture. XR CHEST PORT   Final Result   1. No radiographic evidence of acute cardiopulmonary disease. XR KNEE RT MAX 2 VWS   Final Result   1. No visible fracture. XR KNEE LT 3 V   Final Result   1. Knee arthroplasty with possible loosening redemonstrated. CT Results  (Last 48 hours)               03/28/22 1042  CT ABD PELV W WO CONT Final result    Impression:  1. 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 2.  Bilateral perinephric stranding which is nonspecific. No evidence of   perinephric abscess or hydronephrosis. Narrative:  EXAM: CT ABD PELV W WO CONT       INDICATION: follow up complicated pyelonephritis       COMPARISON: 2/20/2022. IV CONTRAST: 100 mL of Isovue-370. ORAL CONTRAST: None       TECHNIQUE:     Multislice helical CT was performed from the diaphragm to the iliac crest prior   to intravenous contrast administration and from the diaphragm to the symphysis   pubis during uneventful rapid bolus intravenous contrast administration. Contiguous 5 mm axial images were reconstructed and lung and soft tissue windows   were generated. Coronal and sagittal reformations were generated. CT dose   reduction was achieved through use of a standardized protocol tailored for this   examination and automatic exposure control for dose modulation.          FINDINGS:    LOWER THORAX: No significant abnormality in the incidentally imaged lower chest.   LIVER: No mass. BILIARY TREE: Cholecystectomy CBD is not dilated. SPLEEN: within normal limits. PANCREAS: No mass or ductal dilatation. ADRENALS: Unremarkable. KIDNEYS: No evidence of hydronephrosis. Mild perinephric stranding is noted   around both kidneys. No fluid collections are identified. The left ureter is   incompletely opacified however no focal abnormalities noted. . Small hypodensity   in the left kidney consistent with a simple cyst are no further follow-up. STOMACH: Unremarkable. SMALL BOWEL: No dilatation or wall thickening. COLON: No dilatation or wall thickening. APPENDIX: Not visualized   PERITONEUM: No ascites or pneumoperitoneum. RETROPERITONEUM: Atherosclerotic disease. No evidence of aneurysm or   lymphadenopathy. REPRODUCTIVE ORGANS: Unremarkable   URINARY BLADDER: Incompletely opacified but grossly unremarkable. BONES: Anterolisthesis of L4 on L5 with severe canal stenosis. Multilevel   degenerative changes in the lumbar spine   ABDOMINAL WALL: No mass or hernia. ADDITIONAL COMMENTS: N/A               CXR Results  (Last 48 hours)    None            Medical Decision Making   I am the first provider for this patient. I reviewed the vital signs, available nursing notes, past medical history, past surgical history, family history and social history. Vital Signs-Reviewed the patient's vital signs. Patient Vitals for the past 12 hrs:   Temp Pulse Resp BP SpO2   03/28/22 1603 97.3 °F (36.3 °C) 80 18 109/70 --   03/28/22 1245 97.4 °F (36.3 °C) 67 18 (!) 140/70 99 %   03/28/22 0824 97.9 °F (36.6 °C) 80 16 136/76 100 %   03/28/22 0752 -- -- -- -- 98 %       Records Reviewed: Nursing Notes and Old Medical Records    Patient with emphysematous cystitis and pyelonephritis, ESBL Klebsiella UTI on IV meropenem    Provider Notes (Medical Decision Making):    We will obtain x-rays to evaluate for osseous injury, suspect either this, consider RA flare however given traumatic nature pain if not osseous consider ligamentous injury or contusion  We will treat with Tylenol, ibuprofen. ED Course:   Initial assessment performed. The patients presenting problems have been discussed, and they are in agreement with the care plan formulated and outlined with them. I have encouraged them to ask questions as they arise throughout their visit. ED Course as of 03/28/22 1739   Thu Mar 24, 2022   1518 Right knee x-rays no visible fracture. Osteopenia, previous interlocking arthroplasty [WB]   1521 Left knee arthroplasty demonstrating joint effusion similar to prior, lucency at the tibia which appears similar to knee x-ray performed in 2020, possible loosening which is unchanged. Patient's primary complaint is regarding her right knee, unlikely that there is an acute hardware issue. [WB]   1526 Patient now divulging she has had recent dizziness, staggering with walking for the past month or 2. She denies any abdominal pain, reports she was recently mated for infection related to her liver, is getting IV antibiotics currently through a PICC line. [WB]   1532 Will obtain UA troponin CMP CBC chest x-ray to evaluate her dizziness [WB]   1539 Patient refusing IV access, requesting PICC line use. [WB]   1023 She did agree to IV access [WB]   1548 No abdominal tenderness on exam [WB]   1620 Patient unable to bear weight now complaining of right ankle and foot pain [WB]   1628 CBC is normal white count, hemoglobin 10.1, normal platelets normal differential [WB]   1629 Anemia is stable [WB]   1648 UA negative, CMP creatinine 1.34 normal LFTs [WB]   1649 Mild SHIRAZ [WB]   1745 Patient still unable to ambulate after pain medication. Patient does live alone and unable to get around. Is a safety risk at home so plan will be for admission.  [JS]      ED Course User Index  [JS] Lluvia Jackson MD  [WB] Christine Quintanilla MD     Critical Care Time: 0    Disposition:  Discharge Note:  The patient has been re-evaluated and is ready for discharge. Reviewed available results with patient. Counseled patient on diagnosis and care plan. Patient has expressed understanding, and all questions have been answered. Patient agrees with plan and agrees to follow up as recommended, or to return to the ED if their symptoms worsen. Discharge instructions have been provided and explained to the patient, along with reasons to return to the ED. PLAN:  Current Discharge Medication List        2. Follow-up Information    None       3. Return to ED if worse     Diagnosis     Clinical Impression:   1. Contusion of right knee, initial encounter    2. Inability to walk    3. Hallucination    4. Memory loss    5. Neuropathy        Attestations:    Cooper Smart M.D. Please note that this dictation was completed with [employer], the computer voice recognition software. Quite often unanticipated grammatical, syntax, homophones, and other interpretive errors are inadvertently transcribed by the computer software. Please disregard these errors. Please excuse any errors that have escaped final proofreading. Thank you.

## 2022-03-24 NOTE — H&P
Hospitalist Admission Note    NAME: Yasmin Duval   :  1945   MRN:  [id number]     Date/Time:  3/24/2022 6:15 PM    Patient PCP: Mildred Cullen MD  ______________________________________________________________________  Given the patient's current clinical presentation, I have a high level of concern for decompensation if discharged from the emergency department. Complex decision making was performed, which includes reviewing the patient's available past medical records, laboratory results, and x-ray films. My assessment of this patient's clinical condition and my plan of care is as follows. Assessment / Plan:  Ambulatory dysfunction POA  -Likely due to fall and knee injury  -Admit for observation  -Percocet for pain, continue gabapentin  -PT OT  -Patient will likely need a rehab  -No fractures noticed  -Case management consult for SNF placement      Elevated creatinine not SHIRAZ POA  -Creatinine on discharge was 0.8  -Today is 1.3  -Given IV hydration in the ED  -Continue to monitor for now     Diabetes mellitus type 2 with neuropathy  -Continue Metformin, add sliding scale insulin  -Continue home gabapentin     Hypertension  Dyslipidemia  Osteoarthritis  Peripheral arterial disease  History of rheumatoid arthritis  History of sarcoid  Obstructive sleep apnea  GERD  Depression  -Continue home metoprolol, Lipitor, PPI, Zoloft  -Resume [place]ast[place] and leflunomide as available         Code Status: Full code  Surrogate Decision Maker: Poncho Merritt [phone]         DVT Prophylaxis: Lovenox  GI Prophylaxis: not indicated    Baseline: Independent      Subjective:   CHIEF COMPLAINT: Ambulatory dysfunction    HISTORY OF PRESENT ILLNESS:     Yasmin Duval is a 79-year-old history RA, chronic pain presenting with knee pain following fall. Patient was recently discharged about 3 weeks ago when she was admitted for emphysematous cystitis was noted to have ESBL E. coli.   She was discharged on IV ertapenem. She presents with inability to walk. Patient states she was getting out of bed when she fell out with ground striking her right knee  On March 15. She had a subsequent twisting injury to her left knee within the past 24 hours. Pain is relatively severe, right-sided. He has been able to ambulate with some difficulty. Reports no dizziness no near syncope no syncope no lightheadedness. Only had bilateral knee pain. No fever chills nausea or vomiting.     There are no other complaints, changes, or physical findings at this time.     We were asked to admit for work up and evaluation of the above problems. Past Medical History:   Diagnosis Date    Asthma     Chronic pain 5/8/2020    DDD (degenerative disc disease), lumbar     Diabetes ([address]     Gastritis     GERD (gastroesophageal reflux disease)     Glaucoma     Hearing loss 5/8/2020    Hiatal hernia     High cholesterol     Hypertension     Hypocalcemia 5/8/2020    Peripheral vascular disease (HCC)     RA (rheumatoid arthritis) (HCC)     Sarcoidosis 1999    [facility]    Sleep apnea     has not used cpap in years since weight loss        Past Surgical History:   Procedure Laterality Date    HX GASTRIC BYPASS      HX HEART CATHETERIZATION      s/p PCI with stenting in 1998     HX KNEE REPLACEMENT Bilateral     HX ORTHOPAEDIC Bilateral     carpal tunnel surgery     HX SHOULDER REPLACEMENT Right     x 2        Social History     Tobacco Use    Smoking status: Never Smoker    Smokeless tobacco: Never Used   Substance Use Topics    Alcohol use:  Yes     Alcohol/week: 2.0 standard drinks     Types: 1 Glasses of wine, 1 Shots of liquor per week     Comment: 2-3 yearly        Family History   Problem Relation Age of Onset    Heart Disease Mother     Liver Disease Father     Alcohol abuse Brother     Dementia Neg Hx     Cancer Neg Hx     Seizures Neg Hx    Reviewed  Allergies   Allergen Reactions    Lisinopril Rash    Methotrexate Hives        Prior to Admission medications    Medication Sig Start Date End Date Taking? Authorizing Provider   0.9% sodium chloride solution 10 mL by IntraVENous route daily. before and after IV antibiotic using SASH method    Provider, Historical   heparin sod,porcine/0.9 % NaCl (HEPARIN FLUSH IV) 5 mL by IntraVENous route daily. after antibiotic using SASH method    Provider, Historical   ertapenem [facility]) 1 gram injection 1 g by IntraVENous route daily. Provider, Historical   traMADoL (ULTRAM) 50 mg tablet Take 50 mg by mouth every six (6) hours as needed for Pain. Provider, Historical   mupirocin (BACTROBAN) 2 % ointment Apply 1 Tube to affected area daily. 1 application right 2nd toe 2/14/22   Provider, Historical   albuterol sulfate (PROVENTIL;VENTOLIN) 2.5 mg/0.5 mL nebu nebulizer solution USE 1 VIAL IN NEBULIZER EVERY 4 HOURS AS NEEDED FOR WHEEZING 2/11/22   Lonell Landau, MD   simvastatin (ZOCOR) 20 mg tablet TAKE BY MOUTH NIGHTLY. Patient taking differently: Take 20 mg by mouth nightly. TAKE BY MOUTH NIGHTLY. 10/26/21   Lonell Landau, MD   sertraline (ZOLOFT) 50 mg tablet Take 1 Tablet by mouth daily. 10/26/21   Lonell Landau, MD   omeprazole (PRILOSEC) 20 mg capsule Take 1 Capsule by mouth daily. 10/26/21   Lonell Landau, MD   metoprolol tartrate (LOPRESSOR) 25 mg tablet Take 1 Tablet by mouth two (2) times a day. 10/26/21   Lonell Landau, MD   metFORMIN (GLUCOPHAGE) 500 mg tablet Take 1 Tablet by mouth daily (with dinner). Decreased dose  Patient not taking: Reported on 3/17/2022 10/26/21   Lonell Landau, MD   latanoprost (XALATAN) 0.005 % ophthalmic solution INSTILL 1 DROP INTO BOTH EYES NIGHTLY  Patient taking differently: Administer 1 Drop to both eyes nightly.  INSTILL 1 DROP INTO BOTH EYES NIGHTLY 10/26/21   Lonell Landau, MD   gabapentin (NEURONTIN) 300 mg capsule TAKE 1 CAPSULE IN THE MORNING AND TAKE 3 CAPSULES BEFORE BEDTIME  Patient taking differently: Take 300 mg by mouth daily. TAKE 1 CAPSULE IN THE MORNING AND TAKE 3 CAPSULES BEFORE BEDTIME 10/26/21   Harpreet Reed MD   ergocalciferol (ERGOCALCIFEROL) 1,250 mcg (50,000 unit) capsule Take 1 Capsule by mouth every seven (7) days. Indications: vitamin D deficiency (high dose therapy)  Patient taking differently: Take 50,000 Units by mouth every seven (7) days. saturdays  Indications: vitamin D deficiency (high dose therapy) 10/26/21   Harpreet Reed MD   fluticasone furoate-vilanteroL (Breo Ellipta) 100-25 mcg/dose inhaler TAKE 1 PUFF BY MOUTH EVERY DAY  Patient taking differently: Take 1 Puff by inhalation daily. TAKE 1 PUFF BY MOUTH EVERY DAY 10/26/21   Harpreet Reed MD   leflunomide (ARAVA) 20 mg tablet TAKE 1 TABLET BY MOUTH EVERY DAY  Patient taking differently: Take 20 mg by mouth daily. TAKE 1 TABLET BY MOUTH EVERY DAY 8/31/21   Bree Sosa MD   nortriptyline (PAMELOR) 50 mg capsule Take 50 mg by mouth nightly. 5/7/18   Provider, Historical       REVIEW OF SYSTEMS:     I am not able to complete the review of systems because:    The patient is intubated and sedated    The patient has altered mental status due to his acute medical problems    The patient has baseline aphasia from prior stroke(s)    The patient has baseline dementia and is not reliable historian    The patient is in acute medical distress and unable to provide information           Total of 12 systems reviewed as follows:       POSITIVE= underlined text  Negative = text not underlined  General:  fever, chills, sweats, generalized weakness, weight loss/gain,      loss of appetite   Eyes:    blurred vision, eye pain, loss of vision, double vision  ENT:    rhinorrhea, pharyngitis   Respiratory:   cough, sputum production, SOB, WORKMAN, wheezing, pleuritic pain   Cardiology:   chest pain, palpitations, orthopnea, PND, edema, syncope   Gastrointestinal:  abdominal pain , N/V, diarrhea, dysphagia, constipation, bleeding   Genitourinary:  frequency, urgency, dysuria, hematuria, incontinence   Muskuloskeletal :  arthralgia, myalgia, back pain  Hematology:  easy bruising, nose or gum bleeding, lymphadenopathy   Dermatological: rash, ulceration, pruritis, color change / jaundice  Endocrine:   hot flashes or polydipsia   Neurological:  headache, dizziness, confusion, focal weakness, paresthesia,     Speech difficulties, memory loss, gait difficulty  Psychological: Feelings of anxiety, depression, agitation    Objective:   VITALS:    Visit Vitals  BP (!) 126/59   Pulse 87   Temp 97.8 °F (36.6 °C)   Resp 17   Ht 5' 5\" (1.651 m)   Wt 76.7 kg (169 lb)   SpO2 98%   BMI 28.12 kg/m²       PHYSICAL EXAM:    General:    Alert, cooperative, no distress, appears stated age. HEENT: Atraumatic, anicteric sclerae, pink conjunctivae     No oral ulcers, mucosa moist, throat clear, dentition fair  Neck:  Supple, symmetrical,  thyroid: non tender  Lungs:   Clear to auscultation bilaterally. No Wheezing or Rhonchi. No rales. Chest wall:  No tenderness  No Accessory muscle use. Heart:   Regular  rhythm,  No  murmur   No edema  Abdomen:   Soft, non-tender. Not distended. Bowel sounds normal  Extremities: No cyanosis. No clubbing,      Skin turgor normal, Capillary refill normal, Radial dial pulse 2+  Skin:     Not pale. Not Jaundiced  No rashes   Psych:  Good insight. Not depressed. Not anxious or agitated. Neurologic: EOMs intact. No facial asymmetry. No aphasia or slurred speech. Symmetrical strength, Sensation grossly intact.  Alert and oriented X 4.     _______________________________________________________________________  Care Plan discussed with:    Comments   Patient X    Family      RN X    Care Manager                    Consultant:      _______________________________________________________________________  Expected  Disposition:   Home with Family    HH/PT/OT/RN    SNF/LTC X   WU ________________________________________________________________________  TOTAL TIME: 40 Minutes    Critical Care Provided     Minutes non procedure based      Comments    X Reviewed previous records   >50% of visit spent in counseling and coordination of care  Discussion with patient and/or family and questions answered       ________________________________________________________________________  Signed: Woody Garcia MD    Procedures: see electronic medical records for all procedures/Xrays and details which were not copied into this note but were reviewed prior to creation of Plan. LAB DATA REVIEWED:    Recent Results (from the past 24 hour(s))   CBC WITH AUTOMATED DIFF    Collection Time: 03/24/22  3:54 PM   Result Value Ref Range    WBC 6.6 3.6 - 11.0 K/uL    RBC 3.17 (L) 3.80 - 5.20 M/uL    HGB 10.1 (L) 11.5 - 16.0 g/dL    HCT 31.3 (L) 35.0 - 47.0 %    MCV 98.7 80.0 - 99.0 FL    MCH 31.9 26.0 - 34.0 PG    MCHC 32.3 30.0 - 36.5 g/dL    RDW 13.2 11.5 - 14.5 %    PLATELET 687 009 - 973 K/uL    MPV 10.0 8.9 - 12.9 FL    NRBC 0.0 0  WBC    ABSOLUTE NRBC 0.00 0.00 - 0.01 K/uL    NEUTROPHILS 74 32 - 75 %    LYMPHOCYTES 13 12 - 49 %    MONOCYTES 10 5 - 13 %    EOSINOPHILS 1 0 - 7 %    BASOPHILS 1 0 - 1 %    IMMATURE GRANULOCYTES 1 (H) 0.0 - 0.5 %    ABS. NEUTROPHILS 5.0 1.8 - 8.0 K/UL    ABS. LYMPHOCYTES 0.9 0.8 - 3.5 K/UL    ABS. MONOCYTES 0.6 0.0 - 1.0 K/UL    ABS. EOSINOPHILS 0.0 0.0 - 0.4 K/UL    ABS. BASOPHILS 0.1 0.0 - 0.1 K/UL    ABS. IMM.  GRANS. 0.0 0.00 - 0.04 K/UL    DF AUTOMATED     METABOLIC PANEL, COMPREHENSIVE    Collection Time: 03/24/22  3:54 PM   Result Value Ref Range    Sodium 141 136 - 145 mmol/L    Potassium 4.7 3.5 - 5.1 mmol/L    Chloride 109 (H) 97 - 108 mmol/L    CO2 27 21 - 32 mmol/L    Anion gap 5 5 - 15 mmol/L    Glucose 80 65 - 100 mg/dL    BUN 31 (H) 6 - 20 MG/DL    Creatinine 1.34 (H) 0.55 - 1.02 MG/DL    BUN/Creatinine ratio 23 (H) 12 - 20      GFR est AA 47 (L) >60 ml/min/1.73m2    GFR est non-AA 39 (L) >60 ml/min/1.73m2    Calcium 8.8 8.5 - 10.1 MG/DL    Bilirubin, total 0.7 0.2 - 1.0 MG/DL    ALT (SGPT) 19 12 - 78 U/L    AST (SGOT) 44 (H) 15 - 37 U/L    Alk.  phosphatase 84 45 - 117 U/L    Protein, total 7.0 6.4 - 8.2 g/dL    Albumin 3.2 (L) 3.5 - 5.0 g/dL    Globulin 3.8 2.0 - 4.0 g/dL    A-G Ratio 0.8 (L) 1.1 - 2.2     TROPONIN-HIGH SENSITIVITY    Collection Time: 03/24/22  3:54 PM   Result Value Ref Range    Troponin-High Sensitivity 18 0 - 51 ng/L   URINALYSIS W/ REFLEX CULTURE    Collection Time: 03/24/22  4:01 PM    Specimen: Urine    Urine specimen   Result Value Ref Range    Color YELLOW/STRAW      Appearance CLEAR CLEAR      Specific gravity 1.019 1.003 - 1.030      pH (UA) 5.5 5.0 - 8.0      Protein Negative NEG mg/dL    Glucose Negative NEG mg/dL    Ketone Negative NEG mg/dL    Blood Negative NEG      Urobilinogen 0.2 0.2 - 1.0 EU/dL    Nitrites Negative NEG      Leukocyte Esterase Negative NEG      WBC 0-4 0 - 4 /hpf    RBC 0-5 0 - 5 /hpf    Epithelial cells MODERATE (A) FEW /lpf    Bacteria Negative NEG /hpf    UA:UC IF INDICATED CULTURE NOT INDICATED BY UA RESULT CNI      Hyaline cast 2-5 0 - 5 /lpf   BILIRUBIN, CONFIRM    Collection Time: 03/24/22  4:01 PM   Result Value Ref Range    Bilirubin UA, confirm Negative NEG

## 2022-03-24 NOTE — ED TRIAGE NOTES
Pt presents via EMS for right knee pain x 1 week. Pt had a GLF yesterday and states that has caused her knee pain to worsen. Pt denies any head injury from her fall yesterday.

## 2022-03-25 PROBLEM — R26.2 AMBULATORY DYSFUNCTION: Status: ACTIVE | Noted: 2022-03-24

## 2022-03-25 PROCEDURE — G0378 HOSPITAL OBSERVATION PER HR: HCPCS

## 2022-03-25 PROCEDURE — 94640 AIRWAY INHALATION TREATMENT: CPT

## 2022-03-25 PROCEDURE — 97161 PT EVAL LOW COMPLEX 20 MIN: CPT

## 2022-03-25 PROCEDURE — 74011000250 HC RX REV CODE- 250: Performed by: INTERNAL MEDICINE

## 2022-03-25 PROCEDURE — 97116 GAIT TRAINING THERAPY: CPT

## 2022-03-25 PROCEDURE — 97165 OT EVAL LOW COMPLEX 30 MIN: CPT | Performed by: OCCUPATIONAL THERAPIST

## 2022-03-25 PROCEDURE — 97530 THERAPEUTIC ACTIVITIES: CPT | Performed by: OCCUPATIONAL THERAPIST

## 2022-03-25 PROCEDURE — 74011250637 HC RX REV CODE- 250/637: Performed by: INTERNAL MEDICINE

## 2022-03-25 RX ADMIN — ARFORMOTEROL TARTRATE: 15 SOLUTION RESPIRATORY (INHALATION) at 19:36

## 2022-03-25 RX ADMIN — GABAPENTIN 300 MG: 300 CAPSULE ORAL at 09:05

## 2022-03-25 RX ADMIN — LEFLUNOMIDE 20 MG: 10 TABLET ORAL at 09:06

## 2022-03-25 RX ADMIN — ATORVASTATIN CALCIUM 10 MG: 10 TABLET, FILM COATED ORAL at 09:05

## 2022-03-25 RX ADMIN — METOPROLOL TARTRATE 25 MG: 25 TABLET, FILM COATED ORAL at 09:05

## 2022-03-25 RX ADMIN — LATANOPROST 1 DROP: 50 SOLUTION/ DROPS OPHTHALMIC at 23:16

## 2022-03-25 RX ADMIN — METOPROLOL TARTRATE 25 MG: 25 TABLET, FILM COATED ORAL at 17:39

## 2022-03-25 RX ADMIN — OXYCODONE HYDROCHLORIDE AND ACETAMINOPHEN 1 TABLET: 5; 325 TABLET ORAL at 20:25

## 2022-03-25 RX ADMIN — SERTRALINE 50 MG: 50 TABLET, FILM COATED ORAL at 09:06

## 2022-03-25 RX ADMIN — NORTRIPTYLINE HYDROCHLORIDE 50 MG: 25 CAPSULE ORAL at 21:52

## 2022-03-25 RX ADMIN — METFORMIN HYDROCHLORIDE 500 MG: 500 TABLET ORAL at 17:39

## 2022-03-25 RX ADMIN — ARFORMOTEROL TARTRATE: 15 SOLUTION RESPIRATORY (INHALATION) at 09:30

## 2022-03-25 RX ADMIN — PANTOPRAZOLE SODIUM 40 MG: 40 TABLET, DELAYED RELEASE ORAL at 06:48

## 2022-03-25 NOTE — PROGRESS NOTES
Physical Therapy    Pt seen for eval. Main c/o is R ankle/foot pain. She actually c/o more pain with touch when in bed than with WB but is still antalgic with WB. She will need SNF at d/c. Full note to follow.     Titus Grey PT

## 2022-03-25 NOTE — PROGRESS NOTES
Transition of Care Plan:    RUR: N/A Observation status; pt is a readmit to hospital.  Disposition: Anticipating SNF - pt provided with SNF list and requested to choose at least three top choices, please follow up to obtain choices tomorrow. - Pt will need insurance authorization to transition to SNF   Follow up appointments: PCP  DME needed: Pt owns a cane, walker, and shower chair. Transportation at Discharge: Medicaid transport vs son(s)? Keys or means to access home: Pending disposition         Medicare Letter: N/A - Observation status; [facility] notice signed 3/25/22  Is patient a BCPI-A Bundle: N/A          If yes, was Bundle Letter given?:    Is patient a  and connected with the [facility]? N/A                If yes, was [place] transfer form completed and [facility] notified? Caregiver Contact: Pt's sonAshley) [phone]  Discharge Caregiver contacted prior to discharge? Care Conference needed?:                     Reason for Readmission:  Ambulatory dysfunction, elevated creatinine not SHIRAZ, etc           RUR Score/Risk Level:  N/A Observation status; pt is a readmit to hospital.       PCP: First and Last name:  Sergio Thomas MD    Name of Practice:    Are you a current patient: Yes/No: Yes   Approximate date of last visit: 2-3 weeks ago per pt   Can you participate in a virtual visit with your PCP: No    Is a Care Conference indicated:       Did you attend your follow up appointment (s): If not, why not:  Pt reports that she attended follow up with PCP 2-3 weeks ago       Resources/supports as identified by patient/family:  Sons, grandchildren        Top Challenges facing patient (as identified by patient/family and CM): Finances/Medication cost? Pt denies concerns, utilizes [address] on [place] & [employer] Transportation   Medicaid transport vs sons     Support system or lack thereof? Pt resides alone, states that her son typically visits her 3-4x a week. Living arrangements? Resides alone in apartment          Self-care/ADLs/Cognition? Pt is alert and oriented. She states that she is independent with ADLs at baseline but has been having falls. Current Advanced Directive/Advance Care Plan:  Advance Care Planning   Healthcare Decision Maker:  No ACP documents on file. Pt's two sons are legal NOK. Today we documented Decision Maker(s) consistent with Legal Next of Kin hierarchy. Plan for utilizing home health: At this time pt is recommended to transition to SNF for rehab at d/c. [facility]               Transition of Care Plan:    Based on readmission, the patient's previous Plan of Care   has been evaluated and/or modified. The current Transition of Care Plan is:  SNF - will need insurance authorization    CM reviewed chart. CM completed assessment with pt at bedside. CM introduced self, role of CM, verified demographics, and discussed transition of care planning. Pt noted with previous hospitalization from 2/28/22-3/7/22 for emphysematous cystitis. At last d/c, pt returned home with IV ABX and [facility]. Pt currently being recommended to discharge to a SNF due to recurrent falls. Pt provided with SNF list, seemed receptive to this idea, will review list and provide numerous choices. Freedom of choice reviewed, pt verbalized understanding. CM to follow up with pt to obtain choices. Unit care management will continue to follow for transition of care planning needs. Care Management Interventions  PCP Verified by CM: Yes  Palliative Care Criteria Met (RRAT>21 & CHF Dx)?: No  Mode of Transport at Discharge:  Other (see comment) (Medicaid transport vs son?)  Transition of Care Consult (CM Consult): Discharge Planning  Discharge Durable Medical Equipment: No (Pt reports that she owns a shower seat, walker, cane)  Physical Therapy Consult: Yes  Occupational Therapy Consult: Yes  Speech Therapy Consult: No  Support Systems: Child(reji),Other Family Member(s) (Pt resides alone, reports support from sons/family who visit 3-4x a week.  Does not appear that pt has day to day support in home)  Confirm Follow Up Transport: Family (Medicaid transport vs son(s))  Discharge Location  Patient Expects to be Discharged to[de-identified] Skilled nursing facility (Pt is aware of recommendation for SNF; SNF list left at bedside today for pt to review and provide choices)    Readmission Assessment  Number of days since last admission?: 8-30 days  Previous disposition: Home with Home Health  Who is being interviewed?: Patient  What was the patient's/caregiver's perception as to why they think they needed to return back to the hospital?: Other (Comment) (Knee pain d/t fall)  Did you visit your Primary Care Physician after you left the hospital, before you returned this time?: Yes  Did you see a specialist, such as Cardiac, Pulmonary, Orthopedic Physician, etc. after you left the hospital?: No  Who advised the patient to return to the hospital?: Self-referral  Does the patient report anything that got in the way of taking their medications?: No  In our efforts to provide the best possible care to you and others like you, can you think of anything that we could have done to help you after you left the hospital the first time, so that you might not have needed to return so soon?: Other (Comment) (Pt returned to hospital for evaluation of knee pain r/t fall)    Rebekah Gan [facility] [address]

## 2022-03-25 NOTE — ED NOTES
Pt assisted to bed side commode, pt in NAD, still has c/o dizziness.  Pt back in bed with monitor in place, pt voided 250 ml urine

## 2022-03-25 NOTE — PROGRESS NOTES
Hospitalist Progress Note    NAME: Lendel Dandy   :  1945   MRN:  [id number]       Assessment / Plan:    Ambulatory dysfunction POA  -Likely due to fall and knee injury  -Percocet for pain, continue gabapentin  -PT OT, will likely need SNF  -Right knee, foot, ankle x-ray and left knee x-ray negative for fractures  -Case management consult for SNF placement     Elevated creatinine  POA  -Creatinine on discharge was 0.8  -Creatinine 1.3 on admission  -Given IV hydration in the ED  -Serial labs     Diabetes mellitus type 2 with neuropathy  -Continue Metformin, add sliding scale insulin  -Continue home gabapentin     Hypertension  Dyslipidemia  Osteoarthritis  Peripheral arterial disease  History of rheumatoid arthritis  History of sarcoid  Obstructive sleep apnea  GERD  Depression  -Continue home metoprolol, Lipitor, PPI, Zoloft  -Resume Xeljanz and leflunomide as available      25.0 - 29.9 Overweight / Body mass index is 28.12 kg/m². Estimated discharge date: 3/26  Barriers:      Code Status: Full code  Surrogate Decision Maker: Thania Pompa [phone]            DVT Prophylaxis: Lovenox  GI Prophylaxis: not indicated     Baseline: Independent       Subjective:     Chief Complaint / Reason for Physician Visit   Discussed with RN events overnight. Complains of pain to the right foot worse with movement    Review of Systems:  Symptom Y/N Comments  Symptom Y/N Comments   Fever/Chills    Chest Pain     Poor Appetite    Edema     Cough    Abdominal Pain     Sputum    Joint Pain     SOB/WORKMAN    Pruritis/Rash     Nausea/vomit    Tolerating PT/OT     Diarrhea    Tolerating Diet     Constipation    Other       Could NOT obtain due to:      Objective:     VITALS:   Last 24hrs VS reviewed since prior progress note.  Most recent are:  Patient Vitals for the past 24 hrs:   Temp Pulse Resp BP SpO2   22 0931 -- -- -- -- 99 %   22 0903 -- 75 -- 105/60 --   22 0859 -- 73 -- 120/65 -- 03/25/22 0854 -- 67 -- 121/81 97 %   03/25/22 0801 97.3 °F (36.3 °C) 69 17 118/69 99 %   03/25/22 0517 -- 75 12 126/60 97 %   03/25/22 0317 -- 78 13 (!) 160/96 96 %   03/25/22 0241 -- 75 16 (!) 145/71 96 %   03/25/22 0217 -- 73 13 139/68 95 %   03/25/22 0141 -- 75 12 135/66 99 %   03/25/22 0041 -- 66 16 (!) 120/94 (!) 88 %   03/24/22 2317 -- 70 14 127/73 96 %   03/24/22 2251 -- 69 18 (!) 129/92 95 %   03/24/22 2209 -- 74 16 (!) 121/54 --   03/24/22 2140 -- 83 17 130/60 --       Intake/Output Summary (Last 24 hours) at 3/25/2022 1341  Last data filed at 3/24/2022 2303  Gross per 24 hour   Intake --   Output 250 ml   Net -250 ml        I had a face to face encounter and independently examined this patient on 3/25/2022, as outlined below:  PHYSICAL EXAM:  General: WD, WN. Alert, cooperative, no acute distress    EENT:  EOMI. Anicteric sclerae. MMM  Resp:  CTA bilaterally, no wheezing or rales. No accessory muscle use  CV:  Regular  rhythm,  No edema  GI:  Soft, Non distended, Non tender. +Bowel sounds  Neurologic:  Alert and oriented X 3, normal speech,   Psych:   Good insight. Not anxious nor agitated  Skin:  No rashes. No jaundice    Reviewed most current lab test results and cultures  YES  Reviewed most current radiology test results   YES  Review and summation of old records today    NO  Reviewed patient's current orders and MAR    YES  PMH/SH reviewed - no change compared to H&P  ________________________________________________________________________  Care Plan discussed with:    Comments   Patient x    Family      RN     Care Manager     Consultant                       x Multidiciplinary team rounds were held today with , nursing, pharmacist and clinical coordinator. Patient's plan of care was discussed; medications were reviewed and discharge planning was addressed.      ________________________________________________________________________  Total NON critical care TIME:  30 Minutes    Total CRITICAL CARE TIME Spent:   Minutes non procedure based      Comments   >50% of visit spent in counseling and coordination of care     ________________________________________________________________________  Gabriella Weldon MD     Procedures: see electronic medical records for all procedures/Xrays and details which were not copied into this note but were reviewed prior to creation of Plan. LABS:  I reviewed today's most current labs and imaging studies.   Pertinent labs include:  Recent Labs     03/24/22  1554   WBC 6.6   HGB 10.1*   HCT 31.3*        Recent Labs     03/24/22  1554      K 4.7   *   CO2 27   GLU 80   BUN 31*   CREA 1.34*   CA 8.8   ALB 3.2*   TBILI 0.7   ALT 19       Signed: Gabriella Weldon MD
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
## 2022-03-26 LAB
ATRIAL RATE: 87 BPM
CALCULATED P AXIS, ECG09: 62 DEGREES
CALCULATED R AXIS, ECG10: 58 DEGREES
CALCULATED T AXIS, ECG11: 87 DEGREES
DIAGNOSIS, 93000: NORMAL
GLUCOSE BLD STRIP.AUTO-MCNC: 133 MG/DL (ref 65–117)
GLUCOSE BLD STRIP.AUTO-MCNC: 46 MG/DL (ref 65–117)
GLUCOSE BLD STRIP.AUTO-MCNC: 67 MG/DL (ref 65–117)
GLUCOSE BLD STRIP.AUTO-MCNC: 85 MG/DL (ref 65–117)
P-R INTERVAL, ECG05: 164 MS
Q-T INTERVAL, ECG07: 394 MS
QRS DURATION, ECG06: 96 MS
QTC CALCULATION (BEZET), ECG08: 474 MS
SERVICE CMNT-IMP: ABNORMAL
SERVICE CMNT-IMP: ABNORMAL
SERVICE CMNT-IMP: NORMAL
SERVICE CMNT-IMP: NORMAL
VENTRICULAR RATE, ECG03: 87 BPM

## 2022-03-26 PROCEDURE — 74011250637 HC RX REV CODE- 250/637: Performed by: STUDENT IN AN ORGANIZED HEALTH CARE EDUCATION/TRAINING PROGRAM

## 2022-03-26 PROCEDURE — 74011000250 HC RX REV CODE- 250: Performed by: INTERNAL MEDICINE

## 2022-03-26 PROCEDURE — 74011250637 HC RX REV CODE- 250/637: Performed by: INTERNAL MEDICINE

## 2022-03-26 PROCEDURE — G0378 HOSPITAL OBSERVATION PER HR: HCPCS

## 2022-03-26 PROCEDURE — 94640 AIRWAY INHALATION TREATMENT: CPT

## 2022-03-26 PROCEDURE — 82962 GLUCOSE BLOOD TEST: CPT

## 2022-03-26 PROCEDURE — 2709999900 HC NON-CHARGEABLE SUPPLY

## 2022-03-26 RX ORDER — HALOPERIDOL 1 MG/1
0.5 TABLET ORAL
Status: DISCONTINUED | OUTPATIENT
Start: 2022-03-26 | End: 2022-04-01 | Stop reason: HOSPADM

## 2022-03-26 RX ADMIN — GABAPENTIN 300 MG: 300 CAPSULE ORAL at 08:40

## 2022-03-26 RX ADMIN — HALOPERIDOL 0.5 MG: 1 TABLET ORAL at 15:54

## 2022-03-26 RX ADMIN — ATORVASTATIN CALCIUM 10 MG: 10 TABLET, FILM COATED ORAL at 08:40

## 2022-03-26 RX ADMIN — METOPROLOL TARTRATE 25 MG: 25 TABLET, FILM COATED ORAL at 18:35

## 2022-03-26 RX ADMIN — SERTRALINE 50 MG: 50 TABLET, FILM COATED ORAL at 08:40

## 2022-03-26 RX ADMIN — LATANOPROST 1 DROP: 50 SOLUTION/ DROPS OPHTHALMIC at 22:21

## 2022-03-26 RX ADMIN — METOPROLOL TARTRATE 25 MG: 25 TABLET, FILM COATED ORAL at 08:41

## 2022-03-26 RX ADMIN — LEFLUNOMIDE 20 MG: 10 TABLET ORAL at 08:44

## 2022-03-26 RX ADMIN — ARFORMOTEROL TARTRATE: 15 SOLUTION RESPIRATORY (INHALATION) at 07:59

## 2022-03-26 RX ADMIN — PANTOPRAZOLE SODIUM 40 MG: 40 TABLET, DELAYED RELEASE ORAL at 08:43

## 2022-03-26 RX ADMIN — NORTRIPTYLINE HYDROCHLORIDE 50 MG: 25 CAPSULE ORAL at 22:19

## 2022-03-26 RX ADMIN — METFORMIN HYDROCHLORIDE 500 MG: 500 TABLET ORAL at 18:35

## 2022-03-26 RX ADMIN — ARFORMOTEROL TARTRATE: 15 SOLUTION RESPIRATORY (INHALATION) at 21:11
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
## 2022-03-26 NOTE — PROGRESS NOTES
Patient with a large open area to her left buttocks. Area pink, undefined edges, no drainage. Area cleaned and foam dressing applied. Skin break sown shown to Dr. Paola Dockery on rounds. Verbal order for  Wound consult noted.

## 2022-03-26 NOTE — PROGRESS NOTES
Patient noted having conversations when no one in the room. Patient talking to the air  wanting to go pick her mom up and unable to re-direct patient that she is in a hospital. Dr. Kem Leyva notified and  Order noted for haldol 0.5 mg every 6 hours as needed. Haldol given by mouth.

## 2022-03-26 NOTE — PROGRESS NOTES
Patient laying in bed resting no longer communicating to the air about wanting to go pick her mom up , haldol 0.5 mg by mouth seems to be somewhat effective at the moment.

## 2022-03-26 NOTE — PROGRESS NOTES
Hospitalist Progress Note    NAME: Alisia Gonsalesms   :  1945   MRN:  [id number]       Assessment / Plan:    Ambulatory dysfunction POA  -Likely due to fall and knee injury  -Right knee, foot, ankle x-ray and left knee x-ray negative for fractures  -Percocet for pain, continue gabapentin  -PT OT, SNF recommended  - working on placement     Elevated creatinine  POA  -Creatinine on discharge was 0.8  -Creatinine 1.3 on admission. Labs pending this morning  -S/p IV fluid  -Serial labs. Avoid nephrotoxic medications     Diabetes mellitus type 2 with neuropathy  -Continue Metformin and sliding scale insulin  -Continue home gabapentin     Hypertension  Dyslipidemia  Osteoarthritis  Peripheral arterial disease  History of rheumatoid arthritis  History of sarcoid  Obstructive sleep apnea  GERD  Depression  -Continue home metoprolol, Lipitor, PPI, Zoloft  -Resume Naty Cypher and leflunomide as available    Hallucination  -Per son this has been going on on and off since her fracture in 2021  -Patient noted to be talking to someone who was not in the room  -Will benefit from outpatient evaluation with neurology      Right sacral pressure ulcer, POA  -Wound care consulted    Updated patient's son via phone    25.0 - 29.9 Overweight / Body mass index is 28.12 kg/m². Estimated discharge date: 3/26  Barriers:      Code Status: Full code  Surrogate Decision Maker: Nanette Santana [phone]            DVT Prophylaxis: Lovenox  GI Prophylaxis: not indicated     Baseline: Independent       Subjective:     Chief Complaint / Reason for Physician Visit   Discussed with RN events overnight. Complains of right foot pain \" I cannot put weight on it\"  ? Auditory hallucination. Patient was talking to someone who was not in the room.   She tells me she is talking to herself    Review of Systems:  Symptom Y/N Comments  Symptom Y/N Comments   Fever/Chills    Chest Pain     Poor Appetite    Edema Cough    Abdominal Pain     Sputum    Joint Pain     SOB/WORKMAN    Pruritis/Rash     Nausea/vomit    Tolerating PT/OT     Diarrhea    Tolerating Diet     Constipation    Other       Could NOT obtain due to:      Objective:     VITALS:   Last 24hrs VS reviewed since prior progress note. Most recent are:  Patient Vitals for the past 24 hrs:   Temp Pulse Resp BP SpO2   03/26/22 1159 97.8 °F (36.6 °C) 64 18 (!) 147/67 98 %   03/26/22 0844 97.1 °F (36.2 °C) 80 17 (!) 158/70 99 %   03/26/22 0808 -- -- -- -- 99 %   03/26/22 0309 97.6 °F (36.4 °C) 70 17 132/68 98 %   03/25/22 2240 97.7 °F (36.5 °C) 73 16 139/67 99 %   03/25/22 2000 97.1 °F (36.2 °C) 72 18 139/63 96 %   03/25/22 1938 -- -- -- -- 96 %   03/25/22 1546 97.8 °F (36.6 °C) 70 18 (!) 145/67 96 %     No intake or output data in the 24 hours ending 03/26/22 1210     I had a face to face encounter and independently examined this patient on 3/26/2022, as outlined below:  PHYSICAL EXAM:  General: WD, WN. Alert, cooperative, no acute distress    EENT:  EOMI. Anicteric sclerae. MMM  Resp:  CTA bilaterally, no wheezing or rales. No accessory muscle use  CV:  Regular  rhythm,  No edema  GI:  Soft, Non distended, Non tender. +Bowel sounds  Neurologic:  Alert and oriented X 3, normal speech,   Psych:   Good insight. Not anxious nor agitated  Skin:  No rashes. No jaundice, right buttock ulcer with clean red base    Reviewed most current lab test results and cultures  YES  Reviewed most current radiology test results   YES  Review and summation of old records today    NO  Reviewed patient's current orders and MAR    YES  PMH/SH reviewed - no change compared to H&P  ________________________________________________________________________  Care Plan discussed with:    Comments   Patient x    Family      RN x    Care Manager     Consultant                       x Multidiciplinary team rounds were held today with , nursing, pharmacist and clinical coordinator. Patient's plan of care was discussed; medications were reviewed and discharge planning was addressed. ________________________________________________________________________  Total NON critical care TIME:  30   Minutes    Total CRITICAL CARE TIME Spent:   Minutes non procedure based      Comments   >50% of visit spent in counseling and coordination of care     ________________________________________________________________________  Leny Garcia MD     Procedures: see electronic medical records for all procedures/Xrays and details which were not copied into this note but were reviewed prior to creation of Plan. LABS:  I reviewed today's most current labs and imaging studies.   Pertinent labs include:  Recent Labs     03/24/22  1554   WBC 6.6   HGB 10.1*   HCT 31.3*        Recent Labs     03/24/22  1554      K 4.7   *   CO2 27   GLU 80   BUN 31*   CREA 1.34*   CA 8.8   ALB 3.2*   TBILI 0.7   ALT 19       Signed: Leny Garcia MD

## 2022-03-27 LAB
ANION GAP SERPL CALC-SCNC: 5 MMOL/L (ref 5–15)
BUN SERPL-MCNC: 23 MG/DL (ref 6–20)
BUN/CREAT SERPL: 29 (ref 12–20)
CALCIUM SERPL-MCNC: 8.9 MG/DL (ref 8.5–10.1)
CHLORIDE SERPL-SCNC: 107 MMOL/L (ref 97–108)
CO2 SERPL-SCNC: 25 MMOL/L (ref 21–32)
CREAT SERPL-MCNC: 0.79 MG/DL (ref 0.55–1.02)
GLUCOSE BLD STRIP.AUTO-MCNC: 128 MG/DL (ref 65–117)
GLUCOSE BLD STRIP.AUTO-MCNC: 55 MG/DL (ref 65–117)
GLUCOSE BLD STRIP.AUTO-MCNC: 65 MG/DL (ref 65–117)
GLUCOSE BLD STRIP.AUTO-MCNC: 69 MG/DL (ref 65–117)
GLUCOSE BLD STRIP.AUTO-MCNC: 88 MG/DL (ref 65–117)
GLUCOSE BLD STRIP.AUTO-MCNC: 88 MG/DL (ref 65–117)
GLUCOSE SERPL-MCNC: 67 MG/DL (ref 65–100)
HGB BLD-MCNC: 9.9 G/DL (ref 11.5–16)
POTASSIUM SERPL-SCNC: 4.4 MMOL/L (ref 3.5–5.1)
SERVICE CMNT-IMP: ABNORMAL
SERVICE CMNT-IMP: ABNORMAL
SERVICE CMNT-IMP: NORMAL
SODIUM SERPL-SCNC: 137 MMOL/L (ref 136–145)

## 2022-03-27 PROCEDURE — 94640 AIRWAY INHALATION TREATMENT: CPT

## 2022-03-27 PROCEDURE — 96374 THER/PROPH/DIAG INJ IV PUSH: CPT

## 2022-03-27 PROCEDURE — G0378 HOSPITAL OBSERVATION PER HR: HCPCS

## 2022-03-27 PROCEDURE — 74011250637 HC RX REV CODE- 250/637: Performed by: INTERNAL MEDICINE

## 2022-03-27 PROCEDURE — 74011000250 HC RX REV CODE- 250: Performed by: STUDENT IN AN ORGANIZED HEALTH CARE EDUCATION/TRAINING PROGRAM

## 2022-03-27 PROCEDURE — 36415 COLL VENOUS BLD VENIPUNCTURE: CPT

## 2022-03-27 PROCEDURE — 80048 BASIC METABOLIC PNL TOTAL CA: CPT

## 2022-03-27 PROCEDURE — 82962 GLUCOSE BLOOD TEST: CPT

## 2022-03-27 PROCEDURE — 74011250637 HC RX REV CODE- 250/637: Performed by: STUDENT IN AN ORGANIZED HEALTH CARE EDUCATION/TRAINING PROGRAM

## 2022-03-27 PROCEDURE — 85018 HEMOGLOBIN: CPT

## 2022-03-27 PROCEDURE — 74011000250 HC RX REV CODE- 250: Performed by: INTERNAL MEDICINE

## 2022-03-27 RX ORDER — DEXTROSE MONOHYDRATE AND SODIUM CHLORIDE 5; .45 G/100ML; G/100ML
75 INJECTION, SOLUTION INTRAVENOUS CONTINUOUS
Status: DISCONTINUED | OUTPATIENT
Start: 2022-03-27 | End: 2022-03-29

## 2022-03-27 RX ORDER — LANOLIN ALCOHOL/MO/W.PET/CERES
3 CREAM (GRAM) TOPICAL
Status: DISCONTINUED | OUTPATIENT
Start: 2022-03-27 | End: 2022-04-01 | Stop reason: HOSPADM

## 2022-03-27 RX ADMIN — GABAPENTIN 300 MG: 300 CAPSULE ORAL at 09:15

## 2022-03-27 RX ADMIN — HALOPERIDOL 0.5 MG: 1 TABLET ORAL at 21:24

## 2022-03-27 RX ADMIN — METOPROLOL TARTRATE 25 MG: 25 TABLET, FILM COATED ORAL at 17:37

## 2022-03-27 RX ADMIN — ATORVASTATIN CALCIUM 10 MG: 10 TABLET, FILM COATED ORAL at 09:15

## 2022-03-27 RX ADMIN — PANTOPRAZOLE SODIUM 40 MG: 40 TABLET, DELAYED RELEASE ORAL at 09:15

## 2022-03-27 RX ADMIN — LEFLUNOMIDE 20 MG: 10 TABLET ORAL at 09:15

## 2022-03-27 RX ADMIN — DEXTROSE AND SODIUM CHLORIDE 50 ML/HR: 5; 450 INJECTION, SOLUTION INTRAVENOUS at 10:07

## 2022-03-27 RX ADMIN — METOPROLOL TARTRATE 25 MG: 25 TABLET, FILM COATED ORAL at 09:15

## 2022-03-27 RX ADMIN — HALOPERIDOL 0.5 MG: 1 TABLET ORAL at 02:01

## 2022-03-27 RX ADMIN — ARFORMOTEROL TARTRATE: 15 SOLUTION RESPIRATORY (INHALATION) at 07:39

## 2022-03-27 RX ADMIN — NORTRIPTYLINE HYDROCHLORIDE 50 MG: 25 CAPSULE ORAL at 21:24

## 2022-03-27 RX ADMIN — LATANOPROST 1 DROP: 50 SOLUTION/ DROPS OPHTHALMIC at 21:24

## 2022-03-27 RX ADMIN — ARFORMOTEROL TARTRATE: 15 SOLUTION RESPIRATORY (INHALATION) at 21:13

## 2022-03-27 RX ADMIN — MELATONIN 3 MG: at 21:24

## 2022-03-27 NOTE — PROGRESS NOTES
Transition of Care Plan:     RUR: N/A Observation status; pt is a readmit to hospital.  Disposition: Anticipating SNF - pt and son Pastora Martel provided with SNF list and requested to choose at least three top choices, please follow up to obtain choices tomorrow. - Pt will need insurance authorization to transition to SNF   Follow up appointments: PCP  DME needed: Pt owns a cane, walker, and shower chair. Transportation at Discharge: Medicaid transport vs son(s)? Keys or means to access home: Pending disposition         Medicare Letter: N/A - Observation status; [employer] notice signed 3/25/22  Is patient a BCPI-A Bundle: N/A                     If yes, was Bundle Letter given?:    Is patient a [place] and connected with the [place]? N/A                If yes, was [employer] transfer form completed and [facility] notified? Caregiver Contact: Pt's son, Wilma Mcdonnell [phone]  Discharge Caregiver contacted prior to discharge? Care Conference needed?:       Initial Note: Chart reviewed. DONNY attempted to meet with pt at bedside to obtain SNF choices. CM unable to obtain choices due to pt experiencing new confusion. CM phoned pt's son Juani Irby [phone] - family is agreeable to SNF. CM emailed SNF list to son and requested he select 3 facilities for referrals to be sent. Unit CM to follow on Monday 3/28.      VITALY Waters  Care Manager, [address] [place] [address]

## 2022-03-27 NOTE — ROUTINE PROCESS
[employer] Telesitter Monitor initiated on 3/26/2022 at 0913 for the following reason(s): Safety      Patient educated on use of camera and is in agreement.     If patient unable to verbalize understanding of camera necessity, the responsible party notified and educated:

## 2022-03-27 NOTE — PROGRESS NOTES
Hospitalist Progress Note    NAME: Alisia Campos   :  1945   MRN:  [id number]       Assessment / Plan:    Ambulatory dysfunction POA  -Likely due to fall and knee injury  -Right knee, foot, ankle x-ray and left knee x-ray negative for fractures  -Percocet for pain, continue gabapentin  -PT OT, SNF recommended  - working on placement     Elevated creatinine  POA -resolved   -Creatinine on discharge was 0.8  -Creatinine 1.3 on admission. S/p IV fluid. A1c at 0.7 today  -Serial labs. Avoid nephrotoxic medications     ?Diabetes mellitus type 2 with neuropathy  -On Metformin 500 mg daily at home. A1c 3 weeks ago was 4.6. A1c was 6.1 in 2019. Unclear if she has just prediabetes or diabetes  -Episodes of hypoglycemia. Discontinue Metformin. Check A1c  -Start D5 half-normal saline. Monitor blood glucose  -Continue home gabapentin     Hypertension  Dyslipidemia  Osteoarthritis  Peripheral arterial disease  History of rheumatoid arthritis  History of sarcoid  Obstructive sleep apnea  GERD  Depression  -Continue home metoprolol, Lipitor, PPI, Zoloft  -Resume Fred Dallas and leflunomide as available    Hallucination  -Per son this has been going on on and off since her fracture in 2021  -Patient noted to be talking to someone who was not in the room  -Maintain sleep-wake cycle. Avoid sedatives. We will add nightly melatonin. To minimize hospital delirium  -As needed Haldol  -Will benefit from outpatient evaluation with neurology      Right sacral pressure ulcer, POA  -Wound care consulted    Updated patient's son via phone    25.0 - 29.9 Overweight / Body mass index is 28.12 kg/m². Estimated discharge date: 3/26  Barriers:      Code Status: Full code  Surrogate Decision Maker: Candie Tima [phone]            DVT Prophylaxis: Lovenox  GI Prophylaxis: not indicated     Baseline:  Independent       Subjective:     Chief Complaint / Reason for Physician Visit   Discussed with RN events overnight. \" I feel better\"  Still having hallucinations. Oriented x2    Review of Systems:  Symptom Y/N Comments  Symptom Y/N Comments   Fever/Chills    Chest Pain     Poor Appetite    Edema     Cough    Abdominal Pain     Sputum    Joint Pain     SOB/WORKMAN    Pruritis/Rash     Nausea/vomit    Tolerating PT/OT     Diarrhea    Tolerating Diet     Constipation    Other       Could NOT obtain due to:      Objective:     VITALS:   Last 24hrs VS reviewed since prior progress note. Most recent are:  Patient Vitals for the past 24 hrs:   Temp Pulse Resp BP SpO2   03/27/22 0739 -- -- -- -- 98 %   03/27/22 0320 98 °F (36.7 °C) 80 18 (!) 153/74 100 %   03/26/22 2249 97.9 °F (36.6 °C) 75 18 (!) 166/60 99 %   03/26/22 2115 -- -- -- -- 99 %   03/26/22 2010 98.3 °F (36.8 °C) 70 18 (!) 154/63 100 %   03/26/22 1537 98.4 °F (36.9 °C) 74 18 139/64 99 %   03/26/22 1159 97.8 °F (36.6 °C) 64 18 (!) 147/67 98 %     No intake or output data in the 24 hours ending 03/27/22 0910     I had a face to face encounter and independently examined this patient on 3/27/2022, as outlined below:  PHYSICAL EXAM:  General: WD, WN. Alert, cooperative, no acute distress    EENT:  EOMI. Anicteric sclerae. MMM  Resp:  CTA bilaterally, no wheezing or rales. No accessory muscle use  CV:  Regular  rhythm,  No edema  GI:  Soft, Non distended, Non tender. +Bowel sounds  Neurologic:  Alert and oriented X 2, normal speech,   Psych:   Good insight. Not anxious nor agitated  Skin:  No rashes.   No jaundice, right buttock ulcer with clean red base    Reviewed most current lab test results and cultures  YES  Reviewed most current radiology test results   YES  Review and summation of old records today    NO  Reviewed patient's current orders and MAR    YES  PMH/SH reviewed - no change compared to H&P  ________________________________________________________________________  Care Plan discussed with:    Comments   Patient x    Family      RN x    Care Manager     Consultant                       x Multidiciplinary team rounds were held today with , nursing, pharmacist and clinical coordinator. Patient's plan of care was discussed; medications were reviewed and discharge planning was addressed. ________________________________________________________________________  Total NON critical care TIME:  30   Minutes    Total CRITICAL CARE TIME Spent:   Minutes non procedure based      Comments   >50% of visit spent in counseling and coordination of care     ________________________________________________________________________  Hansel Mariano MD     Procedures: see electronic medical records for all procedures/Xrays and details which were not copied into this note but were reviewed prior to creation of Plan. LABS:  I reviewed today's most current labs and imaging studies.   Pertinent labs include:  Recent Labs     03/27/22  0242 03/24/22  1554   WBC  --  6.6   HGB 9.9* 10.1*   HCT  --  31.3*   PLT  --  261     Recent Labs     03/27/22  0242 03/24/22  1554    141   K 4.4 4.7    109*   CO2 25 27   GLU 67 80   BUN 23* 31*   CREA 0.79 1.34*   CA 8.9 8.8   ALB  --  3.2*   TBILI  --  0.7   ALT  --  19       Signed: Hansel Mariano MD

## 2022-03-27 NOTE — PROGRESS NOTES
Following message sent to Dr. Alex Lord via ePub Direct: Can I have parameters for metoprolol? Current b/p is 118/56, and order is for 25mg bid, with a dose being due now. SBP has been 140s-150s since 03/26/2022       19:15. Bedside shift change report given to [facility] Hor[facility] RN (oncoming nurse) by Loretta Reyna (offgoing nurse). Report included the following information SBAR, Kardex, MAR and Recent Results. Metoprolol now has parameters.

## 2022-03-27 NOTE — PROGRESS NOTES
End of Shift Note    Bedside shift change report given to Javan Dong (oncoming nurse) by Kenisha Tong RN (offgoing nurse). Report included the following information SBAR    Shift worked:  7a-3p     Shift summary and any significant changes:     Patient had hypoglycemic episodes, improved after drinking a cup of juice. Still having some hallucinations, seeing people that's not in her room. Still has tele-sitter. Concerns for physician to address:  Please call the son, Ronn Escobedo, for an update     Zone phone for oncoming shift:   [phone]       Activity:  Activity Level: Up with Assistance,Chair  Number times ambulated in hallways past shift: 0  Number of times OOB to chair past shift: 1    Cardiac:   Cardiac Monitoring: No           Access:   Current line(s): PIV     Genitourinary:   Urinary status: voiding    Respiratory:   O2 Device: None (Room air)  Chronic home O2 use?: NO  Incentive spirometer at bedside: N/A       GI:  Last Bowel Movement Date: 03/27/22  Current diet:  ADULT DIET Regular; 4 carb choices (60 gm/meal); Low Fat/Low Chol/High Fiber/2 gm Na  ADULT ORAL NUTRITION SUPPLEMENT Dinner, HS Snack, Breakfast; Diabetic Supplement  DIET ONE TIME MESSAGE  Passing flatus: YES  Tolerating current diet: YES       Pain Management:   Patient states pain is manageable on current regimen: YES. No complaints of pain during this shift. Skin:  Jose Luis Score: 16  Interventions: float heels, increase time out of bed, foam dressing and PT/OT consult    Patient Safety:  Fall Score:  Total Score: 5  Interventions: bed/chair alarm, assistive device (walker, cane, etc), gripper socks, pt to call before getting OOB and stay with me (per policy)  High Fall Risk: Yes    Length of Stay:  Expected LOS: - - -  Actual LOS: [address] [facility] Demetrius Mistry, MEGAN

## 2022-03-27 NOTE — ROUTINE PROCESS
..    End of Shift Note    Bedside shift change report given to Sandee Pineda RN  (oncoming nurse) by Nedra Forrester RN (offgoing nurse). Report included the following information SBAR, Kardex, Intake/Output, MAR and Recent Results    Shift worked:  7p - 7a   Shift summary and any significant changes:    Patient experiencing auditory and visual Hallucinations. TeleSitter appointed. Haldol given. Patient awake all night.      Concerns for physician to address: None   Zone phone for oncoming shift:  [phone]     Patient Information  Kristen Rodrigez  78 y.o.  3/24/2022  1:15 PM by Renée Chicas MD. Kristen Rodrigez was admitted from Home    Problem List  Patient Active Problem List    Diagnosis Date Noted    Ambulatory dysfunction 03/24/2022    Emphysematous cystitis 68/97/8800    Complication of internal right knee prosthesis ([address] 11/09/2021    Type 2 diabetes mellitus without complication ([address] 79/65/3057    MGUS (monoclonal gammopathy of unknown significance) 11/22/2019    Severe obesity ([address] 05/07/2019    Seronegative rheumatoid arthritis of multiple sites ([address] 02/18/2019    Rheumatoid arthritis involving multiple sites ([address] 09/28/2018    Essential hypertension 09/28/2018    Mixed hyperlipidemia 09/28/2018    RLS (restless legs syndrome) 09/28/2018    Mild intermittent asthma 09/28/2018    Status post angioplasty with stent 09/28/2018    Coronary artery disease due to lipid rich plaque 09/28/2018    Long-term use of immunosuppressant medication 08/22/2018    Osteopenia of multiple sites 08/22/2018    Vitamin D deficiency 08/22/2018     Past Medical History:   Diagnosis Date    Asthma     Chronic pain 5/8/2020    DDD (degenerative disc disease), lumbar     Diabetes ([address]     Gastritis     GERD (gastroesophageal reflux disease)     Glaucoma     Hearing loss 5/8/2020    Hiatal hernia     High cholesterol     Hypertension     Hypocalcemia 5/8/2020    Peripheral vascular disease ([address]  RA (rheumatoid arthritis) (HCC)     Sarcoidosis 1999    MC[facility]    Sleep apnea     has not used cpap in years since weight loss       Core Measures:  CVA: No No  CHF:No No  PNA:No No    Activity:  Activity Level: Bed Rest      Cardiac:   Cardiac Monitoring: No          Access:   Current line(s): PIV PICC -   PICC LINE? Yes  - Access- Yes    Genitourinary:   Urinary status: voiding  Urinary Catheter? Respiratory:   O2 Device: None (Room air)  Chronic home O2 use?: NO  Incentive spirometer at bedside: NO       GI:  Last Bowel Movement Date: 03/25/22  Current diet:  ADULT DIET Regular; 4 carb choices (60 gm/meal); Low Fat/Low Chol/High Fiber/2 gm Na  ADULT ORAL NUTRITION SUPPLEMENT Dinner, HS Snack, Breakfast; Diabetic Supplement  DIET ONE TIME MESSAGE  Passing flatus: YES  Tolerating current diet: YES       Pain Management:   Patient states pain is manageable on current regimen: YES    Skin:  Jose Luis Score: 15  Interventions: increase time out of bed, PT/OT consult and nutritional support     Patient Safety:  Fall Score:  Total Score: 5  Interventions: bed/chair alarm, assistive device (walker, cane, etc), gripper socks, pt to call before getting OOB and stay with me (per policy), Telesitter  High Fall Risk: Yes  @Rollbelt  @dexterity to release roll belt  Yes/No ( must document dexterity  here by stating Yes or No here, otherwise this is a restraint and must follow restraint documentation policy.)    DVT prophylaxis:  DVT prophylaxis Med- No  DVT prophylaxis SCD or DANGELO- No     Wounds: (If Applicable)  Wounds- Yes  Location [place]    Active Consults:  IP CONSULT TO HOSPITALIST    Length of Stay:  Expected LOS:  Actual LOS: 0  Discharge Plan: Micheline Casey RN

## 2022-03-28 ENCOUNTER — APPOINTMENT (OUTPATIENT)
Dept: MRI IMAGING | Age: 77
End: 2022-03-28
Attending: PSYCHIATRY & NEUROLOGY
Payer: MEDICARE

## 2022-03-28 ENCOUNTER — APPOINTMENT (OUTPATIENT)
Dept: CT IMAGING | Age: 77
End: 2022-03-28
Attending: STUDENT IN AN ORGANIZED HEALTH CARE EDUCATION/TRAINING PROGRAM
Payer: MEDICARE

## 2022-03-28 LAB
ANION GAP SERPL CALC-SCNC: 2 MMOL/L (ref 5–15)
BUN SERPL-MCNC: 20 MG/DL (ref 6–20)
BUN/CREAT SERPL: 22 (ref 12–20)
CALCIUM SERPL-MCNC: 8.4 MG/DL (ref 8.5–10.1)
CHLORIDE SERPL-SCNC: 107 MMOL/L (ref 97–108)
CO2 SERPL-SCNC: 26 MMOL/L (ref 21–32)
CREAT SERPL-MCNC: 0.9 MG/DL (ref 0.55–1.02)
GLUCOSE BLD STRIP.AUTO-MCNC: 107 MG/DL (ref 65–117)
GLUCOSE BLD STRIP.AUTO-MCNC: 118 MG/DL (ref 65–117)
GLUCOSE BLD STRIP.AUTO-MCNC: 60 MG/DL (ref 65–117)
GLUCOSE BLD STRIP.AUTO-MCNC: 69 MG/DL (ref 65–117)
GLUCOSE BLD STRIP.AUTO-MCNC: 72 MG/DL (ref 65–117)
GLUCOSE BLD STRIP.AUTO-MCNC: 86 MG/DL (ref 65–117)
GLUCOSE SERPL-MCNC: 103 MG/DL (ref 65–100)
POTASSIUM SERPL-SCNC: 4.5 MMOL/L (ref 3.5–5.1)
SERVICE CMNT-IMP: ABNORMAL
SERVICE CMNT-IMP: ABNORMAL
SERVICE CMNT-IMP: NORMAL
SODIUM SERPL-SCNC: 135 MMOL/L (ref 136–145)
TSH SERPL DL<=0.05 MIU/L-ACNC: 1.48 UIU/ML (ref 0.36–3.74)
VIT B12 SERPL-MCNC: 129 PG/ML (ref 193–986)

## 2022-03-28 PROCEDURE — 70551 MRI BRAIN STEM W/O DYE: CPT

## 2022-03-28 PROCEDURE — 94640 AIRWAY INHALATION TREATMENT: CPT

## 2022-03-28 PROCEDURE — 82607 VITAMIN B-12: CPT

## 2022-03-28 PROCEDURE — 77030038269 HC DRN EXT URIN PURWCK BARD -A

## 2022-03-28 PROCEDURE — 77030040392 HC DRSG OPTIFOAM MDII -A

## 2022-03-28 PROCEDURE — 95816 EEG AWAKE AND DROWSY: CPT | Performed by: PSYCHIATRY & NEUROLOGY

## 2022-03-28 PROCEDURE — 77030041076 HC DRSG AG OPTICELL MDII -A

## 2022-03-28 PROCEDURE — 74178 CT ABD&PLV WO CNTR FLWD CNTR: CPT

## 2022-03-28 PROCEDURE — 74011000636 HC RX REV CODE- 636: Performed by: STUDENT IN AN ORGANIZED HEALTH CARE EDUCATION/TRAINING PROGRAM

## 2022-03-28 PROCEDURE — 84443 ASSAY THYROID STIM HORMONE: CPT

## 2022-03-28 PROCEDURE — 36415 COLL VENOUS BLD VENIPUNCTURE: CPT

## 2022-03-28 PROCEDURE — G0378 HOSPITAL OBSERVATION PER HR: HCPCS

## 2022-03-28 PROCEDURE — 99223 1ST HOSP IP/OBS HIGH 75: CPT | Performed by: PSYCHIATRY & NEUROLOGY

## 2022-03-28 PROCEDURE — 77030020847 HC STATLOK BARD -A

## 2022-03-28 PROCEDURE — 74011000250 HC RX REV CODE- 250: Performed by: STUDENT IN AN ORGANIZED HEALTH CARE EDUCATION/TRAINING PROGRAM

## 2022-03-28 PROCEDURE — 96376 TX/PRO/DX INJ SAME DRUG ADON: CPT

## 2022-03-28 PROCEDURE — 74011250637 HC RX REV CODE- 250/637: Performed by: INTERNAL MEDICINE

## 2022-03-28 PROCEDURE — 74011250637 HC RX REV CODE- 250/637: Performed by: STUDENT IN AN ORGANIZED HEALTH CARE EDUCATION/TRAINING PROGRAM

## 2022-03-28 PROCEDURE — 74011000250 HC RX REV CODE- 250: Performed by: INTERNAL MEDICINE

## 2022-03-28 PROCEDURE — 80048 BASIC METABOLIC PNL TOTAL CA: CPT

## 2022-03-28 PROCEDURE — 99223 1ST HOSP IP/OBS HIGH 75: CPT | Performed by: INTERNAL MEDICINE

## 2022-03-28 PROCEDURE — 82962 GLUCOSE BLOOD TEST: CPT

## 2022-03-28 RX ADMIN — METOPROLOL TARTRATE 25 MG: 25 TABLET, FILM COATED ORAL at 17:30

## 2022-03-28 RX ADMIN — NORTRIPTYLINE HYDROCHLORIDE 50 MG: 25 CAPSULE ORAL at 21:13

## 2022-03-28 RX ADMIN — MELATONIN 3 MG: at 21:13

## 2022-03-28 RX ADMIN — ATORVASTATIN CALCIUM 10 MG: 10 TABLET, FILM COATED ORAL at 08:35

## 2022-03-28 RX ADMIN — LEFLUNOMIDE 20 MG: 10 TABLET ORAL at 08:35

## 2022-03-28 RX ADMIN — DEXTROSE AND SODIUM CHLORIDE 50 ML/HR: 5; 450 INJECTION, SOLUTION INTRAVENOUS at 08:36

## 2022-03-28 RX ADMIN — ARFORMOTEROL TARTRATE: 15 SOLUTION RESPIRATORY (INHALATION) at 07:51

## 2022-03-28 RX ADMIN — PANTOPRAZOLE SODIUM 40 MG: 40 TABLET, DELAYED RELEASE ORAL at 08:35

## 2022-03-28 RX ADMIN — SERTRALINE 50 MG: 50 TABLET, FILM COATED ORAL at 08:35

## 2022-03-28 RX ADMIN — METOPROLOL TARTRATE 25 MG: 25 TABLET, FILM COATED ORAL at 08:35

## 2022-03-28 RX ADMIN — HALOPERIDOL 0.5 MG: 1 TABLET ORAL at 21:13

## 2022-03-28 RX ADMIN — GABAPENTIN 300 MG: 300 CAPSULE ORAL at 08:35

## 2022-03-28 RX ADMIN — IOPAMIDOL 100 ML: 755 INJECTION, SOLUTION INTRAVENOUS at 09:53

## 2022-03-28 RX ADMIN — LATANOPROST 1 DROP: 50 SOLUTION/ DROPS OPHTHALMIC at 21:14

## 2022-03-28 NOTE — PROGRESS NOTES
Transition of Care Plan:     RUR: N/A - \"Observation status\"  Disposition: Plan A) SNF placement (pending acceptance/insurance auth) vs Plan B) Home with HH, increased support/supervision, & f/u apts  Follow up appointments: PCP & specialist as indicated   DME needed: Pt owns DME necessary for d/c  Transportation at Discharge: CM to secure medical transport for d/c; PCS to be completed & placed on chart  Keys or means to access home: Pt's son has access to the home      IM Medicare Letter: N/A - Observation status; [employer] notice signed 3/25/22  Is patient a BCPI-A Bundle: N/A          Is patient a [place] and connected with the [facility]? N/A                Caregiver Contact: Pt's son, Guerrero Sun [phone]  Discharge Caregiver contacted prior to discharge? To be contacted prior to d/c  Care Conference needed?: TBD pending progress - CM will continue to follow to determine need for care conference     Initial note: CM reviewed pt's chart and completed IDR with medical team prior to moving forward with d/c planning. Attending MD reported pt is medically stable for d/c pending disposition re: SNF. Medical team aware pt will require insurance auth for SNF; updated PT/OT notes needed until auth is obtained. Weekend Jan HINES e-mailed SNF list to pt's son 3/27/22 & requested for him to identify at least 3 options for placement. CM met with pt and son Tiago Sellar: [phone]) at bedside, introduced role as d/c planner, & requested preferences for SNF. Upon conducting room visit, pt presented alert but displayed periods of intermittent confusion. CM noted tele-sitter present at bedside; pt will need to be tele-sitter free for at least 24 hrs prior to transitioning to SNF. CM will reiterate information to medical team to avoid further delay in d/c. Pt's son identified [facility] as preference for placement.  CM requested for son to work alongside his brother to have at least 3-4 additional preferences for SNF identified no later this afternoon; son verbalized understanding. FOC offered; signed copy placed on chart. CM inquired if pt and/or son had any immediate questions or concerns related to the d/c plan; both parties declined. CM will send referral via [employer] [address] to [address] for review; updates to be reported out once available. CM will continue to follow & remain accessible for d/c planning.     Sandi Lama, MSW  Care Manager, [address] [facility]

## 2022-03-28 NOTE — PROGRESS NOTES
Chart reviewed. Attempted to see pt for OT session this morning. Pt currently DANIELA for scan.  Will defer at this time.

## 2022-03-28 NOTE — PROCEDURES
EEG REPORT    Patient Name: Alicia Black  : 1945  Age: 78 y.o. Ordering physician: Dr. Charissa Solares  Date of EEG: 3/28/2022  12:02-12:24  Diagnosis: hallucinations  Interpreting physician: Renu Dias D.O. FAAN    Procedure: EEG    CLINICAL INDICATION: The patient is a 78 y.o. female who is being evaluated for baseline electro cerebral activities and to rule out seizure focus. Current Facility-Administered Medications   Medication Dose Route Frequency    dextrose 5 % - 0.45% NaCl infusion  75 mL/hr IntraVENous CONTINUOUS    melatonin tablet 3 mg  3 mg Oral QHS    haloperidoL (HALDOL) tablet 0.5 mg  0.5 mg Oral Q6H PRN    acetaminophen (TYLENOL) tablet 650 mg  650 mg Oral Q6H PRN    arformoterol 15 mcg/budesonide 0.25 mg neb solution   Nebulization BID RT    gabapentin (NEURONTIN) capsule 300 mg  300 mg Oral DAILY    latanoprost (XALATAN) 0.005 % ophthalmic solution 1 Drop  1 Drop Both Eyes QHS    leflunomide (ARAVA) tablet 20 mg  20 mg Oral DAILY    metoprolol tartrate (LOPRESSOR) tablet 25 mg  25 mg Oral BID    nortriptyline (PAMELOR) capsule 50 mg  50 mg Oral QHS    pantoprazole (PROTONIX) tablet 40 mg  40 mg Oral ACB    sertraline (ZOLOFT) tablet 50 mg  50 mg Oral DAILY    atorvastatin (LIPITOR) tablet 10 mg  10 mg Oral DAILY    oxyCODONE-acetaminophen (PERCOCET) 5-325 mg per tablet 1 Tablet  1 Tablet Oral Q6H PRN    albuterol (PROVENTIL VENTOLIN) nebulizer solution 2.5 mg  2.5 mg Nebulization Q4H PRN           DESCRIPTION OF PROCEDURE:     This is a digitally recorded electroencephalogram  Electrodes were applied in accordance with the international 10-20 system of electrode placement. 18 channels of scalp EEG are recorded  A channel was used for EoG  Another channel was used for ECG   The data is stored digitally and reviewed in reformatted montages for optimal display  EEG  was reviewed in both bipolar and referential montages    Description of Activity:     The background of this recording contains a low amplitude posteriorly-located occipital alpha rhythm of 9-10 hz that attenuates with eye opening. This was seen maximally over the posterior head region and was symmetric. There was a small amount of beta activity seen anteriorly. Throughout the recording, there were no clear areas of focal slowing nor spike or spike-and-wave discharges seen. Hyperventilation was not performed. Photic stimulation produced minimal driving response in the posterior head regions at mid frequency ranges. During drowsiness, there is attenuation of the underlying  frequency and slower theta activity occurs. During the recording, the patient did mot achieve stage II sleep        Clinical Interpretation: This EEG, performed during wakefulness and drowsiness, is normal.  There was no clear focal abnormalities or epileptiform activity. A normal EEG doesn't not rule out seizures. Clinical correlation recommended. Ari Dias D.O.   Daniel Gupta

## 2022-03-28 NOTE — PROGRESS NOTES
Physical Therapy    Chart reviewed. Attempted to see pt for PT session this morning. Pt currently DANIELA for scan. Will defer at this time.     Lawyer Rothman PT, DPT, NCS

## 2022-03-28 NOTE — CONSULTS
Neurology Note    Patient ID:  Booker Verduzco  [id number]  53 y.o.  1945      Date of Consultation:  March 28, 2022    Referring Physician: Dr. Farhana Encarnacion    Reason for Consultation:  hallucinations      Assessment and Plan:    The patient is a pleasant 72-year-old female admitted to the hospital with foot and leg pain after a fall who has had intermittent bouts of hallucinations with progressive cognitive decline. Her examination does reveal cognitive impairment and a mild peripheral neuropathy. Intermittent visual hallucinations with cognitive impairment:  The differential for this does include an underlying dementia. This may be a Alzheimer's dementia, vascular dementia, or a Lewy body dementia. Also on differential would be an acute stroke, intermittent seizures,  and metabolic derangements. Prior MRI from 2 years ago did reveal significant atrophy and chronic periventricular micro ischemic changes. I would recommend the patient have a brain MRI. Order placed  I will order an eeg  I will also obtain a vitamin B12 level and thyroid panel. The patient's risk factors for stroke to include possible diabetes, hypertension, dyslipidemia  Ensure that lifestyle and hospital modifications to minimize the presence of sundowning have been implemented. Minimize cognitively impacting medications    Peripheral neuropathy:  She has multiple medical conditions that can lead to a neuropathy including sarcoid, rheumatoid arthritis, diabetes  This will be followed clinically for the time being. Vitamin B12 level has been checked. The patient may need an EMG/nerve conduction study as an outpatient after discharge. Peripheral arterial disease  Sarcoid  Sleep apnea  Reflux disease  Depression  Arthritis    Neurology will continue to follow him closely in this complicated patient.            Subjective: I fell and my leg hurt     History of Present Illness:   Booker Verduzco is a 78 y.o. female with a history of rheumatoid arthritis, chronic pain who was recently discharged from the hospital 3 weeks ago after having developed a cystitis and urinary tract infection. She was readmitted to the hospital on March 24, 2022 after having had a fall while getting out of bed hitting her right knee. She was having a difficult time with ambulating afterwards and was admitted to the hospital.  She was given pain medication. She was also continued on her gabapentin. She was noted to have an elevated creatinine upon admission. It appears that it was noted that she was having hallucination. From reviewing the records, it appeared this had been on and off since November 2021. The patient was noted to be talking to someone who is not in the room. The patient was started on melatonin at bedtime to minimize hospital delirium. The patient's son was at the bedside today and provided additional information. The patient has been having cognitive concerns dating back for many months now. She does live with her other son. It appears there has been difficulties with her cognition and intermittent hallucinations that they have noted. She does need some assistance with help around the house. They have noticed that she is having more difficulty around the house not only with movements but also with her thinking    I did review a brain MRI that she had had performed approximately 2 years ago in September 2020. There was mild generalized volume loss with mild to moderate chronic microvascular disease.     Past Medical History:   Diagnosis Date    Asthma     Chronic pain 5/8/2020    DDD (degenerative disc disease), lumbar     Diabetes ([address]     Gastritis     GERD (gastroesophageal reflux disease)     Glaucoma     Hearing loss 5/8/2020    Hiatal hernia     High cholesterol     Hypertension     Hypocalcemia 5/8/2020    Peripheral vascular disease (HCC)     RA (rheumatoid arthritis) ([address]     Sarcoidosis 1999    [facility]  Sleep apnea     has not used cpap in years since weight loss        Past Surgical History:   Procedure Laterality Date    HX GASTRIC BYPASS      HX HEART CATHETERIZATION      s/p PCI with stenting in 1998     HX KNEE REPLACEMENT Bilateral     HX ORTHOPAEDIC Bilateral     carpal tunnel surgery     HX SHOULDER REPLACEMENT Right     x 2         Family History   Problem Relation Age of Onset    Heart Disease Mother     Liver Disease Father     Alcohol abuse Brother     Dementia Neg Hx     Cancer Neg Hx     Seizures Neg Hx         Social History     Tobacco Use    Smoking status: Never Smoker    Smokeless tobacco: Never Used   Substance Use Topics    Alcohol use: Yes     Alcohol/week: 2.0 standard drinks     Types: 1 Glasses of wine, 1 Shots of liquor per week     Comment: 2-3 yearly        Allergies   Allergen Reactions    Lisinopril Rash    Methotrexate Hives        Prior to Admission medications    Medication Sig Start Date End Date Taking? Authorizing Provider   0.9% sodium chloride solution 10 mL by IntraVENous route daily. before and after IV antibiotic using SASH method    Provider, Historical   heparin sod,porcine/0.9 % NaCl (HEPARIN FLUSH IV) 5 mL by IntraVENous route daily. after antibiotic using SASH method    Provider, Historical   ertapenem [facility]) 1 gram injection 1 g by IntraVENous route daily. Provider, Historical   traMADoL (ULTRAM) 50 mg tablet Take 50 mg by mouth every six (6) hours as needed for Pain. Provider, Historical   mupirocin (BACTROBAN) 2 % ointment Apply 1 Tube to affected area daily. 1 application right 2nd toe 2/14/22   Provider, Historical   albuterol sulfate (PROVENTIL;VENTOLIN) 2.5 mg/0.5 mL nebu nebulizer solution USE 1 VIAL IN NEBULIZER EVERY 4 HOURS AS NEEDED FOR WHEEZING 2/11/22   Lupe Sauceda MD   simvastatin (ZOCOR) 20 mg tablet TAKE BY MOUTH NIGHTLY. Patient taking differently: Take 20 mg by mouth nightly. TAKE BY MOUTH NIGHTLY.  10/26/21   Mark Anthony Guerrier MD   sertraline (ZOLOFT) 50 mg tablet Take 1 Tablet by mouth daily. 10/26/21   Prema Leyva MD   omeprazole (PRILOSEC) 20 mg capsule Take 1 Capsule by mouth daily. 10/26/21   Prema Leyva MD   metoprolol tartrate (LOPRESSOR) 25 mg tablet Take 1 Tablet by mouth two (2) times a day. 10/26/21   Prema Leyva MD   metFORMIN (GLUCOPHAGE) 500 mg tablet Take 1 Tablet by mouth daily (with dinner). Decreased dose  Patient not taking: Reported on 3/17/2022 10/26/21   Prema Leyva MD   latanoprost (XALATAN) 0.005 % ophthalmic solution INSTILL 1 DROP INTO BOTH EYES NIGHTLY  Patient taking differently: Administer 1 Drop to both eyes nightly. INSTILL 1 DROP INTO BOTH EYES NIGHTLY 10/26/21   Prema Leyva MD   gabapentin (NEURONTIN) 300 mg capsule TAKE 1 CAPSULE IN THE MORNING AND TAKE 3 CAPSULES BEFORE BEDTIME  Patient taking differently: Take 300 mg by mouth daily. TAKE 1 CAPSULE IN THE MORNING AND TAKE 3 CAPSULES BEFORE BEDTIME 10/26/21   Prema Leyva MD   ergocalciferol (ERGOCALCIFEROL) 1,250 mcg (50,000 unit) capsule Take 1 Capsule by mouth every seven (7) days. Indications: vitamin D deficiency (high dose therapy)  Patient taking differently: Take 50,000 Units by mouth every seven (7) days. saturdays  Indications: vitamin D deficiency (high dose therapy) 10/26/21   Prema Leyva MD   fluticasone furoate-vilanteroL (Breo Ellipta) 100-25 mcg/dose inhaler TAKE 1 PUFF BY MOUTH EVERY DAY  Patient taking differently: Take 1 Puff by inhalation daily. TAKE 1 PUFF BY MOUTH EVERY DAY 10/26/21   Prema Leyva MD   leflunomide (ARAVA) 20 mg tablet TAKE 1 TABLET BY MOUTH EVERY DAY  Patient taking differently: Take 20 mg by mouth daily. TAKE 1 TABLET BY MOUTH EVERY DAY 8/31/21   Kirstin Aldana MD   nortriptyline (PAMELOR) 50 mg capsule Take 50 mg by mouth nightly.  5/7/18   Provider, Historical     Current Facility-Administered Medications   Medication Dose Route Frequency    dextrose 5 % - 0.45% NaCl infusion  50 mL/hr IntraVENous CONTINUOUS    melatonin tablet 3 mg  3 mg Oral QHS    haloperidoL (HALDOL) tablet 0.5 mg  0.5 mg Oral Q6H PRN    acetaminophen (TYLENOL) tablet 650 mg  650 mg Oral Q6H PRN    arformoterol 15 mcg/budesonide 0.25 mg neb solution   Nebulization BID RT    gabapentin (NEURONTIN) capsule 300 mg  300 mg Oral DAILY    latanoprost (XALATAN) 0.005 % ophthalmic solution 1 Drop  1 Drop Both Eyes QHS    leflunomide (ARAVA) tablet 20 mg  20 mg Oral DAILY    metoprolol tartrate (LOPRESSOR) tablet 25 mg  25 mg Oral BID    nortriptyline (PAMELOR) capsule 50 mg  50 mg Oral QHS    pantoprazole (PROTONIX) tablet 40 mg  40 mg Oral ACB    sertraline (ZOLOFT) tablet 50 mg  50 mg Oral DAILY    atorvastatin (LIPITOR) tablet 10 mg  10 mg Oral DAILY    oxyCODONE-acetaminophen (PERCOCET) 5-325 mg per tablet 1 Tablet  1 Tablet Oral Q6H PRN    albuterol (PROVENTIL VENTOLIN) nebulizer solution 2.5 mg  2.5 mg Nebulization Q4H PRN       Review of Systems:    General, constitutional: negative  Eyes, vision: negative  Ears, nose, throat: negative  Cardiovascular, heart: negative  Respiratory: negative  Gastrointestinal: negative  Genitourinary: negative  Musculoskeletal: Leg pain  Skin and integumentary: negative  Psychiatric: negative  Endocrine: negative  Neurological: negative, except for HPI  Hematologic/lymphatic: negative  Allergy/immunology: negative    Objective:     Visit Vitals  /76 (BP 1 Location: Left upper arm, BP Patient Position: Supine)   Pulse 80   Temp 97.9 °F (36.6 °C)   Resp 16   Ht 5' 5\" (1.651 m)   Wt 169 lb (76.7 kg)   SpO2 100%   BMI 28.12 kg/m²       Physical Exam:    General:  appears well nourished in no acute distress  Neck: no carotid bruits  Lungs: clear to auscultation  Heart:  no murmurs, regular rate  Lower extremity: peripheral pulses palpable and no edema  Skin: intact.   As noted from prior surgery    Neurological exam:    The patient is awake and alert. She is oriented to person and place. She did know the correct year. She could perform simple calculations. She could state the days of the week. She could name objects correctly. She had a a difficult time with similarities and differences. She has a poor understanding of her medical health. She has a decreased fund of knowledge. She is easily confused    Cranial nerves:   II-XII were tested    Perrrla  Fundoscopic examination revealed venous pulsations and no clear abnormalities  Visual fields were full  Eomi, no evidence of nystagmus  Facial sensation:  normal and symmetric  Facial motor: normal and symmetric  Hearing intact  SCM strength intact  Tongue: midline without fasciculations    Motor: Tone normal  Pronator drift is absent  No evidence of fasciculations    Strength testing:   deltoid triceps biceps Wrist ext. Wrist flex. intrinsics Hip flex. Hip ext. Knee ext. Knee flex Dorsi flex Plantar flex   Right 5 5 5 5 5 5 4 4 5 5 5 5   Left 5 5 5 5 5 5 4 4 5 5 5 5         Sensory:  Upper extremity: intact to pp, light touch, and vibration > 10 seconds  Lower extremity: Decreased pinprick to the level of mid shin. Vibration was present for 5 seconds at her ankle    Reflexes:    Right Left  Biceps  2 2  Triceps 2 2  Brachiorad.  2 2  Patella  1 1  Achilles - -    Plantar response:  flexor bilaterally    Cerebellar testing:  no tremor apparent, finger/nose and juve were intact    Gait: This was not assessed due to concerns over safety  Labs:     Lab Results   Component Value Date/Time    Hemoglobin A1c 4.6 03/04/2022 11:50 AM    Sodium 135 (L) 03/28/2022 01:26 AM    Potassium 4.5 03/28/2022 01:26 AM    Chloride 107 03/28/2022 01:26 AM    Glucose 103 (H) 03/28/2022 01:26 AM    BUN 20 03/28/2022 01:26 AM    Creatinine 0.90 03/28/2022 01:26 AM    Calcium 8.4 (L) 03/28/2022 01:26 AM    WBC 6.6 03/24/2022 03:54 PM    HCT 31.3 (L) 03/24/2022 03:54 PM    HGB 9.9 (L) 03/27/2022 02:42 AM PLATELET 716 65/04/2555 03:54 PM       Imaging:    Results from Hospital Encounter encounter on 09/17/21    MRI LUMB SPINE W WO CONT    Narrative  EXAM: MRI LUMB SPINE W WO CONT    INDICATION: . Radiculopathy, lumbar region    COMPARISON: None    TECHNIQUE: MR imaging of the lumbar spine was performed using the following  sequences: sagittal T1, T2, STIR;  axial T1, T2 prior to and following contrast  administration. CONTRAST 17 cc IV ProHance. FINDINGS:  Transitional lumbar sacral segment. Grade 1 spondylolisthesis L4-5. The conus appears unremarkable. No evidence for bone marrow replacement. T12-L1 shows no focal findings. L1-L2 shows no focal findings. L2-L3 shows some mild disc bulging some mild facet disease but no definite canal  compromise. L3-L4 shows some mild central canal stenosis, there is facet hypertrophy and  disc bulging. L4-5 show severe central canal stenosis. L5-S1 shows mild central canal stenosis. No definite lateralization. Impression  Paraspinal stenosis L4-5. Likely transitional lumbar sacral segment. Results from [place] encounter on 02/28/22    CT ABD PELV WO CONT    Narrative  EXAM: CT ABD PELV WO CONT    INDICATION: painful hematuria    COMPARISON: 4/2/2021    IV CONTRAST: None. ORAL CONTRAST: None    TECHNIQUE:  Thin axial images were obtained through the abdomen and pelvis. Coronal and  sagittal reformats were generated. CT dose reduction was achieved through use of  a standardized protocol tailored for this examination and automatic exposure  control for dose modulation. The absence of intravenous contrast material reduces the sensitivity for  evaluation of the vasculature and solid organs. FINDINGS:  LOWER THORAX: Small hiatal hernia. Extensive coronary artery calcifications. No  consolidation  LIVER: No mass. BILIARY TREE: Gallbladder is within normal limits. CBD is not dilated. SPLEEN: within normal limits.   PANCREAS: No focal abnormality. ADRENALS: Unremarkable. KIDNEYS/URETERS: Mild perinephric stranding on the left. Possible small cyst  left kidney  STOMACH: Unremarkable. SMALL BOWEL: No dilatation or wall thickening. COLON: No dilatation or wall thickening. APPENDIX: Partially visualized. The visualized portions are not inflamed. PERITONEUM: No ascites or pneumoperitoneum. RETROPERITONEUM: Severe vascular calcifications without aneurysm. No pathologic  adenopathy  REPRODUCTIVE ORGANS: Not enlarged  URINARY BLADDER: Gas within the wall of the bladder which is incompletely  distended  BONES: No destructive bone lesion. ABDOMINAL WALL: No mass or hernia. ADDITIONAL COMMENTS: N/A    Impression  Cast within the wall of the bladder compatible with emphysematous cystitis        Complex decision making was necessary due to the patient's current medical condition and other medical co-morbidities.        Active Problems:    Ambulatory dysfunction (3/24/2022)                   Signed By:  Ady Waters DO FAAN    March 28, 2022

## 2022-03-28 NOTE — PROGRESS NOTES
Hospitalist Progress Note    NAME: Victorina Neves   :  1945   MRN:  [id number]       Assessment / Plan:    Ambulatory dysfunction POA  Peripheral neuropathy  -Likely due to recent fall/ knee injury, peripheral neuropathy  -Right knee, foot, ankle x-ray and left knee x-ray negative for fractures  -Percocet for pain, continue gabapentin  -PT OT, SNF recommended  - working on placement     Elevated creatinine  POA -resolved   -Creatinine 1.3 on admission. S/p IV fluid. Cr back to normal     Diabetes mellitus type 2   Hx of gastric bypass surgery. Used to weigh >300lbs  -On Metformin 500 mg daily at home. A1c 3 weeks ago was 4.6. A1c was 6.1 in 2019.   -Episodes of hypoglycemia. Metformin discontinued  -Continue D5 half-normal saline. Monitor blood glucose. Repeat A1c    Recent emphysematous cystitis and ESBL Klebsiella pneumoniae UTI: 2022  -Was discharged on IV ertapenem and was supposed to continue until 3/28 (duration was prolonged at due to patient having persistent symptoms)  -Has not received any ertapenem since admission  -Has PICC line from recent admission  -CT abdomen/pelvis with contrast today shows resolution of emphysematous cystitis. Nonspecific bilateral perinephric stranding. -ID consulted. Remove PICC line if okay with ID    Hypertension  Dyslipidemia  Osteoarthritis  Peripheral arterial disease  History of rheumatoid arthritis  History of sarcoid  Obstructive sleep apnea  GERD  Depression  -Continue home metoprolol, Lipitor, PPI, Zoloft  -Resume Albertoirma [place] and leflunomide as available    Visual hallucinations  -Per son this has been going on intermittently since her knee fracture in 2021  -Neurology consulted. Appreciate input. Follow-up on brain MRI and EEG  -Maintain sleep-wake cycle. Avoid sedatives. Continue melatonin nightly.         Right sacral skin tear, POA  -Wound care consulted    Updated patient's son via phone    25.0 - 29.9 Overweight / Body mass index is 28.12 kg/m². Updated patient's son Rena Dutta 3/28    Estimated discharge date: 3/29  Barriers: Clinical improvement, placement    Code Status: Full code  Surrogate Decision Maker: Chelly Hwang [phone]            DVT Prophylaxis: Lovenox  GI Prophylaxis: not indicated     Baseline: Independent       Subjective:     Chief Complaint / Reason for Physician Visit   Discussed with RN events overnight. No acute events overnight  Seen after she got back from CT  Alert and oriented  Son at bedside     Review of Systems:  Symptom Y/N Comments  Symptom Y/N Comments   Fever/Chills    Chest Pain     Poor Appetite    Edema     Cough    Abdominal Pain     Sputum    Joint Pain     SOB/WORKMAN    Pruritis/Rash     Nausea/vomit    Tolerating PT/OT     Diarrhea    Tolerating Diet     Constipation    Other       Could NOT obtain due to:      Objective:     VITALS:   Last 24hrs VS reviewed since prior progress note. Most recent are:  Patient Vitals for the past 24 hrs:   Temp Pulse Resp BP SpO2   03/28/22 1245 97.4 °F (36.3 °C) 67 18 (!) 140/70 99 %   03/28/22 0824 97.9 °F (36.6 °C) 80 16 136/76 100 %   03/28/22 0752 -- -- -- -- 98 %   03/28/22 0330 97.3 °F (36.3 °C) 77 18 (!) 180/86 93 %   03/27/22 2115 -- -- -- -- 99 %   03/27/22 1956 98.2 °F (36.8 °C) 82 18 121/65 99 %   03/27/22 1745 97.5 °F (36.4 °C) 79 18 (!) 118/56 94 %       Intake/Output Summary (Last 24 hours) at 3/28/2022 1427  Last data filed at 3/27/2022 1630  Gross per 24 hour   Intake 319.17 ml   Output --   Net 319.17 ml        I had a face to face encounter and independently examined this patient on 3/28/2022, as outlined below:  PHYSICAL EXAM:  General: WD, WN. Alert, cooperative, no acute distress    EENT:  EOMI. Anicteric sclerae. MMM  Resp:  CTA bilaterally, no wheezing or rales. No accessory muscle use  CV:  Regular  rhythm,  No edema  GI:  Soft, Non distended, Non tender.   +Bowel sounds  Neurologic:  Alert and oriented X 2, normal speech,   Psych:   Good insight. Not anxious nor agitated  Skin:  No rashes. No jaundice, right buttock ulcer with clean red base    Reviewed most current lab test results and cultures  YES  Reviewed most current radiology test results   YES  Review and summation of old records today    NO  Reviewed patient's current orders and MAR    YES  PMH/SH reviewed - no change compared to H&P  ________________________________________________________________________  Care Plan discussed with:    Comments   Patient x    Family      RN x    Care Manager     Consultant                       x Multidiciplinary team rounds were held today with , nursing, pharmacist and clinical coordinator. Patient's plan of care was discussed; medications were reviewed and discharge planning was addressed. ________________________________________________________________________  Total NON critical care TIME:  30   Minutes    Total CRITICAL CARE TIME Spent:   Minutes non procedure based      Comments   >50% of visit spent in counseling and coordination of care     ________________________________________________________________________  Dakota Duenas MD     Procedures: see electronic medical records for all procedures/Xrays and details which were not copied into this note but were reviewed prior to creation of Plan. LABS:  I reviewed today's most current labs and imaging studies.   Pertinent labs include:  Recent Labs     03/27/22 0242   HGB 9.9*     Recent Labs     03/28/22  0126 03/27/22 0242   * 137   K 4.5 4.4    107   CO2 26 25   * 67   BUN 20 23*   CREA 0.90 0.79   CA 8.4* 8.9       Signed: Dakota Duenas MD

## 2022-03-28 NOTE — WOUND CARE
Wound Care consult for the large skin tear on the left side of the Sacrum / buttocks:  Chart reviewed and patient assessed today. Pt. Is 78years old and Was admitted on 3-24-22 for ambulatory dysfunction. She has peripheral neuropathy and Diabetes type 2 with an A1C of 4.6 at the beginning of March. Pt.'s son was visiting at the time of this exam and confirmed that patient had this wound on admission. PtWisam Delatorre Orn that is \"happened\" when she was last in the hospital when she fell (?). Assessment:  Pt. Is alert, oriented x 3 and answers simple questions so far. The dressing was removed from the wound and noted to be completely pink and healing and completely blanchable with some macerated edges. THIS is NOT a Pressure injury so should not be staged. This is a partial thickness skin tear. 3-28-22: skin tear to left butt / sacrum:      Treatment: The wound was cleansed with NS and wiped with gauze. Opticel Ag dressing applied dry on the wound and then covered with a small 7 x 7 sacrum dressing dated for today. Plan: Wrote orders for the wound care to be done daily by nursing. Remove diapers and make sure the Pure wick is in place properly with the chux under her. No diaper brief is needed to keep the purewick in place. Encourage good nutrition for healing and good hydration.   Modesto Sawyer, RN, BSN, [employer]

## 2022-03-28 NOTE — PROGRESS NOTES
End of Shift Note    Bedside shift change report given to anup  (oncoming nurse) by Jackie Luna RN (offgoing nurse).   Report included the following information SBAR    Shift worked:  nights   Shift summary and any significant changes:     still hallucinating, haldol given x1        Concerns for physician to address:  son is wanting to speak with MD    Zone phone for oncoming shift:        Patient Information  Mahsa Diaz  78 y.o.  3/24/2022  1:15 PM by Liliana Dominique MD. Mahsa Diaz was admitted from Home    Problem List  Patient Active Problem List    Diagnosis Date Noted    Ambulatory dysfunction 03/24/2022    Emphysematous cystitis 44/50/9003    Complication of internal right knee prosthesis ([address] 11/09/2021    Type 2 diabetes mellitus without complication ([address] 21/01/6451    MGUS (monoclonal gammopathy of unknown significance) 11/22/2019    Severe obesity ([address] 05/07/2019    Seronegative rheumatoid arthritis of multiple sites ([address] 02/18/2019    Rheumatoid arthritis involving multiple sites ([address] 09/28/2018    Essential hypertension 09/28/2018    Mixed hyperlipidemia 09/28/2018    RLS (restless legs syndrome) 09/28/2018    Mild intermittent asthma 09/28/2018    Status post angioplasty with stent 09/28/2018    Coronary artery disease due to lipid rich plaque 09/28/2018    Long-term use of immunosuppressant medication 08/22/2018    Osteopenia of multiple sites 08/22/2018    Vitamin D deficiency 08/22/2018     Past Medical History:   Diagnosis Date    Asthma     Chronic pain 5/8/2020    DDD (degenerative disc disease), lumbar     Diabetes ([address]     Gastritis     GERD (gastroesophageal reflux disease)     Glaucoma     Hearing loss 5/8/2020    Hiatal hernia     High cholesterol     Hypertension     Hypocalcemia 5/8/2020    Peripheral vascular disease ([address]     RA (rheumatoid arthritis) ([address]     Sarcoidosis 1999    MCV    Sleep apnea     has not used cpap in years since weight loss       Core Measures:  CVA: No No  CHF:No No  PNA:No No    Activity:  Activity Level: Up with Assistance  Number times ambulated in hallways past shift: 0  Number of times OOB to chair past shift: 0    Cardiac:   Cardiac Monitoring: No           Access:   Current line(s): PIV   Central Line? No Placement date  Reason Medically Necessary   PICC LINE? No Placement date Reason Medically Necessary     Genitourinary:   Urinary status: incontinent  Urinary Catheter? No Placement Date  Reason Medically Necessary     Respiratory:   O2 Device: None (Room air)  Chronic home O2 use?: NO  Incentive spirometer at bedside: NO       GI:  Last Bowel Movement Date: 03/27/22  Current diet:  ADULT DIET Regular; 4 carb choices (60 gm/meal); Low Fat/Low Chol/High Fiber/2 gm Na  ADULT ORAL NUTRITION SUPPLEMENT Dinner, HS Snack, Breakfast; Diabetic Supplement  DIET ONE TIME MESSAGE  Passing flatus: YES  Tolerating current diet: YES       Pain Management:   Patient states pain is manageable on current regimen: YES    Skin:  Jose Luis Score: 16  Interventions: increase time out of bed    Patient Safety:  Fall Score:  Total Score: 5  Interventions: bed/chair alarm  High Fall Risk: Yes  @Rollbelt  @dexterity to release roll belt  Yes/No ( must document dexterity  here by stating Yes or No here, otherwise this is a restraint and must follow restraint documentation policy.)    DVT prophylaxis:  DVT prophylaxis Med- Yes  DVT prophylaxis SCD or DANGELO- No     Wounds: (If Applicable)  Wounds- Yes  Location stage 2 sacrum     Active Consults:  IP CONSULT TO HOSPITALIST    Length of Stay:  Expected LOS: - - -  Actual LOS: 0  Discharge Plan: Yes snf      Lavon Gatica RN

## 2022-03-28 NOTE — PROGRESS NOTES
End of Shift Note    Bedside shift change report given to [facility] (oncoming nurse) by Antoine Kate (offgoing nurse). Report included the following information SBAR, Kardex, Procedure Summary, Intake/Output, MAR and Recent Results    Shift worked:  7a-7p   Shift summary and any significant changes:     Patient was alert and oriented x3, periodic confusion. Patient had CT, EEG, and MRI complete. Results are in the patient's chart. Patient stated she was missing change purse. Patient was encouraged to put purse along with cash inside (roughly 26$) with security but wanted it with her at the bedside. Patient also has hearing aids with her at the bedside and wanted to keep them in her purse.         Concerns for physician to address:  None   Zone phone for oncoming shift:   [phone]     Patient Information  Raquel Licea  78 y.o.  3/24/2022  1:15 PM by Saloni Gould MD. Raquel Licea was admitted from Home    Problem List  Patient Active Problem List    Diagnosis Date Noted    Ambulatory dysfunction 03/24/2022    Emphysematous cystitis 75/76/8942    Complication of internal right knee prosthesis ([address] 11/09/2021    Type 2 diabetes mellitus without complication ([address] 35/98/1737    MGUS (monoclonal gammopathy of unknown significance) 11/22/2019    Severe obesity ([address] 05/07/2019    Seronegative rheumatoid arthritis of multiple sites ([address] 02/18/2019    Rheumatoid arthritis involving multiple sites ([address] 09/28/2018    Essential hypertension 09/28/2018    Mixed hyperlipidemia 09/28/2018    RLS (restless legs syndrome) 09/28/2018    Mild intermittent asthma 09/28/2018    Status post angioplasty with stent 09/28/2018    Coronary artery disease due to lipid rich plaque 09/28/2018    Long-term use of immunosuppressant medication 08/22/2018    Osteopenia of multiple sites 08/22/2018    Vitamin D deficiency 08/22/2018     Past Medical History:   Diagnosis Date    Asthma     Chronic pain 5/8/2020  DDD (degenerative disc disease), lumbar     [facility]     Gastritis     GERD (gastroesophageal reflux disease)     Glaucoma     Hearing loss 5/8/2020    Hiatal hernia     High cholesterol     Hypertension     Hypocalcemia 5/8/2020    Peripheral vascular disease (HCC)     RA (rheumatoid arthritis) (HCC)     Sarcoidosis 1999    MCV    Sleep apnea     has not used cpap in years since weight loss       Core Measures:  CVA: No No  CHF:No No  PNA:No No    Activity:  Activity Level: Bed Rest  Number times ambulated in hallways past shift: 0  Number of times OOB to chair past shift: 0    Cardiac:   Cardiac Monitoring: No           Access:   Current line(s): PIV and PICC   Central Line? No  PICC LINE? Yes Placement date prior to admission. Genitourinary:   Urinary status: voiding, incontinent and external catheter  Urinary Catheter? No     Respiratory:   O2 Device: None (Room air)  Chronic home O2 use?: NO  Incentive spirometer at bedside: NO       GI:  Last Bowel Movement Date: 03/27/22  Current diet:  ADULT DIET Regular; 4 carb choices (60 gm/meal); Low Fat/Low Chol/High Fiber/2 gm Na  ADULT ORAL NUTRITION SUPPLEMENT Dinner, HS Snack, Breakfast; Diabetic Supplement  DIET ONE TIME MESSAGE  Passing flatus: YES  Tolerating current diet: YES       Pain Management:   Patient states pain is manageable on current regimen: YES    Skin:  Jose Luis Score: 16  Interventions: float heels, increase time out of bed, limit briefs and internal/external urinary devices    Patient Safety:  Fall Score:  Total Score: 5  Interventions: bed/chair alarm, assistive device (walker, cane, etc), pt to call before getting OOB and stay with me (per policy)  High Fall Risk: Yes  @Rollbelt  @dexterity to release roll belt  Yes/No ( must document dexterity  here by stating Yes or No here, otherwise this is a restraint and must follow restraint documentation policy.)    DVT prophylaxis:  DVT prophylaxis Med- Yes  DVT prophylaxis SCD or DANGELO- No     Wounds: (If Applicable)  Wounds- Yes  Stage 2 sacrum    Active Consults:  IP CONSULT TO HOSPITALIST  IP CONSULT TO NEUROLOGY  IP CONSULT TO INFECTIOUS DISEASES    Length of Stay:  Expected LOS: - - -  Actual LOS: 0  Discharge Plan: Yes, Pending acceptance to [facility].       Franny Staley

## 2022-03-28 NOTE — CONSULTS
Infectious Disease Consult    Date of Consultation:  March 28, 2022  Date of Admission: 3/24/2022   Referring Physician:  Dr Gordon Coyle:     Patient is a 78 y.o. female who is being seen for . \"Recent hx of emphysematous cystitis\"        IMPRESSION:      H/o Emphysematous cystitis, acute pyelonephritis  CT abdomen/pelvis-2/28  Mild left perinephric stranding, Gas within the wall of the bladder which is incompletely  Distended. Emphysematous cystitis is typically caused by E. coli, Klebsiella. Proteus, Pseudomonas, Enterococcus, Candida have also been  implicated. Diabetes mellitus, urinary obstruction are major risk factors.         - S/p ESBL Klebsiella UTI, complicated  Urine culture 2/22+ for >100,000 ESBL Klebsiella pneumoniae  Failed outpatient fosfomycin x 1 ,    Urine culture 2/28+ for >100,000 Klebsiella pneumoniae  UA-3/24-negative  Patient has been on meropenem from 2/28, changed to ertapenem IV on discharge 3/5. Plan was for repeat CT abdomen/pelvis and DC ertapenem on 3/28. Repeat CT abdomen/pelvis-3/28-emphysematous cystitis is resolved. Nonspecific perinephric stranding.        -Hematuria  2ry to infection     -SHIRAZ  Resolved     -Diabetes mellitus with neuropathy  A1c 5 (8/2021)     -H/o rheumatoid arthritis, sarcoid  On leflunomide, immune suppressed     -Hypertension, dyslipidemia             PLAN:        -Patient has resolution of emphysematous cystitis on imaging. UA is negative, patient is currently asymptomatic  -PICC line could be removed from ID standpoint       Catheryn Back a 61-year-old female with past medical history significant for RA, chronic pain. Patient is known to infectious disease service . She was being treated for emphysematous cystitis & acute pyelonephritis. Patient was recently admitted 2/28-3/5. Urine culture on 2/22 and 2/28 were positive for >100,000 ESBL Klebsiella pneumoniae.   Patient was treated with meropenem IV during her hospital course and was discharged on ertapenem IVx 2 weeks. Patient had persistent urinary symptoms and ertapenem IV was extended for another week until 3/22 and until patient had her repeat CAT scan of bladder and urinary tract completed. Patient had not gotten her CT scan done and antibiotic therapy was extended to complete 4 weeks on 3/28. She is currently admitted secondary to sustaining injury to knee. ID services asked to see her in order that her PICC line may be removed and antibiotic therapy can be completed. Patient reports no urinary symptoms. Repeat CT abdomen pelvis done. Report as follows  FINDINGS:   LOWER THORAX: No significant abnormality in the incidentally imaged lower chest.  LIVER: No mass. BILIARY TREE: Cholecystectomy CBD is not dilated. SPLEEN: within normal limits. PANCREAS: No mass or ductal dilatation. ADRENALS: Unremarkable. KIDNEYS: No evidence of hydronephrosis. Mild perinephric stranding is noted  around both kidneys. No fluid collections are identified. The left ureter is  incompletely opacified however no focal abnormalities noted. . Small hypodensity  in the left kidney consistent with a simple cyst are no further follow-up. STOMACH: Unremarkable. SMALL BOWEL: No dilatation or wall thickening. COLON: No dilatation or wall thickening. APPENDIX: Not visualized  PERITONEUM: No ascites or pneumoperitoneum. RETROPERITONEUM: Atherosclerotic disease. No evidence of aneurysm or  lymphadenopathy. REPRODUCTIVE ORGANS: Unremarkable  URINARY BLADDER: Incompletely opacified but grossly unremarkable. BONES: Anterolisthesis of L4 on L5 with severe canal stenosis. Multilevel  degenerative changes in the lumbar spine  ABDOMINAL WALL: No mass or hernia. ADDITIONAL COMMENTS: N/A     IMPRESSION  1. 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     2.  Bilateral perinephric stranding which is nonspecific. No evidence of  perinephric abscess or hydronephrosis. Patient seen today. Patient denies urinary symptoms. UA negative. Denies supra pubic & flank pain. Patient Active Problem List   Diagnosis Code    Long-term use of immunosuppressant medication Z79.899    Osteopenia of multiple sites M85.89    Vitamin D deficiency E55.9    Rheumatoid arthritis involving multiple sites ([facility] M06.9    Essential hypertension I10    Mixed hyperlipidemia E78.2    RLS (restless legs syndrome) G25.81    Mild intermittent asthma J45.20    Status post angioplasty with stent Z95.820    Coronary artery disease due to lipid rich plaque I25.10, I25.83    Seronegative rheumatoid arthritis of multiple sites (HCC) M06.09    Severe obesity ([facility]) E66.01    MGUS (monoclonal gammopathy of unknown significance) D47.2    Type 2 diabetes mellitus without complication ([facility]) C21.6    Complication of internal right knee prosthesis ([facility] T84. 9XXA, [id number]    Emphysematous cystitis N30.80    Ambulatory dysfunction R26.2     Past Medical History:   Diagnosis Date    Asthma     Chronic pain 5/8/2020    DDD (degenerative disc disease), lumbar     Diabetes ([facility])     Gastritis     GERD (gastroesophageal reflux disease)     Glaucoma     Hearing loss 5/8/2020    Hiatal hernia     High cholesterol     Hypertension     Hypocalcemia 5/8/2020    Peripheral vascular disease (HCC)     RA (rheumatoid arthritis) ([facility]     Sarcoidosis 1999    MCV    Sleep apnea     has not used cpap in years since weight loss      Family History   Problem Relation Age of Onset    Heart Disease Mother     Liver Disease Father     Alcohol abuse Brother     Dementia Neg Hx     Cancer Neg Hx     Seizures Neg Hx       Social History     Tobacco Use    Smoking status: Never Smoker    Smokeless tobacco: Never Used   Substance Use Topics    Alcohol use:  Yes     Alcohol/week: 2.0 standard drinks     Types: 1 Glasses of wine, 1 Shots of liquor per week     Comment: 2-3 yearly     Past Surgical History:   Procedure Laterality Date    HX GASTRIC BYPASS      HX HEART CATHETERIZATION      s/p PCI with stenting in 1998     HX KNEE REPLACEMENT Bilateral     HX ORTHOPAEDIC Bilateral     carpal tunnel surgery     HX SHOULDER REPLACEMENT Right     x 2       Prior to Admission medications    Medication Sig Start Date End Date Taking? Authorizing Provider   0.9% sodium chloride solution 10 mL by IntraVENous route daily. before and after IV antibiotic using SASH method    Provider, Historical   heparin sod,porcine/0.9 % NaCl (HEPARIN FLUSH IV) 5 mL by IntraVENous route daily. after antibiotic using SASH method    Provider, Historical   ertapenem [facility]) 1 gram injection 1 g by IntraVENous route daily. Provider, Historical   traMADoL (ULTRAM) 50 mg tablet Take 50 mg by mouth every six (6) hours as needed for Pain. Provider, Historical   mupirocin (BACTROBAN) 2 % ointment Apply 1 Tube to affected area daily. 1 application right 2nd toe 2/14/22   Provider, Historical   albuterol sulfate (PROVENTIL;VENTOLIN) 2.5 mg/0.5 mL nebu nebulizer solution USE 1 VIAL IN NEBULIZER EVERY 4 HOURS AS NEEDED FOR WHEEZING 2/11/22   Giselle Reyes MD   simvastatin (ZOCOR) 20 mg tablet TAKE BY MOUTH NIGHTLY. Patient taking differently: Take 20 mg by mouth nightly. TAKE BY MOUTH NIGHTLY. 10/26/21   Giselle Reyes MD   sertraline (ZOLOFT) 50 mg tablet Take 1 Tablet by mouth daily. 10/26/21   Giselle Reyes MD   omeprazole (PRILOSEC) 20 mg capsule Take 1 Capsule by mouth daily. 10/26/21   Giselle Reyes MD   metoprolol tartrate (LOPRESSOR) 25 mg tablet Take 1 Tablet by mouth two (2) times a day. 10/26/21   Giselle Reyes MD   metFORMIN (GLUCOPHAGE) 500 mg tablet Take 1 Tablet by mouth daily (with dinner).  Decreased dose  Patient not taking: Reported on 3/17/2022 10/26/21   Giselle Reyes MD   latanoprost (XALATAN) 0.005 % ophthalmic solution INSTILL 1 DROP INTO BOTH EYES NIGHTLY  Patient taking differently: Administer 1 Drop to both eyes nightly. INSTILL 1 DROP INTO BOTH EYES NIGHTLY 10/26/21   Giselle Reyes MD   gabapentin (NEURONTIN) 300 mg capsule TAKE 1 CAPSULE IN THE MORNING AND TAKE 3 CAPSULES BEFORE BEDTIME  Patient taking differently: Take 300 mg by mouth daily. TAKE 1 CAPSULE IN THE MORNING AND TAKE 3 CAPSULES BEFORE BEDTIME 10/26/21   Giselle Reyes MD   ergocalciferol (ERGOCALCIFEROL) 1,250 mcg (50,000 unit) capsule Take 1 Capsule by mouth every seven (7) days. Indications: vitamin D deficiency (high dose therapy)  Patient taking differently: Take 50,000 Units by mouth every seven (7) days.   Indications: vitamin D deficiency (high dose therapy) 10/26/21   Giselle Reyes MD   fluticasone furoate-vilanteroL (Breo Ellipta) 100-25 mcg/dose inhaler TAKE 1 PUFF BY MOUTH EVERY DAY  Patient taking differently: Take 1 Puff by inhalation daily. TAKE 1 PUFF BY MOUTH EVERY DAY 10/26/21   Giselle Reyes MD   leflunomide (ARAVA) 20 mg tablet TAKE 1 TABLET BY MOUTH EVERY DAY  Patient taking differently: Take 20 mg by mouth daily. TAKE 1 TABLET BY MOUTH EVERY DAY 21   Leonidas Pritchard MD   nortriptyline (PAMELOR) 50 mg capsule Take 50 mg by mouth nightly. 18   Provider, Historical     Allergies   Allergen Reactions    Lisinopril Rash    Methotrexate Hives        Review of Systems:  A comprehensive review of systems was negative except for that written in the History of Present Illness. 14 point review of systems obtained . All other systems negative    Objective:   Blood pressure (!) 140/70, pulse 67, temperature 97.4 °F (36.3 °C), resp. rate 18, height 5' 5\" (1.651 m), weight 169 lb (76.7 kg), SpO2 99 %.   Temp (24hrs), Av.7 °F (36.5 °C), Min:97.3 °F (36.3 °C), Max:98.2 °F (36.8 °C)    Current Facility-Administered Medications   Medication Dose Route Frequency    dextrose 5 % - 0.45% NaCl infusion  75 mL/hr IntraVENous CONTINUOUS    melatonin tablet 3 mg  3 mg Oral QHS    haloperidoL (HALDOL) tablet 0.5 mg  0.5 mg Oral Q6H PRN    acetaminophen (TYLENOL) tablet 650 mg  650 mg Oral Q6H PRN    arformoterol 15 mcg/budesonide 0.25 mg neb solution   Nebulization BID RT    gabapentin (NEURONTIN) capsule 300 mg  300 mg Oral DAILY    latanoprost (XALATAN) 0.005 % ophthalmic solution 1 Drop  1 Drop Both Eyes QHS    leflunomide (ARAVA) tablet 20 mg  20 mg Oral DAILY    metoprolol tartrate (LOPRESSOR) tablet 25 mg  25 mg Oral BID    nortriptyline (PAMELOR) capsule 50 mg  50 mg Oral QHS    pantoprazole (PROTONIX) tablet 40 mg  40 mg Oral ACB    sertraline (ZOLOFT) tablet 50 mg  50 mg Oral DAILY    atorvastatin (LIPITOR) tablet 10 mg  10 mg Oral DAILY    oxyCODONE-acetaminophen (PERCOCET) 5-325 mg per tablet 1 Tablet  1 Tablet Oral Q6H PRN    albuterol (PROVENTIL VENTOLIN) nebulizer solution 2.5 mg  2.5 mg Nebulization Q4H PRN        Exam:    General:  Awake, cooperative,    Eyes:  Sclera anicteric. Pupils equally round and reactive to light. Mouth/Throat: Mucous membranes normal, oral pharynx clear   Neck: Supple   Lungs:   Clear to auscultation bilaterally, good effort   CV:  Regular rate and rhythm,no murmur, click, rub or gallop   Abdomen:   Soft, non-tender.  bowel sounds normal. non-distended   Extremities: No cyanosis or edema   Skin: Skin color, texture, turgor normal. no acute rash or lesions   Lymph nodes: Cervical and supraclavicular normal   Musculoskeletal: No swelling or deformity   Lines/Devices:  Intact, no erythema, drainage or tenderness   Psych: Alert and oriented, normal mood affect        Data Reviewed:   CBC:   Recent Labs     03/27/22  0242   HGB 9.9*     CMP:   Recent Labs     03/28/22  0126 03/27/22  0242   * 67   * 137   K 4.5 4.4    107   CO2 26 25   BUN 20 23*   CREA 0.90 0.79   CA 8.4* 8.9   AGAP 2* 5   BUCR 22* 29*       Lab Results   Component Value Date/Time    Culture result: NO GROWTH 5 DAYS 02/28/2022 10:30 PM    Culture result: (A) 02/28/2022 04:54 PM     KLEBSIELLA PNEUMONIAE ** (EXTENDED SPECTRUM BETA LACTAMASE ) **    Culture result:  02/28/2022 04:54 PM     (NOTE) ESBL RESULT CALLED TO MEGAN REAVES,AT 1410 ON 3/2/22. RF    Culture result: (A) 02/22/2022 10:13 AM     KLEBSIELLA PNEUMONIAE ** (EXTENDED SPECTRUM BETA LACTAMASE ) ** ** (EXTENDED SPECTRUM BETA LACTAMASE ) **    Culture result:  02/22/2022 10:13 AM     (NOTE) ESBL CALLED TO Shiloh Carbajal MD AT 7807 ON 02/26/22 BY ESBL CALLED TO AND READ BACK BY Cindra Ahumada, MD AT 0049 ON 02/26/22 BY MD.          XR Results (most recent)reviewed:  Results from [place] encounter on 03/24/22    XR FOOT RT AP/LAT    Narrative  EXAM: XR FOOT RT AP/LAT    INDICATION: None provided. Additional history: Right knee pain x1 week, exacerbated by a ground-level fall  last night. COMPARISON: None. FINDINGS: Two views of the right foot demonstrate plantar and Achilles heel  spurs. Degenerative changes in the midfoot. Osteopenia. Extensive vascular  calcifications. 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 The soft tissues are  within normal limits. Impression  1. No visible fracture. ICD-10-CM ICD-9-CM    1. Contusion of right knee, initial encounter  S80. 01XA 924.11    2. Inability to walk  R26.2 719.7    3. Hallucination  R44.3 780.1    4. Memory loss  R41.3 780.93    5. Neuropathy  G62.9 355.9            Antibiotic History    I have discussed the diagnosis with the patient and the intended plan as seen in the above orders. I have discussed medication side effects and warnings with the patient as well.     Reviewed test results at length with patient    Signed By: Qiana Gonzalez MD FAC     March 28, 2022

## 2022-03-29 LAB
EST. AVERAGE GLUCOSE BLD GHB EST-MCNC: 97 MG/DL
GLUCOSE BLD STRIP.AUTO-MCNC: 132 MG/DL (ref 65–117)
GLUCOSE BLD STRIP.AUTO-MCNC: 154 MG/DL (ref 65–117)
HBA1C MFR BLD: 5 % (ref 4–5.6)
SERVICE CMNT-IMP: ABNORMAL
SERVICE CMNT-IMP: ABNORMAL

## 2022-03-29 PROCEDURE — 83036 HEMOGLOBIN GLYCOSYLATED A1C: CPT

## 2022-03-29 PROCEDURE — 96376 TX/PRO/DX INJ SAME DRUG ADON: CPT

## 2022-03-29 PROCEDURE — 74011250637 HC RX REV CODE- 250/637: Performed by: STUDENT IN AN ORGANIZED HEALTH CARE EDUCATION/TRAINING PROGRAM

## 2022-03-29 PROCEDURE — 82962 GLUCOSE BLOOD TEST: CPT

## 2022-03-29 PROCEDURE — 99233 SBSQ HOSP IP/OBS HIGH 50: CPT | Performed by: PSYCHIATRY & NEUROLOGY

## 2022-03-29 PROCEDURE — 74011250637 HC RX REV CODE- 250/637: Performed by: INTERNAL MEDICINE

## 2022-03-29 PROCEDURE — 36415 COLL VENOUS BLD VENIPUNCTURE: CPT

## 2022-03-29 PROCEDURE — 97116 GAIT TRAINING THERAPY: CPT

## 2022-03-29 PROCEDURE — 74011000250 HC RX REV CODE- 250: Performed by: STUDENT IN AN ORGANIZED HEALTH CARE EDUCATION/TRAINING PROGRAM

## 2022-03-29 PROCEDURE — 97535 SELF CARE MNGMENT TRAINING: CPT

## 2022-03-29 PROCEDURE — 96372 THER/PROPH/DIAG INJ SC/IM: CPT

## 2022-03-29 PROCEDURE — 94640 AIRWAY INHALATION TREATMENT: CPT

## 2022-03-29 PROCEDURE — 74011250636 HC RX REV CODE- 250/636: Performed by: NURSE PRACTITIONER

## 2022-03-29 PROCEDURE — G0378 HOSPITAL OBSERVATION PER HR: HCPCS

## 2022-03-29 PROCEDURE — 74011250636 HC RX REV CODE- 250/636: Performed by: STUDENT IN AN ORGANIZED HEALTH CARE EDUCATION/TRAINING PROGRAM

## 2022-03-29 PROCEDURE — 96375 TX/PRO/DX INJ NEW DRUG ADDON: CPT

## 2022-03-29 PROCEDURE — 74011000250 HC RX REV CODE- 250: Performed by: INTERNAL MEDICINE

## 2022-03-29 RX ORDER — AMLODIPINE BESYLATE 5 MG/1
5 TABLET ORAL DAILY
Status: DISCONTINUED | OUTPATIENT
Start: 2022-03-29 | End: 2022-04-01 | Stop reason: HOSPADM

## 2022-03-29 RX ORDER — CYANOCOBALAMIN 1000 UG/ML
1000 INJECTION, SOLUTION INTRAMUSCULAR; SUBCUTANEOUS DAILY
Status: DISCONTINUED | OUTPATIENT
Start: 2022-03-29 | End: 2022-04-01 | Stop reason: HOSPADM

## 2022-03-29 RX ORDER — HYDRALAZINE HYDROCHLORIDE 20 MG/ML
10 INJECTION INTRAMUSCULAR; INTRAVENOUS
Status: DISCONTINUED | OUTPATIENT
Start: 2022-03-29 | End: 2022-04-01 | Stop reason: HOSPADM

## 2022-03-29 RX ADMIN — ATORVASTATIN CALCIUM 10 MG: 10 TABLET, FILM COATED ORAL at 09:45

## 2022-03-29 RX ADMIN — ARFORMOTEROL TARTRATE: 15 SOLUTION RESPIRATORY (INHALATION) at 07:53

## 2022-03-29 RX ADMIN — SERTRALINE 50 MG: 50 TABLET, FILM COATED ORAL at 09:45

## 2022-03-29 RX ADMIN — ARFORMOTEROL TARTRATE: 15 SOLUTION RESPIRATORY (INHALATION) at 19:35

## 2022-03-29 RX ADMIN — LEFLUNOMIDE 20 MG: 10 TABLET ORAL at 09:45

## 2022-03-29 RX ADMIN — PANTOPRAZOLE SODIUM 40 MG: 40 TABLET, DELAYED RELEASE ORAL at 09:45

## 2022-03-29 RX ADMIN — LATANOPROST 1 DROP: 50 SOLUTION/ DROPS OPHTHALMIC at 22:55

## 2022-03-29 RX ADMIN — NORTRIPTYLINE HYDROCHLORIDE 50 MG: 25 CAPSULE ORAL at 21:45

## 2022-03-29 RX ADMIN — HYDRALAZINE HYDROCHLORIDE 10 MG: 20 INJECTION INTRAMUSCULAR; INTRAVENOUS at 01:47

## 2022-03-29 RX ADMIN — METOPROLOL TARTRATE 25 MG: 25 TABLET, FILM COATED ORAL at 09:45

## 2022-03-29 RX ADMIN — SODIUM CHLORIDE 500 ML: 9 INJECTION, SOLUTION INTRAVENOUS at 17:01

## 2022-03-29 RX ADMIN — GABAPENTIN 300 MG: 300 CAPSULE ORAL at 09:45

## 2022-03-29 RX ADMIN — HYDRALAZINE HYDROCHLORIDE 10 MG: 20 INJECTION INTRAMUSCULAR; INTRAVENOUS at 09:46

## 2022-03-29 RX ADMIN — MELATONIN 3 MG: at 21:45

## 2022-03-29 RX ADMIN — CYANOCOBALAMIN 1000 MCG: 1000 INJECTION INTRAMUSCULAR; SUBCUTANEOUS at 17:06

## 2022-03-29 RX ADMIN — DEXTROSE AND SODIUM CHLORIDE 75 ML/HR: 5; 450 INJECTION, SOLUTION INTRAVENOUS at 01:54

## 2022-03-29 NOTE — PROGRESS NOTES
Problem: Self Care Deficits Care Plan (Adult)  Goal: *Acute Goals and Plan of Care (Insert Text)  Description: FUNCTIONAL STATUS PRIOR TO ADMISSION: ambulated with rolling walker, frequency of falls, sits on shower chair to bathe, performed ADLS and household tasks on her own, recently slipped off of her high bed injuring right LE, relies on others for transportation, uses electric scooter in stores (doesn't own one)    [address]: The patient lived alone with son whom checks on pt. Occupational Therapy Goals:  Initiated 3/25/2022  1. Patient will perform grooming standing with supervision within 7 days. 2. Patient will perform upper body dressing and lower body dressing with supervision/set-up within 7 days. 3. Patient will perform toileting with supervision/set-up within 7 days. 4. Patient will transfer from toilet with supervision/set-up using the least restrictive device and appropriate durable medical equipment within 7 days. Outcome: Progressing Towards Goal   OCCUPATIONAL THERAPY TREATMENT  Patient: Jhonny Layne (76 y.o. female)  Date: 3/29/2022  Diagnosis: Ambulatory dysfunction [R26.2] <principal problem not specified>       Precautions: Contact (ESBL)  Chart, occupational therapy assessment, plan of care, and goals were reviewed. ASSESSMENT  Patient continues with skilled OT services and is progressing towards goals. Pt was supine in bed and was min of 1 for supine to sit and she was max to mod to scoot to EOb. Pt was able to stand with min of 2 and was min for transfers and needed assist to guide walker. She was able to walk to door of room and back to chair. Worked with pt on grooming sitting up in chair and she needed assist to duncan her wig due to decreased range in BUE but was able to comb front of wig. Pt remains impulsive and confused, and does not see her deficits.       Current Level of Function Impacting Discharge (ADLs): max for LB ADLs, and min to setup for UB             PLAN :  Patient continues to benefit from skilled intervention to address the above impairments. Continue treatment per established plan of care to address goals. Recommend with staff: Kristina Jean for meals and use of BSC    Recommend next OT session: work on cleaning self after toileting. Recommendation for discharge: (in order for the patient to meet his/her long term goals)  Therapy up to 5 days/week in SNF setting    This discharge recommendation:  Has been made in collaboration with the attending provider and/or case management    IF patient discharges home will need the following DME: tbd       SUBJECTIVE:   Patient stated Adamaris Caller was just up with nursing to the bathroom. Veria Proper    OBJECTIVE DATA SUMMARY:   Cognitive/Behavioral Status:  Neurologic State: Alert;Confused  Orientation Level: Oriented to person;Oriented to place; Disoriented to situation;Disoriented to time  Cognition: Follows commands;Poor safety awareness             Functional Mobility and Transfers for ADLs:  Bed Mobility:  Rolling: Minimum assistance  Supine to Sit: Minimum assistance  Scooting: Minimum assistance    Transfers:  Sit to Stand: Minimum assistance     Bed to Chair: Minimum assistance    Balance:  Sitting: Impaired; With support  Sitting - Static: Fair (occasional); Supported sitting  Sitting - Dynamic: Fair (occasional); Supported sitting  Standing: Impaired; With support  Standing - Static: Constant support; Fair  Standing - Dynamic : Constant support;Poor    ADL Intervention:        Pt is CGA to setup with VC for grooming sitting up in chair, and min for UB ADLs sitting in chair and max for LB ADLs            Activity Tolerance:   Fair    After treatment patient left in no apparent distress:   Sitting in chair, Call bell within reach, and Bed / chair alarm activated    COMMUNICATION/COLLABORATION:   The patients plan of care was discussed with: Physical therapist and Registered nurse.      QUANG Goldsmith  Time Calculation: 18 mins

## 2022-03-29 NOTE — PROGRESS NOTES
Received notification from bedside RN about patient with regards to: persistently elevated BP  VS: /83, HR 73, RR 18, O2 sat 97% on RA    Intervention given: Hydralazine IV PRN ordered

## 2022-03-29 NOTE — PROGRESS NOTES
End of Shift Note    Bedside shift change report given to Nikky Tamayo (oncoming nurse) by Jerrica Bailey RN (offgoing nurse). Report included the following information SBAR, Kardex, Procedure Summary, Intake/Output, MAR and Recent Results    Shift worked:  7p-7a   Shift summary and any significant changes:     Patient was alert and oriented x2, periodic confusion. Refused 12 MN fingerstick and c/o hunger. Given small meal.  Purewick in place and draining well.      Concerns for physician to address:  None   [facility] phone for oncoming shift:   [phone]     Patient Information  Keanu Flower  78 y.o.  3/24/2022  1:15 PM by Hema Willis MD. Keanu Flower was admitted from Home    Problem List  Patient Active Problem List    Diagnosis Date Noted    Ambulatory dysfunction 03/24/2022    Emphysematous cystitis 67/68/3493    Complication of internal right knee prosthesis ([address] 11/09/2021    Type 2 diabetes mellitus without complication ([address] 13/71/8766    MGUS (monoclonal gammopathy of unknown significance) 11/22/2019    Severe obesity ([address] 05/07/2019    Seronegative rheumatoid arthritis of multiple sites ([address] 02/18/2019    Rheumatoid arthritis involving multiple sites ([address] 09/28/2018    Essential hypertension 09/28/2018    Mixed hyperlipidemia 09/28/2018    RLS (restless legs syndrome) 09/28/2018    Mild intermittent asthma 09/28/2018    Status post angioplasty with stent 09/28/2018    Coronary artery disease due to lipid rich plaque 09/28/2018    Long-term use of immunosuppressant medication 08/22/2018    Osteopenia of multiple sites 08/22/2018    Vitamin D deficiency 08/22/2018     Past Medical History:   Diagnosis Date    Asthma     Chronic pain 5/8/2020    DDD (degenerative disc disease), lumbar     Diabetes ([address]     Gastritis     GERD (gastroesophageal reflux disease)     Glaucoma     Hearing loss 5/8/2020    Hiatal hernia     High cholesterol     Hypertension     Hypocalcemia 5/8/2020    Peripheral vascular disease (HCC)     RA (rheumatoid arthritis) (HCC)     Sarcoidosis 1999    MCV    Sleep apnea     has not used cpap in years since weight loss       Core Measures:  CVA: No No  CHF:No No  PNA:No No    Activity:  Activity Level: Bed Rest  Number times ambulated in hallways past shift: 0  Number of times OOB to chair past shift: 0    Cardiac:   Cardiac Monitoring: No           Access:   Current line(s): PIV and PICC   Central Line? No  PICC LINE? Yes Placement date prior to admission. Genitourinary:   Urinary status: voiding, incontinent and external catheter  Urinary Catheter? No     Respiratory:   O2 Device: None (Room air)  Chronic home O2 use?: NO  Incentive spirometer at bedside: NO       GI:  Last Bowel Movement Date: 03/27/22  Current diet:  ADULT DIET Regular; 4 carb choices (60 gm/meal); Low Fat/Low Chol/High Fiber/2 gm Na  ADULT ORAL NUTRITION SUPPLEMENT Dinner, HS Snack, Breakfast; Diabetic Supplement  DIET ONE TIME MESSAGE  Passing flatus: YES  Tolerating current diet: YES       Pain Management:   Patient states pain is manageable on current regimen: YES    Skin:  Jose Luis Score: 16  Interventions: float heels, increase time out of bed, limit briefs and internal/external urinary devices    Patient Safety:  Fall Score:  Total Score: 5  Interventions: bed/chair alarm, assistive device (walker, cane, etc), pt to call before getting OOB and stay with me (per policy)  High Fall Risk: Yes  @Rollbelt  @dexterity to release roll belt  Yes/No ( must document dexterity  here by stating Yes or No here, otherwise this is a restraint and must follow restraint documentation policy.)    DVT prophylaxis:  DVT prophylaxis Med- Yes  DVT prophylaxis SCD or DANGELO- No     Wounds: (If Applicable)  Wounds- Yes  Stage 2 sacrum    Active Consults:  IP CONSULT TO HOSPITALIST  IP CONSULT TO NEUROLOGY  IP CONSULT TO INFECTIOUS DISEASES    Length of Stay:  Expected LOS: - - -  Actual LOS: 0  Discharge Plan: Yes, Pending acceptance to [facility] .       Ahmet Tovar RN

## 2022-03-29 NOTE — PROGRESS NOTES
Neurology Note    Patient ID:  Mahsa Diaz  [id number]  48 y.o.  1945      Date of Consultation:  March 29, 2022      Assessment and Plan:    The patient is a pleasant 24-year-old female admitted to the hospital with foot and leg pain after a fall who has had intermittent bouts of hallucinations with progressive cognitive decline. Her examination does reveal cognitive impairment and a mild peripheral neuropathy. Intermittent visual hallucinations with cognitive impairment:  The differential for this does include an underlying dementia. Gated MRI does reveal atrophy and mild to moderate chronic microvascular ischemic disease. There most likely is an underlying element of a dementia. The patient was found to have a significantly low vitamin B12 level and has been placed on supplementation. She will need long-term monthly 1000 mcg of vitamin B12. Ensure that lifestyle and hospital modifications to minimize the presence of sundowning have been implemented. Minimize cognitively impacting medications  She will need long-term follow-up in the neurology clinic for her memory loss and consideration of additional medication. Peripheral neuropathy:  She has multiple medical conditions that can lead to a neuropathy including sarcoid, rheumatoid arthritis, diabetes  She also does have a vitamin B12 deficiency which can cause a neuropathy. Continue supplementation  The patient may need an EMG/nerve conduction study as an outpatient after discharge. Peripheral arterial disease  Sarcoid  Sleep apnea  Reflux disease  Depression  Arthritis      There is no other additional neurology recommendations at this time. If questions arise, please not hesitate to contact me and I will return to see the patient. The patient should follow-up in clinic in 3 to 4 weeks time after discharge.          Subjective: I fell and my leg hurt     History of Present Illness:   Mahsa Diaz is a 78 y.o. female with a history of rheumatoid arthritis, chronic pain who was recently discharged from the hospital 3 weeks ago after having developed a cystitis and urinary tract infection. She was readmitted to the hospital on March 24, 2022 after having had a fall while getting out of bed hitting her right knee. She was having a difficult time with ambulating afterwards and was admitted to the hospital.  She was given pain medication. She was also continued on her gabapentin. She was noted to have an elevated creatinine upon admission. It appears that it was noted that she was having hallucination. Today, the patient did not have family at the bedside but she denied having any hallucinations. She did have her brain MRI last evening. There has been no new concerns. They are working on placement. .    Past Medical History:   Diagnosis Date    Asthma     Chronic pain 5/8/2020    DDD (degenerative disc disease), lumbar     [facility]     Gastritis     GERD (gastroesophageal reflux disease)     Glaucoma     Hearing loss 5/8/2020    Hiatal hernia     High cholesterol     Hypertension     Hypocalcemia 5/8/2020    Peripheral vascular disease (HCC)     RA (rheumatoid arthritis) (HCC)     Sarcoidosis 1999    MCV    Sleep apnea     has not used cpap in years since weight loss        Past Surgical History:   Procedure Laterality Date    HX GASTRIC BYPASS      HX HEART CATHETERIZATION      s/p PCI with stenting in 1998     HX KNEE REPLACEMENT Bilateral     HX ORTHOPAEDIC Bilateral     carpal tunnel surgery     HX SHOULDER REPLACEMENT Right     x 2         Family History   Problem Relation Age of Onset    Heart Disease Mother     Liver Disease Father     Alcohol abuse Brother     Dementia Neg Hx     Cancer Neg Hx     Seizures Neg Hx         Social History     Tobacco Use    Smoking status: Never Smoker    Smokeless tobacco: Never Used   Substance Use Topics    Alcohol use:  Yes     Alcohol/week: 2.0 standard drinks     Types: 1 Glasses of wine, 1 Shots of liquor per week     Comment: 2-3 yearly        Allergies   Allergen Reactions    Lisinopril Rash    Methotrexate Hives          Current Facility-Administered Medications   Medication Dose Route Frequency    hydrALAZINE (APRESOLINE) 20 mg/mL injection 10 mg  10 mg IntraVENous Q4H PRN    amLODIPine (NORVASC) tablet 5 mg  5 mg Oral DAILY    cyanocobalamin (VITAMIN B12) injection 1,000 mcg  1,000 mcg IntraMUSCular DAILY    melatonin tablet 3 mg  3 mg Oral QHS    haloperidoL (HALDOL) tablet 0.5 mg  0.5 mg Oral Q6H PRN    acetaminophen (TYLENOL) tablet 650 mg  650 mg Oral Q6H PRN    arformoterol 15 mcg/budesonide 0.25 mg neb solution   Nebulization BID RT    gabapentin (NEURONTIN) capsule 300 mg  300 mg Oral DAILY    latanoprost (XALATAN) 0.005 % ophthalmic solution 1 Drop  1 Drop Both Eyes QHS    leflunomide (ARAVA) tablet 20 mg  20 mg Oral DAILY    metoprolol tartrate (LOPRESSOR) tablet 25 mg  25 mg Oral BID    nortriptyline (PAMELOR) capsule 50 mg  50 mg Oral QHS    pantoprazole (PROTONIX) tablet 40 mg  40 mg Oral ACB    sertraline (ZOLOFT) tablet 50 mg  50 mg Oral DAILY    atorvastatin (LIPITOR) tablet 10 mg  10 mg Oral DAILY    oxyCODONE-acetaminophen (PERCOCET) 5-325 mg per tablet 1 Tablet  1 Tablet Oral Q6H PRN    albuterol (PROVENTIL VENTOLIN) nebulizer solution 2.5 mg  2.5 mg Nebulization Q4H PRN       Review of Systems:    General, constitutional: negative  Eyes, vision: negative  Ears, nose, throat: negative  Cardiovascular, heart: negative  Respiratory: negative  Gastrointestinal: negative  Genitourinary: negative  Musculoskeletal: Leg pain  Skin and integumentary: negative  Psychiatric: negative  Endocrine: negative  Neurological: negative, except for HPI  Hematologic/lymphatic: negative  Allergy/immunology: negative    Objective:     Visit Vitals  BP (!) 96/54   Pulse 87   Temp 98 °F (36.7 °C)   Resp 16   Ht 5' 5\" (1.651 m)   Wt 169 lb (76.7 kg)   SpO2 92%   BMI 28.12 kg/m²       Physical Exam:    General:  appears well nourished in no acute distress  Neck: no carotid bruits  Lungs: clear to auscultation  Heart:  no murmurs, regular rate  Lower extremity: peripheral pulses palpable and no edema  Skin: intact. As noted from prior surgery    Neurological exam:    The patient is awake and alert. She is oriented to person and place. She did know the correct year. She could perform simple calculations. She could state the days of the week. She could name objects correctly. She had a a difficult time with similarities and differences. She has a poor understanding of her medical health. She has a decreased fund of knowledge. She is easily confused    Cranial nerves:   II-XII were tested    Perrrla  Visual fields were full  Eomi, no evidence of nystagmus  Facial sensation:  normal and symmetric  Facial motor: normal and symmetric  Hearing intact  SCM strength intact  Tongue: midline without fasciculations    Motor: Tone normal  Pronator drift is absent  No evidence of fasciculations    Strength testing:   deltoid triceps biceps Wrist ext. Wrist flex. intrinsics Hip flex. Hip ext. Knee ext. Knee flex Dorsi flex Plantar flex   Right 5 5 5 5 5 5 4 4 5 5 5 5   Left 5 5 5 5 5 5 4 4 5 5 5 5         Sensory:  Upper extremity: intact to pp, light touch, and vibration > 10 seconds  Lower extremity: Decreased pinprick to the level of mid shin. Vibration was present for 5 seconds at her ankle    Reflexes:    Right Left  Biceps  2 2  Triceps 2 2  Brachiorad.  2 2  Patella  1 1  Achilles - -    Cerebellar testing:  no tremor apparent, finger/nose and juve were intact      Labs:     Lab Results   Component Value Date/Time    Hemoglobin A1c 5.0 03/29/2022 03:34 AM    Sodium 135 (L) 03/28/2022 01:26 AM    Potassium 4.5 03/28/2022 01:26 AM    Chloride 107 03/28/2022 01:26 AM    Glucose 103 (H) 03/28/2022 01:26 AM    BUN 20 03/28/2022 01:26 AM    Creatinine 0.90 03/28/2022 01:26 AM    Calcium 8.4 (L) 03/28/2022 01:26 AM    WBC 6.6 03/24/2022 03:54 PM    HCT 31.3 (L) 03/24/2022 03:54 PM    HGB 9.9 (L) 03/27/2022 02:42 AM    PLATELET 400 71/62/0584 03:54 PM       Imaging:    Results from [place] encounter on 03/24/22    MRI BRAIN WO CONT    Narrative  EXAM: MRI BRAIN WO CONT    INDICATION: hallucinations. Memory loss    COMPARISON: MRI brain 9/29/2020. CONTRAST: None. TECHNIQUE:  Multiplanar multisequence acquisition without contrast of the brain. FINDINGS:  Evaluation is markedly limited by motion. Unchanged mild generalized parenchymal volume loss with commensurate dilation of  the sulci and ventricular system. Unchanged scattered periventricular and deep  white matter T2/FLAIR hyperintensities, and patchy T2/FLAIR hyperintensity in  the bilateral thalami and ramez, consistent with mild to moderate chronic  microvascular ischemic disease. There is no acute infarct, hemorrhage,  extra-axial fluid collection, or mass effect. There is no cerebellar tonsillar  herniation. Expected arterial flow-voids are present. The paranasal sinuses, mastoid air cells, and middle ears are clear. The orbital  contents are within normal limits with bilateral lens implants. No significant  osseous or scalp lesions are identified. Impression  1. Evaluation is markedly limited by motion. No definite acute intracranial  abnormality. 2. Unchanged mild generalized parenchymal volume loss and mild to moderate  chronic microvascular ischemic disease. Results from [place] encounter on 03/24/22    CT ABD PELV W WO CONT    Narrative  EXAM: CT ABD PELV W WO CONT    INDICATION: follow up complicated pyelonephritis    COMPARISON: 2/20/2022. IV CONTRAST: 100 mL of Isovue-370.     ORAL CONTRAST: None    TECHNIQUE:  Multislice helical CT was performed from the diaphragm to the iliac crest prior  to intravenous contrast administration and from the diaphragm to the symphysis  pubis during uneventful rapid bolus intravenous contrast administration. Contiguous 5 mm axial images were reconstructed and lung and soft tissue windows  were generated. Coronal and sagittal reformations were generated. CT dose  reduction was achieved through use of a standardized protocol tailored for this  examination and automatic exposure control for dose modulation. FINDINGS:  LOWER THORAX: No significant abnormality in the incidentally imaged lower chest.  LIVER: No mass. BILIARY TREE: Cholecystectomy CBD is not dilated. SPLEEN: within normal limits. PANCREAS: No mass or ductal dilatation. ADRENALS: Unremarkable. KIDNEYS: No evidence of hydronephrosis. Mild perinephric stranding is noted  around both kidneys. No fluid collections are identified. The left ureter is  incompletely opacified however no focal abnormalities noted. . Small hypodensity  in the left kidney consistent with a simple cyst are no further follow-up. STOMACH: Unremarkable. SMALL BOWEL: No dilatation or wall thickening. COLON: No dilatation or wall thickening. APPENDIX: Not visualized  PERITONEUM: No ascites or pneumoperitoneum. RETROPERITONEUM: Atherosclerotic disease. No evidence of aneurysm or  lymphadenopathy. REPRODUCTIVE ORGANS: Unremarkable  URINARY BLADDER: Incompletely opacified but grossly unremarkable. BONES: Anterolisthesis of L4 on L5 with severe canal stenosis. Multilevel  degenerative changes in the lumbar spine  ABDOMINAL WALL: No mass or hernia. ADDITIONAL COMMENTS: N/A    Impression  1. 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 2.  Bilateral perinephric stranding which is nonspecific. No evidence of  perinephric abscess or hydronephrosis. Did independently review the brain MRI from March 28, 2022. There was mild atrophy and chronic microvascular ischemic disease.   Hemoglobin A1c wa 5.0    Vitamin B12 level was low at 129  TSH normal    I spent  35   minutes providing care to this  acutely ill inpatient with > 50% of the time counseling and assisting in the coordination of care of the patient on the patient's hospital floor/unit.         Active Problems:    Ambulatory dysfunction (3/24/2022)                   Signed By:  Georgette Malone DO FAAN    March 29, 2022

## 2022-03-29 NOTE — PROGRESS NOTES
Problem: Mobility Impaired (Adult and Pediatric)  Goal: *Acute Goals and Plan of Care (Insert Text)  Description: FUNCTIONAL STATUS PRIOR TO ADMISSION: Pt lives alone in an apartment; has a son in the area, stays with her sometimes. She said independent with ADLs and using shower seat and walked mod I with rolling walker; states she does not go out much; if goes to store she uses the electric scooter    [address]: The patient lived alone with no local support. Physical Therapy Goals  Initiated 3/25/2022  1. Patient will move from supine to sit and sit to supine , scoot up and down, and roll side to side in bed with independence within 7 day(s). 2.  Patient will transfer from bed to chair and chair to bed with modified independence using the least restrictive device within 7 day(s). 3.  Patient will perform sit to stand with modified independence within 7 day(s). 4.  Patient will ambulate with modified independence for 150 feet with the least restrictive device within 7 day(s). Outcome: Progressing Towards Goal   PHYSICAL THERAPY TREATMENT  Patient: Booker Verduzco (34 y.o. female)  Date: 3/29/2022  Diagnosis: Ambulatory dysfunction [R26.2] <principal problem not specified>      Precautions: Contact (ESBL), Fall  Chart, physical therapy assessment, plan of care and goals were reviewed. ASSESSMENT  Patient continues with skilled PT services and is progressing towards goals. Pt received in supine, agreeable to PT session. She demonstrated bed mobility with extra time and Min A. Poor safety awareness and sitting balance without support but improved with feet on floor and R UE on rail. Sit <> stand with Min A and RW for steadying. Gait performed up to 25ft with RW and Min A for steering/ balance. Gait slow, cues needed to keep feet inside base of RW for safety and fall prevention. Pt left seated in chair, heels elevated, all needs met and call bell in reach.  She continues to benefit from therapy services and [facility] with family support vs. SNF stay upon DC. Will continue to follow. .     Current Level of Function Impacting Discharge (mobility/balance): Min A with RW    Other factors to consider for discharge: poor safety awareness. PLAN :  Patient continues to benefit from skilled intervention to address the above impairments. Continue treatment per established plan of care. to address goals. Recommendation for discharge: (in order for the patient to meet his/her long term goals)  Therapy up to 5 days/week in SNF setting vs.  PT with increased family support    This discharge recommendation:  Has been made in collaboration with the attending provider and/or case management    IF patient discharges home will need the following DME: to be determined (TBD)       SUBJECTIVE:   Patient stated I'm doing it right (walking).     OBJECTIVE DATA SUMMARY:   Critical Behavior:  Neurologic State: Alert,Confused  Orientation Level: Oriented to person,Oriented to place,Disoriented to situation,Disoriented to time  Cognition: Follows commands,Poor safety awareness  Safety/Judgement: Fall prevention  Functional Mobility Training:  Bed Mobility:  Rolling: Minimum assistance  Supine to Sit: Minimum assistance     Scooting: Minimum assistance        Transfers:  Sit to Stand: Minimum assistance  Stand to Sit: Minimum assistance        Bed to Chair: Minimum assistance                    Balance:  Sitting: Impaired; With support  Sitting - Static: Fair (occasional); Supported sitting  Sitting - Dynamic: Fair (occasional); Supported sitting  Standing: Impaired; With support  Standing - Static: Constant support; Fair  Standing - Dynamic : Constant support;Poor  Ambulation/Gait Training:  Distance (ft): 25 Feet (ft)  Assistive Device: Gait belt;Walker, rolling  Ambulation - Level of Assistance: Minimal assistance        Gait Abnormalities: Decreased step clearance; Path deviations Base of Support: Widened     Speed/Nicole: Shuffled; Slow  Step Length: Left shortened;Right shortened         Pain Rating:  No pain reported    Activity Tolerance:   Fair and requires rest breaks    After treatment patient left in no apparent distress:   Sitting in chair, Heels elevated for pressure relief, Call bell within reach and Bed / chair alarm activated    COMMUNICATION/COLLABORATION:   The patients plan of care was discussed with: Occupational therapist and Registered nurse.      Stephen Weir, PT, DPT, NCS   Time Calculation: 17 mins

## 2022-03-29 NOTE — PROGRESS NOTES
Acknowledge a PICC order. Perfect serve Dr. Shirin Dover to confirm the PICC placement order, since patient admitted with PICC in place last 3/24/22. Dr. hSirin Dover said over the phone she didn't order a new PICC placement and cancelled the  order. Chest X-ray done 3/24/22, showed PICC in the right side of the chest which projects to terminate in the confluence of brachiocephalic veins. Again spoke with Dr. Shirin Dover  in person about the the PICC placement suggested to pull out the PICC and place Endurance catheter but she insisted to wait after the test to be done and after ID consult. . Per Dr. Shirin Dover she will let us know ( VAT Team)  if the PICC will be pulled out. Spoke with day shift nurse and the Nurse in charge about the conversation with Dr. Shirin Dover. Assessed PICC dressing changed good blood return. Patient has a working regular PIV.

## 2022-03-29 NOTE — PROGRESS NOTES
End of Shift Note    Bedside shift change report given to Unruly Â® (oncoming nurse) by Derik Hollis RN (offgoing nurse). Report included the following information SBAR, Kardex, Procedure Summary, Intake/Output, MAR and Recent Results    Shift worked:  7A -7P   Shift summary and any significant changes:     Patient alert and oriented x2, periodic confusion. Pt had elevated BP at 190/84, given PRN Hydralazine. 1000: RN held 5 mg Norvasc due to low BP of 100/60.     1419: Pt concerned about \"missing luggage\". RN called son, Braydon Valladares, who reports that, \"my brother took a bag of clothes home yesterday\". RN encouraged family to visit family, to reorient. 1634: RN notified Pedro Gómez MD about low BP of 96/54, pt appearing to be asymptomatic.  MD ordered 500 ml Bolus of NS.     1859: BP recheck 104/54, family at the bedside         Concerns for physician to address:  See above    Zone phone for oncoming shift:   [phone]     Patient Information  Dallas Romo  78 y.o.  3/24/2022  1:15 PM by Jaja Pruett MD. Dallas Romo was admitted from Home    Problem List  Patient Active Problem List    Diagnosis Date Noted    Ambulatory dysfunction 03/24/2022    Emphysematous cystitis 39/67/4678    Complication of internal right knee prosthesis ([address] 11/09/2021    Type 2 diabetes mellitus without complication ([address] 86/29/0751    MGUS (monoclonal gammopathy of unknown significance) 11/22/2019    Severe obesity ([address] 05/07/2019    Seronegative rheumatoid arthritis of multiple sites ([address] 02/18/2019    Rheumatoid arthritis involving multiple sites ([address] 09/28/2018    Essential hypertension 09/28/2018    Mixed hyperlipidemia 09/28/2018    RLS (restless legs syndrome) 09/28/2018    Mild intermittent asthma 09/28/2018    Status post angioplasty with stent 09/28/2018    Coronary artery disease due to lipid rich plaque 09/28/2018    Long-term use of immunosuppressant medication 08/22/2018    Osteopenia of multiple sites 08/22/2018    Vitamin D deficiency 08/22/2018     Past Medical History:   Diagnosis Date    Asthma     Chronic pain 5/8/2020    DDD (degenerative disc disease), lumbar     Diabetes (HCC)     Gastritis     GERD (gastroesophageal reflux disease)     Glaucoma     Hearing loss 5/8/2020    Hiatal hernia     High cholesterol     Hypertension     Hypocalcemia 5/8/2020    Peripheral vascular disease (HCC)     RA [facility]rheumatoid arthritis) [facility]     Sarcoidosis 1999    [facility]    Sleep apnea     has not used cpap in years since weight loss       Core Measures:  CVA: No No  CHF:No No  PNA:No No    Activity:  Activity Level: Bed Rest, up to chair with assistance   Number times ambulated in hallways past shift: 0  Number of times OOB to chair past shift: 1    Cardiac:   Cardiac Monitoring: No           Access:   Current line(s): PIV and PICC   Central Line? No  PICC LINE? Yes     Genitourinary:   Urinary status: voiding, incontinent and external catheter  Urinary Catheter? No     Respiratory:   O2 Device: None (Room air)  Chronic home O2 use?: NO  Incentive spirometer at bedside: NO       GI:  Last Bowel Movement Date: 03/29/22  Current diet:  ADULT DIET Regular; 4 carb choices (60 gm/meal); Low Fat/Low Chol/High Fiber/2 gm Na  ADULT ORAL NUTRITION SUPPLEMENT Dinner, HS Snack, Breakfast; Diabetic Supplement  DIET ONE TIME MESSAGE  Passing flatus: YES  Tolerating current diet: YES       Pain Management:   Patient states pain is manageable on current regimen: YES    Skin:  Jose Luis Score: 15  Interventions: float heels, increase time out of bed, limit briefs and internal/external urinary devices    Patient Safety:  Fall Score:  Total Score: 5  Interventions: bed/chair alarm, assistive device (walker, cane, etc), pt to call before getting OOB and stay with me (per policy)  High Fall Risk: Yes  @Rollbelt  @dexterity to release roll belt  Yes/No ( must document dexterity  here by stating Yes or No here, otherwise this is a restraint and must follow restraint documentation policy.)    DVT prophylaxis:  DVT prophylaxis Med- Yes  DVT prophylaxis SCD or DANGELO- No     Wounds: (If Applicable)  Wounds- Yes  Stage 2 sacrum    Active Consults:  IP CONSULT TO HOSPITALIST  IP CONSULT TO NEUROLOGY  IP CONSULT TO INFECTIOUS DISEASES    Length of Stay:  Expected LOS: - - -  Actual LOS: 0  Discharge Plan: Yes, Pending acceptance to [facility].       Kellie Mtz RN

## 2022-03-29 NOTE — PROGRESS NOTES
Transition of Care Plan:     RUR: N/A - \"Observation status\"  Disposition: Plan A) SNF placement (pending acceptance/insurance auth) vs Plan B) Home with HH, increased support/supervision, & f/u apts  Follow up appointments: PCP & specialist as indicated   DME needed: Pt owns DME necessary for d/c  Transportation at Discharge: CM to secure medical transport for d/c; PCS to be completed & placed on chart  Keys or means to access home: Pt's son has access to the home      IM Medicare Letter: N/A - Observation status; [employer] notice signed 3/25/22  Is patient a BCPI-A Bundle: N/A          Is patient a [place] and connected with the [facility]? N/A                Caregiver Contact: Pt's son, Pop Renee [phone]  Discharge Caregiver contacted prior to discharge? To be contacted prior to d/c  Care Conference needed?: TBD pending progress - CM will continue to follow to determine need for care conference      Initial note: CM reviewed pt's chart and completed IDR with medical team prior to moving forward with d/c planning. Attending MD reiterated pt is medically stable for d/c pending disposition re: SNF. Referral sent via [employer] to [address] [place] [address] was denied. Pt currently presenting as A&O x2, CM deferred to son to discuss disposition. CM contacted pt's son Mago Palomo: [phone]) to report update and receive additional preferences for SNF. CM provided update regarding denied referral & requested at least 4 additional preferences for SNF. Son reported he hasn't identified additional options at this time. CM informed son pt is medically stable for d/c pending disposition & stressed the importance of family identifying preferences in a timely manner; son verbalized understanding.  CM requested for son to review list of SNF's while on the phone to avoid further delay in d/c; son verbalized understanding & identified additional preferences as [address], [address], and [address] FOC offered, verbal consent provided by son via telephone. CM inquired if son had any immediate questions or concerns related to the d/c plan; son declined. CM will send referrals via [employer]/Allscripts to the SNF's detailed above; CM will report updates on status of referrals once available. CM reported updates related to disposition to attending MD; MD verbalized understanding. CM will continue to follow & remain accessible for d/c planning.      Billy Purcell, MSW  Care Manager, [address] [place] [address]

## 2022-03-30 LAB
GLUCOSE BLD STRIP.AUTO-MCNC: 105 MG/DL (ref 65–117)
SERVICE CMNT-IMP: NORMAL

## 2022-03-30 PROCEDURE — 74011250637 HC RX REV CODE- 250/637: Performed by: STUDENT IN AN ORGANIZED HEALTH CARE EDUCATION/TRAINING PROGRAM

## 2022-03-30 PROCEDURE — 74011250636 HC RX REV CODE- 250/636: Performed by: NURSE PRACTITIONER

## 2022-03-30 PROCEDURE — 94640 AIRWAY INHALATION TREATMENT: CPT

## 2022-03-30 PROCEDURE — 97530 THERAPEUTIC ACTIVITIES: CPT

## 2022-03-30 PROCEDURE — 97530 THERAPEUTIC ACTIVITIES: CPT | Performed by: OCCUPATIONAL THERAPIST

## 2022-03-30 PROCEDURE — 74011250637 HC RX REV CODE- 250/637: Performed by: INTERNAL MEDICINE

## 2022-03-30 PROCEDURE — 99233 SBSQ HOSP IP/OBS HIGH 50: CPT | Performed by: INTERNAL MEDICINE

## 2022-03-30 PROCEDURE — 74011000250 HC RX REV CODE- 250: Performed by: INTERNAL MEDICINE

## 2022-03-30 PROCEDURE — 96372 THER/PROPH/DIAG INJ SC/IM: CPT

## 2022-03-30 PROCEDURE — 82962 GLUCOSE BLOOD TEST: CPT

## 2022-03-30 PROCEDURE — G0378 HOSPITAL OBSERVATION PER HR: HCPCS

## 2022-03-30 RX ADMIN — ARFORMOTEROL TARTRATE: 15 SOLUTION RESPIRATORY (INHALATION) at 19:58

## 2022-03-30 RX ADMIN — LATANOPROST 1 DROP: 50 SOLUTION/ DROPS OPHTHALMIC at 22:05

## 2022-03-30 RX ADMIN — GABAPENTIN 300 MG: 300 CAPSULE ORAL at 08:30

## 2022-03-30 RX ADMIN — PANTOPRAZOLE SODIUM 40 MG: 40 TABLET, DELAYED RELEASE ORAL at 08:30

## 2022-03-30 RX ADMIN — CYANOCOBALAMIN 1000 MCG: 1000 INJECTION INTRAMUSCULAR; SUBCUTANEOUS at 17:10

## 2022-03-30 RX ADMIN — NORTRIPTYLINE HYDROCHLORIDE 50 MG: 25 CAPSULE ORAL at 22:03

## 2022-03-30 RX ADMIN — ARFORMOTEROL TARTRATE: 15 SOLUTION RESPIRATORY (INHALATION) at 08:16

## 2022-03-30 RX ADMIN — AMLODIPINE BESYLATE 5 MG: 5 TABLET ORAL at 08:30

## 2022-03-30 RX ADMIN — ATORVASTATIN CALCIUM 10 MG: 10 TABLET, FILM COATED ORAL at 08:30

## 2022-03-30 RX ADMIN — METOPROLOL TARTRATE 25 MG: 25 TABLET, FILM COATED ORAL at 17:11

## 2022-03-30 RX ADMIN — MELATONIN 3 MG: at 22:03

## 2022-03-30 RX ADMIN — METOPROLOL TARTRATE 25 MG: 25 TABLET, FILM COATED ORAL at 08:30

## 2022-03-30 RX ADMIN — LEFLUNOMIDE 20 MG: 10 TABLET ORAL at 08:30

## 2022-03-30 RX ADMIN — SERTRALINE 50 MG: 50 TABLET, FILM COATED ORAL at 08:30

## 2022-03-30 NOTE — PROGRESS NOTES
Spiritual Care Assessment/Progress Note  [facility]      NAME: Kevin Somers      MRN: [id number]  AGE: 78 y.o.  SEX: female  Gnosticist Affiliation: Druze   Language: English     3/30/2022     Total Time (in minutes): 16     Spiritual Assessment begun in [facility]OSCI[facility]LE[facility] through conversation with:         [x]Patient        [] Family    [] Friend(s)        Reason for Consult: Initial/Spiritual assessment, patient floor     Spiritual beliefs: (Please include comment if needed)     [x] Identifies with a cas tradition:   Druze      [] Supported by a cas community:   [address] [place] [address]         [] Claims no spiritual orientation:           [] Seeking spiritual identity:                [] Adheres to an individual form of spirituality:           [] Not able to assess:                           Identified resources for coping:      [x] Prayer                               [] Music                  [] Guided Imagery     [x] Family/friends                 [] Pet visits     [] Devotional reading                         [] Unknown     [] Other:                                             Interventions offered during this visit: (See comments for more details)    Patient Interventions: Affirmation of emotions/emotional suffering,Affirmation of cas,Catharsis/review of pertinent events in supportive environment,Coping skills reviewed/reinforced,Iconic (affirming the presence of God/Higher Power),Initial/Spiritual assessment, patient floor,Normalization of emotional/spiritual concerns,Prayer (assurance of),Gnosticist beliefs/image of God discussed           Plan of Care:     [] Support spiritual and/or cultural needs    [] Support AMD and/or advance care planning process      [] Support grieving process   [] Coordinate Rites and/or Rituals    [] Coordination with community clergy   [x] No spiritual needs identified at this time   [] Detailed Plan of Care below (See Comments)  [] Make referral to Music Therapy  [] Make referral to Pet Therapy     [] Make referral to Addiction services  [] Make referral to [facility]  [] Make referral to Spiritual Care Partner  [] No future visits requested        [] Contact Spiritual Care for further referrals     Comments:  Reviewed chart prior to visit on Neuro unit for spiritual assessment. No family/friends present. Patient was laying in bed; it appeared she had recently wakened from a nap. She shared that she is beginning to feel a little better. She shared that she has two sons who have pretty busy lives. One son has six or seven children so has little time to help her, however the other son seems to be able to more for her. She does not drive. She indicated she gets along pretty good on her own. She expects to be discharged to a SNF, but expressed hope she could go home. Ms Cathy Pearson shared her home Mu-ism is [facility], but more recently has been going to [address] [place] [address]. She expressed no spiritual needs or concerns, but was receptive to prayer. Provided spiritual presence, supportive listening and advised her of ongoing availability of pastoral support.      FAINA Davis, [facility], Staff [address]    [address] Paging Service  [phone]

## 2022-03-30 NOTE — PROGRESS NOTES
Infectious Disease progress        IMPRESSION:      H/o Emphysematous cystitis, acute pyelonephritis  Currently no dysuria , abdominal or flank pain  CT abdomen/pelvis-2/28  Mild left perinephric stranding, Gas within the wall of the bladder which is incompletely  Distended. Emphysematous cystitis is typically caused by E. coli, Klebsiella. Proteus, Pseudomonas, Enterococcus, Candida have also been  implicated. Diabetes mellitus, urinary obstruction are major risk factors.         - S/p ESBL Klebsiella UTI, complicated  Urine culture 2/22+ for >100,000 ESBL Klebsiella pneumoniae  Failed outpatient fosfomycin x 1 ,    Urine culture 2/28+ for >100,000 Klebsiella pneumoniae  UA-3/24-negative  Patient has been on meropenem from 2/28, changed to ertapenem IV on discharge 3/5. Plan was for repeat CT abdomen/pelvis and DC ertapenem on 3/28. Repeat CT abdomen/pelvis-3/28-emphysematous cystitis is resolved. Nonspecific perinephric stranding.        -Hematuria  2ry to infection     -SHIRAZ  Resolved     -Diabetes mellitus with neuropathy  A1c 5 (8/2021)     -H/o rheumatoid arthritis, sarcoid  On leflunomide, immune suppressed     -Hypertension, dyslipidemia             PLAN:        -Patient has resolution of emphysematous cystitis clinically & on imaging. UA is negative, patient is currently asymptomatic, s/p completion of antibiotic therapy &removal of picc  -Please reconsult as needed     Pt seen. Sitting up in chair. No dysuria. Debo Morning a 82-year-old female with past medical history significant for RA, chronic pain. Patient is known to infectious disease service . She was being treated for emphysematous cystitis & acute pyelonephritis. Patient was recently admitted 2/28-3/5. Urine culture on 2/22 and 2/28 were positive for >100,000 ESBL Klebsiella pneumoniae. Patient was treated with meropenem IV during her hospital course and was discharged on ertapenem IVx 2 weeks.   Patient had persistent urinary symptoms and ertapenem IV was extended for another week until 3/22 and until patient had her repeat CAT scan of bladder and urinary tract completed. Patient had not gotten her CT scan done and antibiotic therapy was extended to complete 4 weeks on 3/28. She is currently admitted secondary to sustaining injury to knee. ID services asked to see her in order that her PICC line may be removed and antibiotic therapy can be completed. Patient reports no urinary symptoms. Repeat CT abdomen pelvis done. Report as follows  FINDINGS:   LOWER THORAX: No significant abnormality in the incidentally imaged lower chest.  LIVER: No mass. BILIARY TREE: Cholecystectomy CBD is not dilated. SPLEEN: within normal limits. PANCREAS: No mass or ductal dilatation. ADRENALS: Unremarkable. KIDNEYS: No evidence of hydronephrosis. Mild perinephric stranding is noted  around both kidneys. No fluid collections are identified. The left ureter is  incompletely opacified however no focal abnormalities noted. . Small hypodensity  in the left kidney consistent with a simple cyst are no further follow-up. STOMACH: Unremarkable. SMALL BOWEL: No dilatation or wall thickening. COLON: No dilatation or wall thickening. APPENDIX: Not visualized  PERITONEUM: No ascites or pneumoperitoneum. RETROPERITONEUM: Atherosclerotic disease. No evidence of aneurysm or  lymphadenopathy. REPRODUCTIVE ORGANS: Unremarkable  URINARY BLADDER: Incompletely opacified but grossly unremarkable. BONES: Anterolisthesis of L4 on L5 with severe canal stenosis. Multilevel  degenerative changes in the lumbar spine  ABDOMINAL WALL: No mass or hernia. ADDITIONAL COMMENTS: N/A     IMPRESSION  1. 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     2.  Bilateral perinephric stranding which is nonspecific. No evidence of  perinephric abscess or hydronephrosis.       Patient Active Problem List   Diagnosis Code    Long-term use of immunosuppressant medication Z65.36    Osteopenia of multiple sites M85.89    Vitamin D deficiency E55.9    Rheumatoid arthritis involving multiple sites ([facility] [address] M06.9    Essential hypertension I10    Mixed hyperlipidemia E78.2    RLS (restless legs syndrome) G25.81    Mild intermittent asthma J45.20    Status post angioplasty with stent Z95.820    Coronary artery disease due to lipid rich plaque I25.10, I25.83    Seronegative rheumatoid arthritis of multiple sites (HCC) M06.09    Severe obesity (HCC) E66.01    MGUS (monoclonal gammopathy of unknown significance) D47.2    Type 2 diabetes mellitus without complication ([facility] T28.7    [facility] right [facility] T84. 9XXA, [id number]    Emphysematous cystitis N30.80    Ambulatory dysfunction R26.2     Past Medical History:   Diagnosis Date    Asthma     Chronic pain 5/8/2020    DDD (degenerative disc disease), lumbar     Diabetes (HCC)     Gastritis     GERD (gastroesophageal reflux disease)     Glaucoma     Hearing loss 5/8/2020    Hiatal hernia     High cholesterol     Hypertension     Hypocalcemia 5/8/2020    Peripheral vascular disease (HCC)     RA [facility]umatoid arthritis) [facility]     Sarcoidosis 1999    MCV    Sleep apnea     has not used cpap in years since weight loss      Family History   Problem Relation Age of Onset    Heart Disease Mother     Liver Disease Father     Alcohol abuse Brother     Dementia Neg Hx     Cancer Neg Hx     Seizures Neg Hx       Social History     Tobacco Use    Smoking status: Never Smoker    Smokeless tobacco: Never Used   Substance Use Topics    Alcohol use:  Yes     Alcohol/week: 2.0 standard drinks     Types: 1 Glasses of wine, 1 Shots of liquor per week     Comment: 2-3 yearly     Past Surgical History:   Procedure Laterality Date    HX GASTRIC BYPASS      HX HEART CATHETERIZATION      s/p PCI with stenting in 1998     HX KNEE REPLACEMENT Bilateral     HX ORTHOPAEDIC Bilateral     carpal tunnel surgery     HX SHOULDER REPLACEMENT Right     x 2       Prior to Admission medications    Medication Sig Start Date End Date Taking? Authorizing Provider   0.9% sodium chloride solution 10 mL by IntraVENous route daily. before and after IV antibiotic using SASH method    Provider, Historical   heparin sod,porcine/0.9 % NaCl (HEPARIN FLUSH IV) 5 mL by IntraVENous route daily. after antibiotic using SASH method    Provider, Historical   ertapenem [facility]) 1 gram injection 1 g by IntraVENous route daily. Provider, Historical   traMADoL (ULTRAM) 50 mg tablet Take 50 mg by mouth every six (6) hours as needed for Pain. Provider, Historical   mupirocin (BACTROBAN) 2 % ointment Apply 1 Tube to affected area daily. 1 application right 2nd toe 2/14/22   Provider, Historical   albuterol sulfate (PROVENTIL;VENTOLIN) 2.5 mg/0.5 mL nebu nebulizer solution USE 1 VIAL IN NEBULIZER EVERY 4 HOURS AS NEEDED FOR WHEEZING 2/11/22   Mila Wiseman MD   simvastatin (ZOCOR) 20 mg tablet TAKE BY MOUTH NIGHTLY. Patient taking differently: Take 20 mg by mouth nightly. TAKE BY MOUTH NIGHTLY. 10/26/21   Mila Wiseman MD   sertraline (ZOLOFT) 50 mg tablet Take 1 Tablet by mouth daily. 10/26/21   Mila Wiseman MD   omeprazole (PRILOSEC) 20 mg capsule Take 1 Capsule by mouth daily. 10/26/21   Mila Wiseman MD   metoprolol tartrate (LOPRESSOR) 25 mg tablet Take 1 Tablet by mouth two (2) times a day. 10/26/21   Mila Wiseman MD   metFORMIN (GLUCOPHAGE) 500 mg tablet Take 1 Tablet by mouth daily (with dinner). Decreased dose  Patient not taking: Reported on 3/17/2022 10/26/21   Mila Wiseman MD   latanoprost (XALATAN) 0.005 % ophthalmic solution INSTILL 1 DROP INTO BOTH EYES NIGHTLY  Patient taking differently: Administer 1 Drop to both eyes nightly.  INSTILL 1 DROP INTO BOTH EYES NIGHTLY 10/26/21   Mila Wiseman MD   gabapentin (NEURONTIN) 300 mg capsule TAKE 1 CAPSULE IN THE MORNING AND TAKE 3 CAPSULES BEFORE BEDTIME  Patient taking differently: Take 300 mg by mouth daily. TAKE 1 CAPSULE IN THE MORNING AND TAKE 3 CAPSULES BEFORE BEDTIME 10/26/21   Sarwat Barbosa MD   ergocalciferol (ERGOCALCIFEROL) 1,250 mcg (50,000 unit) capsule Take 1 Capsule by mouth every seven (7) days. Indications: vitamin D deficiency (high dose therapy)  Patient taking differently: Take 50,000 Units by mouth every seven (7) days.   Indications: vitamin D deficiency (high dose therapy) 10/26/21   Sarwat Barbosa MD   fluticasone furoate-vilanteroL (Breo Ellipta) 100-25 mcg/dose inhaler TAKE 1 PUFF BY MOUTH EVERY DAY  Patient taking differently: Take 1 Puff by inhalation daily. TAKE 1 PUFF BY MOUTH EVERY DAY 10/26/21   Sarwat Barbosa MD   leflunomide (ARAVA) 20 mg tablet TAKE 1 TABLET BY MOUTH EVERY DAY  Patient taking differently: Take 20 mg by mouth daily. TAKE 1 TABLET BY MOUTH EVERY DAY 21   Diana Osorio MD   nortriptyline (PAMELOR) 50 mg capsule Take 50 mg by mouth nightly. 18   Provider, Historical     Allergies   Allergen Reactions    Lisinopril Rash    Methotrexate Hives        Review of Systems:  A comprehensive review of systems was negative except for that written in the History of Present Illness. 14 point review of systems obtained . All other systems negative    Objective:   Blood pressure (!) 103/52, pulse 80, temperature 98.1 °F (36.7 °C), resp. rate 18, height 5' 5\" (1.651 m), weight 169 lb (76.7 kg), SpO2 99 %.   Temp (24hrs), Av.9 °F (36.6 °C), Min:97.7 °F (36.5 °C), Max:98.1 °F (36.7 °C)    Current Facility-Administered Medications   Medication Dose Route Frequency    hydrALAZINE (APRESOLINE) 20 mg/mL injection 10 mg  10 mg IntraVENous Q4H PRN    amLODIPine (NORVASC) tablet 5 mg  5 mg Oral DAILY    cyanocobalamin (VITAMIN B12) injection 1,000 mcg  1,000 mcg IntraMUSCular DAILY    melatonin tablet 3 mg  3 mg Oral QHS    haloperidoL (HALDOL) tablet 0.5 mg  0.5 mg Oral Q6H PRN    acetaminophen (TYLENOL) tablet 650 mg  650 mg Oral Q6H PRN    arformoterol 15 mcg/budesonide 0.25 mg neb solution   Nebulization BID RT    gabapentin (NEURONTIN) capsule 300 mg  300 mg Oral DAILY    latanoprost (XALATAN) 0.005 % ophthalmic solution 1 Drop  1 Drop Both Eyes QHS    leflunomide (ARAVA) tablet 20 mg  20 mg Oral DAILY    metoprolol tartrate (LOPRESSOR) tablet 25 mg  25 mg Oral BID    nortriptyline (PAMELOR) capsule 50 mg  50 mg Oral QHS    pantoprazole (PROTONIX) tablet 40 mg  40 mg Oral ACB    sertraline (ZOLOFT) tablet 50 mg  50 mg Oral DAILY    atorvastatin (LIPITOR) tablet 10 mg  10 mg Oral DAILY    oxyCODONE-acetaminophen (PERCOCET) 5-325 mg per tablet 1 Tablet  1 Tablet Oral Q6H PRN    albuterol (PROVENTIL VENTOLIN) nebulizer solution 2.5 mg  2.5 mg Nebulization Q4H PRN        Exam:    General:  Awake, cooperative,    Eyes:  Sclera anicteric. Pupils equally round and reactive to light. Mouth/Throat: Mucous membranes normal, oral pharynx clear   Neck: Supple   Lungs:   Clear to auscultation bilaterally, good effort   CV:  Regular rate and rhythm,no murmur, click, rub or gallop   Abdomen:   Soft, non-tender. bowel sounds normal. non-distended   Extremities: No cyanosis or edema   Skin: Skin color, texture, turgor normal. no acute rash or lesions   Lymph nodes: Cervical and supraclavicular normal   Musculoskeletal: No swelling or deformity   Lines/Devices:  Intact, no erythema, drainage or tenderness   Psych: Alert and oriented, normal mood affect        Data Reviewed:   CBC:   No results for input(s): WBC, RBC, HGB, HCT, PLT, GRANS, LYMPH, EOS, HGBEXT, HCTEXT, PLTEXT in the last 72 hours.   CMP:   Recent Labs     03/28/22  0126   *   *   K 4.5      CO2 26   BUN 20   CREA 0.90   CA 8.4*   AGAP 2*   BUCR 22*       Lab Results   Component Value Date/Time    Culture result: NO GROWTH 5 DAYS 02/28/2022 10:30 PM    Culture result: (A) 02/28/2022 04:54 PM     KLEBSIELLA PNEUMONIAE ** (EXTENDED SPECTRUM BETA LACTAMASE ) **    Culture result:  02/28/2022 04:54 PM     (NOTE) ESBL RESULT CALLED TO MEGAN REAVES,AT 1410 ON 3/2/22. RF    Culture result: (A) 02/22/2022 10:13 AM     KLEBSIELLA PNEUMONIAE ** (EXTENDED SPECTRUM BETA LACTAMASE ) ** ** (EXTENDED SPECTRUM BETA LACTAMASE ) **    Culture result:  02/22/2022 10:13 AM     (NOTE) ESBL CALLED TO Jason Bruno MD AT 4230 ON 02/26/22 BY ESBL CALLED TO AND READ BACK BY Zaina Womack MD AT 1523 ON 02/26/22 BY MD.          XR Results (most recent)reviewed:  Results from [place] encounter on 03/24/22    XR FOOT RT AP/LAT    Narrative  EXAM: XR FOOT RT AP/LAT    INDICATION: None provided. Additional history: Right knee pain x1 week, exacerbated by a ground-level fall  last night. COMPARISON: None. FINDINGS: Two views of the right foot demonstrate plantar and Achilles heel  spurs. Degenerative changes in the midfoot. Osteopenia. Extensive vascular  calcifications. 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 The soft tissues are  within normal limits. Impression  1. No visible fracture. ICD-10-CM ICD-9-CM    1. Contusion of right knee, initial encounter  S80. 01XA 924.11    2. Inability to walk  R26.2 719.7    3. Hallucination  R44.3 780.1    4. Memory loss  R41.3 780.93    5. Neuropathy  G62.9 355.9    6. Acute pyelonephritis  N10 590.10    7. Immune deficiency disorder (HCC)  D84.9 279.3    8. Rheumatoid arthritis involving multiple sites, unspecified whether rheumatoid factor present ([facility] [address]  M06.9 714.0    9. Urinary tract infection due to ESBL Klebsiella  N39.0 599.0     B96.89 041.84    10. Emphysematous cystitis  N30.80 595.89    11. Essential hypertension  I10 401.9    12. Vitamin B12 deficiency  E53.8 266.2            Antibiotic History    I have discussed the diagnosis with the patient and the intended plan as seen in the above orders.      I have discussed medication side effects and warnings with the patient as well.     Reviewed test results at length with patient    Signed By: Radha Wright MD FACP     March 30, 2022

## 2022-03-30 NOTE — PROGRESS NOTES
Transition of Care Plan:     RUR: N/A - \"Observation status\"  Disposition: Plan A) SNF placement - [facility] (pending insurance auth - auth to be initiated 3/30/22) vs Plan B) Home with HH, increased support/supervision, & f/u apts  Follow up appointments: PCP & specialist as indicated   DME needed: Pt owns DME necessary for d/c  Transportation at Discharge: CM to secure medical transport for d/c; PCS to be completed & placed on chart  Keys or means to access home: Pt's son has access to the home       Medicare Letter: N/A - Observation status; [employer] notice signed 3/25/22  Is patient a BCPI-A Bundle: N/A          Is patient a  and connected with the [facility]? N/A                Caregiver Contact: Pt's son, Esther Cunningham) [phone]  Discharge Caregiver contacted prior to discharge? To be contacted prior to d/c  Care Conference needed?: TBD pending progress - CM will continue to follow to determine need for care conference      Initial note: CM reviewed pt's chart prior to moving forward with d/c planning. CM received call from [address] [place] [address] admission liaison [facility]: [phone]) regarding referral. Alber Young confirmed the facility can accept pt for placement pending insurance auth. CM requested for [facility] to initiate insurance auth this afternoon, as pt is medically stable for d/c; [facility] verbalized understanding. Pt currently presenting confused; CM deferred to son to provide updates regarding SNF. CM contacted pt's son Kavon Kearney), provided updates regarding SNF placement, and confirmed he's agreeable to proceeding with [address] [place] [address]. Son informed facility is going to initiate insurance auth this afternoon; son informed [employer] [employer] could take 24-48 hrs to process. CM inquired if son had any immediate questions or concerns related to the d/c plan; son declined. CM will continue to follow & remain accessible for d/c planning.      Cassandra Guzman MSW  Care Manager, ED [facility]  [phone]

## 2022-03-30 NOTE — PROGRESS NOTES
End of Shift Note    Bedside shift change report given to [address] (oncoming nurse) by Ben Hilario RN (offgoing nurse). Report included the following information SBAR, Kardex, Procedure Summary, Intake/Output, MAR and Recent Results    Shift worked:  7A -7P   Shift summary and any significant changes:     Patient alert and oriented x2, periodic confusion. Pt tolerating diet and medication without issues. Pt denies pain and respiratory distress.      1100 Reassessment: No Changes       1500: Reassessment: No Changes         Concerns for physician to address:  See above    Zone phone for oncoming shift:   [phone]     Patient Information  Kevin Somers  78 y.o.  3/24/2022  1:15 PM by Joseph Montero MD. Kevin Somers was admitted from Home    Problem List  Patient Active Problem List    Diagnosis Date Noted    Ambulatory dysfunction 03/24/2022    Emphysematous cystitis 87/50/4726    Complication of internal right knee prosthesis ([address] 11/09/2021    Type 2 diabetes mellitus without complication ([address] 89/54/1238    MGUS (monoclonal gammopathy of unknown significance) 11/22/2019    Severe obesity ([address] 05/07/2019    Seronegative rheumatoid arthritis of multiple sites ([address] 02/18/2019    Rheumatoid arthritis involving multiple sites ([address] 09/28/2018    Essential hypertension 09/28/2018    Mixed hyperlipidemia 09/28/2018    RLS (restless legs syndrome) 09/28/2018    Mild intermittent asthma 09/28/2018    Status post angioplasty with stent 09/28/2018    Coronary artery disease due to lipid rich plaque 09/28/2018    Long-term use of immunosuppressant medication 08/22/2018    Osteopenia of multiple sites 08/22/2018    Vitamin D deficiency 08/22/2018     Past Medical History:   Diagnosis Date    Asthma     Chronic pain 5/8/2020    DDD (degenerative disc disease), lumbar     Diabetes ([address]     Gastritis     GERD (gastroesophageal reflux disease)     Glaucoma     Hearing loss 5/8/2020    Hiatal hernia     High cholesterol     Hypertension     Hypocalcemia 5/8/2020    Peripheral vascular disease (HCC)     RA (rheumatoid arthritis) (HCC)     Sarcoidosis 1999    MCV    Sleep apnea     has not used cpap in years since weight loss       Core Measures:  CVA: No No  CHF:No No  PNA:No No    Activity:  Activity Level: Up with Assistance, up to chair with assistance   Number times ambulated in hallways past shift: 0  Number of times OOB to chair past shift: 1    Cardiac:   Cardiac Monitoring: No           Access:   Current line(s): PIV and PICC   Central Line? No  PICC LINE? Yes     Genitourinary:   Urinary status: voiding, incontinent and external catheter  Urinary Catheter? No     Respiratory:   O2 Device: None (Room air)  Chronic home O2 use?: NO  Incentive spirometer at bedside: NO       GI:  Last Bowel Movement Date: 03/29/22  Current diet:  ADULT DIET Regular; 4 carb choices (60 gm/meal); Low Fat/Low Chol/High Fiber/2 gm Na  ADULT ORAL NUTRITION SUPPLEMENT Dinner, HS Snack, Breakfast; Diabetic Supplement  DIET ONE TIME MESSAGE  Passing flatus: YES  Tolerating current diet: YES       Pain Management:   Patient states pain is manageable on current regimen: YES    Skin:  Jose Luis Score: 14  Interventions: float heels, increase time out of bed, limit briefs and internal/external urinary devices    Patient Safety:  Fall Score:  Total Score: 5  Interventions: bed/chair alarm, assistive device (walker, cane, etc), pt to call before getting OOB and stay with me (per policy)  High Fall Risk: Yes  @Rollbelt  @dexterity to release roll belt  Yes/No ( must document dexterity  here by stating Yes or No here, otherwise this is a restraint and must follow restraint documentation policy.)    DVT prophylaxis:  DVT prophylaxis Med- Yes  DVT prophylaxis SCD or DANGELO- No     Wounds: (If Applicable)  Wounds- Yes  Stage 2 sacrum    Active Consults:  IP CONSULT TO HOSPITALIST  IP CONSULT TO NEUROLOGY  IP CONSULT TO INFECTIOUS DISEASES    Length of Stay:  Expected LOS: - - -  Actual LOS: 0  Discharge Plan: Yes, Pending acceptance to [facility].       David Villagomez RN

## 2022-03-30 NOTE — ROUTINE PROCESS
End of Shift Note    Bedside shift change report given to Alex Ellington RN (oncoming nurse) by Marsha Conley RN (offgoing nurse).   Report included the following information SBAR, Kardex, Procedure Summary, Intake/Output, MAR and Recent Results    Shift worked:  7p - 7a   Shift summary and any significant changes:     None        Concerns for physician to address:  None   Zone phone for oncoming shift:   0043     Patient Information  Kevin Somers  78 y.o.  3/24/2022  1:15 PM by Joseph Montero MD. Kevin Somers was admitted from Home    Problem List  Patient Active Problem List    Diagnosis Date Noted    Ambulatory dysfunction 03/24/2022    Emphysematous cystitis 46/86/2521    Complication of internal right knee prosthesis ([address] 11/09/2021    Type 2 diabetes mellitus without complication ([address] 39/15/8792    MGUS (monoclonal gammopathy of unknown significance) 11/22/2019    Severe obesity ([address] 05/07/2019    Seronegative rheumatoid arthritis of multiple sites ([address] 02/18/2019    Rheumatoid arthritis involving multiple sites ([address] 09/28/2018    Essential hypertension 09/28/2018    Mixed hyperlipidemia 09/28/2018    RLS (restless legs syndrome) 09/28/2018    Mild intermittent asthma 09/28/2018    Status post angioplasty with stent 09/28/2018    Coronary artery disease due to lipid rich plaque 09/28/2018    Long-term use of immunosuppressant medication 08/22/2018    Osteopenia of multiple sites 08/22/2018    Vitamin D deficiency 08/22/2018     Past Medical History:   Diagnosis Date    Asthma     Chronic pain 5/8/2020    DDD (degenerative disc disease), lumbar     Diabetes ([address]     Gastritis     GERD (gastroesophageal reflux disease)     Glaucoma     Hearing loss 5/8/2020    Hiatal hernia     High cholesterol     Hypertension     Hypocalcemia 5/8/2020    Peripheral vascular disease ([address]     RA (rheumatoid arthritis) ([address]     Sarcoidosis 1999    MCV    Sleep apnea     has not used cpap in years since weight loss       Core Measures:  CVA: No No  CHF:No No  PNA:No No    Activity:  Activity Level: Bed Rest, up to chair with assistance   Number times ambulated in hallways past shift: 0  Number of times OOB to chair past shift: 1    Cardiac:   Cardiac Monitoring: No           Access:   Current line(s): PIV and PICC   Central Line? No  PICC LINE? Yes     Genitourinary:   Urinary status: voiding, incontinent and external catheter  Urinary Catheter? No     Respiratory:   O2 Device: None (Room air)  Chronic home O2 use?: NO  Incentive spirometer at bedside: NO       GI:  Last Bowel Movement Date: 03/29/22  Current diet:  ADULT DIET Regular; 4 carb choices (60 gm/meal); Low Fat/Low Chol/High Fiber/2 gm Na  ADULT ORAL NUTRITION SUPPLEMENT Dinner, HS Snack, Breakfast; Diabetic Supplement  DIET ONE TIME MESSAGE  Passing flatus: YES  Tolerating current diet: YES       Pain Management:   Patient states pain is manageable on current regimen: YES    Skin:  Jose Luis Score: 15  Interventions: float heels, increase time out of bed, limit briefs and internal/external urinary devices    Patient Safety:  Fall Score: Total Score: 5  Interventions: bed/chair alarm, assistive device (walker, cane, etc), pt to call before getting OOB and stay with me (per policy)  High Fall Risk: Yes  @Rollbelt  @dexterity to release roll belt  Yes/No ( must document dexterity  here by stating Yes or No here, otherwise this is a restraint and must follow restraint documentation policy.)    DVT prophylaxis:  DVT prophylaxis Med- Yes  DVT prophylaxis SCD or DANGELO- No     Wounds: (If Applicable)  Wounds- Yes  Stage 2 sacrum    Active Consults:  IP CONSULT TO HOSPITALIST  IP CONSULT TO NEUROLOGY  IP CONSULT TO INFECTIOUS DISEASES    Length of Stay:  Expected LOS: - - -  Actual LOS: 0  Discharge Plan: Yes, Pending acceptance to [facility] .       Dimas Manzano RN

## 2022-03-30 NOTE — PROGRESS NOTES
Hospitalist Progress Note    NAME: Dallas Romo   :  1945   MRN:  [id number]       Assessment / Plan:    Ambulatory dysfunction POA  Peripheral neuropathy  Vitamin B12 deficiency   -Likely due to recent fall/ knee injury, peripheral neuropathy  -Right knee, foot, ankle x-ray and left knee x-ray negative for fractures  -Percocet for pain, continue gabapentin  -Started on B12  -Appreciate neurology input  -PT OT, SNF recommended  - working on placement     Elevated creatinine  POA -resolved   -Creatinine 1.3 on admission. S/p IV fluid. Cr back to normal     Diabetes mellitus type 2   Hx of gastric bypass surgery. Used to weigh >300lbs  -On Metformin 500 mg daily at home. A1c 3 weeks ago was 4.6. A1c was 6.1 in 2019.   -Episodes of hypoglycemia. Metformin discontinued  -Continue D5 half-normal saline. Monitor blood glucose. Repeat A1c    Recent emphysematous cystitis and ESBL Klebsiella pneumoniae UTI: 2022  -Was discharged on IV ertapenem and was supposed to continue until 3/28 (duration was prolonged at due to patient having persistent symptoms)  -CT abdomen/pelvis with contrast: shows resolution of emphysematous cystitis. Nonspecific bilateral perinephric stranding.  -Remove PICC line  -Appreciate ID input    Hypertension  Dyslipidemia  Osteoarthritis  Peripheral arterial disease  History of rheumatoid arthritis  History of sarcoid  Obstructive sleep apnea  GERD  Depression  -Continue home metoprolol, Lipitor, PPI, Zoloft  -Resume Xeljanz and leflunomide as available    Visual hallucinations  ? Dementia   -Per son this has been going on intermittently since her knee fracture in 2021  -Neurology consulted. Appreciate input. -MRI: mild generalized parenchymal volume loss and mild to moderate  chronic microvascular ischemic disease.  -Maintain sleep-wake cycle. Avoid sedatives. Continue melatonin nightly.         Right sacral skin tear, POA  -Wound care consulted        25.0 - 29.9 Overweight / Body mass index is 28.12 kg/m². Estimated discharge date: Medicaly stable for discharge   Barriers: placement     Code Status: Full code  Surrogate Decision Maker: Emily Hussein [phone]            DVT Prophylaxis: Lovenox  GI Prophylaxis: not indicated     Baseline: Independent       Subjective:     Chief Complaint / Reason for Physician Visit   Discussed with RN events overnight. States she feels fine  Still with visual hallucination     Review of Systems:  Symptom Y/N Comments  Symptom Y/N Comments   Fever/Chills    Chest Pain     Poor Appetite    Edema     Cough    Abdominal Pain     Sputum    Joint Pain     SOB/WORKMAN    Pruritis/Rash     Nausea/vomit    Tolerating PT/OT     Diarrhea    Tolerating Diet     Constipation    Other       Could NOT obtain due to:      Objective:     VITALS:   Last 24hrs VS reviewed since prior progress note. Most recent are:  Patient Vitals for the past 24 hrs:   Temp Pulse Resp BP SpO2   03/29/22 1954 98 °F (36.7 °C) 81 18 (!) 114/59 96 %   03/29/22 1936 -- -- -- -- 93 %   03/29/22 1859 -- -- -- (!) 104/54 --   03/29/22 1503 98 °F (36.7 °C) 87 16 (!) 96/54 92 %   03/29/22 1100 98 °F (36.7 °C) 65 16 100/60 94 %   03/29/22 0700 97.6 °F (36.4 °C) 92 20 (!) 190/84 92 %   03/29/22 0354 97.2 °F (36.2 °C) 82 16 (!) 156/78 --   03/29/22 0017 97.4 °F (36.3 °C) 73 18 (!) 173/83 --     No intake or output data in the 24 hours ending 03/29/22 2201     I had a face to face encounter and independently examined this patient on 3/29/2022, as outlined below:  PHYSICAL EXAM:  General: WD, WN. Alert, cooperative, no acute distress    EENT:  EOMI. Anicteric sclerae. MMM  Resp:  CTA bilaterally, no wheezing or rales. No accessory muscle use  CV:  Regular  rhythm,  No edema  GI:  Soft, Non distended, Non tender. +Bowel sounds  Neurologic:  Alert and oriented X 2, normal speech,   Psych:   Good insight. Not anxious nor agitated  Skin:  No rashes. No jaundice, right buttock ulcer with clean red base    Reviewed most current lab test results and cultures  YES  Reviewed most current radiology test results   YES  Review and summation of old records today    NO  Reviewed patient's current orders and MAR    YES  PMH/SH reviewed - no change compared to H&P  ________________________________________________________________________  Care Plan discussed with:    Comments   Patient x    Family      RN x    Care Manager     Consultant                       x Multidiciplinary team rounds were held today with , nursing, pharmacist and clinical coordinator. Patient's plan of care was discussed; medications were reviewed and discharge planning was addressed. ________________________________________________________________________  Total NON critical care TIME:  30   Minutes    Total CRITICAL CARE TIME Spent:   Minutes non procedure based      Comments   >50% of visit spent in counseling and coordination of care     ________________________________________________________________________  Cris Vivar MD     Procedures: see electronic medical records for all procedures/Xrays and details which were not copied into this note but were reviewed prior to creation of Plan. LABS:  I reviewed today's most current labs and imaging studies.   Pertinent labs include:  Recent Labs     03/27/22 0242   HGB 9.9*     Recent Labs     03/28/22 0126 03/27/22 0242   * 137   K 4.5 4.4    107   CO2 26 25   * 67   BUN 20 23*   CREA 0.90 0.79   CA 8.4* 8.9       Signed: Cris Vivar MD

## 2022-03-30 NOTE — PROGRESS NOTES
Problem: Pressure Injury - Risk of  Goal: *Prevention of pressure injury  Description: Document Jose Luis Scale and appropriate interventions in the flowsheet. Outcome: Progressing Towards Goal  Note: Pressure Injury Interventions:  Sensory Interventions: Assess changes in LOC,Check visual cues for pain,Float heels,Keep linens dry and wrinkle-free,Minimize linen layers    Moisture Interventions: Absorbent underpads,Check for incontinence Q2 hours and as needed,Minimize layers,Moisture barrier,Offer toileting Q_hr    Activity Interventions: Increase time out of bed,Pressure redistribution bed/mattress(bed type)    Mobility Interventions: Turn and reposition approx. every two hours(pillow and wedges)    Nutrition Interventions: Document food/fluid/supplement intake,Offer support with meals,snacks and hydration    Friction and Shear Interventions: HOB 30 degrees or less,Lift sheet,Minimize layers                Problem: Falls - Risk of  Goal: *Absence of Falls  Description: Document Sharifa Fall Risk and appropriate interventions in the flowsheet.   Outcome: Progressing Towards Goal  Note: Fall Risk Interventions:  Mobility Interventions: Bed/chair exit alarm,Patient to call before getting OOB,Utilize walker, cane, or other assistive device,Strengthening exercises (ROM-active/passive)    Mentation Interventions: Adequate sleep, hydration, pain control,Bed/chair exit alarm,Door open when patient unattended,Increase mobility,More frequent rounding,Reorient patient,Room close to nurse's station,Toileting rounds,Update white board    Medication Interventions: Bed/chair exit alarm,Patient to call before getting OOB,Teach patient to arise slowly    Elimination Interventions: Bed/chair exit alarm,Call light in reach,Patient to call for help with toileting needs,Toileting schedule/hourly rounds,Toilet paper/wipes in reach    History of Falls Interventions: Bed/chair exit alarm,Investigate reason for fall,Room close to nurse's station,Door open when patient unattended         Problem: Pain  Goal: *Control of Pain  Outcome: Progressing Towards Goal  Goal: *PALLIATIVE CARE:  Alleviation of Pain  Outcome: Progressing Towards Goal     Problem: Altered Thought Process (Adult/Pediatric)  Goal: *STG: Participates in treatment plan  Outcome: Progressing Towards Goal  Goal: *STG: Remains safe in hospital  Outcome: Progressing Towards Goal  Goal: *STG: Seeks staff when feelings of anxiety and fear arise  Outcome: Progressing Towards Goal  Goal: *STG: Complies with medication therapy  Outcome: Progressing Towards Goal  Goal: *STG: Attends activities and groups  Outcome: Progressing Towards Goal  Goal: *STG: Decreased delusional thinking  Outcome: Progressing Towards Goal  Goal: *STG: Decreased hallucinations  Outcome: Progressing Towards Goal  Goal: *STG: Absence of lethality  Outcome: Progressing Towards Goal  Goal: *STG: Demonstrates ability to understand and use improved judgment in daily activities and relationships  Outcome: Progressing Towards Goal  Goal: *LTG: Returns to baseline functioning  Outcome: Progressing Towards Goal  Goal: Interventions  Outcome: Progressing Towards Goal     Problem: General Medical Care Plan  Goal: *Vital signs within specified parameters  Outcome: Progressing Towards Goal  Goal: *Labs within defined limits  Outcome: Progressing Towards Goal  Goal: *Absence of infection signs and symptoms  Outcome: Progressing Towards Goal  Goal: *Optimal pain control at patient's stated goal  Outcome: Progressing Towards Goal  Goal: *Skin integrity maintained  Outcome: Progressing Towards Goal  Goal: *Fluid volume balance  Outcome: Progressing Towards Goal  Goal: *Optimize nutritional status  Outcome: Progressing Towards Goal  Goal: *Anxiety reduced or absent  Outcome: Progressing Towards Goal  Goal: *Progressive mobility and function (eg: ADL's)  Outcome: Progressing Towards Goal     Problem: Diabetes Self-Management  Goal: *Disease process and treatment process  Description: Define diabetes and identify own type of diabetes; list 3 options for treating diabetes. Outcome: Progressing Towards Goal  Goal: *Incorporating nutritional management into lifestyle  Description: Describe effect of type, amount and timing of food on blood glucose; list 3 methods for planning meals. Outcome: Progressing Towards Goal  Goal: *Incorporating physical activity into lifestyle  Description: State effect of exercise on blood glucose levels. Outcome: Progressing Towards Goal  Goal: *Developing strategies to promote health/change behavior  Description: Define the ABC's of diabetes; identify appropriate screenings, schedule and personal plan for screenings. Outcome: Progressing Towards Goal  Goal: *Using medications safely  Description: State effect of diabetes medications on diabetes; name diabetes medication taking, action and side effects. Outcome: Progressing Towards Goal  Goal: *Monitoring blood glucose, interpreting and using results  Description: Identify recommended blood glucose targets  and personal targets. Outcome: Progressing Towards Goal  Goal: *Prevention, detection, treatment of acute complications  Description: List symptoms of hyper- and hypoglycemia; describe how to treat low blood sugar and actions for lowering  high blood glucose level. Outcome: Progressing Towards Goal  Goal: *Prevention, detection and treatment of chronic complications  Description: Define the natural course of diabetes and describe the relationship of blood glucose levels to long term complications of diabetes.   Outcome: Progressing Towards Goal  Goal: *Developing strategies to address psychosocial issues  Description: Describe feelings about living with diabetes; identify support needed and support network  Outcome: Progressing Towards Goal  Goal: *Insulin pump training  Outcome: Progressing Towards Goal  Goal: *Sick day guidelines  Outcome: Progressing Towards Goal  Goal: *Patient Specific Goal (EDIT GOAL, INSERT TEXT)  Outcome: Progressing Towards Goal

## 2022-03-30 NOTE — PROGRESS NOTES
Problem: Mobility Impaired (Adult and Pediatric)  Goal: *Acute Goals and Plan of Care (Insert Text)  Description: FUNCTIONAL STATUS PRIOR TO ADMISSION: Pt lives alone in an apartment; has a son in the area, stays with her sometimes. She said independent with ADLs and using shower seat and walked mod I with rolling walker; states she does not go out much; if goes to store she uses the electric scooter    [address]: The patient lived alone with no local support. Physical Therapy Goals  Initiated 3/25/2022  1. Patient will move from supine to sit and sit to supine , scoot up and down, and roll side to side in bed with independence within 7 day(s). 2.  Patient will transfer from bed to chair and chair to bed with modified independence using the least restrictive device within 7 day(s). 3.  Patient will perform sit to stand with modified independence within 7 day(s). 4.  Patient will ambulate with modified independence for 150 feet with the least restrictive device within 7 day(s). Outcome: Progressing Towards Goal   PHYSICAL THERAPY TREATMENT  Patient: Mahsa Diaz (54 y.o. female)  Date: 3/30/2022  Diagnosis: Ambulatory dysfunction [R26.2] <principal problem not specified>      Precautions: Contact (off contact 3/29), Fall  Chart, physical therapy assessment, plan of care and goals were reviewed. ASSESSMENT  Patient continues with skilled PT services and is slowly progressing towards goals. Pt received supine in bed, agreeable to PT session with encouragement. She demonstrated bed mobility with Min A, noted decrease in BP with change in position (see below). Standing tolerance poor due to continued orthostasis able to stand with Min A x2 and RW, noted R lean. Pt transferred bed > chair with Min A x2 and RW, walking 5 ft to achieve transfer. BP improving once in chair to near baseline.  She remains a high fall risk and likely will require SNF vs. IP Rehab stay upon DC for further mobility intervention. Will continue to follow. Vitals:    03/30/22 1200 03/30/22 1202 03/30/22 1204     Vital Signs   BP (!) 103/52 (!) 89/62 107/65   MAP (Calculated) 69 71 79   BP 1 Location Left upper arm Left upper arm Left upper arm   BP 1 Method Automatic Automatic Automatic   BP Patient Position Supine Sitting  (edge of bed) Sitting  (ege of bed)        03/30/22 1205 03/30/22 1209   Vital Signs   BP (!) 80/56 (!) 100/51   MAP (Calculated) (!) 64 67   BP 1 Location Left upper arm Left upper arm   BP 1 Method Automatic Automatic   BP Patient Position Standing Sitting  (bedside hair)     Current Level of Function Impacting Discharge (mobility/balance): Min A x2 with RW, very orthostatic    Other factors to consider for discharge: orthostatic         PLAN :  Patient continues to benefit from skilled intervention to address the above impairments. Continue treatment per established plan of care. to address goals. Recommendation for discharge: (in order for the patient to meet his/her long term goals)  Therapy up to 5 days/week in SNF setting vs. IP Rehab    This discharge recommendation:  Has been made in collaboration with the attending provider and/or case management    IF patient discharges home will need the following DME: rolling walker       SUBJECTIVE:   Patient stated I feel okay, just shaky.     OBJECTIVE DATA SUMMARY:   Critical Behavior:  Neurologic State: Alert,Confused  Orientation Level: Oriented to person  Cognition: Follows commands,Decreased attention/concentration,Poor safety awareness  Safety/Judgement: Fall prevention,Decreased awareness of need for safety  Functional Mobility Training:  Bed Mobility:  Rolling: Minimum assistance  Supine to Sit: Minimum assistance     Scooting: Minimum assistance        Transfers:  Sit to Stand: Minimum assistance  Stand to Sit: Moderate assistance (attempted to sit prior to completion of turning to chair)        Bed to Chair: Minimum assistance; Additional time;Assist x2                    Balance:  Sitting - Static: Fair (occasional)  Sitting - Dynamic: Fair (occasional); Poor (constant support)  Standing: Impaired  Standing - Static: Fair;Constant support  Standing - Dynamic : Fair;Poor;Constant support  Ambulation/Gait Training:  Distance (ft): 5 Feet (ft)  Assistive Device: Gait belt;Walker, rolling  Ambulation - Level of Assistance: Minimal assistance;Assist x2        Gait Abnormalities: Decreased step clearance;Shuffling gait        Base of Support: Widened     Speed/Nicole: Shuffled; Slow  Step Length: Left shortened;Right shortened        Pain Rating:  No pain reported    Activity Tolerance:   Fair, requires rest breaks and signs and symptoms of orthostatic hypotension    After treatment patient left in no apparent distress:   Sitting in chair, Heels elevated for pressure relief, Call bell within reach and Bed / chair alarm activated    COMMUNICATION/COLLABORATION:   The patients plan of care was discussed with: Occupational therapist and Registered nurse.      Tariq Friend, PT , DPT, NCS   Time Calculation: 19 mins

## 2022-03-30 NOTE — PROGRESS NOTES
03/30/22 1200 03/30/22 1202 03/30/22 1204   Vital Signs   BP (!) 103/52 (!) 89/62 107/65   MAP (Calculated) 69 71 79   BP 1 Location Left upper arm Left upper arm Left upper arm   BP 1 Method Automatic Automatic Automatic   BP Patient Position Supine Sitting  (edge of bed) Sitting  (ege of bed)      03/30/22 1205 03/30/22 1209   Vital Signs   BP (!) 80/56 (!) 100/51   MAP (Calculated) (!) 64 67   BP 1 Location Left upper arm Left upper arm   BP 1 Method Automatic Automatic   BP Patient Position Standing Sitting  (bedside hair)

## 2022-03-30 NOTE — PROGRESS NOTES
Hospitalist Progress Note    NAME: Cherly Dubin   :  1945   MRN:  [id number]       Assessment / Plan:    Ambulatory dysfunction POA  Peripheral neuropathy  Vitamin B12 deficiency   -Likely due to recent fall/ knee injury, peripheral neuropathy  -Right knee, foot, ankle x-ray and left knee x-ray negative for fractures  -Percocet for pain, continue gabapentin  -Started on B12  -Appreciate neurology input  -PT OT, SNF recommended  -Medically stable for discharge. Awaiting placement     Elevated creatinine  POA -resolved   -Creatinine 1.3 on admission. S/p IV fluid. Cr back to normal     Diabetes mellitus type 2   Hx of gastric bypass surgery. Used to weigh >300lbs  -On Metformin 500 mg daily at home. A1c 3 weeks ago was 4.6. A1c was 6.1 in 2019.   -Episodes of hypoglycemia. Metformin discontinued  -Continue D5 half-normal saline. Monitor blood glucose. Repeat A1c    Recent emphysematous cystitis and ESBL Klebsiella pneumoniae UTI: 2022  -Was discharged on IV ertapenem and was supposed to continue until 3/28 (duration was prolonged at due to patient having persistent symptoms)  -CT abdomen/pelvis with contrast: shows resolution of emphysematous cystitis. Nonspecific bilateral perinephric stranding. -PICC line removed  -Appreciate ID input    Hypertension  Dyslipidemia  Osteoarthritis  Peripheral arterial disease  History of rheumatoid arthritis  History of sarcoid  Obstructive sleep apnea  GERD  Depression  -Continue home metoprolol, Lipitor, PPI, Zoloft  -Resume Xeljanz and leflunomide as available    Visual hallucinations  ? Dementia   -Per son this has been going on intermittently since her knee fracture in 2021  -Neurology consulted. Appreciate input. -MRI: mild generalized parenchymal volume loss and mild to moderate  chronic microvascular ischemic disease.  -Maintain sleep-wake cycle. Avoid sedatives.   Continue melatonin nightly      Right sacral skin tear, POA  -Wound care consulted        25.0 - 29.9 Overweight / Body mass index is 28.12 kg/m². Estimated discharge date: Medicaly stable for discharge   Barriers: placement     Code Status: Full code  Surrogate Decision Maker: Marvin Stevenson [phone]            DVT Prophylaxis: Lovenox  GI Prophylaxis: not indicated     Baseline: Independent       Subjective:     Chief Complaint / Reason for Physician Visit   Discussed with RN events overnight. Pain at her ankle joints has improved  No other complaints  Continues to have intermittent confusion/hallulcinations     Review of Systems:  Symptom Y/N Comments  Symptom Y/N Comments   Fever/Chills    Chest Pain     Poor Appetite    Edema     Cough    Abdominal Pain     Sputum    Joint Pain     SOB/WORKMAN    Pruritis/Rash     Nausea/vomit    Tolerating PT/OT     Diarrhea    Tolerating Diet     Constipation    Other       Could NOT obtain due to:      Objective:     VITALS:   Last 24hrs VS reviewed since prior progress note. Most recent are:  Patient Vitals for the past 24 hrs:   Temp Pulse Resp BP SpO2   03/30/22 0816 -- -- -- -- 97 %   03/30/22 0700 97.9 °F (36.6 °C) 88 16 (!) 148/77 99 %   03/30/22 0316 97.9 °F (36.6 °C) 80 18 (!) 140/50 96 %   03/29/22 2323 97.7 °F (36.5 °C) 86 18 (!) 137/57 99 %   03/29/22 1954 98 °F (36.7 °C) 81 18 (!) 114/59 96 %   03/29/22 1936 -- -- -- -- 93 %   03/29/22 1859 -- -- -- (!) 104/54 --   03/29/22 1503 98 °F (36.7 °C) 87 16 (!) 96/54 92 %   03/29/22 1100 98 °F (36.7 °C) 65 16 100/60 94 %     No intake or output data in the 24 hours ending 03/30/22 0933     I had a face to face encounter and independently examined this patient on 3/30/2022, as outlined below:  PHYSICAL EXAM:  General: WD, WN. Alert, cooperative, no acute distress    EENT:  EOMI. Anicteric sclerae. MMM  Resp:  CTA bilaterally, no wheezing or rales. No accessory muscle use  CV:  Regular  rhythm,  No edema  GI:  Soft, Non distended, Non tender.   +Bowel sounds  Neurologic:  Alert and oriented X 2, normal speech,   Psych:   Good insight. Not anxious nor agitated  Skin:  No rashes. No jaundice, right buttock ulcer with clean red base    Reviewed most current lab test results and cultures  YES  Reviewed most current radiology test results   YES  Review and summation of old records today    NO  Reviewed patient's current orders and MAR    YES  PMH/SH reviewed - no change compared to H&P  ________________________________________________________________________  Care Plan discussed with:    Comments   Patient x    Family      RN x    Care Manager     Consultant                       x Multidiciplinary team rounds were held today with , nursing, pharmacist and clinical coordinator. Patient's plan of care was discussed; medications were reviewed and discharge planning was addressed. ________________________________________________________________________  Total NON critical care TIME:  30   Minutes    Total CRITICAL CARE TIME Spent:   Minutes non procedure based      Comments   >50% of visit spent in counseling and coordination of care     ________________________________________________________________________  Leny Garcia MD     Procedures: see electronic medical records for all procedures/Xrays and details which were not copied into this note but were reviewed prior to creation of Plan. LABS:  I reviewed today's most current labs and imaging studies. Pertinent labs include:  No results for input(s): WBC, HGB, HCT, PLT, HGBEXT, HCTEXT, PLTEXT, HGBEXT, HCTEXT, PLTEXT in the last 72 hours.   Recent Labs     03/28/22  0126   *   K 4.5      CO2 26   *   BUN 20   CREA 0.90   CA 8.4*       Signed: Leny Garcia MD

## 2022-03-30 NOTE — PROGRESS NOTES
Problem: Self Care Deficits Care Plan (Adult)  Goal: *Acute Goals and Plan of Care (Insert Text)  Description: FUNCTIONAL STATUS PRIOR TO ADMISSION: ambulated with rolling walker, frequency of falls, sits on shower chair to bathe, performed ADLS and household tasks on her own, recently slipped off of her high bed injuring right LE, relies on others for transportation, uses electric scooter in stores (doesn't own one)    [address]: The patient lived alone with son whom checks on pt. Occupational Therapy Goals:  Initiated 3/25/2022  1. Patient will perform grooming standing with supervision within 7 days. 2. Patient will perform upper body dressing and lower body dressing with supervision/set-up within 7 days. 3. Patient will perform toileting with supervision/set-up within 7 days. 4. Patient will transfer from toilet with supervision/set-up using the least restrictive device and appropriate durable medical equipment within 7 days. Outcome: Progressing Towards Goal   OCCUPATIONAL THERAPY TREATMENT  Patient: Lendel Dandy (64 y.o. female)  Date: 3/30/2022  Diagnosis: Ambulatory dysfunction [R26.2] <principal problem not specified>       Precautions: Contact (ESBL)  Chart, occupational therapy assessment, plan of care, and goals were reviewed. ASSESSMENT  Patient continues with skilled OT services and is slowly progressing towards goals. Pt continues to have flat affect and confusion. She had low BP with change in position and had decreased responsiveness with drop in BP. Pt is confused and unable to state wether she feels dizzy in standing. BP did improve once pt was seated. Pt was unable to mobilize past stand pivot to chair and was attempting to sit prior to completion of transfer. Focused on seated edge of bed balance as pt tends to lean to the right sitting. CGA to min assist for seated balance correction. She remains at a high level for falls.   Recommend rehab at discharge. 03/30/22 1200 03/30/22 1202 03/30/22 1204    Vital Signs   BP (!) 103/52 (!) 89/62 107/65   MAP (Calculated) 69 71 79   BP 1 Location Left upper arm Left upper arm Left upper arm   BP 1 Method Automatic Automatic Automatic   BP Patient Position Supine Sitting  (edge of bed) Sitting  (ege of bed)        03/30/22 1205 03/30/22 1209   Vital Signs   BP (!) 80/56 (!) 100/51   MAP (Calculated) (!) 64 67   BP 1 Location Left upper arm Left upper arm   BP 1 Method Automatic Automatic   BP Patient Position Standing Sitting  (bedside hair)          Current Level of Function Impacting Discharge (ADLs): min assist stand pivot to bedside chair    Other factors to consider for discharge: low BP, fall risk, balance deficits, lives alone and falling at home, generally weak         PLAN :  Patient continues to benefit from skilled intervention to address the above impairments. Continue treatment per established plan of care to address goals. Recommend with staff: monitor BP with change in position    Recommend next OT session: mobility, ADLS    Recommendation for discharge: (in order for the patient to meet his/her long term goals)  Therapy up to 5 days/week in SNF setting    This discharge recommendation:  Has been made in collaboration with the attending provider and/or case management    IF patient discharges home will need the following DME: bedside commode and wheelchair       SUBJECTIVE:   Patient stated I feel ok.     OBJECTIVE DATA SUMMARY:   Cognitive/Behavioral Status:  Neurologic State: Alert;Confused  Orientation Level: Oriented to person  Cognition: Follows commands;Decreased attention/concentration;Poor safety awareness  Perception: Cues to maintain midline in sitting;Cues to maintain midline in standing (lateral and posterior lean)  Perseveration: No perseveration noted  Safety/Judgement: Fall prevention;Decreased awareness of need for safety    Functional Mobility and Transfers for ADLs:  Bed Mobility:  Rolling: Minimum assistance  Supine to Sit: Minimum assistance  Scooting: Minimum assistance    Transfers:  Sit to Stand: Minimum assistance  Functional Transfers  Bathroom Mobility:  (unable due to low BP)  Bed to Chair: Minimum assistance; Additional time;Assist x2    Balance:  Sitting - Static: Fair (occasional)  Sitting - Dynamic: Fair (occasional); Poor (constant support)  Standing: Impaired  Standing - Static: Fair;Constant support  Standing - Dynamic : Fair;Poor;Constant support    ADL Intervention:  Deferred due to low BP    Cognitive Retraining  Orientation Retraining: Situation  Problem Solving: Identifying the task; Identifying the problem;General alternative solution  Safety/Judgement: Fall prevention;Decreased awareness of need for safety        Activity Tolerance:   Fair and Poor    After treatment patient left in no apparent distress:   Sitting in chair, Call bell within reach and Bed / chair alarm activated    COMMUNICATION/COLLABORATION:   The patients plan of care was discussed with: Physical therapist, Registered nurse and patient.      Montrell Abreu OTR/L  Time Calculation: 18 mins

## 2022-03-31 ENCOUNTER — HOME CARE VISIT (OUTPATIENT)
Dept: HOME HEALTH SERVICES | Facility: HOME HEALTH | Age: 77
End: 2022-03-31
Payer: MEDICARE

## 2022-03-31 LAB
ANION GAP SERPL CALC-SCNC: 5 MMOL/L (ref 5–15)
BASOPHILS # BLD: 0.1 K/UL (ref 0–0.1)
BASOPHILS NFR BLD: 2 % (ref 0–1)
BUN SERPL-MCNC: 23 MG/DL (ref 6–20)
BUN/CREAT SERPL: 21 (ref 12–20)
CALCIUM SERPL-MCNC: 8.5 MG/DL (ref 8.5–10.1)
CHLORIDE SERPL-SCNC: 108 MMOL/L (ref 97–108)
CO2 SERPL-SCNC: 25 MMOL/L (ref 21–32)
CREAT SERPL-MCNC: 1.11 MG/DL (ref 0.55–1.02)
DIFFERENTIAL METHOD BLD: ABNORMAL
EOSINOPHIL # BLD: 0.1 K/UL (ref 0–0.4)
EOSINOPHIL NFR BLD: 2 % (ref 0–7)
ERYTHROCYTE [DISTWIDTH] IN BLOOD BY AUTOMATED COUNT: 13.2 % (ref 11.5–14.5)
GLUCOSE BLD STRIP.AUTO-MCNC: 201 MG/DL (ref 65–117)
GLUCOSE BLD STRIP.AUTO-MCNC: 228 MG/DL (ref 65–117)
GLUCOSE BLD STRIP.AUTO-MCNC: 77 MG/DL (ref 65–117)
GLUCOSE BLD STRIP.AUTO-MCNC: 84 MG/DL (ref 65–117)
GLUCOSE SERPL-MCNC: 78 MG/DL (ref 65–100)
HCT VFR BLD AUTO: 27.1 % (ref 35–47)
HGB BLD-MCNC: 8.5 G/DL (ref 11.5–16)
IMM GRANULOCYTES # BLD AUTO: 0 K/UL (ref 0–0.04)
IMM GRANULOCYTES NFR BLD AUTO: 1 % (ref 0–0.5)
LYMPHOCYTES # BLD: 1.7 K/UL (ref 0.8–3.5)
LYMPHOCYTES NFR BLD: 30 % (ref 12–49)
MCH RBC QN AUTO: 31.7 PG (ref 26–34)
MCHC RBC AUTO-ENTMCNC: 31.4 G/DL (ref 30–36.5)
MCV RBC AUTO: 101.1 FL (ref 80–99)
MONOCYTES # BLD: 0.6 K/UL (ref 0–1)
MONOCYTES NFR BLD: 11 % (ref 5–13)
NEUTS SEG # BLD: 3.1 K/UL (ref 1.8–8)
NEUTS SEG NFR BLD: 54 % (ref 32–75)
NRBC # BLD: 0 K/UL (ref 0–0.01)
NRBC BLD-RTO: 0 PER 100 WBC
PLATELET # BLD AUTO: 228 K/UL (ref 150–400)
PMV BLD AUTO: 9.9 FL (ref 8.9–12.9)
POTASSIUM SERPL-SCNC: 4.4 MMOL/L (ref 3.5–5.1)
RBC # BLD AUTO: 2.68 M/UL (ref 3.8–5.2)
SERVICE CMNT-IMP: ABNORMAL
SERVICE CMNT-IMP: ABNORMAL
SERVICE CMNT-IMP: NORMAL
SERVICE CMNT-IMP: NORMAL
SODIUM SERPL-SCNC: 138 MMOL/L (ref 136–145)
WBC # BLD AUTO: 5.5 K/UL (ref 3.6–11)

## 2022-03-31 PROCEDURE — 97530 THERAPEUTIC ACTIVITIES: CPT

## 2022-03-31 PROCEDURE — G0378 HOSPITAL OBSERVATION PER HR: HCPCS

## 2022-03-31 PROCEDURE — 97535 SELF CARE MNGMENT TRAINING: CPT | Performed by: OCCUPATIONAL THERAPIST

## 2022-03-31 PROCEDURE — 74011000250 HC RX REV CODE- 250: Performed by: INTERNAL MEDICINE

## 2022-03-31 PROCEDURE — 74011250637 HC RX REV CODE- 250/637: Performed by: STUDENT IN AN ORGANIZED HEALTH CARE EDUCATION/TRAINING PROGRAM

## 2022-03-31 PROCEDURE — 74011250636 HC RX REV CODE- 250/636: Performed by: NURSE PRACTITIONER

## 2022-03-31 PROCEDURE — 96372 THER/PROPH/DIAG INJ SC/IM: CPT

## 2022-03-31 PROCEDURE — 74011250637 HC RX REV CODE- 250/637: Performed by: INTERNAL MEDICINE

## 2022-03-31 PROCEDURE — 85025 COMPLETE CBC W/AUTO DIFF WBC: CPT

## 2022-03-31 PROCEDURE — 36415 COLL VENOUS BLD VENIPUNCTURE: CPT

## 2022-03-31 PROCEDURE — 94640 AIRWAY INHALATION TREATMENT: CPT

## 2022-03-31 PROCEDURE — 80048 BASIC METABOLIC PNL TOTAL CA: CPT

## 2022-03-31 PROCEDURE — 82962 GLUCOSE BLOOD TEST: CPT

## 2022-03-31 PROCEDURE — 97116 GAIT TRAINING THERAPY: CPT

## 2022-03-31 RX ADMIN — SERTRALINE 50 MG: 50 TABLET, FILM COATED ORAL at 08:46

## 2022-03-31 RX ADMIN — CYANOCOBALAMIN 1000 MCG: 1000 INJECTION INTRAMUSCULAR; SUBCUTANEOUS at 09:21

## 2022-03-31 RX ADMIN — NORTRIPTYLINE HYDROCHLORIDE 50 MG: 25 CAPSULE ORAL at 22:13

## 2022-03-31 RX ADMIN — ARFORMOTEROL TARTRATE: 15 SOLUTION RESPIRATORY (INHALATION) at 08:24

## 2022-03-31 RX ADMIN — ATORVASTATIN CALCIUM 10 MG: 10 TABLET, FILM COATED ORAL at 08:46

## 2022-03-31 RX ADMIN — PANTOPRAZOLE SODIUM 40 MG: 40 TABLET, DELAYED RELEASE ORAL at 08:46

## 2022-03-31 RX ADMIN — LATANOPROST 1 DROP: 50 SOLUTION/ DROPS OPHTHALMIC at 22:13

## 2022-03-31 RX ADMIN — MELATONIN 3 MG: at 22:13

## 2022-03-31 RX ADMIN — METOPROLOL TARTRATE 25 MG: 25 TABLET, FILM COATED ORAL at 08:46

## 2022-03-31 RX ADMIN — LEFLUNOMIDE 20 MG: 10 TABLET ORAL at 08:46

## 2022-03-31 RX ADMIN — GABAPENTIN 300 MG: 300 CAPSULE ORAL at 08:46

## 2022-03-31 NOTE — PROGRESS NOTES
Problem: Mobility Impaired (Adult and Pediatric)  Goal: *Acute Goals and Plan of Care (Insert Text)  Description: FUNCTIONAL STATUS PRIOR TO ADMISSION: Pt lives alone in an apartment; has a son in the area, stays with her sometimes. She said independent with ADLs and using shower seat and walked mod I with rolling walker; states she does not go out much; if goes to store she uses the electric scooter    [address]: The patient lived alone with no local support. Physical Therapy Goals  Initiated 3/25/2022. Goals re-assessed 3/31/2022 and remain appropriate, continue:   1. Patient will move from supine to sit and sit to supine , scoot up and down, and roll side to side in bed with independence within 7 day(s). 2.  Patient will transfer from bed to chair and chair to bed with modified independence using the least restrictive device within 7 day(s). 3.  Patient will perform sit to stand with modified independence within 7 day(s). 4.  Patient will ambulate with modified independence for 150 feet with the least restrictive device within 7 day(s). Outcome: Progressing Towards Goal   PHYSICAL THERAPY TREATMENT: WEEKLY REASSESSMENT  Patient: Alisia Carter (59 y.o. female)  Date: 3/31/2022  Primary Diagnosis: Ambulatory dysfunction [R26.2]        Precautions: Fall, Contact (off contact 3/29)      ASSESSMENT  Patient continues with skilled PT services and is slowly progressing towards goals. Pt received for PT session in supine, agreeable to therapy. She demonstrated improved bed mobility with S and rail, improved sitting balance edge of bed with rail today. Pt performed transfers with RW and CGA, no major LOB noted. Pt walked in bed > bathroom 20ft and 30ft in room with RW and CGA, seated break between bouts. Pt with improved BP in all positions, no orthostatic hypotension symptoms noted.  She continues to benefit from skilled PT, likely will require SNF rehab upon DC for further mobility care. Will continue to follow. Patient's progression toward goals since last assessment: Improvement in bed mobility, balance, and gait this week. Current Level of Function Impacting Discharge (mobility/balance): CGA-Min A with RW, fluctuates    Functional Outcome Measure: The patient scored 50/100 on the Barthel Index outcome measure which is indicative of 50% impairment in mobility and ADLs. Other factors to consider for discharge: requires frequent re-orientation and redirection, balance fluctuations and high fall risk         PLAN :  Goals have been updated based on progression since last assessment. Patient continues to benefit from skilled intervention to address the above impairments. Recommendations and Planned Interventions: bed mobility training, transfer training, gait training, therapeutic exercises, neuromuscular re-education, patient and family training/education, and therapeutic activities      Frequency/Duration: Patient will be followed by physical therapy:  3 times a week to address goals. Recommendation for discharge: (in order for the patient to meet his/her long term goals)  Therapy up to 5 days/week in SNF setting    This discharge recommendation:  Has been made in collaboration with the attending provider and/or case management    IF patient discharges home will need the following DME: rolling walker         SUBJECTIVE:   Patient stated I can do it (stand up).     OBJECTIVE DATA SUMMARY:   HISTORY:    Past Medical History:   Diagnosis Date    Asthma     Chronic pain 5/8/2020    DDD (degenerative disc disease), lumbar     Diabetes ([facility] [address]     Gastritis     GERD (gastroesophageal reflux disease)     Glaucoma     Hearing loss 5/8/2020    Hiatal hernia     High cholesterol     Hypertension     Hypocalcemia 5/8/2020    Peripheral vascular disease (HCC)     RA (rheumatoid arthritis) (HCC)     Sarcoidosis 1999    [facility]    Sleep apnea     has not used cpap in years since weight loss     Past Surgical History:   Procedure Laterality Date    HX GASTRIC BYPASS      HX HEART CATHETERIZATION      s/p PCI with stenting in 1998     HX KNEE REPLACEMENT Bilateral     HX ORTHOPAEDIC Bilateral     carpal tunnel surgery     HX SHOULDER REPLACEMENT Right     x 2        Personal factors and/or comorbidities impacting plan of care: complex medical history, requires supervision, history of hospitalizations    Home Situation  Home Environment: Apartment  # Steps to Enter: 1  One/Two Story Residence: One story  Living Alone: Yes  Support Systems: Child(reji)  Patient Expects to be Discharged to[de-identified] Skilled nursing facility  Current DME Used/Available at Home: Bryan Hogan or Shower Type: Tub/Shower combination    EXAMINATION/PRESENTATION/DECISION MAKING:   Critical Behavior:  Neurologic State: Alert,Confused  Orientation Level: Oriented to person,Oriented to place,Disoriented to time  Cognition: Follows commands,Poor safety awareness  Safety/Judgement: Fall prevention,Decreased awareness of need for safety  Hearing: Auditory  Auditory Impairment: Hard of hearing, bilateral  Skin:  Appears intact  Edema: None noted  Range Of Motion:  AROM: Generally decreased, functional           PROM: Generally decreased, functional           Strength:    Strength: Generally decreased, functional                    Tone & Sensation:   Tone: Normal                              Coordination:  Coordination: Within functional limits  Vision:      Functional Mobility:  Bed Mobility:  Rolling: Supervision  Supine to Sit: Supervision     Scooting: Minimum assistance  Transfers:  Sit to Stand: Minimum assistance  Stand to Sit: Minimum assistance        Bed to Chair: Minimum assistance              Balance:   Sitting: Impaired; With support  Sitting - Static: Fair (occasional); Supported sitting  Sitting - Dynamic: Fair (occasional); Supported sitting  Standing: Impaired  Standing - Static: Constant support; Fair  Standing - Dynamic : Constant support;Fair;Poor  Ambulation/Gait Training:  Distance (ft): 30 Feet (ft)  Assistive Device: Gait belt;Walker, rolling  Ambulation - Level of Assistance: Minimal assistance        Gait Abnormalities: Decreased step clearance;Shuffling gait        Base of Support: Widened     Speed/Nicole: Shuffled; Slow  Step Length: Left shortened;Right shortened         Functional Measure:  Barthel Index:    Bathin  Bladder: 10  Bowels: 10  Groomin  Dressin  Feeding: 10  Mobility: 0  Stairs: 0  Toilet Use: 0  Transfer (Bed to Chair and Back): 10  Total: 50/100       The Barthel ADL Index: Guidelines  1. The index should be used as a record of what a patient does, not as a record of what a patient could do. 2. The main aim is to establish degree of independence from any help, physical or verbal, however minor and for whatever reason. 3. The need for supervision renders the patient not independent. 4. A patient's performance should be established using the best available evidence. Asking the patient, friends/relatives and nurses are the usual sources, but direct observation and common sense are also important. However direct testing is not needed. 5. Usually the patient's performance over the preceding 24-48 hours is important, but occasionally longer periods will be relevant. 6. Middle categories imply that the patient supplies over 50 per cent of the effort. 7. Use of aids to be independent is allowed. Score Interpretation (from [address] [place] [address])    Independent   60-79 Minimally independent   40-59 Partially dependent   20-39 Very dependent   <20 Totally dependent     -Mona Barber., Barthel, D.W. (1965). Functional evaluation: the Barthel Index. [address] [facility] ([address] (1997). The Barthel activities of daily living index: self-reporting versus actual performance in the old (> or = 75 years). Journal of [address] [place] [address] 45(7), 561-817. LASHAE Padilla, Kulwant Miller., Kasia Abbasi., Erica Albright. (1999). Measuring the change in disability after inpatient rehabilitation; comparison of the responsiveness of the Barthel Index and Functional [place] Measure. Journal of Neurology, Neurosurgery, and Psychiatry, 66(4), 661-134. JOSE Dunlap, RAH Rea, & Xiomara Pandey M.A. (2004) Assessment of post-stroke quality of life in cost-effectiveness studies: The usefulness of the Barthel Index and the EuroQoL-5D. Quality of Life Research, 13, 427-43         Pain Rating:  No pain reported    Activity Tolerance:   Fair and requires rest breaks    After treatment patient left in no apparent distress:   Sitting in chair, Heels elevated for pressure relief, Call bell within reach and Bed / chair alarm activated    COMMUNICATION/EDUCATION:   The patients plan of care was discussed with: Occupational therapist and Registered nurse. Fall prevention education was provided and the patient/caregiver indicated understanding.     Thank you for this referral.  Dillon Lewis, PT, DPT, NCS   Time Calculation: 26 mins

## 2022-03-31 NOTE — PROGRESS NOTES
Hospitalist Progress Note    NAME: Yasmin Duval   :  1945   MRN:  [id number]       Assessment / Plan:    Ambulatory dysfunction POA  Peripheral neuropathy  Vitamin B12 deficiency   -Likely due to recent fall/ knee injury, peripheral neuropathy  -Right knee, foot, ankle x-ray and left knee x-ray negative for fractures  -Percocet for pain, continue gabapentin  -Started on B12  -Appreciate neurology input  -PT OT, SNF recommended  -Medically stable for discharge. Awaiting placement     Elevated creatinine  POA -resolved   -Creatinine 1.3 on admission. S/p IV fluid. Cr back to normal     Diabetes mellitus type 2   Hx of gastric bypass surgery. Used to weigh >300lbs  -On Metformin 500 mg daily at home. A1c 3 weeks ago was 4.6. A1c was 6.1 in 2019.   -Episodes of hypoglycemia. Metformin discontinued  - Monitor blood glucose. Repeat A1c 5  - stop ADA diet and place on regular diet  - BG at 77 this AM    Recent emphysematous cystitis and ESBL Klebsiella pneumoniae UTI: 2022  -Was discharged on IV ertapenem and was supposed to continue until 3/28 (duration was prolonged at due to patient having persistent symptoms)  -CT abdomen/pelvis with contrast: shows resolution of emphysematous cystitis. Nonspecific bilateral perinephric stranding. -PICC line removed  -Appreciate ID input no further abx needed    Hypertension  Dyslipidemia  Osteoarthritis  Peripheral arterial disease  History of rheumatoid arthritis  History of sarcoid  Obstructive sleep apnea  GERD  Depression  -Continue home metoprolol, Lipitor, PPI, Zoloft  -Resume Xeljanz and leflunomide as available  - Hold Norvasc as BP soft last night    Visual hallucinations  ? Dementia   -Per son this has been going on intermittently since her knee fracture in 2021  -Neurology consulted. Appreciate input.     -MRI: mild generalized parenchymal volume loss and mild to moderate  chronic microvascular ischemic disease.  -Maintain sleep-wake cycle. Avoid sedatives. Continue melatonin nightly      Right sacral skin tear, POA-Wound care         Code Status: Full code  Surrogate Decision Maker: Job Pinon [phone]   DVT Prophylaxis: Lovenox  Dispo: Pending INS auth for placement once medically stable         Subjective:     Chief Complaint / Reason for Physician Visit   Patient seen and examined. Poor historian. BP soft last night dropped to 80. Hgb dropped to 8. BG low 70. Objective:     VITALS:   Last 24hrs VS reviewed since prior progress note. Most recent are:  Patient Vitals for the past 24 hrs:   Temp Pulse Resp BP SpO2   03/31/22 0700 98 °F (36.7 °C) 94 16 (!) 122/59 --   03/31/22 0320 98 °F (36.7 °C) 80 18 128/60 96 %   03/30/22 2355 99.4 °F (37.4 °C) 85 18 (!) 123/58 95 %   03/30/22 2000 -- -- -- -- 98 %   03/30/22 1915 97.9 °F (36.6 °C) 80 18 122/60 95 %   03/30/22 1526 97.8 °F (36.6 °C) 81 18 (!) 126/56 98 %   03/30/22 1216 98.1 °F (36.7 °C) 80 18 (!) 103/52 99 %   03/30/22 1209 -- -- -- (!) 100/51 --   03/30/22 1205 -- -- -- (!) 80/56 --   03/30/22 1204 -- -- -- 107/65 --   03/30/22 1202 -- -- -- (!) 89/62 --   03/30/22 1200 -- -- -- (!) 103/52 --   03/30/22 0816 -- -- -- -- 97 %     No intake or output data in the 24 hours ending 03/31/22 0801     I had a face to face encounter and independently examined this patient on 3/31/2022, as outlined below:    PHYSICAL EXAM:  General: WD, WN     Resp:   No accessory muscle use  CV:  RRR  GI:    Non distended, Non tender. Neurologic:  Alert   normal speech,   Psych:    Not anxious nor agitated  Skin:  No rashes         LABS:  I reviewed today's most current labs and imaging studies.   Pertinent labs include:  Recent Labs     03/31/22  0024   WBC 5.5   HGB 8.5*   HCT 27.1*        Recent Labs     03/31/22  0024      K 4.4      CO2 25   GLU 78   BUN 23*   CREA 1.11*   CA 8.5       Signed: Naima Hinojosa MD

## 2022-03-31 NOTE — PROGRESS NOTES
End of Shift Note    Bedside shift change report given to [address] (oncoming nurse) by Chang Obrien RN (offgoing nurse). Report included the following information SBAR, Kardex, Procedure Summary, Intake/Output, MAR and Recent Results    Shift worked:  7A -7P   Shift summary and any significant changes:    Patient alert and oriented x2, periodic confusion, but calm, cooperative and pleasant. Pt tolerating diet and medication without issues. Pt denies pain and respiratory distress.      1100 Reassessment: No Changes    1500: Reassessment: No Changes         Concerns for physician to address:  See above    Zone phone for oncoming shift:   [phone]     Patient Information  Rishi Batista  78 y.o.  3/24/2022  1:15 PM by Kim Newsome MD. Rishi Batista was admitted from Home    Problem List  Patient Active Problem List    Diagnosis Date Noted    Ambulatory dysfunction 03/24/2022    Emphysematous cystitis 12/74/2637    Complication of internal right knee prosthesis ([address] 11/09/2021    Type 2 diabetes mellitus without complication ([address] 52/51/1204    MGUS (monoclonal gammopathy of unknown significance) 11/22/2019    Severe obesity ([address] 05/07/2019    Seronegative rheumatoid arthritis of multiple sites ([address] 02/18/2019    Rheumatoid arthritis involving multiple sites ([address] 09/28/2018    Essential hypertension 09/28/2018    Mixed hyperlipidemia 09/28/2018    RLS (restless legs syndrome) 09/28/2018    Mild intermittent asthma 09/28/2018    Status post angioplasty with stent 09/28/2018    Coronary artery disease due to lipid rich plaque 09/28/2018    Long-term use of immunosuppressant medication 08/22/2018    Osteopenia of multiple sites 08/22/2018    Vitamin D deficiency 08/22/2018     Past Medical History:   Diagnosis Date    Asthma     Chronic pain 5/8/2020    DDD (degenerative disc disease), lumbar     Diabetes ([address]     Gastritis     GERD (gastroesophageal reflux disease)     Glaucoma  Hearing loss 5/8/2020    Hiatal hernia     High cholesterol     Hypertension     Hypocalcemia 5/8/2020    Peripheral vascular disease (HCC)     RA (rheumatoid arthritis) (HCC)     Sarcoidosis 1999    MCV    Sleep apnea     has not used cpap in years since weight loss       Core Measures:  CVA: No No  CHF:No No  PNA:No No    Activity:  Activity Level: Up with Assistance, up to chair with assistance   Number times ambulated in hallways past shift: 0  Number of times OOB to chair past shift: 1    Cardiac:   Cardiac Monitoring: No           Access:   Current line(s): No IV access at this time   Central Line? No  PICC LINE? No - removed     Genitourinary:   Urinary status: voiding, incontinent and external catheter  Urinary Catheter? No     Respiratory:   O2 Device: None (Room air)  Chronic home O2 use?: NO  Incentive spirometer at bedside: NO       GI:  Last Bowel Movement Date: 03/30/22  Current diet:  ADULT DIET Regular  ADULT ORAL NUTRITION SUPPLEMENT Breakfast, Dinner; Low Calorie/High Protein  DIET ONE TIME MESSAGE  Passing flatus: YES  Tolerating current diet: YES       Pain Management:   Patient states pain is manageable on current regimen: YES    Skin:  Jose Luis Score: 14  Interventions: float heels, increase time out of bed, limit briefs and internal/external urinary devices    Patient Safety:  Fall Score:  Total Score: 5  Interventions: bed/chair alarm, assistive device (walker, cane, etc), pt to call before getting OOB and stay with me (per policy)  High Fall Risk: Yes  @Rollbelt  @dexterity to release roll belt  Yes/No ( must document dexterity  here by stating Yes or No here, otherwise this is a restraint and must follow restraint documentation policy.)    DVT prophylaxis:  DVT prophylaxis Med- Yes  DVT prophylaxis SCD or DANGELO- No     Wounds: (If Applicable)  Wounds- Yes  Stage 2 sacrum    Active Consults:  IP CONSULT TO HOSPITALIST  IP CONSULT TO NEUROLOGY  IP CONSULT TO INFECTIOUS DISEASES    Length of Stay:  Expected LOS: - - -  Actual LOS: 0  Discharge Plan: Yes, Pending acceptance to [facility].       Marta Perez RN

## 2022-03-31 NOTE — PROGRESS NOTES
End of Shift Note    Bedside shift change report given to Puja Durbin (oncoming nurse) by Daniella Marley RN (offgoing nurse). Report included the following information SBAR, Kardex, Procedure Summary, Intake/Output, MAR and Recent Results    Shift worked:  7P to [employer]   Shift summary and any significant changes:    Patient alert and oriented x2, periodic confusion. Dressing change to L sacrum, Stage 2. Incontinent of large amounts of urine. Pt more responsive, had a restful night.      Concerns for physician to address:  See above    Zone phone for oncoming shift:   [phone]     Patient Information  Raquel Licea  78 y.o.  3/24/2022  1:15 PM by Saloni Gould MD. Raquel Licea was admitted from Home    Problem List  Patient Active Problem List    Diagnosis Date Noted    Ambulatory dysfunction 03/24/2022    Emphysematous cystitis 13/27/7678    Complication of internal right knee prosthesis ([address] 11/09/2021    Type 2 diabetes mellitus without complication ([address] 52/25/8638    MGUS (monoclonal gammopathy of unknown significance) 11/22/2019    Severe obesity ([address] 05/07/2019    Seronegative rheumatoid arthritis of multiple sites ([address] 02/18/2019    Rheumatoid arthritis involving multiple sites ([address] 09/28/2018    Essential hypertension 09/28/2018    Mixed hyperlipidemia 09/28/2018    RLS (restless legs syndrome) 09/28/2018    Mild intermittent asthma 09/28/2018    Status post angioplasty with stent 09/28/2018    Coronary artery disease due to lipid rich plaque 09/28/2018    Long-term use of immunosuppressant medication 08/22/2018    Osteopenia of multiple sites 08/22/2018    Vitamin D deficiency 08/22/2018     Past Medical History:   Diagnosis Date    Asthma     Chronic pain 5/8/2020    DDD (degenerative disc disease), lumbar     Diabetes ([address]     Gastritis     GERD (gastroesophageal reflux disease)     Glaucoma     Hearing loss 5/8/2020    Hiatal hernia     High cholesterol     Hypertension     Hypocalcemia 5/8/2020    Peripheral vascular disease (HCC)     RA (rheumatoid arthritis) (HCC)     Sarcoidosis 1999    MCV    Sleep apnea     has not used cpap in years since weight loss       Core Measures:  CVA: No No  CHF:No No  PNA:No No    Activity:  Activity Level: Up with Assistance, up to chair with assistance   Number times ambulated in hallways past shift: 0  Number of times OOB to chair past shift: 1    Cardiac:   Cardiac Monitoring: No           Access:   Current line(s): PIV and PICC   Central Line? No  PICC LINE? Yes     Genitourinary:   Urinary status: voiding, incontinent and external catheter  Urinary Catheter? No     Respiratory:   O2 Device: None (Room air)  Chronic home O2 use?: NO  Incentive spirometer at bedside: NO       GI:  Last Bowel Movement Date: 03/29/22  Current diet:  ADULT DIET Regular; 4 carb choices (60 gm/meal); Low Fat/Low Chol/High Fiber/2 gm Na  ADULT ORAL NUTRITION SUPPLEMENT Dinner, HS Snack, Breakfast; Diabetic Supplement  DIET ONE TIME MESSAGE  Passing flatus: YES  Tolerating current diet: YES       Pain Management:   Patient states pain is manageable on current regimen: YES    Skin:  Jose Luis Score: 14  Interventions: float heels, increase time out of bed, limit briefs and internal/external urinary devices    Patient Safety:  Fall Score:  Total Score: 5  Interventions: bed/chair alarm, assistive device (walker, cane, etc), pt to call before getting OOB and stay with me (per policy)  High Fall Risk: Yes  @Rollbelt  @dexterity to release roll belt  Yes/No ( must document dexterity  here by stating Yes or No here, otherwise this is a restraint and must follow restraint documentation policy.)    DVT prophylaxis:  DVT prophylaxis Med- Yes  DVT prophylaxis SCD or DANGELO- No     Wounds: (If Applicable)  Wounds- Yes  Stage 2 sacrum    Active Consults:  IP CONSULT TO HOSPITALIST  IP CONSULT TO NEUROLOGY  IP CONSULT TO INFECTIOUS DISEASES    Length of Stay:  Expected LOS: - - -  Actual LOS: 0  Discharge Plan: Yes, Pending [facility] to [facility].       Ahmet Tovar RN

## 2022-03-31 NOTE — PROGRESS NOTES
Transition of Care Plan:     RUR: N/A - \"Observation status\"  Disposition: SNF placement - [facility] (pending medical stability)  Follow up appointments: PCP & specialist as indicated   DME needed: Defer to SNF for DME needs  Transportation at Discharge: CM to secure medical transport for d/c; PCS to be completed & placed on chart  Keys or means to access home: N/A - pt transitioning to SNF at d/c  IM Medicare Letter: N/A - Observation status; [employer] notice signed 3/25/22  Is patient a BCPI-A Bundle: N/A          Is patient a [place] and connected with the [facility]? N/A                Caregiver Contact: Pt's son, Adrienne Chang [phone]  Discharge Caregiver contacted prior to discharge? To be contacted prior to d/c  Care Conference needed?: TBD pending progress - CM will continue to follow to determine need for care conference     Initial note: CM reviewed pt's chart prior to moving forward with d/c planning. Per MD progress note from today, pt is not medically stable for d/c; BP was soft last night (3/30/22), MD to monitor overnight. CM received call from [facility] admission liaison ([facility] Stamp: [phone]) reporting pt's insurance [employer] has been approved for placement. CM informed Alpa pt is not medically stable for d/c today; Alpa informed CM would contact her tomorrow morning (4/1/22) to report update regarding medical stability. CM contacted pt's son Emerald Becerra) to report update; update received, no immediate questions or concerns identified. CM will continue to follow & remain accessible for d/c planning.     VITALY Diggs  Care Manager, [facility]

## 2022-03-31 NOTE — PROGRESS NOTES
Comprehensive Nutrition Assessment    Type and Reason for Visit: Initial,RD nutrition re-screen/LOS    Nutrition Recommendations/Plan:  Continue regular diet  Ensure high protein BID  Discontinue glucerna     Nutrition Assessment:     Patient medically noted for ambulatory dysfunction. PMH RA, HTN, DM, GERD, and depression. Chart reviewed for length of stay. MD liberalized to a regular diet today. Patient reports a fair appetite. Multiple glucerna bottles at bedside. Patient reports she doesn't really like them but has drank a few. Agreeable to trial of ensure high protein so she can try chocolate flavor. Menu provided. Assisted with ordering dinner. Encouraged intake of meals. Estimated Daily Nutrient Needs:  Energy (kcal): 1652 kcal (BMR 1271 x 1. 3AF); Weight Used for Energy Requirements: Current  Protein (g): 77g (1.0 g/kg bw); Weight Used for Protein Requirements: Current  Fluid (ml/day): 1650 mL; Method Used for Fluid Requirements: 1 ml/kcal    Nutrition Related Findings:       -95-04-  BM 3/30  Atorvastatin, Vitamin B12, Protonix, Zoloft    Wounds:    Skin tears       Current Nutrition Therapies:  ADULT DIET Regular  ADULT ORAL NUTRITION SUPPLEMENT Breakfast, Dinner; Low Calorie/High Protein  DIET ONE TIME MESSAGE    Anthropometric Measures:  · Height:  5' 5\" (165.1 cm)  · Current Body Wt:  76.7 kg (169 lb 1.5 oz)   · BMI Category: Overweight (BMI 25.0-29. 9)       Nutrition Diagnosis:   · Inadequate protein-energy intake related to cognitive or neurological impairment as evidenced by intake 26-50%,intake 51-75%    Nutrition Interventions:   Food and/or Nutrient Delivery: Continue current diet,Modify oral nutrition supplement  Nutrition Education and Counseling: No recommendations at this time  Coordination of Nutrition Care: Continue to monitor while inpatient    Goals:  PO intake >50% of meals/supplement next 3-5 days       Nutrition Monitoring and Evaluation:   Behavioral-Environmental Outcomes: None identified  Food/Nutrient Intake Outcomes: Food and nutrient intake,Supplement intake  Physical Signs/Symptoms Outcomes: Biochemical data,Weight    Discharge Planning:    Continue current diet,Continue oral nutrition supplement     Electronically signed by Adrienne Garcia RD on 3/31/2022 at 3:47 PM    Contact: ext [phone]

## 2022-03-31 NOTE — PROGRESS NOTES
Problem: Self Care Deficits Care Plan (Adult)  Goal: *Acute Goals and Plan of Care (Insert Text)  Description: FUNCTIONAL STATUS PRIOR TO ADMISSION: ambulated with rolling walker, frequency of falls, sits on shower chair to bathe, performed ADLS and household tasks on her own, recently slipped off of her high bed injuring right LE, relies on others for transportation, uses electric scooter in stores (doesn't own one)    [address]: The patient lived alone with son whom checks on pt. Occupational Therapy Goals:  Weekly Re-eval 3/31/2022 no goals met, continue all below goals  Initiated 3/25/2022  1. Patient will perform grooming standing with supervision within 7 days. 2. Patient will perform upper body dressing and lower body dressing with supervision/set-up within 7 days. 3. Patient will perform toileting with supervision/set-up within 7 days. 4. Patient will transfer from toilet with supervision/set-up using the least restrictive device and appropriate durable medical equipment within 7 days. Outcome: Progressing Towards Goal   OCCUPATIONAL THERAPY RE-EVALUATION  Patient: Kenan Avery (62 y.o. female)  Date: 3/31/2022  Diagnosis: Ambulatory dysfunction [R26.2] <principal problem not specified>       Precautions: Contact (off contact 3/29)  Chart, occupational therapy assessment, plan of care, and goals were reviewed. ASSESSMENT  Based on the objective data described below, pts BP was more stable this session but remains low. Pt reported feeling better this session and was able to mobilize to bathroom with rolling walker. Pt continues to need cues for walker safety and leans to the right seated and standing. Reminders needed for pt to pull down and up depends and pt needed moderate assist.  She was able to laterally weight shift seated to perform corona/anal care.   While standing at sink pt had right lateral upper trunk lean and pt does have limited shoulder flexion due to chronic shoulder deficits. Pt live alone and was functional prior admit. Recommend rehab at discharge and pt may need LTC pending progress. No goals met but pt continues to benefit from OT services. Current Level of Function Impacting Discharge (ADLs): min assist mobility  Feeding: Supervision    Oral Facial Hygiene/Grooming: Minimum assistance;Contact guard assistance (standing at sink)    Bathing: Minimum assistance    Upper Body Dressing: Setup    Lower Body Dressing: Moderate assistance    Toileting: Moderate assistance (pants up and down)    Other factors to consider for discharge: fall risk, lives alone         PLAN :  Recommendations and Planned Interventions: self care training, functional mobility training, therapeutic exercise, balance training, therapeutic activities, patient education, home safety training, and family training/education    Frequency/Duration: Patient will be followed by occupational therapy 3 times a week to address goals. Recommend with staff: monitor BP with activity, assist to bathroom    Recommend next OT session: standing balance/tolerance    Recommendation for discharge: (in order for the patient to meet his/her long term goals)  Therapy up to 5 days/week in SNF setting    This discharge recommendation:  Has been made in collaboration with the attending provider and/or case management    Equipment recommendations for successful discharge (if) home: needs rehab       SUBJECTIVE:   Patient stated I have to go to the bathroom.     OBJECTIVE DATA SUMMARY:   Hospital course since last seen and reason for reevaluation: continued participation in therapy, variable BP    Cognitive/Behavioral Status:  Neurologic State: Alert;Confused  Orientation Level: Oriented to person;Oriented to place; Disoriented to time  Cognition: Follows commands;Poor safety awareness  Perception: Cues to maintain midline in standing; Tactile;Verbal;Visual (manual)  Perseveration: No perseveration noted  Safety/Judgement: Fall prevention;Decreased awareness of need for safety      Hearing: Auditory  Auditory Impairment: Hard of hearing, bilateral    Vision/Perceptual:                                Corrective Lenses: Reading glasses    Range of Motion:    AROM: Generally decreased, functional  PROM: Generally decreased, functional                      Strength:    Strength: Generally decreased, functional                Coordination:  Coordination: Within functional limits  Fine Motor Skills-Upper: Left Intact; Right Intact    Gross Motor Skills-Upper: Left Intact; Right Intact    Tone & Sensation:    Tone: Normal                           Functional Mobility and Transfers for ADLs:  Bed Mobility:  Rolling: Supervision  Supine to Sit: Supervision  Scooting: Minimum assistance    Transfers:  Sit to Stand: Minimum assistance  Functional Transfers  Bathroom Mobility: Minimum assistance (with RW)  Toilet Transfer : Minimum assistance (with left grab bar)  Bed to Chair: Minimum assistance    Balance:  Sitting: Impaired; With support  Sitting - Static: Fair (occasional); Supported sitting  Sitting - Dynamic: Fair (occasional); Supported sitting  Standing: Impaired  Standing - Static: Constant support; Fair  Standing - Dynamic : Constant support;Fair;Poor    ADL Assessment:  Feeding: Supervision    Oral Facial Hygiene/Grooming: Minimum assistance;Contact guard assistance (standing at sink)    Bathing: Minimum assistance    Upper Body Dressing: Setup    Lower Body Dressing: Moderate assistance    Toileting:  Moderate assistance (pants up and down)                ADL Intervention and task modifications:     See assessment    Cognitive Retraining  Safety/Judgement: Fall prevention;Decreased awareness of need for safety      Functional Measure:    Barthel Index:  Bathin  Bladder: 10  Bowels: 10  Groomin  Dressin  Feeding: 10  Mobility: 0  Stairs: 0  Toilet Use: 0  Transfer (Bed to Chair and Back): 10  Total: 50/100      The Barthel ADL Index: Guidelines  1. The index should be used as a record of what a patient does, not as a record of what a patient could do. 2. The main aim is to establish degree of independence from any help, physical or verbal, however minor and for whatever reason. 3. The need for supervision renders the patient not independent. 4. A patient's performance should be established using the best available evidence. Asking the patient, friends/relatives and nurses are the usual sources, but direct observation and common sense are also important. However direct testing is not needed. 5. Usually the patient's performance over the preceding 24-48 hours is important, but occasionally longer periods will be relevant. 6. Middle categories imply that the patient supplies over 50 per cent of the effort. 7. Use of aids to be independent is allowed. Score Interpretation (from [facility])    Independent   60-79 Minimally independent   40-59 Partially dependent   20-39 Very dependent   <20 Totally dependent     -Mona Barber., Barthel, D.W. (1965). Functional evaluation: the Barthel Index. [facility] ([address] (1997). The Barthel activities of daily living index: self-reporting versus actual performance in the old (> or = 75 years). Journal of [address] [place] [address] 45(7), [address] [facility] [facility]. (1999). Measuring the change in disability after inpatient rehabilitation; comparison of the responsiveness of the Barthel Index and Functional [place] Measure. Journal of Neurology, Neurosurgery, and Psychiatry, 66(4), 225-115. Kumar Pack, N.J.A, RAH Rea, & Rocky Mooney MWisamA. (2004) Assessment of post-stroke quality of life in cost-effectiveness studies: The usefulness of the Barthel Index and the EuroQoL-5D.  Quality of Life Research, 13, 545-31         Activity Tolerance:   Fair and requires rest breaks    After treatment patient left in no apparent distress:   Sitting in chair, Call bell within reach, and Bed / chair alarm activated    COMMUNICATION/COLLABORATION:   The patients plan of care was discussed with: Physical therapist, Registered nurse, and patient .      Be Baron OTR/L  Time Calculation: 26 mins

## 2022-04-01 VITALS
TEMPERATURE: 98.6 F | HEIGHT: 65 IN | OXYGEN SATURATION: 97 % | DIASTOLIC BLOOD PRESSURE: 56 MMHG | BODY MASS INDEX: 28.16 KG/M2 | HEART RATE: 92 BPM | RESPIRATION RATE: 18 BRPM | WEIGHT: 169 LBS | SYSTOLIC BLOOD PRESSURE: 117 MMHG

## 2022-04-01 LAB
ANION GAP SERPL CALC-SCNC: 5 MMOL/L (ref 5–15)
BASOPHILS # BLD: 0.1 K/UL (ref 0–0.1)
BASOPHILS NFR BLD: 1 % (ref 0–1)
BUN SERPL-MCNC: 27 MG/DL (ref 6–20)
BUN/CREAT SERPL: 26 (ref 12–20)
CALCIUM SERPL-MCNC: 8.6 MG/DL (ref 8.5–10.1)
CHLORIDE SERPL-SCNC: 108 MMOL/L (ref 97–108)
CO2 SERPL-SCNC: 25 MMOL/L (ref 21–32)
COVID-19 RAPID TEST, COVR: NOT DETECTED
CREAT SERPL-MCNC: 1.02 MG/DL (ref 0.55–1.02)
DIFFERENTIAL METHOD BLD: ABNORMAL
EOSINOPHIL # BLD: 0.1 K/UL (ref 0–0.4)
EOSINOPHIL NFR BLD: 2 % (ref 0–7)
ERYTHROCYTE [DISTWIDTH] IN BLOOD BY AUTOMATED COUNT: 13 % (ref 11.5–14.5)
GLUCOSE BLD STRIP.AUTO-MCNC: 92 MG/DL (ref 65–117)
GLUCOSE BLD STRIP.AUTO-MCNC: 94 MG/DL (ref 65–117)
GLUCOSE BLD STRIP.AUTO-MCNC: 95 MG/DL (ref 65–117)
GLUCOSE SERPL-MCNC: 84 MG/DL (ref 65–100)
HCT VFR BLD AUTO: 28.6 % (ref 35–47)
HGB BLD-MCNC: 9 G/DL (ref 11.5–16)
IMM GRANULOCYTES # BLD AUTO: 0 K/UL (ref 0–0.04)
IMM GRANULOCYTES NFR BLD AUTO: 0 % (ref 0–0.5)
LYMPHOCYTES # BLD: 1.6 K/UL (ref 0.8–3.5)
LYMPHOCYTES NFR BLD: 26 % (ref 12–49)
MAGNESIUM SERPL-MCNC: 1.9 MG/DL (ref 1.6–2.4)
MCH RBC QN AUTO: 31.7 PG (ref 26–34)
MCHC RBC AUTO-ENTMCNC: 31.5 G/DL (ref 30–36.5)
MCV RBC AUTO: 100.7 FL (ref 80–99)
MONOCYTES # BLD: 0.7 K/UL (ref 0–1)
MONOCYTES NFR BLD: 12 % (ref 5–13)
NEUTS SEG # BLD: 3.5 K/UL (ref 1.8–8)
NEUTS SEG NFR BLD: 59 % (ref 32–75)
NRBC # BLD: 0 K/UL (ref 0–0.01)
NRBC BLD-RTO: 0 PER 100 WBC
PLATELET # BLD AUTO: 227 K/UL (ref 150–400)
PMV BLD AUTO: 9.9 FL (ref 8.9–12.9)
POTASSIUM SERPL-SCNC: 4.4 MMOL/L (ref 3.5–5.1)
RBC # BLD AUTO: 2.84 M/UL (ref 3.8–5.2)
SERVICE CMNT-IMP: NORMAL
SODIUM SERPL-SCNC: 138 MMOL/L (ref 136–145)
SOURCE, COVRS: NORMAL
WBC # BLD AUTO: 5.9 K/UL (ref 3.6–11)

## 2022-04-01 PROCEDURE — 87635 SARS-COV-2 COVID-19 AMP PRB: CPT

## 2022-04-01 PROCEDURE — 96372 THER/PROPH/DIAG INJ SC/IM: CPT

## 2022-04-01 PROCEDURE — G0378 HOSPITAL OBSERVATION PER HR: HCPCS

## 2022-04-01 PROCEDURE — 83735 ASSAY OF MAGNESIUM: CPT

## 2022-04-01 PROCEDURE — 74011250637 HC RX REV CODE- 250/637: Performed by: INTERNAL MEDICINE

## 2022-04-01 PROCEDURE — 36415 COLL VENOUS BLD VENIPUNCTURE: CPT

## 2022-04-01 PROCEDURE — 74011250637 HC RX REV CODE- 250/637: Performed by: STUDENT IN AN ORGANIZED HEALTH CARE EDUCATION/TRAINING PROGRAM

## 2022-04-01 PROCEDURE — 80048 BASIC METABOLIC PNL TOTAL CA: CPT

## 2022-04-01 PROCEDURE — 74011250636 HC RX REV CODE- 250/636: Performed by: NURSE PRACTITIONER

## 2022-04-01 PROCEDURE — 85025 COMPLETE CBC W/AUTO DIFF WBC: CPT

## 2022-04-01 PROCEDURE — 82962 GLUCOSE BLOOD TEST: CPT

## 2022-04-01 RX ORDER — GABAPENTIN 300 MG/1
CAPSULE ORAL
Qty: 15 CAPSULE | Refills: 0 | Status: SHIPPED | OUTPATIENT
Start: 2022-04-01 | End: 2022-05-27 | Stop reason: SDUPTHER

## 2022-04-01 RX ORDER — CYANOCOBALAMIN 1000 UG/ML
1000 INJECTION, SOLUTION INTRAMUSCULAR; SUBCUTANEOUS DAILY
Qty: 1 ML | Refills: 0 | Status: SHIPPED
Start: 2022-04-02 | End: 2022-04-07

## 2022-04-01 RX ORDER — CYANOCOBALAMIN (VITAMIN B-12) 2000 MCG
2000 TABLET ORAL DAILY
Qty: 30 TABLET | Refills: 2 | Status: SHIPPED | OUTPATIENT
Start: 2022-04-01 | End: 2022-05-01

## 2022-04-01 RX ADMIN — ATORVASTATIN CALCIUM 10 MG: 10 TABLET, FILM COATED ORAL at 09:29

## 2022-04-01 RX ADMIN — CYANOCOBALAMIN 1000 MCG: 1000 INJECTION INTRAMUSCULAR; SUBCUTANEOUS at 11:13

## 2022-04-01 RX ADMIN — METOPROLOL TARTRATE 25 MG: 25 TABLET, FILM COATED ORAL at 09:29

## 2022-04-01 RX ADMIN — PANTOPRAZOLE SODIUM 40 MG: 40 TABLET, DELAYED RELEASE ORAL at 09:29

## 2022-04-01 RX ADMIN — GABAPENTIN 300 MG: 300 CAPSULE ORAL at 09:29

## 2022-04-01 RX ADMIN — LEFLUNOMIDE 20 MG: 10 TABLET ORAL at 09:29

## 2022-04-01 RX ADMIN — SERTRALINE 50 MG: 50 TABLET, FILM COATED ORAL at 09:29

## 2022-04-01 NOTE — DISCHARGE SUMMARY
[facility]  Hospitalist service   Discharge Summary       PATIENT ID: Caio Stevenson  MRN: [id number]   YOB: 1945    DATE OF ADMISSION: 3/24/2022  1:15 PM    DATE OF DISCHARGE: 04/01/2022   PRIMARY CARE PROVIDER: Sarwat Barbosa MD     ATTENDING PHYSICIAN: Mohamud Kerr DO  DISCHARGING PROVIDER: Mohamud Kerr DO    To contact this individual call [phone] and ask the  to page. If unavailable ask to be transferred the Adult Hospitalist Department. CONSULTATIONS: IP CONSULT TO HOSPITALIST  IP CONSULT TO NEUROLOGY  IP CONSULT TO INFECTIOUS DISEASES    PROCEDURES/SURGERIES: * No surgery found *    ADMITTING [address] [place] [address] COURSE:     Simon Godoy a 30-year-old history RA, chronic pain presenting with knee pain following fall. Patient was recently discharged about 3 weeks ago when she was admitted for emphysematous cystitis was noted to have ESBL E. coli. She was discharged on IV ertapenem. She presents with inability to walk. Patient states she was getting out of bed when she fell out with ground striking her right knee  On March 15.   She had a subsequent twisting injury to her left knee within the past 24 hours. Pain is relatively severe, right-sided.  He has been able to ambulate with some difficulty.  Reports no dizziness no near syncope no syncope no lightheadedness.  Only had bilateral knee pain.  No fever chills nausea or vomiting.     There are no other complaints, changes, or physical findings at this time. The patient was admitted for further work-up and treatment. The patient was found to be vitamin B12 severely deficient. She was started on replacement. She was followed by physical therapy during her hospital stay with recommendations for SNF. Patient continued to improve throughout her hospital stay.   Plan for patient to be DC'd to SNF.         DISCHARGE DIAGNOSES / PLAN:      Ambulatory dysfunction POA  Peripheral neuropathy  Vitamin B12 deficiency   -Likely due to recent fall/ knee injury, peripheral neuropathy  -Right knee, foot, ankle x-ray and left knee x-ray negative for fractures  -Percocet for pain, continue gabapentin  -Started on B12  -Appreciate neurology input  -PT OT, SNF recommended  -Medically stable for discharge. Awaiting placement     Elevated creatinine  POA -resolved   -Creatinine 1.3 on admission. S/p IV fluid. Cr back to normal     Diabetes mellitus type 2   Hx of gastric bypass surgery. Used to weigh >300lbs  -On Metformin 500 mg daily at home. A1c 3 weeks ago was 4.6. A1c was 6.1 in 2019.   -Episodes of hypoglycemia. Metformin discontinued  - Monitor blood glucose. Repeat A1c 5  - stop ADA diet and place on regular diet  - BG at 77 this AM     Recent emphysematous cystitis and ESBL Klebsiella pneumoniae UTI: 02/2022  -Was discharged on IV ertapenem and was supposed to continue until 3/28 (duration was prolonged at due to patient having persistent symptoms)  -CT abdomen/pelvis with contrast: shows resolution of emphysematous cystitis. Nonspecific bilateral perinephric stranding. -PICC line removed  -Appreciate ID input no further abx needed     Hypertension  Dyslipidemia  Osteoarthritis  Peripheral arterial disease  History of rheumatoid arthritis  History of sarcoid  Obstructive sleep apnea  GERD  Depression  -Continue home metoprolol, Lipitor, PPI, Zoloft  -Resume Xeljanz and leflunomide as available  - Hold Norvasc as BP soft last night     Visual hallucinations  ? Dementia   -Per son this has been going on intermittently since her knee fracture in November 2021  -Neurology consulted. Appreciate input. -MRI: mild generalized parenchymal volume loss and mild to moderate  chronic microvascular ischemic disease.        PENDING TEST RESULTS:   At the time of discharge the following test results are still pending: none    FOLLOW UP APPOINTMENTS:    Follow-up Information     Follow up With Specialties Details Why Contact Info    Lj Aguilar MD Family Medicine In 1 week  [address] [id number]  [phone]             ADDITIONAL CARE RECOMMENDATIONS:     Continue vitamin B12 replacement and follow-up closely with PCP to recheck vitamin B12    DIET: Diabetic Diet    ACTIVITY: Activity as tolerated          DISCHARGE MEDICATIONS:  Current Discharge Medication List      START taking these medications    Details   cyanocobalamin (VITAMIN B12) 1,000 mcg/mL injection 1 mL by IntraMUSCular route daily for 5 doses. Qty: 1 mL, Refills: 0  Start date: 4/2/2022, End date: 4/7/2022      cyanocobalamin, vitamin B-12, 2,000 mcg tab Take 2,000 mcg by mouth daily for 30 days. Qty: 30 Tablet, Refills: 2  Start date: 4/1/2022, End date: 5/1/2022         CONTINUE these medications which have NOT CHANGED    Details   0.9% sodium chloride solution 10 mL by IntraVENous route daily. before and after IV antibiotic using SASH method      heparin sod,porcine/0.9 % NaCl (HEPARIN FLUSH IV) 5 mL by IntraVENous route daily. after antibiotic using SASH method      ertapenem [facility]) 1 gram injection 1 g by IntraVENous route daily. traMADoL (ULTRAM) 50 mg tablet Take 50 mg by mouth every six (6) hours as needed for Pain. mupirocin (BACTROBAN) 2 % ointment Apply 1 Tube to affected area daily. 1 application right 2nd toe      albuterol sulfate (PROVENTIL;VENTOLIN) 2.5 mg/0.5 mL nebu nebulizer solution USE 1 VIAL IN NEBULIZER EVERY 4 HOURS AS NEEDED FOR WHEEZING  Qty: 30 Nebule, Refills: 2    Associated Diagnoses: Mild intermittent asthma, unspecified whether complicated      simvastatin (ZOCOR) 20 mg tablet TAKE BY MOUTH NIGHTLY. Qty: 90 Tablet, Refills: 1    Associated Diagnoses: Coronary artery disease due to lipid rich plaque; Status post angioplasty with stent; Mixed hyperlipidemia      sertraline (ZOLOFT) 50 mg tablet Take 1 Tablet by mouth daily.   Qty: 90 Tablet, Refills: 1    Associated Diagnoses: Mild episode of recurrent major depressive disorder ([facility])      omeprazole (PRILOSEC) 20 mg capsule Take 1 Capsule by mouth daily. Qty: 90 Capsule, Refills: 1    Associated Diagnoses: History of gastritis      metoprolol tartrate (LOPRESSOR) 25 mg tablet Take 1 Tablet by mouth two (2) times a day. Qty: 180 Tablet, Refills: 1    Associated Diagnoses: Essential hypertension      metFORMIN (GLUCOPHAGE) 500 mg tablet Take 1 Tablet by mouth daily (with dinner). Decreased dose  Qty: 90 Tablet, Refills: 1    Associated Diagnoses: Type 2 diabetes mellitus with complication, without long-term current use of insulin ([facility])      latanoprost (XALATAN) 0.005 % ophthalmic solution INSTILL 1 DROP INTO BOTH EYES NIGHTLY  Qty: 2.5 mL, Refills: 1    Associated Diagnoses: Glaucoma, unspecified glaucoma type, unspecified laterality      gabapentin (NEURONTIN) 300 mg capsule TAKE 1 CAPSULE IN THE MORNING AND TAKE 3 CAPSULES BEFORE BEDTIME  Qty: 120 Capsule, Refills: 2    Comments: Pls delete any prev gabapentin rx on file. thanks  Associated Diagnoses: RLS (restless legs syndrome); Lumbar radiculopathy      ergocalciferol (ERGOCALCIFEROL) 1,250 mcg (50,000 unit) capsule Take 1 Capsule by mouth every seven (7) days. Indications: vitamin D deficiency (high dose therapy)  Qty: 12 Capsule, Refills: 3    Associated Diagnoses: Vitamin D deficiency; Seronegative rheumatoid arthritis of multiple sites [facility]); Sarcoidosis of lung ([facility])      fluticasone furoate-vilanteroL (Breo Ellipta) 100-25 mcg/dose inhaler TAKE 1 PUFF BY MOUTH EVERY DAY  Qty: 1 Each, Refills: 3    Associated Diagnoses: Mild intermittent asthma, unspecified whether complicated      leflunomide (ARAVA) 20 mg tablet TAKE 1 TABLET BY MOUTH EVERY DAY  Qty: 90 Tablet, Refills: 1    Associated Diagnoses: Seronegative rheumatoid arthritis of multiple sites ([facility])      nortriptyline (PAMELOR) 50 mg capsule Take 50 mg by mouth nightly.                NOTIFY [facility] ANY OF THE FOLLOWING:   Fever over 101 degrees for 24 hours. Chest pain, shortness of breath, fever, chills, nausea, vomiting, diarrhea, change in mentation, falling, weakness, bleeding. Severe pain or pain not relieved by medications. Or, any other signs or symptoms that you may have questions about. DISPOSITION:    Home With:   OT  PT  HH  RN       Long term SNF/Inpatient Rehab    Independent/assisted living    Hospice    Other:       PATIENT CONDITION AT DISCHARGE:     Functional status    Poor     Deconditioned     Independent      Cognition     Lucid     Forgetful     Dementia      Catheters/lines (plus indication)    Horton     PICC     PEG     None      Code status     Full code     DNR      PHYSICAL EXAMINATION AT DISCHARGE:    General:          WD, WN     Resp:                No accessory muscle use  CV:                  RRR  GI:                     Non distended, Non tender. Neurologic:       Alert   normal speech,   Psych:    Not anxious nor agitated  Skin:                No rashes             CHRONIC MEDICAL DIAGNOSES:  Problem List as of 4/1/2022 Date Reviewed: 3/15/2022          Codes Class Noted - Resolved    Ambulatory dysfunction ICD-10-CM: R26.2  ICD-9-CM: 719.7  3/24/2022 - Present        Emphysematous cystitis ICD-10-CM: N30.80  ICD-9-CM: 595.89  2/28/2022 - Present        Complication of internal right knee prosthesis ([facility] ICD-10-CM: T84. Obeyie Enter, [id number]  ICD-9-CM: 996.77, V43.65  11/9/2021 - Present        Type 2 diabetes mellitus without complication ([facility] RLG-86-LO: E11.9  ICD-9-CM: 250.00  5/8/2020 - Present        MGUS (monoclonal gammopathy of unknown significance) ICD-10-CM: D47.2  ICD-9-CM: 273.1  11/22/2019 - Present        Severe obesity ([facility] ICD-10-CM: E66.01  ICD-9-CM: 278.01  5/7/2019 - Present        Seronegative rheumatoid arthritis of multiple sites [facility]) ICD-10-CM: M06.09  ICD-9-CM: 714.0  2/18/2019 - Present        Rheumatoid arthritis involving multiple sites ([facility] ICD-10-CM: M06.9  ICD-9-CM: 714.0  9/28/2018 - Present        Essential hypertension ICD-10-CM: I10  ICD-9-CM: 401.9  9/28/2018 - Present        Mixed hyperlipidemia ICD-10-CM: E78.2  ICD-9-CM: 272.2  9/28/2018 - Present        RLS (restless legs syndrome) ICD-10-CM: G25.81  ICD-9-CM: 333.94  9/28/2018 - Present        Mild intermittent asthma ICD-10-CM: J45.20  ICD-9-CM: 493.90  9/28/2018 - Present        Status post angioplasty with stent ICD-10-CM: Z95.820  ICD-9-CM: V45.89  9/28/2018 - Present        Coronary artery disease due to lipid rich plaque ICD-10-CM: I25.10, I25.83  ICD-9-CM: 414.00, 414.3  9/28/2018 - Present        Long-term use of immunosuppressant medication ICD-10-CM: Z79.899  ICD-9-CM: V58.69  8/22/2018 - Present        Osteopenia of multiple sites ICD-10-CM: M85.89  ICD-9-CM: 733.90  8/22/2018 - Present        Vitamin D deficiency ICD-10-CM: E55.9  ICD-9-CM: 268.9  8/22/2018 - Present        RESOLVED: Chronic pain ICD-10-CM: G89.29  ICD-9-CM: 338.29  5/8/2020 - 9/21/2020        RESOLVED: Glaucoma ICD-10-CM: H40.9  ICD-9-CM: 365.9  5/8/2020 - 9/21/2020        RESOLVED: Hearing loss ICD-10-CM: H91.90  ICD-9-CM: 389.9  5/8/2020 - 9/21/2020        RESOLVED: Hypocalcemia ICD-10-CM: E83.51  ICD-9-CM: 275.41  5/8/2020 - 9/21/2020              Greater than 35 minutes were spent with the patient on counseling and coordination of care    Signed:    Bucky Munguia DO  4/1/2022  12:35 PM

## 2022-04-01 NOTE — DISCHARGE INSTRUCTIONS
It is critical that you follow up with your physicians on an outpatient basis. PLEASE TAKE YOUR MEDICATIONS AND FOLLOW UP FOR ANY LABS AS INDICATED ON THIS DISCHARGE NOTE. Please return to the local emergency room if your symptoms worsen. Continue vitamin B12 replacement and follow-up closely with PCP to recheck vitamin B12 in 1 month      Please contact your PCP if you have any questions or concerns. Thank you, it was a pleasure taking care of you.

## 2022-04-01 NOTE — PROGRESS NOTES
No further CM needs identified. CM notified pt's nurse of d/c. Transition of Care Plan:     RUR: N/A - \"Observation Status\"  Disposition: SNF placement - [facility]   *Report: [phone]   *Room: to be provided upon calling report  Follow up appointments: PCP & specialist as indicated   DME needed: Defer to SNF for DME needs  Transportation at Discharge: [facility] transport secured for 3:30 PM delayed until 4:00 PM; PCS completed, copy placed on chart  Keys or means to access home: N/A - pt transitioning to SNF at d/c  IM Medicare Letter: N/A - Observation status; [employer] notice signed 3/25/22  Is patient a BCPI-A Bundle: N/A          Is patient a [place] and connected with the [facility]? N/A                Caregiver Contact: Pt's son, Coleen Barroso) [phone]  Discharge Caregiver contacted prior to discharge? Son contacted the day of d/c (4/1/22); son agreeable to the d/c plan  Care Conference needed?: N/A  COVID-19 test: Rapid COVID-19 test completed 4/1/22 per SNF protocol; results negative      Update - 3:40 PM: CM re-attempted to make contact with pt's son to review plan for d/c. CM reviewed plan, including [facility] transport secured for 4:00 PM & confirmed son is agreeable; no additional questions, concerns, or needs identified. Update - 1:42 PM: CM Perfect Served MD to request hard script for gabapentin; CM requested for signed script to be placed on chart prior to [facility] pick-up at 4:00 PM.    Update - 1:30 PM: CM attempted to contact pt's son to review plan for d/c; initial attemt unsuccessful, CM reached voicemail & requested call back. Update - 12:34 PM: CM received call from [facility] reporting delay in transport secured for 3:30 PM; updated ETA reported for 4:00 PM.    Update - 9:45 AM: MD confirmed pt is medically stable for d/c today to SNF; rapid COVID-19 test order placed in chart.  CM contacted [facility] admission liaison to confirm bed availability for placement today; Alpa confirmed the facility can accept. Number for report reflected in [facility] plan detailed above. [facility] transport secured for 3:30 PM; PCS completed, copy placed on chart. Alpa informed of [facility] transport secured for 3:30 PM.     Initial note: CM reviewed pt's chart prior to moving forward with d/c planning. Pt was accepted to SNF - [facility] pending medical stability. Facility received insurance auth 3/31/22; auth effective through today (4/1/22). CM will Perfect Serve attending MD to inquire if pt is medically stable for d/c today; CM will also request order for rapid COVID-19 test to be completed prior to d/c. Pt is on will call with [facility] for possible d/c today pending medical stability/SNF bed availability; PCS to be completed & placed on chart. Once medical stability is confirmed by MD, CM will contact [facility] admission liaison [facility]: [phone]) to report update & confirm bed availability at the facility for placement today. CM will coordinate updates with pt's edson Mondragon). CM will continue to follow & remain accessible for d/c planning. Care Management Interventions  PCP Verified by CM:  Yes  Palliative Care Criteria Met (RRAT>21 & CHF Dx)?: No  Mode of Transport at Discharge: [facility] Transport Time of Discharge: [address] (CM Consult): SNF  Partner SNF: Yes  Discharge Durable Medical Equipment: No  Physical Therapy Consult: Yes  Occupational Therapy Consult: Yes  Speech Therapy Consult: No  Support Systems: Child(reji),Other Family Member(s)  Confirm Follow Up Transport: Family  The Plan for Transition of Care is Related to the Following Treatment Goals : SNF  The Patient and/or Patient Representative was Provided with a Choice of Provider and Agrees with the Discharge Plan?: Yes  Name of the Patient Representative Who was Provided with a Choice of Provider and Agrees with the Discharge Plan: edson Deleon Alanna)  Banks of Choice List was Provided with Basic Dialogue that Supports the Patient's Individualized Plan of Care/Goals, Treatment Preferences and Shares the Quality Data Associated with the Providers?: Yes   Resource Information Provided?: No  Discharge Location  Patient Expects to be Discharged to[de-identified] Skilled nursing facility ([address] [place] [address])    Transition of Care Plan to SNF/Rehab    SNF/Rehab Transition:  Patient has been accepted to [address] [place] [address] and meets criteria for admission. Patient will transported by [facility] and expected to leave at 4:00 PM on 4/1/22    Communication to Patient/Family:  CM contacted pt's son Emerald Becerra) the day of d/c & confirmed he is agreeable to the d/c plan    Communication to SNF/Rehab:  Bedside RN has been notified to update the transition plan to the facility and call report: [phone]  Discharge information has been updated on the AVS.     Discharge instructions available to accepting SNF via [address] [place] [address],[address] Please include all hard scripts for controlled substances, med rec and dc summary, and AVS in packet. Reviewed and confirmed with [address] [place] [address] that they can manage the patient care needs for the following:     Yuan Pope with (X) only those applicable:    Medication:  [x]  Medications will be available at the facility  []  IV Antibiotics  [x]  Controlled Substance - hard copy to be sent with patient   []  Weekly Labs   Documents:  [x] Hard RX - Hard script to sent for gabapentin (Neurontin) 300 mg  [x] MAR  [x] Kardex  [x] AVS  []Transfer Summary  [x]Discharge   Equipment:  []  CPAP/BiPAP  []  Wound Vacuum  []  Horton or Urinary Device  []  PICC/Central Line  []  Nebulizer  []  Ventilator   Treatment:  [x]Isolation (for MRSA, VRE, etc.): ESBL (identified in urine 2/22/22)  []Surgical Drain Management  []Tracheostomy Care  [x]Dressing Changes/wound care instructions:   * Wound care for left buttocks/sacrum - complete daily: Cleanse the wound with NS and wipe with gauze. Apply a dry Opticel Ag and then a 7x7 sacrum dressing. Date each dressing. Encourage movement, ROM and off load the heels and sacrum by turning  []Dialysis with transportation and chair time   []PEG Care  []Oxygen  []Daily Weights for Heart Failure   Dietary:  [x]Any diet limitations: Diabetic diet    Nutrition supplement:  Frequency: Breakfast    Dinner   Select Supplement: Low Calorie/High Protein     []Tube Feedings   []Total Parenteral Management (TPN)   Eligible for Medicaid Long Term Services and Supports  Yes:  [] Eligible for medical assistance or will become eligible within 180 days and UAI completed. [] Provider/Patient and/or support system has requested screening. [] UAI copy provided to patient or responsible party  [] UAI unavailable at discharge will send once processed to SNF provider. [] UAI unavailable at discharged mailed to patient  No:   [] Private pay and is not financially eligible for Medicaid within the next 180 days. [] Reside out-of-state.   [] A residents of a state owned/operated facility that is licensed  by [address] [facility] or [facility]  [] Enrollment in [facility] hospice services  [] [address] [place] [address]  [] Patient /Family declines to have screening completed or provide financial information for screening     Financial Resources:  Medicaid    [] Initiated and application pending   [x] Full coverage: [employer] Medicaid/[place] [facility]     Advanced Care Plan:  [x]Surrogate Decision Maker of Care: Pt's son Emily Stinson: [phone])  []POA  [x]Communicated Code Status: FULL   Other       Twylla Phlegm, [address] [facility], [address] [facility]

## 2022-04-01 NOTE — PROGRESS NOTES
Report called to [employer] at [facility]ON[facility]. Opportunity for questions and clarification provided. [facility] scheduled for [facility].

## 2022-04-01 NOTE — PROGRESS NOTES
End of Shift Note    Bedside shift change report given to [place] [address] (oncoming nurse) by Gus Norman, RN (offgoing nurse). Report included the following information SBAR, Kardex, Procedure Summary, Intake/Output, MAR and Recent Results    Shift worked:  7P to [facility] summary and any significant changes:    Patient alert, oriented x2, with periodic confusion. Incontinent of urine. Slept soundly and makes needs known.      Concerns for physician to address:  See above    Zone phone for oncoming shift:   [phone]     Patient Information  Alisia Campos  78 y.o.  3/24/2022  1:15 PM by Woody Garcia MD. Alisia Campos was admitted from Home    Problem List  Patient Active Problem List    Diagnosis Date Noted    Ambulatory dysfunction 03/24/2022    Emphysematous cystitis 49/88/3263    Complication of internal right knee prosthesis ([address] 11/09/2021    Type 2 diabetes mellitus without complication ([address] 65/92/4002    MGUS (monoclonal gammopathy of unknown significance) 11/22/2019    Severe obesity ([address] 05/07/2019    Seronegative rheumatoid arthritis of multiple sites ([address] 02/18/2019    Rheumatoid arthritis involving multiple sites ([address] 09/28/2018    Essential hypertension 09/28/2018    Mixed hyperlipidemia 09/28/2018    RLS (restless legs syndrome) 09/28/2018    Mild intermittent asthma 09/28/2018    Status post angioplasty with stent 09/28/2018    Coronary artery disease due to lipid rich plaque 09/28/2018    Long-term use of immunosuppressant medication 08/22/2018    Osteopenia of multiple sites 08/22/2018    Vitamin D deficiency 08/22/2018     Past Medical History:   Diagnosis Date    Asthma     Chronic pain 5/8/2020    DDD (degenerative disc disease), lumbar     Diabetes ([address]     Gastritis     GERD (gastroesophageal reflux disease)     Glaucoma     Hearing loss 5/8/2020    Hiatal hernia     High cholesterol     Hypertension     Hypocalcemia 5/8/2020    [facility]     RA (rheumatoid arthritis) ([facility]     Sarcoidosis 1999    [facility]    Sleep apnea     has not used cpap in years since weight loss       Core Measures:  CVA: No No  CHF:No No  PNA:No No    Activity:  Activity Level: Up with Assistance, up to chair with assistance   Number times ambulated in hallways past shift: 0  Number of times OOB to chair past shift: 1    Cardiac:   Cardiac Monitoring: No           Access:   Current line(s): No IV access at this time   Central Line? No  PICC LINE? No - removed     Genitourinary:   Urinary status: voiding, incontinent and external catheter  Urinary Catheter? No     Respiratory:   O2 Device: None (Room air)  Chronic home O2 use?: NO  Incentive spirometer at bedside: NO       GI:  Last Bowel Movement Date: 03/30/22  Current diet:  ADULT DIET Regular  ADULT ORAL NUTRITION SUPPLEMENT Breakfast, Dinner; Low Calorie/High Protein  DIET ONE TIME MESSAGE  Passing flatus: YES  Tolerating current diet: YES       Pain Management:   Patient states pain is manageable on current regimen: YES    Skin:  Jose Luis Score: 14  Interventions: float heels, increase time out of bed, limit briefs and internal/external urinary devices    Patient Safety:  Fall Score:  Total Score: 5  Interventions: bed/chair alarm, assistive device (walker, cane, etc), pt to call before getting OOB and stay with me (per policy)  High Fall Risk: Yes  @Rollbelt  @dexterity to release roll belt  Yes/No ( must document dexterity  here by stating Yes or No here, otherwise this is a restraint and must follow restraint documentation policy.)    DVT prophylaxis:  DVT prophylaxis Med- Yes  DVT prophylaxis SCD or DANGELO- No     Wounds: (If Applicable)  Wounds- Yes  Stage 2 sacrum    Active Consults:  IP CONSULT TO HOSPITALIST  IP CONSULT TO NEUROLOGY  IP CONSULT TO INFECTIOUS DISEASES    Length of Stay:  Expected LOS: - - -  Actual LOS: 0  Discharge Plan: Yes, Pending acceptance to Skilled Nursing Facility.       Ahmet Tovar RN

## 2022-04-01 NOTE — PROGRESS NOTES
Problem: Pressure Injury - Risk of  Goal: *Prevention of pressure injury  Description: Document Jose Luis Scale and appropriate interventions in the flowsheet. Outcome: Progressing Towards Goal  Note: Pressure Injury Interventions:  Sensory Interventions: Check visual cues for pain,Float heels,Minimize linen layers    Moisture Interventions: Absorbent underpads,Limit adult briefs,Minimize layers,Moisture barrier,Offer toileting Q_hr    Activity Interventions: Increase time out of bed,Pressure redistribution bed/mattress(bed type)    Mobility Interventions: HOB 30 degrees or less,Pressure redistribution bed/mattress (bed type),Turn and reposition approx. every two hours(pillow and wedges)    Nutrition Interventions: Document food/fluid/supplement intake    Friction and Shear Interventions: HOB 30 degrees or less,Lift sheet,Minimize layers                Problem: Falls - Risk of  Goal: *Absence of Falls  Description: Document Sharifa Fall Risk and appropriate interventions in the flowsheet.   Outcome: Progressing Towards Goal  Note: Fall Risk Interventions:  Mobility Interventions: Bed/chair exit alarm    Mentation Interventions: Adequate sleep, hydration, pain control    Medication Interventions: Bed/chair exit alarm    Elimination Interventions: Bed/chair exit alarm    History of Falls Interventions: Bed/chair exit alarm         Problem: Pain  Goal: *Control of Pain  Outcome: Progressing Towards Goal     Problem: Altered Thought Process (Adult/Pediatric)  Goal: *STG: Participates in treatment plan  Outcome: Progressing Towards Goal  Goal: *STG: Remains safe in hospital  Outcome: Progressing Towards Goal  Goal: *STG: Seeks staff when feelings of anxiety and fear arise  Outcome: Progressing Towards Goal  Goal: *STG: Complies with medication therapy  Outcome: Progressing Towards Goal  Goal: *STG: Attends activities and groups  Outcome: Progressing Towards Goal  Goal: *STG: Decreased delusional thinking  Outcome: Progressing Towards Goal  Goal: *STG: Decreased hallucinations  Outcome: Progressing Towards Goal  Goal: *STG: Absence of lethality  Outcome: Progressing Towards Goal  Goal: *STG: Demonstrates ability to understand and use improved judgment in daily activities and relationships  Outcome: Progressing Towards Goal  Goal: *LTG: Returns to baseline functioning  Outcome: Progressing Towards Goal  Goal: Interventions  Outcome: Progressing Towards Goal     Problem: Risk for Spread of Infection  Goal: Prevent transmission of infectious organism to others  Description: Prevent the transmission of infectious organisms to other patients, staff members, and visitors. Outcome: Progressing Towards Goal     Problem: General Medical Care Plan  Goal: *Vital signs within specified parameters  Outcome: Progressing Towards Goal  Goal: *Labs within defined limits  Outcome: Progressing Towards Goal  Goal: *Absence of infection signs and symptoms  Outcome: Progressing Towards Goal  Goal: *Optimal pain control at patient's stated goal  Outcome: Progressing Towards Goal  Goal: *Skin integrity maintained  Outcome: Progressing Towards Goal  Goal: *Fluid volume balance  Outcome: Progressing Towards Goal  Goal: *Optimize nutritional status  Outcome: Progressing Towards Goal  Goal: *Anxiety reduced or absent  Outcome: Progressing Towards Goal  Goal: *Progressive mobility and function (eg: ADL's)  Outcome: Progressing Towards Goal     Problem: Diabetes Self-Management  Goal: *Disease process and treatment process  Description: Define diabetes and identify own type of diabetes; list 3 options for treating diabetes. Outcome: Progressing Towards Goal  Goal: *Incorporating nutritional management into lifestyle  Description: Describe effect of type, amount and timing of food on blood glucose; list 3 methods for planning meals.   Outcome: Progressing Towards Goal  Goal: *Incorporating physical activity into lifestyle  Description: State effect of exercise on blood glucose levels. Outcome: Progressing Towards Goal  Goal: *Developing strategies to promote health/change behavior  Description: Define the ABC's of diabetes; identify appropriate screenings, schedule and personal plan for screenings. Outcome: Progressing Towards Goal  Goal: *Using medications safely  Description: State effect of diabetes medications on diabetes; name diabetes medication taking, action and side effects. Outcome: Progressing Towards Goal  Goal: *Monitoring blood glucose, interpreting and using results  Description: Identify recommended blood glucose targets  and personal targets. Outcome: Progressing Towards Goal  Goal: *Prevention, detection, treatment of acute complications  Description: List symptoms of hyper- and hypoglycemia; describe how to treat low blood sugar and actions for lowering  high blood glucose level. Outcome: Progressing Towards Goal  Goal: *Prevention, detection and treatment of chronic complications  Description: Define the natural course of diabetes and describe the relationship of blood glucose levels to long term complications of diabetes.   Outcome: Progressing Towards Goal  Goal: *Developing strategies to address psychosocial issues  Description: Describe feelings about living with diabetes; identify support needed and support network  Outcome: Progressing Towards Goal

## 2022-04-04 ENCOUNTER — HOME CARE VISIT (OUTPATIENT)
Dept: SCHEDULING | Facility: HOME HEALTH | Age: 77
End: 2022-04-04
Payer: MEDICARE

## 2022-04-05 ENCOUNTER — HOME CARE VISIT (OUTPATIENT)
Dept: HOME HEALTH SERVICES | Facility: HOME HEALTH | Age: 77
End: 2022-04-05
Payer: MEDICARE

## 2022-04-25 ENCOUNTER — TELEPHONE (OUTPATIENT)
Dept: FAMILY MEDICINE CLINIC | Age: 77
End: 2022-04-25

## 2022-04-25 NOTE — TELEPHONE ENCOUNTER
----- Message from Lauren Farfan sent at 4/20/2022 10:06 AM EDT -----  Subject: Message to Provider    QUESTIONS  Information for Provider? Called 4/18/22 for appt. Needs post-hosp visit   scheduled asap. Needs to get into therapy. Please call patient to   schedule.   ---------------------------------------------------------------------------  --------------  CALL BACK INFO  What is the best way for the office to contact you? OK to leave message on   voicemail  Preferred Call Back Phone Number? [phone]  ---------------------------------------------------------------------------  --------------  SCRIPT ANSWERS  Relationship to Patient?  Self

## 2022-04-28 ENCOUNTER — OFFICE VISIT (OUTPATIENT)
Dept: FAMILY MEDICINE CLINIC | Age: 77
End: 2022-04-28
Payer: MEDICARE

## 2022-04-28 VITALS
HEIGHT: 65 IN | RESPIRATION RATE: 18 BRPM | SYSTOLIC BLOOD PRESSURE: 129 MMHG | OXYGEN SATURATION: 93 % | TEMPERATURE: 97 F | DIASTOLIC BLOOD PRESSURE: 43 MMHG | HEART RATE: 78 BPM | WEIGHT: 168.8 LBS | BODY MASS INDEX: 28.12 KG/M2

## 2022-04-28 DIAGNOSIS — M79.601 RIGHT ARM PAIN: ICD-10-CM

## 2022-04-28 DIAGNOSIS — E53.8 VITAMIN B12 DEFICIENCY: ICD-10-CM

## 2022-04-28 DIAGNOSIS — E11.22 TYPE 2 DIABETES MELLITUS WITH STAGE 3A CHRONIC KIDNEY DISEASE, WITHOUT LONG-TERM CURRENT USE OF INSULIN (HCC): ICD-10-CM

## 2022-04-28 DIAGNOSIS — E11.40 TYPE 2 DIABETES MELLITUS WITH DIABETIC NEUROPATHY, WITHOUT LONG-TERM CURRENT USE OF INSULIN (HCC): ICD-10-CM

## 2022-04-28 DIAGNOSIS — M48.02 STENOSIS OF CERVICAL SPINE: ICD-10-CM

## 2022-04-28 DIAGNOSIS — Z09 HOSPITAL DISCHARGE FOLLOW-UP: Primary | ICD-10-CM

## 2022-04-28 DIAGNOSIS — M48.02 FORAMINAL STENOSIS OF CERVICAL REGION: ICD-10-CM

## 2022-04-28 DIAGNOSIS — M06.09 RHEUMATOID ARTHRITIS OF MULTIPLE SITES WITH NEGATIVE RHEUMATOID FACTOR (HCC): ICD-10-CM

## 2022-04-28 DIAGNOSIS — N18.31 TYPE 2 DIABETES MELLITUS WITH STAGE 3A CHRONIC KIDNEY DISEASE, WITHOUT LONG-TERM CURRENT USE OF INSULIN (HCC): ICD-10-CM

## 2022-04-28 DIAGNOSIS — R41.89 COGNITIVE IMPAIRMENT: ICD-10-CM

## 2022-04-28 DIAGNOSIS — G62.9 PERIPHERAL POLYNEUROPATHY: ICD-10-CM

## 2022-04-28 PROCEDURE — 96372 THER/PROPH/DIAG INJ SC/IM: CPT | Performed by: FAMILY MEDICINE

## 2022-04-28 PROCEDURE — G8427 DOCREV CUR MEDS BY ELIG CLIN: HCPCS | Performed by: FAMILY MEDICINE

## 2022-04-28 PROCEDURE — 1111F DSCHRG MED/CURRENT MED MERGE: CPT | Performed by: FAMILY MEDICINE

## 2022-04-28 PROCEDURE — 99495 TRANSJ CARE MGMT MOD F2F 14D: CPT | Performed by: FAMILY MEDICINE

## 2022-04-28 RX ORDER — TRAMADOL HYDROCHLORIDE 50 MG/1
50 TABLET ORAL
Qty: 28 TABLET | Refills: 0 | Status: SHIPPED | OUTPATIENT
Start: 2022-04-28 | End: 2022-05-27 | Stop reason: SDUPTHER

## 2022-04-28 RX ORDER — CYANOCOBALAMIN 1000 UG/ML
1000 INJECTION, SOLUTION INTRAMUSCULAR; SUBCUTANEOUS ONCE
Status: DISCONTINUED | OUTPATIENT
Start: 2022-04-28 | End: 2022-04-28

## 2022-04-28 RX ORDER — CYANOCOBALAMIN 1000 UG/ML
1000 INJECTION, SOLUTION INTRAMUSCULAR; SUBCUTANEOUS ONCE
Qty: 1 ML | Refills: 0
Start: 2022-04-28 | End: 2022-04-28

## 2022-04-28 RX ORDER — PREDNISONE 20 MG/1
TABLET ORAL
Qty: 13 TABLET | Refills: 0 | Status: SHIPPED | OUTPATIENT
Start: 2022-04-28 | End: 2022-05-13 | Stop reason: ALTCHOICE

## 2022-04-28 NOTE — PROGRESS NOTES
Chief Complaint   Patient presents with   [facility] Follow Up     Discharged from [address] 4/14/22   1. Have you been to the ER, urgent care clinic since your last visit? Hospitalized since your last visit? Yes Where: ED [facility] admitted 3/24/22 Discharged to Rehab 4/1/22     2. Have you seen or consulted any other health care providers outside of the [address] [place] [address] since your last visit? Include any pap smears or colon screening.  No    New orders for Physical Therapy And Discuss pain medication for right arm pain

## 2022-04-28 NOTE — PROGRESS NOTES
Transitional Care Management Progress Note    Patient: Tammy Weems  : 1945  PCP: Zach Naylor MD    Date of office visit: 2022   Date of admission: 3/24/22  Date of discharge: 22   Rita Bruno to [facility] for Rehab - Discharged on 22  Hospital: [facility]    Call initiated w/i 2 business dates of discharge: documentation of call w/i 2 days after d/c from hospital on chart. Assessment/Plan:     Diagnoses and all orders for this visit:    1. Hospital discharge follow-up  -     [place] DISCHARGE MEDS RECONCILED W/ CURRENT OUTPATIENT MED LIST    2. Peripheral polyneuropathy - B12 shots, following with Neuro  -     CBC WITH AUTOMATED DIFF; Future  -     VITAMIN B12 & FOLATE; Future  -     METABOLIC PANEL, COMPREHENSIVE; Future  -     REFERRAL TO NEUROLOGY    3. Vitamin B12 deficiency - B12 shot today, pt prefers to get monthly injection rather than use pills  -     CBC WITH AUTOMATED DIFF; Future  -     VITAMIN B12 & FOLATE; Future  -     VITAMIN B12 INJECTION  -     THER/PROPH/DIAG INJECTION, SUBCUT/IM  -     cyanocobalamin (Vitamin B-12) 1,000 mcg/mL injection; 1 mL by IntraMUSCular route once for 1 dose. 4. Rheumatoid arthritis of multiple sites with negative rheumatoid factor (HCC) - ongoing issue, stopped seeing Rheum after Dr. Antonia Simmonds left practice. Sending to Rheum again. Seemed stable on [address] Checking labs  -     REFERRAL TO RHEUMATOLOGY  -     traMADoL (ULTRAM) 50 mg tablet; Take 1 Tablet by mouth every six (6) hours as needed for Pain for up to 7 days. Max Daily Amount: 200 mg.  -     CBC WITH AUTOMATED DIFF; Future  -     VITAMIN B12 & FOLATE; Future  -     SED RATE (ESR); Future  -     C REACTIVE PROTEIN, QT; Future  -     METABOLIC PANEL, COMPREHENSIVE; Future    5. Cervical spine canal stenosis and foraminal stenosis causing right arm pain- for physical therapy and will use prednisone and tramadol as needed.   -     traMADoL (ULTRAM) 50 mg tablet; Take 1 Tablet by mouth every six (6) hours as needed for Pain for up to 7 days. Max Daily Amount: 200 mg.    6. Type 2 diabetes mellitus with stage 3a chronic kidney disease, without long-term current use of insulin ([facility] [address]  7. Type 2 diabetes mellitus with diabetic neuropathy, without long-term current use of insulin ([facility])  -Excellent control with A1c 5.0% last month. Metformin stopped in hospital.  Will discuss with patient at follow-up appointment in 4 weeks to ensure she stops but may be useful while using prednisone as above    8. Cognitive impairment- concerning for dementia, to neurology for further evaluation  -     REFERRAL TO NEUROLOGY    Due for mammogram, COVID booster, and pneumonia vaccine so we will plan for these at next appointment as well    The complexity of medical decision making for this patient's transitional care is moderate   Follow-up and Dispositions    · Return in 1 month (on 5/28/2022), or if symptoms worsen or fail to improve. Subjective:   Cristian Enriquez is a 78 y.o. female presenting today for follow-up after hospital discharge. This encounter and supporting documentation was reviewed if available. Medication reconciliation was performed today. The main problem requiring admission was difficulty walking, neuropathy. Complications during admission: none    Improved overall. Concerns for cognitive impairment. Concern for likely dementia. Hosp discharge summary reviewed:   \"Ambulatory dysfunction POA  Peripheral neuropathy  Vitamin B12 deficiency   -Likely due to recent fall/ knee injury, peripheral neuropathy  -Right knee, foot, ankle x-ray and left knee x-ray negative for fractures  -Percocet for pain, continue gabapentin  -Started on B12  -Appreciate neurology input  -PT OT, SNF recommended  -Medically stable for discharge.  Awaiting placement     Elevated creatinine  POA -resolved   -Creatinine 1.3 on admission.  S/p IV fluid.  Cr back to normal     Diabetes mellitus type 2   Hx of gastric bypass surgery. Used to weigh >300lbs  -On Metformin 500 mg daily at home.  A1c 3 weeks ago was 4.6.  A1c was 6.1 in 2019.   -Episodes of hypoglycemia.  Metformin discontinued  - Monitor blood glucose.  Repeat A1c 5  - stop ADA diet and place on regular diet  - BG at 77 this AM     Recent emphysematous cystitis and ESBL Klebsiella pneumoniae UTI: 02/2022  -Was discharged on IV ertapenem and was supposed to continue until 3/28 (duration was prolonged at due to patient having persistent symptoms)  -CT abdomen/pelvis with contrast: shows resolution of emphysematous cystitis.  Nonspecific bilateral perinephric stranding. -PICC line removed  -Appreciate ID input no further abx needed     Hypertension  Dyslipidemia  Osteoarthritis  Peripheral arterial disease  History of rheumatoid arthritis  History of sarcoid  Obstructive sleep apnea  GERD  Depression  -Continue home metoprolol, Lipitor, PPI, Zoloft  -Resume Xeljanz and leflunomide as available  - Hold Norvasc as BP soft last night     Visual hallucinations  ? Dementia   -Per son this has been going on intermittently since her knee fracture in November 2021  -Neurology consulted.  Appreciate input.    -MRI: mild generalized parenchymal volume loss and mild to moderate  chronic microvascular ischemic disease. \"      Also c/o sig and arm pain today radiating from neck all the way down her hand. Reviewed CT cervical spine from 9/2020:  FINDINGS:   The alignment is within normal limits. There is no fracture or subluxation. The odontoid process is intact. The craniocervical junction is within normal limits. The prevertebral soft tissues are within normal limits.   C2-C3: No significant canal stenosis or foraminal narrowing. C3-C4: Posterior disc osteophyte complex with moderate canal stenosis. Moderate bilateral foraminal narrowing. C4-C5: Posterior disc osteophyte complex and facet arthrosis with probable severe canal stenosis.  Moderate to severe right and moderate left foraminal narrowing. C5-C6: Posterior disc osteophyte complex with moderate canal stenosis and moderate to severe left foraminal narrowing. Moderate right foraminal narrowing. C6-C7: Posterior disc osteophyte complex with moderate bilateral foraminal narrowing and moderate canal stenosis. C7-T1: Posterior disc osteophyte complex without canal stenosis or foraminal narrowing  Incidental note is made of bilateral cervical ribs. There are calcified nodules in the bilateral thyroid gland. IMPRESSION  IMPRESSION:  1. No evidence of acute fracture or subluxation. 2.  Extensive multilevel degenerative disc disease with canal stenosis and foraminal narrowing. 3.  Calcified thyroid nodules    Medications marked \"taking\" at this time:  Prior to Admission medications    Medication Sig Start Date End Date Taking? Authorizing Provider   traMADoL (ULTRAM) 50 mg tablet Take 1 Tablet by mouth every six (6) hours as needed for Pain for up to 7 days. Max Daily Amount: 200 mg. 4/28/22 5/5/22 Yes Omari Moore MD   cyanocobalamin (Vitamin B-12) 1,000 mcg/mL injection 1 mL by IntraMUSCular route once for 1 dose. 4/28/22 4/28/22 Yes Omari Moore MD   cyanocobalamin, vitamin B-12, 2,000 mcg tab Take 2,000 mcg by mouth daily for 30 days. 4/1/22 5/1/22  Karishma Gómez, DO   gabapentin (NEURONTIN) 300 mg capsule TAKE 1 CAPSULE IN THE MORNING AND TAKE 3 CAPSULES BEFORE BEDTIME 4/1/22   Karishma Gómez, DO   0.9% sodium chloride solution 10 mL by IntraVENous route daily. before and after IV antibiotic using SASH method    Provider, Historical   heparin sod,porcine/0.9 % NaCl (HEPARIN FLUSH IV) 5 mL by IntraVENous route daily. after antibiotic using SASH method    Provider, Historical   ertapenem [facility]) 1 gram injection 1 g by IntraVENous route daily. Provider, Historical   traMADoL (ULTRAM) 50 mg tablet Take 50 mg by mouth every six (6) hours as needed for Pain.   4/28/22  Provider, Historical   mupirocin (BACTROBAN) 2 % ointment Apply 1 Tube to affected area daily. 1 application right 2nd toe 2/14/22   Provider, Historical   traMADoL (ULTRAM) 50 mg tablet Take 1 Tablet by mouth every six (6) hours as needed for Pain for up to 7 days. Max Daily Amount: 200 mg. 2/16/22 4/28/22  Julio Self, DO   albuterol sulfate (PROVENTIL;VENTOLIN) 2.5 mg/0.5 mL nebu nebulizer solution USE 1 VIAL IN NEBULIZER EVERY 4 HOURS AS NEEDED FOR WHEEZING 2/11/22   Lonell Landau, MD   simvastatin (ZOCOR) 20 mg tablet TAKE BY MOUTH NIGHTLY. Patient taking differently: Take 20 mg by mouth nightly. TAKE BY MOUTH NIGHTLY. 10/26/21   Lonell Landau, MD   sertraline (ZOLOFT) 50 mg tablet Take 1 Tablet by mouth daily. 10/26/21   Lonell Landau, MD   omeprazole (PRILOSEC) 20 mg capsule Take 1 Capsule by mouth daily. 10/26/21   Lonell Landau, MD   metoprolol tartrate (LOPRESSOR) 25 mg tablet Take 1 Tablet by mouth two (2) times a day. 10/26/21   Lonell Landau, MD   metFORMIN (GLUCOPHAGE) 500 mg tablet Take 1 Tablet by mouth daily (with dinner). Decreased dose  Patient not taking: Reported on 3/17/2022 10/26/21   Lonell Landau, MD   latanoprost (XALATAN) 0.005 % ophthalmic solution INSTILL 1 DROP INTO BOTH EYES NIGHTLY  Patient taking differently: Administer 1 Drop to both eyes nightly. INSTILL 1 DROP INTO BOTH EYES NIGHTLY 10/26/21   Lonell Landau, MD   ergocalciferol (ERGOCALCIFEROL) 1,250 mcg (50,000 unit) capsule Take 1 Capsule by mouth every seven (7) days. Indications: vitamin D deficiency (high dose therapy)  Patient taking differently: Take 50,000 Units by mouth every seven (7) days. saturdays  Indications: vitamin D deficiency (high dose therapy) 10/26/21   Lonell Landau, MD   fluticasone furoate-vilanteroL (Breo Ellipta) 100-25 mcg/dose inhaler TAKE 1 PUFF BY MOUTH EVERY DAY  Patient taking differently: Take 1 Puff by inhalation daily.  TAKE 1 PUFF BY MOUTH EVERY DAY 10/26/21   Mark, Marylin Chavez MD   leflunomide (ARAVA) 20 mg tablet TAKE 1 TABLET BY MOUTH EVERY DAY  Patient taking differently: Take 20 mg by mouth daily. TAKE 1 TABLET BY MOUTH EVERY DAY 8/31/21   Estela Suazo MD   nortriptyline (PAMELOR) 50 mg capsule Take 50 mg by mouth nightly. 5/7/18   Provider, Historical        ROS: per HPI       Patient Active Problem List   Diagnosis Code    Long-term use of immunosuppressant medication Z79.899    Osteopenia of multiple sites M85.89    Vitamin D deficiency E55.9    Rheumatoid arthritis involving multiple sites ([facility] [address] M06.9    Essential hypertension I10    Mixed hyperlipidemia E78.2    RLS (restless legs syndrome) G25.81    Mild intermittent asthma J45.20    Status post angioplasty with stent Z95.820    Coronary artery disease due to lipid rich plaque I25.10, I25.83    Seronegative rheumatoid arthritis of multiple sites ([facility] [address] M06.09    Severe obesity (HCC) E66.01    MGUS (monoclonal gammopathy of unknown significance) D47.2    Type 2 diabetes mellitus without complication ([facility]) S29.2    Complication of internal right knee prosthesis ([facility] [address] T84. 9XXA, [id number]    Emphysematous cystitis N30.80    Ambulatory dysfunction R26.2    Type 2 diabetes mellitus with chronic kidney disease ([facility] [address] E11.22    Type 2 diabetes mellitus with diabetic neuropathy E11.40     Past Medical History:   Diagnosis Date    Asthma     Chronic pain 5/8/2020    DDD (degenerative disc disease), lumbar     Diabetes ([facility] [address]     Gastritis     GERD (gastroesophageal reflux disease)     Glaucoma     Hearing loss 5/8/2020    Hiatal hernia     High cholesterol     Hypertension     Hypocalcemia 5/8/2020    Peripheral vascular disease (HCC)     RA (rheumatoid arthritis) ([facility] [address]     Sarcoidosis 1999    [facility]    Sleep apnea     has not used cpap in years since weight loss     Past Surgical History:   Procedure Laterality Date    HX GASTRIC BYPASS      HX [facility]ART [facility]HETERIZATION      [facility]     HX ORTHOPAEDIC Bilateral     carpal tunnel surgery     HX SHOULDER REPLACEMENT Right     x 2           Objective:     Blood pressure (!) 129/43, pulse 78, temperature 97 °F (36.1 °C), temperature source Temporal, resp. rate 18, height 5' 5\" (1.651 m), weight 168 lb 12.8 oz (76.6 kg), SpO2 93 %. Physical exam:  General appearance - alert, well appearing, and in no distress  Eyes -sclera anicteric, no discharge  HEENT- normocephalic, atraumatic, moist mucous membranes, no visualized neck mass  Chest -normal respiratory effort, no visualized signs of respiratory distress  Neurological - alert, awake, normal speech, no focal findings or movement disorder noted. Using walker  Psych - normal mood and affect  Skin- no apparent lesions    We discussed the expected course, resolution and complications of the diagnosis(es) in detail. Medication risks, benefits, costs, interactions, and alternatives were discussed as indicated. I advised her to contact the office if her condition worsens, changes or fails to improve as anticipated. She expressed understanding with the diagnosis(es) and plan.      Gilbert Juares MD

## 2022-05-13 ENCOUNTER — OFFICE VISIT (OUTPATIENT)
Dept: FAMILY MEDICINE CLINIC | Age: 77
End: 2022-05-13
Payer: MEDICARE

## 2022-05-13 ENCOUNTER — HOSPITAL ENCOUNTER (OUTPATIENT)
Dept: GENERAL RADIOLOGY | Age: 77
Discharge: HOME | End: 2022-05-13
Payer: MEDICARE

## 2022-05-13 VITALS
BODY MASS INDEX: 27.86 KG/M2 | SYSTOLIC BLOOD PRESSURE: 124 MMHG | WEIGHT: 167.2 LBS | HEART RATE: 79 BPM | TEMPERATURE: 97.3 F | HEIGHT: 65 IN | RESPIRATION RATE: 16 BRPM | OXYGEN SATURATION: 95 % | DIASTOLIC BLOOD PRESSURE: 54 MMHG

## 2022-05-13 DIAGNOSIS — S99.921A INJURY OF TOE ON RIGHT FOOT, INITIAL ENCOUNTER: ICD-10-CM

## 2022-05-13 DIAGNOSIS — I73.9 PAD (PERIPHERAL ARTERY DISEASE) (HCC): ICD-10-CM

## 2022-05-13 DIAGNOSIS — S99.921A INJURY OF TOE ON RIGHT FOOT, INITIAL ENCOUNTER: Primary | ICD-10-CM

## 2022-05-13 DIAGNOSIS — G62.9 PERIPHERAL POLYNEUROPATHY: ICD-10-CM

## 2022-05-13 PROCEDURE — G8752 SYS BP LESS 140: HCPCS | Performed by: FAMILY MEDICINE

## 2022-05-13 PROCEDURE — G8754 DIAS BP LESS 90: HCPCS | Performed by: FAMILY MEDICINE

## 2022-05-13 PROCEDURE — G8427 DOCREV CUR MEDS BY ELIG CLIN: HCPCS | Performed by: FAMILY MEDICINE

## 2022-05-13 PROCEDURE — G8399 PT W/DXA RESULTS DOCUMENT: HCPCS | Performed by: FAMILY MEDICINE

## 2022-05-13 PROCEDURE — G8417 CALC BMI ABV UP PARAM F/U: HCPCS | Performed by: FAMILY MEDICINE

## 2022-05-13 PROCEDURE — 99213 OFFICE O/P EST LOW 20 MIN: CPT | Performed by: FAMILY MEDICINE

## 2022-05-13 PROCEDURE — G8432 DEP SCR NOT DOC, RNG: HCPCS | Performed by: FAMILY MEDICINE

## 2022-05-13 PROCEDURE — G9899 SCRN MAM PERF RSLTS DOC: HCPCS | Performed by: FAMILY MEDICINE

## 2022-05-13 PROCEDURE — 1101F PT FALLS ASSESS-DOCD LE1/YR: CPT | Performed by: FAMILY MEDICINE

## 2022-05-13 PROCEDURE — 1090F PRES/ABSN URINE INCON ASSESS: CPT | Performed by: FAMILY MEDICINE

## 2022-05-13 PROCEDURE — 3017F COLORECTAL CA SCREEN DOC REV: CPT | Performed by: FAMILY MEDICINE

## 2022-05-13 PROCEDURE — G8536 NO DOC ELDER MAL SCRN: HCPCS | Performed by: FAMILY MEDICINE

## 2022-05-13 PROCEDURE — 73660 X-RAY EXAM OF TOE(S): CPT

## 2022-05-13 RX ORDER — ALBUTEROL SULFATE 0.83 MG/ML
SOLUTION RESPIRATORY (INHALATION)
COMMUNITY
Start: 2022-04-15 | End: 2022-05-13 | Stop reason: SDUPTHER

## 2022-05-13 NOTE — PROGRESS NOTES
Fili Dejesus (: 1945) is a 78 y.o. female, established patient, here for evaluation of the following chief complaint(s): Foot Swelling       ASSESSMENT/PLAN: x-rays and to podiatry, may be improving on own.  eval for fx and osteomyelitis. Concerning hx of PAD and neuropathy with prev partial toe amputation. Below is the assessment and plan developed based on review of pertinent history, physical exam, labs, studies, and medications. 1. Injury of toe on right foot, initial encounter  -     XR 2ND TOE RT MIN 2 V; Future  -     REFERRAL TO PODIATRY  2. Peripheral polyneuropathy  3. PAD (peripheral artery disease) ([facility] [address]    Will again address care gaps at next appt. Would like to plan for Prevnar 20 and COVID booster in 2 weeks. Health Maintenance Due   Topic Date Due    DTaP/Tdap/Td series (1 - Tdap) Never done    Colorectal Cancer Screening Combo  Never done    Shingrix Vaccine Age 50> (1 of 2) Never done    Breast Cancer Screen Mammogram  10/08/2019    Foot Exam Q1  2020    Eye Exam Retinal or Dilated  2021    Pneumococcal 65+ years (2 - PCV) 2021    COVID-19 Vaccine (4 - Booster for Pfizer series) 2022     Return in about 1 week (around 2022), or if symptoms worsen or fail to improve. SUBJECTIVE/OBJECTIVE:  Foot swelling. Concerns from [facility]. Bumped lotion bottle 2-3 days. Seems like improving to pt. Feels like the toenail fell off. Hx of PAD and neuropathy. Has not seen her podiatrist in a few months. ROS  Gen - no fever/chills  Resp - no dyspnea or cough  CV - no chest pain or WORKMAN  Rest per HPI    Resp. rate 16, height 5' 5\" (1.651 m), weight 167 lb 3.2 oz (75.8 kg).     Physical Exam  General appearance - alert, well appearing, and in no distress  Eyes -sclera anicteric  Foot: right foot with 2nd great toe swelling, slightly delayed cap refill but equal compared to other toes, hx of right great toe amputation          On this date 2022 I have spent 20 minutes reviewing previous notes, test results and face to face with the patient discussing the diagnosis and importance of compliance with the treatment plan as well as documenting on the day of the visit. An electronic signature was used to authenticate this note.   -- Herber Cullen MD

## 2022-05-13 NOTE — PROGRESS NOTES
Chief Complaint   Patient presents with    Foot Swelling   1. Have you been to the ER, urgent care clinic since your last visit? Hospitalized since your last visit? No    2. Have you seen or consulted any other health care providers outside of the [address] [place] [address] since your last visit? Include any pap smears or colon screening.  No     Home Health concerned about swelling of feet

## 2022-05-27 ENCOUNTER — OFFICE VISIT (OUTPATIENT)
Dept: FAMILY MEDICINE CLINIC | Age: 77
End: 2022-05-27
Payer: MEDICARE

## 2022-05-27 VITALS
SYSTOLIC BLOOD PRESSURE: 134 MMHG | BODY MASS INDEX: 27.99 KG/M2 | WEIGHT: 168 LBS | TEMPERATURE: 97 F | RESPIRATION RATE: 18 BRPM | DIASTOLIC BLOOD PRESSURE: 64 MMHG | OXYGEN SATURATION: 97 % | HEART RATE: 88 BPM | HEIGHT: 65 IN

## 2022-05-27 DIAGNOSIS — G25.81 RLS (RESTLESS LEGS SYNDROME): ICD-10-CM

## 2022-05-27 DIAGNOSIS — E11.3593 PROLIFERATIVE DIABETIC RETINOPATHY OF BOTH EYES ASSOCIATED WITH TYPE 2 DIABETES MELLITUS, MACULAR EDEMA PRESENCE UNSPECIFIED (HCC): ICD-10-CM

## 2022-05-27 DIAGNOSIS — Z12.31 ENCOUNTER FOR SCREENING MAMMOGRAM FOR MALIGNANT NEOPLASM OF BREAST: ICD-10-CM

## 2022-05-27 DIAGNOSIS — M06.09 RHEUMATOID ARTHRITIS OF MULTIPLE SITES WITH NEGATIVE RHEUMATOID FACTOR (HCC): Primary | ICD-10-CM

## 2022-05-27 DIAGNOSIS — Z12.11 COLON CANCER SCREENING: ICD-10-CM

## 2022-05-27 DIAGNOSIS — E11.40 TYPE 2 DIABETES MELLITUS WITH DIABETIC NEUROPATHY, WITHOUT LONG-TERM CURRENT USE OF INSULIN (HCC): ICD-10-CM

## 2022-05-27 DIAGNOSIS — M48.02 FORAMINAL STENOSIS OF CERVICAL REGION: ICD-10-CM

## 2022-05-27 DIAGNOSIS — M48.02 STENOSIS OF CERVICAL SPINE: ICD-10-CM

## 2022-05-27 DIAGNOSIS — H40.9 GLAUCOMA OF BOTH EYES, UNSPECIFIED GLAUCOMA TYPE: ICD-10-CM

## 2022-05-27 DIAGNOSIS — M79.601 RIGHT ARM PAIN: ICD-10-CM

## 2022-05-27 DIAGNOSIS — M54.16 LUMBAR RADICULOPATHY: ICD-10-CM

## 2022-05-27 DIAGNOSIS — E53.8 VITAMIN B12 DEFICIENCY: ICD-10-CM

## 2022-05-27 DIAGNOSIS — Z23 ENCOUNTER FOR IMMUNIZATION: ICD-10-CM

## 2022-05-27 DIAGNOSIS — E11.22 TYPE 2 DIABETES MELLITUS WITH STAGE 3A CHRONIC KIDNEY DISEASE, WITHOUT LONG-TERM CURRENT USE OF INSULIN (HCC): ICD-10-CM

## 2022-05-27 DIAGNOSIS — N18.31 TYPE 2 DIABETES MELLITUS WITH STAGE 3A CHRONIC KIDNEY DISEASE, WITHOUT LONG-TERM CURRENT USE OF INSULIN (HCC): ICD-10-CM

## 2022-05-27 PROCEDURE — G8417 CALC BMI ABV UP PARAM F/U: HCPCS | Performed by: FAMILY MEDICINE

## 2022-05-27 PROCEDURE — 1101F PT FALLS ASSESS-DOCD LE1/YR: CPT | Performed by: FAMILY MEDICINE

## 2022-05-27 PROCEDURE — 3044F HG A1C LEVEL LT 7.0%: CPT | Performed by: FAMILY MEDICINE

## 2022-05-27 PROCEDURE — 90677 PCV20 VACCINE IM: CPT | Performed by: FAMILY MEDICINE

## 2022-05-27 PROCEDURE — 1123F ACP DISCUSS/DSCN MKR DOCD: CPT | Performed by: FAMILY MEDICINE

## 2022-05-27 PROCEDURE — G8752 SYS BP LESS 140: HCPCS | Performed by: FAMILY MEDICINE

## 2022-05-27 PROCEDURE — G9899 SCRN MAM PERF RSLTS DOC: HCPCS | Performed by: FAMILY MEDICINE

## 2022-05-27 PROCEDURE — G8510 SCR DEP NEG, NO PLAN REQD: HCPCS | Performed by: FAMILY MEDICINE

## 2022-05-27 PROCEDURE — 2022F DILAT RTA XM EVC RTNOPTHY: CPT | Performed by: FAMILY MEDICINE

## 2022-05-27 PROCEDURE — 99214 OFFICE O/P EST MOD 30 MIN: CPT | Performed by: FAMILY MEDICINE

## 2022-05-27 PROCEDURE — 96372 THER/PROPH/DIAG INJ SC/IM: CPT | Performed by: FAMILY MEDICINE

## 2022-05-27 PROCEDURE — 3017F COLORECTAL CA SCREEN DOC REV: CPT | Performed by: FAMILY MEDICINE

## 2022-05-27 PROCEDURE — 1090F PRES/ABSN URINE INCON ASSESS: CPT | Performed by: FAMILY MEDICINE

## 2022-05-27 PROCEDURE — G8754 DIAS BP LESS 90: HCPCS | Performed by: FAMILY MEDICINE

## 2022-05-27 PROCEDURE — G8427 DOCREV CUR MEDS BY ELIG CLIN: HCPCS | Performed by: FAMILY MEDICINE

## 2022-05-27 PROCEDURE — G8536 NO DOC ELDER MAL SCRN: HCPCS | Performed by: FAMILY MEDICINE

## 2022-05-27 PROCEDURE — G8399 PT W/DXA RESULTS DOCUMENT: HCPCS | Performed by: FAMILY MEDICINE

## 2022-05-27 RX ORDER — TRAMADOL HYDROCHLORIDE 50 MG/1
50 TABLET ORAL
Qty: 28 TABLET | Refills: 0 | Status: SHIPPED | OUTPATIENT
Start: 2022-05-27 | End: 2022-07-14 | Stop reason: SDUPTHER

## 2022-05-27 RX ORDER — GABAPENTIN 300 MG/1
CAPSULE ORAL
Qty: 120 CAPSULE | Refills: 2 | Status: SHIPPED | OUTPATIENT
Start: 2022-05-27 | End: 2022-07-14 | Stop reason: SDUPTHER

## 2022-05-27 RX ORDER — CYANOCOBALAMIN 1000 UG/ML
1000 INJECTION, SOLUTION INTRAMUSCULAR; SUBCUTANEOUS ONCE
Qty: 1 ML | Refills: 0
Start: 2022-05-27 | End: 2022-05-27

## 2022-05-27 NOTE — PROGRESS NOTES
Cherly Dubin (: 1945) is a 78 y.o. female, established patient, here for evaluation of the following chief complaint(s):  Follow-up (2 week)       ASSESSMENT/PLAN: x-rays reassuring, to see podiatry soon. No fx or osteomyelitis. Still concerning hx of PAD and neuropathy with prev partial toe amputation. Reviewed other areas and to get new mammo, eye exam echo given glacuoma hx, and FIT for colon cancer screening. b12 today. Below is the assessment and plan developed based on review of pertinent history, physical exam, labs, studies, and medications. 1. Rheumatoid arthritis of multiple sites with negative rheumatoid factor (HCC)  -     traMADoL (ULTRAM) 50 mg tablet; Take 1 Tablet by mouth every six (6) hours as needed for Pain for up to 7 days. Max Daily Amount: 200 mg., Normal, Disp-28 Tablet, R-0  2. Lumbar radiculopathy  -     gabapentin (NEURONTIN) 300 mg capsule; TAKE 1 CAPSULE IN THE MORNING AND TAKE 3 CAPSULES BEFORE BEDTIME, Normal, Disp-120 Capsule, R-2  3. Right arm pain  -     traMADoL (ULTRAM) 50 mg tablet; Take 1 Tablet by mouth every six (6) hours as needed for Pain for up to 7 days. Max Daily Amount: 200 mg., Normal, Disp-28 Tablet, R-0  4. Stenosis of cervical spine  -     gabapentin (NEURONTIN) 300 mg capsule; TAKE 1 CAPSULE IN THE MORNING AND TAKE 3 CAPSULES BEFORE BEDTIME, Normal, Disp-120 Capsule, R-2  -     traMADoL (ULTRAM) 50 mg tablet; Take 1 Tablet by mouth every six (6) hours as needed for Pain for up to 7 days. Max Daily Amount: 200 mg., Normal, Disp-28 Tablet, R-0  5. Foraminal stenosis of cervical region  -     gabapentin (NEURONTIN) 300 mg capsule; TAKE 1 CAPSULE IN THE MORNING AND TAKE 3 CAPSULES BEFORE BEDTIME, Normal, Disp-120 Capsule, R-2  -     traMADoL (ULTRAM) 50 mg tablet; Take 1 Tablet by mouth every six (6) hours as needed for Pain for up to 7 days.  Max Daily Amount: 200 mg., Normal, Disp-28 Tablet, R-0  6. RLS (restless legs syndrome)  -     gabapentin (NEURONTIN) 300 mg capsule; TAKE 1 CAPSULE IN THE MORNING AND TAKE 3 CAPSULES BEFORE BEDTIME, Normal, Disp-120 Capsule, R-2  7. Colon cancer screening  -     OCCULT BLOOD IMMUNOASSAY,DIAGNOSTIC; Future  8. Encounter for screening mammogram for malignant neoplasm of breast  -     [facility] MAMMO BI SCREENING INCL CAD; Future  9. Glaucoma of both eyes, unspecified glaucoma type  -     REFERRAL TO OPHTHALMOLOGY  10. Proliferative diabetic retinopathy of both eyes associated with type 2 diabetes mellitus, macular edema presence unspecified ([address]  -     REFERRAL TO OPHTHALMOLOGY  11. Type 2 diabetes mellitus with diabetic neuropathy, without long-term current use of insulin (HCC)  -     REFERRAL TO OPHTHALMOLOGY  12. Type 2 diabetes mellitus with stage 3a chronic kidney disease, without long-term current use of insulin (HCC)  -     REFERRAL TO OPHTHALMOLOGY  13. Vitamin B12 deficiency  -     VITAMIN B12 INJECTION  -     THER/PROPH/DIAG INJECTION, SUBCUT/IM  -     cyanocobalamin (Vitamin B-12) 1,000 mcg/mL injection; 1 mL by IntraMUSCular route once for 1 dose., No Print, Disp-1 mL, R-0    Will again address care gaps at next appt. Would like to plan for Prevnar 20 and COVID booster in 2 weeks. Return in about 4 weeks (around 6/24/2022). SUBJECTIVE/OBJECTIVE:  Doing better today. Foot swelling. Concerns from [place]. Bumped lotion bottle 2-3 days. Seems like improving to pt. Feels like the toenail fell off. Hx of PAD and neuropathy. Has not seen her podiatrist in a few months. Prev seeing VEI for glaucoma so will need to see ophtho again. Does not recall the last time she had her eye pressure checked. Due for colon ca screening and mammo. Due for B12 injection for B12 deficiency today. Ongoing cervical pathology and better controlled with gabapentin. No sig SEs.     ROS  Gen - no fever/chills  Resp - no dyspnea or cough  CV - no chest pain or WORKMAN  Rest per HPI    Blood pressure 134/64, pulse 88, temperature 97 °F (36.1 °C), temperature source Temporal, resp. rate 18, height 5' 5\" (1.651 m), weight 168 lb (76.2 kg), SpO2 97 %. Physical Exam  General appearance - alert, well appearing, and in no distress  Eyes -sclera anicteric  Neck - supple, no significant adenopathy, no thyromegaly  Chest - clear to auscultation, no wheezes, rales or rhonchi, symmetric air entry  Heart - normal rate, regular rhythm, normal S1, S2, no murmurs, rubs, clicks or gallops  Neurological - alert, oriented, normal speech, no focal findings or movement disorder noted  Foot: right foot with 2nd toe swelling improving, hx of right great toe amputation    On this date 05/27/2022 I have spent 20 minutes reviewing previous notes, test results and face to face with the patient discussing the diagnosis and importance of compliance with the treatment plan as well as documenting on the day of the visit. An electronic signature was used to authenticate this note.   -- Jane Toscano MD

## 2022-05-27 NOTE — PROGRESS NOTES
Chief Complaint   Patient presents with    Follow-up     2 week   1. Have you been to the ER, urgent care clinic since your last visit? Hospitalized since your last visit? No    2. Have you seen or consulted any other health care providers outside of the [address] [place] [address] since your last visit? Include any pap smears or colon screening. No       She denies any symptoms , reactions or allergies that would exclude them from being immunized or B 12 injection  today   Risks and adverse reactions were discussed and the VIS was given to them. All questions were addressed. She was observed for 15 min post injection. There were no reactions observed.     Francisco Javier Davalos LPN

## 2022-07-14 ENCOUNTER — VIRTUAL VISIT (OUTPATIENT)
Dept: FAMILY MEDICINE CLINIC | Age: 77
End: 2022-07-14
Payer: MEDICARE

## 2022-07-14 DIAGNOSIS — I10 ESSENTIAL HYPERTENSION: ICD-10-CM

## 2022-07-14 DIAGNOSIS — Z95.820 STATUS POST ANGIOPLASTY WITH STENT: ICD-10-CM

## 2022-07-14 DIAGNOSIS — M48.02 FORAMINAL STENOSIS OF CERVICAL REGION: ICD-10-CM

## 2022-07-14 DIAGNOSIS — M06.09 RHEUMATOID ARTHRITIS OF MULTIPLE SITES WITH NEGATIVE RHEUMATOID FACTOR (HCC): ICD-10-CM

## 2022-07-14 DIAGNOSIS — F33.0 MILD EPISODE OF RECURRENT MAJOR DEPRESSIVE DISORDER (HCC): ICD-10-CM

## 2022-07-14 DIAGNOSIS — E78.2 MIXED HYPERLIPIDEMIA: ICD-10-CM

## 2022-07-14 DIAGNOSIS — G25.81 RLS (RESTLESS LEGS SYNDROME): ICD-10-CM

## 2022-07-14 DIAGNOSIS — M48.02 STENOSIS OF CERVICAL SPINE: ICD-10-CM

## 2022-07-14 DIAGNOSIS — E11.8 TYPE 2 DIABETES MELLITUS WITH COMPLICATION, WITHOUT LONG-TERM CURRENT USE OF INSULIN (HCC): Primary | ICD-10-CM

## 2022-07-14 DIAGNOSIS — H40.9 GLAUCOMA, UNSPECIFIED GLAUCOMA TYPE, UNSPECIFIED LATERALITY: ICD-10-CM

## 2022-07-14 DIAGNOSIS — I25.83 CORONARY ARTERY DISEASE DUE TO LIPID RICH PLAQUE: ICD-10-CM

## 2022-07-14 DIAGNOSIS — I25.10 CORONARY ARTERY DISEASE DUE TO LIPID RICH PLAQUE: ICD-10-CM

## 2022-07-14 DIAGNOSIS — M54.16 LUMBAR RADICULOPATHY: ICD-10-CM

## 2022-07-14 PROCEDURE — 99442 PR PHYS/QHP TELEPHONE EVALUATION 11-20 MIN: CPT | Performed by: FAMILY MEDICINE

## 2022-07-14 RX ORDER — TRAMADOL HYDROCHLORIDE 50 MG/1
50 TABLET ORAL
Qty: 28 TABLET | Refills: 0 | Status: SHIPPED | OUTPATIENT
Start: 2022-07-14 | End: 2022-07-21

## 2022-07-14 RX ORDER — SERTRALINE HYDROCHLORIDE 50 MG/1
50 TABLET, FILM COATED ORAL DAILY
Qty: 90 TABLET | Refills: 1 | Status: SHIPPED | OUTPATIENT
Start: 2022-07-14

## 2022-07-14 RX ORDER — SIMVASTATIN 20 MG/1
TABLET, FILM COATED ORAL
Qty: 90 TABLET | Refills: 1 | Status: SHIPPED | OUTPATIENT
Start: 2022-07-14

## 2022-07-14 RX ORDER — GABAPENTIN 300 MG/1
CAPSULE ORAL
Qty: 120 CAPSULE | Refills: 2 | Status: SHIPPED | OUTPATIENT
Start: 2022-07-14

## 2022-07-14 RX ORDER — METOPROLOL TARTRATE 25 MG/1
25 TABLET, FILM COATED ORAL 2 TIMES DAILY
Qty: 180 TABLET | Refills: 1 | Status: SHIPPED | OUTPATIENT
Start: 2022-07-14

## 2022-07-14 RX ORDER — METFORMIN HYDROCHLORIDE 500 MG/1
500 TABLET ORAL
Qty: 90 TABLET | Refills: 1 | Status: SHIPPED | OUTPATIENT
Start: 2022-07-14

## 2022-07-14 RX ORDER — LATANOPROST 50 UG/ML
SOLUTION/ DROPS OPHTHALMIC
Qty: 2.5 ML | Refills: 1 | Status: SHIPPED | OUTPATIENT
Start: 2022-07-14

## 2022-07-14 NOTE — PROGRESS NOTES
Sharee Oconnor (: 1945) is a 78 y.o. female, established patient, here for evaluation of the following chief complaint(s):   Arthritis (Follow-up)       ASSESSMENT/PLAN:  Below is the assessment and plan developed based on review of pertinent history, labs, studies, and medications. 1. Type 2 diabetes mellitus with complication, without long-term current use of insulin (HCC)  -     metFORMIN (GLUCOPHAGE) 500 mg tablet; Take 1 Tablet by mouth daily (with dinner). Decreased dose, Normal, Disp-90 Tablet, R-1  2. Essential hypertension  -     metoprolol tartrate (LOPRESSOR) 25 mg tablet; Take 1 Tablet by mouth two (2) times a day., Normal, Disp-180 Tablet, R-1  3. Coronary artery disease due to lipid rich plaque  -     simvastatin (ZOCOR) 20 mg tablet; TAKE BY MOUTH NIGHTLY., Normal, Disp-90 Tablet, R-1  4. Status post angioplasty with stent  -     simvastatin (ZOCOR) 20 mg tablet; TAKE BY MOUTH NIGHTLY., Normal, Disp-90 Tablet, R-1  5. Mixed hyperlipidemia  -     simvastatin (ZOCOR) 20 mg tablet; TAKE BY MOUTH NIGHTLY., Normal, Disp-90 Tablet, R-1  6. Lumbar radiculopathy  -     gabapentin (NEURONTIN) 300 mg capsule; TAKE 1 CAPSULE IN THE MORNING AND TAKE 3 CAPSULES BEFORE BEDTIME, Normal, Disp-120 Capsule, R-2  7. Stenosis of cervical spine  -     gabapentin (NEURONTIN) 300 mg capsule; TAKE 1 CAPSULE IN THE MORNING AND TAKE 3 CAPSULES BEFORE BEDTIME, Normal, Disp-120 Capsule, R-2  -     traMADoL (ULTRAM) 50 mg tablet; Take 1 Tablet by mouth every six (6) hours as needed for Pain for up to 7 days. Max Daily Amount: 200 mg., Normal, Disp-28 Tablet, R-0  8. Foraminal stenosis of cervical region  -     gabapentin (NEURONTIN) 300 mg capsule; TAKE 1 CAPSULE IN THE MORNING AND TAKE 3 CAPSULES BEFORE BEDTIME, Normal, Disp-120 Capsule, R-2  -     traMADoL (ULTRAM) 50 mg tablet; Take 1 Tablet by mouth every six (6) hours as needed for Pain for up to 7 days.  Max Daily Amount: 200 mg., Normal, Disp-28 Tablet, R-0  9. RLS (restless legs syndrome)  -     gabapentin (NEURONTIN) 300 mg capsule; TAKE 1 CAPSULE IN THE MORNING AND TAKE 3 CAPSULES BEFORE BEDTIME, Normal, Disp-120 Capsule, R-2  10. Mild episode of recurrent major depressive disorder (HCC)  -     sertraline (ZOLOFT) 50 mg tablet; Take 1 Tablet by mouth daily. , Normal, Disp-90 Tablet, R-1  11. Rheumatoid arthritis of multiple sites with negative rheumatoid factor (HCC)  -     traMADoL (ULTRAM) 50 mg tablet; Take 1 Tablet by mouth every six (6) hours as needed for Pain for up to 7 days. Max Daily Amount: 200 mg., Normal, Disp-28 Tablet, R-0  12. Glaucoma, unspecified glaucoma type, unspecified laterality  -     latanoprost (XALATAN) 0.005 % ophthalmic solution; INSTILL 1 DROP INTO BOTH EYES NIGHTLY - Please see your eye doctor for further refills. , Normal, Disp-2.5 mL, R-1      Return in about 7 weeks (around 9/1/2022). SUBJECTIVE/OBJECTIVE:  70-year-old AAF with PMH significant for HTN, DM 2, HLD, CAD s/p angioplasty with stent, RA, MGUS, asthma, peripheral neuropathy who presents for routine follow-up    Foot has improved. Toenail came off yesterday. Mildly tender. Seeing Podiatry. Diabetes Mellitus: Well controlled  Not exercising. Eats a fairly healthy diet. Non-smoker.     Lab Results   Component Value Date/Time    Hemoglobin A1c (POC) 5.4 09/24/2019 08:50 AM    Hemoglobin A1c (POC) 6.7 09/28/2018 10:30 AM    Hemoglobin A1c 5.0 03/29/2022 03:34 AM    Microalb/Creat ratio (ug/mg creat.) 74 (H) 09/01/2021 04:05 PM    LDL, calculated 50 12/10/2021 11:10 AM    LDL, calculated 36 08/06/2020 03:47 PM    Creatinine 1.02 04/01/2022 12:19 AM      Lab Results   Component Value Date/Time    GFR est AA >60 04/01/2022 12:19 AM    GFR est non-AA 53 (L) 04/01/2022 12:19 AM      Lab Results   Component Value Date/Time    TSH 1.48 03/28/2022 02:35 PM         Hyperlipidemia & HTN  Pt is doing well on current meds with no medication side effects noted  No new myalgias, no joint pains, no weakness  No TIA's, no chest pain on exertion, no dyspnea on exertion, no swelling of ankles. Lab Results   Component Value Date/Time    Cholesterol, total 148 12/10/2021 11:10 AM    HDL Cholesterol 85 12/10/2021 11:10 AM    LDL, calculated 50 12/10/2021 11:10 AM    LDL, calculated 36 08/06/2020 03:47 PM    Triglyceride 61 12/10/2021 11:10 AM     Prev seeing VEI for glaucoma and sent to ophtho again at last appt. Ongoing cervical pathology and better controlled with gabapentin. No sig SEs. ROS  Gen - no fever/chills  Resp - no dyspnea or cough  CV - no chest pain or WORKMAN  Rest per HPI    No data recorded     No exam d/t audio only    On this date 07/14/2022 I have spent 15 minutes reviewing previous notes, test results and face to face (virtual) with the patient discussing the diagnosis and importance of compliance with the treatment plan as well as documenting on the day of the visit. Twila Martin, was evaluated through a synchronous (real-time) audio-video encounter. The patient (or guardian if applicable) is aware that this is a billable service, which includes applicable co-pays. This Virtual Visit was conducted with patient's (and/or legal guardian's) consent. The visit was conducted pursuant to the emergency declaration under the [facility], [address] [place] [address] authority and the [employer] and [employer] General Act. Patient identification was verified, and a caregiver was present when appropriate. The patient was located at: Home: [address] [place] [phone]  The provider was located at: Facility (Appt Department): [address] [place] [phone]  [id number] [facility]       An electronic signature was used to authenticate this note.   -- Florinda Sanchez MD

## 2022-07-14 NOTE — PROGRESS NOTES
Chief Complaint   Patient presents with    Arthritis     Follow-up   1. Have you been to the ER, urgent care clinic since your last visit? Hospitalized since your last visit? No    2. Have you seen or consulted any other health care providers outside of the [address] [place] [address] since your last visit? Include any pap smears or colon screening.  No

## 2022-07-19 ENCOUNTER — TELEPHONE (OUTPATIENT)
Dept: PHARMACY | Age: 77
End: 2022-07-19

## 2022-07-19 NOTE — TELEPHONE ENCOUNTER
[facility] CLINICAL PHARMACY: ADHERENCE REVIEW  Identified care gap per [employer]: fills at [facility]: Diabetes and Statin adherence    Last Visit: 7/14/2022    Patient identified as LIS = 3, therefore patient's co-pays are $0.00 through all phases of the benefit regardless of the days' supply dispensed      Sharlene Lopez Records claims through 07/19/2022 (2021 [facility] = NA; YTD [facility] = Filled only once; Potential Fail Date: 08/13/2022): Metformin last filled on 07/15/2022 for 90 day supply. Next refill due: 10/13/2022    Per [address]:   Metformin last picked up on 07/15/2022 for 90 day supply. 1 refills remaining. Billed through [facility] Value Date/Time    Hemoglobin A1c 5.0 03/29/2022 03:34 AM    Hemoglobin A1c 4.6 03/04/2022 11:50 AM    Hemoglobin A1c 5.0 08/31/2021 03:53 PM    Hemoglobin A1c (POC) 5.4 09/24/2019 08:50 AM    Hemoglobin A1c (POC) 6.7 09/28/2018 10:30 AM     NOTE A1c <9%    [id number] Records claims through 07/19/2022 (2021 [facility] = NA; YTD [place] = 75%; Potential Fail Date: 08/13/2022): Simvastatin last filled on 05/10/2022 for 30 day supply. Next refill due: 06/09/2022 PAST DUE (39 days)        Per [facility]:   Simvastatin last picked up on 05/10/2022 for 30 day supply. 0 refills remaining.  Billed through [facility]  A new rx was sent on 7/14  Lab Results   Component Value Date/Time    Cholesterol, total 148 12/10/2021 11:10 AM    HDL Cholesterol 85 12/10/2021 11:10 AM    LDL, calculated 50 12/10/2021 11:10 AM    LDL, calculated 36 08/06/2020 03:47 PM    VLDL, calculated 13 12/10/2021 11:10 AM    VLDL, calculated 18 08/06/2020 03:47 PM    Triglyceride 61 12/10/2021 11:10 AM     ALT (SGPT)   Date Value Ref Range Status   03/24/2022 19 12 - 78 U/L Final     AST (SGOT)   Date Value Ref Range Status   03/24/2022 44 (H) 15 - 37 U/L Final     The 10-year ASCVD risk score (Salvatore Gamboa, et al., 2013) is: 29.9%    Values used to calculate the score:      Age: 78 years      Sex: Female      Is Non- : Yes      Diabetic: Yes      Tobacco smoker: No      Systolic Blood Pressure: 364 mmHg      Is BP treated: Yes      HDL Cholesterol: 85 mg/dL      Total Cholesterol: 148 mg/dL     PLAN  The following are interventions that have been identified:  - Patient overdue refilling Simvastatin and active on home medication list    Reached patient to review. Verified medication instructions         No future appointments.     Michele MoreD, [address] [facility]   Department, toll free: [phone] Option 2  ================================================================================  For Pharmacy Admin Tracking Only     CPA in place: No   Recommendation Provided To: Patient/Caregiver: 1 via Telephone and Pharmacy: 1   Intervention Detail: Adherence Monitorin   Gap Closed?: Yes   Intervention Accepted By: Patient/Caregiver: 1 and Pharmacy: 1   Time Spent (min): 20

## 2022-07-22 ENCOUNTER — PATIENT MESSAGE (OUTPATIENT)
Dept: FAMILY MEDICINE CLINIC | Age: 77
End: 2022-07-22

## 2022-11-09 ENCOUNTER — TELEPHONE (OUTPATIENT)
Dept: FAMILY MEDICINE CLINIC | Age: 77
End: 2022-11-09

## 2022-11-09 NOTE — TELEPHONE ENCOUNTER
----- Message from Deb Wilson sent at 11/8/2022 11:16 AM EST -----  Subject: Message to Provider    QUESTIONS  Information for Provider? Tika Sloan has a VV F/U appt on 11-21 @ 11 and   will need to have labs done prior, please contact with appt for those and   she would like to have a flu shot, B12 & Covid booster if avail as well.  ---------------------------------------------------------------------------  --------------  Juvenal JONES  [phone]; OK to leave message on voicemail  ---------------------------------------------------------------------------  --------------  SCRIPT ANSWERS  Relationship to Patient?  Self

## 2022-11-21 ENCOUNTER — VIRTUAL VISIT (OUTPATIENT)
Dept: FAMILY MEDICINE CLINIC | Age: 77
End: 2022-11-21
Payer: MEDICARE

## 2022-11-21 DIAGNOSIS — Z91.81 AT HIGH RISK FOR INJURY RELATED TO FALL: ICD-10-CM

## 2022-11-21 DIAGNOSIS — G25.81 RLS (RESTLESS LEGS SYNDROME): ICD-10-CM

## 2022-11-21 DIAGNOSIS — M06.09 RHEUMATOID ARTHRITIS OF MULTIPLE SITES WITH NEGATIVE RHEUMATOID FACTOR (HCC): ICD-10-CM

## 2022-11-21 DIAGNOSIS — M54.16 LUMBAR RADICULOPATHY: ICD-10-CM

## 2022-11-21 DIAGNOSIS — M48.02 FORAMINAL STENOSIS OF CERVICAL REGION: ICD-10-CM

## 2022-11-21 DIAGNOSIS — Z00.00 MEDICARE ANNUAL WELLNESS VISIT, SUBSEQUENT: Primary | ICD-10-CM

## 2022-11-21 DIAGNOSIS — H40.9 GLAUCOMA, UNSPECIFIED GLAUCOMA TYPE, UNSPECIFIED LATERALITY: ICD-10-CM

## 2022-11-21 DIAGNOSIS — M48.02 STENOSIS OF CERVICAL SPINE: ICD-10-CM

## 2022-11-21 PROCEDURE — 1123F ACP DISCUSS/DSCN MKR DOCD: CPT | Performed by: FAMILY MEDICINE

## 2022-11-21 PROCEDURE — G0439 PPPS, SUBSEQ VISIT: HCPCS | Performed by: FAMILY MEDICINE

## 2022-11-21 RX ORDER — LATANOPROST 50 UG/ML
SOLUTION/ DROPS OPHTHALMIC
Qty: 2.5 ML | Refills: 0 | Status: SHIPPED | OUTPATIENT
Start: 2022-11-21

## 2022-11-21 RX ORDER — TRAMADOL HYDROCHLORIDE 50 MG/1
50 TABLET ORAL
Qty: 28 TABLET | Refills: 0 | Status: SHIPPED | OUTPATIENT
Start: 2022-11-21 | End: 2022-11-28

## 2022-11-21 RX ORDER — PREGABALIN 75 MG/1
75 CAPSULE ORAL 2 TIMES DAILY
Qty: 60 CAPSULE | Refills: 0 | Status: SHIPPED | OUTPATIENT
Start: 2022-11-21

## 2022-11-21 NOTE — PATIENT INSTRUCTIONS
Medicare Wellness Visit, Female     The best way to live healthy is to have a lifestyle where you eat a well-balanced diet, exercise regularly, limit alcohol use, and quit all forms of tobacco/nicotine, if applicable. Regular preventive services are another way to keep healthy. Preventive services (vaccines, screening tests, monitoring & exams) can help personalize your care plan, which helps you manage your own care. Screening tests can find health problems at the earliest stages, when they are easiest to treat. [facility] follows the current, evidence-based guidelines published by the [facility] Azeem Story ([facility]) when recommending preventive services for our patients. Because we follow these guidelines, sometimes recommendations change over time as research supports it. (For example, mammograms used to be recommended annually. Even though Medicare will still pay for an annual mammogram, the newer guidelines recommend a mammogram every two years for women of average risk). Of course, you and your doctor may decide to screen more often for some diseases, based on your risk and your co-morbidities (chronic disease you are already diagnosed with). Preventive services for you include:  - Medicare offers their members a free annual wellness visit, which is time for you and your primary care provider to discuss and plan for your preventive service needs. Take advantage of this benefit every year!  -All adults over the age of 72 should receive the recommended pneumonia vaccines. Current USPSTF guidelines recommend a series of two vaccines for the best pneumonia protection.   -All adults should have a flu vaccine yearly and a tetanus vaccine every 10 years.   -All adults age 48 and older should receive the shingles vaccines (series of two vaccines).       -All adults age 38-68 who are overweight should have a diabetes screening test once every three years.   -All adults born between [birth year] and 1965 should be screened once for Hepatitis C.  -Other screening tests and preventive services for persons with diabetes include: an eye exam to screen for diabetic retinopathy, a kidney function test, a foot exam, and stricter control over your cholesterol.   -Cardiovascular screening for adults with routine risk involves an electrocardiogram (ECG) at intervals determined by your doctor.   -Colorectal cancer screenings should be done for adults age 54-65 with no increased risk factors for colorectal cancer. There are a number of acceptable methods of screening for this type of cancer. Each test has its own benefits and drawbacks. Discuss with your doctor what is most appropriate for you during your annual wellness visit. The different tests include: colonoscopy (considered the best screening method), a fecal occult blood test, a fecal DNA test, and sigmoidoscopy.    -A bone mass density test is recommended when a woman turns 65 to screen for osteoporosis. This test is only recommended one time, as a screening. Some providers will use this same test as a disease monitoring tool if you already have osteoporosis. -Breast cancer screenings are recommended every other year for women of normal risk, age 54-69.  -Cervical cancer screenings for women over age 72 are only recommended with certain risk factors.      Here is a list of your current Health Maintenance items (your personalized list of preventive services) with a due date:  Health Maintenance Due   Topic Date Due    Shingles Vaccine (1 of 2) Never done    Colorectal Screening  Never done    Diabetic Foot Care  01/17/2020    Eye Exam  07/21/2021    Yearly Flu Vaccine (1) 08/01/2022    Annual Well Visit  08/28/2022    Albumin Urine Test  09/01/2022    Hemoglobin A1C    09/29/2022

## 2022-11-21 NOTE — PROGRESS NOTES
This is the Subsequent Medicare Annual Wellness Exam, performed 12 months or more after the Initial AWV or the last Subsequent AWV    I have reviewed the patient's medical history in detail and updated the computerized patient record. Assessment/Plan - awaiting new labs, trial with lyrica. Working with Podiatry on toe pain (reviewed prev x-rays from 6 months ago with no concerns). Seeing Retinal specialist - to start getting latanoprost from ophtho. Ordering HH Aid to help with IADLs. Education and counseling provided:  Are appropriate based on today's review and evaluation    1. Medicare annual wellness visit, subsequent  2. RLS (restless legs syndrome)  3. Lumbar radiculopathy  -     pregabalin (LYRICA) 75 mg capsule; Take 1 Capsule by mouth two (2) times a day. Max Daily Amount: 150 mg., Normal, Disp-60 Capsule, R-0  -     REFERRAL TO HOME HEALTH  4. Stenosis of cervical spine  -     traMADoL (ULTRAM) 50 mg tablet; Take 1 Tablet by mouth every six (6) hours as needed for Pain for up to 7 days. Max Daily Amount: 200 mg., Normal, Disp-28 Tablet, R-0  -     pregabalin (LYRICA) 75 mg capsule; Take 1 Capsule by mouth two (2) times a day. Max Daily Amount: 150 mg., Normal, Disp-60 Capsule, R-0  -     REFERRAL TO HOME HEALTH  5. Foraminal stenosis of cervical region  -     traMADoL (ULTRAM) 50 mg tablet; Take 1 Tablet by mouth every six (6) hours as needed for Pain for up to 7 days. Max Daily Amount: 200 mg., Normal, Disp-28 Tablet, R-0  -     pregabalin (LYRICA) 75 mg capsule; Take 1 Capsule by mouth two (2) times a day. Max Daily Amount: 150 mg., Normal, Disp-60 Capsule, R-0  -     REFERRAL TO HOME HEALTH  6. Rheumatoid arthritis of multiple sites with negative rheumatoid factor (HCC)  -     traMADoL (ULTRAM) 50 mg tablet; Take 1 Tablet by mouth every six (6) hours as needed for Pain for up to 7 days.  Max Daily Amount: 200 mg., Normal, Disp-28 Tablet, R-0  -     REFERRAL TO HOME HEALTH  7. Glaucoma, unspecified glaucoma type, unspecified laterality  -     latanoprost (XALATAN) 0.005 % ophthalmic solution; INSTILL 1 DROP INTO BOTH EYES NIGHTLY - Please see your eye doctor for further refills. , Normal, Disp-2.5 mL, R-0  8. At high risk for injury related to fall  -     REFERRAL TO HOME HEALTH     Depression Risk Factor Screening:     3 most recent PHQ Screens 5/27/2022   Little interest or pleasure in doing things Not at all   Feeling down, depressed, irritable, or hopeless Not at all   Total Score PHQ 2 0   Trouble falling or staying asleep, or sleeping too much Not at all   Feeling tired or having little energy Not at all   Poor appetite, weight loss, or overeating Not at all   Feeling bad about yourself - or that you are a failure or have let yourself or your family down Not at all   Trouble concentrating on things such as school, work, reading, or watching TV Not at all   Moving or speaking so slowly that other people could have noticed; or the opposite being so fidgety that others notice Not at all   Thoughts of being better off dead, or hurting yourself in some way Not at all   PHQ 9 Score 0   How difficult have these problems made it for you to do your work, take care of your home and get along with others Not difficult at all       Alcohol & Drug Abuse Risk Screen    Do you average more than 1 drink per night or more than 7 drinks a week:  No    On any one occasion in the past three months have you have had more than 3 drinks containing alcohol:  No       Opioid Risk: (Low risk score <55, High risk score ?55)  Opioid risk score: 13      Click here to complete the Controlled Substance Monitoring SmartForm    Last PDMP Virgilio as Reviewed:  Review User Review Instant Review Result          Functional Ability and Level of Safety:    Hearing: Hearing is good. The patient wears hearing aids. Activities of Daily Living: The home contains: no safety equipment.   Patient needs help with:  transportation, shopping, preparing meals, laundry, and housework      Ambulation: with difficulty, uses a walker     Fall Risk:  Fall Risk Assessment, last 12 mths 4/28/2022   Able to walk? Yes   Fall in past 12 months? 1   Do you feel unsteady? 0   Are you worried about falling 0   Is TUG test greater than 12 seconds? 0   Is the gait abnormal? 0   Number of falls in past 12 months 1   Fall with injury?  1      Abuse Screen:  Patient is not abused       Cognitive Screening    Has your family/caregiver stated any concerns about your memory: no    Cognitive Screening: Normal - Mini Cog Test, Verbal Fluency Test    Health Maintenance Due     Health Maintenance Due   Topic Date Due    Shingrix Vaccine Age 49> (1 of 2) Never done    Colorectal Cancer Screening Combo  Never done    Foot Exam Q1  01/17/2020    Eye Exam Retinal or Dilated  07/21/2021    Flu Vaccine (1) 08/01/2022    MICROALBUMIN Q1  09/01/2022    A1C test (Diabetic or Prediabetic)  09/29/2022       Patient Care Team   Patient Care Team:  Mandeep Marcial MD as PCP - General (Family Medicine)  Mandeep Marcial MD as PCP - [facility] Empaneled Provider  Yaquelin Mason MD (Rheumatology Internal Medicine)  Phuong Okeefe DPM (Podiatry)  Sejal Beauchamp DO (Orthopedic Surgery)  Dar Hodgson DO (Neurology)    History     Patient Active Problem List   Diagnosis Code    Long-term use of immunosuppressant medication Z79.60    Osteopenia of multiple sites M85.89    Vitamin D deficiency E55.9    Rheumatoid arthritis involving multiple sites [facility]) M06.9    Essential hypertension I10    Mixed hyperlipidemia E78.2    RLS (restless legs syndrome) G25.81    Mild intermittent asthma J45.20    Status post angioplasty with stent Z95.820    Coronary artery disease due to lipid rich plaque I25.10, I25.83    Seronegative rheumatoid arthritis of multiple sites ([facility] M06.09    Severe obesity (HCC) E66.01    MGUS (monoclonal gammopathy of unknown significance) D47.2    Type 2 diabetes mellitus without complication ([facility]) I74.3    Complication of internal right knee prosthesis ([facility] T84. 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    Ambulatory dysfunction R26.2    Type 2 diabetes mellitus with chronic kidney disease ([facility] E11.22    Type 2 diabetes mellitus with diabetic neuropathy E11.40     Past Medical History:   Diagnosis Date    Asthma     Chronic pain 5/8/2020    DDD (degenerative disc disease), lumbar     Diabetes ([facility]     Gastritis     GERD (gastroesophageal reflux disease)     Glaucoma     Hearing loss 5/8/2020    Hiatal hernia     High cholesterol     Hypertension     Hypocalcemia 5/8/2020    Peripheral vascular disease (HCC)     RA (rheumatoid arthritis) ([facility]     Sarcoidosis 1999    MCV    Sleep apnea     has not used cpap in years since weight loss      Past Surgical History:   Procedure Laterality Date    HX GASTRIC BYPASS      HX HEART CATHETERIZATION      s/p PCI with stenting in 1998     HX KNEE REPLACEMENT Bilateral     HX ORTHOPAEDIC Bilateral     carpal tunnel surgery     HX SHOULDER REPLACEMENT Right     x 2      Current Outpatient Medications   Medication Sig Dispense Refill    traMADoL (ULTRAM) 50 mg tablet Take 1 Tablet by mouth every six (6) hours as needed for Pain for up to 7 days. Max Daily Amount: 200 mg. 28 Tablet 0    latanoprost (XALATAN) 0.005 % ophthalmic solution INSTILL 1 DROP INTO BOTH EYES NIGHTLY - Please see your eye doctor for further refills. 2.5 mL 0    pregabalin (LYRICA) 75 mg capsule Take 1 Capsule by mouth two (2) times a day. Max Daily Amount: 150 mg. 60 Capsule 0    gabapentin (NEURONTIN) 300 mg capsule TAKE 1 CAPSULE IN THE MORNING AND TAKE 3 CAPSULES BEFORE BEDTIME 120 Capsule 2    simvastatin (ZOCOR) 20 mg tablet TAKE BY MOUTH NIGHTLY. 90 Tablet 1    sertraline (ZOLOFT) 50 mg tablet Take 1 Tablet by mouth daily. 90 Tablet 1    metoprolol tartrate (LOPRESSOR) 25 mg tablet Take 1 Tablet by mouth two (2) times a day.  180 Tablet 1    metFORMIN (GLUCOPHAGE) 500 mg tablet Take 1 Tablet by mouth daily (with dinner). Decreased dose 90 Tablet 1    albuterol sulfate (PROVENTIL;VENTOLIN) 2.5 mg/0.5 mL nebu nebulizer solution USE 1 VIAL IN NEBULIZER EVERY 4 HOURS AS NEEDED FOR WHEEZING 30 Nebule 2    omeprazole (PRILOSEC) 20 mg capsule Take 1 Capsule by mouth daily. 90 Capsule 1    ergocalciferol (ERGOCALCIFEROL) 1,250 mcg (50,000 unit) capsule Take 1 Capsule by mouth every seven (7) days. Indications: vitamin D deficiency (high dose therapy) (Patient taking differently: Take 50,000 Units by mouth every seven (7) days. saturdays  Indications: vitamin D deficiency (high dose therapy)) 12 Capsule 3    fluticasone furoate-vilanteroL (Breo Ellipta) 100-25 mcg/dose inhaler TAKE 1 PUFF BY MOUTH EVERY DAY (Patient taking differently: Take 1 Puff by inhalation daily. TAKE 1 PUFF BY MOUTH EVERY DAY) 1 Each 3    leflunomide (ARAVA) 20 mg tablet TAKE 1 TABLET BY MOUTH EVERY DAY (Patient taking differently: Take 20 mg by mouth daily. TAKE 1 TABLET BY MOUTH EVERY DAY) 90 Tablet 1    nortriptyline (PAMELOR) 50 mg capsule Take 50 mg by mouth nightly. Allergies   Allergen Reactions    Lisinopril Rash    Methotrexate Hives       Family History   Problem Relation Age of Onset    Heart Disease Mother     Liver Disease Father     Alcohol abuse Brother     Dementia Neg Hx     Cancer Neg Hx     Seizures Neg Hx      Social History     Tobacco Use    Smoking status: Never    Smokeless tobacco: Never   Substance Use Topics    Alcohol use: Yes     Alcohol/week: 2.0 standard drinks     Types: 1 Glasses of wine, 1 Shots of liquor per week     Comment: 2-3 yearly       Samanta Will, was evaluated through a synchronous (real-time) audio-video encounter. The patient (or guardian if applicable) is aware that this is a billable service, which includes applicable co-pays. This Virtual Visit was conducted with patient's (and/or legal guardian's) consent.  The visit was conducted pursuant to the emergency declaration under the [facility], [address] [place] [address] authority and the [employer] General Act. Patient identification was verified, and a caregiver was present when appropriate.   The patient was located at: Home: [address] [address]  The provider was located at: Home: [unfilled]       Lizet Forrest MD

## 2022-12-05 ENCOUNTER — HOSPITAL ENCOUNTER (OUTPATIENT)
Dept: MAMMOGRAPHY | Age: 77
Discharge: HOME | End: 2022-12-05
Attending: FAMILY MEDICINE
Payer: MEDICARE

## 2022-12-05 DIAGNOSIS — Z12.31 ENCOUNTER FOR SCREENING MAMMOGRAM FOR MALIGNANT NEOPLASM OF BREAST: ICD-10-CM

## 2022-12-05 PROCEDURE — 77063 BREAST TOMOSYNTHESIS BI: CPT

## 2023-01-07 DIAGNOSIS — E11.8 TYPE 2 DIABETES MELLITUS WITH COMPLICATION, WITHOUT LONG-TERM CURRENT USE OF INSULIN (HCC): ICD-10-CM

## 2023-01-09 RX ORDER — METFORMIN HYDROCHLORIDE 500 MG/1
TABLET ORAL
Qty: 90 TABLET | Refills: 0 | Status: SHIPPED | OUTPATIENT
Start: 2023-01-09 | End: 2023-01-12

## 2023-01-11 DIAGNOSIS — E11.8 TYPE 2 DIABETES MELLITUS WITH COMPLICATION, WITHOUT LONG-TERM CURRENT USE OF INSULIN (HCC): ICD-10-CM

## 2023-01-12 RX ORDER — METFORMIN HYDROCHLORIDE 500 MG/1
TABLET ORAL
Qty: 90 TABLET | Refills: 0 | Status: SHIPPED | OUTPATIENT
Start: 2023-01-12

## 2023-03-03 ENCOUNTER — NURSE TRIAGE (OUTPATIENT)
Dept: OTHER | Facility: CLINIC | Age: 78
End: 2023-03-03

## 2023-03-03 NOTE — TELEPHONE ENCOUNTER
Location of patient: [place]    Received call from Asia felix at [facility] with [employer]. Subjective: Caller states \"numbness in feet, needs refills\"     Current Symptoms: see above, numbness is in both feet, more in right. Does have DM and neuropathy    Onset: 2-3 weeks ago or more; worsening    Associated Symptoms: NA    Pain Severity: 7-8/10; numbing; constant    Temperature: n/a     What has been tried: nothing    LMP: NA Pregnant: NA    Recommended disposition: See PCP within 3 Days    Care advice provided, patient verbalizes understanding; denies any other questions or concerns; instructed to call back for any new or worsening symptoms. Patient/Caller agrees with recommended disposition; writer provided warm transfer to Marina at [facility] for appointment scheduling    Attention Provider: Thank you for allowing me to participate in the care of your patient. The patient was connected to triage in response to information provided to the ECC. Please do not respond through this encounter as the response is not directed to a shared pool.     Reason for Disposition   Numbness or tingling in one or both feet is a chronic symptom (recurrent or ongoing problem lasting > 4 weeks)    Protocols used: Neurologic Deficit-ADULT-[place]

## 2023-03-06 ENCOUNTER — OFFICE VISIT (OUTPATIENT)
Dept: FAMILY MEDICINE CLINIC | Age: 78
End: 2023-03-06
Payer: MEDICARE

## 2023-03-06 VITALS
HEART RATE: 88 BPM | BODY MASS INDEX: 29.39 KG/M2 | WEIGHT: 176.4 LBS | RESPIRATION RATE: 16 BRPM | SYSTOLIC BLOOD PRESSURE: 142 MMHG | HEIGHT: 65 IN | TEMPERATURE: 97 F | DIASTOLIC BLOOD PRESSURE: 69 MMHG

## 2023-03-06 DIAGNOSIS — I25.83 CORONARY ARTERY DISEASE DUE TO LIPID RICH PLAQUE: ICD-10-CM

## 2023-03-06 DIAGNOSIS — I10 ESSENTIAL HYPERTENSION: ICD-10-CM

## 2023-03-06 DIAGNOSIS — I73.9 PAD (PERIPHERAL ARTERY DISEASE) (HCC): ICD-10-CM

## 2023-03-06 DIAGNOSIS — M06.09 RHEUMATOID ARTHRITIS OF MULTIPLE SITES WITH NEGATIVE RHEUMATOID FACTOR (HCC): Primary | ICD-10-CM

## 2023-03-06 DIAGNOSIS — E11.22 TYPE 2 DIABETES MELLITUS WITH STAGE 3A CHRONIC KIDNEY DISEASE, WITHOUT LONG-TERM CURRENT USE OF INSULIN (HCC): ICD-10-CM

## 2023-03-06 DIAGNOSIS — F33.0 MILD EPISODE OF RECURRENT MAJOR DEPRESSIVE DISORDER (HCC): ICD-10-CM

## 2023-03-06 DIAGNOSIS — M48.02 FORAMINAL STENOSIS OF CERVICAL REGION: ICD-10-CM

## 2023-03-06 DIAGNOSIS — Z95.820 STATUS POST ANGIOPLASTY WITH STENT: ICD-10-CM

## 2023-03-06 DIAGNOSIS — M54.16 LUMBAR RADICULOPATHY: ICD-10-CM

## 2023-03-06 DIAGNOSIS — N18.31 TYPE 2 DIABETES MELLITUS WITH STAGE 3A CHRONIC KIDNEY DISEASE, WITHOUT LONG-TERM CURRENT USE OF INSULIN (HCC): ICD-10-CM

## 2023-03-06 DIAGNOSIS — Z91.81 AT HIGH RISK FOR FALLS: ICD-10-CM

## 2023-03-06 DIAGNOSIS — J45.20 MILD INTERMITTENT ASTHMA, UNSPECIFIED WHETHER COMPLICATED: ICD-10-CM

## 2023-03-06 DIAGNOSIS — Z79.899 ENCOUNTER FOR LONG-TERM (CURRENT) USE OF HIGH-RISK MEDICATION: ICD-10-CM

## 2023-03-06 DIAGNOSIS — M48.02 STENOSIS OF CERVICAL SPINE: ICD-10-CM

## 2023-03-06 DIAGNOSIS — E78.2 MIXED HYPERLIPIDEMIA: ICD-10-CM

## 2023-03-06 DIAGNOSIS — E11.3593 PROLIFERATIVE DIABETIC RETINOPATHY OF BOTH EYES ASSOCIATED WITH TYPE 2 DIABETES MELLITUS, MACULAR EDEMA PRESENCE UNSPECIFIED (HCC): ICD-10-CM

## 2023-03-06 DIAGNOSIS — M06.09 SERONEGATIVE RHEUMATOID ARTHRITIS OF MULTIPLE SITES (HCC): ICD-10-CM

## 2023-03-06 DIAGNOSIS — I25.10 CORONARY ARTERY DISEASE DUE TO LIPID RICH PLAQUE: ICD-10-CM

## 2023-03-06 DIAGNOSIS — H61.22 IMPACTED CERUMEN OF LEFT EAR: ICD-10-CM

## 2023-03-06 DIAGNOSIS — Z87.19 HISTORY OF GASTRITIS: ICD-10-CM

## 2023-03-06 DIAGNOSIS — D84.9 IMMUNODEFICIENCY, UNSPECIFIED (HCC): ICD-10-CM

## 2023-03-06 DIAGNOSIS — E55.9 VITAMIN D DEFICIENCY: ICD-10-CM

## 2023-03-06 DIAGNOSIS — D86.0 SARCOIDOSIS OF LUNG (HCC): ICD-10-CM

## 2023-03-06 DIAGNOSIS — E53.8 VITAMIN B12 DEFICIENCY: ICD-10-CM

## 2023-03-06 PROBLEM — L97.519 ULCER OF RIGHT FOOT, UNSPECIFIED ULCER STAGE (HCC): Status: ACTIVE | Noted: 2023-03-06

## 2023-03-06 PROCEDURE — 3046F HEMOGLOBIN A1C LEVEL >9.0%: CPT | Performed by: FAMILY MEDICINE

## 2023-03-06 PROCEDURE — 1101F PT FALLS ASSESS-DOCD LE1/YR: CPT | Performed by: FAMILY MEDICINE

## 2023-03-06 PROCEDURE — 2022F DILAT RTA XM EVC RTNOPTHY: CPT | Performed by: FAMILY MEDICINE

## 2023-03-06 PROCEDURE — 3077F SYST BP >= 140 MM HG: CPT | Performed by: FAMILY MEDICINE

## 2023-03-06 PROCEDURE — G8510 SCR DEP NEG, NO PLAN REQD: HCPCS | Performed by: FAMILY MEDICINE

## 2023-03-06 PROCEDURE — G8536 NO DOC ELDER MAL SCRN: HCPCS | Performed by: FAMILY MEDICINE

## 2023-03-06 PROCEDURE — 1090F PRES/ABSN URINE INCON ASSESS: CPT | Performed by: FAMILY MEDICINE

## 2023-03-06 PROCEDURE — G8427 DOCREV CUR MEDS BY ELIG CLIN: HCPCS | Performed by: FAMILY MEDICINE

## 2023-03-06 PROCEDURE — 99215 OFFICE O/P EST HI 40 MIN: CPT | Performed by: FAMILY MEDICINE

## 2023-03-06 PROCEDURE — 3078F DIAST BP <80 MM HG: CPT | Performed by: FAMILY MEDICINE

## 2023-03-06 PROCEDURE — 1123F ACP DISCUSS/DSCN MKR DOCD: CPT | Performed by: FAMILY MEDICINE

## 2023-03-06 PROCEDURE — G8417 CALC BMI ABV UP PARAM F/U: HCPCS | Performed by: FAMILY MEDICINE

## 2023-03-06 PROCEDURE — G8399 PT W/DXA RESULTS DOCUMENT: HCPCS | Performed by: FAMILY MEDICINE

## 2023-03-06 PROCEDURE — 3017F COLORECTAL CA SCREEN DOC REV: CPT | Performed by: FAMILY MEDICINE

## 2023-03-06 PROCEDURE — 96372 THER/PROPH/DIAG INJ SC/IM: CPT | Performed by: FAMILY MEDICINE

## 2023-03-06 RX ORDER — PREDNISONE 20 MG/1
TABLET ORAL
Qty: 13 TABLET | Refills: 0 | Status: SHIPPED | OUTPATIENT
Start: 2023-03-06

## 2023-03-06 RX ORDER — OMEPRAZOLE 20 MG/1
20 CAPSULE, DELAYED RELEASE ORAL DAILY
Qty: 90 CAPSULE | Refills: 1 | Status: SHIPPED | OUTPATIENT
Start: 2023-03-06

## 2023-03-06 RX ORDER — TOFACITINIB 11 MG/1
TABLET, FILM COATED, EXTENDED RELEASE ORAL
Qty: 90 TABLET | Refills: 1 | Status: SHIPPED | OUTPATIENT
Start: 2023-03-06

## 2023-03-06 RX ORDER — FLUTICASONE FUROATE AND VILANTEROL 100; 25 UG/1; UG/1
POWDER RESPIRATORY (INHALATION)
Qty: 1 EACH | Refills: 3 | Status: SHIPPED | OUTPATIENT
Start: 2023-03-06

## 2023-03-06 RX ORDER — SIMVASTATIN 20 MG/1
TABLET, FILM COATED ORAL
Qty: 90 TABLET | Refills: 1 | Status: SHIPPED | OUTPATIENT
Start: 2023-03-06

## 2023-03-06 RX ORDER — SERTRALINE HYDROCHLORIDE 50 MG/1
50 TABLET, FILM COATED ORAL DAILY
Qty: 90 TABLET | Refills: 1 | Status: SHIPPED | OUTPATIENT
Start: 2023-03-06

## 2023-03-06 RX ORDER — CYANOCOBALAMIN 1000 UG/ML
1000 INJECTION, SOLUTION INTRAMUSCULAR; SUBCUTANEOUS ONCE
Status: COMPLETED | OUTPATIENT
Start: 2023-03-06 | End: 2023-03-06

## 2023-03-06 RX ORDER — METOPROLOL TARTRATE 25 MG/1
25 TABLET, FILM COATED ORAL 2 TIMES DAILY
Qty: 180 TABLET | Refills: 1 | Status: SHIPPED | OUTPATIENT
Start: 2023-03-06

## 2023-03-06 RX ORDER — METFORMIN HYDROCHLORIDE 500 MG/1
TABLET ORAL
Qty: 90 TABLET | Refills: 1 | Status: SHIPPED | OUTPATIENT
Start: 2023-03-06

## 2023-03-06 RX ORDER — LANOLIN ALCOHOL/MO/W.PET/CERES
500 CREAM (GRAM) TOPICAL DAILY
Qty: 90 TABLET | Refills: 4 | Status: SHIPPED | OUTPATIENT
Start: 2023-03-06

## 2023-03-06 RX ORDER — LEFLUNOMIDE 20 MG/1
TABLET ORAL
Qty: 90 TABLET | Refills: 1 | Status: SHIPPED | OUTPATIENT
Start: 2023-03-06

## 2023-03-06 RX ORDER — PREGABALIN 75 MG/1
75 CAPSULE ORAL 2 TIMES DAILY
Qty: 60 CAPSULE | Refills: 5 | Status: SHIPPED | OUTPATIENT
Start: 2023-03-06

## 2023-03-06 RX ORDER — ERGOCALCIFEROL 1.25 MG/1
50000 CAPSULE ORAL
Qty: 12 CAPSULE | Refills: 3 | Status: SHIPPED | OUTPATIENT
Start: 2023-03-06

## 2023-03-06 RX ADMIN — CYANOCOBALAMIN 1000 MCG: 1000 INJECTION, SOLUTION INTRAMUSCULAR; SUBCUTANEOUS at 17:26

## 2023-03-06 NOTE — PROGRESS NOTES
Lory Sultana (: 1945) is a 78 y.o. female, established patient, here for evaluation of the following chief complaint(s):  Numbness (Right leg worse than left)       ASSESSMENT/PLAN:   Below is the assessment and plan developed based on review of pertinent history, physical exam, labs, studies, and medications. Diagnoses and all orders for this visit:    1. Rheumatoid arthritis of multiple sites with negative rheumatoid factor ([facility]  2. Seronegative rheumatoid arthritis of multiple sites ([facility]  -Restarting leflunomide and continue on Elise Scheuermann until able to establish with a new rheumatologist.  [facility] labs  -     ergocalciferol (ERGOCALCIFEROL) 1,250 mcg (50,000 unit) capsule; Take 1 Capsule by mouth every seven (7) days.   Indications: vitamin D deficiency (high dose therapy)  -     HEMOGLOBIN A1C WITH EAG; Future  -     LIPID PANEL; Future  -     METABOLIC PANEL, COMPREHENSIVE; Future  -     TSH 3RD GENERATION; Future  -     CBC W/O DIFF; Future  -     URINALYSIS W/ RFLX MICROSCOPIC; Future  -     leflunomide (ARAVA) 20 mg tablet; TAKE 1 TABLET BY MOUTH EVERY DAY  -     tofacitinib (Xeljanz XR) 11 mg Tb24; TAKE 1 TABLET BY MOUTH ONE TIME A DAY  -     REFERRAL TO RHEUMATOLOGY    3. Vitamin B12 deficiency-B12 injection today and planning for oral B12 given symptoms and history of B12 deficiency. She does have difficulty with getting rides to the clinic so we will try to take get 7 injections over the next few days but largely will transition to p.o. B12  -     VITAMIN B12 & FOLATE; Future  -     cyanocobalamin (VITAMIN B12) 500 mcg tablet; Take 1 Tablet by mouth daily. -     cyanocobalamin (VITAMIN B12) injection 1,000 mcg    4. Essential hypertension-BP running slightly high today. Refilling medication and to recheck in 4 weeks  -     metoprolol tartrate (LOPRESSOR) 25 mg tablet; Take 1 Tablet by mouth two (2) times a day. -     METABOLIC PANEL, COMPREHENSIVE; Future    5.  Type 2 diabetes mellitus with stage 3a chronic kidney disease, without long-term current use of insulin (HCC)-checking labs, has been well controlled  -     simvastatin (ZOCOR) 20 mg tablet; TAKE BY MOUTH NIGHTLY. -     HEMOGLOBIN A1C WITH EAG; Future  -     LIPID PANEL; Future  -     METABOLIC PANEL, COMPREHENSIVE; Future  -     TSH 3RD GENERATION; Future    6. Proliferative diabetic retinopathy of both eyes associated with type 2 diabetes mellitus, macular edema presence unspecified (HCC)-DM has been well controlled and following with ophthalmology for retinopathy    7. Coronary artery disease due to lipid rich plaque  8. Status post angioplasty with stent  9. Mixed hyperlipidemia  - ct zocor, no recent cardiology notes so will discuss with pt at next appt  -     simvastatin (ZOCOR) 20 mg tablet; TAKE BY MOUTH NIGHTLY. -     HEMOGLOBIN A1C WITH EAG; Future  -     LIPID PANEL; Future  -     METABOLIC PANEL, COMPREHENSIVE; Future  -     TSH 3RD GENERATION; Future    10. Mild episode of recurrent major depressive disorder (HCC) - stable  -     sertraline (ZOLOFT) 50 mg tablet; Take 1 Tablet by mouth daily. 11. Lumbar radiculopathy  12. Stenosis of cervical spine  13. Foraminal stenosis of cervical region   - Lyrica sig helping with pain control. Prednisone for flaring acutely. -     pregabalin (LYRICA) 75 mg capsule; Take 1 Capsule by mouth two (2) times a day. Max Daily Amount: 150 mg.  -     predniSONE (DELTASONE) 20 mg tablet; Take 3 pills for 2 days, then 2 pills for 2 days, then 1 pill for 3 days    14. History of gastritis - ct PPI  -     omeprazole (PRILOSEC) 20 mg capsule; Take 1 Capsule by mouth daily.  -     CBC W/O DIFF; Future    15. At high risk for falls-ordering new rollator to help prevent falls    16.  Mild intermittent asthma, unspecified whether complicated  -     fluticasone furoate-vilanteroL (Breo Ellipta) 100-25 mcg/dose inhaler; TAKE 1 PUFF BY MOUTH EVERY DAY    17. Vitamin D deficiency  - ergocalciferol (ERGOCALCIFEROL) 1,250 mcg (50,000 unit) capsule; Take 1 Capsule by mouth every seven (7) days. saturdays  Indications: vitamin D deficiency (high dose therapy)  -     VITAMIN D, 25 HYDROXY; Future    18. Sarcoidosis of lung (HCC)-stable  -     ergocalciferol (ERGOCALCIFEROL) 1,250 mcg (50,000 unit) capsule; Take 1 Capsule by mouth every seven (7) days. saturdays  Indications: vitamin D deficiency (high dose therapy)    19. Immunodeficiency, unspecified ([facility]    20. PAD (peripheral artery disease) (HCC)-stable, continues on Zocor. To discuss starting back on aspirin but does have fall risk so we will need to use caution  -     HEMOGLOBIN A1C WITH EAG; Future    21. Impacted cerumen of left ear  -     carbamide peroxide (DEBROX) 6.5 % otic solution; Administer 5 Drops into each ear two (2) times a day. 25. Encounter for long-term (current) use of high-risk medication  -     HEMOGLOBIN A1C WITH EAG; Future  -     LIPID PANEL; Future  -     METABOLIC PANEL, COMPREHENSIVE; Future  -     TSH 3RD GENERATION; Future  -     CBC W/O DIFF; Future  -     URINALYSIS W/ RFLX MICROSCOPIC; Future  -     SED RATE (ESR); Future  -     C REACTIVE PROTEIN, QT; Future  -     VITAMIN D, 25 HYDROXY; Future    Return in about 4 weeks (around 4/3/2023), or if symptoms worsen or fail to improve. Subjective:     44-year-old AAF with PMH significant for HTN, DM 2, HLD, CAD s/p angioplasty with stent, RA, MGUS, asthma, peripheral neuropathy who presents for routine follow-up    Foot has improved. Main concern is right foot numbness. She does also noticed this numbness in her left foot and bilateral hands but not quite as bad as in the right foot. She does have a history of B12 deficiency and has not been in the office to have B12 injections or been on oral B12 for almost 1 year. Struggling with RA. Has not seen rheumatology since Dr. Shin Elder left his practice over a year ago.   She was previously taking leflunomide which significantly helped and Hayleee Grosser. She does still have a few pills of the Marybelle Grosser and has continued on this. Her shoulders and hands have specifically been flaring quite a bit recently. She has not had any medication to help control this pain    Diabetes Mellitus: Well controlled  Taking meds, home glucose monitoring: is not performed  Reports no polyuria or polydipsia, no chest pain, dyspnea or TIA's  Does continue to struggle with neuropathy with an element from B12 deficiency as above  last eye exam approximately within the last few months. Staying on track with her diet and not exercising due to RA and mobility limitations. Pt is a non smoker. Lab Results   Component Value Date/Time    Hemoglobin A1c (POC) 5.4 09/24/2019 08:50 AM    Hemoglobin A1c (POC) 6.7 09/28/2018 10:30 AM    Hemoglobin A1c 5.0 03/29/2022 03:34 AM    Microalb/Creat ratio (ug/mg creat.) 74 (H) 09/01/2021 04:05 PM    LDL, calculated 50 12/10/2021 11:10 AM    LDL, calculated 36 08/06/2020 03:47 PM    Creatinine 1.02 04/01/2022 12:19 AM      Lab Results   Component Value Date/Time    GFR est AA >60 04/01/2022 12:19 AM    GFR est non-AA 53 (L) 04/01/2022 12:19 AM      Lab Results   Component Value Date/Time    TSH 1.48 03/28/2022 02:35 PM           Not exercising. Eats a fairly healthy diet. Non-smoker. She has a high fall risk and is using a walking rollator. Her brakes are not trustworthy as this rollator has been used for years now. Has returned to seeing ophthalmology for her glaucoma. She also has a noted history of diabetic retinopathy. Diabetes has been well controlled as noted above    Ongoing cervical spine pathology and better controlled with gabapentin but noted some side effects of sedation and lower extremity edema so switched over to pregabalin which has helped. She has been out of this for the past few months.     ROS  Gen - no fever/chills  Resp - no dyspnea or cough  CV - no chest pain or WORKMAN  Rest per HPI    Past Medical History:   Diagnosis Date    Asthma     Chronic pain 5/8/2020    DDD (degenerative disc disease), lumbar     Diabetes ([facility]     Gastritis     GERD (gastroesophageal reflux disease)     Glaucoma     Hearing loss 5/8/2020    Hiatal hernia     High cholesterol     Hypertension     Hypocalcemia 5/8/2020    Peripheral vascular disease (HCC)     RA (rheumatoid arthritis) ([facility]     Sarcoidosis 1999    MCV    Sleep apnea     has not used cpap in years since weight loss     Past Surgical History:   Procedure Laterality Date    HX GASTRIC BYPASS      HX HEART CATHETERIZATION      s/p PCI with stenting in 1998     HX KNEE REPLACEMENT Bilateral     HX ORTHOPAEDIC Bilateral     carpal tunnel surgery     HX SHOULDER REPLACEMENT Right     x 2     AL UNLISTED PROCEDURE LUNGS & PLEURA Left     20 yrs        Objective:     Blood pressure (!) 142/69, pulse 88, temperature 97 °F (36.1 °C), temperature source Temporal, resp. rate 16, height 5' 5\" (1.651 m), weight 176 lb 6.4 oz (80 kg). Physical Exam  General appearance - alert, well appearing, and in no distress  Eyes -sclera anicteric  Ears-left ear with impacted cerumen but normal TM  Neck - supple, no significant adenopathy, no thyromegaly  Chest - clear to auscultation, no wheezes, rales or rhonchi, symmetric air entry  Heart - normal rate, regular rhythm, normal S1, S2, no murmurs, rubs, clicks or gallops  Neurological - alert, oriented, normal speech, abn gait - using rollator  Extr - no edema  Psych - normal mood and affect  Foot: right foot with hx of right great toe amputation    On this date 03/06/2023 I have spent 40 minutes reviewing previous notes, test results and face to face with the patient discussing the diagnosis and importance of compliance with the treatment plan as well as documenting on the day of the visit. An electronic signature was used to authenticate this note.   -- Tanika Souza MD

## 2023-03-06 NOTE — PROGRESS NOTES
Chief Complaint   Patient presents with    Numbness     Right leg worse than left    1. Have you been to the ER, urgent care clinic since your last visit? Hospitalized since your last visit? No    2. Have you seen or consulted any other health care providers outside of the [address] [place] [address] since your last visit? Include any pap smears or colon screening. Yes Where: Eye Doctor,Podiatry and Dentist      Blind right eye ,had bleeding behind eye      She denies any symptoms , reactions or allergies that would exclude her from being given B 12 injection  today. Risks and adverse reactions were discussed. All questions were addressed. She was observed for 15 min post injection. There were no reactions observed.     Mildred Castro LPN

## 2023-03-07 NOTE — TELEPHONE ENCOUNTER
Mere calling from [facility]     Needs end of therapy order     Best number to reach her is [phone] Subjective:   Patient ID: Alisia Vines is a 32 y.o. female.    Chief Complaint: Other (S/r )    33 yo female here for suture removal after neck excision 2/17/23 with Dr. Sandoval. She has no complaints or concerns at this time.     Review of patient's allergies indicates:  No Known Allergies        Review of Systems   Constitutional:  Negative for chills, fatigue, fever and unexpected weight change.   HENT:  Negative for congestion, dental problem, ear discharge, ear pain, facial swelling, hearing loss, nosebleeds, postnasal drip, rhinorrhea, sinus pressure, sneezing, sore throat, tinnitus, trouble swallowing and voice change.    Eyes:  Negative for redness, itching and visual disturbance.   Respiratory:  Negative for cough, choking, shortness of breath and wheezing.    Cardiovascular:  Negative for chest pain and palpitations.   Gastrointestinal:  Negative for abdominal pain.        No reflux.   Musculoskeletal:  Negative for gait problem.   Skin:  Negative for rash.   Neurological:  Negative for dizziness, light-headedness and headaches.       Objective:   Temp 98.5 °F (36.9 °C)   Wt 81.5 kg (179 lb 10.8 oz)   BMI 29.90 kg/m²     Physical Exam  Neck:        Comments: Post neck mass excision, healing well with signs of infection     Final Pathologic Diagnosis 1. Right deep cervical lymph node, excisional biopsy:   - Follicular hyperplasia.  See comment.   - No evidence of lymphoma or metastatic carcinoma.       Imaging :          Assessment:     1. Lymphadenopathy        Plan:     Lymphadenopathy      Well healing post operative incision. Sutures removed without difficulty today.

## 2023-03-08 ENCOUNTER — CLINICAL SUPPORT (OUTPATIENT)
Dept: FAMILY MEDICINE CLINIC | Age: 78
End: 2023-03-08

## 2023-03-08 DIAGNOSIS — Z23 ENCOUNTER FOR IMMUNIZATION: ICD-10-CM

## 2023-03-08 DIAGNOSIS — E53.8 VITAMIN B12 DEFICIENCY: Primary | ICD-10-CM

## 2023-03-08 RX ORDER — CYANOCOBALAMIN 1000 UG/ML
1000 INJECTION, SOLUTION INTRAMUSCULAR; SUBCUTANEOUS ONCE
Qty: 1 ML | Refills: 0
Start: 2023-03-08 | End: 2023-03-08

## 2023-03-08 NOTE — PROGRESS NOTES
Chief Complaint   Patient presents with    Immunization/Injection     B12 injection        Carin Nissen is a 78 y.o. female who presents for B12 injection . She denies any symptoms , reactions or allergies that would exclude her from being given B 12 injection today. Risks and adverse reactions were discussed. All questions were addressed. She was observed for 15 min post injection. There were no reactions observed. Carin Nissen is a 78 y.o. female who presents for routine immunizations. She denies any symptoms , reactions or allergies that would exclude them from being immunized today. Risks and adverse reactions were discussed and the VIS was given to them. All questions were addressed. She was observed for 15 min post injection. There were no reactions observed.     Doni Scott LPN Post-Care Instructions: The patient was provided with detailed verbal and written instructions for daily wound care, including use of H2O2 cleansing, followed by application of plain Vaseline and a bandage.  These instructions including Dr. Rosas's contact information should there be any questions or concerns.  The patient is not to engage in any heavy lifting, exercise, or swimming for the next week.  Should the patient develop any fevers, chills, bleeding, or pain not controlled by OTC analgesics, s/he should contact the office immediately.

## 2023-03-09 LAB
25(OH)D3+25(OH)D2 SERPL-MCNC: 21 NG/ML (ref 30–100)
ALBUMIN SERPL-MCNC: 4.5 G/DL (ref 3.7–4.7)
ALBUMIN/GLOB SERPL: 1.7 {RATIO} (ref 1.2–2.2)
ALP SERPL-CCNC: 98 IU/L (ref 44–121)
ALT SERPL-CCNC: 17 IU/L (ref 0–32)
APPEARANCE UR: CLEAR
AST SERPL-CCNC: 46 IU/L (ref 0–40)
BACTERIA #/AREA URNS HPF: ABNORMAL /[HPF]
BILIRUB SERPL-MCNC: 1 MG/DL (ref 0–1.2)
BILIRUB UR QL STRIP: NEGATIVE
BUN SERPL-MCNC: 24 MG/DL (ref 8–27)
BUN/CREAT SERPL: 24 (ref 12–28)
CALCIUM SERPL-MCNC: 9.5 MG/DL (ref 8.7–10.3)
CASTS URNS QL MICRO: ABNORMAL /LPF
CHLORIDE SERPL-SCNC: 110 MMOL/L (ref 96–106)
CHOLEST SERPL-MCNC: 183 MG/DL (ref 100–199)
CO2 SERPL-SCNC: 19 MMOL/L (ref 20–29)
COLOR UR: YELLOW
CREAT SERPL-MCNC: 0.99 MG/DL (ref 0.57–1)
CRP SERPL-MCNC: 2 MG/L (ref 0–10)
EGFRCR SERPLBLD CKD-EPI 2021: 59 ML/MIN/1.73
EPI CELLS #/AREA URNS HPF: ABNORMAL /HPF (ref 0–10)
ERYTHROCYTE [DISTWIDTH] IN BLOOD BY AUTOMATED COUNT: 12.3 % (ref 11.7–15.4)
ERYTHROCYTE [SEDIMENTATION RATE] IN BLOOD BY WESTERGREN METHOD: 18 MM/HR (ref 0–40)
EST. AVERAGE GLUCOSE BLD GHB EST-MCNC: 103 MG/DL
FOLATE SERPL-MCNC: 14.9 NG/ML
GLOBULIN SER CALC-MCNC: 2.7 G/DL (ref 1.5–4.5)
GLUCOSE SERPL-MCNC: 76 MG/DL (ref 70–99)
GLUCOSE UR QL STRIP: NEGATIVE
HBA1C MFR BLD: 5.2 % (ref 4.8–5.6)
HCT VFR BLD AUTO: 38.7 % (ref 34–46.6)
HDLC SERPL-MCNC: 74 MG/DL
HGB BLD-MCNC: 12.2 G/DL (ref 11.1–15.9)
HGB UR QL STRIP: NEGATIVE
KETONES UR QL STRIP: NEGATIVE
LDLC SERPL CALC-MCNC: 98 MG/DL (ref 0–99)
LEUKOCYTE ESTERASE UR QL STRIP: ABNORMAL
MCH RBC QN AUTO: 29.8 PG (ref 26.6–33)
MCHC RBC AUTO-ENTMCNC: 31.5 G/DL (ref 31.5–35.7)
MCV RBC AUTO: 94 FL (ref 79–97)
MICRO URNS: ABNORMAL
NITRITE UR QL STRIP: POSITIVE
PH UR STRIP: 5.5 [PH] (ref 5–7.5)
PLATELET # BLD AUTO: 202 X10E3/UL (ref 150–450)
POTASSIUM SERPL-SCNC: 6.3 MMOL/L (ref 3.5–5.2)
PROT SERPL-MCNC: 7.2 G/DL (ref 6–8.5)
PROT UR QL STRIP: ABNORMAL
RBC # BLD AUTO: 4.1 X10E6/UL (ref 3.77–5.28)
RBC #/AREA URNS HPF: ABNORMAL /HPF (ref 0–2)
SODIUM SERPL-SCNC: 143 MMOL/L (ref 134–144)
SP GR UR STRIP: 1.02 (ref 1–1.03)
TRIGL SERPL-MCNC: 60 MG/DL (ref 0–149)
TSH SERPL DL<=0.005 MIU/L-ACNC: 2.53 UIU/ML (ref 0.45–4.5)
UROBILINOGEN UR STRIP-MCNC: 0.2 MG/DL (ref 0.2–1)
VIT B12 SERPL-MCNC: >2000 PG/ML (ref 232–1245)
VLDLC SERPL CALC-MCNC: 11 MG/DL (ref 5–40)
WBC # BLD AUTO: 4 X10E3/UL (ref 3.4–10.8)
WBC #/AREA URNS HPF: ABNORMAL /HPF (ref 0–5)

## 2023-03-13 ENCOUNTER — CLINICAL SUPPORT (OUTPATIENT)
Dept: FAMILY MEDICINE CLINIC | Age: 78
End: 2023-03-13
Payer: MEDICARE

## 2023-03-13 DIAGNOSIS — E53.8 VITAMIN B12 DEFICIENCY: Primary | ICD-10-CM

## 2023-03-13 PROCEDURE — 96372 THER/PROPH/DIAG INJ SC/IM: CPT | Performed by: FAMILY MEDICINE

## 2023-03-13 RX ORDER — CYANOCOBALAMIN 1000 UG/ML
1000 INJECTION, SOLUTION INTRAMUSCULAR; SUBCUTANEOUS ONCE
Qty: 1 ML | Refills: 0
Start: 2023-03-13 | End: 2023-03-13

## 2023-03-13 NOTE — PROGRESS NOTES
Chief Complaint   Patient presents with    Immunization/Injection     B12 injection    Fausto Valdivia is a 78 y.o. female who presents for B12 injection. She denies any symptoms , reactions or allergies that would exclude her  from being given B12 injection today. Risks and adverse reactions were discussed. All questions were addressed. She was observed for 15 min post injection. There were no reactions observed.     Víctor Khan LPN Graft Donor Site Bandage (Optional-Leave Blank If You Don't Want In Note): Pressure dressing applied to the donor site.

## 2023-03-16 ENCOUNTER — CLINICAL SUPPORT (OUTPATIENT)
Dept: FAMILY MEDICINE CLINIC | Age: 78
End: 2023-03-16

## 2023-03-16 DIAGNOSIS — E53.8 VITAMIN B12 DEFICIENCY: Primary | ICD-10-CM

## 2023-03-16 RX ORDER — CYANOCOBALAMIN 1000 UG/ML
1000 INJECTION, SOLUTION INTRAMUSCULAR; SUBCUTANEOUS ONCE
Qty: 1 ML | Refills: 0
Start: 2023-03-16 | End: 2023-03-16

## 2023-03-16 NOTE — PROGRESS NOTES
Chief Complaint   Patient presents with    Immunization/Injection     B 12 injection       Amanda Helms is a 78 y.o. female who presents for B12 injection . She denies any symptoms , reactions or allergies that would exclude her from being given B 12 injection today. Risks and adverse reactions were discussed. All questions were addressed. She was observed for 15 min post injection. There were no reactions observed.

## 2023-03-22 DIAGNOSIS — E87.5 HYPERKALEMIA: Primary | ICD-10-CM

## 2023-06-20 ENCOUNTER — OFFICE VISIT (OUTPATIENT)
Age: 78
End: 2023-06-20
Payer: MEDICAID

## 2023-06-20 VITALS
HEART RATE: 67 BPM | OXYGEN SATURATION: 99 % | TEMPERATURE: 97.5 F | SYSTOLIC BLOOD PRESSURE: 146 MMHG | RESPIRATION RATE: 20 BRPM | WEIGHT: 176.6 LBS | DIASTOLIC BLOOD PRESSURE: 68 MMHG | HEIGHT: 65 IN | BODY MASS INDEX: 29.42 KG/M2

## 2023-06-20 DIAGNOSIS — M85.89 OSTEOPENIA OF MULTIPLE SITES: ICD-10-CM

## 2023-06-20 DIAGNOSIS — E11.22 TYPE 2 DIABETES MELLITUS WITH STAGE 3A CHRONIC KIDNEY DISEASE, WITH LONG-TERM CURRENT USE OF INSULIN (HCC): ICD-10-CM

## 2023-06-20 DIAGNOSIS — E87.5 HYPERKALEMIA: ICD-10-CM

## 2023-06-20 DIAGNOSIS — Z89.419 HISTORY OF AMPUTATION OF GREAT TOE (HCC): ICD-10-CM

## 2023-06-20 DIAGNOSIS — H92.02 OTALGIA OF LEFT EAR: ICD-10-CM

## 2023-06-20 DIAGNOSIS — Z79.4 TYPE 2 DIABETES MELLITUS WITH STAGE 3A CHRONIC KIDNEY DISEASE, WITH LONG-TERM CURRENT USE OF INSULIN (HCC): ICD-10-CM

## 2023-06-20 DIAGNOSIS — L97.511 SKIN ULCER OF SECOND TOE OF RIGHT FOOT, LIMITED TO BREAKDOWN OF SKIN (HCC): ICD-10-CM

## 2023-06-20 DIAGNOSIS — M06.09 RHEUMATOID ARTHRITIS WITHOUT RHEUMATOID FACTOR, MULTIPLE SITES (HCC): Primary | ICD-10-CM

## 2023-06-20 DIAGNOSIS — E53.8 DEFICIENCY OF OTHER SPECIFIED B GROUP VITAMINS: ICD-10-CM

## 2023-06-20 DIAGNOSIS — E11.3593 TYPE 2 DIABETES MELLITUS WITH BOTH EYES AFFECTED BY PROLIFERATIVE RETINOPATHY WITHOUT MACULAR EDEMA, WITHOUT LONG-TERM CURRENT USE OF INSULIN (HCC): ICD-10-CM

## 2023-06-20 DIAGNOSIS — N18.31 TYPE 2 DIABETES MELLITUS WITH STAGE 3A CHRONIC KIDNEY DISEASE, WITH LONG-TERM CURRENT USE OF INSULIN (HCC): ICD-10-CM

## 2023-06-20 DIAGNOSIS — I10 ESSENTIAL (PRIMARY) HYPERTENSION: ICD-10-CM

## 2023-06-20 PROBLEM — L97.519 ULCER OF RIGHT FOOT, UNSPECIFIED ULCER STAGE (HCC): Status: RESOLVED | Noted: 2023-03-06 | Resolved: 2023-06-20

## 2023-06-20 PROCEDURE — 2022F DILAT RTA XM EVC RTNOPTHY: CPT | Performed by: FAMILY MEDICINE

## 2023-06-20 PROCEDURE — G8419 CALC BMI OUT NRM PARAM NOF/U: HCPCS | Performed by: FAMILY MEDICINE

## 2023-06-20 PROCEDURE — G8399 PT W/DXA RESULTS DOCUMENT: HCPCS | Performed by: FAMILY MEDICINE

## 2023-06-20 PROCEDURE — G8427 DOCREV CUR MEDS BY ELIG CLIN: HCPCS | Performed by: FAMILY MEDICINE

## 2023-06-20 PROCEDURE — 99214 OFFICE O/P EST MOD 30 MIN: CPT | Performed by: FAMILY MEDICINE

## 2023-06-20 PROCEDURE — 3044F HG A1C LEVEL LT 7.0%: CPT | Performed by: FAMILY MEDICINE

## 2023-06-20 PROCEDURE — 3078F DIAST BP <80 MM HG: CPT | Performed by: FAMILY MEDICINE

## 2023-06-20 PROCEDURE — 1123F ACP DISCUSS/DSCN MKR DOCD: CPT | Performed by: FAMILY MEDICINE

## 2023-06-20 PROCEDURE — PBSHW PBB SHADOW CHARGE: Performed by: FAMILY MEDICINE

## 2023-06-20 PROCEDURE — 3074F SYST BP LT 130 MM HG: CPT | Performed by: FAMILY MEDICINE

## 2023-06-20 PROCEDURE — 1036F TOBACCO NON-USER: CPT | Performed by: FAMILY MEDICINE

## 2023-06-20 PROCEDURE — 1090F PRES/ABSN URINE INCON ASSESS: CPT | Performed by: FAMILY MEDICINE

## 2023-06-20 PROCEDURE — 3017F COLORECTAL CA SCREEN DOC REV: CPT | Performed by: FAMILY MEDICINE

## 2023-06-20 RX ORDER — FUROSEMIDE 20 MG/1
TABLET ORAL
COMMUNITY
Start: 2018-06-21

## 2023-06-20 RX ORDER — FAMOTIDINE 10 MG/ML
20 INJECTION, SOLUTION INTRAVENOUS
OUTPATIENT
Start: 2023-06-20

## 2023-06-20 RX ORDER — PREGABALIN 100 MG/1
100 CAPSULE ORAL 2 TIMES DAILY
Qty: 60 CAPSULE | Refills: 2 | Status: SHIPPED | OUTPATIENT
Start: 2023-06-20 | End: 2023-09-18

## 2023-06-20 RX ORDER — CYANOCOBALAMIN 1000 UG/ML
1000 INJECTION, SOLUTION INTRAMUSCULAR; SUBCUTANEOUS ONCE
Status: COMPLETED | OUTPATIENT
Start: 2023-06-20 | End: 2023-06-20

## 2023-06-20 RX ORDER — PREDNISONE 10 MG/1
10 TABLET ORAL DAILY
Qty: 30 TABLET | Refills: 2 | Status: SHIPPED | OUTPATIENT
Start: 2023-06-20

## 2023-06-20 RX ORDER — EPINEPHRINE 1 MG/ML
0.3 INJECTION, SOLUTION, CONCENTRATE INTRAVENOUS PRN
OUTPATIENT
Start: 2023-06-20

## 2023-06-20 RX ORDER — BENZONATATE 100 MG/1
CAPSULE ORAL
COMMUNITY

## 2023-06-20 RX ORDER — ACETAMINOPHEN 325 MG/1
650 TABLET ORAL
OUTPATIENT
Start: 2023-06-20

## 2023-06-20 RX ORDER — ALBUTEROL SULFATE 90 UG/1
4 AEROSOL, METERED RESPIRATORY (INHALATION) PRN
OUTPATIENT
Start: 2023-06-20

## 2023-06-20 RX ORDER — DIPHENHYDRAMINE HYDROCHLORIDE 50 MG/ML
50 INJECTION INTRAMUSCULAR; INTRAVENOUS
OUTPATIENT
Start: 2023-06-20

## 2023-06-20 RX ORDER — TOFACITINIB 11 MG/1
1 TABLET, FILM COATED, EXTENDED RELEASE ORAL DAILY
Qty: 90 TABLET | Refills: 0 | Status: SHIPPED | OUTPATIENT
Start: 2023-06-20

## 2023-06-20 RX ORDER — ONDANSETRON 2 MG/ML
8 INJECTION INTRAMUSCULAR; INTRAVENOUS
OUTPATIENT
Start: 2023-06-20

## 2023-06-20 RX ORDER — ASPIRIN 81 MG/1
TABLET ORAL
COMMUNITY

## 2023-06-20 RX ORDER — GLUCOSAMINE HCL/CHONDROITIN SU 500-400 MG
CAPSULE ORAL
Qty: 100 STRIP | Refills: 0 | Status: SHIPPED | OUTPATIENT
Start: 2023-06-20 | End: 2023-06-23 | Stop reason: SDUPTHER

## 2023-06-20 RX ORDER — SODIUM CHLORIDE 9 MG/ML
INJECTION, SOLUTION INTRAVENOUS CONTINUOUS
OUTPATIENT
Start: 2023-06-20

## 2023-06-20 RX ORDER — LEFLUNOMIDE 20 MG/1
20 TABLET ORAL DAILY
Qty: 90 TABLET | Refills: 0 | Status: SHIPPED | OUTPATIENT
Start: 2023-06-20

## 2023-06-20 RX ORDER — BLOOD-GLUCOSE METER
1 KIT MISCELLANEOUS DAILY
Qty: 1 KIT | Refills: 0 | Status: SHIPPED | OUTPATIENT
Start: 2023-06-20 | End: 2023-06-23 | Stop reason: SDUPTHER

## 2023-06-20 RX ADMIN — CYANOCOBALAMIN 1000 MCG: 1000 INJECTION, SOLUTION INTRAMUSCULAR at 11:41

## 2023-06-20 SDOH — ECONOMIC STABILITY: HOUSING INSECURITY
IN THE LAST 12 MONTHS, WAS THERE A TIME WHEN YOU DID NOT HAVE A STEADY PLACE TO SLEEP OR SLEPT IN A SHELTER (INCLUDING NOW)?: NO

## 2023-06-20 SDOH — ECONOMIC STABILITY: FOOD INSECURITY: WITHIN THE PAST 12 MONTHS, YOU WORRIED THAT YOUR FOOD WOULD RUN OUT BEFORE YOU GOT MONEY TO BUY MORE.: NEVER TRUE

## 2023-06-20 SDOH — ECONOMIC STABILITY: FOOD INSECURITY: WITHIN THE PAST 12 MONTHS, THE FOOD YOU BOUGHT JUST DIDN'T LAST AND YOU DIDN'T HAVE MONEY TO GET MORE.: NEVER TRUE

## 2023-06-20 SDOH — ECONOMIC STABILITY: INCOME INSECURITY: HOW HARD IS IT FOR YOU TO PAY FOR THE VERY BASICS LIKE FOOD, HOUSING, MEDICAL CARE, AND HEATING?: NOT HARD AT ALL

## 2023-06-20 NOTE — PROGRESS NOTES
Neli Crowe (: 1945) is a 78 y.o. female, established patient, here for evaluation of the following chief complaint(s):  Follow-up (Routine check up/Pt want B12 shot/Referral to Rheumatology)       ASSESSMENT/PLAN:  Britany Schultz was seen today for follow-up. Diagnoses and all orders for this visit:    Rheumatoid arthritis without rheumatoid factor, multiple sites ([facility] - ct Arava and [place] and adding Prednisone 10 mg daily for now. Sending to Rheumatology again.  -     [address] , Rheumatology, [facility] ([address])  -     Tofacitinib Citrate ER (XELJANZ XR) 11 MG TB24; Take 1 tablet by mouth daily  -     leflunomide (ARAVA) 20 MG tablet; Take 1 tablet by mouth daily  -     predniSONE (DELTASONE) 10 MG tablet; Take 1 tablet by mouth daily    Deficiency of other specified B group vitamins - b12 injection today, repeat in 4 weeks and to discuss PO option again  -     cyanocobalamin injection 1,000 mcg    Essential (primary) hypertension - bp high, recheck at next appt  -     Basic Metabolic Panel; Future    Type 2 diabetes mellitus with stage 3a chronic kidney disease, with long-term current use of insulin ([facility])  Type 2 diabetes mellitus with both eyes affected by proliferative retinopathy without macular edema, without long-term current use of insulin ([facility]  - controlled, will need to monitor after starting steroids  -     Basic Metabolic Panel; Future  -     glucose monitoring (FREESTYLE FREEDOM) kit; 1 kit by Does not apply route daily  -     blood glucose monitor strips; Test 1 time a day & as needed for symptoms of irregular blood glucose. Dispense sufficient amount for indicated testing frequency plus additional to accommodate PRN testing needs. -     pregabalin (LYRICA) 100 MG capsule; Take 1 capsule by mouth 2 times daily for 90 days.  Max Daily Amount: 200 mg    Otalgia of left ear - no clear cause on exam, discussed options, will send to ENT  -     ALFONZO - [employer],

## 2023-06-20 NOTE — PROGRESS NOTES
Chief Complaint   Patient presents with    Follow-up     Routine check up     1. Have you been to the ER, urgent care clinic since your last visit? Hospitalized since your last visit? No    2. Have you seen or consulted any other health care providers outside of the [address] [place] [address] since your last visit? Include any pap smears or colon screening.  No

## 2023-06-22 DIAGNOSIS — N18.31 TYPE 2 DIABETES MELLITUS WITH STAGE 3A CHRONIC KIDNEY DISEASE, WITH LONG-TERM CURRENT USE OF INSULIN (HCC): ICD-10-CM

## 2023-06-22 DIAGNOSIS — Z79.4 TYPE 2 DIABETES MELLITUS WITH STAGE 3A CHRONIC KIDNEY DISEASE, WITH LONG-TERM CURRENT USE OF INSULIN (HCC): ICD-10-CM

## 2023-06-22 DIAGNOSIS — E11.3593 TYPE 2 DIABETES MELLITUS WITH BOTH EYES AFFECTED BY PROLIFERATIVE RETINOPATHY WITHOUT MACULAR EDEMA, WITHOUT LONG-TERM CURRENT USE OF INSULIN (HCC): ICD-10-CM

## 2023-06-22 DIAGNOSIS — E11.22 TYPE 2 DIABETES MELLITUS WITH STAGE 3A CHRONIC KIDNEY DISEASE, WITH LONG-TERM CURRENT USE OF INSULIN (HCC): ICD-10-CM

## 2023-06-22 NOTE — TELEPHONE ENCOUNTER
----- Message from Omer Lau sent at 6/22/2023  9:58 AM EDT -----  Subject: Message to Provider    QUESTIONS  Information for Provider? Patient needs a new prescription for her test   strips and monitor for One touch verio or accucheck glucose [facility] [address] [place], [place] [address]junito please put in prescription   ---------------------------------------------------------------------------  --------------  Abhinav GRANDA  [phone]; OK to leave message on voicemail  ---------------------------------------------------------------------------  --------------  SCRIPT ANSWERS  Relationship to Patient?  Self

## 2023-06-23 ENCOUNTER — TELEPHONE (OUTPATIENT)
Age: 78
End: 2023-06-23

## 2023-06-23 DIAGNOSIS — E11.3593 TYPE 2 DIABETES MELLITUS WITH BOTH EYES AFFECTED BY PROLIFERATIVE RETINOPATHY WITHOUT MACULAR EDEMA, WITHOUT LONG-TERM CURRENT USE OF INSULIN (HCC): ICD-10-CM

## 2023-06-23 DIAGNOSIS — N18.31 TYPE 2 DIABETES MELLITUS WITH STAGE 3A CHRONIC KIDNEY DISEASE, WITH LONG-TERM CURRENT USE OF INSULIN (HCC): ICD-10-CM

## 2023-06-23 DIAGNOSIS — Z79.4 TYPE 2 DIABETES MELLITUS WITH STAGE 3A CHRONIC KIDNEY DISEASE, WITH LONG-TERM CURRENT USE OF INSULIN (HCC): ICD-10-CM

## 2023-06-23 DIAGNOSIS — E11.22 TYPE 2 DIABETES MELLITUS WITH STAGE 3A CHRONIC KIDNEY DISEASE, WITH LONG-TERM CURRENT USE OF INSULIN (HCC): ICD-10-CM

## 2023-06-23 RX ORDER — GLUCOSAMINE HCL/CHONDROITIN SU 500-400 MG
CAPSULE ORAL
Qty: 100 STRIP | Refills: 0 | Status: SHIPPED | OUTPATIENT
Start: 2023-06-23 | End: 2023-06-24 | Stop reason: SDUPTHER

## 2023-06-23 RX ORDER — BLOOD-GLUCOSE METER
1 KIT MISCELLANEOUS DAILY
Qty: 1 KIT | Refills: 0 | Status: SHIPPED | OUTPATIENT
Start: 2023-06-23 | End: 2023-06-24 | Stop reason: SDUPTHER

## 2023-06-23 NOTE — TELEPHONE ENCOUNTER
Patient would like a call regarding her RX went to the wrong pharmacy please give her a call @ [phone]

## 2023-06-23 NOTE — TELEPHONE ENCOUNTER
Venu Bonner from [facility] called, stating Dr Shruti Lamb has prescribed for patient to have Prolia injection    For patient's insurance she needs to know the following    if patient has tried and failed any other drug for this condition     Also, when was her last dexa scan    The insurance will not cover diagnosis of osteopenia    Please call Venu Bonner  [phone]    She will be there until 3:00 pm today and will return on Monday

## 2023-06-24 RX ORDER — BLOOD-GLUCOSE METER
1 KIT MISCELLANEOUS DAILY
Qty: 1 KIT | Refills: 0 | Status: SHIPPED | OUTPATIENT
Start: 2023-06-24

## 2023-06-24 RX ORDER — GLUCOSAMINE HCL/CHONDROITIN SU 500-400 MG
CAPSULE ORAL
Qty: 100 STRIP | Refills: 0 | Status: SHIPPED | OUTPATIENT
Start: 2023-06-24

## 2023-06-30 DIAGNOSIS — E11.3593 TYPE 2 DIABETES MELLITUS WITH BOTH EYES AFFECTED BY PROLIFERATIVE RETINOPATHY WITHOUT MACULAR EDEMA, WITHOUT LONG-TERM CURRENT USE OF INSULIN (HCC): Primary | ICD-10-CM

## 2023-07-02 RX ORDER — LANCETS 30 GAUGE
1 EACH MISCELLANEOUS 2 TIMES DAILY
Qty: 300 EACH | Refills: 1 | Status: SHIPPED | OUTPATIENT
Start: 2023-07-02

## 2023-07-10 ENCOUNTER — APPOINTMENT (OUTPATIENT)
Facility: HOSPITAL | Age: 78
DRG: 543 | End: 2023-07-10
Payer: MEDICAID

## 2023-07-10 ENCOUNTER — HOSPITAL ENCOUNTER (INPATIENT)
Facility: HOSPITAL | Age: 78
LOS: 3 days | Discharge: NURSING, REHAB OR LONG-TERM CARE | DRG: 543 | End: 2023-07-13
Attending: STUDENT IN AN ORGANIZED HEALTH CARE EDUCATION/TRAINING PROGRAM | Admitting: STUDENT IN AN ORGANIZED HEALTH CARE EDUCATION/TRAINING PROGRAM
Payer: MEDICAID
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
## 2023-07-10 DIAGNOSIS — D64.9 ANEMIA, UNSPECIFIED TYPE: ICD-10-CM

## 2023-07-10 DIAGNOSIS — S32.592A PUBIC RAMUS FRACTURE, LEFT, CLOSED, INITIAL ENCOUNTER (HCC): ICD-10-CM

## 2023-07-10 DIAGNOSIS — S32.592A CLOSED FRACTURE OF RAMUS OF LEFT PUBIS, INITIAL ENCOUNTER (HCC): Primary | ICD-10-CM

## 2023-07-10 LAB
ALBUMIN SERPL-MCNC: 2.9 G/DL (ref 3.5–5)
ALBUMIN/GLOB SERPL: 0.9 (ref 1.1–2.2)
ALP SERPL-CCNC: 71 U/L (ref 45–117)
ALT SERPL-CCNC: 19 U/L (ref 12–78)
ANION GAP SERPL CALC-SCNC: 11 MMOL/L (ref 5–15)
AST SERPL-CCNC: 43 U/L (ref 15–37)
BASOPHILS # BLD: 0.1 K/UL (ref 0–0.1)
BASOPHILS NFR BLD: 1 % (ref 0–1)
BILIRUB SERPL-MCNC: 0.6 MG/DL (ref 0.2–1)
BUN SERPL-MCNC: 30 MG/DL (ref 6–20)
BUN/CREAT SERPL: 23 (ref 12–20)
CALCIUM SERPL-MCNC: 8 MG/DL (ref 8.5–10.1)
CHLORIDE SERPL-SCNC: 109 MMOL/L (ref 97–108)
CO2 SERPL-SCNC: 21 MMOL/L (ref 21–32)
CREAT SERPL-MCNC: 1.33 MG/DL (ref 0.55–1.02)
DIFFERENTIAL METHOD BLD: ABNORMAL
EOSINOPHIL # BLD: 0.1 K/UL (ref 0–0.4)
EOSINOPHIL NFR BLD: 2 % (ref 0–7)
ERYTHROCYTE [DISTWIDTH] IN BLOOD BY AUTOMATED COUNT: 13 % (ref 11.5–14.5)
GLOBULIN SER CALC-MCNC: 3.4 G/DL (ref 2–4)
GLUCOSE BLD STRIP.AUTO-MCNC: 70 MG/DL (ref 65–117)
GLUCOSE SERPL-MCNC: 75 MG/DL (ref 65–100)
HCT VFR BLD AUTO: 27.3 % (ref 35–47)
HGB BLD-MCNC: 8.8 G/DL (ref 11.5–16)
IMM GRANULOCYTES # BLD AUTO: 0.1 K/UL (ref 0–0.04)
IMM GRANULOCYTES NFR BLD AUTO: 1 % (ref 0–0.5)
LYMPHOCYTES # BLD: 1.6 K/UL (ref 0.8–3.5)
LYMPHOCYTES NFR BLD: 26 % (ref 12–49)
MCH RBC QN AUTO: 30.1 PG (ref 26–34)
MCHC RBC AUTO-ENTMCNC: 32.2 G/DL (ref 30–36.5)
MCV RBC AUTO: 93.5 FL (ref 80–99)
MONOCYTES # BLD: 0.7 K/UL (ref 0–1)
MONOCYTES NFR BLD: 11 % (ref 5–13)
NEUTS SEG # BLD: 3.6 K/UL (ref 1.8–8)
NEUTS SEG NFR BLD: 59 % (ref 32–75)
NRBC # BLD: 0 K/UL (ref 0–0.01)
NRBC BLD-RTO: 0 PER 100 WBC
PLATELET # BLD AUTO: 223 K/UL (ref 150–400)
PMV BLD AUTO: 10.5 FL (ref 8.9–12.9)
POTASSIUM SERPL-SCNC: 4.4 MMOL/L (ref 3.5–5.1)
PROT SERPL-MCNC: 6.3 G/DL (ref 6.4–8.2)
RBC # BLD AUTO: 2.92 M/UL (ref 3.8–5.2)
SERVICE CMNT-IMP: NORMAL
SODIUM SERPL-SCNC: 141 MMOL/L (ref 136–145)
WBC # BLD AUTO: 6.1 K/UL (ref 3.6–11)

## 2023-07-10 PROCEDURE — 96372 THER/PROPH/DIAG INJ SC/IM: CPT

## 2023-07-10 PROCEDURE — 6370000000 HC RX 637 (ALT 250 FOR IP): Performed by: STUDENT IN AN ORGANIZED HEALTH CARE EDUCATION/TRAINING PROGRAM

## 2023-07-10 PROCEDURE — 73070 X-RAY EXAM OF ELBOW: CPT

## 2023-07-10 PROCEDURE — 6360000002 HC RX W HCPCS: Performed by: STUDENT IN AN ORGANIZED HEALTH CARE EDUCATION/TRAINING PROGRAM

## 2023-07-10 PROCEDURE — 2580000003 HC RX 258: Performed by: STUDENT IN AN ORGANIZED HEALTH CARE EDUCATION/TRAINING PROGRAM

## 2023-07-10 PROCEDURE — 1200000000 HC SEMI PRIVATE

## 2023-07-10 PROCEDURE — 70450 CT HEAD/BRAIN W/O DYE: CPT

## 2023-07-10 PROCEDURE — 36415 COLL VENOUS BLD VENIPUNCTURE: CPT

## 2023-07-10 PROCEDURE — 83036 HEMOGLOBIN GLYCOSYLATED A1C: CPT

## 2023-07-10 PROCEDURE — 80053 COMPREHEN METABOLIC PANEL: CPT

## 2023-07-10 PROCEDURE — 99285 EMERGENCY DEPT VISIT HI MDM: CPT

## 2023-07-10 PROCEDURE — 73080 X-RAY EXAM OF ELBOW: CPT

## 2023-07-10 PROCEDURE — 73502 X-RAY EXAM HIP UNI 2-3 VIEWS: CPT

## 2023-07-10 PROCEDURE — 85025 COMPLETE CBC W/AUTO DIFF WBC: CPT

## 2023-07-10 PROCEDURE — 82962 GLUCOSE BLOOD TEST: CPT

## 2023-07-10 RX ORDER — ACETAMINOPHEN 650 MG/1
650 SUPPOSITORY RECTAL EVERY 6 HOURS PRN
Status: DISCONTINUED | OUTPATIENT
Start: 2023-07-10 | End: 2023-07-13 | Stop reason: HOSPADM

## 2023-07-10 RX ORDER — PREDNISONE 5 MG/1
10 TABLET ORAL DAILY
Status: DISCONTINUED | OUTPATIENT
Start: 2023-07-11 | End: 2023-07-10

## 2023-07-10 RX ORDER — NORTRIPTYLINE HYDROCHLORIDE 25 MG/1
50 CAPSULE ORAL NIGHTLY
Status: DISCONTINUED | OUTPATIENT
Start: 2023-07-10 | End: 2023-07-11

## 2023-07-10 RX ORDER — OXYCODONE HYDROCHLORIDE 5 MG/1
5 TABLET ORAL
Status: COMPLETED | OUTPATIENT
Start: 2023-07-10 | End: 2023-07-10

## 2023-07-10 RX ORDER — POLYETHYLENE GLYCOL 3350 17 G/17G
17 POWDER, FOR SOLUTION ORAL DAILY PRN
Status: DISCONTINUED | OUTPATIENT
Start: 2023-07-10 | End: 2023-07-13 | Stop reason: HOSPADM

## 2023-07-10 RX ORDER — INSULIN LISPRO 100 [IU]/ML
0-4 INJECTION, SOLUTION INTRAVENOUS; SUBCUTANEOUS NIGHTLY
Status: DISCONTINUED | OUTPATIENT
Start: 2023-07-10 | End: 2023-07-13 | Stop reason: HOSPADM

## 2023-07-10 RX ORDER — HYDRALAZINE HYDROCHLORIDE 20 MG/ML
10 INJECTION INTRAMUSCULAR; INTRAVENOUS EVERY 6 HOURS PRN
Status: DISCONTINUED | OUTPATIENT
Start: 2023-07-10 | End: 2023-07-13 | Stop reason: HOSPADM

## 2023-07-10 RX ORDER — ONDANSETRON 2 MG/ML
4 INJECTION INTRAMUSCULAR; INTRAVENOUS EVERY 6 HOURS PRN
Status: DISCONTINUED | OUTPATIENT
Start: 2023-07-10 | End: 2023-07-13 | Stop reason: HOSPADM

## 2023-07-10 RX ORDER — OXYCODONE HYDROCHLORIDE 5 MG/1
5 TABLET ORAL EVERY 4 HOURS PRN
Status: DISCONTINUED | OUTPATIENT
Start: 2023-07-10 | End: 2023-07-13 | Stop reason: HOSPADM

## 2023-07-10 RX ORDER — KETOROLAC TROMETHAMINE 30 MG/ML
15 INJECTION, SOLUTION INTRAMUSCULAR; INTRAVENOUS
Status: COMPLETED | OUTPATIENT
Start: 2023-07-10 | End: 2023-07-10

## 2023-07-10 RX ORDER — SODIUM CHLORIDE 0.9 % (FLUSH) 0.9 %
5-40 SYRINGE (ML) INJECTION EVERY 12 HOURS SCHEDULED
Status: DISCONTINUED | OUTPATIENT
Start: 2023-07-10 | End: 2023-07-13 | Stop reason: HOSPADM

## 2023-07-10 RX ORDER — SODIUM CHLORIDE 9 MG/ML
INJECTION, SOLUTION INTRAVENOUS CONTINUOUS
Status: DISCONTINUED | OUTPATIENT
Start: 2023-07-10 | End: 2023-07-12

## 2023-07-10 RX ORDER — LATANOPROST 50 UG/ML
1 SOLUTION/ DROPS OPHTHALMIC NIGHTLY
Status: DISCONTINUED | OUTPATIENT
Start: 2023-07-11 | End: 2023-07-13 | Stop reason: HOSPADM

## 2023-07-10 RX ORDER — INSULIN LISPRO 100 [IU]/ML
0-4 INJECTION, SOLUTION INTRAVENOUS; SUBCUTANEOUS
Status: DISCONTINUED | OUTPATIENT
Start: 2023-07-11 | End: 2023-07-13 | Stop reason: HOSPADM

## 2023-07-10 RX ORDER — SODIUM CHLORIDE 9 MG/ML
INJECTION, SOLUTION INTRAVENOUS PRN
Status: DISCONTINUED | OUTPATIENT
Start: 2023-07-10 | End: 2023-07-13 | Stop reason: HOSPADM

## 2023-07-10 RX ORDER — ACETAMINOPHEN 325 MG/1
650 TABLET ORAL EVERY 6 HOURS PRN
Status: DISCONTINUED | OUTPATIENT
Start: 2023-07-10 | End: 2023-07-13 | Stop reason: HOSPADM

## 2023-07-10 RX ORDER — ASPIRIN 81 MG/1
81 TABLET, CHEWABLE ORAL DAILY
Status: DISCONTINUED | OUTPATIENT
Start: 2023-07-11 | End: 2023-07-13 | Stop reason: HOSPADM

## 2023-07-10 RX ORDER — ONDANSETRON 4 MG/1
4 TABLET, ORALLY DISINTEGRATING ORAL EVERY 8 HOURS PRN
Status: DISCONTINUED | OUTPATIENT
Start: 2023-07-10 | End: 2023-07-13 | Stop reason: HOSPADM

## 2023-07-10 RX ORDER — ENOXAPARIN SODIUM 100 MG/ML
40 INJECTION SUBCUTANEOUS DAILY
Status: DISCONTINUED | OUTPATIENT
Start: 2023-07-11 | End: 2023-07-13 | Stop reason: HOSPADM

## 2023-07-10 RX ORDER — LEFLUNOMIDE 10 MG/1
20 TABLET ORAL DAILY
Status: DISCONTINUED | OUTPATIENT
Start: 2023-07-11 | End: 2023-07-13 | Stop reason: HOSPADM

## 2023-07-10 RX ORDER — ACETAMINOPHEN 500 MG
1000 TABLET ORAL
Status: COMPLETED | OUTPATIENT
Start: 2023-07-10 | End: 2023-07-10

## 2023-07-10 RX ORDER — ATORVASTATIN CALCIUM 10 MG/1
20 TABLET, FILM COATED ORAL DAILY
Status: DISCONTINUED | OUTPATIENT
Start: 2023-07-11 | End: 2023-07-13 | Stop reason: HOSPADM

## 2023-07-10 RX ORDER — KETOROLAC TROMETHAMINE 30 MG/ML
15 INJECTION, SOLUTION INTRAMUSCULAR; INTRAVENOUS EVERY 6 HOURS PRN
Status: DISCONTINUED | OUTPATIENT
Start: 2023-07-10 | End: 2023-07-13 | Stop reason: HOSPADM

## 2023-07-10 RX ORDER — PANTOPRAZOLE SODIUM 40 MG/1
40 TABLET, DELAYED RELEASE ORAL
Status: DISCONTINUED | OUTPATIENT
Start: 2023-07-11 | End: 2023-07-13 | Stop reason: HOSPADM

## 2023-07-10 RX ORDER — DEXTROSE MONOHYDRATE 100 MG/ML
INJECTION, SOLUTION INTRAVENOUS CONTINUOUS PRN
Status: DISCONTINUED | OUTPATIENT
Start: 2023-07-10 | End: 2023-07-13 | Stop reason: HOSPADM

## 2023-07-10 RX ORDER — SODIUM CHLORIDE 0.9 % (FLUSH) 0.9 %
5-40 SYRINGE (ML) INJECTION PRN
Status: DISCONTINUED | OUTPATIENT
Start: 2023-07-10 | End: 2023-07-13 | Stop reason: HOSPADM

## 2023-07-10 RX ORDER — PREGABALIN 100 MG/1
100 CAPSULE ORAL 2 TIMES DAILY
Status: DISCONTINUED | OUTPATIENT
Start: 2023-07-10 | End: 2023-07-13 | Stop reason: HOSPADM

## 2023-07-10 RX ADMIN — SODIUM CHLORIDE: 9 INJECTION, SOLUTION INTRAVENOUS at 23:13

## 2023-07-10 RX ADMIN — METOPROLOL TARTRATE 25 MG: 25 TABLET, FILM COATED ORAL at 23:27

## 2023-07-10 RX ADMIN — OXYCODONE HYDROCHLORIDE 5 MG: 5 TABLET ORAL at 21:00

## 2023-07-10 RX ADMIN — ACETAMINOPHEN 1000 MG: 500 TABLET ORAL at 21:00

## 2023-07-10 RX ADMIN — PREGABALIN 100 MG: 100 CAPSULE ORAL at 23:27

## 2023-07-10 RX ADMIN — KETOROLAC TROMETHAMINE 15 MG: 30 INJECTION, SOLUTION INTRAMUSCULAR; INTRAVENOUS at 19:42

## 2023-07-10 RX ADMIN — SODIUM CHLORIDE, PRESERVATIVE FREE 10 ML: 5 INJECTION INTRAVENOUS at 23:13

## 2023-07-10 ASSESSMENT — PAIN SCALES - GENERAL
PAINLEVEL_OUTOF10: 10
PAINLEVEL_OUTOF10: 5
PAINLEVEL_OUTOF10: 9
PAINLEVEL_OUTOF10: 10
PAINLEVEL_OUTOF10: 9

## 2023-07-10 ASSESSMENT — PAIN DESCRIPTION - ORIENTATION
ORIENTATION: LEFT

## 2023-07-10 ASSESSMENT — PAIN DESCRIPTION - DESCRIPTORS
DESCRIPTORS: ACHING

## 2023-07-10 ASSESSMENT — PAIN - FUNCTIONAL ASSESSMENT: PAIN_FUNCTIONAL_ASSESSMENT: 0-10

## 2023-07-10 ASSESSMENT — LIFESTYLE VARIABLES
HOW MANY STANDARD DRINKS CONTAINING ALCOHOL DO YOU HAVE ON A TYPICAL DAY: PATIENT DOES NOT DRINK
HOW OFTEN DO YOU HAVE A DRINK CONTAINING ALCOHOL: NEVER

## 2023-07-10 ASSESSMENT — PAIN DESCRIPTION - LOCATION
LOCATION: HIP

## 2023-07-10 NOTE — ED PROVIDER NOTES
ordered. Risk  OTC drugs. Prescription drug management. Decision regarding hospitalization. ED Course as of 07/10/23 2229   Mon Jul 10, 2023   2145 I reviewed her CT head which is negative. Reviewed her hip x-ray consistent with pubic ramus fracture. Patient given medication but I am out of bear weight. Unsafe discharge home and will need admission for pain control and likely rehab placement. Patient is amenable to this. We will get screening lab work and work on admission. [JS]   3946 Reviewed her initial lab work. Hemoglobin 8.8. Appears to be her baseline. She did have at 12 hemoglobin not too long ago but that seemed unusually high and likely an error. Looks like she hovers around 9. [JS]      ED Course User Index  [JS] Ann aMarie Ladd MD         FINAL IMPRESSION     1. Closed fracture of ramus of left pubis, initial encounter ([address])    2. Ground-level fall    3. Anemia, unspecified type          DISPOSITION/PLAN   Elizabeth Boone  results have been reviewed with her. She has been counseled regarding her diagnosis, treatment, and plan. She verbally conveys understanding and agreement of the signs, symptoms, diagnosis, treatment and prognosis and additionally agrees to follow up as discussed. She also agrees with the care-plan and conveys that all of her questions have been answered. I have also provided discharge instructions for her that include: educational information regarding their diagnosis and treatment, and list of reasons why they would want to return to the ED prior to their follow-up appointment, should her condition change. CLINICAL IMPRESSION    Admit Note: Pt is being admitted by Dr. Monica Herrera. The results of their tests and reason(s) for their admission have been discussed with pt and/or available family. They convey agreement and understanding for the need to be admitted and for the admission diagnosis.      PATIENT REFERRED TO:  No follow-up provider

## 2023-07-10 NOTE — ED TRIAGE NOTES
Pt arrives in her wheelchair from home d/t L hip pain after a fall while trying to walk using her walker at [facility] around noon today. Pt reports severe pain in LEFT hip and unable to bear weight on that leg.

## 2023-07-11 ENCOUNTER — APPOINTMENT (OUTPATIENT)
Facility: HOSPITAL | Age: 78
DRG: 543 | End: 2023-07-11
Payer: MEDICAID

## 2023-07-11 LAB
GLUCOSE BLD STRIP.AUTO-MCNC: 72 MG/DL (ref 65–117)
GLUCOSE BLD STRIP.AUTO-MCNC: 82 MG/DL (ref 65–117)
GLUCOSE BLD STRIP.AUTO-MCNC: 84 MG/DL (ref 65–117)
GLUCOSE BLD STRIP.AUTO-MCNC: 87 MG/DL (ref 65–117)
GLUCOSE BLD STRIP.AUTO-MCNC: 94 MG/DL (ref 65–117)
SERVICE CMNT-IMP: NORMAL

## 2023-07-11 PROCEDURE — 1200000000 HC SEMI PRIVATE

## 2023-07-11 PROCEDURE — 6370000000 HC RX 637 (ALT 250 FOR IP): Performed by: INTERNAL MEDICINE

## 2023-07-11 PROCEDURE — 6360000002 HC RX W HCPCS: Performed by: STUDENT IN AN ORGANIZED HEALTH CARE EDUCATION/TRAINING PROGRAM

## 2023-07-11 PROCEDURE — 6370000000 HC RX 637 (ALT 250 FOR IP): Performed by: STUDENT IN AN ORGANIZED HEALTH CARE EDUCATION/TRAINING PROGRAM

## 2023-07-11 PROCEDURE — 97530 THERAPEUTIC ACTIVITIES: CPT

## 2023-07-11 PROCEDURE — 82962 GLUCOSE BLOOD TEST: CPT

## 2023-07-11 PROCEDURE — 2580000003 HC RX 258: Performed by: STUDENT IN AN ORGANIZED HEALTH CARE EDUCATION/TRAINING PROGRAM

## 2023-07-11 PROCEDURE — 97165 OT EVAL LOW COMPLEX 30 MIN: CPT | Performed by: OCCUPATIONAL THERAPIST

## 2023-07-11 PROCEDURE — 97535 SELF CARE MNGMENT TRAINING: CPT | Performed by: OCCUPATIONAL THERAPIST

## 2023-07-11 PROCEDURE — 97161 PT EVAL LOW COMPLEX 20 MIN: CPT

## 2023-07-11 PROCEDURE — 72192 CT PELVIS W/O DYE: CPT

## 2023-07-11 RX ORDER — CYANOCOBALAMIN 1000 UG/ML
1000 INJECTION, SOLUTION INTRAMUSCULAR; SUBCUTANEOUS
COMMUNITY

## 2023-07-11 RX ORDER — CEPHALEXIN 250 MG/1
500 CAPSULE ORAL EVERY 6 HOURS SCHEDULED
Status: DISCONTINUED | OUTPATIENT
Start: 2023-07-11 | End: 2023-07-12

## 2023-07-11 RX ORDER — PREDNISONE 10 MG/1
10 TABLET ORAL DAILY
COMMUNITY

## 2023-07-11 RX ADMIN — ATORVASTATIN CALCIUM 20 MG: 10 TABLET, FILM COATED ORAL at 08:29

## 2023-07-11 RX ADMIN — ONDANSETRON 4 MG: 4 TABLET, ORALLY DISINTEGRATING ORAL at 12:55

## 2023-07-11 RX ADMIN — CEPHALEXIN 500 MG: 250 CAPSULE ORAL at 12:12

## 2023-07-11 RX ADMIN — SERTRALINE HYDROCHLORIDE 50 MG: 50 TABLET ORAL at 08:29

## 2023-07-11 RX ADMIN — PANTOPRAZOLE SODIUM 40 MG: 40 TABLET, DELAYED RELEASE ORAL at 08:29

## 2023-07-11 RX ADMIN — CEPHALEXIN 500 MG: 250 CAPSULE ORAL at 23:39

## 2023-07-11 RX ADMIN — OXYCODONE HYDROCHLORIDE 5 MG: 5 TABLET ORAL at 05:30

## 2023-07-11 RX ADMIN — ASPIRIN 81 MG 81 MG: 81 TABLET ORAL at 08:29

## 2023-07-11 RX ADMIN — CEPHALEXIN 500 MG: 250 CAPSULE ORAL at 18:30

## 2023-07-11 RX ADMIN — PREGABALIN 100 MG: 100 CAPSULE ORAL at 21:47

## 2023-07-11 RX ADMIN — METOPROLOL TARTRATE 25 MG: 25 TABLET, FILM COATED ORAL at 08:29

## 2023-07-11 RX ADMIN — LATANOPROST 1 DROP: 50 SOLUTION OPHTHALMIC at 22:10

## 2023-07-11 RX ADMIN — ENOXAPARIN SODIUM 40 MG: 100 INJECTION SUBCUTANEOUS at 08:34

## 2023-07-11 RX ADMIN — METOPROLOL TARTRATE 25 MG: 25 TABLET, FILM COATED ORAL at 21:47

## 2023-07-11 RX ADMIN — SODIUM CHLORIDE: 9 INJECTION, SOLUTION INTRAVENOUS at 19:42

## 2023-07-11 RX ADMIN — SODIUM CHLORIDE, PRESERVATIVE FREE 10 ML: 5 INJECTION INTRAVENOUS at 08:37

## 2023-07-11 RX ADMIN — OXYCODONE HYDROCHLORIDE 5 MG: 5 TABLET ORAL at 21:47

## 2023-07-11 RX ADMIN — PREGABALIN 100 MG: 100 CAPSULE ORAL at 08:34

## 2023-07-11 ASSESSMENT — PAIN SCALES - GENERAL
PAINLEVEL_OUTOF10: 4
PAINLEVEL_OUTOF10: 0
PAINLEVEL_OUTOF10: 3
PAINLEVEL_OUTOF10: 7

## 2023-07-11 ASSESSMENT — PAIN DESCRIPTION - ORIENTATION
ORIENTATION: LEFT

## 2023-07-11 ASSESSMENT — PAIN DESCRIPTION - DESCRIPTORS
DESCRIPTORS: ACHING;OTHER (COMMENT)
DESCRIPTORS: NAGGING

## 2023-07-11 ASSESSMENT — PAIN DESCRIPTION - LOCATION
LOCATION: HIP

## 2023-07-11 NOTE — H&P
History & Physical    Primary Care Provider: Sharon Berry MD  Source of Information: Patient and chart review    History of Presenting Illness:   Christopher Browning is a 78 y.o. female with history of rheumatoid arthritis, hypertension, insulin-dependent type 2 diabetes, CKD stage III, right great toe amputation, degenerative disc disease, PVD who presented to the hospital for evaluation after ground-level fall. Was walking into [facility] when she lost her footing after her Rolator got caught in some concrete and she fell to her left side. She remembers striking her head but did not lose any consciousness. Had a mild headache which has since resolved. Admits to history of frequent falls. Noted left hip pain shortly after her fall with difficulty moving her leg. The patient denies any fever, chills, chest or abdominal pain, nausea, vomiting, cough, congestion, recent illness, palpitations, or dysuria. Remarkable vitals on ER Presentation:   Labs Remarkable for: /70  ER Images: CBC and CMP in progress  ER Rx: CT head: No acute process  X-ray of left elbow: Focal nodular soft tissue swelling. Nonspecific. X-ray of hip: Suspected acute left superior and inferior pubic ramus fractures  ER treatment: Toradol, Roxicodone, Tylenol     Review of Systems:  Pertinent items are noted in the History of Present Illness.      Past Medical History:   Diagnosis Date    Asthma     Chronic pain 5/8/2020    DDD (degenerative disc disease), lumbar     Diabetes ([address])     Gastritis     GERD (gastroesophageal reflux disease)     Glaucoma     Hearing loss 5/8/2020    Hiatal hernia     High cholesterol     Hypertension     Hypocalcemia 5/8/2020    Peripheral vascular disease (HCC)     RA (rheumatoid arthritis) ([address])     Sarcoidosis 1999    MCV    Sleep apnea     has not used cpap in years since weight loss      Past Surgical History:   Procedure Laterality Date    CARDIAC CATHETERIZATION      s/p PCI with stenting in 1998

## 2023-07-11 NOTE — ED NOTES
TRANSFER - OUT REPORT:    Verbal report given to RN on Sourav Strickland  being transferred to [facility] for routine progression of patient care       Report consisted of patient's Situation, Background, Assessment and   Recommendations(SBAR). Information from the following report(s) ED SBAR, Intake/Output, MAR, and Recent Results was reviewed with the receiving nurse. [place] Fall Assessment:    Presents to emergency department  because of falls (Syncope, seizure, or loss of consciousness): Yes  Age > 79: Yes  Altered Mental Status, Intoxication with alcohol or substance confusion (Disorientation, impaired judgment, poor safety awaremess, or inability to follow instructions): No  Impaired Mobility: Ambulates or transfers with assistive devices or assistance; Unable to ambulate or transer.: Yes  Nursing Judgement: Yes          Lines:   Peripheral IV 07/10/23 Left;Dorsal Forearm (Active)   Site Assessment Clean, dry & intact 07/10/23 2219   Line Status Blood return noted;Normal saline locked;Specimen collected 07/10/23 2219   Line Care Cap changed 07/10/23 2219   Phlebitis Assessment No symptoms 07/10/23 2219   Infiltration Assessment 0 07/10/23 2219   Alcohol Cap Used Yes 07/10/23 2219   Dressing Status New dressing applied;Clean, dry & intact 07/10/23 2219   Dressing Type Transparent 07/10/23 2219   Dressing Intervention New 07/10/23 2219        Opportunity for questions and clarification was provided.       Patient transported with:  Sangeeta Roth RN  07/10/23 8787

## 2023-07-12 LAB
EST. AVERAGE GLUCOSE BLD GHB EST-MCNC: 82 MG/DL
GLUCOSE BLD STRIP.AUTO-MCNC: 106 MG/DL (ref 65–117)
GLUCOSE BLD STRIP.AUTO-MCNC: 115 MG/DL (ref 65–117)
GLUCOSE BLD STRIP.AUTO-MCNC: 119 MG/DL (ref 65–117)
GLUCOSE BLD STRIP.AUTO-MCNC: 97 MG/DL (ref 65–117)
HBA1C MFR BLD: 4.5 % (ref 4–5.6)
SERVICE CMNT-IMP: ABNORMAL
SERVICE CMNT-IMP: NORMAL

## 2023-07-12 PROCEDURE — 6360000002 HC RX W HCPCS: Performed by: STUDENT IN AN ORGANIZED HEALTH CARE EDUCATION/TRAINING PROGRAM

## 2023-07-12 PROCEDURE — 1200000000 HC SEMI PRIVATE

## 2023-07-12 PROCEDURE — 97116 GAIT TRAINING THERAPY: CPT

## 2023-07-12 PROCEDURE — 6370000000 HC RX 637 (ALT 250 FOR IP): Performed by: STUDENT IN AN ORGANIZED HEALTH CARE EDUCATION/TRAINING PROGRAM

## 2023-07-12 PROCEDURE — 6370000000 HC RX 637 (ALT 250 FOR IP): Performed by: INTERNAL MEDICINE

## 2023-07-12 PROCEDURE — 82962 GLUCOSE BLOOD TEST: CPT

## 2023-07-12 PROCEDURE — 97530 THERAPEUTIC ACTIVITIES: CPT

## 2023-07-12 PROCEDURE — 2580000003 HC RX 258: Performed by: STUDENT IN AN ORGANIZED HEALTH CARE EDUCATION/TRAINING PROGRAM

## 2023-07-12 RX ORDER — NALOXONE HYDROCHLORIDE 0.4 MG/ML
0.4 INJECTION, SOLUTION INTRAMUSCULAR; INTRAVENOUS; SUBCUTANEOUS PRN
Status: DISCONTINUED | OUTPATIENT
Start: 2023-07-12 | End: 2023-07-13 | Stop reason: HOSPADM

## 2023-07-12 RX ADMIN — ENOXAPARIN SODIUM 40 MG: 100 INJECTION SUBCUTANEOUS at 09:38

## 2023-07-12 RX ADMIN — SODIUM CHLORIDE: 9 INJECTION, SOLUTION INTRAVENOUS at 03:53

## 2023-07-12 RX ADMIN — METOPROLOL TARTRATE 25 MG: 25 TABLET, FILM COATED ORAL at 20:12

## 2023-07-12 RX ADMIN — CEPHALEXIN 500 MG: 250 CAPSULE ORAL at 05:57

## 2023-07-12 RX ADMIN — PREGABALIN 100 MG: 100 CAPSULE ORAL at 20:12

## 2023-07-12 RX ADMIN — PANTOPRAZOLE SODIUM 40 MG: 40 TABLET, DELAYED RELEASE ORAL at 09:38

## 2023-07-12 RX ADMIN — METOPROLOL TARTRATE 25 MG: 25 TABLET, FILM COATED ORAL at 09:40

## 2023-07-12 RX ADMIN — SERTRALINE HYDROCHLORIDE 50 MG: 50 TABLET ORAL at 09:40

## 2023-07-12 RX ADMIN — SODIUM CHLORIDE, PRESERVATIVE FREE 10 ML: 5 INJECTION INTRAVENOUS at 09:41

## 2023-07-12 RX ADMIN — LATANOPROST 1 DROP: 50 SOLUTION OPHTHALMIC at 20:12

## 2023-07-12 RX ADMIN — ASPIRIN 81 MG 81 MG: 81 TABLET ORAL at 09:40

## 2023-07-12 RX ADMIN — OXYCODONE HYDROCHLORIDE 5 MG: 5 TABLET ORAL at 03:52

## 2023-07-12 RX ADMIN — CEPHALEXIN 500 MG: 250 CAPSULE ORAL at 12:30

## 2023-07-12 RX ADMIN — ATORVASTATIN CALCIUM 20 MG: 10 TABLET, FILM COATED ORAL at 09:39

## 2023-07-12 RX ADMIN — SODIUM CHLORIDE: 9 INJECTION, SOLUTION INTRAVENOUS at 15:18

## 2023-07-12 RX ADMIN — OXYCODONE HYDROCHLORIDE 5 MG: 5 TABLET ORAL at 23:32

## 2023-07-12 RX ADMIN — PREGABALIN 100 MG: 100 CAPSULE ORAL at 09:40

## 2023-07-12 ASSESSMENT — PAIN DESCRIPTION - LOCATION
LOCATION: HIP;OTHER (COMMENT)
LOCATION: GENERALIZED

## 2023-07-12 ASSESSMENT — PAIN SCALES - GENERAL
PAINLEVEL_OUTOF10: 7
PAINLEVEL_OUTOF10: 8
PAINLEVEL_OUTOF10: 0
PAINLEVEL_OUTOF10: 0

## 2023-07-12 ASSESSMENT — PAIN DESCRIPTION - ORIENTATION
ORIENTATION: OTHER (COMMENT)
ORIENTATION: LEFT

## 2023-07-12 ASSESSMENT — PAIN DESCRIPTION - DESCRIPTORS
DESCRIPTORS: ACHING;OTHER (COMMENT)
DESCRIPTORS: ACHING;OTHER (COMMENT)

## 2023-07-12 NOTE — PLAN OF CARE
Problem: Pain  Goal: Verbalizes/displays adequate comfort level or baseline comfort level  Outcome: Progressing     Problem: Safety - Adult  Goal: Free from fall injury  Outcome: Progressing     Problem: Skin/Tissue Integrity  Goal: Absence of new skin breakdown  Description: 1. Monitor for areas of redness and/or skin breakdown  2. Assess vascular access sites hourly  3. Every 4-6 hours minimum:  Change oxygen saturation probe site  4. Every 4-6 hours:  If on nasal continuous positive airway pressure, respiratory therapy assess nares and determine need for appliance change or resting period.   Outcome: Progressing
Problem: Physical Therapy - Adult  Goal: By Discharge: Performs mobility at highest level of function for planned discharge setting. See evaluation for individualized goals. Description: FUNCTIONAL STATUS PRIOR TO ADMISSION: Patient was modified independent using a rolling walker and rollator for functional mobility. HOME SUPPORT PRIOR TO ADMISSION: The patient lived alone with aide M-F 8a-2p; son come over to assist with meds (but works and unable to provide more than intermittent support) to provide assistance. Physical Therapy Goals  Initiated 7/11/2023  1. Patient will move from supine to sit and sit to supine, scoot up and down, and roll side to side in bed with modified independence within 7 day(s). 2.  Patient will perform sit to stand with modified independence within 7 day(s). 3.  Patient will transfer from bed to chair and chair to bed with modified independence using the least restrictive device within 7 day(s). 4.  Patient will ambulate with modified independence for 75 feet with the least restrictive device within 7 day(s). Outcome: Progressing     PHYSICAL THERAPY EVALUATION    Patient: Yaneth Iglesias (32 y.o. female)  Date: 7/11/2023  Primary Diagnosis: Pubic ramus fracture, left, closed, initial encounter [facility]) [S32.592A]  Closed fracture of ramus of left pubis, initial encounter ([facility]) [S32.592A]  Anemia, unspecified type [D64.9]  Ground-level fall [W18.30XA]       Precautions: Fall Risk, Weight Bearing   Left Lower Extremity Weight Bearing: Weight Bearing As Tolerated              ASSESSMENT :   DEFICITS/IMPAIRMENTS:   The patient is limited by decreased functional mobility, independence in ADLs, high-level IADLs, ROM, strength, sensation, activity tolerance, endurance, safety awareness, attention/concentration, balance, posture, fine-motor control, increased pain levels.  Patient able to transfer out of bed to/from bedside commode with overall minimal assist of 1 person with
Problem: Physical Therapy - Adult  Goal: By Discharge: Performs mobility at highest level of function for planned discharge setting. See evaluation for individualized goals. Description: FUNCTIONAL STATUS PRIOR TO ADMISSION: Patient was modified independent using a rolling walker and rollator for functional mobility. HOME SUPPORT PRIOR TO ADMISSION: The patient lived alone with aide M-F 8a-2p; son come over to assist with meds (but works and unable to provide more than intermittent support) to provide assistance. Physical Therapy Goals  Initiated 7/11/2023  1. Patient will move from supine to sit and sit to supine, scoot up and down, and roll side to side in bed with modified independence within 7 day(s). 2.  Patient will perform sit to stand with modified independence within 7 day(s). 3.  Patient will transfer from bed to chair and chair to bed with modified independence using the least restrictive device within 7 day(s). 4.  Patient will ambulate with modified independence for 75 feet with the least restrictive device within 7 day(s). Outcome: Progressing     PHYSICAL THERAPY TREATMENT    Patient: Dionicio Padilla (22 y.o. female)  Date: 7/12/2023  Diagnosis: Pubic ramus fracture, left, closed, initial encounter [facility]) [S32.592A]  Closed fracture of ramus of left pubis, initial encounter ([address]) [S32.592A]  Anemia, unspecified type [D64.9]  Ground-level fall [W18.30XA] Pubic ramus fracture, left, closed, initial encounter ([address])      Precautions: Fall Risk, Weight Bearing   Left Lower Extremity Weight Bearing: Weight Bearing As Tolerated              ASSESSMENT:  Patient continues to benefit from skilled PT services and is progressing towards goals. Able to progress to short gait to chair although remains with difficulty with weightbearing through L LE. She reports baseline arthritis in shoulders which makes it difficult for her to push through her UEs to unweight L LE during gait.  Anticipate slow,
stenting in [address]     22 yrs    GASTRIC BYPASS SURGERY      ORTHOPEDIC SURGERY Bilateral     carpal tunnel surgery     SHOULDER ARTHROPLASTY Right     x 2     TOTAL KNEE ARTHROPLASTY Bilateral           Expanded or extensive additional review of patient history:   Lives With: Alone  Type of Home: Apartment  Home Layout: One level  Home Access: Level entry        Bathroom Shower/Tub: Tub/Shower unit, Shower chair with back     Bathroom Equipment: Shower chair     Home Equipment: Perlie Zuñiga, rolling, Rollator  Has the patient had two or more falls in the past year or any fall with injury in the past year?: Yes (fall prior to admission; multiple falls in the home)        Hand Dominance: right     EXAMINATION OF PERFORMANCE DEFICITS:    Cognitive/Behavioral Status:  Orientation  Overall Orientation Status: Within Normal Limits  Orientation Level: Oriented X4  Cognition  Overall Cognitive Status: WNL    Hearing:   Hearing  Hearing: Within functional limits    Vision/Perceptual:    Vision - Basic Assessment  Prior Vision: Wears glasses all the time  Visual History: No significant visual history     Vision  Vision: Impaired  Vision Exceptions: Wears glasses for reading       Range of Motion:   AROM: Generally decreased, functional  PROM: Generally decreased, functional      Strength:  Strength: Generally decreased, functional      Coordination:  Coordination: Generally decreased, functional (B hand numbness)            Tone & Sensation:   Tone: Normal  Sensation: Impaired (B hand numbness)          Functional Mobility and Transfers for ADLs:    Bed Mobility:     Bed Mobility Training  Bed Mobility Training: Yes  Overall Level of Assistance: Minimum assistance (for LLE)  Interventions: Tactile cues; Verbal cues; Visual cues  Rolling: Minimum assistance  Supine to Sit: Minimum assistance  Scooting: Supervision    Transfers:     Transfer Training  Transfer Training: Yes  Overall Level of Assistance: Minimum

## 2023-07-13 VITALS
OXYGEN SATURATION: 97 % | RESPIRATION RATE: 16 BRPM | HEART RATE: 68 BPM | BODY MASS INDEX: 30.51 KG/M2 | TEMPERATURE: 98 F | HEIGHT: 64 IN | DIASTOLIC BLOOD PRESSURE: 74 MMHG | WEIGHT: 178.7 LBS | SYSTOLIC BLOOD PRESSURE: 157 MMHG

## 2023-07-13 LAB
ALBUMIN SERPL-MCNC: 2.7 G/DL (ref 3.5–5)
ALBUMIN/GLOB SERPL: 0.8 (ref 1.1–2.2)
ALP SERPL-CCNC: 79 U/L (ref 45–117)
ALT SERPL-CCNC: 14 U/L (ref 12–78)
ANION GAP SERPL CALC-SCNC: 10 MMOL/L (ref 5–15)
AST SERPL-CCNC: 43 U/L (ref 15–37)
BASOPHILS # BLD: 0.1 K/UL (ref 0–0.1)
BASOPHILS NFR BLD: 1 % (ref 0–1)
BILIRUB SERPL-MCNC: 0.6 MG/DL (ref 0.2–1)
BUN SERPL-MCNC: 20 MG/DL (ref 6–20)
BUN/CREAT SERPL: 19 (ref 12–20)
CALCIUM SERPL-MCNC: 7.5 MG/DL (ref 8.5–10.1)
CHLORIDE SERPL-SCNC: 109 MMOL/L (ref 97–108)
CO2 SERPL-SCNC: 21 MMOL/L (ref 21–32)
CREAT SERPL-MCNC: 1.07 MG/DL (ref 0.55–1.02)
DIFFERENTIAL METHOD BLD: ABNORMAL
EOSINOPHIL # BLD: 0.2 K/UL (ref 0–0.4)
EOSINOPHIL NFR BLD: 4 % (ref 0–7)
ERYTHROCYTE [DISTWIDTH] IN BLOOD BY AUTOMATED COUNT: 13.2 % (ref 11.5–14.5)
GLOBULIN SER CALC-MCNC: 3.6 G/DL (ref 2–4)
GLUCOSE BLD STRIP.AUTO-MCNC: 100 MG/DL (ref 65–117)
GLUCOSE BLD STRIP.AUTO-MCNC: 80 MG/DL (ref 65–117)
GLUCOSE SERPL-MCNC: 118 MG/DL (ref 65–100)
HCT VFR BLD AUTO: 26.8 % (ref 35–47)
HGB BLD-MCNC: 8.5 G/DL (ref 11.5–16)
IMM GRANULOCYTES # BLD AUTO: 0.1 K/UL (ref 0–0.04)
IMM GRANULOCYTES NFR BLD AUTO: 2 % (ref 0–0.5)
LYMPHOCYTES # BLD: 1 K/UL (ref 0.8–3.5)
LYMPHOCYTES NFR BLD: 18 % (ref 12–49)
MCH RBC QN AUTO: 30.2 PG (ref 26–34)
MCHC RBC AUTO-ENTMCNC: 31.7 G/DL (ref 30–36.5)
MCV RBC AUTO: 95.4 FL (ref 80–99)
MONOCYTES # BLD: 0.7 K/UL (ref 0–1)
MONOCYTES NFR BLD: 13 % (ref 5–13)
NEUTS SEG # BLD: 3.4 K/UL (ref 1.8–8)
NEUTS SEG NFR BLD: 62 % (ref 32–75)
NRBC # BLD: 0 K/UL (ref 0–0.01)
NRBC BLD-RTO: 0 PER 100 WBC
PLATELET # BLD AUTO: 192 K/UL (ref 150–400)
PMV BLD AUTO: 10.2 FL (ref 8.9–12.9)
POTASSIUM SERPL-SCNC: 4.7 MMOL/L (ref 3.5–5.1)
PROT SERPL-MCNC: 6.3 G/DL (ref 6.4–8.2)
RBC # BLD AUTO: 2.81 M/UL (ref 3.8–5.2)
SERVICE CMNT-IMP: NORMAL
SERVICE CMNT-IMP: NORMAL
SODIUM SERPL-SCNC: 140 MMOL/L (ref 136–145)
WBC # BLD AUTO: 5.4 K/UL (ref 3.6–11)

## 2023-07-13 PROCEDURE — 85025 COMPLETE CBC W/AUTO DIFF WBC: CPT

## 2023-07-13 PROCEDURE — 82962 GLUCOSE BLOOD TEST: CPT

## 2023-07-13 PROCEDURE — 36415 COLL VENOUS BLD VENIPUNCTURE: CPT

## 2023-07-13 PROCEDURE — 80053 COMPREHEN METABOLIC PANEL: CPT

## 2023-07-13 PROCEDURE — 2580000003 HC RX 258: Performed by: STUDENT IN AN ORGANIZED HEALTH CARE EDUCATION/TRAINING PROGRAM

## 2023-07-13 PROCEDURE — 6360000002 HC RX W HCPCS: Performed by: STUDENT IN AN ORGANIZED HEALTH CARE EDUCATION/TRAINING PROGRAM

## 2023-07-13 PROCEDURE — 6370000000 HC RX 637 (ALT 250 FOR IP): Performed by: STUDENT IN AN ORGANIZED HEALTH CARE EDUCATION/TRAINING PROGRAM

## 2023-07-13 RX ORDER — OXYCODONE HYDROCHLORIDE 5 MG/1
5 TABLET ORAL EVERY 6 HOURS PRN
Qty: 10 TABLET | Refills: 0 | Status: SHIPPED | OUTPATIENT
Start: 2023-07-13 | End: 2023-07-16

## 2023-07-13 RX ADMIN — ENOXAPARIN SODIUM 40 MG: 100 INJECTION SUBCUTANEOUS at 08:31

## 2023-07-13 RX ADMIN — OXYCODONE HYDROCHLORIDE 5 MG: 5 TABLET ORAL at 07:38

## 2023-07-13 RX ADMIN — OXYCODONE HYDROCHLORIDE 5 MG: 5 TABLET ORAL at 14:14

## 2023-07-13 RX ADMIN — PREGABALIN 100 MG: 100 CAPSULE ORAL at 08:30

## 2023-07-13 RX ADMIN — SERTRALINE HYDROCHLORIDE 50 MG: 50 TABLET ORAL at 08:30

## 2023-07-13 RX ADMIN — ATORVASTATIN CALCIUM 20 MG: 10 TABLET, FILM COATED ORAL at 08:29

## 2023-07-13 RX ADMIN — PANTOPRAZOLE SODIUM 40 MG: 40 TABLET, DELAYED RELEASE ORAL at 06:45

## 2023-07-13 RX ADMIN — ASPIRIN 81 MG 81 MG: 81 TABLET ORAL at 08:30

## 2023-07-13 RX ADMIN — METOPROLOL TARTRATE 25 MG: 25 TABLET, FILM COATED ORAL at 08:30

## 2023-07-13 RX ADMIN — SODIUM CHLORIDE, PRESERVATIVE FREE 10 ML: 5 INJECTION INTRAVENOUS at 08:30

## 2023-07-13 ASSESSMENT — PAIN DESCRIPTION - FREQUENCY
FREQUENCY: CONTINUOUS
FREQUENCY: CONTINUOUS

## 2023-07-13 ASSESSMENT — PAIN DESCRIPTION - ONSET
ONSET: OTHER (COMMENT)
ONSET: OTHER (COMMENT)

## 2023-07-13 ASSESSMENT — PAIN DESCRIPTION - DESCRIPTORS
DESCRIPTORS: SHARP
DESCRIPTORS: ACHING

## 2023-07-13 ASSESSMENT — PAIN DESCRIPTION - ORIENTATION
ORIENTATION: LEFT
ORIENTATION: LEFT

## 2023-07-13 ASSESSMENT — PAIN SCALES - GENERAL
PAINLEVEL_OUTOF10: 8
PAINLEVEL_OUTOF10: 7
PAINLEVEL_OUTOF10: 4
PAINLEVEL_OUTOF10: 6

## 2023-07-13 ASSESSMENT — PAIN DESCRIPTION - PAIN TYPE
TYPE: CHRONIC PAIN;ACUTE PAIN
TYPE: ACUTE PAIN

## 2023-07-13 ASSESSMENT — PAIN DESCRIPTION - LOCATION
LOCATION: HIP
LOCATION: HIP

## 2023-07-13 NOTE — DISCHARGE SUMMARY
auscultation bilaterally   CVS: S1 S2 heard, Capillary refill less than 2 seconds  Abd: soft/ non tender, non distended, BS physiological,   Ext: no clubbing, no cyanosis, no edema, brisk 2+ DP pulses, deformities hands b/l  Neuro/Psych: pleasant mood and affect, CN 2-12 grossly intact, sensory grossly within normal limit, Strength decreased left LE due to pain,  Skin: warm        CHRONIC MEDICAL DIAGNOSES:  Principal Problem:    Pubic ramus fracture, left, closed, initial encounter ([facility])  Resolved Problems:    * No resolved hospital problems.  *        Greater than 31 minutes were spent with the patient on counseling and coordination of care    Signed:   Zana Huntley MD  7/13/2023  11:26 AM

## 2023-07-13 NOTE — DISCHARGE INSTR - COC
7Continuity of Care Form    Patient Name: Bhupendra Segura   :  1945  MRN:  [id number]    [address] date:  7/10/2023  Discharge date:  2023    Code Status Order: Full Code   Advance Directives:     Admitting Physician:  Arnulfo Mcneil MD  PCP: Saúl Funk MD    Discharging Nurse: Ivette Smith, [address] Unit/Room#: 80/5  Discharging Unit Phone Number: [place] [phone]    Emergency Contact:   Extended Emergency Contact Information  Primary Emergency Contact: Jerri Foley  Address: [address] [place] [address], [address] [place] [address] of [address] [address] Phone: [phone]  Relation: Child  Secondary Emergency Contact: Aarti Luis Alberto Lopeze Phone: [phone]  Mobile Phone: [phone]  Relation: Child    Past Surgical History:  Past Surgical History:   Procedure Laterality Date    CARDIAC CATHETERIZATION      s/p PCI with stenting in [address] [place] [address] Left     22 yrs    GASTRIC BYPASS SURGERY      ORTHOPEDIC SURGERY Bilateral     carpal tunnel surgery     SHOULDER ARTHROPLASTY Right     x 2     TOTAL KNEE ARTHROPLASTY Bilateral        Immunization History:   Immunization History   Administered Date(s) Administered    COVID-19, J&J, (age 18y+), IM, 0.5 mL 2021, 2021    COVID-19, PFIZER Bivalent, DO NOT Dilute, (age 12y+), IM, 30 mcg/0.3 mL 2023    COVID-19, PFIZER PURPLE top, DILUTE for use, (age 15 y+), 30mcg/0.3mL 2021, 2021, 10/24/2021    Influenza Virus Vaccine 10/22/2019, 2021    Influenza, FLUARIX, FLULAVAL, FLUZONE (age 10 mo+) AND AFLURIA, (age 1 y+), PF, 0.5mL 2021    Influenza, High Dose (Fluzone 65 yrs and older) 2018    Influenza, Triv, inactivated, subunit, adjuvanted, IM (Fluad 65 yrs and older) 2019    Pneumococcal, PCV20, PREVNAR 20, (age 18y+), IM, 0.5mL 2022    Pneumococcal, PPSV23, PNEUMOVAX 23, (age 2y+), SC/IM, 0.5mL 2020       Active Problems:  Patient Active

## 2023-07-14 ENCOUNTER — FOLLOWUP TELEPHONE ENCOUNTER (OUTPATIENT)
Facility: HOSPITAL | Age: 78
End: 2023-07-14

## 2023-07-14 NOTE — CARE COORDINATION
CM phoned Medicaid for Stretcher transport for transport from [facility] to [facility] and rehab after being informed that patient was approved and authorized from [facility] on 7/12/23 at 423 pm. She stated it would be ok to arrange transport for the am as it is late in the day. 7/13/23   950 am Received phone call from Kenney Arceo with room update at [facility]. Room 62 B will be patients room number    CM informed patient and she stated that was great. Freedom of choice was explained and patient stated [facility] was where she wanted to go and to make sure her sons were informed. CM informed both sons Thomasenia Dakins and Liane Both and updated them with transport, facility and room number information and they both verbalized understanding. CM communicated with MD and bedside nurse about the facility and transport and they verbalized understanding. Primary care and specialist information on AVS.      Rajat Valero [facility] transportation  [phone]   Trip # [id number]      Damaris Mackenzie RN, CM    Transition of Care Plan:    RUR: 15%  Prior Level of Functioning:   Disposition: IADL and ADL assistance  If SNF or IPR: Date FOC offered: 7/12/23 pm  Date FOC received: 7/13/23  Accepting facility: [facility]  Date authorization started with reference number: 7/11/23   [id number]  Date authorization received and expires: Follow up appointments: Providers on AVS patient/facility to follow up with appointments  DME needed: Facility to determine  Transportation at discharge: Medicaid stretcher transport  IM/IMM Medicare/ letter given: Yes  Is patient a  and connected with VA? NO     Caregiver Contact: [place]  Discharge Caregiver contacted prior to discharge? Yes contacted and informed on 7/12/23 HOSP OTTONIEL CORTES needed?  No  Barriers to discharge: Needed auth approval    Damaris Mackenzie RN, CM
Late entry for 7/13/23    VONNIE    RUR 15%    PLAN    SNF    2nd IMM   Important Message from Medicare Letter provided to Gita Bergeron. Oral explanation was provided and all questions answered. Signed document placed on the bedside chart to be scanned under the media tab. Copy provided to Gita Bergeron    Patient signed UAI copy. Copy placed on chart, sent to facility and given to patient       07/13/23 1500   Services At/After Discharge   Transition of Care Consult (CM Consult) SNF; Transportation Assistance; Discharge Planning   Partner SNF Yes   Services At/After Discharge [address] (SNF); In ambulance   [address] Provided? No   Mode of Transport at Discharge [address] [facility] [address] Time of Discharge   (Transportation time given by ambulance was 1:20 then 1:45pm)   Condition of Participation: Discharge Planning   The Plan for Transition of Care is related to the following treatment goals: SNF   The Patient and/or Patient Representative was provided with a Choice of Provider? Patient   The Patient and/Or Patient Representative agree with the Discharge Plan? Yes   Freedom of Choice list was provided with basic dialogue that supports the patient's individualized plan of care/goals, treatment preferences, and shares the quality data associated with the providers?   Yes     Cristopher HERMAN
VONNIE    RUR-16%    PLAN    SNF    CM phoned Uma Rohit at [address] to Request UAI and left message with CM name and Number. Uma Rohit returned call and verified patient and stated she will fax UAI.     Omar Gomes
VONNIE   RUR 16 %     IDR Rounds this am with MD and team   KARINE ready for discharge - waiting final decision from insurance Co.   Continue therapy  Wound Care Consult to evaluate Wounds. Plan   Waiting for Authorization from insurance [place]   SNF -Choice of facility- [facility] - information  fax  [phone]. Asked for MAR to be fax   UAI obtained from [place]- this sent to the nursing facility   Second IMM letter  needs to be given   Transportation Medicaid needs to be arranged   Coordination with family   Arrange specialist appointment.          Liaison from [address] [place] [address] was onsite to assess patient -       [address] [facility],[address]   [phone]     Addendum  5:10PM   The [address] [place] [address] called and they have obtained Authorization- they can accept patient in the am.  CM will coordinate transportation Medicaid
ability)   Can patient return to prior living arrangement Yes   Ability to make needs known: Good   Family able to assist with home care needs: Yes  (Patient has 2 sons and one sone that stops in nightly)   Would you like for me to discuss the discharge plan with any other family members/significant others, and if so, who? Yes  (sons if needed)   Financial Resources Medicaid; Medicare   Community Resources ECF/Home Care   CM/SW Referral Other (see comment)  (N/A)   Social/Functional History   Lives With Alone   Type of Home Apartment   Home Layout One level   Discharge Planning   Type of Residence [address] Alone   Current Services Prior To Admission Durable Medical Equipment;Private Duty Homecare   Current DME Prior to [employer]; Bedside [employer]; [address]   DME Ordered? No   Potential Assistance Purchasing Medications No   Type of Home Care Services Aide Services  (Patient to resume Home care services post SNF Discharge)   Patient expects to be discharged to: Skilled nursing facility   Follow Up Appointment: Best Day/Time    (N/A)   History of falls? 1   Services At/After Discharge   Transition of Care Consult (CM Consult) SNF; Discharge Planning   Partner SNF Yes   Services At/After Discharge [address] (SNF); Transport; In ambulance   [address] Provided? No   Mode of Transport at Discharge BLS   Condition of Participation: Discharge Planning   The Plan for Transition of Care is related to the following treatment goals: SNF   The Patient and/or Patient Representative was provided with a Choice of Provider? Patient   Freedom of Choice list was provided with basic dialogue that supports the patient's individualized plan of care/goals, treatment preferences, and shares the quality data associated with the providers?   Yes     Damaris Mackenzie RN CM

## 2023-07-14 NOTE — TELEPHONE ENCOUNTER
CM called [facility] at [phone] to confirm that pt arrived safely at their facility yesterday. They confirmed that she did. CM to close case and will reopen upon further contact from pt or pt's family.     Antoine Carver, [address] , Care Manager  [phone]

## 2023-07-17 ENCOUNTER — TELEPHONE (OUTPATIENT)
Age: 78
End: 2023-07-17

## 2023-07-17 NOTE — TELEPHONE ENCOUNTER
----- Message from Carlos Sutton sent at 7/17/2023  8:54 AM EDT -----  Subject: Message to Provider    QUESTIONS  Information for Provider? Patient called and said she has been trying to   reach the  to let him know that she broke her pelvic bone. She now has   been transferred over to [facility]. and wants to talk with the   . Please call her as soon as possible. Thank you.   ---------------------------------------------------------------------------  --------------  Mita Miller INFO  [phone]; OK to leave message on voicemail  ---------------------------------------------------------------------------  --------------  SCRIPT ANSWERS  Relationship to Patient?  Self

## 2023-08-03 DIAGNOSIS — E34.9 NEUROPATHY ASSOCIATED WITH ENDOCRINE DISORDER (HCC): Primary | ICD-10-CM

## 2023-08-03 DIAGNOSIS — S32.9XXS PELVIS FRACTURE, RIGHT, SEQUELA: ICD-10-CM

## 2023-08-03 DIAGNOSIS — G63 NEUROPATHY ASSOCIATED WITH ENDOCRINE DISORDER (HCC): Primary | ICD-10-CM

## 2023-08-03 RX ORDER — OXYCODONE HYDROCHLORIDE 5 MG/1
5 TABLET ORAL EVERY 6 HOURS PRN
Qty: 28 TABLET | Refills: 0 | Status: SHIPPED | OUTPATIENT
Start: 2023-08-03 | End: 2023-08-29 | Stop reason: SDUPTHER

## 2023-08-03 RX ORDER — PREGABALIN 100 MG/1
100 CAPSULE ORAL 2 TIMES DAILY
Qty: 30 CAPSULE | Refills: 0 | Status: SHIPPED | OUTPATIENT
Start: 2023-08-03 | End: 2023-08-29 | Stop reason: SDUPTHER

## 2023-08-11 ENCOUNTER — TELEPHONE (OUTPATIENT)
Age: 78
End: 2023-08-11

## 2023-08-11 NOTE — TELEPHONE ENCOUNTER
----- Message from [facility] MURALI sent at 8/10/2023  9:52 AM EDT -----  Subject: Message to Provider    QUESTIONS  Information for Provider? Patient was calling to speak with the doctor or   nurse she is trying to get a hospital follow up and talk about pt.  ---------------------------------------------------------------------------  --------------  Sowmya Stonefort Fátima  [phone]; OK to leave message on voicemail  ---------------------------------------------------------------------------  --------------  SCRIPT ANSWERS  Relationship to Patient?  Self

## 2023-08-29 ENCOUNTER — OFFICE VISIT (OUTPATIENT)
Age: 78
End: 2023-08-29
Payer: MEDICARE

## 2023-08-29 ENCOUNTER — HOME HEALTH ADMISSION (OUTPATIENT)
Dept: HOME HEALTH SERVICES | Facility: HOME HEALTH | Age: 78
End: 2023-08-29

## 2023-08-29 VITALS
SYSTOLIC BLOOD PRESSURE: 136 MMHG | HEART RATE: 67 BPM | HEIGHT: 64 IN | BODY MASS INDEX: 30.67 KG/M2 | DIASTOLIC BLOOD PRESSURE: 65 MMHG | RESPIRATION RATE: 16 BRPM | TEMPERATURE: 97.3 F

## 2023-08-29 DIAGNOSIS — Z79.52 CURRENT CHRONIC USE OF SYSTEMIC STEROIDS: ICD-10-CM

## 2023-08-29 DIAGNOSIS — Z09 HOSPITAL DISCHARGE FOLLOW-UP: Primary | ICD-10-CM

## 2023-08-29 DIAGNOSIS — S32.9XXS PELVIS FRACTURE, RIGHT, SEQUELA: ICD-10-CM

## 2023-08-29 DIAGNOSIS — S32.592S: ICD-10-CM

## 2023-08-29 DIAGNOSIS — D64.9 ANEMIA, UNSPECIFIED TYPE: ICD-10-CM

## 2023-08-29 DIAGNOSIS — M06.09 RHEUMATOID ARTHRITIS WITHOUT RHEUMATOID FACTOR, MULTIPLE SITES (HCC): ICD-10-CM

## 2023-08-29 DIAGNOSIS — S42.301D CLOSED FRACTURE OF RIGHT UPPER EXTREMITY WITH ROUTINE HEALING, SUBSEQUENT ENCOUNTER: ICD-10-CM

## 2023-08-29 DIAGNOSIS — M85.89 OSTEOPENIA OF MULTIPLE SITES: ICD-10-CM

## 2023-08-29 DIAGNOSIS — E83.51 HYPOCALCEMIA: ICD-10-CM

## 2023-08-29 PROCEDURE — 3078F DIAST BP <80 MM HG: CPT | Performed by: FAMILY MEDICINE

## 2023-08-29 PROCEDURE — 3017F COLORECTAL CA SCREEN DOC REV: CPT | Performed by: FAMILY MEDICINE

## 2023-08-29 PROCEDURE — 1111F DSCHRG MED/CURRENT MED MERGE: CPT | Performed by: FAMILY MEDICINE

## 2023-08-29 PROCEDURE — 3074F SYST BP LT 130 MM HG: CPT | Performed by: FAMILY MEDICINE

## 2023-08-29 PROCEDURE — G8417 CALC BMI ABV UP PARAM F/U: HCPCS | Performed by: FAMILY MEDICINE

## 2023-08-29 PROCEDURE — 99214 OFFICE O/P EST MOD 30 MIN: CPT | Performed by: FAMILY MEDICINE

## 2023-08-29 PROCEDURE — G8427 DOCREV CUR MEDS BY ELIG CLIN: HCPCS | Performed by: FAMILY MEDICINE

## 2023-08-29 PROCEDURE — 1123F ACP DISCUSS/DSCN MKR DOCD: CPT | Performed by: FAMILY MEDICINE

## 2023-08-29 PROCEDURE — G8399 PT W/DXA RESULTS DOCUMENT: HCPCS | Performed by: FAMILY MEDICINE

## 2023-08-29 PROCEDURE — 1036F TOBACCO NON-USER: CPT | Performed by: FAMILY MEDICINE

## 2023-08-29 PROCEDURE — 1090F PRES/ABSN URINE INCON ASSESS: CPT | Performed by: FAMILY MEDICINE

## 2023-08-29 RX ORDER — OXYCODONE HYDROCHLORIDE 5 MG/1
5 TABLET ORAL EVERY 6 HOURS PRN
Qty: 28 TABLET | Refills: 0 | Status: SHIPPED | OUTPATIENT
Start: 2023-08-29 | End: 2023-09-05

## 2023-08-29 RX ORDER — ALENDRONATE SODIUM 70 MG/1
70 TABLET ORAL
Qty: 12 TABLET | Refills: 1 | Status: SHIPPED | OUTPATIENT
Start: 2023-08-29

## 2023-08-29 RX ORDER — CALCITONIN SALMON 200 [IU]/.09ML
1 SPRAY, METERED NASAL DAILY
Qty: 3.7 ML | Refills: 0 | Status: SHIPPED | OUTPATIENT
Start: 2023-08-29

## 2023-08-29 NOTE — PROGRESS NOTES
Post-Discharge Transitional Care  Follow Up      Farheen Stahl   YOB: 1945    Date of Office Visit:  8/29/2023  Date of Hospital Admission: 7/10/23  Date of Hospital Discharge: 7/13/23  Risk of hospital readmission (high >=14%. Medium >=10%) :Readmission Risk Score: 14.9      Care management risk score Rising risk (score 2-5) and Complex Care (Scores >=6): No Risk Score On File     Non face to face  following discharge, date last encounter closed (first attempt may have been earlier): *No documented post hospital discharge outreach found in the last 14 days    Call initiated 2 business days of discharge: *No response recorded in the last 14 days    ASSESSMENT/PLAN:   Hospital discharge follow-up  -     [address] [place] [address] MED LIST  Closed fracture of inferior pubic ramus, left, sequela  -     [facility]  -     alendronate (FOSAMAX) 70 MG tablet; Take 1 tablet by mouth every 7 days, Disp-12 tablet, R-1Normal  -     calcitonin (MIACALCIN) 200 UNIT/ACT nasal spray; 1 spray by Nasal route daily, Disp-3.7 mL, R-0Normal  The following orders have not been finalized:  -     oxyCODONE (ROXICODONE) 5 MG immediate release tablet  Closed fracture of right upper extremity with routine healing, subsequent encounter  -     [address] [place] [address]  -     alendronate (FOSAMAX) 70 MG tablet; Take 1 tablet by mouth every 7 days, Disp-12 tablet, R-1Normal  -     calcitonin (MIACALCIN) 200 UNIT/ACT nasal spray; 1 spray by Nasal route daily, Disp-3.7 mL, R-0Normal  The following orders have not been finalized:  -     oxyCODONE (ROXICODONE) 5 MG immediate release tablet  Osteopenia of multiple sites  -     alendronate (FOSAMAX) 70 MG tablet;  Take 1 tablet by mouth every 7 days, Disp-12 tablet, R-1Normal  -     calcitonin (MIACALCIN) 200 UNIT/ACT nasal spray; 1 spray by Nasal route daily,

## 2023-08-29 NOTE — PROGRESS NOTES
Chief Complaint   Patient presents with    Follow-Up from Hospital     Admitted 7/11/23 [facility]    1. Have you been to the ER, urgent care clinic since your last visit? Hospitalized since your last visit? Yes Reason for visit: Fall    2. Have you seen or consulted any other health care providers outside of the [address] [place] [address] since your last visit? Include any pap smears or colon screening.  Yes Where: Ortho

## 2023-09-06 RX ORDER — OMEPRAZOLE 20 MG/1
CAPSULE, DELAYED RELEASE ORAL
Qty: 90 CAPSULE | Refills: 1 | Status: SHIPPED | OUTPATIENT
Start: 2023-09-06

## 2023-09-06 NOTE — TELEPHONE ENCOUNTER
Last appointment: 8/29/23  Next appointment: 9/27/23  Previous refill encounter(s): 3/6/23 90 d/s with 1 refill    Requested Prescriptions     Pending Prescriptions Disp Refills    metoprolol tartrate (LOPRESSOR) 25 MG tablet [Pharmacy Med Name: Metoprolol Tartrate 25 MG Oral Tablet] 180 tablet 1     Sig: Take 1 tablet by mouth twice daily    sertraline (ZOLOFT) 50 MG tablet [Pharmacy Med Name: Sertraline HCl 50 MG Oral Tablet] 90 tablet 1     Sig: Take 1 tablet by mouth once daily    omeprazole (PRILOSEC) 20 MG delayed release capsule [Pharmacy Med Name: Omeprazole 20 MG Oral Capsule Delayed Release] 90 capsule 1     Sig: Take 1 capsule by mouth once daily         For Pharmacy Admin Tracking Only    Program: Medication Refill  CPA in place:    Recommendation Provided To:    Intervention Detail: New Rx: 3, reason: Patient Preference  Intervention Accepted By:   Stalin Gonzales Closed?:    Time Spent (min): 5

## 2023-09-30 ENCOUNTER — HOSPITAL ENCOUNTER (EMERGENCY)
Facility: HOSPITAL | Age: 78
Discharge: HOME | End: 2023-09-30
Payer: MEDICARE

## 2023-09-30 ENCOUNTER — APPOINTMENT (OUTPATIENT)
Facility: HOSPITAL | Age: 78
End: 2023-09-30
Payer: MEDICARE

## 2023-09-30 VITALS
SYSTOLIC BLOOD PRESSURE: 172 MMHG | DIASTOLIC BLOOD PRESSURE: 89 MMHG | WEIGHT: 170 LBS | HEIGHT: 64 IN | BODY MASS INDEX: 29.02 KG/M2 | HEART RATE: 80 BPM | RESPIRATION RATE: 18 BRPM | TEMPERATURE: 96.8 F | OXYGEN SATURATION: 98 %

## 2023-09-30 DIAGNOSIS — M79.604 CHRONIC PAIN OF RIGHT LOWER EXTREMITY: Primary | ICD-10-CM

## 2023-09-30 DIAGNOSIS — G89.29 CHRONIC PAIN OF RIGHT LOWER EXTREMITY: Primary | ICD-10-CM

## 2023-09-30 PROCEDURE — 73562 X-RAY EXAM OF KNEE 3: CPT

## 2023-09-30 PROCEDURE — 73502 X-RAY EXAM HIP UNI 2-3 VIEWS: CPT

## 2023-09-30 PROCEDURE — 99283 EMERGENCY DEPT VISIT LOW MDM: CPT

## 2023-09-30 PROCEDURE — 6370000000 HC RX 637 (ALT 250 FOR IP): Performed by: NURSE PRACTITIONER

## 2023-09-30 RX ORDER — OXYCODONE HYDROCHLORIDE AND ACETAMINOPHEN 5; 325 MG/1; MG/1
1 TABLET ORAL
Status: COMPLETED | OUTPATIENT
Start: 2023-09-30 | End: 2023-09-30

## 2023-09-30 RX ADMIN — OXYCODONE AND ACETAMINOPHEN 1 TABLET: 5; 325 TABLET ORAL at 20:33

## 2023-09-30 ASSESSMENT — PAIN DESCRIPTION - DESCRIPTORS: DESCRIPTORS: SHARP

## 2023-09-30 ASSESSMENT — PAIN SCALES - GENERAL: PAINLEVEL_OUTOF10: 10

## 2023-09-30 ASSESSMENT — PAIN DESCRIPTION - PAIN TYPE: TYPE: ACUTE PAIN

## 2023-09-30 ASSESSMENT — PAIN DESCRIPTION - ORIENTATION: ORIENTATION: RIGHT

## 2023-09-30 ASSESSMENT — PAIN DESCRIPTION - LOCATION: LOCATION: LEG

## 2023-09-30 ASSESSMENT — PAIN - FUNCTIONAL ASSESSMENT: PAIN_FUNCTIONAL_ASSESSMENT: 0-10

## 2023-10-01 ASSESSMENT — ENCOUNTER SYMPTOMS
BACK PAIN: 0
SHORTNESS OF BREATH: 0
ABDOMINAL PAIN: 0

## 2023-10-01 NOTE — ED NOTES
Discharge instructions reviewed with patient. Opportunity for additional questions provided. Patient did not have any further questions at this time. Pt is leaving dept in stable condition.        Tashi Esteban RN  09/30/23 2035

## 2023-10-02 NOTE — ED PROVIDER NOTES
[facility] EMERGENCY DEPT  EMERGENCY DEPARTMENT ENCOUNTER       Pt Name: Sourav Strickland  MRN: [id number]  [address] 1945  Date of evaluation: 9/30/2023  Provider: ARIS Green - NP   PCP: Fareed Smith MD  Note Started: 9:41 PM 10/1/23     CHIEF COMPLAINT       Chief Complaint   Patient presents with    Leg Pain     Per pt reports right leg pain x 1 week that radiates up to the right hip, denies recent injury. HISTORY OF PRESENT ILLNESS: 1 or more elements      History Provided by: Patient   History is limited by: Nothing     Sourav Strickland is a 78 y.o. female who presents   With right knee pain and right hip pain and right leg pain. She has had pain for 1 week. Denies falling. States that she had a fractured pelvis 2 months ago. States she has increased pain with weightbearing. Nursing Notes were all reviewed and agreed with or any disagreements were addressed in the HPI. REVIEW OF SYSTEMS      Review of Systems   Eyes:  Negative for visual disturbance. Respiratory:  Negative for shortness of breath. Cardiovascular:  Negative for chest pain. Gastrointestinal:  Negative for abdominal pain. Genitourinary:  Negative for difficulty urinating. Musculoskeletal:  Negative for back pain and neck pain. Knee pain hip pain leg pain   Skin:  Negative for rash. Neurological:  Negative for dizziness, weakness and headaches. All other systems reviewed and are negative. Positives and Pertinent negatives as per HPI.     PAST HISTORY     Past Medical History:  Past Medical History:   Diagnosis Date    Asthma     Chronic pain 5/8/2020    DDD (degenerative disc disease), lumbar     Diabetes ([address])     Gastritis     GERD (gastroesophageal reflux disease)     Glaucoma     Hearing loss 5/8/2020    Hiatal hernia     High cholesterol     Hypertension     Hypocalcemia 5/8/2020    Peripheral vascular disease (HCC)     RA (rheumatoid arthritis) ([address])     Sarcoidosis 1999    MCV

## 2023-10-10 DIAGNOSIS — S42.301D CLOSED FRACTURE OF RIGHT UPPER EXTREMITY WITH ROUTINE HEALING, SUBSEQUENT ENCOUNTER: ICD-10-CM

## 2023-10-10 DIAGNOSIS — S32.592S: Primary | ICD-10-CM

## 2023-10-10 RX ORDER — OXYCODONE HYDROCHLORIDE 5 MG/1
5 TABLET ORAL EVERY 6 HOURS PRN
Qty: 28 TABLET | Refills: 0 | OUTPATIENT
Start: 2023-10-10 | End: 2023-10-17

## 2023-10-10 RX ORDER — LATANOPROST 50 UG/ML
SOLUTION/ DROPS OPHTHALMIC
Qty: 2.5 ML | Refills: 0 | OUTPATIENT
Start: 2023-10-10

## 2023-10-10 NOTE — TELEPHONE ENCOUNTER
Nai Dean, this pt I've never met    So eye drop is from [facility], I've never filled one nor will I ever in future. Knows not much of it. Oxycodone, she recently had surgery? Ortho? She should ask them.      Thanks  Ronit Dimas MD  10/10/2023

## 2023-10-10 NOTE — TELEPHONE ENCOUNTER
----- Message from Denice Figueroa sent at 10/10/2023  1:08 PM EDT -----  Subject: Refill Request    QUESTIONS  Name of Medication? latanoprost (XALATAN) 0.005 % ophthalmic solution  Patient-reported dosage and instructions? .005% nightly  How many days do you have left? 0  Preferred Pharmacy? [address] [place] [address] [address]  Pharmacy phone number (if available)? [phone]  ---------------------------------------------------------------------------  --------------,  Name of Medication? oxyCODONE (ROXICODONE) 5 MG immediate release tablet  Patient-reported dosage and instructions? 5mg as needed  How many days do you have left? 0  Preferred Pharmacy? [place] [address] [address]  Pharmacy phone number (if available)? [phone]  ---------------------------------------------------------------------------  --------------  CALL BACK INFO  What is the best way for the office to contact you? OK to leave message on   voicemail  Preferred Call Back Phone Number? [phone]  ---------------------------------------------------------------------------  --------------  SCRIPT ANSWERS  Relationship to Patient?  Self

## 2023-10-10 NOTE — TELEPHONE ENCOUNTER
Left message for patient to call office. Recommended high fiber diet and metamucil daily  Increase water intake-64 Oz of water   Will evaluate with colonoscopy

## 2023-10-10 NOTE — TELEPHONE ENCOUNTER
Last appointment: 8/29/23  Next appointment: 10/18/23  Previous refill encounter(s): 6/14/23 Xalatan #3ml, 8/29/23 Jesi #28    Requested Prescriptions     Pending Prescriptions Disp Refills    latanoprost (XALATAN) 0.005 % ophthalmic solution 2.5 mL 0     Sig: INSTILL 1 DROP INTO EACH EYE NIGHTLY    oxyCODONE (ROXICODONE) 5 MG immediate release tablet 28 tablet 0     Sig: Take 1 tablet by mouth every 6 hours as needed for Pain for up to 7 days. Max Daily Amount: 20 mg         For Pharmacy Admin Tracking Only    Program: Medication Refill  CPA in place:    Recommendation Provided To:    Intervention Detail: New Rx: 2, reason: Patient Preference  Intervention Accepted By:   Yadira Sears Closed?:    Time Spent (min): 5

## 2023-10-18 ENCOUNTER — OFFICE VISIT (OUTPATIENT)
Age: 78
End: 2023-10-18
Payer: MEDICARE

## 2023-10-18 VITALS
SYSTOLIC BLOOD PRESSURE: 138 MMHG | HEIGHT: 64 IN | DIASTOLIC BLOOD PRESSURE: 66 MMHG | BODY MASS INDEX: 29.4 KG/M2 | WEIGHT: 172.2 LBS | TEMPERATURE: 95.1 F | HEART RATE: 68 BPM | RESPIRATION RATE: 18 BRPM

## 2023-10-18 DIAGNOSIS — S42.301D CLOSED FRACTURE OF RIGHT UPPER EXTREMITY WITH ROUTINE HEALING, SUBSEQUENT ENCOUNTER: ICD-10-CM

## 2023-10-18 DIAGNOSIS — E78.2 MIXED HYPERLIPIDEMIA: ICD-10-CM

## 2023-10-18 DIAGNOSIS — Z79.4 TYPE 2 DIABETES MELLITUS WITH STAGE 3A CHRONIC KIDNEY DISEASE, WITH LONG-TERM CURRENT USE OF INSULIN (HCC): ICD-10-CM

## 2023-10-18 DIAGNOSIS — Z79.52 CURRENT CHRONIC USE OF SYSTEMIC STEROIDS: ICD-10-CM

## 2023-10-18 DIAGNOSIS — N18.31 TYPE 2 DIABETES MELLITUS WITH STAGE 3A CHRONIC KIDNEY DISEASE, WITH LONG-TERM CURRENT USE OF INSULIN (HCC): ICD-10-CM

## 2023-10-18 DIAGNOSIS — Z23 NEED FOR VACCINATION: ICD-10-CM

## 2023-10-18 DIAGNOSIS — J45.30 MILD PERSISTENT ASTHMA WITHOUT COMPLICATION: ICD-10-CM

## 2023-10-18 DIAGNOSIS — F41.9 ANXIETY AND DEPRESSION: ICD-10-CM

## 2023-10-18 DIAGNOSIS — E11.22 TYPE 2 DIABETES MELLITUS WITH STAGE 3A CHRONIC KIDNEY DISEASE, WITH LONG-TERM CURRENT USE OF INSULIN (HCC): ICD-10-CM

## 2023-10-18 DIAGNOSIS — E53.8 B12 DEFICIENCY: ICD-10-CM

## 2023-10-18 DIAGNOSIS — M06.09 RHEUMATOID ARTHRITIS OF MULTIPLE SITES WITH NEGATIVE RHEUMATOID FACTOR (HCC): ICD-10-CM

## 2023-10-18 DIAGNOSIS — Z79.899 LONG-TERM USE OF HIGH-RISK MEDICATION: ICD-10-CM

## 2023-10-18 DIAGNOSIS — E55.9 VITAMIN D DEFICIENCY: ICD-10-CM

## 2023-10-18 DIAGNOSIS — M85.89 OSTEOPENIA OF MULTIPLE SITES: ICD-10-CM

## 2023-10-18 DIAGNOSIS — J45.30 MILD PERSISTENT ASTHMA WITHOUT COMPLICATION: Primary | ICD-10-CM

## 2023-10-18 DIAGNOSIS — R60.9 CHRONIC EDEMA: ICD-10-CM

## 2023-10-18 DIAGNOSIS — I25.10 CORONARY ARTERY DISEASE DUE TO LIPID RICH PLAQUE: ICD-10-CM

## 2023-10-18 DIAGNOSIS — F32.A ANXIETY AND DEPRESSION: ICD-10-CM

## 2023-10-18 DIAGNOSIS — Z79.60 LONG-TERM USE OF IMMUNOSUPPRESSANT MEDICATION: ICD-10-CM

## 2023-10-18 DIAGNOSIS — E11.40 TYPE 2 DIABETES MELLITUS WITH DIABETIC NEUROPATHY, WITHOUT LONG-TERM CURRENT USE OF INSULIN (HCC): ICD-10-CM

## 2023-10-18 DIAGNOSIS — I10 PRIMARY HYPERTENSION: ICD-10-CM

## 2023-10-18 DIAGNOSIS — S32.592S: ICD-10-CM

## 2023-10-18 DIAGNOSIS — I25.83 CORONARY ARTERY DISEASE DUE TO LIPID RICH PLAQUE: ICD-10-CM

## 2023-10-18 DIAGNOSIS — M06.09 RHEUMATOID ARTHRITIS WITHOUT RHEUMATOID FACTOR, MULTIPLE SITES (HCC): Primary | ICD-10-CM

## 2023-10-18 DIAGNOSIS — N18.31 TYPE 2 DIABETES MELLITUS WITH STAGE 3A CHRONIC KIDNEY DISEASE, WITHOUT LONG-TERM CURRENT USE OF INSULIN (HCC): ICD-10-CM

## 2023-10-18 DIAGNOSIS — E11.22 TYPE 2 DIABETES MELLITUS WITH STAGE 3A CHRONIC KIDNEY DISEASE, WITHOUT LONG-TERM CURRENT USE OF INSULIN (HCC): ICD-10-CM

## 2023-10-18 PROBLEM — J45.20 MILD INTERMITTENT ASTHMA: Status: RESOLVED | Noted: 2018-09-28 | Resolved: 2023-10-18

## 2023-10-18 PROCEDURE — G8399 PT W/DXA RESULTS DOCUMENT: HCPCS | Performed by: FAMILY MEDICINE

## 2023-10-18 PROCEDURE — PBSHW INFLUENZA, FLUAD, (AGE 65 Y+), IM, PF, 0.5 ML: Performed by: FAMILY MEDICINE

## 2023-10-18 PROCEDURE — 3075F SYST BP GE 130 - 139MM HG: CPT | Performed by: FAMILY MEDICINE

## 2023-10-18 PROCEDURE — 1123F ACP DISCUSS/DSCN MKR DOCD: CPT | Performed by: FAMILY MEDICINE

## 2023-10-18 PROCEDURE — G8417 CALC BMI ABV UP PARAM F/U: HCPCS | Performed by: FAMILY MEDICINE

## 2023-10-18 PROCEDURE — 2022F DILAT RTA XM EVC RTNOPTHY: CPT | Performed by: FAMILY MEDICINE

## 2023-10-18 PROCEDURE — G8427 DOCREV CUR MEDS BY ELIG CLIN: HCPCS | Performed by: FAMILY MEDICINE

## 2023-10-18 PROCEDURE — 3078F DIAST BP <80 MM HG: CPT | Performed by: FAMILY MEDICINE

## 2023-10-18 PROCEDURE — 3017F COLORECTAL CA SCREEN DOC REV: CPT | Performed by: FAMILY MEDICINE

## 2023-10-18 PROCEDURE — 3044F HG A1C LEVEL LT 7.0%: CPT | Performed by: FAMILY MEDICINE

## 2023-10-18 PROCEDURE — G8484 FLU IMMUNIZE NO ADMIN: HCPCS | Performed by: FAMILY MEDICINE

## 2023-10-18 PROCEDURE — 99205 OFFICE O/P NEW HI 60 MIN: CPT | Performed by: FAMILY MEDICINE

## 2023-10-18 PROCEDURE — 1036F TOBACCO NON-USER: CPT | Performed by: FAMILY MEDICINE

## 2023-10-18 PROCEDURE — G0008 ADMIN INFLUENZA VIRUS VAC: HCPCS | Performed by: FAMILY MEDICINE

## 2023-10-18 PROCEDURE — 1090F PRES/ABSN URINE INCON ASSESS: CPT | Performed by: FAMILY MEDICINE

## 2023-10-18 RX ORDER — ALENDRONATE SODIUM 70 MG/1
70 TABLET ORAL
Qty: 12 TABLET | Refills: 1 | Status: SHIPPED | OUTPATIENT
Start: 2023-10-18

## 2023-10-18 RX ORDER — OXYCODONE HYDROCHLORIDE 5 MG/1
TABLET ORAL
COMMUNITY
Start: 2023-07-13 | End: 2023-10-18 | Stop reason: SDUPTHER

## 2023-10-18 RX ORDER — SIMVASTATIN 20 MG
TABLET ORAL
Qty: 90 TABLET | Refills: 1 | Status: SHIPPED | OUTPATIENT
Start: 2023-10-18

## 2023-10-18 RX ORDER — TOFACITINIB 11 MG/1
1 TABLET, FILM COATED, EXTENDED RELEASE ORAL DAILY
Qty: 90 TABLET | Refills: 0 | Status: SHIPPED | OUTPATIENT
Start: 2023-10-18

## 2023-10-18 RX ORDER — FUROSEMIDE 20 MG/1
20 TABLET ORAL DAILY PRN
Qty: 30 TABLET | Refills: 1 | Status: SHIPPED | OUTPATIENT
Start: 2023-10-18

## 2023-10-18 RX ORDER — ERGOCALCIFEROL 1.25 MG/1
50000 CAPSULE ORAL
Qty: 12 CAPSULE | Refills: 1 | Status: SHIPPED | OUTPATIENT
Start: 2023-10-18

## 2023-10-18 RX ORDER — LEFLUNOMIDE 20 MG/1
20 TABLET ORAL DAILY
Qty: 90 TABLET | Refills: 0 | Status: SHIPPED | OUTPATIENT
Start: 2023-10-18

## 2023-10-18 RX ORDER — FLUTICASONE FUROATE AND VILANTEROL 100; 25 UG/1; UG/1
POWDER RESPIRATORY (INHALATION)
Qty: 1 EACH | Refills: 2 | Status: SHIPPED | OUTPATIENT
Start: 2023-10-18

## 2023-10-18 RX ORDER — LANOLIN ALCOHOL/MO/W.PET/CERES
CREAM (GRAM) TOPICAL
COMMUNITY
Start: 2023-07-14

## 2023-10-18 RX ORDER — PREGABALIN 100 MG/1
100 CAPSULE ORAL 2 TIMES DAILY
Qty: 60 CAPSULE | Refills: 2 | Status: SHIPPED | OUTPATIENT
Start: 2023-10-18 | End: 2024-01-16

## 2023-10-18 RX ORDER — OXYCODONE HYDROCHLORIDE 5 MG/1
5 TABLET ORAL DAILY PRN
Qty: 14 TABLET | Refills: 0 | Status: SHIPPED | OUTPATIENT
Start: 2023-10-18 | End: 2023-11-01

## 2023-10-18 NOTE — PROGRESS NOTES
Chief Complaint   Patient presents with    New Patient    Shoulder Pain    Hip Pain    Abdominal Pain     Thinks she has UTI       1. Have you been to the ER, urgent care clinic since your last visit? Hospitalized since your last visit? Yes ER    2. Have you seen or consulted any other health care providers outside of the [address] [place] [address] since your last visit? Include any pap smears or colon screening. No      Order placed for Fluad from provider Dr. Dilan Carr, verified by Verbal Order Read Back with provider. Observed for 15 min, no reaction.

## 2023-10-18 NOTE — PROGRESS NOTES
Reyan Salcido is a 78 y.o. female, who's a new patient to our practice. Previous PCP: dr. Joseph Sandoval,     She's present w her aid. Lives w her son. has a past medical history of Asthma, Chronic pain, DDD (degenerative disc disease), lumbar, Diabetes ([address]), Gastritis, GERD (gastroesophageal reflux disease), Glaucoma, Hearing loss, Hiatal hernia, High cholesterol, Hypertension, Hypocalcemia, Peripheral vascular disease ([address]), RA (rheumatoid arthritis) ([address]), Sarcoidosis, and Sleep apnea. Assessment/Plans:    Vasile Jaimes was seen today for new patient, shoulder pain, hip pain and abdominal pain. Diagnoses and all orders for this visit:    Rheumatoid arthritis without rheumatoid factor, multiple sites [facility])  -     Kenney Woods MD, Rheumatology, [place]  -     CBC; Future  -     Comprehensive Metabolic Panel; Future  -     TSH; Future  -     Lipid Panel; Future  -     Hemoglobin A1C; Future  -     Microalbumin / Creatinine Urine Ratio; Future  -     Vitamin B12 & Folate; Future  -     Tofacitinib Citrate ER (XELJANZ XR) 11 MG TB24; Take 1 tablet by mouth daily Courtesy refill, will be last refill from me. Please find Rheumatology  -     leflunomide (ARAVA) 20 MG tablet; Take 1 tablet by mouth daily Courtesy refill, will be last refill from me. Please find Rheumatology  -     oxyCODONE (ROXICODONE) 5 MG immediate release tablet; Take 1 tablet by mouth daily as needed for Pain for up to 14 days. Max Daily Amount: 5 mg  -     pregabalin (LYRICA) 100 MG capsule; Take 1 capsule by mouth 2 times daily for 90 days. Max Daily Amount: 200 mg    Closed fracture of inferior pubic ramus, left, sequela  -     [facility] - Eric Medina MD, Rheumatology, [place]  -     alendronate (FOSAMAX) 70 MG tablet; Take 1 tablet by mouth every 7 days    Osteopenia of multiple sites  -     [address] [facility] [address] - Eric Medina MD, Rheumatology, [place]  -     alendronate (FOSAMAX) 70 MG tablet;  Take 1 tablet by mouth every 7 days    Primary

## 2023-10-19 RX ORDER — ALBUTEROL SULFATE 2.5 MG/3ML
2.5 SOLUTION RESPIRATORY (INHALATION) 4 TIMES DAILY PRN
Qty: 120 EACH | Refills: 1 | Status: SHIPPED | OUTPATIENT
Start: 2023-10-19

## 2023-10-30 ENCOUNTER — TELEPHONE (OUTPATIENT)
Age: 78
End: 2023-10-30

## 2023-10-30 NOTE — TELEPHONE ENCOUNTER
Reginald from [facility] [phone]  Reference # [id number]  Medication pre [facility] [birth year]

## 2023-11-01 ENCOUNTER — OFFICE VISIT (OUTPATIENT)
Age: 78
End: 2023-11-01
Payer: MEDICARE

## 2023-11-01 ENCOUNTER — HOME HEALTH ADMISSION (OUTPATIENT)
Dept: HOME HEALTH SERVICES | Facility: HOME HEALTH | Age: 78
End: 2023-11-01
Payer: MEDICAID

## 2023-11-01 VITALS
WEIGHT: 174 LBS | OXYGEN SATURATION: 97 % | SYSTOLIC BLOOD PRESSURE: 149 MMHG | DIASTOLIC BLOOD PRESSURE: 71 MMHG | HEIGHT: 64 IN | RESPIRATION RATE: 16 BRPM | BODY MASS INDEX: 29.71 KG/M2 | HEART RATE: 97 BPM

## 2023-11-01 DIAGNOSIS — M54.16 LUMBAR RADICULOPATHY: Primary | ICD-10-CM

## 2023-11-01 PROCEDURE — G8417 CALC BMI ABV UP PARAM F/U: HCPCS | Performed by: ORTHOPAEDIC SURGERY

## 2023-11-01 PROCEDURE — G8484 FLU IMMUNIZE NO ADMIN: HCPCS | Performed by: ORTHOPAEDIC SURGERY

## 2023-11-01 PROCEDURE — 3077F SYST BP >= 140 MM HG: CPT | Performed by: ORTHOPAEDIC SURGERY

## 2023-11-01 PROCEDURE — G8427 DOCREV CUR MEDS BY ELIG CLIN: HCPCS | Performed by: ORTHOPAEDIC SURGERY

## 2023-11-01 PROCEDURE — 99213 OFFICE O/P EST LOW 20 MIN: CPT | Performed by: ORTHOPAEDIC SURGERY

## 2023-11-01 PROCEDURE — 1123F ACP DISCUSS/DSCN MKR DOCD: CPT | Performed by: ORTHOPAEDIC SURGERY

## 2023-11-01 PROCEDURE — G8399 PT W/DXA RESULTS DOCUMENT: HCPCS | Performed by: ORTHOPAEDIC SURGERY

## 2023-11-01 PROCEDURE — 1090F PRES/ABSN URINE INCON ASSESS: CPT | Performed by: ORTHOPAEDIC SURGERY

## 2023-11-01 PROCEDURE — 3078F DIAST BP <80 MM HG: CPT | Performed by: ORTHOPAEDIC SURGERY

## 2023-11-01 PROCEDURE — 1036F TOBACCO NON-USER: CPT | Performed by: ORTHOPAEDIC SURGERY

## 2023-11-01 RX ORDER — TRAMADOL HYDROCHLORIDE 50 MG/1
50 TABLET ORAL EVERY 6 HOURS PRN
Qty: 28 TABLET | Refills: 0 | Status: SHIPPED | OUTPATIENT
Start: 2023-11-01 | End: 2023-11-08

## 2023-11-06 ENCOUNTER — HOME CARE VISIT (OUTPATIENT)
Facility: HOME HEALTH | Age: 78
End: 2023-11-06
Payer: MEDICAID

## 2023-11-06 VITALS
TEMPERATURE: 98 F | OXYGEN SATURATION: 98 % | HEART RATE: 81 BPM | SYSTOLIC BLOOD PRESSURE: 131 MMHG | DIASTOLIC BLOOD PRESSURE: 78 MMHG | RESPIRATION RATE: 16 BRPM

## 2023-11-06 PROCEDURE — G0151 HHCP-SERV OF PT,EA 15 MIN: HCPCS

## 2023-11-06 PROCEDURE — 400013 HH SOC

## 2023-11-06 ASSESSMENT — ENCOUNTER SYMPTOMS
PAIN LOCATION - PAIN QUALITY: ACHY
DYSPNEA ACTIVITY LEVEL: AFTER AMBULATING 10 - 20 FT

## 2023-11-06 NOTE — HOME HEALTH
following:  admission to home health status, plan of care including visit frequency of 2/weekx4 wk requesting additional services as follows: none, medication concerns as follows: none, clarifications on none    Interdisciplinary communication with none    PCP:  Sanjiv Smallwood MD    Next scheduled doctor appointment: TBD, Patient/Caregiver instructed to keep follow up appointment because lack of follow through with physician appointments could result in discontinuation of home care services for non-compliance. Patient/Caregiver verbalize knowledge of above through teach back with 100 percent accuracy. Emergency Preparedness: Patient/Caregiver instructed in the following:  Have one gallon of water per person for at least 3 days on hand. Have non-perishable food for at least 3 days that do not need to be cooked. Have flashlights and batteries. Charge your cell phones and any back up lithium batteries for your cell phones. Have 3+ days of back up oxygen in your home. Have a phone in your home that is hard wired and does not require power. Have medication for a week in your home. Make sure you have a caregiver in the home to provide care in case your home health nurse cannot get to your house. Make sure you have all of your paperwork i.e. written emergency preparedness plan, Identification, insurance cards, DME phone number, physician and pharmacy phone number, agency phone number, and your medications in one place for easy access and in a zip lock bag to protect them. Take your Admission Handbook, written emergency preparedness plan, written medication list and folder if you relocate in the event of an emergency, if possible. Call agency if you relocate so we can contact you. Patient/Caregiver verbalize knowledge of above through teach back with 100% percent accuracy.

## 2023-11-08 ENCOUNTER — HOME CARE VISIT (OUTPATIENT)
Facility: HOME HEALTH | Age: 78
End: 2023-11-08
Payer: MEDICAID

## 2023-11-08 ENCOUNTER — TELEPHONE (OUTPATIENT)
Age: 78
End: 2023-11-08

## 2023-11-08 VITALS — TEMPERATURE: 98 F | DIASTOLIC BLOOD PRESSURE: 70 MMHG | SYSTOLIC BLOOD PRESSURE: 131 MMHG | RESPIRATION RATE: 15 BRPM

## 2023-11-08 PROCEDURE — G0151 HHCP-SERV OF PT,EA 15 MIN: HCPCS

## 2023-11-08 NOTE — HOME HEALTH
Subjective: I am feeling good and ready to do the exercises. I Falls since last visit (if yes include. bsrifallreport): None reported by the patient/caregiver. Caregiver involvement: Yes- patients son/paid caregiver will be available as needed, Aide was present today. Home health supplies by type and quantity ordered/delivered this visit include none today. Does the patient have any new or changed medications? No  If Yes, were medications reconciled? no.  Was the certifying physician notified of changes in medications? na.  Clinical assessment (what this visit means for the patient overall and need for ongoing skilled care): PT interventions including BLE resisted exercises, Gait  training and safety education-Patient was needed constant cues while doing the resisted exercises including pacing the reps/what to do next . Patient will continue to need more PT to improve her  functions so that the patient can transfer with less possible assistance from the caregiver. Progress or lack of progress toward specific goals: Patient demonstrated no progress today and need more sessions at this time to meet the goals  Inter disciplinary communication: none  Discharge planning: DC when met all the goals/DC when home PT is no longer appropriate/ DC when patient is no longer home bound. Specific plan for next visit: BLE resisted exercises, gait training and transfer training    - so that the patient can ambulate/ transfer with less possible assistance from the caregiver.

## 2023-11-13 ENCOUNTER — HOME CARE VISIT (OUTPATIENT)
Facility: HOME HEALTH | Age: 78
End: 2023-11-13
Payer: MEDICAID

## 2023-11-13 VITALS
SYSTOLIC BLOOD PRESSURE: 122 MMHG | HEART RATE: 67 BPM | DIASTOLIC BLOOD PRESSURE: 76 MMHG | RESPIRATION RATE: 16 BRPM | OXYGEN SATURATION: 98 % | TEMPERATURE: 98 F

## 2023-11-13 PROCEDURE — G0151 HHCP-SERV OF PT,EA 15 MIN: HCPCS

## 2023-11-13 NOTE — HOME HEALTH
Subjective: I can walk again today   I Falls since last visit (if yes include. bsrifallreport): None reported by the patient/caregiver. Caregiver involvement: Yes- patients son/paid caregiver will be available as needed, Aide was present today. Home health supplies by type and quantity ordered/delivered this visit include none today. Does the patient have any new or changed medications? No  If Yes, were medications reconciled? no.  Was the certifying physician notified of changes in medications? na.  Clinical assessment (what this visit means for the patient overall and need for ongoing skilled care): PT interventions continued with  BLE resisted exercises, Gait  training and safety education-Patient was needed constant cues like previous visit  while doing the resisted exercises including pacing the reps/what to do next . Patient will continue to need more PT to improve her  functions so that the patient can transfer with less possible assistance from the caregiver. Progress or lack of progress toward specific goals: Patient demonstrated no progress today and need more sessions at this time to meet the goals  Inter disciplinary communication: none  Discharge planning: DC when met all the goals/DC when home PT is no longer appropriate/ DC when patient is no longer home bound. Specific plan for next visit: Continue with BLE resisted exercises, gait training and transfer training    - so that the patient can ambulate/ transfer with less possible assistance from the caregiver.

## 2023-11-17 ENCOUNTER — HOME CARE VISIT (OUTPATIENT)
Facility: HOME HEALTH | Age: 78
End: 2023-11-17
Payer: MEDICAID

## 2023-11-20 ENCOUNTER — HOME CARE VISIT (OUTPATIENT)
Facility: HOME HEALTH | Age: 78
End: 2023-11-20
Payer: MEDICAID

## 2023-11-20 VITALS
SYSTOLIC BLOOD PRESSURE: 138 MMHG | OXYGEN SATURATION: 98 % | DIASTOLIC BLOOD PRESSURE: 78 MMHG | RESPIRATION RATE: 15 BRPM | HEART RATE: 71 BPM | TEMPERATURE: 98 F

## 2023-11-20 PROCEDURE — G0151 HHCP-SERV OF PT,EA 15 MIN: HCPCS

## 2023-11-20 NOTE — HOME HEALTH
Subjective: I can walk more today   I Falls since last visit (if yes include. bsrifallreport): None reported by the patient/caregiver. Caregiver involvement: Yes- patients son/paid caregiver will be available as needed, Aide was present today. Home health supplies by type and quantity ordered/delivered this visit include none today. Does the patient have any new or changed medications? No  If Yes, were medications reconciled? no.  Was the certifying physician notified of changes in medications? na.  Clinical assessment (what this visit means for the patient overall and need for ongoing skilled care): PT interventions including balance challenging exercises in standing,  BLE resisted exercises, Gait  training and safety education-Patient was needed constant cues while doing the standing exercises including for proper weight shifting and CGAx1 for safety. Patient will continue to need more PT to improve her  functions so that the patient can transfer with less possible assistance from the caregiver. Progress or lack of progress toward specific goals: Patient demonstrated good progress today by ambulating longer distance, but was needed CGAx1 for safety and need more sessions at this time to meet the goals  Inter disciplinary communication: none  Discharge planning: DC when met all the goals/DC when home PT is no longer appropriate/ DC when patient is no longer home bound. Specific plan for next visit: Balance challenging exercises in standing, gait training and transfer training    - so that the patient can ambulate/ transfer with less possible assistance from the caregiver.

## 2023-11-22 ENCOUNTER — HOME CARE VISIT (OUTPATIENT)
Facility: HOME HEALTH | Age: 78
End: 2023-11-22
Payer: MEDICAID

## 2023-11-22 NOTE — HOME HEALTH
PT visit cancled per patients request. Per patient she wants no visit today due to Thanksgiving Holiday.  PT will contact the patient again next week to continue the care

## 2023-11-29 ENCOUNTER — HOME CARE VISIT (OUTPATIENT)
Facility: HOME HEALTH | Age: 78
End: 2023-11-29
Payer: MEDICAID

## 2023-11-29 ENCOUNTER — TELEPHONE (OUTPATIENT)
Dept: PHARMACY | Facility: CLINIC | Age: 78
End: 2023-11-29

## 2023-11-29 VITALS
HEART RATE: 69 BPM | OXYGEN SATURATION: 98 % | RESPIRATION RATE: 15 BRPM | SYSTOLIC BLOOD PRESSURE: 123 MMHG | DIASTOLIC BLOOD PRESSURE: 78 MMHG | TEMPERATURE: 98 F

## 2023-11-29 PROCEDURE — G0151 HHCP-SERV OF PT,EA 15 MIN: HCPCS

## 2023-11-29 NOTE — HOME HEALTH
Subjective: I can walk more today with the walker   I Falls since last visit (if yes include. bsrifallreport): None reported by the patient/caregiver. Caregiver involvement: Yes- patients son/paid caregiver will be available as needed, Aide was present today. Home health supplies by type and quantity ordered/delivered this visit include none today. Does the patient have any new or changed medications? No  If Yes, were medications reconciled? no.  Was the certifying physician notified of changes in medications? na.  Clinical assessment (what this visit means for the patient overall and need for ongoing skilled care): PT interventions including  BLE resisted exercises, Gait  training and safety education-Patient was able to follow the proper gait pattern for few steps while ambulating, but was needed cues again including for properly controlling the walker with CGAx1 for safety. Patient will continue to need more PT to improve her  functions so that the patient can transfer with less possible assistance from the caregiver. Progress or lack of progress toward specific goals: Patient demonstrated good progress today by ambulating longer distance, but was needed CGAx1 for safety like previous visit and need more sessions at this time to meet the goals  Inter disciplinary communication: none  Discharge planning: DC when met all the goals/DC when home PT is no longer appropriate/ DC when patient is no longer home bound. Specific plan for next visit:Continue with  Balance challenging exercises in standing, gait training and transfer training    - so that the patient can ambulate/ transfer with less possible assistance from the caregiver.

## 2023-11-29 NOTE — TELEPHONE ENCOUNTER
[facility] [place] [facility]: ADHERENCE REVIEW  Identified care gap per [employer] fills at [facility] : Statin adherence    Patient also appears to be prescribed: Diabetes    ASSESSMENT    DIABETES ADHERENCE    Insurance Records claims through  23  (Prior Year [address] [place] [address] = not reported; YTD [address] [place] [address] = 97% - PASSED): METFORMIN  MG last filled on 10.08.23 for 90 day supply. Next refill due: 24    Prescribed si tablet/capsule daily    Per Insurer Portal: last filled on 10.08.23 for 90 day supply. Per [facility]: last picked up on 10.08.23 for 90 day supply. [facility] [employer] [facility] 0 refills remaining. Lab Results   Component Value Date    LABA1C 4.5 07/10/2023    LABA1C 5.2 2023    LABA1C 5.0 2022       STATIN ADHERENCE    Insurance Records claims through  23  (Prior Year [address] [place] [address] = not reported; YTD [address] [place] [address] = 82%; Potential Fail Date: 12.10.23):   SIMVASTATIN TAB 20 MG last filled on 10.11.23 for 30 day supply. Next refill due: 11.10.23 -PAST DUE 19 DAYS     Prescribed si tablet/capsule daily    Per Insurer Portal: last filled on 10.11.23 for 30 day supply. Per [facility]: last picked up on 10.11.23 for 30 day supply. will get 90 day supply ready to  since past due. [facility] [employer] [facility] 1 refills remaining.     Lab Results   Component Value Date    CHOL 183 2023    TRIG 60 2023    HDL 74 2023    LDLCALC 98 2023     ALT   Date Value Ref Range Status   2023 14 12 - 78 U/L Final     AST   Date Value Ref Range Status   2023 43 (H) 15 - 37 U/L Final     The 10-year ASCVD risk score (Mia RODRIGUEZ, et al., 2019) is: 38.9%    Values used to calculate the score:      Age: 70 years      Sex: Female      Is Non- : Yes      Diabetic: Yes      Tobacco smoker: No      Systolic Blood Pressure: 090 mmHg      Is BP treated: Yes      HDL Cholesterol: 74 mg/dL      Total Cholesterol: 183 mg/dL     PLAN    Per insurer

## 2023-12-01 ENCOUNTER — HOME CARE VISIT (OUTPATIENT)
Facility: HOME HEALTH | Age: 78
End: 2023-12-01
Payer: MEDICAID

## 2023-12-01 PROCEDURE — G0151 HHCP-SERV OF PT,EA 15 MIN: HCPCS

## 2023-12-02 VITALS
RESPIRATION RATE: 16 BRPM | TEMPERATURE: 97.5 F | SYSTOLIC BLOOD PRESSURE: 124 MMHG | HEART RATE: 77 BPM | DIASTOLIC BLOOD PRESSURE: 87 MMHG | OXYGEN SATURATION: 99 %

## 2023-12-02 NOTE — HOME HEALTH
Subjective: I am not using my wheelchair   I Falls since last visit (if yes include. bsrifallreport): None reported by the patient/caregiver. Caregiver involvement: Yes- patients son/paid caregiver will be available as needed, Aide was present today. Home health supplies by type and quantity ordered/delivered this visit include none today. Does the patient have any new or changed medications? No  If Yes, were medications reconciled? no.  Was the certifying physician notified of changes in medications? na.  Clinical assessment (what this visit means for the patient overall and need for ongoing skilled care): PT interventions including  HEP reedccuation, DC instructions, BLE resisted exercises, Gait  training and safety education-Patient was able to repeat back all the HEP with 100% accuracy. Progress or lack of progress toward specific goals: Met all the goals  Inter disciplinary communication: none  Discharge planning: DC when met all the goals/DC when home PT is no longer appropriate/ DC when patient is no longer home bound/dc as of today.   Specific plan for next visit: NA.

## 2023-12-06 ENCOUNTER — OFFICE VISIT (OUTPATIENT)
Age: 78
End: 2023-12-06
Payer: MEDICARE

## 2023-12-06 VITALS
BODY MASS INDEX: 30.42 KG/M2 | WEIGHT: 178.2 LBS | DIASTOLIC BLOOD PRESSURE: 78 MMHG | RESPIRATION RATE: 18 BRPM | HEART RATE: 91 BPM | HEIGHT: 64 IN | SYSTOLIC BLOOD PRESSURE: 157 MMHG | TEMPERATURE: 96.8 F

## 2023-12-06 DIAGNOSIS — S40.011A CONTUSION OF RIGHT SHOULDER, INITIAL ENCOUNTER: Primary | ICD-10-CM

## 2023-12-06 PROCEDURE — 1036F TOBACCO NON-USER: CPT | Performed by: ORTHOPAEDIC SURGERY

## 2023-12-06 PROCEDURE — G8399 PT W/DXA RESULTS DOCUMENT: HCPCS | Performed by: ORTHOPAEDIC SURGERY

## 2023-12-06 PROCEDURE — 1123F ACP DISCUSS/DSCN MKR DOCD: CPT | Performed by: ORTHOPAEDIC SURGERY

## 2023-12-06 PROCEDURE — G8484 FLU IMMUNIZE NO ADMIN: HCPCS | Performed by: ORTHOPAEDIC SURGERY

## 2023-12-06 PROCEDURE — 1090F PRES/ABSN URINE INCON ASSESS: CPT | Performed by: ORTHOPAEDIC SURGERY

## 2023-12-06 PROCEDURE — 3077F SYST BP >= 140 MM HG: CPT | Performed by: ORTHOPAEDIC SURGERY

## 2023-12-06 PROCEDURE — 99213 OFFICE O/P EST LOW 20 MIN: CPT | Performed by: ORTHOPAEDIC SURGERY

## 2023-12-06 PROCEDURE — G8427 DOCREV CUR MEDS BY ELIG CLIN: HCPCS | Performed by: ORTHOPAEDIC SURGERY

## 2023-12-06 PROCEDURE — 3078F DIAST BP <80 MM HG: CPT | Performed by: ORTHOPAEDIC SURGERY

## 2023-12-06 PROCEDURE — G8417 CALC BMI ABV UP PARAM F/U: HCPCS | Performed by: ORTHOPAEDIC SURGERY

## 2023-12-06 ASSESSMENT — PATIENT HEALTH QUESTIONNAIRE - PHQ9
2. FEELING DOWN, DEPRESSED OR HOPELESS: 0
SUM OF ALL RESPONSES TO PHQ QUESTIONS 1-9: 0
SUM OF ALL RESPONSES TO PHQ9 QUESTIONS 1 & 2: 0
1. LITTLE INTEREST OR PLEASURE IN DOING THINGS: 0
SUM OF ALL RESPONSES TO PHQ QUESTIONS 1-9: 0

## 2023-12-22 ENCOUNTER — HOSPITAL ENCOUNTER (OUTPATIENT)
Facility: HOSPITAL | Age: 78
Discharge: HOME | End: 2023-12-25
Attending: ORTHOPAEDIC SURGERY
Payer: MEDICARE

## 2023-12-22 ENCOUNTER — HOSPITAL ENCOUNTER (EMERGENCY)
Facility: HOSPITAL | Age: 78
Discharge: HOME | End: 2023-12-22
Attending: EMERGENCY MEDICINE
Payer: MEDICARE

## 2023-12-22 VITALS
DIASTOLIC BLOOD PRESSURE: 74 MMHG | RESPIRATION RATE: 18 BRPM | TEMPERATURE: 97.5 F | SYSTOLIC BLOOD PRESSURE: 166 MMHG | HEART RATE: 75 BPM | WEIGHT: 170 LBS | BODY MASS INDEX: 29.02 KG/M2 | HEIGHT: 64 IN

## 2023-12-22 DIAGNOSIS — M12.811 RIGHT ROTATOR CUFF TEAR ARTHROPATHY: ICD-10-CM

## 2023-12-22 DIAGNOSIS — H92.01 ACUTE EAR PAIN, RIGHT: ICD-10-CM

## 2023-12-22 DIAGNOSIS — H72.91 PERFORATED TYMPANIC MEMBRANE ON EXAMINATION, RIGHT: Primary | ICD-10-CM

## 2023-12-22 DIAGNOSIS — M75.101 RIGHT ROTATOR CUFF TEAR ARTHROPATHY: ICD-10-CM

## 2023-12-22 DIAGNOSIS — Z96.611 STATUS POST TOTAL SHOULDER ARTHROPLASTY, RIGHT: ICD-10-CM

## 2023-12-22 PROCEDURE — 99283 EMERGENCY DEPT VISIT LOW MDM: CPT

## 2023-12-22 PROCEDURE — 73200 CT UPPER EXTREMITY W/O DYE: CPT

## 2023-12-22 RX ORDER — NAPROXEN 500 MG/1
500 TABLET ORAL 2 TIMES DAILY PRN
Qty: 30 TABLET | Refills: 0 | Status: SHIPPED | OUTPATIENT
Start: 2023-12-22

## 2023-12-22 RX ORDER — AMOXICILLIN 500 MG/1
500 CAPSULE ORAL 3 TIMES DAILY
Qty: 21 CAPSULE | Refills: 0 | Status: SHIPPED | OUTPATIENT
Start: 2023-12-22 | End: 2023-12-29

## 2023-12-22 NOTE — DISCHARGE INSTRUCTIONS
You work seen in the emergency department with right ear pain, this is likely due to a perforated eardrum, there may be an underlying infection, please take the amoxicillin as prescribed until completion. You can use Tylenol and naproxen as needed to help with pain. Please do not let water get into the ear and do not put anything into the ear canal.  It will be very important for you to follow-up with ear nose and throat, please call the numbers listed to make an appointment. If you develop any worsening pain or fevers please return to the emergency department immediately.

## 2023-12-22 NOTE — ED NOTES
The patient left the Emergency Department ambulatory with walker accompanied by caregiver, alert and oriented and in no acute distress. The patient was encouraged to call or return to the ED for worsening issues or problems and was encouraged to schedule a follow up appointment for continuing care. The patient verbalized understanding of discharge instructions and prescriptions, all questions were answered. The patient has no further concerns at this time.

## 2024-01-04 ENCOUNTER — NURSE TRIAGE (OUTPATIENT)
Dept: OTHER | Facility: CLINIC | Age: 79
End: 2024-01-04

## 2024-01-04 NOTE — TELEPHONE ENCOUNTER
Location of patient: [place]    Received call from Lorri at [facility] with Red Flag Complaint.    Subjective: Caller states \"I was seen on 12/22/23 in the ER for a ruptured ear drum\"     Current Symptoms: Still with ear pain and told to get in with PCP if not getting better.  Not any better.  No new or worsening symptoms since she was seen.      Recommended disposition:  Duplicate Contact Call    Care advice provided, patient verbalizes understanding; denies any other questions or concerns; instructed to call back for any new or worsening symptoms.    [facility], to assist with an ER follow up visit appt.      Attention Provider:  Thank you for allowing me to participate in the care of your patient.  The patient was connected to triage in response to information provided to the [facility].  Please do not respond through this encounter as the response is not directed to a shared pool.    Reason for Disposition   Caller has already spoken with the PCP (or office), and has no further questions    Protocols used: No Contact or Duplicate Contact Call-ADULT-[place]

## 2024-02-13 ENCOUNTER — TELEPHONE (OUTPATIENT)
Age: 79
End: 2024-02-13

## 2024-02-13 NOTE — TELEPHONE ENCOUNTER
Called the pt and she said that she is interested in becoming a new pt of ours here at our office.2/13/24

## 2024-02-22 ENCOUNTER — TELEPHONE (OUTPATIENT)
Age: 79
End: 2024-02-22

## 2024-02-22 NOTE — TELEPHONE ENCOUNTER
I called the pt and left her a vm letting her know that the NP had a chance to look over her referral and she gave us a go head to schedule her for a new pt appt. I asked her to give us a call back at the phone number I left on the vm.2/22/24

## 2024-02-27 RX ORDER — OMEPRAZOLE 20 MG/1
20 CAPSULE, DELAYED RELEASE ORAL DAILY
Qty: 30 CAPSULE | Refills: 0 | OUTPATIENT
Start: 2024-02-27

## 2024-09-03 ENCOUNTER — HOSPITAL ENCOUNTER (OUTPATIENT)
Facility: HOSPITAL | Age: 79
Discharge: HOME | End: 2024-09-05
Payer: MEDICARE

## 2024-09-03 DIAGNOSIS — M79.672 LEFT FOOT PAIN: ICD-10-CM

## 2024-09-03 LAB
VAS LEFT ABI: 1.39
VAS LEFT ARM BP: 163 MMHG
VAS LEFT DORSALIS PEDIS BP: 240 MMHG
VAS LEFT TBI: 0.82
VAS LEFT TOE PRESSURE: 141 MMHG
VAS RIGHT ABI: 1.39
VAS RIGHT ARM BP: 173 MMHG
VAS RIGHT DORSALIS PEDIS BP: 240 MMHG
VAS RIGHT PTA BP: 240 MMHG

## 2024-09-03 PROCEDURE — 93926 LOWER EXTREMITY STUDY: CPT

## 2024-09-03 PROCEDURE — 93922 UPR/L XTREMITY ART 2 LEVELS: CPT | Performed by: INTERNAL MEDICINE

## 2024-09-03 PROCEDURE — 93922 UPR/L XTREMITY ART 2 LEVELS: CPT

## 2024-09-04 LAB
VAS LEFT ATA PROX PSV: 56.6 CM/S
VAS LEFT CFA PROX PSV: 76.4 CM/S
VAS LEFT PFA PROX PSV: 38.5 CM/S
VAS LEFT POP A DIST PSV: 56.6 CM/S
VAS LEFT POP A PROX PSV: 66 CM/S
VAS LEFT POP A PROX VEL RATIO: 0.87
VAS LEFT PTA PROX PSV: 29.3 CM/S
VAS LEFT SFA DIST PSV: 75.5 CM/S
VAS LEFT SFA DIST VEL RATIO: 1.11
VAS LEFT SFA MID PSV: 68.3 CM/S
VAS LEFT SFA MID VEL RATIO: 0.86
VAS LEFT SFA PROX PSV: 79.2 CM/S
VAS LEFT SFA PROX VEL RATIO: 1.04

## 2024-09-04 PROCEDURE — 93926 LOWER EXTREMITY STUDY: CPT | Performed by: INTERNAL MEDICINE

## 2025-06-09 ENCOUNTER — TRANSCRIBE ORDERS (OUTPATIENT)
Facility: HOSPITAL | Age: 80
End: 2025-06-09

## 2025-06-09 ENCOUNTER — HOSPITAL ENCOUNTER (OUTPATIENT)
Facility: HOSPITAL | Age: 80
Discharge: HOME | End: 2025-06-12
Payer: MEDICARE

## 2025-06-09 DIAGNOSIS — R10.9 ABDOMINAL PAIN, UNSPECIFIED ABDOMINAL LOCATION: Primary | ICD-10-CM

## 2025-06-09 DIAGNOSIS — R10.9 ABDOMINAL PAIN, UNSPECIFIED ABDOMINAL LOCATION: ICD-10-CM

## 2025-06-09 PROCEDURE — 74176 CT ABD & PELVIS W/O CONTRAST: CPT

## (undated) DEVICE — SUTURE STRATAFIX SPRL SZ 1 L14IN ABSRB VLT L48CM CTX 1/2 SXPD2B405

## (undated) DEVICE — SOLUTION IRRIG 1000ML STRL H2O USP PLAS POUR BTL

## (undated) DEVICE — ZIMMER® STERILE DISPOSABLE TOURNIQUET CUFF WITH PLC, DUAL PORT, SINGLE BLADDER, 18 IN. (46 CM)

## (undated) DEVICE — SPONGE GZ W4XL4IN COT 12 PLY TYP VII WVN C FLD DSGN

## (undated) DEVICE — DRSG POSTOP PRMSL AG 3.5X14IN

## (undated) DEVICE — SOL IRRIGATION INJ NACL 0.9% 500ML BTL

## (undated) DEVICE — SUTURE VCRL SZ 2-0 L36IN ABSRB UD L36MM CT-1 1/2 CIR J945H

## (undated) DEVICE — TOWEL,OR,DSP,ST,BLUE,STD,2/PK,40PK/CS: Brand: MEDLINE

## (undated) DEVICE — BANDAGE,GAUZE,BULKEE II,4.5"X4.1YD,STRL: Brand: MEDLINE

## (undated) DEVICE — BRUSH SCRB 4% CHG RED DISP --

## (undated) DEVICE — Device

## (undated) DEVICE — BANDAGE COMPR M W6INXL10YD WHT BGE VELC E MTRX HK AND LOOP

## (undated) DEVICE — SOLUTION IRRIG 3000ML 0.9% SOD CHL USP UROMATIC PLAS CONT

## (undated) DEVICE — (D)PREP SKN CHLRAPRP APPL 26ML -- CONVERT TO ITEM 371833

## (undated) DEVICE — ZIMMER® STERILE DISPOSABLE TOURNIQUET CUFF WITH PROTECTIVE SLEEVE AND PLC, DUAL PORT, SINGLE BLADDER, 34 IN. (86 CM)

## (undated) DEVICE — 3M™ IOBAN™ 2 ANTIMICROBIAL INCISE DRAPE 6648EZ: Brand: IOBAN™ 2

## (undated) DEVICE — DRAPE,REIN 53X77,STERILE: Brand: MEDLINE

## (undated) DEVICE — ELECTRODE BLDE L4IN NONINSULATED EDGE

## (undated) DEVICE — DRAPE,U/ SHT,SPLIT,PLAS,STERIL: Brand: MEDLINE

## (undated) DEVICE — CURITY NON-ADHERENT STRIPS: Brand: CURITY

## (undated) DEVICE — ROCKER SWITCH PENCIL BLADE ELECTRODE, HOLSTER: Brand: EDGE

## (undated) DEVICE — SOLUTION IRRIG 1000ML 0.9% SOD CHL USP POUR PLAS BTL

## (undated) DEVICE — 3M™ TEGADERM™ TRANSPARENT FILM DRESSING FRAME STYLE, 1624W, 2-3/8 IN X 2-3/4 IN (6 CM X 7 CM), 100/CT 4CT/CASE: Brand: 3M™ TEGADERM™

## (undated) DEVICE — SWAB CULT LIQ STUART AGR AERB MOD IN BRK SGL RAYON TIP PLAS 220099] BECTON DICKINSON MICRO]

## (undated) DEVICE — HANDPIECE SET WITH COAXIAL HIGH FLOW TIP AND SUCTION TUBE: Brand: INTERPULSE

## (undated) DEVICE — GOWN,SIRUS,NONRNF,XLN/2XL,18/CS: Brand: MEDLINE

## (undated) DEVICE — NEEDLE HYPO 18GA L1.5IN PNK S STL HUB POLYPR SHLD REG BVL

## (undated) DEVICE — EXTREMITY III-LF: Brand: MEDLINE INDUSTRIES, INC.

## (undated) DEVICE — SUTURE ETHLN SZ 4-0 L18IN NONABSORBABLE BLK L19MM PS-2 3/8 1667H

## (undated) DEVICE — ATTUNE SOLO PINNING SYSTEM

## (undated) DEVICE — TOTAL JOINT-MRMC: Brand: MEDLINE INDUSTRIES, INC.

## (undated) DEVICE — GOWN,SIRUS,FABRNF,XL,20/CS: Brand: MEDLINE

## (undated) DEVICE — REM POLYHESIVE ADULT PATIENT RETURN ELECTRODE: Brand: VALLEYLAB

## (undated) DEVICE — CEMENT MIXING SYSTEM WITH FEMORAL BREAKWAY NOZZLE: Brand: REVOLUTION

## (undated) DEVICE — BANDAGE COBAN 4 IN COMPR W4INXL5YD FOAM COHESIVE QUIK STK SELF ADH SFT

## (undated) DEVICE — YANKAUER,SMOOTH HANDLE,HIGH CAPACITY: Brand: MEDLINE INDUSTRIES, INC.

## (undated) DEVICE — CULTURETTE SGL EVAC TUBE PALL -- 100/CA

## (undated) DEVICE — SYR 10ML LUER LOK 1/5ML GRAD --

## (undated) DEVICE — HOOD: Brand: FLYTE

## (undated) DEVICE — KENDALL DL ECG CABLE AND LEAD WIRE SYSTEM, 3-LEAD, SINGLE PATIENT USE: Brand: KENDALL

## (undated) DEVICE — SUTURE MCRYL SZ 3-0 L27IN ABSRB UD L19MM PS-2 3/8 CIR PRIM Y427H

## (undated) DEVICE — Device: Brand: JELCO

## (undated) DEVICE — GLOVE SURG SZ 85 L12IN FNGR THK79MIL GRN LTX FREE

## (undated) DEVICE — SUTURE VCRL SZ 1 L36IN ABSRB UD L36MM CT-1 1/2 CIR J947H

## (undated) DEVICE — TRAP FLUID BUFFALO FLTR

## (undated) DEVICE — STERILE POLYISOPRENE POWDER-FREE SURGICAL GLOVES: Brand: PROTEXIS

## (undated) DEVICE — CONTINU-FLO SOLUTION SET, 2 INJECTION SITES, MALE LUER LOCK ADAPTER WITH RETRACTABLE COLLAR, LARGE BORE STOPCOCK WITH ROTATING MALE LUER LOCK EXTENSION SET, 2 INJECTION SITES, MALE LUER LOCK ADAPTER WITH RETRACTABLE COLLAR: Brand: INTERLINK/CONTINU-FLO

## (undated) DEVICE — 3M™ TEGADERM™ TRANSPARENT FILM DRESSING FRAME STYLE, 1626W, 4 IN X 4-3/4 IN (10 CM X 12 CM), 50/CT 4CT/CASE: Brand: 3M™ TEGADERM™

## (undated) DEVICE — NEEDLE HYPO 25GA L1.5IN BVL ORIENTED ECLIPSE

## (undated) DEVICE — GLOVE SURG SZ 85 L12IN FNGR ORTHO 126MIL CRM LTX FREE

## (undated) DEVICE — TRANSFER SET 3": Brand: MEDLINE INDUSTRIES, INC.

## (undated) DEVICE — DRESSING,GAUZE,XEROFORM,CURAD,5"X9",ST: Brand: CURAD

## (undated) DEVICE — SOLUTION SURG PREP 26 CC PURPREP

## (undated) DEVICE — 450 ML BOTTLE OF 0.05% CHLORHEXIDINE GLUCONATE IN 99.95% STERILE WATER FOR IRRIGATION, USP AND APPLICATOR.: Brand: IRRISEPT ANTIMICROBIAL WOUND LAVAGE

## (undated) DEVICE — SOLUTION LACTATED RINGERS INJECTION USP

## (undated) DEVICE — BLADE RMFG 18.5X7.0MM .022IN --

## (undated) DEVICE — INFECTION CONTROL KIT SYS